# Patient Record
Sex: FEMALE | Race: WHITE | NOT HISPANIC OR LATINO | Employment: OTHER | URBAN - METROPOLITAN AREA
[De-identification: names, ages, dates, MRNs, and addresses within clinical notes are randomized per-mention and may not be internally consistent; named-entity substitution may affect disease eponyms.]

---

## 2017-09-13 ENCOUNTER — OFFICE VISIT (OUTPATIENT)
Dept: GASTROENTEROLOGY | Facility: CLINIC | Age: 62
End: 2017-09-13

## 2017-09-13 VITALS
SYSTOLIC BLOOD PRESSURE: 150 MMHG | TEMPERATURE: 97.1 F | OXYGEN SATURATION: 95 % | WEIGHT: 197.8 LBS | HEART RATE: 80 BPM | DIASTOLIC BLOOD PRESSURE: 84 MMHG | HEIGHT: 61 IN | BODY MASS INDEX: 37.34 KG/M2

## 2017-09-13 DIAGNOSIS — K85.90 ACUTE PANCREATITIS, UNSPECIFIED COMPLICATION STATUS, UNSPECIFIED PANCREATITIS TYPE: Primary | ICD-10-CM

## 2017-09-13 PROCEDURE — 99204 OFFICE O/P NEW MOD 45 MIN: CPT | Performed by: INTERNAL MEDICINE

## 2017-09-13 RX ORDER — CARVEDILOL 6.25 MG
6.25 TABLET ORAL 2 TIMES DAILY WITH MEALS
COMMUNITY

## 2017-09-13 RX ORDER — HYDROCODONE BITARTRATE AND ACETAMINOPHEN 5; 325 MG/1; MG/1
TABLET ORAL
COMMUNITY
Start: 2017-06-28 | End: 2017-09-13 | Stop reason: HOSPADM

## 2017-09-13 RX ORDER — ONDANSETRON 4 MG/1
4 TABLET, ORALLY DISINTEGRATING ORAL AS NEEDED
COMMUNITY
Start: 2017-06-28 | End: 2018-10-31

## 2017-09-13 RX ORDER — TICAGRELOR 90 MG/1
TABLET ORAL
COMMUNITY
Start: 2017-07-19 | End: 2017-09-13 | Stop reason: HOSPADM

## 2017-09-13 RX ORDER — LINAGLIPTIN 5 MG/1
5 TABLET, FILM COATED ORAL DAILY
COMMUNITY
Start: 2017-07-24 | End: 2018-10-31

## 2017-09-13 RX ORDER — FAMOTIDINE 20 MG/1
20 TABLET, FILM COATED ORAL 2 TIMES DAILY
COMMUNITY
Start: 2017-07-24

## 2017-09-13 RX ORDER — LORATADINE 10 MG/1
CAPSULE, LIQUID FILLED ORAL DAILY
COMMUNITY
End: 2018-12-03

## 2017-09-13 RX ORDER — LEVOTHYROXINE SODIUM 0.07 MG/1
75 TABLET ORAL DAILY
COMMUNITY
Start: 2017-08-01

## 2017-09-13 RX ORDER — ASPIRIN 81 MG/1
81 TABLET ORAL DAILY
COMMUNITY
End: 2019-01-01

## 2017-09-13 RX ORDER — EVOLOCUMAB 140 MG/ML
140 INJECTION, SOLUTION SUBCUTANEOUS
COMMUNITY
Start: 2017-07-28 | End: 2019-01-01

## 2017-09-13 RX ORDER — PANTOPRAZOLE SODIUM 40 MG/1
40 TABLET, DELAYED RELEASE ORAL DAILY
COMMUNITY
Start: 2017-07-03

## 2017-09-13 NOTE — PROGRESS NOTES
PCP: Rox Singleton MD    Chief Complaint   Patient presents with   • Abdominal Pain       History of Present Illness:   HPI  Ms. Valencia is a 63 yo with a history of diabetes, hyperlipidemia and chronic tobacco use.  She had an episode in June of upper abdominal pain and presented to the local hospital in [place], [place].  She was diagnosed with pancreatitis. Ms. Valencia was in the hospital for about 4 days.  The patient states she's been doing well over the last 2 months without any abdominal pain.  She denies any nausea or vomiting.  The patient has no difficult or painful swallowing.  The patient denies any breakthrough heartburn.  Ms. Valencia does not drink alcohol.  She denies any family history of pancreas disease.  There is no history of change in bowel habits or signs of gastrointestinal bleeding.  The patient denies any recent travel outside the [place]. There is no history of new medication.  Past Medical History:   Diagnosis Date   • Diabetes mellitus    • Hyperlipidemia    • Pancreatitis      Peripheral vascular disease  Past Surgical History:   Procedure Laterality Date   • CAROTID STENT     • COLONOSCOPY     • UPPER GASTROINTESTINAL ENDOSCOPY           Current Outpatient Prescriptions:   •  aspirin 81 MG EC tablet, Take 81 mg by mouth Daily., Disp: , Rfl:   •  carvedilol (COREG) 6.25 MG tablet, Take 6.25 mg by mouth 2 (Two) Times a Day With Meals., Disp: , Rfl:   •  famotidine (PEPCID) 20 MG tablet, Take 20 mg by mouth 2 (Two) Times a Day., Disp: , Rfl:   •  levothyroxine (SYNTHROID, LEVOTHROID) 75 MCG tablet, Take 75 mcg by mouth Daily., Disp: , Rfl:   •  Loratadine (CLARITIN) 10 MG capsule, Take  by mouth Daily., Disp: , Rfl:   •  ondansetron ODT (ZOFRAN-ODT) 4 MG disintegrating tablet, Take 4 mg by mouth As Needed., Disp: , Rfl:   •  pantoprazole (PROTONIX) 40 MG EC tablet, Take 40 mg by mouth Daily., Disp: , Rfl:   •  REPATHA SURECLICK 140 MG/ML solution auto-injector, Every 14  (Fourteen) Days., Disp: , Rfl:   •  ticagrelor (BRILINTA) 90 MG tablet tablet, Take 90 mg by mouth 2 (Two) Times a Day., Disp: , Rfl:   •  TRADJENTA 5 MG tablet tablet, Take 5 mg by mouth Daily., Disp: , Rfl:     Allergies   Allergen Reactions   • Plavix [Clopidogrel Bisulfate] Anaphylaxis   • Codeine    • Penicillins        Family History   Problem Relation Age of Onset   • Colon polyps Mother    • Ulcerative colitis Sister        Social History     Social History   • Marital status:      Spouse name: N/A   • Number of children: N/A   • Years of education: N/A     Occupational History   • Not on file.     Social History Main Topics   • Smoking status: Current Every Day Smoker     Types: Cigarettes   • Smokeless tobacco: Not on file   • Alcohol use No   • Drug use: Not on file   • Sexual activity: Not on file     Other Topics Concern   • Not on file     Social History Narrative   • No narrative on file       Review of Systems   Constitutional: Negative for activity change, appetite change, fatigue, fever and unexpected weight change.   HENT: Negative for dental problem, hearing loss, mouth sores, postnasal drip, sneezing, trouble swallowing and voice change.    Eyes: Negative for pain, redness, itching and visual disturbance.   Respiratory: Negative for cough, choking, chest tightness, shortness of breath and wheezing.    Cardiovascular: Negative for chest pain, palpitations and leg swelling.   Gastrointestinal: Positive for abdominal distention (bloating) and abdominal pain. Negative for anal bleeding, blood in stool, constipation, diarrhea, nausea, rectal pain and vomiting.   Endocrine: Negative for cold intolerance, heat intolerance, polydipsia, polyphagia and polyuria.   Genitourinary: Negative.  Negative for dysuria, enuresis, flank pain, hematuria and urgency.   Musculoskeletal: Negative for arthralgias, back pain, gait problem, joint swelling and myalgias.   Skin: Negative for color change, pallor and  rash.   Allergic/Immunologic: Negative for environmental allergies, food allergies and immunocompromised state.   Neurological: Negative for dizziness, tremors, seizures, facial asymmetry, speech difficulty, numbness and headaches.   Hematological: Negative for adenopathy.   Psychiatric/Behavioral: Negative for behavioral problems, confusion, dysphoric mood, hallucinations and self-injury.       Vitals:    09/13/17 1323   BP: 150/84   Pulse: 80   Temp: 97.1 °F (36.2 °C)   SpO2: 95%       Physical Exam   Constitutional: She is oriented to person, place, and time. She appears well-nourished. No distress.   HENT:   Head: Normocephalic and atraumatic.   Mouth/Throat: Oropharynx is clear and moist. No oropharyngeal exudate.   Eyes: EOM are normal. Pupils are equal, round, and reactive to light. No scleral icterus.   Neck: Normal range of motion. Neck supple. No thyromegaly present.   Cardiovascular: Normal rate, regular rhythm and normal heart sounds.  Exam reveals no gallop.    No murmur heard.  Pulmonary/Chest: Effort normal and breath sounds normal. She has no wheezes. She has no rales.   Abdominal: Soft. Bowel sounds are normal. There is no tenderness. There is no rebound and no guarding.   Well healed surgical incision   Musculoskeletal: Normal range of motion. She exhibits no edema or tenderness.   Lymphadenopathy:     She has no cervical adenopathy.   Neurological: She is alert and oriented to person, place, and time. She exhibits normal muscle tone.   Skin: Skin is dry. No rash noted. No erythema.   Psychiatric: She has a normal mood and affect. Her behavior is normal. Thought content normal.   Vitals reviewed.      Shauna was seen today for abdominal pain.    Diagnoses and all orders for this visit:    Acute pancreatitis, unspecified complication status, unspecified pancreatitis type  The patient has no significant history of alcohol use.  She does take the medicine Tradjenta and this  has been associated with  pancreatitis.  She also does smoke and this also associated with pancreas disease.  The clinical history is not suggestive of a passed common duct stone.      Plan: Encouraged tobacco cessation.           Will obtain a copy of CT scan from [facility] in [place], [place].           Will discuss case further with Dr. Oquendo regarding the medication for diabetes.      I spent over 50% of the office visit counseling and answering questions from the patient.

## 2017-10-13 ENCOUNTER — TELEPHONE (OUTPATIENT)
Dept: GASTROENTEROLOGY | Facility: CLINIC | Age: 62
End: 2017-10-13

## 2017-10-13 NOTE — TELEPHONE ENCOUNTER
Carolina,  Please call [facility] in [place] for CT Scan records. We will request it.

## 2017-11-29 ENCOUNTER — LAB (OUTPATIENT)
Dept: LAB | Facility: HOSPITAL | Age: 62
End: 2017-11-29

## 2017-11-29 ENCOUNTER — TRANSCRIBE ORDERS (OUTPATIENT)
Dept: LAB | Facility: HOSPITAL | Age: 62
End: 2017-11-29

## 2017-11-29 DIAGNOSIS — E78.00 PURE HYPERCHOLESTEROLEMIA: Primary | ICD-10-CM

## 2017-11-29 DIAGNOSIS — E78.00 PURE HYPERCHOLESTEROLEMIA: ICD-10-CM

## 2017-11-29 PROCEDURE — 36415 COLL VENOUS BLD VENIPUNCTURE: CPT

## 2017-11-29 PROCEDURE — 80076 HEPATIC FUNCTION PANEL: CPT | Performed by: INTERNAL MEDICINE

## 2017-11-29 PROCEDURE — 80061 LIPID PANEL: CPT | Performed by: INTERNAL MEDICINE

## 2017-11-30 LAB
ALBUMIN SERPL-MCNC: 4.5 G/DL (ref 3.4–4.8)
ALP SERPL-CCNC: 67 U/L (ref 35–104)
ALT SERPL W P-5'-P-CCNC: 23 U/L (ref 10–36)
AST SERPL-CCNC: 19 U/L (ref 10–30)
BILIRUB CONJ SERPL-MCNC: 0.2 MG/DL (ref 0–0.2)
BILIRUB INDIRECT SERPL-MCNC: 0.1 MG/DL
BILIRUB SERPL-MCNC: 0.3 MG/DL (ref 0.2–1.8)
CHOLEST SERPL-MCNC: 211 MG/DL (ref 0–200)
HDLC SERPL-MCNC: 35 MG/DL (ref 60–100)
LDLC SERPL CALC-MCNC: ABNORMAL MG/DL (ref 0–100)
LDLC/HDLC SERPL: ABNORMAL {RATIO}
PROT SERPL-MCNC: 6.9 G/DL (ref 6–8)
TRIGL SERPL-MCNC: 440 MG/DL (ref 0–150)
VLDLC SERPL-MCNC: ABNORMAL MG/DL

## 2018-01-23 ENCOUNTER — LAB (OUTPATIENT)
Dept: LAB | Facility: HOSPITAL | Age: 63
End: 2018-01-23

## 2018-01-23 ENCOUNTER — TRANSCRIBE ORDERS (OUTPATIENT)
Dept: LAB | Facility: HOSPITAL | Age: 63
End: 2018-01-23

## 2018-01-23 DIAGNOSIS — E78.00 HYPERCHOLESTEREMIA: ICD-10-CM

## 2018-01-23 DIAGNOSIS — E78.00 PURE HYPERCHOLESTEROLEMIA: ICD-10-CM

## 2018-01-23 DIAGNOSIS — E78.00 PURE HYPERCHOLESTEROLEMIA: Primary | ICD-10-CM

## 2018-01-23 PROCEDURE — 80061 LIPID PANEL: CPT | Performed by: INTERNAL MEDICINE

## 2018-01-23 PROCEDURE — 85027 COMPLETE CBC AUTOMATED: CPT | Performed by: INTERNAL MEDICINE

## 2018-01-23 PROCEDURE — 36415 COLL VENOUS BLD VENIPUNCTURE: CPT

## 2018-01-23 PROCEDURE — 80048 BASIC METABOLIC PNL TOTAL CA: CPT | Performed by: INTERNAL MEDICINE

## 2018-01-23 PROCEDURE — 80076 HEPATIC FUNCTION PANEL: CPT | Performed by: INTERNAL MEDICINE

## 2018-01-24 LAB
ALBUMIN SERPL-MCNC: 4.7 G/DL (ref 3.4–4.8)
ALP SERPL-CCNC: 68 U/L (ref 35–104)
ALT SERPL W P-5'-P-CCNC: 23 U/L (ref 10–36)
ANION GAP SERPL CALCULATED.3IONS-SCNC: 9.5 MMOL/L (ref 3.6–11.2)
AST SERPL-CCNC: 15 U/L (ref 10–30)
BILIRUB CONJ SERPL-MCNC: 0.1 MG/DL (ref 0–0.2)
BILIRUB INDIRECT SERPL-MCNC: 0.4 MG/DL
BILIRUB SERPL-MCNC: 0.5 MG/DL (ref 0.2–1.8)
BUN BLD-MCNC: 15 MG/DL (ref 7–21)
BUN/CREAT SERPL: 17 (ref 7–25)
CALCIUM SPEC-SCNC: 9.8 MG/DL (ref 7.7–10)
CHLORIDE SERPL-SCNC: 105 MMOL/L (ref 99–112)
CHOLEST SERPL-MCNC: 240 MG/DL (ref 0–200)
CO2 SERPL-SCNC: 26.5 MMOL/L (ref 24.3–31.9)
CREAT BLD-MCNC: 0.88 MG/DL (ref 0.43–1.29)
GFR SERPL CREATININE-BSD FRML MDRD: 66 ML/MIN/1.73
GLUCOSE BLD-MCNC: 124 MG/DL (ref 70–110)
HDLC SERPL-MCNC: 43 MG/DL (ref 60–100)
LDLC SERPL CALC-MCNC: 144 MG/DL (ref 0–100)
LDLC/HDLC SERPL: 3.34 {RATIO}
OSMOLALITY SERPL CALC.SUM OF ELEC: 283.5 MOSM/KG (ref 273–305)
POTASSIUM BLD-SCNC: 4 MMOL/L (ref 3.5–5.3)
PROT SERPL-MCNC: 7.3 G/DL (ref 6–8)
SODIUM BLD-SCNC: 141 MMOL/L (ref 135–153)
TRIGL SERPL-MCNC: 266 MG/DL (ref 0–150)
VLDLC SERPL-MCNC: 53.2 MG/DL

## 2018-01-25 LAB
DEPRECATED RDW RBC AUTO: 43.7 FL (ref 37–54)
ERYTHROCYTE [DISTWIDTH] IN BLOOD BY AUTOMATED COUNT: 13.7 % (ref 11.5–14.5)
HCT VFR BLD AUTO: 43.3 % (ref 37–47)
HGB BLD-MCNC: 14 G/DL (ref 12–16)
MCH RBC QN AUTO: 28.7 PG (ref 27–33)
MCHC RBC AUTO-ENTMCNC: 32.3 G/DL (ref 33–37)
MCV RBC AUTO: 88.9 FL (ref 80–94)
PLATELET # BLD AUTO: 305 10*3/MM3 (ref 130–400)
PMV BLD AUTO: 10.3 FL (ref 6–10)
RBC # BLD AUTO: 4.87 10*6/MM3 (ref 4.2–5.4)
WBC NRBC COR # BLD: 7.91 10*3/MM3 (ref 4.5–12.5)

## 2018-05-24 ENCOUNTER — LAB (OUTPATIENT)
Dept: LAB | Facility: HOSPITAL | Age: 63
End: 2018-05-24

## 2018-05-24 ENCOUNTER — TRANSCRIBE ORDERS (OUTPATIENT)
Dept: LAB | Facility: HOSPITAL | Age: 63
End: 2018-05-24

## 2018-05-24 DIAGNOSIS — E78.00 PURE HYPERCHOLESTEROLEMIA: Primary | ICD-10-CM

## 2018-05-24 DIAGNOSIS — E78.00 PURE HYPERCHOLESTEROLEMIA: ICD-10-CM

## 2018-05-24 PROCEDURE — 36415 COLL VENOUS BLD VENIPUNCTURE: CPT

## 2018-05-24 PROCEDURE — 80076 HEPATIC FUNCTION PANEL: CPT | Performed by: INTERNAL MEDICINE

## 2018-05-24 PROCEDURE — 80061 LIPID PANEL: CPT | Performed by: INTERNAL MEDICINE

## 2018-05-25 LAB
ALBUMIN SERPL-MCNC: 4.6 G/DL (ref 3.4–4.8)
ALP SERPL-CCNC: 65 U/L (ref 35–104)
ALT SERPL W P-5'-P-CCNC: 18 U/L (ref 10–36)
AST SERPL-CCNC: 21 U/L (ref 10–30)
BILIRUB CONJ SERPL-MCNC: 0.2 MG/DL (ref 0–0.2)
BILIRUB INDIRECT SERPL-MCNC: 0.3 MG/DL
BILIRUB SERPL-MCNC: 0.5 MG/DL (ref 0.2–1.8)
CHOLEST SERPL-MCNC: 144 MG/DL (ref 0–200)
HDLC SERPL-MCNC: 34 MG/DL (ref 60–100)
LDLC SERPL CALC-MCNC: 62 MG/DL (ref 0–100)
LDLC/HDLC SERPL: 1.84 {RATIO}
PROT SERPL-MCNC: 7 G/DL (ref 6–8)
TRIGL SERPL-MCNC: 238 MG/DL (ref 0–150)
VLDLC SERPL-MCNC: 47.6 MG/DL

## 2018-10-31 ENCOUNTER — HOSPITAL ENCOUNTER (INPATIENT)
Facility: HOSPITAL | Age: 63
LOS: 17 days | Discharge: HOME HEALTH | End: 2018-11-17
Attending: EMERGENCY MEDICINE | Admitting: INTERNAL MEDICINE

## 2018-10-31 ENCOUNTER — APPOINTMENT (OUTPATIENT)
Dept: CT IMAGING | Facility: HOSPITAL | Age: 63
End: 2018-10-31

## 2018-10-31 DIAGNOSIS — C34.12 SMALL CELL LUNG CANCER, LEFT UPPER LOBE (HCC): ICD-10-CM

## 2018-10-31 DIAGNOSIS — R91.8 MASS OF UPPER LOBE OF LEFT LUNG: ICD-10-CM

## 2018-10-31 DIAGNOSIS — E87.1 ACUTE HYPONATREMIA: Primary | ICD-10-CM

## 2018-10-31 DIAGNOSIS — R06.00 DYSPNEA, UNSPECIFIED TYPE: ICD-10-CM

## 2018-10-31 DIAGNOSIS — R13.13 PHARYNGEAL DYSPHAGIA: ICD-10-CM

## 2018-10-31 DIAGNOSIS — R91.8 HILAR MASS: ICD-10-CM

## 2018-10-31 PROBLEM — E66.9 OBESITY (BMI 30-39.9): Status: ACTIVE | Noted: 2018-10-31

## 2018-10-31 PROBLEM — E11.9 TYPE 2 DIABETES MELLITUS (HCC): Status: ACTIVE | Noted: 2018-10-31

## 2018-10-31 PROBLEM — E78.5 HYPERLIPIDEMIA: Status: ACTIVE | Noted: 2018-10-31

## 2018-10-31 PROBLEM — Z72.0 TOBACCO ABUSE: Status: ACTIVE | Noted: 2018-10-31

## 2018-10-31 LAB
ANION GAP SERPL CALCULATED.3IONS-SCNC: 5 MMOL/L (ref 3–11)
BASOPHILS # BLD AUTO: 0.04 10*3/MM3 (ref 0–0.2)
BASOPHILS NFR BLD AUTO: 0.5 % (ref 0–1)
BILIRUB UR QL STRIP: NEGATIVE
BUN BLD-MCNC: 12 MG/DL (ref 9–23)
BUN/CREAT SERPL: 17.9 (ref 7–25)
CALCIUM SPEC-SCNC: 8.8 MG/DL (ref 8.7–10.4)
CHLORIDE SERPL-SCNC: 87 MMOL/L (ref 99–109)
CLARITY UR: CLEAR
CO2 SERPL-SCNC: 25 MMOL/L (ref 20–31)
COLOR UR: ABNORMAL
CREAT BLD-MCNC: 0.67 MG/DL (ref 0.6–1.3)
DEPRECATED RDW RBC AUTO: 37.9 FL (ref 37–54)
EOSINOPHIL # BLD AUTO: 0.15 10*3/MM3 (ref 0–0.3)
EOSINOPHIL NFR BLD AUTO: 2 % (ref 0–3)
ERYTHROCYTE [DISTWIDTH] IN BLOOD BY AUTOMATED COUNT: 12.7 % (ref 11.3–14.5)
GFR SERPL CREATININE-BSD FRML MDRD: 89 ML/MIN/1.73
GLUCOSE BLD-MCNC: 146 MG/DL (ref 70–100)
GLUCOSE UR STRIP-MCNC: NEGATIVE MG/DL
HCT VFR BLD AUTO: 33.8 % (ref 34.5–44)
HGB BLD-MCNC: 11.7 G/DL (ref 11.5–15.5)
HGB UR QL STRIP.AUTO: NEGATIVE
IMM GRANULOCYTES # BLD: 0.02 10*3/MM3 (ref 0–0.03)
IMM GRANULOCYTES NFR BLD: 0.3 % (ref 0–0.6)
KETONES UR QL STRIP: ABNORMAL
LEUKOCYTE ESTERASE UR QL STRIP.AUTO: NEGATIVE
LYMPHOCYTES # BLD AUTO: 1.59 10*3/MM3 (ref 0.6–4.8)
LYMPHOCYTES NFR BLD AUTO: 21.1 % (ref 24–44)
MCH RBC QN AUTO: 28.4 PG (ref 27–31)
MCHC RBC AUTO-ENTMCNC: 34.6 G/DL (ref 32–36)
MCV RBC AUTO: 82 FL (ref 80–99)
MONOCYTES # BLD AUTO: 0.66 10*3/MM3 (ref 0–1)
MONOCYTES NFR BLD AUTO: 8.8 % (ref 0–12)
NEUTROPHILS # BLD AUTO: 5.1 10*3/MM3 (ref 1.5–8.3)
NEUTROPHILS NFR BLD AUTO: 67.6 % (ref 41–71)
NITRITE UR QL STRIP: NEGATIVE
OSMOLALITY SERPL: 256 MOSM/KG (ref 275–295)
OSMOLALITY UR: 879 MOSM/KG (ref 300–1100)
PH UR STRIP.AUTO: 6 [PH] (ref 5–8)
PLATELET # BLD AUTO: 422 10*3/MM3 (ref 150–450)
PMV BLD AUTO: 9 FL (ref 6–12)
POTASSIUM BLD-SCNC: 4.4 MMOL/L (ref 3.5–5.5)
PROT UR QL STRIP: NEGATIVE
RBC # BLD AUTO: 4.12 10*6/MM3 (ref 3.89–5.14)
SODIUM BLD-SCNC: 117 MMOL/L (ref 132–146)
SODIUM UR-SCNC: 55 MMOL/L (ref 30–90)
SP GR UR STRIP: >=1.03 (ref 1–1.03)
UROBILINOGEN UR QL STRIP: ABNORMAL
WBC NRBC COR # BLD: 7.54 10*3/MM3 (ref 3.5–10.8)

## 2018-10-31 PROCEDURE — 84300 ASSAY OF URINE SODIUM: CPT | Performed by: EMERGENCY MEDICINE

## 2018-10-31 PROCEDURE — 71250 CT THORAX DX C-: CPT

## 2018-10-31 PROCEDURE — 99223 1ST HOSP IP/OBS HIGH 75: CPT | Performed by: HOSPITALIST

## 2018-10-31 PROCEDURE — G0378 HOSPITAL OBSERVATION PER HR: HCPCS

## 2018-10-31 PROCEDURE — 85025 COMPLETE CBC W/AUTO DIFF WBC: CPT | Performed by: EMERGENCY MEDICINE

## 2018-10-31 PROCEDURE — 81003 URINALYSIS AUTO W/O SCOPE: CPT | Performed by: EMERGENCY MEDICINE

## 2018-10-31 PROCEDURE — 83930 ASSAY OF BLOOD OSMOLALITY: CPT | Performed by: EMERGENCY MEDICINE

## 2018-10-31 PROCEDURE — 80048 BASIC METABOLIC PNL TOTAL CA: CPT | Performed by: HOSPITALIST

## 2018-10-31 PROCEDURE — 83036 HEMOGLOBIN GLYCOSYLATED A1C: CPT | Performed by: HOSPITALIST

## 2018-10-31 PROCEDURE — 99284 EMERGENCY DEPT VISIT MOD MDM: CPT

## 2018-10-31 PROCEDURE — 63710000001 INSULIN LISPRO (HUMAN) PER 5 UNITS: Performed by: HOSPITALIST

## 2018-10-31 PROCEDURE — 83935 ASSAY OF URINE OSMOLALITY: CPT | Performed by: EMERGENCY MEDICINE

## 2018-10-31 PROCEDURE — 80048 BASIC METABOLIC PNL TOTAL CA: CPT | Performed by: EMERGENCY MEDICINE

## 2018-10-31 RX ORDER — ASPIRIN 81 MG/1
81 TABLET ORAL DAILY
Status: DISCONTINUED | OUTPATIENT
Start: 2018-11-01 | End: 2018-11-17 | Stop reason: HOSPADM

## 2018-10-31 RX ORDER — LEVOTHYROXINE SODIUM 0.07 MG/1
75 TABLET ORAL DAILY
Status: DISCONTINUED | OUTPATIENT
Start: 2018-11-01 | End: 2018-11-17 | Stop reason: HOSPADM

## 2018-10-31 RX ORDER — ALBUTEROL SULFATE 90 UG/1
2 AEROSOL, METERED RESPIRATORY (INHALATION) EVERY 4 HOURS PRN
COMMUNITY
End: 2019-01-01 | Stop reason: HOSPADM

## 2018-10-31 RX ORDER — FAMOTIDINE 20 MG/1
20 TABLET, FILM COATED ORAL DAILY
Status: DISCONTINUED | OUTPATIENT
Start: 2018-11-01 | End: 2018-11-09

## 2018-10-31 RX ORDER — DOXYCYCLINE 100 MG/1
100 CAPSULE ORAL EVERY 12 HOURS SCHEDULED
Status: DISCONTINUED | OUTPATIENT
Start: 2018-10-31 | End: 2018-11-01

## 2018-10-31 RX ORDER — CARVEDILOL 6.25 MG/1
6.25 TABLET ORAL 2 TIMES DAILY WITH MEALS
Status: DISCONTINUED | OUTPATIENT
Start: 2018-10-31 | End: 2018-11-17 | Stop reason: HOSPADM

## 2018-10-31 RX ORDER — NICOTINE POLACRILEX 4 MG
15 LOZENGE BUCCAL
Status: DISCONTINUED | OUTPATIENT
Start: 2018-10-31 | End: 2018-11-17 | Stop reason: HOSPADM

## 2018-10-31 RX ORDER — CETIRIZINE HYDROCHLORIDE 10 MG/1
10 TABLET ORAL DAILY
Status: DISCONTINUED | OUTPATIENT
Start: 2018-11-01 | End: 2018-11-17 | Stop reason: HOSPADM

## 2018-10-31 RX ORDER — CEFTRIAXONE SODIUM 1 G/50ML
1 INJECTION, SOLUTION INTRAVENOUS
Status: DISCONTINUED | OUTPATIENT
Start: 2018-10-31 | End: 2018-11-01

## 2018-10-31 RX ORDER — SODIUM CHLORIDE 9 MG/ML
75 INJECTION, SOLUTION INTRAVENOUS CONTINUOUS
Status: DISCONTINUED | OUTPATIENT
Start: 2018-10-31 | End: 2018-11-01

## 2018-10-31 RX ORDER — DEXTROSE MONOHYDRATE 25 G/50ML
25 INJECTION, SOLUTION INTRAVENOUS
Status: DISCONTINUED | OUTPATIENT
Start: 2018-10-31 | End: 2018-11-17 | Stop reason: HOSPADM

## 2018-10-31 RX ADMIN — SODIUM CHLORIDE 75 ML/HR: 9 INJECTION, SOLUTION INTRAVENOUS at 21:38

## 2018-10-31 RX ADMIN — CARVEDILOL 6.25 MG: 6.25 TABLET, FILM COATED ORAL at 21:40

## 2018-11-01 ENCOUNTER — ANCILLARY PROCEDURE (OUTPATIENT)
Dept: SPEECH THERAPY | Facility: HOSPITAL | Age: 63
End: 2018-11-01
Attending: HOSPITALIST

## 2018-11-01 PROBLEM — R91.8 MASS OF UPPER LOBE OF LEFT LUNG: Status: ACTIVE | Noted: 2018-11-01

## 2018-11-01 PROBLEM — R59.0 MEDIASTINAL LYMPHADENOPATHY: Status: ACTIVE | Noted: 2018-11-01

## 2018-11-01 LAB
ANION GAP SERPL CALCULATED.3IONS-SCNC: 3 MMOL/L (ref 3–11)
ANION GAP SERPL CALCULATED.3IONS-SCNC: 4 MMOL/L (ref 3–11)
ANION GAP SERPL CALCULATED.3IONS-SCNC: 7 MMOL/L (ref 3–11)
ANION GAP SERPL CALCULATED.3IONS-SCNC: 8 MMOL/L (ref 3–11)
BUN BLD-MCNC: 10 MG/DL (ref 9–23)
BUN BLD-MCNC: 10 MG/DL (ref 9–23)
BUN BLD-MCNC: 11 MG/DL (ref 9–23)
BUN BLD-MCNC: 12 MG/DL (ref 9–23)
BUN BLD-MCNC: 13 MG/DL (ref 9–23)
BUN BLD-MCNC: 13 MG/DL (ref 9–23)
BUN/CREAT SERPL: 15.4 (ref 7–25)
BUN/CREAT SERPL: 15.9 (ref 7–25)
BUN/CREAT SERPL: 16.9 (ref 7–25)
BUN/CREAT SERPL: 18.8 (ref 7–25)
BUN/CREAT SERPL: 21.1 (ref 7–25)
BUN/CREAT SERPL: 22.8 (ref 7–25)
CALCIUM SPEC-SCNC: 8.1 MG/DL (ref 8.7–10.4)
CALCIUM SPEC-SCNC: 8.3 MG/DL (ref 8.7–10.4)
CALCIUM SPEC-SCNC: 8.4 MG/DL (ref 8.7–10.4)
CALCIUM SPEC-SCNC: 8.4 MG/DL (ref 8.7–10.4)
CALCIUM SPEC-SCNC: 8.5 MG/DL (ref 8.7–10.4)
CALCIUM SPEC-SCNC: 8.6 MG/DL (ref 8.7–10.4)
CHLORIDE SERPL-SCNC: 88 MMOL/L (ref 99–109)
CHLORIDE SERPL-SCNC: 89 MMOL/L (ref 99–109)
CHLORIDE SERPL-SCNC: 89 MMOL/L (ref 99–109)
CHLORIDE SERPL-SCNC: 90 MMOL/L (ref 99–109)
CHLORIDE SERPL-SCNC: 90 MMOL/L (ref 99–109)
CHLORIDE SERPL-SCNC: 91 MMOL/L (ref 99–109)
CO2 SERPL-SCNC: 21 MMOL/L (ref 20–31)
CO2 SERPL-SCNC: 23 MMOL/L (ref 20–31)
CO2 SERPL-SCNC: 24 MMOL/L (ref 20–31)
CO2 SERPL-SCNC: 26 MMOL/L (ref 20–31)
CO2 SERPL-SCNC: 26 MMOL/L (ref 20–31)
CO2 SERPL-SCNC: 28 MMOL/L (ref 20–31)
CREAT BLD-MCNC: 0.57 MG/DL (ref 0.6–1.3)
CREAT BLD-MCNC: 0.57 MG/DL (ref 0.6–1.3)
CREAT BLD-MCNC: 0.63 MG/DL (ref 0.6–1.3)
CREAT BLD-MCNC: 0.65 MG/DL (ref 0.6–1.3)
CREAT BLD-MCNC: 0.65 MG/DL (ref 0.6–1.3)
CREAT BLD-MCNC: 0.69 MG/DL (ref 0.6–1.3)
GFR SERPL CREATININE-BSD FRML MDRD: 108 ML/MIN/1.73
GFR SERPL CREATININE-BSD FRML MDRD: 108 ML/MIN/1.73
GFR SERPL CREATININE-BSD FRML MDRD: 86 ML/MIN/1.73
GFR SERPL CREATININE-BSD FRML MDRD: 93 ML/MIN/1.73
GFR SERPL CREATININE-BSD FRML MDRD: 93 ML/MIN/1.73
GFR SERPL CREATININE-BSD FRML MDRD: 96 ML/MIN/1.73
GLUCOSE BLD-MCNC: 107 MG/DL (ref 70–100)
GLUCOSE BLD-MCNC: 113 MG/DL (ref 70–100)
GLUCOSE BLD-MCNC: 124 MG/DL (ref 70–100)
GLUCOSE BLD-MCNC: 138 MG/DL (ref 70–100)
GLUCOSE BLD-MCNC: 162 MG/DL (ref 70–100)
GLUCOSE BLD-MCNC: 180 MG/DL (ref 70–100)
GLUCOSE BLDC GLUCOMTR-MCNC: 136 MG/DL (ref 70–130)
GLUCOSE BLDC GLUCOMTR-MCNC: 157 MG/DL (ref 70–130)
GLUCOSE BLDC GLUCOMTR-MCNC: 170 MG/DL (ref 70–130)
HBA1C MFR BLD: 7.3 % (ref 4.8–5.6)
POTASSIUM BLD-SCNC: 3.8 MMOL/L (ref 3.5–5.5)
POTASSIUM BLD-SCNC: 3.9 MMOL/L (ref 3.5–5.5)
POTASSIUM BLD-SCNC: 4 MMOL/L (ref 3.5–5.5)
POTASSIUM BLD-SCNC: 4.1 MMOL/L (ref 3.5–5.5)
POTASSIUM BLD-SCNC: 4.2 MMOL/L (ref 3.5–5.5)
POTASSIUM BLD-SCNC: 4.5 MMOL/L (ref 3.5–5.5)
SODIUM BLD-SCNC: 118 MMOL/L (ref 132–146)
SODIUM BLD-SCNC: 118 MMOL/L (ref 132–146)
SODIUM BLD-SCNC: 119 MMOL/L (ref 132–146)
SODIUM BLD-SCNC: 119 MMOL/L (ref 132–146)
SODIUM BLD-SCNC: 120 MMOL/L (ref 132–146)
SODIUM BLD-SCNC: 121 MMOL/L (ref 132–146)
TSH SERPL DL<=0.05 MIU/L-ACNC: 0.73 MIU/ML (ref 0.35–5.35)

## 2018-11-01 PROCEDURE — 84443 ASSAY THYROID STIM HORMONE: CPT | Performed by: HOSPITALIST

## 2018-11-01 PROCEDURE — 25010000002 ENOXAPARIN PER 10 MG: Performed by: HOSPITALIST

## 2018-11-01 PROCEDURE — 82962 GLUCOSE BLOOD TEST: CPT

## 2018-11-01 PROCEDURE — 80048 BASIC METABOLIC PNL TOTAL CA: CPT | Performed by: HOSPITALIST

## 2018-11-01 PROCEDURE — 25010000002 FUROSEMIDE PER 20 MG: Performed by: NURSE PRACTITIONER

## 2018-11-01 PROCEDURE — 92610 EVALUATE SWALLOWING FUNCTION: CPT

## 2018-11-01 PROCEDURE — 99233 SBSQ HOSP IP/OBS HIGH 50: CPT | Performed by: HOSPITALIST

## 2018-11-01 PROCEDURE — 25010000002 CEFTRIAXONE PER 250 MG: Performed by: HOSPITALIST

## 2018-11-01 PROCEDURE — 25010000002 ONDANSETRON PER 1 MG: Performed by: HOSPITALIST

## 2018-11-01 PROCEDURE — 99221 1ST HOSP IP/OBS SF/LOW 40: CPT | Performed by: INTERNAL MEDICINE

## 2018-11-01 PROCEDURE — 92612 ENDOSCOPY SWALLOW (FEES) VID: CPT

## 2018-11-01 RX ORDER — ONDANSETRON 2 MG/ML
4 INJECTION INTRAMUSCULAR; INTRAVENOUS EVERY 6 HOURS PRN
Status: DISCONTINUED | OUTPATIENT
Start: 2018-11-01 | End: 2018-11-17 | Stop reason: HOSPADM

## 2018-11-01 RX ORDER — ACETAMINOPHEN 325 MG/1
650 TABLET ORAL EVERY 6 HOURS PRN
Status: DISCONTINUED | OUTPATIENT
Start: 2018-11-01 | End: 2018-11-17 | Stop reason: HOSPADM

## 2018-11-01 RX ORDER — BENZONATATE 100 MG/1
200 CAPSULE ORAL 3 TIMES DAILY PRN
Status: DISCONTINUED | OUTPATIENT
Start: 2018-11-01 | End: 2018-11-17 | Stop reason: HOSPADM

## 2018-11-01 RX ORDER — NICOTINE 21 MG/24HR
1 PATCH, TRANSDERMAL 24 HOURS TRANSDERMAL
Status: DISCONTINUED | OUTPATIENT
Start: 2018-11-01 | End: 2018-11-17 | Stop reason: HOSPADM

## 2018-11-01 RX ORDER — PANTOPRAZOLE SODIUM 40 MG/1
40 TABLET, DELAYED RELEASE ORAL
Status: DISCONTINUED | OUTPATIENT
Start: 2018-11-01 | End: 2018-11-17 | Stop reason: HOSPADM

## 2018-11-01 RX ORDER — FUROSEMIDE 10 MG/ML
40 INJECTION INTRAMUSCULAR; INTRAVENOUS ONCE
Status: COMPLETED | OUTPATIENT
Start: 2018-11-01 | End: 2018-11-01

## 2018-11-01 RX ORDER — SODIUM CHLORIDE 1000 MG
1 TABLET, SOLUBLE MISCELLANEOUS
Status: DISCONTINUED | OUTPATIENT
Start: 2018-11-01 | End: 2018-11-05

## 2018-11-01 RX ADMIN — INSULIN LISPRO 2 UNITS: 100 INJECTION, SOLUTION INTRAVENOUS; SUBCUTANEOUS at 21:05

## 2018-11-01 RX ADMIN — Medication 1 G: at 17:15

## 2018-11-01 RX ADMIN — ENOXAPARIN SODIUM 40 MG: 100 INJECTION SUBCUTANEOUS at 06:23

## 2018-11-01 RX ADMIN — Medication 1 G: at 12:05

## 2018-11-01 RX ADMIN — NICOTINE 1 PATCH: 21 PATCH, EXTENDED RELEASE TRANSDERMAL at 12:05

## 2018-11-01 RX ADMIN — LEVOTHYROXINE SODIUM 75 MCG: 75 TABLET ORAL at 06:23

## 2018-11-01 RX ADMIN — BENZONATATE 200 MG: 100 CAPSULE ORAL at 21:04

## 2018-11-01 RX ADMIN — TICAGRELOR 90 MG: 90 TABLET ORAL at 08:51

## 2018-11-01 RX ADMIN — SODIUM CHLORIDE 75 ML/HR: 9 INJECTION, SOLUTION INTRAVENOUS at 09:51

## 2018-11-01 RX ADMIN — DOXYCYCLINE 100 MG: 100 CAPSULE ORAL at 00:39

## 2018-11-01 RX ADMIN — ASPIRIN 81 MG: 81 TABLET, COATED ORAL at 08:51

## 2018-11-01 RX ADMIN — ONDANSETRON HYDROCHLORIDE 4 MG: 2 INJECTION, SOLUTION INTRAMUSCULAR; INTRAVENOUS at 09:50

## 2018-11-01 RX ADMIN — CETIRIZINE HYDROCHLORIDE 10 MG: 10 TABLET, FILM COATED ORAL at 08:51

## 2018-11-01 RX ADMIN — PANTOPRAZOLE SODIUM 40 MG: 40 TABLET, DELAYED RELEASE ORAL at 09:50

## 2018-11-01 RX ADMIN — CARVEDILOL 6.25 MG: 6.25 TABLET, FILM COATED ORAL at 08:51

## 2018-11-01 RX ADMIN — CEFTRIAXONE SODIUM 1 G: 1 INJECTION, SOLUTION INTRAVENOUS at 00:39

## 2018-11-01 RX ADMIN — Medication 5 MG: at 21:04

## 2018-11-01 RX ADMIN — CARVEDILOL 6.25 MG: 6.25 TABLET, FILM COATED ORAL at 17:15

## 2018-11-01 RX ADMIN — INSULIN LISPRO 2 UNITS: 100 INJECTION, SOLUTION INTRAVENOUS; SUBCUTANEOUS at 00:40

## 2018-11-01 RX ADMIN — FAMOTIDINE 20 MG: 20 TABLET ORAL at 08:51

## 2018-11-01 RX ADMIN — ACETAMINOPHEN 650 MG: 325 TABLET ORAL at 09:41

## 2018-11-01 RX ADMIN — FUROSEMIDE 40 MG: 10 INJECTION, SOLUTION INTRAMUSCULAR; INTRAVENOUS at 22:12

## 2018-11-01 RX ADMIN — DOXYCYCLINE 100 MG: 100 CAPSULE ORAL at 08:51

## 2018-11-01 NOTE — PLAN OF CARE
Problem: Pain, Acute (Adult)  Goal: Identify Related Risk Factors and Signs and Symptoms  Pt request tylenol for pain of head and right leg, chronic type. Reports relief.

## 2018-11-01 NOTE — DISCHARGE INSTR - DIET
MBS/VFSS/FEES 11/01/18    Reason for Referral  Patient was referred for a FEES to assess the efficiency of his/her swallow function, rule out aspiration and make recommendations regarding safe dietary consistencies, effective compensatory strategies, and safe eating environment.                   Recommendations/Treatment            Risks/Benefits Reviewed: (P) risks/benefits explained, patient, family, agreed to eval  Nasal Entry: (P) right:  Special Considerations: (P) Scope #338    SLP Swallowing Diagnosis: (P) functional oral phase, moderate, pharyngeal dysfunction  Functional Impact: (P) risk of aspiration/pneumonia  Rehab Potential/Prognosis, Swallowing: (P) good, to achieve stated therapy goals  Swallow Criteria for Skilled Therapeutic Interventions Met: (P) demonstrates skilled criteria    Therapy Frequency (Swallow): (P) 5 days per week  Predicted Duration Therapy Intervention (Days): (P) until discharge  SLP Diet Recommendation: (P) mechanical soft with no mixed consistencies, honey thick liquids  Recommended Precautions and Strategies: (P) upright posture during/after eating, small bites of food and sips of liquid, no straw, alternate between small bites of food and sips of liquid, other (see comments) (oral care BID/PRN)  SLP Rec. for Method of Medication Administration: (P) meds whole, with pudding or applesauce, as tolerated  Monitor for Signs of Aspiration: (P) yes, notify SLP if any concerns, cough, gurgly voice, throat clearing  Anticipated Dischage Disposition: (P) unknown, anticipate therapy at next level of care      Oral Preparation/ Oral Phase       Oral Phase: (P) WFL    Pharyngeal Phase       Initiation of Pharyngeal Swallow: (P) bolus in pyriform sinuses  Pharyngeal Phase: (P) impaired pharyngeal phase of swallowing  Penetration During the Swallow: (P) honey-thick liquids, secondary to delayed swallow initiation or mistiming, secondary to reduced laryngeal elevation, secondary to reduced  vestibular closure  Aspiration During the Swallow: (P) thin liquids, nectar-thick liquids, secondary to delayed swallow initiation or mistiming, secondary to reduced laryngeal elevation, secondary to reduced vestibular closure  Response to Aspiration: (P) response with cue only, could not clear subglottic material  Rosenbek's Scale: (P) thin:, nectar:, 8-->Level 8, honey:, 3-->Level 3, pudding/puree:, regular textures:, 1-->Level 1  Residue: (P) all consistencies tested, base of tongue, valleculae, posterior pharyngeal wall, secondary to reduced base of tongue retraction, secondary to reduced posterior pharyngeal wall stripping, secondary to reduced laryngeal elevation (> w/ pudding and solid )  Response to Residue: (P) cleared residue, with liquid wash  Attempted Compensatory Maneuvers: (P) bolus size, bolus presentation style, chin tuck, additional subsequent swallow, alternate liquids/solids, breath hold, throat clear after swallow  Response to Attempted Compensatory Maneuvers: (P) did not prevent aspiration  FEES Summary: (P) FEES completed. Trials given of thin via tsp, cup, and straw, nectar-thick via tsp, cup, honey-thick via tsp and cup, pudding, and solid. Moderate pharyngeal dysphagia. Swallow initiation delayed to the pyriform sinuses w/ all consistencies. Trace aspiration during the swallow w/ thin and nectar-thick liquids 2' delay and decr'd elevation and vestibular closure. Aspiration was silent, pt was u/a to fully clear subglottic material w/ cued cough. Intermittent trace penetration above the level of the vocal folds w/ honey-thick liquids 2' to delay and decr'd elevation and vestibular closure. No penetration or aspiration observed w/ pudding or solid. Moderate diffuse pharyngeal residue w/ pudding and solid 2' decr'd base of tongue retraction, posterior pharyngeal wall contraction, and decr'd elevation. Pharyngeal residue decr'd w/ liquid wash. Compensatory strategies were attempted but  unsuccessful in preventing aspiration. Recommend level 4 diet w/ honey-thick liquids, no straws, small bites and sips, and alternating bites and sips. Meds whole w/ puree as tolerated. Will plan to begin dysphagia tx.         Cervical Esophageal Phase              Prognosis

## 2018-11-01 NOTE — PLAN OF CARE
Problem: Patient Care Overview  Goal: Plan of Care Review  Outcome: Ongoing (interventions implemented as appropriate)   11/01/18 4433   Coping/Psychosocial   Plan of Care Reviewed With patient;daughter   SLP evaluation completed. Clinical swallow eval revealed s/sxs aspiration, so completed FEES to further assess. FEES revealed moderate pharyngeal dysphagia w/ silent trace aspiration of thin and nectar-thick liquid. Also, noted laryngeal edema (greatest in arytenoids bilaterally), as well as edematous/vascular lesion located between right arytenoid and right pyriform sinus (see images in chart). May consider ENT consult, per MD discretion. Rec: dysphagia level IV diet, honey-thick liquid; small bites/sips; no straws; alternate bites/sips. Will address dysphagia in tx. Please see note for further details and recommendations.

## 2018-11-01 NOTE — CONSULTS
Pulmonary Hospital Consult Note     LOS: 1 day   Patient Care Team:  Rox Singleton MD as PCP - General (Internal Medicine)    Chief Complaint:    Chief Complaint   Patient presents with   • Abnormal Lab       Subjective     HPI:   63-year-old female lifetime smoker with a history of coronary artery disease status post 5 stents in the past, the last of which was greater than 2 years ago, diabetes was admitted for hyponatremia.  Further evaluation with CT scan of the chest showed a left upper lobe lung mass worrisome for malignancy.  We are consulted for further evaluation.    The patient reports fatigue for the past 3 weeks or so.  She was evaluated by her primary care provider and found to have hyponatremia.  She was put on an increased salt diet and recheck of her sodium showed some improvement.  On 10/31/2018 the patient followed up with her endocrinologist and had a repeat sodium which was 117.  She was subsequently sent straight to the emergency department and admitted to the hospitalist service.    The patient does report slightly worsening dyspnea from baseline.  She has a history of COPD and takes a Ventolin rescue inhaler as well as a Flovent inhaler regularly.  She denies any hemoptysis.  She denies lymphadenopathy, night sweats or unexplained weight loss.  She takes Brilinta.  She reports a remote history of a blood clot in her right leg but was never treated with anticoagulation.    History taken from: patient    Past Medical History:   Diagnosis Date   • Diabetes mellitus (CMS/HCC)    • Hyperlipidemia    • Pancreatitis        Past Surgical History:   Procedure Laterality Date   • CAROTID STENT     • COLONOSCOPY     • UPPER GASTROINTESTINAL ENDOSCOPY         Family History   Problem Relation Age of Onset   • Colon polyps Mother    • Ulcerative colitis Sister        Social History     Social History   • Marital status:      Spouse name: N/A   • Number of children: N/A   • Years of education:  N/A     Occupational History   • Not on file.     Social History Main Topics   • Smoking status: Current Every Day Smoker     Packs/day: 0.50     Types: Cigarettes   • Smokeless tobacco: Not on file   • Alcohol use No   • Drug use: Unknown   • Sexual activity: Not on file     Other Topics Concern   • Not on file     Social History Narrative   • No narrative on file       Allergies   Allergen Reactions   • Plavix [Clopidogrel Bisulfate] Anaphylaxis   • Codeine    • Penicillins        PMH/FH/SocH were reviewed by me and updates were made.     Review of Systems:    All other systems were reviewed and are negative.  Exceptions are noted in subjective or below.      Objective     Vital Signs  Temp:  [96.4 °F (35.8 °C)-98.2 °F (36.8 °C)] 97.6 °F (36.4 °C)  Heart Rate:  [] 78  Resp:  [16-18] 18  BP: (109-164)/(60-96) 140/71  Body mass index is 37.53 kg/m².    Physical Exam:     Constitutional:    Alert, cooperative, in no acute distress   Head:    Normocephalic, without obvious abnormality, atraumatic   Eyes:            Lids and lashes normal, conjunctivae and sclerae normal, no   icterus, no pallor, corneas clear, PER   ENMT:   Ears appear intact with no abnormalities noted      No oral lesions, no thrush, oral mucosa moist   Neck:   No adenopathy, supple, trachea midline, no thyromegaly, no   carotid bruit, no JVD       Lungs/Resp:     Normal effort, symmetric chest rise, no crepitus, clear to      auscultation bilaterally, no chest wall tenderness                  Heart/CV:    Regular rhythm and normal rate, normal S1 and S2, no            murmur   Abdomen/GI:     Normal bowel sounds, no masses, no organomegaly, soft        non-tender, non-distended   :     Deferred   Extremities/MSK:   No clubbing, cyanosis or edema.  No deformities.    Pulses:   Pulses palpable and equal bilaterally   Skin:   No bleeding, bruising or rash   Hem/Lymph:   No cervical or supraclavicular adenopathy.    Neurologic:   Moves all  extremities with no obvious focal motor deficit.  Cranial nerves 2 - 12 grossly intact             Psychiatric: Normal mood and affect, oriented x 3.      Results Review:     I reviewed the patient's new clinical results.     Results from last 7 days  Lab Units 11/01/18  0749 11/01/18  0646 10/31/18  2344   SODIUM mmol/L 118* 119* 120*   POTASSIUM mmol/L 3.9 4.2 4.0   CHLORIDE mmol/L 90* 89* 88*   CO2 mmol/L 24.0 26.0 28.0   BUN mg/dL 12 13 13   CREATININE mg/dL 0.57* 0.57* 0.69   CALCIUM mg/dL 8.1* 8.3* 8.5*   GLUCOSE mg/dL 107* 113* 138*       Results from last 7 days  Lab Units 10/31/18  1710   WBC 10*3/mm3 7.54   HEMOGLOBIN g/dL 11.7   HEMATOCRIT % 33.8*   PLATELETS 10*3/mm3 422           I reviewed the patient's new imaging including images and reports.  CT scan of the chest from 10/31/2018 was reviewed.  It shows a 4.4 cm left hilar mass with left upper lobe lingular nodule measuring approximately 1.9 cm.  The left apical bronchus appears to be completely obstructed.    Medication Review:     aspirin 81 mg Oral Daily   carvedilol 6.25 mg Oral BID With Meals   cetirizine 10 mg Oral Daily   enoxaparin 40 mg Subcutaneous Q24H   famotidine 20 mg Oral Daily   insulin lispro 0-7 Units Subcutaneous 4x Daily With Meals & Nightly   levothyroxine 75 mcg Oral Daily   nicotine 1 patch Transdermal Q24H   pantoprazole 40 mg Oral Q AM   sodium chloride 1 g Oral TID With Meals          Assessment/Plan       Acute hyponatremia    Hyperlipidemia    Type 2 diabetes mellitus (CMS/HCC)    Tobacco abuse    Obesity (BMI 30-39.9)    Mass of upper lobe of left lung    Mediastinal lymphadenopathy    63-year-old female lifetime smoker admitted for hyponatremia and found to have a left hilar lung mass.  She has a history of coronary artery disease with remote stents but has been maintained on Brilinta.    This far as I can tell, the patient has no evidence of infection.  I'm going to stop the patient's Rocephin and doxycycline.  The  patient will need a tissue diagnosis however I cannot do a bronchoscopy for at least 5 days secondary to the patient's use of Brilinta.  She has not had a recent stent so I do not see a hard indication to continue the Brilinta.  I will continue aspirin 81 mg daily.    Plan:  1.  Plan on bronchoscopy with left upper lobe biopsy next week.  Possibly as an outpatient if the patient is discharged.  2.  Smoking cessation counseling.  3.  Hold Brilinta.   4.  Continue aspirin 81 mg daily.  5.  Bronchodilators.  6.  Management and workup of hyponatremia per primary team.      Clay Scott MD  11/01/18  1:26 PM

## 2018-11-01 NOTE — PROGRESS NOTES
Discharge Planning Assessment  [facility]     Patient Name: Shauna Valencia  MRN: [id number]  Today's Date: 11/1/2018    Admit Date: 10/31/2018          Discharge Needs Assessment     Row Name 11/01/18 1132       Living Environment    Lives With alone    Current Living Arrangements home/apartment/condo    Living Arrangement Comments Has steps to enter but one level home.       Discharge Needs Assessment    Equipment Currently Used at Home none    Equipment Needed After Discharge none    Discharge Coordination/Progress Had HH in the past but not current after a hip surgery. Supportive family.            Discharge Plan     Row Name 11/01/18 1135       Plan    Plan Home at DC    Patient/Family in Agreement with Plan yes    Plan Comments I spoke with the pt. She is interested in a nicotine patch at DC if insurance covers it. SW is checking.    Row Name 11/01/18 0844       Plan    Final Discharge Disposition Code 01 - home or self-care        Destination     No service coordination in this encounter.      Durable Medical Equipment     No service coordination in this encounter.      Dialysis/Infusion     No service coordination in this encounter.      Home Medical Care     No service coordination in this encounter.      Social Care     No service coordination in this encounter.        Expected Discharge Date and Time     Expected Discharge Date Expected Discharge Time    Nov 3, 2018               Demographic Summary    No documentation.           Functional Status     Row Name 11/01/18 1132       Functional Status    Usual Activity Tolerance good       Functional Status, IADL    Medications independent    Meal Preparation independent    Housekeeping independent    Laundry independent    Shopping independent            Psychosocial    No documentation.           Abuse/Neglect    No documentation.           Legal    No documentation.           Substance Abuse    No documentation.           Patient Forms    No documentation.          Rocio Hutchins, RN

## 2018-11-01 NOTE — THERAPY EVALUATION
Acute Care - Speech Language Pathology   Swallow Initial Evaluation [facility]   Fiberoptic Endoscopic Evaluation of Swallowing (FEES)     Patient Name: Shauna Valencia  : 1955  MRN: [id number]  Today's Date: 2018               Admit Date: 10/31/2018    Visit Dx:     ICD-10-CM ICD-9-CM   1. Acute hyponatremia E87.1 276.1   2. Dyspnea, unspecified type R06.00 786.09   3. Pharyngeal dysphagia R13.13 787.23     Patient Active Problem List   Diagnosis   • Acute hyponatremia   • Hyperlipidemia   • Type 2 diabetes mellitus (CMS/HCC)   • Tobacco abuse   • Obesity (BMI 30-39.9)   • Mass of upper lobe of left lung   • Mediastinal lymphadenopathy     Past Medical History:   Diagnosis Date   • Diabetes mellitus (CMS/HCC)    • Hyperlipidemia    • Pancreatitis      Past Surgical History:   Procedure Laterality Date   • CAROTID STENT     • COLONOSCOPY     • UPPER GASTROINTESTINAL ENDOSCOPY            SWALLOW EVALUATION (last 72 hours)      SLP Adult Swallow Evaluation     Row Name 18 1500                   Rehab Evaluation    Document Type (P)  evaluation  -TC        Subjective Information (P)  no complaints  -TC        Patient Observations (P)  alert;cooperative;agree to therapy  -TC        Patient/Family Observations (P)  Dtr present   -TC        Patient Effort (P)  good  -TC        Symptoms Noted During/After Treatment (P)  none  -TC           General Information    Patient Profile Reviewed (P)  yes  -TC        Pertinent History Of Current Problem (P)  Adm w/ acute hyponatremia. H/o COPD, tobacco abuse, CAD s/p 5 stents, mediastinal disease. CXR indicating concern for malignant L upper lobe mass and post-obstructive PNA.    -TC        Current Method of Nutrition (P)  regular textures;thin liquids  -TC        Precautions/Limitations, Vision (P)  WFL with corrective lenses  -TC        Precautions/Limitations, Hearing (P)  WFL;for purposes of eval  -TC        Prior Level of Function-Communication (P)  WFL  -TC         Prior Level of Function-Swallowing (P)  no diet consistency restrictions  -TC        Plans/Goals Discussed with (P)  patient and family;agreed upon  -TC        Barriers to Rehab (P)  medically complex  -TC        Patient's Goals for Discharge (P)  patient did not state  -TC        Family Goals for Discharge (P)  family did not state  -TC           Pain Assessment    Additional Documentation (P)  Pain Scale: Numbers Pre/Post-Treatment (Group)  -TC           Pain Scale: Numbers Pre/Post-Treatment    Pain Scale: Numbers, Pretreatment (P)  0/10 - no pain  -TC        Pain Scale: Numbers, Post-Treatment (P)  0/10 - no pain  -TC           Oral Motor and Function    Dentition Assessment (P)  poor oral hygiene  -TC        Secretion Management (P)  WNL/WFL;other (see comments)   pt reports anterior loss of saliva when laying down  -TC        Mucosal Quality (P)  dry  -TC        Volitional Swallow (P)  WFL  -TC        Volitional Cough (P)  WFL  -TC           Oral Musculature and Cranial Nerve Assessment    Oral Motor General Assessment (P)  generalized oral motor weakness  -TC           General Eating/Swallowing Observations    Respiratory Support Currently in Use (P)  room air  -TC        Eating/Swallowing Skills (P)  self-fed;fed by SLP  -TC        Positioning During Eating (P)  upright in bed  -TC        Utensils Used (P)  spoon;cup;straw  -TC        Consistencies Trialed (P)  thin liquids;nectar/syrup-thick liquids;pureed  -TC           Respiratory    Respiratory Status (P)  WFL  -TC           Clinical Swallow Eval    Oral Prep Phase (P)  WFL   DNT solid   -TC        Oral Transit (P)  WFL   DNT solid   -TC        Oral Residue (P)  WFL   DNT solid   -TC        Pharyngeal Phase (P)  suspected pharyngeal impairment  -TC        Clinical Swallow Evaluation Summary (P)  Clinical swallow eval completed. Trials given of thin via tsp, cup, and straw, nectar-thick liquid via tsp and cup, and puree. Pt demonstrating wet vocal  quality w/ thin via straw and puree. Wet vocal quality and cough observed w/ nectar-thick via cup. Recommend NPO w/ meds alt route. Will plan for FEES today to determine pt's least restrictive diet.   -TC           Pharyngeal Phase Concerns    Pharyngeal Phase Concerns (P)  wet vocal quality;cough  -TC        Wet Vocal Quality (P)  thin;pudding  -TC        Cough (P)  nectar  -TC           Fiberoptic Endoscopic Evaluation of Swallowing (FEES)    Risks/Benefits Reviewed (P)  risks/benefits explained;patient;family;agreed to eval  -TC        Nasal Entry (P)  right:  -TC        Special Considerations (P)  Scope #338  -TC           Anatomy and Physiology    Anatomic Considerations (P)  edema;arytenoids  -TC        Comment (P)  Also noted edematous and vascular lesion located in the space between the right arytenoid and pyriform sinus. MD notified, pt may benefit from an ENT consult.  -TC        Velopharyngeal (P)  WFL  -TC        Base of Tongue (P)  symmetrical  -TC        Epiglottis (P)  WFL  -TC        Laryngeal Function Breathing (P)  symmetrical  -TC        Laryngeal Function Phonation (P)  symmetrical  -TC        Laryngeal Function to Breath Hold (P)  TVT/FVF/Arytenoid  -TC        Secretion Rating Scale (Jesus et al. 1996) (P)  0- normal, no visible secretions  -TC        Ice Chips (P)  elicited swallow;aspirated;no response to aspiration  -TC        Spontaneous Swallow (P)  frequency adequate  -TC        Sensory (P)  sensed scope  -TC        Utensils Used (P)  Spoon;Cup;Straw  -TC        Consistencies Trialed (P)  thin liquids;nectar-thick liquids;honey-thick liquids;pudding/puree;Regular textures  -TC           FEES Interpretation    Oral Phase (P)  WFL  -TC           Initiation of Pharyngeal Swallow    Initiation of Pharyngeal Swallow (P)  bolus in pyriform sinuses  -TC        Pharyngeal Phase (P)  impaired pharyngeal phase of swallowing  -TC        Penetration During the Swallow (P)  honey-thick  liquids;secondary to delayed swallow initiation or mistiming;secondary to reduced laryngeal elevation;secondary to reduced vestibular closure  -TC        Aspiration During the Swallow (P)  thin liquids;nectar-thick liquids;secondary to delayed swallow initiation or mistiming;secondary to reduced laryngeal elevation;secondary to reduced vestibular closure  -TC        Response to Aspiration (P)  response with cue only;could not clear subglottic material  -TC        Rosenbek's Scale (P)  thin:;nectar:;8-->Level 8;honey:;3-->Level 3;pudding/puree:;regular textures:;1-->Level 1  -TC        Residue (P)  all consistencies tested;base of tongue;valleculae;posterior pharyngeal wall;secondary to reduced base of tongue retraction;secondary to reduced posterior pharyngeal wall stripping;secondary to reduced laryngeal elevation   > w/ pudding and solid   -TC        Response to Residue (P)  cleared residue;with liquid wash  -TC        Attempted Compensatory Maneuvers (P)  bolus size;bolus presentation style;chin tuck;additional subsequent swallow;alternate liquids/solids;breath hold;throat clear after swallow  -TC        Response to Attempted Compensatory Maneuvers (P)  did not prevent aspiration  -TC        FEES Summary (P)  FEES completed. Trials given of thin via tsp, cup, and straw, nectar-thick via tsp, cup, honey-thick via tsp and cup, pudding, and solid. Moderate pharyngeal dysphagia. Swallow initiation delayed to the pyriform sinuses w/ all consistencies. Trace aspiration during the swallow w/ thin and nectar-thick liquids 2' delay and decr'd elevation and vestibular closure. Aspiration was silent, pt was u/a to fully clear subglottic material w/ cued cough. Intermittent trace penetration above the level of the vocal folds w/ honey-thick liquids 2' to delay and decr'd elevation and vestibular closure. No penetration or aspiration observed w/ pudding or solid. Moderate diffuse pharyngeal residue w/ pudding and solid 2'  decr'd base of tongue retraction, posterior pharyngeal wall contraction, and decr'd elevation. Pharyngeal residue decr'd w/ liquid wash. Compensatory strategies were attempted but unsuccessful in preventing aspiration. Recommend level 4 diet w/ honey-thick liquids, no straws, small bites and sips, and alternating bites and sips. Meds whole w/ puree as tolerated. Will plan to begin dysphagia tx.   -TC           Clinical Impression    SLP Swallowing Diagnosis (P)  functional oral phase;moderate;pharyngeal dysfunction  -TC        Functional Impact (P)  risk of aspiration/pneumonia  -TC        Rehab Potential/Prognosis, Swallowing (P)  good, to achieve stated therapy goals  -TC        Swallow Criteria for Skilled Therapeutic Interventions Met (P)  demonstrates skilled criteria  -TC           Recommendations    Therapy Frequency (Swallow) (P)  5 days per week  -TC        Predicted Duration Therapy Intervention (Days) (P)  until discharge  -TC        SLP Diet Recommendation (P)  mechanical soft with no mixed consistencies;honey thick liquids  -TC        Recommended Precautions and Strategies (P)  upright posture during/after eating;small bites of food and sips of liquid;no straw;alternate between small bites of food and sips of liquid;other (see comments)   oral care BID/PRN  -TC        SLP Rec. for Method of Medication Administration (P)  meds whole;with pudding or applesauce;as tolerated  -TC        Monitor for Signs of Aspiration (P)  yes;notify SLP if any concerns;cough;gurgly voice;throat clearing  -TC        Anticipated Dischage Disposition (P)  unknown;anticipate therapy at next level of care  -TC          User Key  (r) = Recorded By, (t) = Taken By, (c) = Cosigned By    Initials Name Effective Dates    TC Rachael Quiroga, Speech Therapy Student 08/06/18 -         EDUCATION  The patient has been educated in the following areas:   Dysphagia (Swallowing Impairment) Oral Care/Hydration Modified Diet  Instruction.    SLP Recommendation and Plan  SLP Swallowing Diagnosis: (P) functional oral phase, moderate, pharyngeal dysfunction  SLP Diet Recommendation: (P) mechanical soft with no mixed consistencies, honey thick liquids  Recommended Precautions and Strategies: (P) upright posture during/after eating, small bites of food and sips of liquid, no straw, alternate between small bites of food and sips of liquid, other (see comments) (oral care BID/PRN)     Monitor for Signs of Aspiration: (P) yes, notify SLP if any concerns, cough, gurgly voice, throat clearing     Swallow Criteria for Skilled Therapeutic Interventions Met: (P) demonstrates skilled criteria  Anticipated Dischage Disposition: (P) unknown, anticipate therapy at next level of care  Rehab Potential/Prognosis, Swallowing: (P) good, to achieve stated therapy goals  Therapy Frequency (Swallow): (P) 5 days per week  Predicted Duration Therapy Intervention (Days): (P) until discharge                     SLP GOALS     Row Name 11/01/18 1500             Oral Nutrition/Hydration Goal 1 (SLP)    Oral Nutrition/Hydration Goal 1, SLP (P)  LTG: Pt will return to regular diet and thin liquids w/o s/s of aspiration w/ 100% accuracy w/o cues.   -TC      Time Frame (Oral Nutrition/Hydration Goal 1, SLP) (P)  by discharge  -TC         Oral Nutrition/Hydration Goal 2 (SLP)    Oral Nutrition/Hydration Goal 2, SLP (P)  Pt will tolerate trials of soft-solid and honey-thick liquids w/o s/s of aspiration w/ 100% accuracy w/o cues.   -TC      Time Frame (Oral Nutrition/Hydration Goal 2, SLP) (P)  short term goal (STG);by discharge  -TC         Pharyngeal Strengthening Exercise Goal 1 (SLP)    Activity (Pharyngeal Strengthening Goal 1, SLP) (P)  increase timing;increase superior movement of the hyolaryngeal complex;increase squeeze/positive pressure generation;increase tongue base retraction;increase closure at entrance to airway/closure of airway at glottis  -TC      Increase  Timing (P)  prepping - 3 second prep or suck swallow or 3-step swallow  -TC      Increase Superior Movement of the Hyolaryngeal Complex (P)  Mendelsohn;falsetto  -TC      Increase Closure at Entrance to Airway/Closure of Airway at Glottis (P)  super-supraglottic swallow  -TC      Increase Squeeze/Positive Pressure Generation (P)  hard effortful swallow  -TC      Increase Tongue Base Retraction (P)  sunny  -TC      [place]/Accuracy (Pharyngeal Strengthening Goal 1, SLP) (P)  with minimal cues (75-90% accuracy)  -TC      Time Frame (Pharyngeal Strengthening Goal 1, SLP) (P)  short term goal (STG);by discharge  -TC         Swallow Compensatory Strategies Goal 1 (SLP)    Activity (Swallow Compensatory Strategies/Techniques Goal 1, SLP) (P)  compensatory strategies;during meal intake;during p.o. trials;small cup sips;alternate food/liquid intake;other (see comments)   no straws  -TC      [place]/Accuracy (Swallow Compensatory Strategies/Techniques Goal 1, SLP) (P)  with minimal cues (75-90% accuracy)  -TC      Time Frame (Swallow Compensatory Strategies/Techniques Goal 1, SLP) (P)  short term goal (STG);by discharge  -TC        User Key  (r) = Recorded By, (t) = Taken By, (c) = Cosigned By    Initials Name Provider Type    Rachael Peters, Speech Therapy Student Speech Therapy Student               Time Calculation:         Time Calculation- SLP     Row Name 11/01/18 1706             Time Calculation- SLP    SLP Start Time (P)  1500  -TC      SLP Received On (P)  11/01/18  -TC        User Key  (r) = Recorded By, (t) = Taken By, (c) = Cosigned By    Initials Name Provider Type    Rachael Peters, Speech Therapy Student Speech Therapy Student          Therapy Charges for Today     Code Description Service Date Service Provider Modifiers Qty    [id number]  ST EVAL ORAL PHARYNG SWALLOW 4 11/1/2018 Rachael Quiroga, Speech Therapy Student GN 1    [id number]  ST FIBEROPTIC ENDO EVAL SWALL 6 11/1/2018  Rachael Quiroga, Speech Therapy Student GN 1               Rachael Quiroga, Speech Therapy Student  11/1/2018

## 2018-11-01 NOTE — PLAN OF CARE
Problem: Patient Care Overview  Goal: Plan of Care Review  Outcome: Ongoing (interventions implemented as appropriate)   11/01/18 0614   Coping/Psychosocial   Plan of Care Reviewed With patient   Plan of Care Review   Progress improving   OTHER   Outcome Summary Pt denies of pain or discomfort. Pt appears sad and worry about Ct scan lungs result. Sodium level is improving. Will continue to monitor.

## 2018-11-01 NOTE — PROGRESS NOTES
[facility] Medicine Services  PROGRESS NOTE    Patient Name: Shauna Valencia  : 1955  MRN: [id number]    Date of Admission: 10/31/2018  Length of Stay: 1  Primary Care Physician: Rox Singleton MD    Subjective   Subjective     CC:  Weakness    HPI:  Dry cough. Weak. No other issues.    Review of Systems    Otherwise ROS is negative except as mentioned in the HPI.    Objective   Objective     Vital Signs:   Temp:  [96.4 °F (35.8 °C)-98.2 °F (36.8 °C)] 97.6 °F (36.4 °C)  Heart Rate:  [] 78  Resp:  [16-18] 18  BP: (109-164)/(60-96) 140/71        Physical Exam:  NAD, alert and oriented  OP clear, MMM  PERRL  Neck supple  RRR  CTAB  +BS, ND, NT  POLLACK  No c/c/e  No rashes  Normal affect    Results Reviewed:  I have personally reviewed current lab, radiology, and data and agree.      Results from last 7 days  Lab Units 10/31/18  1710   WBC 10*3/mm3 7.54   HEMOGLOBIN g/dL 11.7   HEMATOCRIT % 33.8*   PLATELETS 10*3/mm3 422       Results from last 7 days  Lab Units 18  0749 18  0646 10/31/18  2344   SODIUM mmol/L 118* 119* 120*   POTASSIUM mmol/L 3.9 4.2 4.0   CHLORIDE mmol/L 90* 89* 88*   CO2 mmol/L 24.0 26.0 28.0   BUN mg/dL 12 13 13   CREATININE mg/dL 0.57* 0.57* 0.69   GLUCOSE mg/dL 107* 113* 138*   CALCIUM mg/dL 8.1* 8.3* 8.5*     Estimated Creatinine Clearance: 105.9 mL/min (A) (by C-G formula based on SCr of 0.57 mg/dL (L)).  No results found for: BNP    Microbiology Results Abnormal     None          Imaging Results (last 24 hours)     Procedure Component Value Units Date/Time    CT Chest Without Contrast [[id number]] Collected:  10/31/18 1701     Updated:  10/31/18 1930    Narrative:       EXAM:    CT Chest Without Intravenous Contrast     EXAM DATE/TIME:    10/31/2018 5:01 PM     CLINICAL HISTORY:    63 years old, female; Hypo-osmolality and hyponatremia; Dyspnea, unspecified;   Signs and symptoms; Cough and shortness of breath; Prior surgery; Surgery date:     months; Surgery type: 5 heart stents; Additional info: SOA, hyponatremia     TECHNIQUE:    Axial computed tomography images of the chest without intravenous contrast.    All CT scans at this facility use at least one of these dose optimization   techniques: automated exposure control; mA and/or kV adjustment per patient   size (includes targeted exams where dose is matched to clinical indication); or   iterative reconstruction.    Coronal reformatted images were created and reviewed.     COMPARISON:    No relevant prior studies available.     FINDINGS:     LUNGS/PLEURA: Suboptimally evaluated located and mildly spiculated RIGHT   hilar mass measuring at least 4.4 cm in the long axis. Nodular peribronchial   LEFT upper lobe and subpleural inferior lingula airspace opacities measuring up   to 1.9 cm and centrilobular nodules in the LEFT upper lobe. Calcific granuloma   in the RIGHT normal. No pleural effusion or pneumothorax. Complete obstruction   of the LEFT apical bronchus, no other central obstructive endobronchial mass or   abnormality.     MEDIASTINUM: Heart size is normal, with trace pericardial   thickening/effusion. Severe coronary arterial calcification/coronary stents.   Atherosclerotic disease of the aorta without aortic aneurysm. Bulky prevascular   lymph node adjacent to the aortic arch measuring 3.0 cm and LEFT hilar mass   which may represent lymph nodes or primary lung malignancy.     OTHER STRUCTURES: Chronic degenerative changes in the visualized spine.   Nonspecific bilateral perinephric fat stranding and renal cortical scarring.   Small LEFT adrenal nodule measuring 1.3 cm.       Impression:       1. Findings concerning for LEFT HILAR MALIGNANT SOFT TISSUE MASS with   postobstructive focal pneumonia/bronchiolitis in the LEFT upper lobe.   2. Likely METASTATIC mediastinal and possibly LEFT hilar lymph nodes.   3. Coronary arterial calcification suggestive of CAD.   4. Other nonemergent/incidental  findings as described.     THIS DOCUMENT HAS BEEN ELECTRONICALLY SIGNED BY BATOOL SAMANO MD             I have reviewed the medications.      aspirin 81 mg Oral Daily   carvedilol 6.25 mg Oral BID With Meals   ceftriaxone 1 g Intravenous Q24H   cetirizine 10 mg Oral Daily   doxycycline 100 mg Oral Q12H   enoxaparin 40 mg Subcutaneous Q24H   famotidine 20 mg Oral Daily   insulin lispro 0-7 Units Subcutaneous 4x Daily With Meals & Nightly   levothyroxine 75 mcg Oral Daily   pantoprazole 40 mg Oral Q AM   sodium chloride 1 g Oral TID With Meals         Assessment/Plan   Assessment / Plan     Active Hospital Problems    Diagnosis   • Acute hyponatremia   • Hyperlipidemia   • Type 2 diabetes mellitus (CMS/HCC)   • Tobacco abuse   • Obesity (BMI 30-39.9)          Brief Hospital Course to date:  Shauna Valencia is a 63 y.o. female here with hyponatremia, likely SIADH, and found to have a lung mass      Assessment & Plan:  L hilar mass  --bronchoscopy next week  Hx of carotid stent per chart  --hold Brilinta  Probable post-obstructive pneumonia/bronchiolitis  --abx  Hyponatremia  --probable SIADH  --fluid restrict  --check AM cortisol  --TSH normal  Tobacco abuse  Obesity  HL    DVT Prophylaxis:  Lovenox    CODE STATUS:   Code Status and Medical Interventions:   Ordered at: 10/31/18 1909     Level Of Support Discussed With:    Patient     Code Status:    CPR     Medical Interventions (Level of Support Prior to Arrest):    Full       Disposition: I expect the patient to be discharged TBD.      Electronically signed by Maynor Ansari MD, 11/01/18, 10:20 AM.

## 2018-11-02 LAB
ANION GAP SERPL CALCULATED.3IONS-SCNC: 2 MMOL/L (ref 3–11)
ANION GAP SERPL CALCULATED.3IONS-SCNC: 5 MMOL/L (ref 3–11)
ANION GAP SERPL CALCULATED.3IONS-SCNC: 7 MMOL/L (ref 3–11)
ANION GAP SERPL CALCULATED.3IONS-SCNC: 8 MMOL/L (ref 3–11)
BUN BLD-MCNC: 11 MG/DL (ref 9–23)
BUN BLD-MCNC: 11 MG/DL (ref 9–23)
BUN BLD-MCNC: 12 MG/DL (ref 9–23)
BUN BLD-MCNC: 12 MG/DL (ref 9–23)
BUN BLD-MCNC: 13 MG/DL (ref 9–23)
BUN BLD-MCNC: 16 MG/DL (ref 9–23)
BUN/CREAT SERPL: 15.1 (ref 7–25)
BUN/CREAT SERPL: 15.2 (ref 7–25)
BUN/CREAT SERPL: 15.5 (ref 7–25)
BUN/CREAT SERPL: 16.2 (ref 7–25)
BUN/CREAT SERPL: 17.6 (ref 7–25)
BUN/CREAT SERPL: 20.5 (ref 7–25)
CALCIUM SPEC-SCNC: 8.5 MG/DL (ref 8.7–10.4)
CALCIUM SPEC-SCNC: 8.8 MG/DL (ref 8.7–10.4)
CALCIUM SPEC-SCNC: 8.8 MG/DL (ref 8.7–10.4)
CALCIUM SPEC-SCNC: 8.9 MG/DL (ref 8.7–10.4)
CALCIUM SPEC-SCNC: 8.9 MG/DL (ref 8.7–10.4)
CALCIUM SPEC-SCNC: 9.1 MG/DL (ref 8.7–10.4)
CHLORIDE SERPL-SCNC: 87 MMOL/L (ref 99–109)
CHLORIDE SERPL-SCNC: 88 MMOL/L (ref 99–109)
CHLORIDE SERPL-SCNC: 89 MMOL/L (ref 99–109)
CHLORIDE SERPL-SCNC: 89 MMOL/L (ref 99–109)
CHLORIDE SERPL-SCNC: 90 MMOL/L (ref 99–109)
CHLORIDE SERPL-SCNC: 93 MMOL/L (ref 99–109)
CO2 SERPL-SCNC: 26 MMOL/L (ref 20–31)
CO2 SERPL-SCNC: 28 MMOL/L (ref 20–31)
CO2 SERPL-SCNC: 29 MMOL/L (ref 20–31)
CO2 SERPL-SCNC: 31 MMOL/L (ref 20–31)
CORTIS SERPL-MCNC: 7.8 MCG/DL
CREAT BLD-MCNC: 0.71 MG/DL (ref 0.6–1.3)
CREAT BLD-MCNC: 0.73 MG/DL (ref 0.6–1.3)
CREAT BLD-MCNC: 0.74 MG/DL (ref 0.6–1.3)
CREAT BLD-MCNC: 0.74 MG/DL (ref 0.6–1.3)
CREAT BLD-MCNC: 0.78 MG/DL (ref 0.6–1.3)
CREAT BLD-MCNC: 0.79 MG/DL (ref 0.6–1.3)
GFR SERPL CREATININE-BSD FRML MDRD: 74 ML/MIN/1.73
GFR SERPL CREATININE-BSD FRML MDRD: 75 ML/MIN/1.73
GFR SERPL CREATININE-BSD FRML MDRD: 80 ML/MIN/1.73
GFR SERPL CREATININE-BSD FRML MDRD: 80 ML/MIN/1.73
GFR SERPL CREATININE-BSD FRML MDRD: 81 ML/MIN/1.73
GFR SERPL CREATININE-BSD FRML MDRD: 84 ML/MIN/1.73
GLUCOSE BLD-MCNC: 108 MG/DL (ref 70–100)
GLUCOSE BLD-MCNC: 129 MG/DL (ref 70–100)
GLUCOSE BLD-MCNC: 135 MG/DL (ref 70–100)
GLUCOSE BLD-MCNC: 145 MG/DL (ref 70–100)
GLUCOSE BLD-MCNC: 154 MG/DL (ref 70–100)
GLUCOSE BLD-MCNC: 178 MG/DL (ref 70–100)
GLUCOSE BLDC GLUCOMTR-MCNC: 127 MG/DL (ref 70–130)
GLUCOSE BLDC GLUCOMTR-MCNC: 152 MG/DL (ref 70–130)
GLUCOSE BLDC GLUCOMTR-MCNC: 182 MG/DL (ref 70–130)
GLUCOSE BLDC GLUCOMTR-MCNC: 195 MG/DL (ref 70–130)
POTASSIUM BLD-SCNC: 3.6 MMOL/L (ref 3.5–5.5)
POTASSIUM BLD-SCNC: 3.7 MMOL/L (ref 3.5–5.5)
POTASSIUM BLD-SCNC: 3.8 MMOL/L (ref 3.5–5.5)
POTASSIUM BLD-SCNC: 3.8 MMOL/L (ref 3.5–5.5)
POTASSIUM BLD-SCNC: 3.9 MMOL/L (ref 3.5–5.5)
POTASSIUM BLD-SCNC: 4.1 MMOL/L (ref 3.5–5.5)
SODIUM BLD-SCNC: 120 MMOL/L (ref 132–146)
SODIUM BLD-SCNC: 121 MMOL/L (ref 132–146)
SODIUM BLD-SCNC: 121 MMOL/L (ref 132–146)
SODIUM BLD-SCNC: 122 MMOL/L (ref 132–146)
SODIUM BLD-SCNC: 122 MMOL/L (ref 132–146)
SODIUM BLD-SCNC: 123 MMOL/L (ref 132–146)
SODIUM BLD-SCNC: 126 MMOL/L (ref 132–146)
SODIUM BLD-SCNC: 127 MMOL/L (ref 132–146)
SODIUM BLD-SCNC: 127 MMOL/L (ref 132–146)
URATE SERPL-MCNC: 2.5 MG/DL (ref 3.1–7.8)

## 2018-11-02 PROCEDURE — 80048 BASIC METABOLIC PNL TOTAL CA: CPT | Performed by: HOSPITALIST

## 2018-11-02 PROCEDURE — 84550 ASSAY OF BLOOD/URIC ACID: CPT | Performed by: INTERNAL MEDICINE

## 2018-11-02 PROCEDURE — 82533 TOTAL CORTISOL: CPT | Performed by: HOSPITALIST

## 2018-11-02 PROCEDURE — 99232 SBSQ HOSP IP/OBS MODERATE 35: CPT | Performed by: INTERNAL MEDICINE

## 2018-11-02 PROCEDURE — 25010000002 ONDANSETRON PER 1 MG: Performed by: HOSPITALIST

## 2018-11-02 PROCEDURE — 99233 SBSQ HOSP IP/OBS HIGH 50: CPT | Performed by: HOSPITALIST

## 2018-11-02 PROCEDURE — 84295 ASSAY OF SERUM SODIUM: CPT | Performed by: INTERNAL MEDICINE

## 2018-11-02 PROCEDURE — 92526 ORAL FUNCTION THERAPY: CPT

## 2018-11-02 PROCEDURE — 82962 GLUCOSE BLOOD TEST: CPT

## 2018-11-02 RX ORDER — PANTOPRAZOLE SODIUM 40 MG/1
40 TABLET, DELAYED RELEASE ORAL ONCE
Status: COMPLETED | OUTPATIENT
Start: 2018-11-02 | End: 2018-11-02

## 2018-11-02 RX ORDER — FENTANYL CITRATE 50 UG/ML
50 INJECTION, SOLUTION INTRAMUSCULAR; INTRAVENOUS
Status: CANCELLED | OUTPATIENT
Start: 2018-11-02

## 2018-11-02 RX ORDER — IBUPROFEN 400 MG/1
400 TABLET ORAL EVERY 4 HOURS PRN
Status: DISCONTINUED | OUTPATIENT
Start: 2018-11-02 | End: 2018-11-08

## 2018-11-02 RX ORDER — SODIUM CHLORIDE 9 MG/ML
75 INJECTION, SOLUTION INTRAVENOUS CONTINUOUS
Status: DISCONTINUED | OUTPATIENT
Start: 2018-11-02 | End: 2018-11-03

## 2018-11-02 RX ORDER — SODIUM CHLORIDE 0.9 % (FLUSH) 0.9 %
3-10 SYRINGE (ML) INJECTION AS NEEDED
Status: CANCELLED | OUTPATIENT
Start: 2018-11-02

## 2018-11-02 RX ORDER — SODIUM CHLORIDE, SODIUM LACTATE, POTASSIUM CHLORIDE, CALCIUM CHLORIDE 600; 310; 30; 20 MG/100ML; MG/100ML; MG/100ML; MG/100ML
9 INJECTION, SOLUTION INTRAVENOUS CONTINUOUS
Status: CANCELLED | OUTPATIENT
Start: 2018-11-02

## 2018-11-02 RX ORDER — LIDOCAINE HYDROCHLORIDE 10 MG/ML
0.5 INJECTION, SOLUTION EPIDURAL; INFILTRATION; INTRACAUDAL; PERINEURAL ONCE AS NEEDED
Status: CANCELLED | OUTPATIENT
Start: 2018-11-02

## 2018-11-02 RX ORDER — SODIUM CHLORIDE 0.9 % (FLUSH) 0.9 %
3 SYRINGE (ML) INJECTION EVERY 12 HOURS SCHEDULED
Status: CANCELLED | OUTPATIENT
Start: 2018-11-02

## 2018-11-02 RX ADMIN — FAMOTIDINE 20 MG: 20 TABLET ORAL at 09:57

## 2018-11-02 RX ADMIN — ONDANSETRON HYDROCHLORIDE 4 MG: 2 INJECTION, SOLUTION INTRAMUSCULAR; INTRAVENOUS at 02:32

## 2018-11-02 RX ADMIN — CARVEDILOL 6.25 MG: 6.25 TABLET, FILM COATED ORAL at 18:26

## 2018-11-02 RX ADMIN — ASPIRIN 81 MG: 81 TABLET, COATED ORAL at 09:56

## 2018-11-02 RX ADMIN — SODIUM CHLORIDE 75 ML/HR: 9 INJECTION, SOLUTION INTRAVENOUS at 13:24

## 2018-11-02 RX ADMIN — HYDROCODONE POLISTIREX AND CHLORPHENIRAMINE POLISTIREX 2.5 ML: 10; 8 SUSPENSION, EXTENDED RELEASE ORAL at 13:24

## 2018-11-02 RX ADMIN — ACETAMINOPHEN 650 MG: 325 TABLET ORAL at 01:16

## 2018-11-02 RX ADMIN — BENZONATATE 200 MG: 100 CAPSULE ORAL at 11:38

## 2018-11-02 RX ADMIN — Medication 1 G: at 09:57

## 2018-11-02 RX ADMIN — CARVEDILOL 6.25 MG: 6.25 TABLET, FILM COATED ORAL at 09:56

## 2018-11-02 RX ADMIN — NICOTINE 1 PATCH: 21 PATCH, EXTENDED RELEASE TRANSDERMAL at 09:57

## 2018-11-02 RX ADMIN — PANTOPRAZOLE SODIUM 40 MG: 40 TABLET, DELAYED RELEASE ORAL at 20:35

## 2018-11-02 RX ADMIN — Medication 1 G: at 11:38

## 2018-11-02 RX ADMIN — INSULIN LISPRO 2 UNITS: 100 INJECTION, SOLUTION INTRAVENOUS; SUBCUTANEOUS at 21:30

## 2018-11-02 RX ADMIN — Medication 1 G: at 18:26

## 2018-11-02 RX ADMIN — INSULIN LISPRO 2 UNITS: 100 INJECTION, SOLUTION INTRAVENOUS; SUBCUTANEOUS at 11:39

## 2018-11-02 RX ADMIN — CETIRIZINE HYDROCHLORIDE 10 MG: 10 TABLET, FILM COATED ORAL at 09:56

## 2018-11-02 NOTE — PROGRESS NOTES
[facility] Consult Note     LOS: 2 days   Patient Care Team:  Rox Singleton MD as PCP - General (Internal Medicine)    Chief Complaint:    Chief Complaint   Patient presents with   • Abnormal Lab       Subjective     HPI:   63-year-old female lifetime smoker with a history of coronary artery disease status post 5 stents in the past, the last of which was greater than 2 years ago, diabetes was admitted for hyponatremia.  Further evaluation with CT scan of the chest showed a left upper lobe lung mass worrisome for malignancy.  We are consulted for further evaluation.    The patient reports fatigue for the past 3 weeks or so.  She was evaluated by her primary care provider and found to have hyponatremia.  She was put on an increased salt diet and recheck of her sodium showed some improvement.  On 10/31/2018 the patient followed up with her endocrinologist and had a repeat sodium which was 117.  She was subsequently sent straight to the emergency department and admitted to the hospitalist service.    The patient does report slightly worsening dyspnea from baseline.  She has a history of COPD and takes a Ventolin rescue inhaler as well as a Flovent inhaler regularly.  She denies any hemoptysis.  She denies lymphadenopathy, night sweats or unexplained weight loss.  She takes Brilinta.  She reports a remote history of a blood clot in her right leg but was never treated with anticoagulation.    Interval history: Patient's sodium is still low.  She failed a dysphagia evaluation for thin liquids and is on thickened liquid diet.  Speech is following her.  She complains about not being able to drink thickened coffee.  Otherwise, she reports that she is feeling like she has more energy.  She denies dyspnea.  No hemoptysis.  No new complaints.    History taken from: patient    PMH/FH/SocH were reviewed by me and updates were made.     Review of Systems:    All other systems were reviewed and are negative.   Exceptions are noted in subjective or below.      Objective     Vital Signs  Temp:  [97.5 °F (36.4 °C)-97.7 °F (36.5 °C)] 97.5 °F (36.4 °C)  Heart Rate:  [66-81] 74  Resp:  [18] 18  BP: (118-140)/(62-81) 140/77  Body mass index is 37.53 kg/m².    Physical Exam:     Constitutional:    Alert, cooperative, in no acute distress   Head:    Normocephalic, without obvious abnormality, atraumatic   Eyes:            Lids and lashes normal, conjunctivae and sclerae normal, no   icterus, no pallor, corneas clear, PER   ENMT:   Ears appear intact with no abnormalities noted      No oral lesions, no thrush, oral mucosa moist   Neck:   No adenopathy, supple, trachea midline, no thyromegaly, no   carotid bruit, no JVD       Lungs/Resp:     Normal effort, symmetric chest rise, no crepitus, clear to      auscultation bilaterally, no chest wall tenderness                  Heart/CV:    Regular rhythm and normal rate, normal S1 and S2, no            murmur   Abdomen/GI:     Normal bowel sounds, no masses, no organomegaly, soft        non-tender, non-distended   :     Deferred   Extremities/MSK:   No clubbing or cyanosis.  Trace LE edema.  No deformities.    Pulses:   Pulses palpable and equal bilaterally   Skin:   No bleeding, bruising or rash   Hem/Lymph:   No cervical or supraclavicular adenopathy.    Neurologic:   Moves all extremities with no obvious focal motor deficit.  Cranial nerves 2 - 12 grossly intact             Psychiatric: Normal mood and affect, oriented x 3.      Results Review:     I reviewed the patient's new clinical results.     Results from last 7 days  Lab Units 11/02/18  1519 11/02/18  1243 11/02/18  1130 11/02/18  0758   SODIUM mmol/L 123* 122* 120* 121*   POTASSIUM mmol/L 4.1  --  3.9 3.8   CHLORIDE mmol/L 90*  --  89* 88*   CO2 mmol/L 28.0  --  26.0 31.0   BUN mg/dL 13  --  11 11   CREATININE mg/dL 0.74  --  0.73 0.71   CALCIUM mg/dL 9.1  --  8.9 8.8   GLUCOSE mg/dL 129*  --  178* 108*       Results from  last 7 days  Lab Units 10/31/18  1710   WBC 10*3/mm3 7.54   HEMOGLOBIN g/dL 11.7   HEMATOCRIT % 33.8*   PLATELETS 10*3/mm3 422           I reviewed the patient's new imaging including images and reports.  CT scan of the chest from 10/31/2018 was reviewed.  It shows a 4.4 cm left hilar mass with left upper lobe lingular nodule measuring approximately 1.9 cm.  The left apical bronchus appears to be completely obstructed.    Medication Review:     aspirin 81 mg Oral Daily   carvedilol 6.25 mg Oral BID With Meals   cetirizine 10 mg Oral Daily   enoxaparin 40 mg Subcutaneous Q24H   famotidine 20 mg Oral Daily   insulin lispro 0-7 Units Subcutaneous 4x Daily With Meals & Nightly   levothyroxine 75 mcg Oral Daily   nicotine 1 patch Transdermal Q24H   pantoprazole 40 mg Oral Q AM   sodium chloride 1 g Oral TID With Meals       sodium chloride 75 mL/hr Last Rate: 75 mL/hr (11/02/18 1324)       Assessment/Plan       Acute hyponatremia    Hyperlipidemia    Type 2 diabetes mellitus (CMS/HCC)    Tobacco abuse    Obesity (BMI 30-39.9)    Mass of upper lobe of left lung    Mediastinal lymphadenopathy    63-year-old female lifetime smoker admitted for hyponatremia and found to have a left hilar lung mass.  She has a history of coronary artery disease with remote stents but has been maintained on Brilinta.    This far as I can tell, the patient has no evidence of infection.  I'm going to stop the patient's Rocephin and doxycycline.  The patient will need a tissue diagnosis however I cannot do a bronchoscopy for at least 5 days secondary to the patient's use of Brilinta.  She has not had a recent stent so I do not see a hard indication to continue the Brilinta.  I will continue aspirin 81 mg daily.    Plan:  1.  Plan on bronchoscopy with left upper lobe biopsy next week.  Likely with endobronchial ultrasound guidance for staging.  Possibly as an outpatient if the patient is discharged.  2.  Smoking cessation counseling.  3.  Hold  Brilinta.  Try to get records from [facility], Dr. Aleman, including recent stress test.   4.  Continue aspirin 81 mg daily.  5.  Bronchodilators.  6.  Management and workup of hyponatremia per primary team / nephrology.      Clay Scott MD  11/02/18  5:10 PM

## 2018-11-02 NOTE — PROGRESS NOTES
Continued Stay Note  [facility]     Patient Name: Shauna Valencia  MRN: [id number]  Today's Date: 11/2/2018    Admit Date: 10/31/2018          Discharge Plan     Row Name 11/02/18 1448       Plan    Plan Update    Patient/Family in Agreement with Plan yes    Plan Comments For Bronch Monday. CM will follow for needs.              Discharge Codes    No documentation.       Expected Discharge Date and Time     Expected Discharge Date Expected Discharge Time    Nov 3, 2018             Rocio Hutchins RN

## 2018-11-02 NOTE — THERAPY TREATMENT NOTE
Acute Care - Speech Language Pathology   Swallow Treatment Note [facility]     Patient Name: Shauna Valencia  : 1955  MRN: [id number]  Today's Date: 2018               Admit Date: 10/31/2018    Visit Dx:      ICD-10-CM ICD-9-CM   1. Acute hyponatremia E87.1 276.1   2. Dyspnea, unspecified type R06.00 786.09   3. Pharyngeal dysphagia R13.13 787.23   4. Hilar mass R91.8 786.6     Patient Active Problem List   Diagnosis   • Acute hyponatremia   • Hyperlipidemia   • Type 2 diabetes mellitus (CMS/HCC)   • Tobacco abuse   • Obesity (BMI 30-39.9)   • Mass of upper lobe of left lung   • Mediastinal lymphadenopathy       Therapy Treatment        Rehabilitation Treatment Summary     Row Name 18 0945             Treatment Time/Intention    Discipline speech language pathologist  -      Document Type therapy note (daily note)  -SM      Subjective Information no complaints  -SM      Care Plan Review evaluation/treatment results reviewed;care plan/treatment goals reviewed;patient/other agree to care plan  -      Care Plan Review, Other Participant(s) daughter  -      Patient Effort excellent  -SM      Recorded by [SM] Gabriela Lofton MS CCC-SLP 18 1043      Row Name 18 0945             Pain Scale: Numbers Pre/Post-Treatment    Pain Scale: Numbers, Pretreatment 0/10 - no pain  -      Pain Scale: Numbers, Post-Treatment 0/10 - no pain  -SM      Recorded by [SM] Gabriela Lofton MS CCC-SLP 18 1043      Row Name 18 0945             Outcome Summary/Treatment Plan (SLP)    Daily Summary of Progress (SLP) progress toward functional goals as expected  -      Plan for Continued Treatment (SLP) Continue modified diet and dysphagia tx. Pt to complete exercises 3x/day, preferrably after meal to assist with increasing resistance/load outcome.   -SM      Anticipated Dischage Disposition anticipate therapy at next level of care  -SM      Recorded by [SM] Gabriela Lofton, MS  [facility]-SLP 11/02/18 1043        User Key  (r) = Recorded By, (t) = Taken By, (c) = Cosigned By    Initials Name Effective Dates Discipline     Gabriela Lofton, MS CCC-SLP 06/22/15 -  SLP          Outcome Summary  Outcome Summary/Treatment Plan (SLP)  Daily Summary of Progress (SLP): progress toward functional goals as expected (11/02/18 0945 : Gabriela Lofton, MS CCC-SLP)  Plan for Continued Treatment (SLP): Continue modified diet and dysphagia tx. Pt to complete exercises 3x/day, preferrably after meal to assist with increasing resistance/load outcome.  (11/02/18 0945 : Gabriela Lofton, MS CCC-SLP)  Anticipated Dischage Disposition: anticipate therapy at next level of care (11/02/18 0945 : Gabriela Lofton, MS CCC-SLP)            SLP GOALS     Row Name 11/02/18 0945 11/01/18 1500          Oral Nutrition/Hydration Goal 1 (SLP)    Oral Nutrition/Hydration Goal 1, SLP LTG: Pt will return to regular diet and thin liquids w/o s/s of aspiration w/ 100% accuracy w/o cues.   -SM LTG: Pt will return to regular diet and thin liquids w/o s/s of aspiration w/ 100% accuracy w/o cues.   -AC (r) TC (t) AC (c)     Time Frame (Oral Nutrition/Hydration Goal 1, SLP) by discharge  - by discharge  -AC (r) TC (t) AC (c)     Progress/Outcomes (Oral Nutrition/Hydration Goal 1, SLP) continuing progress toward goal  -  --        Oral Nutrition/Hydration Goal 2 (SLP)    Oral Nutrition/Hydration Goal 2, SLP Pt will tolerate trials of soft-solid and honey-thick liquids w/o s/s of aspiration w/ 100% accuracy w/o cues.   -SM Pt will tolerate trials of soft-solid and honey-thick liquids w/o s/s of aspiration w/ 100% accuracy w/o cues.   -AC (r) TC (t) AC (c)     Time Frame (Oral Nutrition/Hydration Goal 2, SLP) short term goal (STG);by discharge  - short term goal (STG);by discharge  -AC (r) TC (t) AC (c)     Barriers (Oral Nutrition/Hydration Goal 2, SLP) no s/s aspiration  -SM  --     Progress/Outcomes (Oral  Nutrition/Hydration Goal 2, SLP) continuing progress toward goal  -SM  --        Pharyngeal Strengthening Exercise Goal 1 (SLP)    Activity (Pharyngeal Strengthening Goal 1, SLP) increase timing;increase superior movement of the hyolaryngeal complex;increase squeeze/positive pressure generation;increase tongue base retraction;increase closure at entrance to airway/closure of airway at glottis  -SM increase timing;increase superior movement of the hyolaryngeal complex;increase squeeze/positive pressure generation;increase tongue base retraction;increase closure at entrance to airway/closure of airway at glottis  -AC (r) TC (t) AC (c)     Increase Timing prepping - 3 second prep or suck swallow or 3-step swallow  -SM prepping - 3 second prep or suck swallow or 3-step swallow  -AC (r) TC (t) AC (c)     Increase Superior Movement of the Hyolaryngeal Complex Mendelsohn;falsetto  -SM Mendelsohn;falsetto  -AC (r) TC (t) AC (c)     Increase Closure at Entrance to Airway/Closure of Airway at Glottis super-supraglottic swallow  -SM super-supraglottic swallow  -AC (r) TC (t) AC (c)     Increase Squeeze/Positive Pressure Generation hard effortful swallow  -SM hard effortful swallow  -AC (r) TC (t) AC (c)     Increase Tongue Base Retraction sunny  -SM sunny  -AC (r) TC (t) AC (c)     [place]/Accuracy (Pharyngeal Strengthening Goal 1, SLP) independently (over 90% accuracy)  -SM with minimal cues (75-90% accuracy)  -AC (r) TC (t) AC (c)     Time Frame (Pharyngeal Strengthening Goal 1, SLP) short term goal (STG);by discharge  -SM short term goal (STG);by discharge  -AC (r) TC (t) AC (c)     Barriers (Pharyngeal Strengthening Goal 1, SLP) Provided copy of exercises. Completed following explanation/demonstration without issue. Adjusted goal to independently given today's session. Pt to complete 3x/day, preferrably after meal.   -SM  --     Progress/Outcomes (Pharyngeal Strengthening Goal 1, SLP) good progress toward goal  -SM   --        Swallow Compensatory Strategies Goal 1 (SLP)    Activity (Swallow Compensatory Strategies/Techniques Goal 1, SLP) compensatory strategies;during meal intake;during p.o. trials;small cup sips;alternate food/liquid intake;other (see comments)  -SM compensatory strategies;during meal intake;during p.o. trials;small cup sips;alternate food/liquid intake;other (see comments)   no straws  -AC (r) TC (t) AC (c)     [place]/Accuracy (Swallow Compensatory Strategies/Techniques Goal 1, SLP) independently (over 90% accuracy)  -SM with minimal cues (75-90% accuracy)  -AC (r) TC (t) AC (c)     Time Frame (Swallow Compensatory Strategies/Techniques Goal 1, SLP) short term goal (STG)  -SM short term goal (STG);by discharge  -AC (r) TC (t) AC (c)     Barriers (Swallow Compensatory Strategies/Techniques Goal 1, SLP) Cues needed  -SM  --     Progress/Outcomes (Swallow Compensatory Strategies/Techniques Goal 1, SLP) continuing progress toward goal  -SM  --       User Key  (r) = Recorded By, (t) = Taken By, (c) = Cosigned By    Initials Name Provider Type    Gabriela Lentz, MS CCC-SLP Speech and Language Pathologist    AC Radha Reece, MS CCC-SLP Speech and Language Pathologist    TC Rachael Quiroga, Speech Therapy Student Speech Therapy Student          EDUCATION  The patient has been educated in the following areas:   Dysphagia (Swallowing Impairment) Oral Care/Hydration Modified Diet Instruction.    SLP Recommendation and Plan                       Anticipated Dischage Disposition: anticipate therapy at next level of care                Plan of Care Reviewed With: patient, daughter  Plan of Care Review  Plan of Care Reviewed With: patient, daughter  Daily Summary of Progress (SLP): progress toward functional goals as expected  Plan for Continued Treatment (SLP): Continue modified diet and dysphagia tx. Pt to complete exercises 3x/day, preferrably after meal to assist with increasing resistance/load outcome.             Time Calculation:         Time Calculation- SLP     Row Name 11/02/18 1046             Time Calculation- SLP    SLP Start Time 0945  -      SLP Received On 11/02/18  -        User Key  (r) = Recorded By, (t) = Taken By, (c) = Cosigned By    Initials Name Provider Type    Gabriela Lentz MS CCC-SLP Speech and Language Pathologist          Therapy Charges for Today     Code Description Service Date Service Provider Modifiers Qty    [id number]  ST TREATMENT SWALLOW 4 11/2/2018 Gabriela Lofton MS CCC-SLP GN 1                 Gabriela Lofton MS CCC-SLP  11/2/2018

## 2018-11-02 NOTE — PLAN OF CARE
Problem: Patient Care Overview  Goal: Plan of Care Review  Outcome: Ongoing (interventions implemented as appropriate)   11/01/18 0614 11/02/18 0318   Coping/Psychosocial   Plan of Care Reviewed With --  patient;daughter   Plan of Care Review   Progress improving --    OTHER   Outcome Summary --  VSS; no complaints of pain; Na+ improving; Bronchoscopy scheduled in the AM 11/2     Goal: Discharge Needs Assessment  Outcome: Ongoing (interventions implemented as appropriate)   10/31/18 2020 11/01/18 1132 11/02/18 0318   Discharge Needs Assessment   Readmission Within the Last 30 Days --  --  no previous admission in last 30 days   Concerns to be Addressed --  --  denies needs/concerns at this time   Patient/Family Anticipates Transition to home with family --  --    Patient/Family Anticipated Services at Transition  --  --    Transportation Anticipated family or friend will provide --  --    Equipment Needed After Discharge --  none --    Discharge Coordination/Progress --  Had HH in the past but not current after a hip surgery. Supportive family. --    Disability   Equipment Currently Used at Home --  none --        Problem: Pain, Acute (Adult)  Goal: Identify Related Risk Factors and Signs and Symptoms  Outcome: Ongoing (interventions implemented as appropriate)   11/01/18 1447   Pain, Acute (Adult)   Related Risk Factors (Acute Pain) knowledge deficit;patient perception;persistent pain;positioning   Signs and Symptoms (Acute Pain) sleep pattern alteration;verbalization of pain descriptors     Goal: Acceptable Pain Control/Comfort Level  Outcome: Ongoing (interventions implemented as appropriate)   11/02/18 0318   Pain, Acute (Adult)   Acceptable Pain Control/Comfort Level making progress toward outcome       Problem: Diabetes, Type 2 (Adult)  Goal: Signs and Symptoms of Listed Potential Problems Will be Absent, Minimized or Managed (Diabetes, Type 2)  Outcome: Ongoing (interventions implemented as  appropriate)   11/02/18 7865   Goal/Outcome Evaluation   Problems Assessed (Type 2 Diabetes) all   Problems Present (Type 2 Diabetes) none

## 2018-11-02 NOTE — PLAN OF CARE
Problem: Patient Care Overview  Goal: Plan of Care Review  Outcome: Ongoing (interventions implemented as appropriate)   11/02/18 1046   Coping/Psychosocial   Plan of Care Reviewed With patient;daughter   SLP treatment completed. Participated well in dysphagia tx. Provided copy of exercises for pt to complete between sessions. Will continue to address. Please see note for further details and recommendations.

## 2018-11-02 NOTE — CONSULTS
NAL Consult Note    Shauna Valencia  1955  [id number]    Date of Admit:  10/31/2018    Date of Consult: 11/2/2018        Requesting Provider: No ref. provider found  Evaluating Physician: Phillip Valdez MD        Reason for Consultation: HYPONATREMIA    History of present illness:    Patient is a 63 y.o.  Yr old female with history of obesity, HTN, HLD, DM2, and tobacco abuse, who was directed for admission by Dr Banks (her endocrinologist) after routine lab revealed significant hyponatremia. At the beginning of 2018, it was normal, but per pt, she recently had routine labs in 9/2018 which also revealed hyponatremia (Na 120) and her PCP. Her PCP ordered CT chest, which showed lung mass which is a new finding. Her serum sodium was normal in jan 2018 and next lab in sept shows it be 120.  Pt denies any new medications or SSRI.  Pt is relatively asymptomatic.     Past Medical History:   Diagnosis Date   • Diabetes mellitus (CMS/HCC)    • Hyperlipidemia    • Pancreatitis        Past Surgical History:   Procedure Laterality Date   • CAROTID STENT     • COLONOSCOPY     • UPPER GASTROINTESTINAL ENDOSCOPY         Social History     Social History   • Marital status:      Social History Main Topics   • Smoking status: Current Every Day Smoker     Packs/day: 0.50     Types: Cigarettes   • Alcohol use No   • Drug use: Unknown     Other Topics Concern   • Not on file       family history includes Colon polyps in her mother; Ulcerative colitis in her sister.    Allergies   Allergen Reactions   • Plavix [Clopidogrel Bisulfate] Anaphylaxis   • Codeine    • Penicillins        Medication:    Current Facility-Administered Medications:   •  acetaminophen (TYLENOL) tablet 650 mg, 650 mg, Oral, Q6H PRN, Maynor Ansari MD, 650 mg at 11/02/18 0116  •  aspirin EC tablet 81 mg, 81 mg, Oral, Daily, Mickey Warner MD, 81 mg at 11/02/18 0956  •  benzonatate (TESSALON) capsule 200 mg, 200 mg, Oral, TID PRN, Will  Malia Domínguez MD, 200 mg at 11/02/18 1138  •  carvedilol (COREG) tablet 6.25 mg, 6.25 mg, Oral, BID With Meals, Mickey Warner MD, 6.25 mg at 11/02/18 0956  •  cetirizine (zyrTEC) tablet 10 mg, 10 mg, Oral, Daily, Mickey Warner MD, 10 mg at 11/02/18 0956  •  dextrose (D50W) 25 g/ 50mL Intravenous Solution 25 g, 25 g, Intravenous, Q15 Min PRN, Mickey Warner MD  •  dextrose (GLUTOSE) oral gel 15 g, 15 g, Oral, Q15 Min PRN, Mickey Warner MD  •  enoxaparin (LOVENOX) syringe 40 mg, 40 mg, Subcutaneous, Q24H, Mickey Warner MD, Stopped at 11/02/18 0600  •  famotidine (PEPCID) tablet 20 mg, 20 mg, Oral, Daily, Mickey Warner MD, 20 mg at 11/02/18 0957  •  glucagon (human recombinant) (GLUCAGEN DIAGNOSTIC) injection 1 mg, 1 mg, Subcutaneous, PRN, Mickey Warner MD  •  ibuprofen (ADVIL,MOTRIN) tablet 400 mg, 400 mg, Oral, Q4H PRN, Maynor Ansari MD  •  insulin lispro (humaLOG) injection 0-7 Units, 0-7 Units, Subcutaneous, 4x Daily With Meals & Nightly, Mickey Warner MD, 2 Units at 11/02/18 1139  •  levothyroxine (SYNTHROID, LEVOTHROID) tablet 75 mcg, 75 mcg, Oral, Daily, Mickey Warner MD, Stopped at 11/02/18 0600  •  melatonin sublingual tablet 5 mg, 5 mg, Sublingual, Nightly PRN, Malia Solis MD, 5 mg at 11/01/18 2104  •  nicotine (NICODERM CQ) 21 MG/24HR patch 1 patch, 1 patch, Transdermal, Q24H, Maynor Ansari MD, 1 patch at 11/02/18 0957  •  ondansetron (ZOFRAN) injection 4 mg, 4 mg, Intravenous, Q6H PRN, Maynor Ansari MD, 4 mg at 11/02/18 0232  •  pantoprazole (PROTONIX) EC tablet 40 mg, 40 mg, Oral, Q AM, Maynor Ansari MD, Stopped at 11/02/18 0600  •  sodium chloride tablet 1 g, 1 g, Oral, TID With Meals, Maynor Ansari MD, 1 g at 11/02/18 1138    Prescriptions Prior to Admission   Medication Sig Dispense Refill Last Dose   • albuterol (PROVENTIL HFA;VENTOLIN HFA) 108 (90 Base) MCG/ACT inhaler Inhale 2 puffs  Every 4 (Four) Hours As Needed for Wheezing.   10/31/2018 at Unknown time   • aspirin 81 MG EC tablet Take 81 mg by mouth Daily.   10/31/2018 at Unknown time   • carvedilol (COREG) 6.25 MG tablet Take 6.25 mg by mouth 2 (Two) Times a Day With Meals.   10/31/2018 at Unknown time   • famotidine (PEPCID) 20 MG tablet Take 20 mg by mouth 2 (Two) Times a Day.   10/31/2018 at Unknown time   • levothyroxine (SYNTHROID, LEVOTHROID) 75 MCG tablet Take 75 mcg by mouth Daily.   10/31/2018 at Unknown time   • Loratadine (CLARITIN) 10 MG capsule Take  by mouth Daily.   10/31/2018 at Unknown time   • pantoprazole (PROTONIX) 40 MG EC tablet Take 40 mg by mouth Daily.   10/31/2018 at Unknown time   • REPATHA SURECLICK 140 MG/ML solution auto-injector Inject 140 mg under the skin into the appropriate area as directed Every 14 (Fourteen) Days.   10/22/2018   • SITagliptin (JANUVIA) 100 MG tablet Take 100 mg by mouth Daily.   10/30/2018 at Unknown time   • ticagrelor (BRILINTA) 60 MG tablet tablet Take 60 mg by mouth Daily.   10/30/2018 at Unknown time       Review of Systems:    Constitutional-- No Fever, chills or sweats. No significant change in weight  Eye-- no diplopia, no conjunctivitis  ENT-- No new hearing or throat complaints.  No epistaxis or oral sores. No odynophagia or dysphagia. No headache, photophobia or neck stiffness.  CV-- No chest pain, palpitations, soa, or edema  Resp-- No SOB/cough/Hemoptysis  GI- No nausea, vomiting, or diarrhea.  Does have some difficulty or aspiration on drinking clear fluids thin in nature  -- No dysuria, hematuria, or flank pain. No lower tract obstructive symptoms  Skin-- No rash, warm and dry  Lymph- no painful or swollen lymph nodes in neck/axilla or groin.   Heme- No active bruising or bleeding; no Hx of DVT or PE.  MS-- no swelling or pain in the joints  Neuro-- No acute focal weakness or numbness in the arms or legs.  No seizures.  Psych--No anxiety or depression  Endo--No cold or  "heat intolerance.  No polyuria, polydipsia, or polyphagia    Full review of systems reviewed and negative otherwise for acute complaints    Physical Exam:   Vital Signs   Blood pressure 140/77, pulse 74, temperature 97.5 °F (36.4 °C), temperature source Oral, resp. rate 18, height 154.9 cm (61\"), weight 90.1 kg (198 lb 9.6 oz), SpO2 96 %.     GENERAL: Awake and alert, in no acute distress.   HEENT: Normocephalic, atraumatic.  PER.  No conjunctivitis. No icterus. Oropharynx clear without evidence of thrush or exudate. No evidence of peridontal disease.    NECK: Supple without nuchal rigidity. No mass.  LYMPH: No painful cervical, axillary or inguinal lymphadenopathy.  HEART: RRR; No murmur, rubs, gallops. No bruits in neck, abdomen, or groins, no edema  LUNGS: Normal respiratory effort. Nonlabored. No dullness.  No crepitus.  Clear to auscultation bilaterally without wheezing, rales, rhonchi.  ABDOMEN: Soft, nontender, nondistended. Positive bowel sounds. No rebound or guarding. NO mass or HSM.  JOINTS:  Full range of motion, no redness or tenderness.  EXT:  No cyanosis, clubbing or edema.  :  External genitalia without swelling or lesion  SKIN: Warm and dry without rash  NEURO: Oriented to PPT. No focal neurological deficits. Strength equal bilateral  PSYCHIATRIC: Normal insight and judgement. Cooperative with PE    Laboratory Data    Results from last 7 days  Lab Units 10/31/18  1710   HEMOGLOBIN g/dL 11.7   HEMATOCRIT % 33.8*       Results from last 7 days  Lab Units 11/02/18  0758 11/02/18  0343 11/02/18  0022 11/01/18  1949   SODIUM mmol/L 121* 121* 122* 118*   POTASSIUM mmol/L 3.8 3.8 3.6 3.8   CHLORIDE mmol/L 88* 87* 89* 89*   CO2 mmol/L 31.0 26.0 26.0 21.0   BUN mg/dL 11 12 12 10   CREATININE mg/dL 0.71 0.74 0.79 0.65   CALCIUM mg/dL 8.8 8.5* 8.8 8.4*       Results from last 7 days  Lab Units 11/02/18  0758   GLUCOSE mg/dL 108*       Results from last 7 days  Lab Units 10/31/18  1729   COLOR UA  Dark " Yellow*   CLARITY UA  Clear   PH, URINE  6.0   SPECIFIC GRAVITY, URINE  >=1.030   GLUCOSE UA  Negative   KETONES UA  Trace*   BILIRUBIN UA  Negative   PROTEIN UA  Negative   BLOOD UA  Negative   LEUKOCYTES UA  Negative   NITRITE UA  Negative         Estimated Creatinine Clearance: 85 mL/min (by C-G formula based on SCr of 0.71 mg/dL).    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 but very low chloride level- recent use of diuretic  She is assymptomatic at this itme    PLAN: Thank you for asking us to see Shauna Valencia, I recommend the following:   I initially thought of giving tolvaptan but due to her difficulty in drinking fluid I am going to try normal saline first and see if sodium gets corrected  Will continue to check serial sodium levels and if in next 12 hours serum soidum is not getting better or is getting lower will go ahead and give tolvaptan- I am concerned that her serum sodium will get corrected much faster than desired with tolvaptan- I have discussed this in detail with patient  plz call with sodium levels      Phillip Valdez MD  11/2/2018  11:56 AM

## 2018-11-03 LAB
ANION GAP SERPL CALCULATED.3IONS-SCNC: 5 MMOL/L (ref 3–11)
ANION GAP SERPL CALCULATED.3IONS-SCNC: 6 MMOL/L (ref 3–11)
BUN BLD-MCNC: 14 MG/DL (ref 9–23)
BUN BLD-MCNC: 14 MG/DL (ref 9–23)
BUN BLD-MCNC: 15 MG/DL (ref 9–23)
BUN BLD-MCNC: 18 MG/DL (ref 9–23)
BUN/CREAT SERPL: 22.6 (ref 7–25)
BUN/CREAT SERPL: 23 (ref 7–25)
BUN/CREAT SERPL: 23.7 (ref 7–25)
BUN/CREAT SERPL: 24.6 (ref 7–25)
CALCIUM SPEC-SCNC: 8.6 MG/DL (ref 8.7–10.4)
CALCIUM SPEC-SCNC: 9.1 MG/DL (ref 8.7–10.4)
CHLORIDE SERPL-SCNC: 97 MMOL/L (ref 99–109)
CHLORIDE SERPL-SCNC: 98 MMOL/L (ref 99–109)
CO2 SERPL-SCNC: 25 MMOL/L (ref 20–31)
CO2 SERPL-SCNC: 26 MMOL/L (ref 20–31)
CO2 SERPL-SCNC: 28 MMOL/L (ref 20–31)
CO2 SERPL-SCNC: 28 MMOL/L (ref 20–31)
CREAT BLD-MCNC: 0.61 MG/DL (ref 0.6–1.3)
CREAT BLD-MCNC: 0.61 MG/DL (ref 0.6–1.3)
CREAT BLD-MCNC: 0.62 MG/DL (ref 0.6–1.3)
CREAT BLD-MCNC: 0.76 MG/DL (ref 0.6–1.3)
GFR SERPL CREATININE-BSD FRML MDRD: 100 ML/MIN/1.73
GFR SERPL CREATININE-BSD FRML MDRD: 100 ML/MIN/1.73
GFR SERPL CREATININE-BSD FRML MDRD: 77 ML/MIN/1.73
GFR SERPL CREATININE-BSD FRML MDRD: 98 ML/MIN/1.73
GLUCOSE BLD-MCNC: 113 MG/DL (ref 70–100)
GLUCOSE BLD-MCNC: 134 MG/DL (ref 70–100)
GLUCOSE BLD-MCNC: 151 MG/DL (ref 70–100)
GLUCOSE BLD-MCNC: 169 MG/DL (ref 70–100)
GLUCOSE BLDC GLUCOMTR-MCNC: 128 MG/DL (ref 70–130)
GLUCOSE BLDC GLUCOMTR-MCNC: 162 MG/DL (ref 70–130)
GLUCOSE BLDC GLUCOMTR-MCNC: 167 MG/DL (ref 70–130)
GLUCOSE BLDC GLUCOMTR-MCNC: 178 MG/DL (ref 70–130)
POTASSIUM BLD-SCNC: 4 MMOL/L (ref 3.5–5.5)
POTASSIUM BLD-SCNC: 4.2 MMOL/L (ref 3.5–5.5)
SODIUM BLD-SCNC: 128 MMOL/L (ref 132–146)
SODIUM BLD-SCNC: 128 MMOL/L (ref 132–146)
SODIUM BLD-SCNC: 129 MMOL/L (ref 132–146)
SODIUM BLD-SCNC: 129 MMOL/L (ref 132–146)
SODIUM BLD-SCNC: 130 MMOL/L (ref 132–146)
SODIUM BLD-SCNC: 130 MMOL/L (ref 132–146)
SODIUM BLD-SCNC: 131 MMOL/L (ref 132–146)
SODIUM BLD-SCNC: 131 MMOL/L (ref 132–146)
SODIUM BLD-SCNC: 132 MMOL/L (ref 132–146)

## 2018-11-03 PROCEDURE — 80048 BASIC METABOLIC PNL TOTAL CA: CPT | Performed by: HOSPITALIST

## 2018-11-03 PROCEDURE — 99233 SBSQ HOSP IP/OBS HIGH 50: CPT | Performed by: HOSPITALIST

## 2018-11-03 PROCEDURE — 84295 ASSAY OF SERUM SODIUM: CPT | Performed by: INTERNAL MEDICINE

## 2018-11-03 PROCEDURE — 82962 GLUCOSE BLOOD TEST: CPT

## 2018-11-03 PROCEDURE — 25010000002 DESMOPRESSIN PER 1 MCG: Performed by: INTERNAL MEDICINE

## 2018-11-03 PROCEDURE — 25010000002 ENOXAPARIN PER 10 MG: Performed by: HOSPITALIST

## 2018-11-03 RX ORDER — DEXTROSE MONOHYDRATE 50 MG/ML
100 INJECTION, SOLUTION INTRAVENOUS CONTINUOUS
Status: DISCONTINUED | OUTPATIENT
Start: 2018-11-03 | End: 2018-11-03

## 2018-11-03 RX ORDER — SODIUM CHLORIDE 9 MG/ML
75 INJECTION, SOLUTION INTRAVENOUS CONTINUOUS
Status: DISCONTINUED | OUTPATIENT
Start: 2018-11-03 | End: 2018-11-04

## 2018-11-03 RX ADMIN — INSULIN LISPRO 2 UNITS: 100 INJECTION, SOLUTION INTRAVENOUS; SUBCUTANEOUS at 20:11

## 2018-11-03 RX ADMIN — FAMOTIDINE 20 MG: 20 TABLET ORAL at 09:30

## 2018-11-03 RX ADMIN — ASPIRIN 81 MG: 81 TABLET, COATED ORAL at 09:30

## 2018-11-03 RX ADMIN — CARVEDILOL 6.25 MG: 6.25 TABLET, FILM COATED ORAL at 17:50

## 2018-11-03 RX ADMIN — PANTOPRAZOLE SODIUM 40 MG: 40 TABLET, DELAYED RELEASE ORAL at 05:20

## 2018-11-03 RX ADMIN — LEVOTHYROXINE SODIUM 75 MCG: 75 TABLET ORAL at 05:20

## 2018-11-03 RX ADMIN — DEXTROSE MONOHYDRATE 100 ML/HR: 50 INJECTION, SOLUTION INTRAVENOUS at 01:50

## 2018-11-03 RX ADMIN — NICOTINE 1 PATCH: 21 PATCH, EXTENDED RELEASE TRANSDERMAL at 09:31

## 2018-11-03 RX ADMIN — Medication 1 G: at 17:50

## 2018-11-03 RX ADMIN — CARVEDILOL 6.25 MG: 6.25 TABLET, FILM COATED ORAL at 09:30

## 2018-11-03 RX ADMIN — Medication 1 G: at 09:30

## 2018-11-03 RX ADMIN — HYDROCODONE POLISTIREX AND CHLORPHENIRAMINE POLISTIREX 2.5 ML: 10; 8 SUSPENSION, EXTENDED RELEASE ORAL at 00:43

## 2018-11-03 RX ADMIN — INSULIN LISPRO 2 UNITS: 100 INJECTION, SOLUTION INTRAVENOUS; SUBCUTANEOUS at 17:50

## 2018-11-03 RX ADMIN — CETIRIZINE HYDROCHLORIDE 10 MG: 10 TABLET, FILM COATED ORAL at 09:30

## 2018-11-03 RX ADMIN — INSULIN LISPRO 2 UNITS: 100 INJECTION, SOLUTION INTRAVENOUS; SUBCUTANEOUS at 09:31

## 2018-11-03 RX ADMIN — Medication 1 G: at 12:03

## 2018-11-03 RX ADMIN — SODIUM CHLORIDE 75 ML/HR: 9 INJECTION, SOLUTION INTRAVENOUS at 12:03

## 2018-11-03 RX ADMIN — DESMOPRESSIN ACETATE 2 MCG: 4 SOLUTION INTRAVENOUS at 01:53

## 2018-11-03 RX ADMIN — ENOXAPARIN SODIUM 40 MG: 100 INJECTION SUBCUTANEOUS at 05:20

## 2018-11-03 NOTE — PROGRESS NOTES
[facility] Medicine Services  PROGRESS NOTE    Patient Name: Shauna Valencia  : 1955  MRN: [id number]    Date of Admission: 10/31/2018  Length of Stay: 3  Primary Care Physician: Rox Singleton MD    Subjective   Subjective     CC:  Weakness    HPI:    Dry cough only. No shortness of breath. Weakness better. Slept better. Cough better.    Review of Systems    Otherwise ROS is negative except as mentioned in the HPI.    Objective   Objective     Vital Signs:   Temp:  [97.3 °F (36.3 °C)-97.5 °F (36.4 °C)] 97.5 °F (36.4 °C)  Heart Rate:  [74-86] 76  Resp:  [18] 18  BP: (110-140)/(67-77) 113/67        Physical Exam:  NAD, alert and oriented  OP clear, MMM  PERRL  Neck supple  RRR  CTAB  +BS, ND, NT  POLLACK  No c/c/e  No rashes  Normal affect  No change     Results Reviewed:  I have personally reviewed current lab, radiology, and data and agree.      Results from last 7 days  Lab Units 10/31/18  1710   WBC 10*3/mm3 7.54   HEMOGLOBIN g/dL 11.7   HEMATOCRIT % 33.8*   PLATELETS 10*3/mm3 422       Results from last 7 days  Lab Units 18  0836 18  0500 18  0352   SODIUM mmol/L 129*  130* 128* 128*   POTASSIUM mmol/L 4.0 4.0 4.0   CHLORIDE mmol/L 97* 97* 98*   CO2 mmol/L 28.0 26.0 25.0   BUN mg/dL 15 14 14   CREATININE mg/dL 0.61 0.62 0.61   GLUCOSE mg/dL 113* 151* 169*   CALCIUM mg/dL 8.6* 8.6* 8.6*     Estimated Creatinine Clearance: 98.9 mL/min (by C-G formula based on SCr of 0.61 mg/dL).  No results found for: BNP    Microbiology Results Abnormal     None          Imaging Results (last 24 hours)     ** No results found for the last 24 hours. **             I have reviewed the medications.      aspirin 81 mg Oral Daily   carvedilol 6.25 mg Oral BID With Meals   cetirizine 10 mg Oral Daily   enoxaparin 40 mg Subcutaneous Q24H   famotidine 20 mg Oral Daily   insulin lispro 0-7 Units Subcutaneous 4x Daily With Meals & Nightly   levothyroxine 75 mcg Oral Daily   nicotine 1  patch Transdermal Q24H   pantoprazole 40 mg Oral Q AM   sodium chloride 1 g Oral TID With Meals         Assessment/Plan   Assessment / Plan     Active Hospital Problems    Diagnosis   • Mass of upper lobe of left lung   • Mediastinal lymphadenopathy   • Acute hyponatremia   • Hyperlipidemia   • Type 2 diabetes mellitus (CMS/HCC)   • Tobacco abuse   • Obesity (BMI 30-39.9)          Brief Hospital Course to date:  Shauna Valencia is a 63 y.o. female here with hyponatremia, likely SIADH, and found to have a lung mass      Assessment & Plan:  L hilar mass  --bronchoscopy next week  Hx of RCA stent, chart reviewed, 7/2017  --hold Brilinta, aspirin only  Hyponatremia  --probable SIADH  --fluid restrict  --checked AM cortisol  --TSH normal  --s/p IVF, with improvement  --further recommendations per NAL  Dysphagia  --on modified diet  Tobacco abuse  Obesity  HL    DVT Prophylaxis:  Lovenox    CODE STATUS:   Code Status and Medical Interventions:   Ordered at: 10/31/18 1909     Level Of Support Discussed With:    Patient     Code Status:    CPR     Medical Interventions (Level of Support Prior to Arrest):    Full       Disposition: I expect the patient to be discharged TBD.      Electronically signed by Maynor Ansari MD, 11/03/18, 9:50 AM.

## 2018-11-03 NOTE — PROGRESS NOTES
"NAL Progress Note  Shauna Valencia  [id number]  1955    10/31/2018    Reason for visit:  Hyponatremia    ROS:    Pulm:  No SOA  CV:  No CP    I&O:    Intake/Output Summary (Last 24 hours) at 11/03/18 1130  Last data filed at 11/03/18 0900   Gross per 24 hour   Intake            830.3 ml   Output                0 ml   Net            830.3 ml       PE:   Blood pressure 113/67, pulse 76, temperature 97.5 °F (36.4 °C), temperature source Oral, resp. rate 18, height 154.9 cm (61\"), weight 90.1 kg (198 lb 9.6 oz), SpO2 96 %.    GENERAL: Awake and alert, in no acute distress.   HEENT: PER, No conjunctivitis  NECK: Supple, no JVD     HEART: RRR; No murmur, rubs, gallops.   LUNGS: Clear to auscultation bilaterally without wheezing, rales, rhonchi. Normal respiratory effort. Nonlabored. No dullness.  ABDOMEN: Soft, nontender, nondistended. Positive bowel sounds. No rebound or guarding. NO mass or HSM.  EXT:  No cyanosis, clubbing or edema. No cord.  : Genitalia generally unremarkable.  Without [place] catheter.  SKIN: Warm and dry without cutaneous eruptions on Inspection/palpation.    NEURO: Alert and oriented x 3, Motor 5/5 bilaterally    Medications:    Current Facility-Administered Medications:   •  acetaminophen (TYLENOL) tablet 650 mg, 650 mg, Oral, Q6H PRN, Maynor Ansari MD, 650 mg at 11/02/18 0116  •  aspirin EC tablet 81 mg, 81 mg, Oral, Daily, Mickey Warner MD, 81 mg at 11/03/18 0930  •  benzonatate (TESSALON) capsule 200 mg, 200 mg, Oral, TID PRN, Malia Solis MD, 200 mg at 11/02/18 1138  •  carvedilol (COREG) tablet 6.25 mg, 6.25 mg, Oral, BID With Meals, Mickey Warner MD, 6.25 mg at 11/03/18 0930  •  cetirizine (zyrTEC) tablet 10 mg, 10 mg, Oral, Daily, Mickey Warner MD, 10 mg at 11/03/18 0930  •  dextrose (D50W) 25 g/ 50mL Intravenous Solution 25 g, 25 g, Intravenous, Q15 Min PRN, Mickey Warner MD  •  dextrose (GLUTOSE) oral gel 15 g, 15 g, Oral, Q15 Min PRN, " Mickey Warner MD  •  enoxaparin (LOVENOX) syringe 40 mg, 40 mg, Subcutaneous, Q24H, Mickey Warner MD, 40 mg at 11/03/18 0520  •  famotidine (PEPCID) tablet 20 mg, 20 mg, Oral, Daily, Mickey Warner MD, 20 mg at 11/03/18 0930  •  glucagon (human recombinant) (GLUCAGEN DIAGNOSTIC) injection 1 mg, 1 mg, Subcutaneous, PRN, Mickey Warner MD  •  HYDROcod Polst-CPM Polst ER (TUSSIONEX PENNKINETIC) 10-8 MG/5ML ER suspension 2.5 mL, 2.5 mL, Oral, Q12H PRN, Maynor Ansari MD, 2.5 mL at 11/03/18 0043  •  ibuprofen (ADVIL,MOTRIN) tablet 400 mg, 400 mg, Oral, Q4H PRN, Maynor Ansari MD  •  insulin lispro (humaLOG) injection 0-7 Units, 0-7 Units, Subcutaneous, 4x Daily With Meals & Nightly, Mickey Warner MD, 2 Units at 11/03/18 0931  •  levothyroxine (SYNTHROID, LEVOTHROID) tablet 75 mcg, 75 mcg, Oral, Daily, Mickey Warner MD, 75 mcg at 11/03/18 0520  •  melatonin sublingual tablet 5 mg, 5 mg, Sublingual, Nightly PRN, Malia Solis MD, 5 mg at 11/01/18 2104  •  nicotine (NICODERM CQ) 21 MG/24HR patch 1 patch, 1 patch, Transdermal, Q24H, Maynor Ansari MD, 1 patch at 11/03/18 0931  •  ondansetron (ZOFRAN) injection 4 mg, 4 mg, Intravenous, Q6H PRN, Maynor Ansari MD, 4 mg at 11/02/18 0232  •  pantoprazole (PROTONIX) EC tablet 40 mg, 40 mg, Oral, Q AM, Maynor Ansari MD, 40 mg at 11/03/18 0520  •  sodium chloride 0.9 % infusion, 75 mL/hr, Intravenous, Continuous, Phillip Valdez MD  •  sodium chloride tablet 1 g, 1 g, Oral, TID With Meals, Maynor Ansari MD, 1 g at 11/03/18 0930    Meds reviewed and doses adjusted for renal function    Labs:    Results from last 7 days  Lab Units 11/03/18  0836 11/03/18  0500 11/03/18  0352 11/02/18  2358 11/02/18  2027 11/02/18  1808 11/02/18  1519  11/02/18  1130  10/31/18  1710   WBC 10*3/mm3  --   --   --   --   --   --   --   --   --   --  7.54   HEMOGLOBIN g/dL  --   --   --   --   --   --   --   --   --   --   11.7   HEMATOCRIT %  --   --   --   --   --   --   --   --   --   --  33.8*   PLATELETS 10*3/mm3  --   --   --   --   --   --   --   --   --   --  422   SODIUM mmol/L 129*  130* 128* 128* 131* 127*  127* 126* 123*  < > 120*  < >  --    POTASSIUM mmol/L 4.0 4.0 4.0 4.2 3.7  --  4.1  --  3.9  < >  --    CHLORIDE mmol/L 97* 97* 98* 97* 93*  --  90*  --  89*  < >  --    CO2 mmol/L 28.0 26.0 25.0 28.0 29.0  --  28.0  --  26.0  < >  --    BUN mg/dL 15 14 14 18 16  --  13  --  11  < >  --    CREATININE mg/dL 0.61 0.62 0.61 0.76 0.78  --  0.74  --  0.73  < >  --    GLUCOSE mg/dL 113* 151* 169* 134* 145*  --  129*  --  178*  < >  --    CALCIUM mg/dL 8.6* 8.6* 8.6* 9.1 8.9  --  9.1  --  8.9  < >  --    URIC ACID mg/dL  --   --   --   --   --   --   --   --  2.5*  --   --    < > = values in this interval not displayed.          Radiology:  Imaging Results (last 72 hours)     Procedure Component Value Units Date/Time    Fiberoptic Endo (fees) [[id number]] Resulted:  11/01/18 1555     Updated:  11/01/18 1555    Narrative:       This procedure was auto-finalized with no dictation required.    CT Chest Without Contrast [[id number]] Collected:  10/31/18 1701     Updated:  10/31/18 1930    Narrative:       EXAM:    CT Chest Without Intravenous Contrast     EXAM DATE/TIME:    10/31/2018 5:01 PM     CLINICAL HISTORY:    63 years old, female; Hypo-osmolality and hyponatremia; Dyspnea, unspecified;   Signs and symptoms; Cough and shortness of breath; Prior surgery; Surgery date:   6+ months; Surgery type: 5 heart stents; Additional info: SOA, hyponatremia     TECHNIQUE:    Axial computed tomography images of the chest without intravenous contrast.    All CT scans at this facility use at least one of these dose optimization   techniques: automated exposure control; mA and/or kV adjustment per patient   size (includes targeted exams where dose is matched to clinical indication); or   iterative reconstruction.    Coronal reformatted images  were created and reviewed.     COMPARISON:    No relevant prior studies available.     FINDINGS:     LUNGS/PLEURA: Suboptimally evaluated located and mildly spiculated RIGHT   hilar mass measuring at least 4.4 cm in the long axis. Nodular peribronchial   LEFT upper lobe and subpleural inferior lingula airspace opacities measuring up   to 1.9 cm and centrilobular nodules in the LEFT upper lobe. Calcific granuloma   in the RIGHT normal. No pleural effusion or pneumothorax. Complete obstruction   of the LEFT apical bronchus, no other central obstructive endobronchial mass or   abnormality.     MEDIASTINUM: Heart size is normal, with trace pericardial   thickening/effusion. Severe coronary arterial calcification/coronary stents.   Atherosclerotic disease of the aorta without aortic aneurysm. Bulky prevascular   lymph node adjacent to the aortic arch measuring 3.0 cm and LEFT hilar mass   which may represent lymph nodes or primary lung malignancy.     OTHER STRUCTURES: Chronic degenerative changes in the visualized spine.   Nonspecific bilateral perinephric fat stranding and renal cortical scarring.   Small LEFT adrenal nodule measuring 1.3 cm.       Impression:       1. Findings concerning for LEFT HILAR MALIGNANT SOFT TISSUE MASS with   postobstructive focal pneumonia/bronchiolitis in the LEFT upper lobe.   2. Likely METASTATIC mediastinal and possibly LEFT hilar lymph nodes.   3. Coronary arterial calcification suggestive of CAD.   4. Other nonemergent/incidental findings as described.     THIS DOCUMENT HAS BEEN ELECTRONICALLY SIGNED BY BATOOL SAMANO MD          Renal Imaging:        IMPRESSION:  Hyponatremia; Likely SIADH sec to lung mass but very low chloride level- recent use of diuretic  She is assymptomatic at this time  We gave normal saline and her sodium got corrected faster than I desired  Given one dose of ddavp to lower it for safety reasons      RECOMMENDATIONS:     continue normal saline 75 cc/hr x 24  hrs  Continue serial sodium checks  Aim is to raise sodium not more than 134 in next 24 hours  Continue regular diet at this time  DW Dr Aguila Valdez MD  11/3/2018  11:30 AM

## 2018-11-04 LAB
ANION GAP SERPL CALCULATED.3IONS-SCNC: 5.2 MMOL/L (ref 3–11)
BUN BLD-MCNC: 14 MG/DL (ref 9–23)
BUN/CREAT SERPL: 23.3 (ref 7–25)
CALCIUM SPEC-SCNC: 8.7 MG/DL (ref 8.7–10.4)
CHLORIDE SERPL-SCNC: 98 MMOL/L (ref 99–109)
CO2 SERPL-SCNC: 26.8 MMOL/L (ref 20–31)
CREAT BLD-MCNC: 0.6 MG/DL (ref 0.6–1.3)
GFR SERPL CREATININE-BSD FRML MDRD: 102 ML/MIN/1.73
GLUCOSE BLD-MCNC: 93 MG/DL (ref 70–100)
GLUCOSE BLDC GLUCOMTR-MCNC: 131 MG/DL (ref 70–130)
GLUCOSE BLDC GLUCOMTR-MCNC: 170 MG/DL (ref 70–130)
GLUCOSE BLDC GLUCOMTR-MCNC: 254 MG/DL (ref 70–130)
GLUCOSE BLDC GLUCOMTR-MCNC: 95 MG/DL (ref 70–130)
POTASSIUM BLD-SCNC: 4.2 MMOL/L (ref 3.5–5.5)
SODIUM BLD-SCNC: 129 MMOL/L (ref 132–146)
SODIUM BLD-SCNC: 130 MMOL/L (ref 132–146)
SODIUM BLD-SCNC: 131 MMOL/L (ref 132–146)

## 2018-11-04 PROCEDURE — 84295 ASSAY OF SERUM SODIUM: CPT | Performed by: INTERNAL MEDICINE

## 2018-11-04 PROCEDURE — 82962 GLUCOSE BLOOD TEST: CPT

## 2018-11-04 PROCEDURE — 25010000002 ENOXAPARIN PER 10 MG: Performed by: HOSPITALIST

## 2018-11-04 PROCEDURE — 80048 BASIC METABOLIC PNL TOTAL CA: CPT | Performed by: INTERNAL MEDICINE

## 2018-11-04 PROCEDURE — 99233 SBSQ HOSP IP/OBS HIGH 50: CPT | Performed by: HOSPITALIST

## 2018-11-04 RX ADMIN — Medication 1 G: at 11:55

## 2018-11-04 RX ADMIN — SODIUM CHLORIDE 75 ML/HR: 9 INJECTION, SOLUTION INTRAVENOUS at 01:49

## 2018-11-04 RX ADMIN — FAMOTIDINE 20 MG: 20 TABLET ORAL at 08:36

## 2018-11-04 RX ADMIN — HYDROCODONE POLISTIREX AND CHLORPHENIRAMINE POLISTIREX 2.5 ML: 10; 8 SUSPENSION, EXTENDED RELEASE ORAL at 01:50

## 2018-11-04 RX ADMIN — IBUPROFEN 400 MG: 400 TABLET ORAL at 08:47

## 2018-11-04 RX ADMIN — INSULIN LISPRO 2 UNITS: 100 INJECTION, SOLUTION INTRAVENOUS; SUBCUTANEOUS at 21:16

## 2018-11-04 RX ADMIN — Medication 1 G: at 17:09

## 2018-11-04 RX ADMIN — ASPIRIN 81 MG: 81 TABLET, COATED ORAL at 08:36

## 2018-11-04 RX ADMIN — INSULIN LISPRO 4 UNITS: 100 INJECTION, SOLUTION INTRAVENOUS; SUBCUTANEOUS at 11:55

## 2018-11-04 RX ADMIN — PANTOPRAZOLE SODIUM 40 MG: 40 TABLET, DELAYED RELEASE ORAL at 05:37

## 2018-11-04 RX ADMIN — ENOXAPARIN SODIUM 40 MG: 100 INJECTION SUBCUTANEOUS at 05:37

## 2018-11-04 RX ADMIN — LEVOTHYROXINE SODIUM 75 MCG: 75 TABLET ORAL at 05:37

## 2018-11-04 RX ADMIN — CARVEDILOL 6.25 MG: 6.25 TABLET, FILM COATED ORAL at 17:09

## 2018-11-04 RX ADMIN — NICOTINE 1 PATCH: 21 PATCH, EXTENDED RELEASE TRANSDERMAL at 08:37

## 2018-11-04 RX ADMIN — Medication 1 G: at 08:37

## 2018-11-04 RX ADMIN — ACETAMINOPHEN 650 MG: 325 TABLET ORAL at 06:18

## 2018-11-04 RX ADMIN — CETIRIZINE HYDROCHLORIDE 10 MG: 10 TABLET, FILM COATED ORAL at 08:37

## 2018-11-04 RX ADMIN — CARVEDILOL 6.25 MG: 6.25 TABLET, FILM COATED ORAL at 08:36

## 2018-11-04 NOTE — PROGRESS NOTES
[facility] Medicine Services  PROGRESS NOTE    Patient Name: Shauna Valencia  : 1955  MRN: [id number]    Date of Admission: 10/31/2018  Length of Stay: 4  Primary Care Physician: Rox Singleton MD    Subjective   Subjective     CC:  Weakness    HPI:    Dry cough only. No shortness of breath. Weakness better. Slept better. Cough better. Hates thickened liquids. Attributes HA to lack of coffee from thickener. No f/c. Some R LE cramping, but better.    Review of Systems    Otherwise ROS is negative except as mentioned in the HPI.    Objective   Objective     Vital Signs:   Temp:  [97.5 °F (36.4 °C)-98.6 °F (37 °C)] 98.6 °F (37 °C)  Heart Rate:  [86-88] 88  Resp:  [18] 18  BP: (126-160)/(63-81) 140/63        Physical Exam:  NAD, alert and oriented  OP clear, MMM  PERRL  Neck supple  RRR  CTAB  +BS, ND, NT  POLLACK  No c/c  Tr LE edema only  No rashes  Normal affect  No change 11/3 otherwise    Results Reviewed:  I have personally reviewed current lab, radiology, and data and agree.      Results from last 7 days  Lab Units 10/31/18  1710   WBC 10*3/mm3 7.54   HEMOGLOBIN g/dL 11.7   HEMATOCRIT % 33.8*   PLATELETS 10*3/mm3 422       Results from last 7 days  Lab Units 18  0556 18  0243 18  2350  18  0836 18  0500   SODIUM mmol/L 130* 130* 131*  < > 129*  130* 128*   POTASSIUM mmol/L  --  4.2  --   --  4.0 4.0   CHLORIDE mmol/L  --  98*  --   --  97* 97*   CO2 mmol/L  --  26.8  --   --  28.0 26.0   BUN mg/dL  --  14  --   --  15 14   CREATININE mg/dL  --  0.60  --   --  0.61 0.62   GLUCOSE mg/dL  --  93  --   --  113* 151*   CALCIUM mg/dL  --  8.7  --   --  8.6* 8.6*   < > = values in this interval not displayed.  Estimated Creatinine Clearance: 100.6 mL/min (by C-G formula based on SCr of 0.6 mg/dL).  No results found for: BNP    Microbiology Results Abnormal     None          Imaging Results (last 24 hours)     ** No results found for the last 24 hours. **              I have reviewed the medications.      aspirin 81 mg Oral Daily   carvedilol 6.25 mg Oral BID With Meals   cetirizine 10 mg Oral Daily   enoxaparin 40 mg Subcutaneous Q24H   famotidine 20 mg Oral Daily   insulin lispro 0-7 Units Subcutaneous 4x Daily With Meals & Nightly   levothyroxine 75 mcg Oral Daily   nicotine 1 patch Transdermal Q24H   pantoprazole 40 mg Oral Q AM   sodium chloride 1 g Oral TID With Meals         Assessment/Plan   Assessment / Plan     Active Hospital Problems    Diagnosis   • Mass of upper lobe of left lung   • Mediastinal lymphadenopathy   • Acute hyponatremia   • Hyperlipidemia   • Type 2 diabetes mellitus (CMS/HCC)   • Tobacco abuse   • Obesity (BMI 30-39.9)          Brief Hospital Course to date:  Shauna Valencia is a 63 y.o. female here with hyponatremia, likely SIADH, and found to have a lung mass      Assessment & Plan:  L hilar mass  --bronchoscopy next week  Hx of RCA stent, chart reviewed, 7/2017  --hold Brilinta, aspirin only  Hyponatremia  --probable SIADH  --fluid restrict  --checked AM cortisol  --TSH normal  --s/p IVF, with improvement  --further recommendations per NAL  Dysphagia  --on modified diet, but will try thin liquids today and monitor  Tobacco abuse  Obesity  HL    DVT Prophylaxis:  Lovenox    CODE STATUS:   Code Status and Medical Interventions:   Ordered at: 10/31/18 1909     Level Of Support Discussed With:    Patient     Code Status:    CPR     Medical Interventions (Level of Support Prior to Arrest):    Full       Disposition: I expect the patient to be discharged TBD.      Electronically signed by Maynor Ansari MD, 11/04/18, 9:39 AM.

## 2018-11-05 ENCOUNTER — APPOINTMENT (OUTPATIENT)
Dept: GENERAL RADIOLOGY | Facility: HOSPITAL | Age: 63
End: 2018-11-05

## 2018-11-05 ENCOUNTER — APPOINTMENT (OUTPATIENT)
Dept: CARDIOLOGY | Facility: HOSPITAL | Age: 63
End: 2018-11-05
Attending: HOSPITALIST

## 2018-11-05 ENCOUNTER — HOSPITAL ENCOUNTER (OUTPATIENT)
Facility: HOSPITAL | Age: 63
Setting detail: HOSPITAL OUTPATIENT SURGERY
End: 2018-11-05
Attending: INTERNAL MEDICINE | Admitting: INTERNAL MEDICINE

## 2018-11-05 LAB
ANION GAP SERPL CALCULATED.3IONS-SCNC: 6 MMOL/L (ref 3–11)
BH CV ECHO MEAS - BSA(HAYCOCK): 2 M^2
BH CV ECHO MEAS - BSA: 1.9 M^2
BH CV ECHO MEAS - BZI_BMI: 37.4 KILOGRAMS/M^2
BH CV ECHO MEAS - BZI_METRIC_HEIGHT: 154.9 CM
BH CV ECHO MEAS - BZI_METRIC_WEIGHT: 89.8 KG
BH CV LOW VAS LEFT LESSER SAPH VESSEL: 1
BH CV LOWER VASCULAR LEFT COMMON FEMORAL AUGMENT: NORMAL
BH CV LOWER VASCULAR LEFT COMMON FEMORAL COMPRESS: NORMAL
BH CV LOWER VASCULAR LEFT COMMON FEMORAL PHASIC: NORMAL
BH CV LOWER VASCULAR LEFT COMMON FEMORAL SPONT: NORMAL
BH CV LOWER VASCULAR LEFT DISTAL FEMORAL AUGMENT: NORMAL
BH CV LOWER VASCULAR LEFT DISTAL FEMORAL COMPRESS: NORMAL
BH CV LOWER VASCULAR LEFT DISTAL FEMORAL PHASIC: NORMAL
BH CV LOWER VASCULAR LEFT DISTAL FEMORAL SPONT: NORMAL
BH CV LOWER VASCULAR LEFT GASTRONEMIUS COMPRESS: NORMAL
BH CV LOWER VASCULAR LEFT GREATER SAPH AK COMPRESS: NORMAL
BH CV LOWER VASCULAR LEFT GREATER SAPH BK COMPRESS: NORMAL
BH CV LOWER VASCULAR LEFT LESSER SAPH COMPRESS: NORMAL
BH CV LOWER VASCULAR LEFT MID FEMORAL AUGMENT: NORMAL
BH CV LOWER VASCULAR LEFT MID FEMORAL COMPRESS: NORMAL
BH CV LOWER VASCULAR LEFT MID FEMORAL PHASIC: NORMAL
BH CV LOWER VASCULAR LEFT MID FEMORAL SPONT: NORMAL
BH CV LOWER VASCULAR LEFT PERONEAL COMPRESS: NORMAL
BH CV LOWER VASCULAR LEFT POPLITEAL AUGMENT: NORMAL
BH CV LOWER VASCULAR LEFT POPLITEAL COMPRESS: NORMAL
BH CV LOWER VASCULAR LEFT POPLITEAL PHASIC: NORMAL
BH CV LOWER VASCULAR LEFT POPLITEAL SPONT: NORMAL
BH CV LOWER VASCULAR LEFT POSTERIOR TIBIAL COMPRESS: NORMAL
BH CV LOWER VASCULAR LEFT PROFUNDA FEMORAL AUGMENT: NORMAL
BH CV LOWER VASCULAR LEFT PROFUNDA FEMORAL COMPRESS: NORMAL
BH CV LOWER VASCULAR LEFT PROFUNDA FEMORAL PHASIC: NORMAL
BH CV LOWER VASCULAR LEFT PROFUNDA FEMORAL SPONT: NORMAL
BH CV LOWER VASCULAR LEFT PROXIMAL FEMORAL AUGMENT: NORMAL
BH CV LOWER VASCULAR LEFT PROXIMAL FEMORAL COMPRESS: NORMAL
BH CV LOWER VASCULAR LEFT PROXIMAL FEMORAL PHASIC: NORMAL
BH CV LOWER VASCULAR LEFT PROXIMAL FEMORAL SPONT: NORMAL
BH CV LOWER VASCULAR LEFT SAPHENOFEMORAL JUNCTION COMPETENT: NORMAL
BH CV LOWER VASCULAR LEFT SAPHENOFEMORAL JUNCTION COMPRESS: NORMAL
BH CV LOWER VASCULAR LEFT SAPHENOFEMORAL JUNCTION SPONT: NORMAL
BH CV LOWER VASCULAR RIGHT COMMON FEMORAL AUGMENT: NORMAL
BH CV LOWER VASCULAR RIGHT COMMON FEMORAL COMPRESS: NORMAL
BH CV LOWER VASCULAR RIGHT COMMON FEMORAL PHASIC: NORMAL
BH CV LOWER VASCULAR RIGHT COMMON FEMORAL SPONT: NORMAL
BH CV LOWER VASCULAR RIGHT DISTAL FEMORAL AUGMENT: NORMAL
BH CV LOWER VASCULAR RIGHT DISTAL FEMORAL COMPRESS: NORMAL
BH CV LOWER VASCULAR RIGHT DISTAL FEMORAL PHASIC: NORMAL
BH CV LOWER VASCULAR RIGHT DISTAL FEMORAL SPONT: NORMAL
BH CV LOWER VASCULAR RIGHT GASTRONEMIUS COMPRESS: NORMAL
BH CV LOWER VASCULAR RIGHT GREATER SAPH AK COMPRESS: NORMAL
BH CV LOWER VASCULAR RIGHT GREATER SAPH BK COMPRESS: NORMAL
BH CV LOWER VASCULAR RIGHT LESSER SAPH COMPRESS: NORMAL
BH CV LOWER VASCULAR RIGHT MID FEMORAL AUGMENT: NORMAL
BH CV LOWER VASCULAR RIGHT MID FEMORAL COMPRESS: NORMAL
BH CV LOWER VASCULAR RIGHT MID FEMORAL PHASIC: NORMAL
BH CV LOWER VASCULAR RIGHT MID FEMORAL SPONT: NORMAL
BH CV LOWER VASCULAR RIGHT PERONEAL COMPRESS: NORMAL
BH CV LOWER VASCULAR RIGHT PERONEAL SPONT: NORMAL
BH CV LOWER VASCULAR RIGHT POPLITEAL AUGMENT: NORMAL
BH CV LOWER VASCULAR RIGHT POPLITEAL COMPRESS: NORMAL
BH CV LOWER VASCULAR RIGHT POPLITEAL PHASIC: NORMAL
BH CV LOWER VASCULAR RIGHT POPLITEAL SPONT: NORMAL
BH CV LOWER VASCULAR RIGHT POSTERIOR TIBIAL COMPRESS: NORMAL
BH CV LOWER VASCULAR RIGHT PROFUNDA FEMORAL AUGMENT: NORMAL
BH CV LOWER VASCULAR RIGHT PROFUNDA FEMORAL COMPRESS: NORMAL
BH CV LOWER VASCULAR RIGHT PROFUNDA FEMORAL PHASIC: NORMAL
BH CV LOWER VASCULAR RIGHT PROFUNDA FEMORAL SPONT: NORMAL
BH CV LOWER VASCULAR RIGHT PROXIMAL FEMORAL AUGMENT: NORMAL
BH CV LOWER VASCULAR RIGHT PROXIMAL FEMORAL COMPRESS: NORMAL
BH CV LOWER VASCULAR RIGHT PROXIMAL FEMORAL PHASIC: NORMAL
BH CV LOWER VASCULAR RIGHT SAPHENOFEMORAL JUNCTION COMPRESS: NORMAL
BH CV LOWER VASCULAR RIGHT SAPHENOFEMORAL JUNCTION SPONT: NORMAL
BUN BLD-MCNC: 11 MG/DL (ref 9–23)
BUN/CREAT SERPL: 18 (ref 7–25)
CALCIUM SPEC-SCNC: 8.9 MG/DL (ref 8.7–10.4)
CHLORIDE SERPL-SCNC: 92 MMOL/L (ref 99–109)
CO2 SERPL-SCNC: 29 MMOL/L (ref 20–31)
CREAT BLD-MCNC: 0.61 MG/DL (ref 0.6–1.3)
GFR SERPL CREATININE-BSD FRML MDRD: 100 ML/MIN/1.73
GLUCOSE BLD-MCNC: 98 MG/DL (ref 70–100)
GLUCOSE BLDC GLUCOMTR-MCNC: 109 MG/DL (ref 70–130)
GLUCOSE BLDC GLUCOMTR-MCNC: 122 MG/DL (ref 70–130)
GLUCOSE BLDC GLUCOMTR-MCNC: 174 MG/DL (ref 70–130)
GLUCOSE BLDC GLUCOMTR-MCNC: 189 MG/DL (ref 70–130)
POTASSIUM BLD-SCNC: 4.3 MMOL/L (ref 3.5–5.5)
SODIUM BLD-SCNC: 121 MMOL/L (ref 132–146)
SODIUM BLD-SCNC: 121 MMOL/L (ref 132–146)
SODIUM BLD-SCNC: 127 MMOL/L (ref 132–146)
SODIUM BLD-SCNC: 128 MMOL/L (ref 132–146)

## 2018-11-05 PROCEDURE — 99233 SBSQ HOSP IP/OBS HIGH 50: CPT | Performed by: HOSPITALIST

## 2018-11-05 PROCEDURE — 84295 ASSAY OF SERUM SODIUM: CPT | Performed by: INTERNAL MEDICINE

## 2018-11-05 PROCEDURE — 80048 BASIC METABOLIC PNL TOTAL CA: CPT | Performed by: HOSPITALIST

## 2018-11-05 PROCEDURE — 99233 SBSQ HOSP IP/OBS HIGH 50: CPT | Performed by: INTERNAL MEDICINE

## 2018-11-05 PROCEDURE — 93970 EXTREMITY STUDY: CPT

## 2018-11-05 PROCEDURE — 93970 EXTREMITY STUDY: CPT | Performed by: INTERNAL MEDICINE

## 2018-11-05 PROCEDURE — 25010000002 ENOXAPARIN PER 10 MG: Performed by: HOSPITALIST

## 2018-11-05 PROCEDURE — 71046 X-RAY EXAM CHEST 2 VIEWS: CPT

## 2018-11-05 PROCEDURE — 82962 GLUCOSE BLOOD TEST: CPT

## 2018-11-05 RX ORDER — SODIUM CHLORIDE 1000 MG
2 TABLET, SOLUBLE MISCELLANEOUS 2 TIMES DAILY WITH MEALS
Status: DISCONTINUED | OUTPATIENT
Start: 2018-11-05 | End: 2018-11-06

## 2018-11-05 RX ADMIN — INSULIN LISPRO 2 UNITS: 100 INJECTION, SOLUTION INTRAVENOUS; SUBCUTANEOUS at 21:30

## 2018-11-05 RX ADMIN — PANTOPRAZOLE SODIUM 40 MG: 40 TABLET, DELAYED RELEASE ORAL at 06:32

## 2018-11-05 RX ADMIN — Medication 1 G: at 10:13

## 2018-11-05 RX ADMIN — INSULIN LISPRO 2 UNITS: 100 INJECTION, SOLUTION INTRAVENOUS; SUBCUTANEOUS at 17:50

## 2018-11-05 RX ADMIN — LEVOTHYROXINE SODIUM 75 MCG: 75 TABLET ORAL at 06:32

## 2018-11-05 RX ADMIN — Medication 2 G: at 17:50

## 2018-11-05 RX ADMIN — CARVEDILOL 6.25 MG: 6.25 TABLET, FILM COATED ORAL at 10:12

## 2018-11-05 RX ADMIN — FAMOTIDINE 20 MG: 20 TABLET ORAL at 10:13

## 2018-11-05 RX ADMIN — HYDROCODONE POLISTIREX AND CHLORPHENIRAMINE POLISTIREX 2.5 ML: 10; 8 SUSPENSION, EXTENDED RELEASE ORAL at 23:51

## 2018-11-05 RX ADMIN — CARVEDILOL 6.25 MG: 6.25 TABLET, FILM COATED ORAL at 17:50

## 2018-11-05 RX ADMIN — ENOXAPARIN SODIUM 40 MG: 100 INJECTION SUBCUTANEOUS at 06:32

## 2018-11-05 RX ADMIN — NICOTINE 1 PATCH: 21 PATCH, EXTENDED RELEASE TRANSDERMAL at 10:12

## 2018-11-05 RX ADMIN — CETIRIZINE HYDROCHLORIDE 10 MG: 10 TABLET, FILM COATED ORAL at 10:12

## 2018-11-05 RX ADMIN — ASPIRIN 81 MG: 81 TABLET, COATED ORAL at 10:13

## 2018-11-05 NOTE — PROGRESS NOTES
Continued Stay Note  [facility]     Patient Name: Shauna Valencia  MRN: [id number]  Today's Date: 11/5/2018    Admit Date: 10/31/2018          Discharge Plan     Row Name 11/05/18 1111       Plan    Plan update    Patient/Family in Agreement with Plan other (see comments)    Plan Comments Pt is not medically ready for d/c. No d/c needs as of today. CM will continue to follow.              Discharge Codes    No documentation.       Expected Discharge Date and Time     Expected Discharge Date Expected Discharge Time    Nov 3, 2018             Pam Garcia, RN

## 2018-11-05 NOTE — PROGRESS NOTES
INPATIENT PULMONARY SERVICE  PROGRESS NOTE     Hospital:  LOS: 5 days      S   Ms. Shauna Valencia, 63 y.o. female is followed for:    JESSICA mass    Interval History:  Doing well.  Uneventful night.  Some edema in her legs.     The patient's relevant past medical, surgical and social history were reviewed and updated in Epic as appropriate.      ROS:   Constitutional: Negative for fever.   Respiratory: Negative for dyspnea.   Cardiovascular: Negative for chest pain.   Gastrointestinal: Negative for  nausea, vomiting and diarrhea.        O     Vitals:  Temp: 97.8 °F (36.6 °C) (11/05/18 0700) Temp  Min: 97.8 °F (36.6 °C)  Max: 98.3 °F (36.8 °C)   BP: 151/70 (11/05/18 0700) BP  Min: 142/75  Max: 151/70   Pulse: 78 (11/04/18 1938) Pulse  Min: 78  Max: 78   Resp: 18 (11/05/18 0700) Resp  Min: 18  Max: 18   SpO2: 96 % (11/01/18 0819) No Data Recorded   Device: room air (11/05/18 0800)    Flow Rate:   No Data Recorded     Physical Examination  Telemetry:  Rhythm: normal sinus rhythm (11/05/18 1000)         Constitutional:  No acute distress.   Cardiovascular: Normal rate, regular and rhythm. Normal heart sounds.  No murmurs, gallop or rub.   Respiratory: No respiratory distress. Normal respiratory effort.  Normal breath sounds  Clear to auscultation and percussion.    Abdominal:  Soft. No masses. Non-tender. No distension. No HSM.   Extremities: No digital cyanosis. No clubbing.  Edema.   Neurological:   Alert and Oriented to person, place, and time.  Best Eye Response: 4-->(E4) spontaneous (11/05/18 0800)  Best Motor Response: 6-->(M6) obeys commands (11/05/18 0800)  Best Verbal Response: 5-->(V5) oriented (11/05/18 0800)  Geoffrey Coma Scale Score: 15 (11/05/18 0800)   Lines/Drains/Airways: Peripheral IV(s)       Hematology:    Results from last 7 days  Lab Units 10/31/18  1710   WBC 10*3/mm3 7.54   HEMOGLOBIN g/dL 11.7   MCV fL 82.0   PLATELETS 10*3/mm3 422     Chemistry:  Estimated Creatinine Clearance: 98.8 mL/min (by C-G  formula based on SCr of 0.61 mg/dL).      Results from last 7 days  Lab Units 11/05/18  0545 11/04/18  2341 11/04/18  1806  11/04/18  0243  11/03/18  0836   SODIUM mmol/L 127* 128* 129*  < > 130*  < > 129*  130*   POTASSIUM mmol/L 4.3  --   --   --  4.2  --  4.0   CHLORIDE mmol/L 92*  --   --   --  98*  --  97*   CO2 mmol/L 29.0  --   --   --  26.8  --  28.0   ANION GAP mmol/L 6.0  --   --   --  5.2  --  5.0   GLUCOSE mg/dL 98  --   --   --  93  --  113*   CALCIUM mg/dL 8.9  --   --   --  8.7  --  8.6*   < > = values in this interval not displayed.        Results from last 7 days  Lab Units 11/05/18 0545 11/04/18 0243 11/03/18  0836   BUN mg/dL 11 14 15   CREATININE mg/dL 0.61 0.60 0.61     Images:  No radiology results for the last day    I reviewed the patient's new laboratory and imaging results.  I independently reviewed the patient's new images.    Medications: Reviewed.    Assessment/Plan   A / P     63 y.o.female, admitted on 10/31/2018 with:     10/31/2018      Impressions:  1. JESSICA mass  2. Hyponatremia } Renal  3. Dyslipidemia  4. CAD s/p stents, on Brillinta at home.  5. Tobacco use  6. Obesity II. Body mass index is 37.41 kg/m².     Nutrition Support: Patient isn't on Tube Feeding   Modulars: Patient doesn't have any tube feeding modular orders   Diet: Diet Regular; Consistent Carbohydrate, Daily Fluid Restriction; Other; 1,200  NPO Diet     No active supplement orders     Advance Directives: Code Status and Medical Interventions:   Ordered at: 10/31/18 7589     Level Of Support Discussed With:    Patient     Code Status:    CPR     Medical Interventions (Level of Support Prior to Arrest):    Full        Assessment / Plan:  1. Eventually Bronch with EBUS, we will schedule for tomorrow 11 am, but final decision will depend on anesthesia evaluation and Na level.  2. Labs in AM, including PT/INR and PTT.  3. Follow up Na  4. Venous duplex LE, pending    I discussed the patient's findings and my  recommendations with patient and primary care team    Isaac Epstein MD, FACP, FCCP, CNSC  Intensive Care Medicine, Nutrition Support and Pulmonary Medicine

## 2018-11-05 NOTE — PROGRESS NOTES
[facility] Medicine Services  PROGRESS NOTE    Patient Name: Shauna Valencia  : 1955  MRN: [id number]    Date of Admission: 10/31/2018  Length of Stay: 5  Primary Care Physician: Rox Singleton MD    Subjective   Subjective     CC:  Weakness    HPI:    Dry cough only. No shortness of breath. Weakness better. Slept better. Cough better. HA better after resuming caffeine. Notes R LE pain again, aching, but edema better. No other issues.    Review of Systems    Otherwise ROS is negative except as mentioned in the HPI.    Objective   Objective     Vital Signs:   Temp:  [97.8 °F (36.6 °C)-98.3 °F (36.8 °C)] 97.8 °F (36.6 °C)  Heart Rate:  [78] 78  Resp:  [18] 18  BP: (142-151)/(70-75) 151/70        Physical Exam:  NAD, alert and oriented  OP clear, MMM  PERRL  Neck supple  RRR  CTAB  +BS, ND, NT  POLLACK  No c/c  Tr LE edema only  No rashes  Normal affect  No change  otherwise    Results Reviewed:  I have personally reviewed current lab, radiology, and data and agree.      Results from last 7 days  Lab Units 10/31/18  1710   WBC 10*3/mm3 7.54   HEMOGLOBIN g/dL 11.7   HEMATOCRIT % 33.8*   PLATELETS 10*3/mm3 422       Results from last 7 days  Lab Units 18  0545 18  2341 18  1806  18  0243  18  0836   SODIUM mmol/L 127* 128* 129*  < > 130*  < > 129*  130*   POTASSIUM mmol/L 4.3  --   --   --  4.2  --  4.0   CHLORIDE mmol/L 92*  --   --   --  98*  --  97*   CO2 mmol/L 29.0  --   --   --  26.8  --  28.0   BUN mg/dL 11  --   --   --  14  --  15   CREATININE mg/dL 0.61  --   --   --  0.60  --  0.61   GLUCOSE mg/dL 98  --   --   --  93  --  113*   CALCIUM mg/dL 8.9  --   --   --  8.7  --  8.6*   < > = values in this interval not displayed.  Estimated Creatinine Clearance: 98.9 mL/min (by C-G formula based on SCr of 0.61 mg/dL).  No results found for: BNP    Microbiology Results Abnormal     None          Imaging Results (last 24 hours)     ** No results found for  the last 24 hours. **             I have reviewed the medications.      aspirin 81 mg Oral Daily   carvedilol 6.25 mg Oral BID With Meals   cetirizine 10 mg Oral Daily   enoxaparin 40 mg Subcutaneous Q24H   famotidine 20 mg Oral Daily   insulin lispro 0-7 Units Subcutaneous 4x Daily With Meals & Nightly   levothyroxine 75 mcg Oral Daily   nicotine 1 patch Transdermal Q24H   pantoprazole 40 mg Oral Q AM   sodium chloride 2 g Oral BID With Meals         Assessment/Plan   Assessment / Plan     Active Hospital Problems    Diagnosis   • Mass of upper lobe of left lung   • Mediastinal lymphadenopathy   • Acute hyponatremia   • Hyperlipidemia   • Type 2 diabetes mellitus (CMS/HCC)   • Tobacco abuse   • Obesity (BMI 30-39.9)          Brief Hospital Course to date:  Shauna Valencia is a 63 y.o. female here with hyponatremia, likely SIADH, and found to have a lung mass      Assessment & Plan:  L hilar mass  --bronchoscopy this week  Hx of RCA stent, chart reviewed, 7/2017  --hold Brilinta, aspirin only  Hyponatremia  --probable SIADH  --fluid restrict  --checked AM cortisol  --TSH normal  --s/p IVF, with improvement  --further recommendations per NAL, low but stable  Dysphagia  --not tolerating modified diet, back on thin liquids without incident  HA  --resolved after resuming caffeine  R LE pain  --check duplex  --has hx of sciatica with R leg pain however  Tobacco abuse  Obesity  HL    DVT Prophylaxis:  Lovenox    CODE STATUS:   Code Status and Medical Interventions:   Ordered at: 10/31/18 1984     Level Of Support Discussed With:    Patient     Code Status:    CPR     Medical Interventions (Level of Support Prior to Arrest):    Full       Disposition: I expect the patient to be discharged TBD.      Electronically signed by Maynor Ansari MD, 11/05/18, 9:28 AM.

## 2018-11-05 NOTE — PLAN OF CARE
Problem: Patient Care Overview  Goal: Plan of Care Review  Outcome: Ongoing (interventions implemented as appropriate)   11/05/18 0610   Coping/Psychosocial   Plan of Care Reviewed With patient   Plan of Care Review   Progress improving

## 2018-11-05 NOTE — PROGRESS NOTES
"NAL Progress Note  Shauna Valencia  [id number]  1955    10/31/2018    Reason for visit:  Hyponatremia    Eating better  No over night events    ROS:    Pulm:  No SOA  CV:  No CP    I&O:    Intake/Output Summary (Last 24 hours) at 11/05/18 1236  Last data filed at 11/04/18 1300   Gross per 24 hour   Intake              240 ml   Output                0 ml   Net              240 ml       PE:   Blood pressure 151/70, pulse 78, temperature 97.8 °F (36.6 °C), temperature source Axillary, resp. rate 18, height 154.9 cm (61\"), weight 89.8 kg (198 lb), SpO2 96 %.    GENERAL: Awake and alert, in no acute distress.   HEENT: PER, No conjunctivitis  NECK: Supple, no JVD     HEART: RRR; No murmur, rubs, gallops.   LUNGS: Clear to auscultation bilaterally without wheezing, rales, rhonchi. Normal respiratory effort. Nonlabored. No dullness.  ABDOMEN: Soft, nontender, nondistended. Positive bowel sounds. No rebound or guarding. NO mass or HSM.  EXT:  No cyanosis, clubbing or edema. No cord.  : Genitalia generally unremarkable.  Without [place] catheter.  SKIN: Warm and dry without cutaneous eruptions on Inspection/palpation.    NEURO: Alert and oriented x 3, Motor 5/5 bilaterally    Medications:    Current Facility-Administered Medications:   •  acetaminophen (TYLENOL) tablet 650 mg, 650 mg, Oral, Q6H PRN, Maynor Ansari MD, 650 mg at 11/04/18 0618  •  aspirin EC tablet 81 mg, 81 mg, Oral, Daily, Mickey Warner MD, 81 mg at 11/05/18 1013  •  benzonatate (TESSALON) capsule 200 mg, 200 mg, Oral, TID PRN, Malia Solis MD, 200 mg at 11/02/18 1138  •  carvedilol (COREG) tablet 6.25 mg, 6.25 mg, Oral, BID With Meals, Mickey Warner MD, 6.25 mg at 11/05/18 1012  •  cetirizine (zyrTEC) tablet 10 mg, 10 mg, Oral, Daily, Mickey Warner MD, 10 mg at 11/05/18 1012  •  dextrose (D50W) 25 g/ 50mL Intravenous Solution 25 g, 25 g, Intravenous, Q15 Min PRN, Mickey Warner MD  •  dextrose (GLUTOSE) oral " gel 15 g, 15 g, Oral, Q15 Min PRN, Mickey Warner MD  •  enoxaparin (LOVENOX) syringe 40 mg, 40 mg, Subcutaneous, Q24H, Mickey Warner MD, 40 mg at 11/05/18 0632  •  famotidine (PEPCID) tablet 20 mg, 20 mg, Oral, Daily, Mickey Warner MD, 20 mg at 11/05/18 1013  •  glucagon (human recombinant) (GLUCAGEN DIAGNOSTIC) injection 1 mg, 1 mg, Subcutaneous, PRN, Mickey Warner MD  •  HYDROcod Polst-CPM Polst ER (TUSSIONEX PENNKINETIC) 10-8 MG/5ML ER suspension 2.5 mL, 2.5 mL, Oral, Q12H PRN, Maynor Ansari MD, 2.5 mL at 11/04/18 0150  •  ibuprofen (ADVIL,MOTRIN) tablet 400 mg, 400 mg, Oral, Q4H PRN, Maynor Ansari MD, 400 mg at 11/04/18 0847  •  insulin lispro (humaLOG) injection 0-7 Units, 0-7 Units, Subcutaneous, 4x Daily With Meals & Nightly, Mickey Warner MD, 2 Units at 11/04/18 2116  •  levothyroxine (SYNTHROID, LEVOTHROID) tablet 75 mcg, 75 mcg, Oral, Daily, Mickey Warner MD, 75 mcg at 11/05/18 0632  •  melatonin sublingual tablet 5 mg, 5 mg, Sublingual, Nightly PRN, Malia Solis MD, 5 mg at 11/01/18 2104  •  nicotine (NICODERM CQ) 21 MG/24HR patch 1 patch, 1 patch, Transdermal, Q24H, Maynor Ansari MD, 1 patch at 11/05/18 1012  •  ondansetron (ZOFRAN) injection 4 mg, 4 mg, Intravenous, Q6H PRN, Maynor Ansari MD, 4 mg at 11/02/18 0232  •  pantoprazole (PROTONIX) EC tablet 40 mg, 40 mg, Oral, Q AM, Maynor Ansari MD, 40 mg at 11/05/18 0632  •  sodium chloride tablet 2 g, 2 g, Oral, BID With Meals, Phillip Valdez MD    Meds reviewed and doses adjusted for renal function    Labs:    Results from last 7 days  Lab Units 11/05/18  0545 11/04/18  2341 11/04/18  1806 11/04/18  1217 11/04/18  0556 11/04/18  0243 11/03/18  2350  11/03/18  0836 11/03/18  0500 11/03/18  0352 11/02/18  2358 11/02/18  2027  11/02/18  1130  10/31/18  1710   WBC 10*3/mm3  --   --   --   --   --   --   --   --   --   --   --   --   --   --   --   --  7.54   HEMOGLOBIN g/dL   --   --   --   --   --   --   --   --   --   --   --   --   --   --   --   --  11.7   HEMATOCRIT %  --   --   --   --   --   --   --   --   --   --   --   --   --   --   --   --  33.8*   PLATELETS 10*3/mm3  --   --   --   --   --   --   --   --   --   --   --   --   --   --   --   --  422   SODIUM mmol/L 127* 128* 129* 130* 130* 130* 131*  < > 129*  130* 128* 128* 131* 127*  127*  < > 120*  < >  --    POTASSIUM mmol/L 4.3  --   --   --   --  4.2  --   --  4.0 4.0 4.0 4.2 3.7  < > 3.9  < >  --    CHLORIDE mmol/L 92*  --   --   --   --  98*  --   --  97* 97* 98* 97* 93*  < > 89*  < >  --    CO2 mmol/L 29.0  --   --   --   --  26.8  --   --  28.0 26.0 25.0 28.0 29.0  < > 26.0  < >  --    BUN mg/dL 11  --   --   --   --  14  --   --  15 14 14 18 16  < > 11  < >  --    CREATININE mg/dL 0.61  --   --   --   --  0.60  --   --  0.61 0.62 0.61 0.76 0.78  < > 0.73  < >  --    GLUCOSE mg/dL 98  --   --   --   --  93  --   --  113* 151* 169* 134* 145*  < > 178*  < >  --    CALCIUM mg/dL 8.9  --   --   --   --  8.7  --   --  8.6* 8.6* 8.6* 9.1 8.9  < > 8.9  < >  --    URIC ACID mg/dL  --   --   --   --   --   --   --   --   --   --   --   --   --   --  2.5*  --   --    < > = values in this interval not displayed.          Radiology:  Imaging Results (last 72 hours)     Procedure Component Value Units Date/Time    Fiberoptic Endo (fees) [[id number]] Resulted:  11/01/18 1555     Updated:  11/01/18 1555    Narrative:       This procedure was auto-finalized with no dictation required.    CT Chest Without Contrast [[id number]] Collected:  10/31/18 1701     Updated:  10/31/18 1930    Narrative:       EXAM:    CT Chest Without Intravenous Contrast     EXAM DATE/TIME:    10/31/2018 5:01 PM     CLINICAL HISTORY:    63 years old, female; Hypo-osmolality and hyponatremia; Dyspnea, unspecified;   Signs and symptoms; Cough and shortness of breath; Prior surgery; Surgery date:   6+ months; Surgery type: 5 heart stents; Additional info: POP  hyponatremia     TECHNIQUE:    Axial computed tomography images of the chest without intravenous contrast.    All CT scans at this facility use at least one of these dose optimization   techniques: automated exposure control; mA and/or kV adjustment per patient   size (includes targeted exams where dose is matched to clinical indication); or   iterative reconstruction.    Coronal reformatted images were created and reviewed.     COMPARISON:    No relevant prior studies available.     FINDINGS:     LUNGS/PLEURA: Suboptimally evaluated located and mildly spiculated RIGHT   hilar mass measuring at least 4.4 cm in the long axis. Nodular peribronchial   LEFT upper lobe and subpleural inferior lingula airspace opacities measuring up   to 1.9 cm and centrilobular nodules in the LEFT upper lobe. Calcific granuloma   in the RIGHT normal. No pleural effusion or pneumothorax. Complete obstruction   of the LEFT apical bronchus, no other central obstructive endobronchial mass or   abnormality.     MEDIASTINUM: Heart size is normal, with trace pericardial   thickening/effusion. Severe coronary arterial calcification/coronary stents.   Atherosclerotic disease of the aorta without aortic aneurysm. Bulky prevascular   lymph node adjacent to the aortic arch measuring 3.0 cm and LEFT hilar mass   which may represent lymph nodes or primary lung malignancy.     OTHER STRUCTURES: Chronic degenerative changes in the visualized spine.   Nonspecific bilateral perinephric fat stranding and renal cortical scarring.   Small LEFT adrenal nodule measuring 1.3 cm.       Impression:       1. Findings concerning for LEFT HILAR MALIGNANT SOFT TISSUE MASS with   postobstructive focal pneumonia/bronchiolitis in the LEFT upper lobe.   2. Likely METASTATIC mediastinal and possibly LEFT hilar lymph nodes.   3. Coronary arterial calcification suggestive of CAD.   4. Other nonemergent/incidental findings as described.     THIS DOCUMENT HAS BEEN  ELECTRONICALLY SIGNED BY BATOOL SAMANO MD          Renal Imaging:        IMPRESSION:  Hyponatremia; Likely SIADH sec to lung mass but very low chloride level- recent use of diuretic  She is assymptomatic at this time  We gave normal saline and her sodium got corrected faster than I desired  Given one dose of ddavp to lower it for safety reasons      RECOMMENDATIONS:    Dc ivf  Serum sodium stable but monitor closely  Raise sodium tabs to 4 gms a day  Continue serial sodium checks but change frequency to 6 hr  Encourage po regular food  Continue regular diet at this time  dw Patient and family  Phillip Valdez MD  11/5/2018  12:36 PM

## 2018-11-05 NOTE — PLAN OF CARE
Problem: Patient Care Overview  Goal: Plan of Care Review  Outcome: Ongoing (interventions implemented as appropriate)   11/05/18 0800 11/05/18 1632   Coping/Psychosocial   Plan of Care Reviewed With patient;family --    Plan of Care Review   Progress --  no change     Goal: Individualization and Mutuality  Outcome: Outcome(s) achieved Date Met: 11/05/18    Goal: Discharge Needs Assessment  Outcome: Ongoing (interventions implemented as appropriate)   10/31/18 2020 11/01/18 1132 11/02/18 0318   Discharge Needs Assessment   Readmission Within the Last 30 Days --  --  no previous admission in last 30 days   Concerns to be Addressed --  --  denies needs/concerns at this time   Patient/Family Anticipates Transition to home with family --  --    Patient/Family Anticipated Services at Transition  --  --    Transportation Anticipated family or friend will provide --  --    Equipment Needed After Discharge --  none --    Disability   Equipment Currently Used at Home --  none --      Goal: Interprofessional Rounds/Family Conf  Outcome: Ongoing (interventions implemented as appropriate)   11/05/18 1632   Interdisciplinary Rounds/Family Conf   Participants nursing;;family;patient;physician       Problem: Pain, Acute (Adult)  Goal: Identify Related Risk Factors and Signs and Symptoms  Outcome: Outcome(s) achieved Date Met: 11/05/18 11/04/18 1631   Pain, Acute (Adult)   Related Risk Factors (Acute Pain) disease process;knowledge deficit   Signs and Symptoms (Acute Pain) fatigue/weakness     Goal: Acceptable Pain Control/Comfort Level  Outcome: Ongoing (interventions implemented as appropriate)   11/05/18 1632   Pain, Acute (Adult)   Acceptable Pain Control/Comfort Level making progress toward outcome       Problem: Diabetes, Type 2 (Adult)  Goal: Signs and Symptoms of Listed Potential Problems Will be Absent, Minimized or Managed (Diabetes, Type 2)  Outcome: Ongoing (interventions implemented as  appropriate)   11/02/18 0318 11/05/18 1632   Goal/Outcome Evaluation   Problems Assessed (Type 2 Diabetes) all --    Problems Present (Type 2 Diabetes) --  hyperglycemia

## 2018-11-06 ENCOUNTER — ANESTHESIA EVENT (OUTPATIENT)
Dept: GASTROENTEROLOGY | Facility: HOSPITAL | Age: 63
End: 2018-11-06

## 2018-11-06 ENCOUNTER — ANESTHESIA (OUTPATIENT)
Dept: GASTROENTEROLOGY | Facility: HOSPITAL | Age: 63
End: 2018-11-06

## 2018-11-06 ENCOUNTER — APPOINTMENT (OUTPATIENT)
Dept: GENERAL RADIOLOGY | Facility: HOSPITAL | Age: 63
End: 2018-11-06

## 2018-11-06 LAB
ANION GAP SERPL CALCULATED.3IONS-SCNC: 10 MMOL/L (ref 3–11)
ANION GAP SERPL CALCULATED.3IONS-SCNC: 7 MMOL/L (ref 3–11)
ANION GAP SERPL CALCULATED.3IONS-SCNC: 7 MMOL/L (ref 3–11)
APTT PPP: 29.6 SECONDS (ref 24–31)
BNP SERPL-MCNC: 77 PG/ML (ref 0–100)
BUN BLD-MCNC: 10 MG/DL (ref 9–23)
BUN BLD-MCNC: 12 MG/DL (ref 9–23)
BUN BLD-MCNC: 9 MG/DL (ref 9–23)
BUN/CREAT SERPL: 12 (ref 7–25)
BUN/CREAT SERPL: 14.3 (ref 7–25)
BUN/CREAT SERPL: 21.1 (ref 7–25)
CALCIUM SPEC-SCNC: 8.7 MG/DL (ref 8.7–10.4)
CALCIUM SPEC-SCNC: 8.8 MG/DL (ref 8.7–10.4)
CALCIUM SPEC-SCNC: 8.9 MG/DL (ref 8.7–10.4)
CHLORIDE SERPL-SCNC: 88 MMOL/L (ref 99–109)
CHLORIDE SERPL-SCNC: 89 MMOL/L (ref 99–109)
CHLORIDE SERPL-SCNC: 89 MMOL/L (ref 99–109)
CO2 SERPL-SCNC: 21 MMOL/L (ref 20–31)
CO2 SERPL-SCNC: 22 MMOL/L (ref 20–31)
CO2 SERPL-SCNC: 28 MMOL/L (ref 20–31)
CREAT BLD-MCNC: 0.57 MG/DL (ref 0.6–1.3)
CREAT BLD-MCNC: 0.7 MG/DL (ref 0.6–1.3)
CREAT BLD-MCNC: 0.75 MG/DL (ref 0.6–1.3)
GFR SERPL CREATININE-BSD FRML MDRD: 108 ML/MIN/1.73
GFR SERPL CREATININE-BSD FRML MDRD: 79 ML/MIN/1.73
GFR SERPL CREATININE-BSD FRML MDRD: 85 ML/MIN/1.73
GLUCOSE BLD-MCNC: 210 MG/DL (ref 70–100)
GLUCOSE BLD-MCNC: 248 MG/DL (ref 70–100)
GLUCOSE BLD-MCNC: 92 MG/DL (ref 70–100)
GLUCOSE BLDC GLUCOMTR-MCNC: 110 MG/DL (ref 70–130)
GLUCOSE BLDC GLUCOMTR-MCNC: 194 MG/DL (ref 70–130)
GLUCOSE BLDC GLUCOMTR-MCNC: 297 MG/DL (ref 70–130)
INR PPP: 0.96 (ref 0.91–1.09)
MAGNESIUM SERPL-MCNC: 1.5 MG/DL (ref 1.3–2.7)
OSMOLALITY SERPL: 256 MOSM/KG (ref 275–295)
OSMOLALITY UR: 785 MOSM/KG (ref 300–1100)
PHOSPHATE SERPL-MCNC: 4.1 MG/DL (ref 2.4–5.1)
POTASSIUM BLD-SCNC: 4.3 MMOL/L (ref 3.5–5.5)
POTASSIUM BLD-SCNC: 4.3 MMOL/L (ref 3.5–5.5)
POTASSIUM BLD-SCNC: 4.5 MMOL/L (ref 3.5–5.5)
PROTHROMBIN TIME: 10.1 SECONDS (ref 9.6–11.5)
SODIUM BLD-SCNC: 118 MMOL/L (ref 132–146)
SODIUM BLD-SCNC: 119 MMOL/L (ref 132–146)
SODIUM BLD-SCNC: 119 MMOL/L (ref 132–146)
SODIUM BLD-SCNC: 120 MMOL/L (ref 132–146)
SODIUM BLD-SCNC: 123 MMOL/L (ref 132–146)
SODIUM BLD-SCNC: 124 MMOL/L (ref 132–146)
SODIUM UR-SCNC: 260 MMOL/L (ref 30–90)

## 2018-11-06 PROCEDURE — 31625 BRONCHOSCOPY W/BIOPSY(S): CPT | Performed by: INTERNAL MEDICINE

## 2018-11-06 PROCEDURE — 87116 MYCOBACTERIA CULTURE: CPT | Performed by: INTERNAL MEDICINE

## 2018-11-06 PROCEDURE — 82962 GLUCOSE BLOOD TEST: CPT

## 2018-11-06 PROCEDURE — 83930 ASSAY OF BLOOD OSMOLALITY: CPT | Performed by: INTERNAL MEDICINE

## 2018-11-06 PROCEDURE — 25010000002 PROPOFOL 10 MG/ML EMULSION: Performed by: NURSE ANESTHETIST, CERTIFIED REGISTERED

## 2018-11-06 PROCEDURE — 88112 CYTOPATH CELL ENHANCE TECH: CPT | Performed by: INTERNAL MEDICINE

## 2018-11-06 PROCEDURE — 87205 SMEAR GRAM STAIN: CPT | Performed by: INTERNAL MEDICINE

## 2018-11-06 PROCEDURE — 83735 ASSAY OF MAGNESIUM: CPT | Performed by: INTERNAL MEDICINE

## 2018-11-06 PROCEDURE — 87070 CULTURE OTHR SPECIMN AEROBIC: CPT | Performed by: INTERNAL MEDICINE

## 2018-11-06 PROCEDURE — 25010000002 ONDANSETRON PER 1 MG: Performed by: HOSPITALIST

## 2018-11-06 PROCEDURE — 84295 ASSAY OF SERUM SODIUM: CPT | Performed by: INTERNAL MEDICINE

## 2018-11-06 PROCEDURE — 87206 SMEAR FLUORESCENT/ACID STAI: CPT | Performed by: INTERNAL MEDICINE

## 2018-11-06 PROCEDURE — 71045 X-RAY EXAM CHEST 1 VIEW: CPT

## 2018-11-06 PROCEDURE — 87209 SMEAR COMPLEX STAIN: CPT | Performed by: INTERNAL MEDICINE

## 2018-11-06 PROCEDURE — 80048 BASIC METABOLIC PNL TOTAL CA: CPT | Performed by: INTERNAL MEDICINE

## 2018-11-06 PROCEDURE — 84100 ASSAY OF PHOSPHORUS: CPT | Performed by: INTERNAL MEDICINE

## 2018-11-06 PROCEDURE — 31652 BRONCH EBUS SAMPLNG 1/2 NODE: CPT | Performed by: INTERNAL MEDICINE

## 2018-11-06 PROCEDURE — 83880 ASSAY OF NATRIURETIC PEPTIDE: CPT | Performed by: INTERNAL MEDICINE

## 2018-11-06 PROCEDURE — 25010000002 SUCCINYLCHOLINE PER 20 MG: Performed by: NURSE ANESTHETIST, CERTIFIED REGISTERED

## 2018-11-06 PROCEDURE — 99233 SBSQ HOSP IP/OBS HIGH 50: CPT | Performed by: HOSPITALIST

## 2018-11-06 PROCEDURE — 84300 ASSAY OF URINE SODIUM: CPT | Performed by: INTERNAL MEDICINE

## 2018-11-06 PROCEDURE — 0BJ08ZZ INSPECTION OF TRACHEOBRONCHIAL TREE, VIA NATURAL OR ARTIFICIAL OPENING ENDOSCOPIC: ICD-10-PCS | Performed by: INTERNAL MEDICINE

## 2018-11-06 PROCEDURE — 83935 ASSAY OF URINE OSMOLALITY: CPT | Performed by: INTERNAL MEDICINE

## 2018-11-06 PROCEDURE — 25010000002 ENOXAPARIN PER 10 MG: Performed by: HOSPITALIST

## 2018-11-06 PROCEDURE — 88305 TISSUE EXAM BY PATHOLOGIST: CPT | Performed by: INTERNAL MEDICINE

## 2018-11-06 PROCEDURE — 85730 THROMBOPLASTIN TIME PARTIAL: CPT | Performed by: INTERNAL MEDICINE

## 2018-11-06 PROCEDURE — 87177 OVA AND PARASITES SMEARS: CPT | Performed by: INTERNAL MEDICINE

## 2018-11-06 PROCEDURE — 25010000002 DEXAMETHASONE PER 1 MG: Performed by: NURSE ANESTHETIST, CERTIFIED REGISTERED

## 2018-11-06 PROCEDURE — 25010000002 MAGNESIUM SULFATE 2 GM/50ML SOLUTION: Performed by: HOSPITALIST

## 2018-11-06 PROCEDURE — 31623 DX BRONCHOSCOPE/BRUSH: CPT | Performed by: INTERNAL MEDICINE

## 2018-11-06 PROCEDURE — C1726 CATH, BAL DIL, NON-VASCULAR: HCPCS | Performed by: INTERNAL MEDICINE

## 2018-11-06 PROCEDURE — 85610 PROTHROMBIN TIME: CPT | Performed by: INTERNAL MEDICINE

## 2018-11-06 PROCEDURE — 87102 FUNGUS ISOLATION CULTURE: CPT | Performed by: INTERNAL MEDICINE

## 2018-11-06 RX ORDER — MAGNESIUM SULFATE HEPTAHYDRATE 40 MG/ML
4 INJECTION, SOLUTION INTRAVENOUS AS NEEDED
Status: DISCONTINUED | OUTPATIENT
Start: 2018-11-06 | End: 2018-11-17 | Stop reason: HOSPADM

## 2018-11-06 RX ORDER — SODIUM CHLORIDE 0.9 % (FLUSH) 0.9 %
3-10 SYRINGE (ML) INJECTION AS NEEDED
Status: DISCONTINUED | OUTPATIENT
Start: 2018-11-06 | End: 2018-11-06 | Stop reason: HOSPADM

## 2018-11-06 RX ORDER — MAGNESIUM SULFATE HEPTAHYDRATE 40 MG/ML
2 INJECTION, SOLUTION INTRAVENOUS AS NEEDED
Status: DISCONTINUED | OUTPATIENT
Start: 2018-11-06 | End: 2018-11-17 | Stop reason: HOSPADM

## 2018-11-06 RX ORDER — LIDOCAINE HYDROCHLORIDE 10 MG/ML
INJECTION, SOLUTION INFILTRATION; PERINEURAL AS NEEDED
Status: DISCONTINUED | OUTPATIENT
Start: 2018-11-06 | End: 2018-11-06 | Stop reason: SURG

## 2018-11-06 RX ORDER — TOLVAPTAN 15 MG/1
7.5 TABLET ORAL ONCE
Status: COMPLETED | OUTPATIENT
Start: 2018-11-06 | End: 2018-11-06

## 2018-11-06 RX ORDER — LIDOCAINE HYDROCHLORIDE 10 MG/ML
0.5 INJECTION, SOLUTION EPIDURAL; INFILTRATION; INTRACAUDAL; PERINEURAL ONCE AS NEEDED
Status: DISCONTINUED | OUTPATIENT
Start: 2018-11-06 | End: 2018-11-06 | Stop reason: HOSPADM

## 2018-11-06 RX ORDER — ROCURONIUM BROMIDE 10 MG/ML
INJECTION, SOLUTION INTRAVENOUS AS NEEDED
Status: DISCONTINUED | OUTPATIENT
Start: 2018-11-06 | End: 2018-11-06 | Stop reason: SURG

## 2018-11-06 RX ORDER — SODIUM CHLORIDE 9 MG/ML
75 INJECTION, SOLUTION INTRAVENOUS CONTINUOUS
Status: DISCONTINUED | OUTPATIENT
Start: 2018-11-06 | End: 2018-11-08

## 2018-11-06 RX ORDER — SUCCINYLCHOLINE CHLORIDE 20 MG/ML
INJECTION INTRAMUSCULAR; INTRAVENOUS AS NEEDED
Status: DISCONTINUED | OUTPATIENT
Start: 2018-11-06 | End: 2018-11-06 | Stop reason: SURG

## 2018-11-06 RX ORDER — SODIUM CHLORIDE, SODIUM LACTATE, POTASSIUM CHLORIDE, CALCIUM CHLORIDE 600; 310; 30; 20 MG/100ML; MG/100ML; MG/100ML; MG/100ML
9 INJECTION, SOLUTION INTRAVENOUS CONTINUOUS PRN
Status: DISCONTINUED | OUTPATIENT
Start: 2018-11-06 | End: 2018-11-06 | Stop reason: HOSPADM

## 2018-11-06 RX ORDER — SODIUM CHLORIDE 0.9 % (FLUSH) 0.9 %
3 SYRINGE (ML) INJECTION EVERY 12 HOURS SCHEDULED
Status: DISCONTINUED | OUTPATIENT
Start: 2018-11-06 | End: 2018-11-06 | Stop reason: HOSPADM

## 2018-11-06 RX ORDER — FENTANYL CITRATE 50 UG/ML
50 INJECTION, SOLUTION INTRAMUSCULAR; INTRAVENOUS
Status: DISCONTINUED | OUTPATIENT
Start: 2018-11-06 | End: 2018-11-06

## 2018-11-06 RX ORDER — SODIUM CHLORIDE, SODIUM LACTATE, POTASSIUM CHLORIDE, CALCIUM CHLORIDE 600; 310; 30; 20 MG/100ML; MG/100ML; MG/100ML; MG/100ML
20 INJECTION, SOLUTION INTRAVENOUS CONTINUOUS
Status: DISCONTINUED | OUTPATIENT
Start: 2018-11-06 | End: 2018-11-08

## 2018-11-06 RX ORDER — PROPOFOL 10 MG/ML
VIAL (ML) INTRAVENOUS AS NEEDED
Status: DISCONTINUED | OUTPATIENT
Start: 2018-11-06 | End: 2018-11-06 | Stop reason: SURG

## 2018-11-06 RX ORDER — LABETALOL HYDROCHLORIDE 5 MG/ML
INJECTION, SOLUTION INTRAVENOUS AS NEEDED
Status: DISCONTINUED | OUTPATIENT
Start: 2018-11-06 | End: 2018-11-06 | Stop reason: SURG

## 2018-11-06 RX ORDER — DEXAMETHASONE SODIUM PHOSPHATE 4 MG/ML
INJECTION, SOLUTION INTRA-ARTICULAR; INTRALESIONAL; INTRAMUSCULAR; INTRAVENOUS; SOFT TISSUE AS NEEDED
Status: DISCONTINUED | OUTPATIENT
Start: 2018-11-06 | End: 2018-11-06 | Stop reason: SURG

## 2018-11-06 RX ADMIN — DEXAMETHASONE SODIUM PHOSPHATE 8 MG: 4 INJECTION, SOLUTION INTRAMUSCULAR; INTRAVENOUS at 11:50

## 2018-11-06 RX ADMIN — SUCCINYLCHOLINE CHLORIDE 100 MG: 20 INJECTION, SOLUTION INTRAMUSCULAR; INTRAVENOUS at 11:40

## 2018-11-06 RX ADMIN — MAGNESIUM SULFATE HEPTAHYDRATE 2 G: 40 INJECTION, SOLUTION INTRAVENOUS at 18:41

## 2018-11-06 RX ADMIN — ROCURONIUM BROMIDE 10 MG: 10 SOLUTION INTRAVENOUS at 11:40

## 2018-11-06 RX ADMIN — CARVEDILOL 6.25 MG: 6.25 TABLET, FILM COATED ORAL at 08:43

## 2018-11-06 RX ADMIN — NICOTINE 1 PATCH: 21 PATCH, EXTENDED RELEASE TRANSDERMAL at 08:44

## 2018-11-06 RX ADMIN — MAGNESIUM SULFATE HEPTAHYDRATE 2 G: 40 INJECTION, SOLUTION INTRAVENOUS at 14:42

## 2018-11-06 RX ADMIN — PROPOFOL 150 MG: 10 INJECTION, EMULSION INTRAVENOUS at 11:40

## 2018-11-06 RX ADMIN — TOLVAPTAN 7.5 MG: 15 TABLET ORAL at 15:56

## 2018-11-06 RX ADMIN — SODIUM CHLORIDE 75 ML/HR: 9 INJECTION, SOLUTION INTRAVENOUS at 13:45

## 2018-11-06 RX ADMIN — ONDANSETRON HYDROCHLORIDE 4 MG: 2 INJECTION, SOLUTION INTRAMUSCULAR; INTRAVENOUS at 08:56

## 2018-11-06 RX ADMIN — MAGNESIUM SULFATE HEPTAHYDRATE 2 G: 40 INJECTION, SOLUTION INTRAVENOUS at 16:36

## 2018-11-06 RX ADMIN — LIDOCAINE HYDROCHLORIDE 50 MG: 10 INJECTION, SOLUTION INFILTRATION; PERINEURAL at 11:40

## 2018-11-06 RX ADMIN — LABETALOL HYDROCHLORIDE 10 MG: 5 INJECTION, SOLUTION INTRAVENOUS at 12:25

## 2018-11-06 RX ADMIN — ONDANSETRON HYDROCHLORIDE 4 MG: 2 INJECTION, SOLUTION INTRAMUSCULAR; INTRAVENOUS at 12:14

## 2018-11-06 RX ADMIN — INSULIN LISPRO 2 UNITS: 100 INJECTION, SOLUTION INTRAVENOUS; SUBCUTANEOUS at 17:08

## 2018-11-06 RX ADMIN — SODIUM CHLORIDE, POTASSIUM CHLORIDE, SODIUM LACTATE AND CALCIUM CHLORIDE: 600; 310; 30; 20 INJECTION, SOLUTION INTRAVENOUS at 11:40

## 2018-11-06 RX ADMIN — LABETALOL HYDROCHLORIDE 5 MG: 5 INJECTION, SOLUTION INTRAVENOUS at 12:30

## 2018-11-06 RX ADMIN — CARVEDILOL 6.25 MG: 6.25 TABLET, FILM COATED ORAL at 17:08

## 2018-11-06 RX ADMIN — PANTOPRAZOLE SODIUM 40 MG: 40 TABLET, DELAYED RELEASE ORAL at 05:12

## 2018-11-06 RX ADMIN — CETIRIZINE HYDROCHLORIDE 10 MG: 10 TABLET, FILM COATED ORAL at 08:43

## 2018-11-06 RX ADMIN — SODIUM CHLORIDE, POTASSIUM CHLORIDE, SODIUM LACTATE AND CALCIUM CHLORIDE 20 ML/HR: 600; 310; 30; 20 INJECTION, SOLUTION INTRAVENOUS at 11:02

## 2018-11-06 RX ADMIN — BENZONATATE 200 MG: 100 CAPSULE ORAL at 22:46

## 2018-11-06 RX ADMIN — LEVOTHYROXINE SODIUM 75 MCG: 75 TABLET ORAL at 05:12

## 2018-11-06 RX ADMIN — ENOXAPARIN SODIUM 40 MG: 100 INJECTION SUBCUTANEOUS at 05:11

## 2018-11-06 RX ADMIN — Medication 2 G: at 08:43

## 2018-11-06 RX ADMIN — INSULIN LISPRO 4 UNITS: 100 INJECTION, SOLUTION INTRAVENOUS; SUBCUTANEOUS at 20:35

## 2018-11-06 RX ADMIN — ROCURONIUM BROMIDE 10 MG: 10 SOLUTION INTRAVENOUS at 11:50

## 2018-11-06 RX ADMIN — FAMOTIDINE 20 MG: 20 TABLET ORAL at 08:43

## 2018-11-06 NOTE — ANESTHESIA PREPROCEDURE EVALUATION
Anesthesia Evaluation     Patient summary reviewed and Nursing notes reviewed   no history of anesthetic complications:  NPO Solid Status: > 8 hours  NPO Liquid Status: > 2 hours           Airway   Mallampati: I  TM distance: >3 FB  Neck ROM: full  No difficulty expected  Dental - normal exam     Pulmonary    (+) a smoker Current Abstained day of surgery, COPD moderate, decreased breath sounds,   Cardiovascular   Exercise tolerance: good (4-7 METS)    Rhythm: regular  Rate: normal    (+) hypertension well controlled less than 2 medications, cardiac stents Drug eluting stent within the past 12 months hyperlipidemia,       Neuro/Psych  GI/Hepatic/Renal/Endo    (+) morbid obesity,  diabetes mellitus type 2 well controlled, hypothyroidism,   (-)  obesity    Musculoskeletal     Abdominal   (+) obese,     Abdomen: soft.   Substance History      OB/GYN          Other   (+) arthritis                     Anesthesia Plan    ASA 3     general     intravenous induction   Anesthetic plan, all risks, benefits, and alternatives have been provided, discussed and informed consent has been obtained with: patient.    Plan discussed with CRNA.

## 2018-11-06 NOTE — ANESTHESIA POSTPROCEDURE EVALUATION
Patient: Shauna Valencia    Procedure Summary     Date:  11/06/18 Room / Location:   [facility] /  [facility]    Anesthesia Start:  1135 Anesthesia Stop:  1243    Procedure:  BRONCHOSCOPY WITH ENDOBRONCHIAL ULTRASOUND, biopsies, brushing and washing. (N/A Bronchus) Diagnosis:      Surgeon:  Isaac Epstein MD Provider:  Asia Michelle MD    Anesthesia Type:  general ASA Status:  3          Anesthesia Type: general  Last vitals  BP   137/99   Temp   98.5   Pulse   73   Resp   18   SpO2   98.5     Post Anesthesia Care and Evaluation    Patient location during evaluation: PACU  Patient participation: complete - patient participated  Level of consciousness: awake and alert  Pain score: 0  Pain management: adequate  Airway patency: patent  Anesthetic complications: No anesthetic complications  PONV Status: none  Cardiovascular status: hemodynamically stable and acceptable  Respiratory status: acceptable and nasal cannula  Hydration status: acceptable

## 2018-11-06 NOTE — PLAN OF CARE
Problem: Patient Care Overview  Goal: Plan of Care Review  Outcome: Ongoing (interventions implemented as appropriate)   11/06/18 0422   Coping/Psychosocial   Plan of Care Reviewed With patient   Plan of Care Review   Progress no change   OTHER   Outcome Summary VSS. No complaints. Na+ improving at last check. NPO after MN for possible bronch today.        Problem: Pain, Acute (Adult)  Goal: Acceptable Pain Control/Comfort Level  Outcome: Ongoing (interventions implemented as appropriate)

## 2018-11-06 NOTE — ANESTHESIA PROCEDURE NOTES
Airway  Urgency: elective    Airway not difficult    General Information and Staff    Patient location during procedure: OR  CRNA: WALDEMAR GONZALES    Indications and Patient Condition  Indications for airway management: airway protection    Preoxygenated: yes  MILS not maintained throughout  Mask difficulty assessment: 1 - vent by mask    Final Airway Details  Final airway type: endotracheal airway      Successful airway: ETT  Cuffed: yes   Successful intubation technique: direct laryngoscopy  Endotracheal tube insertion site: oral  Blade: Eloy  Blade size: 3  ETT size: 8.5 mm  Cormack-Lehane Classification: grade I - full view of glottis  Placement verified by: chest auscultation and capnometry   Measured from: lips  ETT to lips (cm): 20  Number of attempts at approach: 1    Additional Comments  Negative epigastric sounds, Breath sound equal bilaterally with symmetric chest rise and fall

## 2018-11-06 NOTE — PROGRESS NOTES
"NAL Progress Note  Shauna Valencia  [id number]  1955    10/31/2018    Reason for visit:  Hyponatremia    Just returned from bronchoscopy  Sodium dropped significantly  ROS:    Pulm:  No SOA  CV:  No CP    I&O:    Intake/Output Summary (Last 24 hours) at 11/06/18 1512  Last data filed at 11/06/18 0900   Gross per 24 hour   Intake              680 ml   Output              300 ml   Net              380 ml       PE:   Blood pressure 123/53, pulse 70, temperature 98.3 °F (36.8 °C), temperature source Temporal Artery , resp. rate 18, height 154.9 cm (61\"), weight 89.8 kg (198 lb), SpO2 96 %.    GENERAL: Awake and alert, in no acute distress.   HEENT: PER, No conjunctivitis  NECK: Supple, no JVD     HEART: RRR; No murmur, rubs, gallops.   LUNGS: Clear to auscultation bilaterally without wheezing, rales, rhonchi. Normal respiratory effort. Nonlabored. No dullness.  ABDOMEN: Soft, nontender, nondistended. Positive bowel sounds. No rebound or guarding. NO mass or HSM.  EXT:  No cyanosis, clubbing or edema. No cord.  : Genitalia generally unremarkable.  Without [place] catheter.  SKIN: Warm and dry without cutaneous eruptions on Inspection/palpation.    NEURO: Alert and oriented x 3, Motor 5/5 bilaterally    Medications:    Current Facility-Administered Medications:   •  acetaminophen (TYLENOL) tablet 650 mg, 650 mg, Oral, Q6H PRN, Maynor Ansari MD, 650 mg at 11/04/18 0618  •  aspirin EC tablet 81 mg, 81 mg, Oral, Daily, Mickey Warner MD, 81 mg at 11/05/18 1013  •  benzonatate (TESSALON) capsule 200 mg, 200 mg, Oral, TID PRN, Malia Solis MD, 200 mg at 11/02/18 1138  •  carvedilol (COREG) tablet 6.25 mg, 6.25 mg, Oral, BID With Meals, Mickey Warner MD, 6.25 mg at 11/06/18 0843  •  cetirizine (zyrTEC) tablet 10 mg, 10 mg, Oral, Daily, Mickey Warner MD, 10 mg at 11/06/18 0843  •  dextrose (D50W) 25 g/ 50mL Intravenous Solution 25 g, 25 g, Intravenous, Q15 Min PRN, Timmy, " MD Mickey  •  dextrose (GLUTOSE) oral gel 15 g, 15 g, Oral, Q15 Min PRN, Mickey Warner MD  •  enoxaparin (LOVENOX) syringe 40 mg, 40 mg, Subcutaneous, Q24H, Mickey Warner MD, 40 mg at 11/06/18 0511  •  famotidine (PEPCID) tablet 20 mg, 20 mg, Oral, Daily, Mickey Warner MD, 20 mg at 11/06/18 0843  •  glucagon (human recombinant) (GLUCAGEN DIAGNOSTIC) injection 1 mg, 1 mg, Subcutaneous, PRN, Mickey Warner MD  •  HYDROcod Polst-CPM Polst ER (TUSSIONEX PENNKINETIC) 10-8 MG/5ML ER suspension 2.5 mL, 2.5 mL, Oral, Q12H PRN, Maynor Ansari MD, 2.5 mL at 11/05/18 2351  •  ibuprofen (ADVIL,MOTRIN) tablet 400 mg, 400 mg, Oral, Q4H PRN, Maynor Ansari MD, 400 mg at 11/04/18 0847  •  insulin lispro (humaLOG) injection 0-7 Units, 0-7 Units, Subcutaneous, 4x Daily With Meals & Nightly, Mickey Warner MD, 2 Units at 11/05/18 2130  •  lactated ringers infusion, 20 mL/hr, Intravenous, Continuous, Mathieu Eason MD, Last Rate: 20 mL/hr at 11/06/18 1102  •  levothyroxine (SYNTHROID, LEVOTHROID) tablet 75 mcg, 75 mcg, Oral, Daily, Mickey Warner MD, 75 mcg at 11/06/18 0512  •  Magnesium Sulfate 2 gram Bolus, followed by 8 gram infusion (total Mg dose 10 grams)- Mg less than or equal to 1mg/dL, 2 g, Intravenous, PRN **OR** Magnesium Sulfate 2 gram / 50mL Infusion (GIVE X 3 BAGS TO EQUAL 6GM TOTAL DOSE) - Mg 1.1 - 1.5 mg/dl, 2 g, Intravenous, PRN, Last Rate: 25 mL/hr at 11/06/18 1442, 2 g at 11/06/18 1442 **OR** Magnesium Sulfate 4 gram infusion- Mg 1.6-1.9 mg/dL, 4 g, Intravenous, PRN, Maynor Ansari MD  •  melatonin sublingual tablet 5 mg, 5 mg, Sublingual, Nightly PRN, Malia Solis MD, 5 mg at 11/01/18 2104  •  nicotine (NICODERM CQ) 21 MG/24HR patch 1 patch, 1 patch, Transdermal, Q24H, Maynor Ansari MD, 1 patch at 11/06/18 0844  •  ondansetron (ZOFRAN) injection 4 mg, 4 mg, Intravenous, Q6H PRN, Maynor Ansari MD, 4 mg at 11/06/18 1214  •   pantoprazole (PROTONIX) EC tablet 40 mg, 40 mg, Oral, Q AM, Maynor Ansari MD, 40 mg at 11/06/18 0512  •  sodium chloride 0.9 % infusion, 75 mL/hr, Intravenous, Continuous, Phillip Valdez MD, Last Rate: 75 mL/hr at 11/06/18 1345, 75 mL/hr at 11/06/18 1345  •  sodium chloride tablet 2 g, 2 g, Oral, BID With Meals, Phillip Valdez MD, 2 g at 11/06/18 0843  •  tolvaptan (SAMSCA) tablet 7.5 mg, 7.5 mg, Oral, Once, Phillip Valdez MD    Meds reviewed and doses adjusted for renal function    Labs:    Results from last 7 days  Lab Units 11/06/18  1415 11/06/18  0514 11/06/18  0025 11/05/18  1842 11/05/18  1250 11/05/18  0545 11/04/18  2341  11/04/18  0243  11/03/18  0836 11/03/18  0500 11/03/18  0352 11/02/18  2358  11/02/18  1130  10/31/18  1710   WBC 10*3/mm3  --   --   --   --   --   --   --   --   --   --   --   --   --   --   --   --   --  7.54   HEMOGLOBIN g/dL  --   --   --   --   --   --   --   --   --   --   --   --   --   --   --   --   --  11.7   HEMATOCRIT %  --   --   --   --   --   --   --   --   --   --   --   --   --   --   --   --   --  33.8*   PLATELETS 10*3/mm3  --   --   --   --   --   --   --   --   --   --   --   --   --   --   --   --   --  422   SODIUM mmol/L 119* 123* 124* 121* 121* 127* 128*  < > 130*  < > 129*  130* 128* 128* 131*  < > 120*  < >  --    POTASSIUM mmol/L  --  4.3  --   --   --  4.3  --   --  4.2  --  4.0 4.0 4.0 4.2  < > 3.9  < >  --    CHLORIDE mmol/L  --  88*  --   --   --  92*  --   --  98*  --  97* 97* 98* 97*  < > 89*  < >  --    CO2 mmol/L  --  28.0  --   --   --  29.0  --   --  26.8  --  28.0 26.0 25.0 28.0  < > 26.0  < >  --    BUN mg/dL  --  12  --   --   --  11  --   --  14  --  15 14 14 18  < > 11  < >  --    CREATININE mg/dL  --  0.57*  --   --   --  0.61  --   --  0.60  --  0.61 0.62 0.61 0.76  < > 0.73  < >  --    GLUCOSE mg/dL  --  92  --   --   --  98  --   --  93  --  113* 151* 169* 134*  < > 178*  < >  --    CALCIUM mg/dL  --  8.8  --   --   --  8.9  --    --  8.7  --  8.6* 8.6* 8.6* 9.1  < > 8.9  < >  --    PHOSPHORUS mg/dL  --  4.1  --   --   --   --   --   --   --   --   --   --   --   --   --   --   --   --    URIC ACID mg/dL  --   --   --   --   --   --   --   --   --   --   --   --   --   --   --  2.5*  --   --    < > = values in this interval not displayed.          Radiology:  Imaging Results (last 72 hours)     Procedure Component Value Units Date/Time    Fiberoptic Endo (fees) [[id number]] Resulted:  11/01/18 1555     Updated:  11/01/18 1555    Narrative:       This procedure was auto-finalized with no dictation required.    CT Chest Without Contrast [[id number]] Collected:  10/31/18 1701     Updated:  10/31/18 1930    Narrative:       EXAM:    CT Chest Without Intravenous Contrast     EXAM DATE/TIME:    10/31/2018 5:01 PM     CLINICAL HISTORY:    63 years old, female; Hypo-osmolality and hyponatremia; Dyspnea, unspecified;   Signs and symptoms; Cough and shortness of breath; Prior surgery; Surgery date:   6+ months; Surgery type: 5 heart stents; Additional info: SOA, hyponatremia     TECHNIQUE:    Axial computed tomography images of the chest without intravenous contrast.    All CT scans at this facility use at least one of these dose optimization   techniques: automated exposure control; mA and/or kV adjustment per patient   size (includes targeted exams where dose is matched to clinical indication); or   iterative reconstruction.    Coronal reformatted images were created and reviewed.     COMPARISON:    No relevant prior studies available.     FINDINGS:     LUNGS/PLEURA: Suboptimally evaluated located and mildly spiculated RIGHT   hilar mass measuring at least 4.4 cm in the long axis. Nodular peribronchial   LEFT upper lobe and subpleural inferior lingula airspace opacities measuring up   to 1.9 cm and centrilobular nodules in the LEFT upper lobe. Calcific granuloma   in the RIGHT normal. No pleural effusion or pneumothorax. Complete obstruction   of the  LEFT apical bronchus, no other central obstructive endobronchial mass or   abnormality.     MEDIASTINUM: Heart size is normal, with trace pericardial   thickening/effusion. Severe coronary arterial calcification/coronary stents.   Atherosclerotic disease of the aorta without aortic aneurysm. Bulky prevascular   lymph node adjacent to the aortic arch measuring 3.0 cm and LEFT hilar mass   which may represent lymph nodes or primary lung malignancy.     OTHER STRUCTURES: Chronic degenerative changes in the visualized spine.   Nonspecific bilateral perinephric fat stranding and renal cortical scarring.   Small LEFT adrenal nodule measuring 1.3 cm.       Impression:       1. Findings concerning for LEFT HILAR MALIGNANT SOFT TISSUE MASS with   postobstructive focal pneumonia/bronchiolitis in the LEFT upper lobe.   2. Likely METASTATIC mediastinal and possibly LEFT hilar lymph nodes.   3. Coronary arterial calcification suggestive of CAD.   4. Other nonemergent/incidental findings as described.     THIS DOCUMENT HAS BEEN ELECTRONICALLY SIGNED BY BATOOL SAMANO MD          Renal Imaging:        IMPRESSION:  Hyponatremia; Likely SIADH sec to lung mass but very low chloride level- recent use of diuretic  She is assymptomatic at this time  We gave normal saline and her sodium got corrected faster than I desired  Given one dose of ddavp to lower it for safety reasons      RECOMMENDATIONS:  I am giving her tolvaptan  Asked to drink more fluid  No fluid restriction  Will dc sodium tabs to avoid rapid rise  I am checking serum sodium frequently- dont want her sodium to beyong 130 in next 24 hours to be careful  She may need ivf as d5w with ddavp iv 2 mcg if that happens  dw Dr Elan Valdez MD  11/6/2018  3:12 PM

## 2018-11-06 NOTE — SIGNIFICANT NOTE
11/06/18 1100   SLP Deferred Reason   SLP Deferred Reason Routine  (Pt is NPO for procedure today. Need FEES re-eval. Rec: Resume dysphagia diet per rec's after procedure and FEES schedule for tomorrow. Thank you, SLP)

## 2018-11-06 NOTE — OP NOTE
"BRONCHOSCOPY REPORT     Date: 11/6/2018       Procedure(s): Procedure(s):  BRONCHOSCOPY WITH ENDOBRONCHIAL ULTRASOUND, biopsies, brushing and washing.   Surgeon: Surgeon(s):  Isaac Epstein MD    Scope In: 11:49   Scope Out: 12:24   Pre-Operative Diagnosis: Left hilar mass   Post-Operative Diagnosis: Same   ASA and Mallampati: ASA Class II. Mallampati } Per anesthesia.   Time out: Immediately prior to procedure a \"time out\" was called to verify the correct patient, procedure, equipment, support staff and site/side as required.   Anesthesia: General       Findings:    A fiberoptic bronchoscope was inserted into the main airway via the ETT.      An anatomical survey was done of the main airways and the subsegmental bronchus: including the Right Mainstem bronchus, the Right Upper Lobe bronchus, Bronchus Intermedius, Right Middle Lobe, Superior Segment of the Right Lower Lobe and the Basilar segments of the Right Lower Lobe.     Then, the scope was withdrawn back to the karen and advanced into the Left Mainstem Bronchus, Left Upper Lobe bronchus, Lingula Bronchus, and the basilar segments of the Left Lower Lobe.    Vocal Cords: Not examined   Main Tachea and Karen: Normal.   Bronchial Tree: Extrinsic compression in the JESSICA, see picture, with neovascularization.     RB1: Apical  RB2 Posterior  RB3 Anterior    RB4: Lateral  RB5: Medial    RB6:  Superior Basal  RB7:  Medial Basal  RB8: Anterio Basal  RB9: Lateral Basal  RB10: Posterior  LB1+2: Apical Posterior    LB3: Anterior    LB4: Superior  LB5: Inferior    LB6: Superior Basal    LB8: Anterior Medial  LB9: Lateral Basal  LB10: Posterior Basal     Estimated Blood Loss: 10 mL.   Medications: Cold Saline   Complications: None     Specimens:   BW BALF PSB [place] EBBx TBBx EBUS   Location L Lung   JESSICA JESSICA  Site L11   Respiratory Cult. & GS ([id number]). X         Fungus smear (GMS) ([id number]) X         Fungus Culture ([id number]) X         AFB Smear and Culture ([id number]) X       "   Ova and Parasite Examination ([id number]) X                   Non-Gyn Cytology (LAB13) X   X                Pathology ([id number])     X  X     Pictures:     Karen:   RUL:   R B Int:     Left mainstem:   JESSICA:   JESSICA and brush:       JESSICA apical posterior segment        Isaac Epstein MD, FACP, FCCP, [facility]  Intensive Care Medicine and Pulmonary Medicine

## 2018-11-06 NOTE — PROGRESS NOTES
[facility] Medicine Services  PROGRESS NOTE    Patient Name: Shauna Valencia  : 1955  MRN: [id number]    Date of Admission: 10/31/2018  Length of Stay: 6  Primary Care Physician: Rox Singleton MD    Subjective   Subjective     CC:  Weakness    HPI:    A little short of breath. Cough/congestion after bronch. Nausea with meds this AM. HA after missing her morning coffee. No other issues.    Review of Systems    Otherwise ROS is negative except as mentioned in the HPI.    Objective   Objective     Vital Signs:   Temp:  [97.2 °F (36.2 °C)-98.7 °F (37.1 °C)] 98.3 °F (36.8 °C)  Heart Rate:  [67-78] 69  Resp:  [18-20] 18  BP: (126-163)/(66-99) 134/77        Physical Exam:  NAD, alert and oriented, tired appearing  OP clear, MMM  PERRL  Neck supple  RRR  JESSICA/LLL rhonchi, clear on R, ?exp wheeze  +BS, ND, NT  POLLACK  No c/c  Tr LE edema only  No rashes  Normal affect  No change  otherwise    Results Reviewed:  I have personally reviewed current lab, radiology, and data and agree.      Results from last 7 days  Lab Units 18  0514 10/31/18  1710   WBC 10*3/mm3  --  7.54   HEMOGLOBIN g/dL  --  11.7   HEMATOCRIT %  --  33.8*   PLATELETS 10*3/mm3  --  422   INR  0.96  --        Results from last 7 days  Lab Units 18  0514 18  0025 18  1842  18  0545  18  0243   SODIUM mmol/L 123* 124* 121*  < > 127*  < > 130*   POTASSIUM mmol/L 4.3  --   --   --  4.3  --  4.2   CHLORIDE mmol/L 88*  --   --   --  92*  --  98*   CO2 mmol/L 28.0  --   --   --  29.0  --  26.8   BUN mg/dL 12  --   --   --  11  --  14   CREATININE mg/dL 0.57*  --   --   --  0.61  --  0.60   GLUCOSE mg/dL 92  --   --   --  98  --  93   CALCIUM mg/dL 8.8  --   --   --  8.9  --  8.7   < > = values in this interval not displayed.  Estimated Creatinine Clearance: 105.7 mL/min (A) (by C-G formula based on SCr of 0.57 mg/dL (L)).  BNP   Date Value Ref Range Status   2018 77.0 0.0 - 100.0 pg/mL  Final     Comment:     Results may be falsely decreased if patient taking Biotin.       Microbiology Results Abnormal     None          Imaging Results (last 24 hours)     Procedure Component Value Units Date/Time    XR Chest 1 View [[id number]] Updated:  11/06/18 1304    XR Chest PA & Lateral [[id number]] Collected:  11/05/18 1626     Updated:  11/05/18 1636    Narrative:       EXAMINATION: XR CHEST PA AND LATERAL- 11/05/2018     INDICATION: E87.7-Koay-doqmzhznfy and hyponatremia; R06.00-Dyspnea,  unspecified; R13.13-Dysphagia, pharyngeal phase; R91.8-Other nonspecific  abnormal finding of lung field; R91.8-Other nonspecific abnormal finding  of lung field      COMPARISON: CT chest dated 10/31/2018     FINDINGS: Rounded masslike opacity within the left suprahilar region  consistent with known mass lesion. No focal consolidation otherwise  noted. No pneumothorax or pleural effusion. Calcified granuloma left  lung base. Degenerative changes of the spine.           Impression:       Left suprahilar mass lesion seen better on recent CT chest  10/31/2018 without acute parenchymal process otherwise noted.     D:  11/05/2018  E:  11/05/2018     This report was finalized on 11/5/2018 4:34 PM by Dr. Johnathon Cameron.                I have reviewed the medications.      aspirin 81 mg Oral Daily   carvedilol 6.25 mg Oral BID With Meals   cetirizine 10 mg Oral Daily   enoxaparin 40 mg Subcutaneous Q24H   famotidine 20 mg Oral Daily   insulin lispro 0-7 Units Subcutaneous 4x Daily With Meals & Nightly   levothyroxine 75 mcg Oral Daily   nicotine 1 patch Transdermal Q24H   pantoprazole 40 mg Oral Q AM   sodium chloride 2 g Oral BID With Meals         Assessment/Plan   Assessment / Plan     Active Hospital Problems    Diagnosis   • Mass of upper lobe of left lung   • Mediastinal lymphadenopathy   • Acute hyponatremia   • Hyperlipidemia   • Type 2 diabetes mellitus (CMS/HCC)   • Tobacco abuse   • Obesity (BMI 30-39.9)          Brief  Hospital Course to date:  Shauna Valencia is a 63 y.o. female here with hyponatremia, likely SIADH, and found to have a lung mass      Assessment & Plan:  L hilar mass  --s/p bronchoscopy, path/studies pending  Hx of RCA stent, chart reviewed, 7/2017  --held Brilinta, aspirin only  Hyponatremia  --probable SIADH  --checked AM cortisol  --TSH normal  --back on IVF per NAL, likely to need tolvaptan  Dysphagia  --not tolerating modified diet, back on thin liquids without incident  HA  --resolved after resuming caffeine  R LE pain  --checked duplex, superficial clot only  --has hx of sciatica with R leg pain however  Tobacco abuse  Obesity  HL    DVT Prophylaxis:  Lovenox    CODE STATUS:   Code Status and Medical Interventions:   Ordered at: 10/31/18 1906     Level Of Support Discussed With:    Patient     Code Status:    CPR     Medical Interventions (Level of Support Prior to Arrest):    Full       Disposition: I expect the patient to be discharged TBD.      Electronically signed by Maynor Anasri MD, 11/06/18, 1:15 PM.

## 2018-11-07 LAB
ANION GAP SERPL CALCULATED.3IONS-SCNC: 10 MMOL/L (ref 3–11)
BUN BLD-MCNC: 10 MG/DL (ref 9–23)
BUN/CREAT SERPL: 15.4 (ref 7–25)
CALCIUM SPEC-SCNC: 8.8 MG/DL (ref 8.7–10.4)
CHLORIDE SERPL-SCNC: 93 MMOL/L (ref 99–109)
CO2 SERPL-SCNC: 22 MMOL/L (ref 20–31)
CREAT BLD-MCNC: 0.65 MG/DL (ref 0.6–1.3)
CYTO UR: NORMAL
DEPRECATED RDW RBC AUTO: 37.7 FL (ref 37–54)
ERYTHROCYTE [DISTWIDTH] IN BLOOD BY AUTOMATED COUNT: 12.5 % (ref 11.3–14.5)
GFR SERPL CREATININE-BSD FRML MDRD: 93 ML/MIN/1.73
GLUCOSE BLD-MCNC: 191 MG/DL (ref 70–100)
GLUCOSE BLDC GLUCOMTR-MCNC: 186 MG/DL (ref 70–130)
GLUCOSE BLDC GLUCOMTR-MCNC: 197 MG/DL (ref 70–130)
GLUCOSE BLDC GLUCOMTR-MCNC: 200 MG/DL (ref 70–130)
GLUCOSE BLDC GLUCOMTR-MCNC: 210 MG/DL (ref 70–130)
HCT VFR BLD AUTO: 31 % (ref 34.5–44)
HGB BLD-MCNC: 10.8 G/DL (ref 11.5–15.5)
LAB AP CASE REPORT: NORMAL
LAB AP CASE REPORT: NORMAL
LAB AP CLINICAL INFORMATION: NORMAL
MAGNESIUM SERPL-MCNC: 2.6 MG/DL (ref 1.3–2.7)
MCH RBC QN AUTO: 28.6 PG (ref 27–31)
MCHC RBC AUTO-ENTMCNC: 34.8 G/DL (ref 32–36)
MCV RBC AUTO: 82.2 FL (ref 80–99)
PATH REPORT.FINAL DX SPEC: NORMAL
PATH REPORT.FINAL DX SPEC: NORMAL
PATH REPORT.GROSS SPEC: NORMAL
PLATELET # BLD AUTO: 379 10*3/MM3 (ref 150–450)
PMV BLD AUTO: 8.3 FL (ref 6–12)
POTASSIUM BLD-SCNC: 4.5 MMOL/L (ref 3.5–5.5)
RBC # BLD AUTO: 3.77 10*6/MM3 (ref 3.89–5.14)
SODIUM BLD-SCNC: 122 MMOL/L (ref 132–146)
SODIUM BLD-SCNC: 122 MMOL/L (ref 132–146)
SODIUM BLD-SCNC: 125 MMOL/L (ref 132–146)
SODIUM BLD-SCNC: 126 MMOL/L (ref 132–146)
SODIUM BLD-SCNC: 127 MMOL/L (ref 132–146)
SODIUM BLD-SCNC: 130 MMOL/L (ref 132–146)
WBC NRBC COR # BLD: 11.92 10*3/MM3 (ref 3.5–10.8)

## 2018-11-07 PROCEDURE — 82962 GLUCOSE BLOOD TEST: CPT

## 2018-11-07 PROCEDURE — 92612 ENDOSCOPY SWALLOW (FEES) VID: CPT

## 2018-11-07 PROCEDURE — 85027 COMPLETE CBC AUTOMATED: CPT | Performed by: HOSPITALIST

## 2018-11-07 PROCEDURE — 99233 SBSQ HOSP IP/OBS HIGH 50: CPT | Performed by: INTERNAL MEDICINE

## 2018-11-07 PROCEDURE — 83735 ASSAY OF MAGNESIUM: CPT | Performed by: HOSPITALIST

## 2018-11-07 PROCEDURE — 99232 SBSQ HOSP IP/OBS MODERATE 35: CPT | Performed by: INTERNAL MEDICINE

## 2018-11-07 PROCEDURE — 25010000002 ENOXAPARIN PER 10 MG: Performed by: HOSPITALIST

## 2018-11-07 PROCEDURE — 80048 BASIC METABOLIC PNL TOTAL CA: CPT | Performed by: INTERNAL MEDICINE

## 2018-11-07 PROCEDURE — 84295 ASSAY OF SERUM SODIUM: CPT | Performed by: INTERNAL MEDICINE

## 2018-11-07 RX ORDER — BISACODYL 10 MG
10 SUPPOSITORY, RECTAL RECTAL DAILY PRN
Status: DISCONTINUED | OUTPATIENT
Start: 2018-11-07 | End: 2018-11-17 | Stop reason: HOSPADM

## 2018-11-07 RX ORDER — TOLVAPTAN 15 MG/1
7.5 TABLET ORAL ONCE
Status: DISCONTINUED | OUTPATIENT
Start: 2018-11-07 | End: 2018-11-07

## 2018-11-07 RX ORDER — BISACODYL 10 MG
10 SUPPOSITORY, RECTAL RECTAL ONCE
Status: DISCONTINUED | OUTPATIENT
Start: 2018-11-07 | End: 2018-11-07

## 2018-11-07 RX ORDER — TOLVAPTAN 15 MG/1
7.5 TABLET ORAL ONCE
Status: COMPLETED | OUTPATIENT
Start: 2018-11-07 | End: 2018-11-07

## 2018-11-07 RX ADMIN — INSULIN LISPRO 3 UNITS: 100 INJECTION, SOLUTION INTRAVENOUS; SUBCUTANEOUS at 12:00

## 2018-11-07 RX ADMIN — CARVEDILOL 6.25 MG: 6.25 TABLET, FILM COATED ORAL at 17:28

## 2018-11-07 RX ADMIN — CARVEDILOL 6.25 MG: 6.25 TABLET, FILM COATED ORAL at 09:13

## 2018-11-07 RX ADMIN — LEVOTHYROXINE SODIUM 75 MCG: 75 TABLET ORAL at 06:10

## 2018-11-07 RX ADMIN — ASPIRIN 81 MG: 81 TABLET, COATED ORAL at 09:13

## 2018-11-07 RX ADMIN — NICOTINE 1 PATCH: 21 PATCH, EXTENDED RELEASE TRANSDERMAL at 09:13

## 2018-11-07 RX ADMIN — CETIRIZINE HYDROCHLORIDE 10 MG: 10 TABLET, FILM COATED ORAL at 09:13

## 2018-11-07 RX ADMIN — PANTOPRAZOLE SODIUM 40 MG: 40 TABLET, DELAYED RELEASE ORAL at 06:10

## 2018-11-07 RX ADMIN — HYDROCODONE POLISTIREX AND CHLORPHENIRAMINE POLISTIREX 2.5 ML: 10; 8 SUSPENSION, EXTENDED RELEASE ORAL at 11:59

## 2018-11-07 RX ADMIN — TOLVAPTAN 7.5 MG: 15 TABLET ORAL at 14:44

## 2018-11-07 RX ADMIN — INSULIN LISPRO 3 UNITS: 100 INJECTION, SOLUTION INTRAVENOUS; SUBCUTANEOUS at 09:14

## 2018-11-07 RX ADMIN — INSULIN LISPRO 2 UNITS: 100 INJECTION, SOLUTION INTRAVENOUS; SUBCUTANEOUS at 17:28

## 2018-11-07 RX ADMIN — INSULIN LISPRO 2 UNITS: 100 INJECTION, SOLUTION INTRAVENOUS; SUBCUTANEOUS at 21:31

## 2018-11-07 RX ADMIN — Medication 5 MG: at 23:08

## 2018-11-07 RX ADMIN — FAMOTIDINE 20 MG: 20 TABLET ORAL at 09:13

## 2018-11-07 RX ADMIN — ENOXAPARIN SODIUM 40 MG: 100 INJECTION SUBCUTANEOUS at 06:10

## 2018-11-07 NOTE — PROGRESS NOTES
Continued Stay Note  [facility]     Patient Name: Shauna Valencia  MRN: [id number]  Today's Date: 11/7/2018    Admit Date: 10/31/2018          Discharge Plan     Row Name 11/07/18 1110       Plan    Plan Comments Cm continues to follow for discharge needs. Pt is not medically ready for discharge at this time.              Discharge Codes    No documentation.       Expected Discharge Date and Time     Expected Discharge Date Expected Discharge Time    Nov 7, 2018             Mariia Frank RN

## 2018-11-07 NOTE — PROGRESS NOTES
"Clinical Nutrition   Reason For Visit: DINORA    Patient Name: Shauna Valencia  YOB: 1955  MRN: [id number]  Date of Encounter: 11/07/18 1:05 PM  Admission date: 10/31/2018    Ordered Boost Pudding for patient to try  Removed fluid restriction from PO diet order (no longer needs per nephrologist note 11/6 and verified by RN at visit)    Nutrition Assessment     Admission Problem List:  Hyponatremia - likely SIADH  Left hilar lung mass  Possible post-obstructive PNA  Dysphagia      Applicable medical tests/procedures since admission:  (11/1) SLP FEES - dysphagia level 4 / HTL / no straws  (11/6) s/p bronchoscopy - pathology pending  (11/6) SLP FEES - dysphagia level 4 / HTL / no straws      PMH: She  has a past medical history of Diabetes mellitus (CMS/HCC); Hyperlipidemia; and Pancreatitis.   PSxH: She  has a past surgical history that includes Carotid stent; Colonoscopy; Upper gastrointestinal endoscopy; and Bronchoscopy (N/A, 11/6/2018).        Reported/Observed/Food/Nutrition Related History   Patient, family and RN present. Patient has been eating okay, not quite what is considered her \"normal\". Dislikes honey thickened liquids. RN reports patient no longer has to be on fluid restriction per nephrologist. Patient does not want Boost/Ensure supplement drinks since they will have to be thickened, but she is willing to try Boost pudding.      Anthropometrics   Height: 61 in  Weight: 198 lbs (standing wt 10/31 per ns doc)  BMI: 37.4  BMI classification: Obese Class II: 35-39.9kg/m2       Labs reviewed   Labs reviewed: Yes    Medications reviewed   Medications reviewed: Yes    Current Nutrition Prescription   PO: Diet Regular; Honey Thick; Consistent Carbohydrate, Daily Fluid Restriction; Other; 1,200    Evaluation of Received Nutrient/Fluid Intake: 60% / 7 meals      Nutrition Diagnosis     Problem Inadequate oral intake   Etiology Decreased appetite, dislike of dysphagia diet textures/liquid consistencies "   Signs/Symptoms PO intake: 60% / 7 meals       Intervention   Intervention: Follow treatment progress, Care plan reviewed, Interview for preferences, Encourage intake     Ordered Boost pudding 2x daily (L/D)      Goal:   General: Nutrition support treatment  PO: Increase intake      Monitoring/Evaluation:       Monitoring/Evaluation: Per protocol, PO intake, Supplement intake  Will Continue to follow per protocol  Rachael Pulido RD  Time Spent: 25 min

## 2018-11-07 NOTE — PROGRESS NOTES
INPATIENT PULMONARY SERVICE  PROGRESS NOTE     Hospital:  LOS: 7 days      S   Ms. Shauna Valencia, 63 y.o. female is followed for:    JESSICA mass    Interval History:  Mininmal hemoptysis after bronchoscope. Better today.  Diuresis with tolvaptan. She could not sleep last night.     The patient's relevant past medical, surgical and social history were reviewed and updated in Epic as appropriate.      ROS:   Constitutional: Negative for fever.   Respiratory: Negative for dyspnea.   Cardiovascular: Negative for chest pain.   Gastrointestinal: Negative for  nausea, vomiting and diarrhea.        O     Vitals:  Temp: 98.1 °F (36.7 °C) (11/07/18 0700) Temp  Min: 98 °F (36.7 °C)  Max: 98.1 °F (36.7 °C)   BP: 139/76 (11/07/18 0700) BP  Min: 137/80  Max: 146/79   Pulse: 80 (11/07/18 1116) Pulse  Min: 80  Max: 90   Resp: 18 (11/07/18 0700) Resp  Min: 18  Max: 18   SpO2: 96 % (11/06/18 1315) No Data Recorded   Device: room air (11/07/18 1402)    Flow Rate: 2 (11/06/18 1315) No Data Recorded     Physical Examination  Telemetry:  Rhythm: normal sinus rhythm (11/07/18 0803)         Constitutional:  No acute distress.   Cardiovascular: Normal rate, regular and rhythm. Normal heart sounds.  No murmurs, gallop or rub.   Respiratory: No respiratory distress. Normal respiratory effort.  Normal breath sounds  Clear to auscultation and percussion.    Abdominal:  Soft. No masses. Non-tender. No distension. No HSM.   Extremities: No digital cyanosis. No clubbing.  Edema.   Neurological:   Alert and Oriented to person, place, and time.  Best Eye Response: 4-->(E4) spontaneous (11/07/18 1402)  Best Motor Response: 6-->(M6) obeys commands (11/07/18 1402)  Best Verbal Response: 5-->(V5) oriented (11/07/18 1402)  Geoffrey Coma Scale Score: 15 (11/07/18 1402)   Lines/Drains/Airways: Peripheral IV(s)       Hematology:    Results from last 7 days  Lab Units 11/07/18  0615   WBC 10*3/mm3 11.92*   HEMOGLOBIN g/dL 10.8*   MCV fL 82.2   PLATELETS 10*3/mm3  379     Chemistry:  Estimated Creatinine Clearance: 92.7 mL/min (by C-G formula based on SCr of 0.65 mg/dL).      Results from last 7 days  Lab Units 11/07/18  1453 11/07/18  1200 11/07/18  0615  11/06/18 2211 11/06/18  1922   SODIUM mmol/L 122* 126* 125*  < > 120* 118*   POTASSIUM mmol/L  --   --  4.5  --  4.3 4.5   CHLORIDE mmol/L  --   --  93*  --  89* 89*   CO2 mmol/L  --   --  22.0  --  21.0 22.0   ANION GAP mmol/L  --   --  10.0  --  10.0 7.0   GLUCOSE mg/dL  --   --  191*  --  210* 248*   CALCIUM mg/dL  --   --  8.8  --  8.7 8.9   < > = values in this interval not displayed.    Results from last 7 days  Lab Units 11/07/18  0615 11/06/18  0514   MAGNESIUM mg/dL 2.6 1.5   PHOSPHORUS mg/dL  --  4.1       Results from last 7 days  Lab Units 11/07/18  0615 11/06/18 2211 11/06/18  1922   BUN mg/dL 10 10 9   CREATININE mg/dL 0.65 0.70 0.75     Images:  Xr Chest 1 View    Result Date: 11/6/2018  No postprocedural pneumothorax. Mildly increased opacifications adjacent to the left suprahilar mass lesion may represent post bronchoscopy debris material or postobstructive changes.  D:  11/06/2018 E:  11/06/2018  This report was finalized on 11/6/2018 1:41 PM by Dr. Johnathon Cameron.      Xr Chest Pa & Lateral    Result Date: 11/5/2018  Left suprahilar mass lesion seen better on recent CT chest 10/31/2018 without acute parenchymal process otherwise noted.  D:  11/05/2018 E:  11/05/2018  This report was finalized on 11/5/2018 4:34 PM by Dr. Johnathon Cameron.        I reviewed the patient's new laboratory and imaging results.  I independently reviewed the patient's new images.    Medications: Reviewed.    Assessment/Plan   A / P     63 y.o.female, admitted on 10/31/2018 with:     10/31/2018      Impressions:  1. JESSICA mass  1. Bronch with EBUS 11/06/18   Lab Results   Component Value Date    FINALDX  11/06/2018      1. BRONCH BRUSH, LEFT UPPER LOBE:  Negative for malignant cells.   2. BRONCH WASH, LEFT UPPER LOBE:  Negative for  malignant cells.    This diagnosis was rendered by Justo Correa M.D. at [facility] . See scanned report for full consultative opinion.          TBNA and EBBx: also non-diagnostic.  2. Hyponatremia, now on tolvaptan } Renal  3. Dyslipidemia  4. CAD s/p stents, on Brillinta at home.  5. Tobacco use  6. Obesity II. Body mass index is 37.41 kg/m².     Nutrition Support: Patient isn't on Tube Feeding   Modulars: Patient doesn't have any tube feeding modular orders   Diet: Diet Regular; Honey Thick; Consistent Carbohydrate       Active Supplement Orders      DIET MESSAGE Please being wheatley, eggs now thanks      Dietary Nutrition Supplements Boost Pudding   Advance Directives: Code Status and Medical Interventions:   Ordered at: 10/31/18 1909     Level Of Support Discussed With:    Patient     Code Status:    CPR     Medical Interventions (Level of Support Prior to Arrest):    Full        Assessment / Plan:  1. Continue therapy for hyponatremia.  2. We will consider a second look with EBUS.  3. She will also need a PET scan.    I discussed the patient's findings and my recommendations with patient and primary care team    Isaac Epstein MD, FACP, FCCP, [facility]  Intensive Care Medicine, Nutrition Support and Pulmonary Medicine

## 2018-11-07 NOTE — PROGRESS NOTES
[facility] Medicine Services  PROGRESS NOTE    Patient Name: Shauna Valencia  : 1955  MRN: [id number]    Date of Admission: 10/31/2018  Length of Stay: 7  Primary Care Physician: Rox Singleton MD    Subjective   Subjective     CC:  Weakness    HPI:    Pt getting FEES at bedside this am when I went to see her. No issues overnight.  Tussionex order  apparently,but pt was able to get it reordered and cough is better now.     Review of Systems   Gen- No fevers, chills  CV- No chest pain, palpitations  Resp- No cough, dyspnea  GI- No N/V/D, abd pain    Otherwise ROS is negative except as mentioned in the HPI.    Objective   Objective     Vital Signs:   Temp:  [98 °F (36.7 °C)-98.1 °F (36.7 °C)] 98.1 °F (36.7 °C)  Heart Rate:  [80-90] 80  Resp:  [18] 18  BP: (137-146)/(76-80) 139/76        Physical Exam:  Constitutional: No acute distress, awake, alert  HENT: NCAT, mucous membranes moist  Respiratory: rhonchi right side otherwise clear with normal respiratory effort  Cardiovascular: RRR, no murmurs, rubs, or gallops, palpable pedal pulses bilaterally  Gastrointestinal: Positive bowel sounds, soft, nontender, nondistended  Musculoskeletal: No bilateral ankle edema  Psychiatric: Appropriate affect, cooperative  Neurologic: Oriented x 3, strength symmetric in all extremities, Cranial Nerves grossly intact to confrontation, speech clear  Skin: No rashes    Results Reviewed:  I have personally reviewed current lab, radiology, and data and agree.      Results from last 7 days  Lab Units 18  0615 18  0514 10/31/18  1710   WBC 10*3/mm3 11.92*  --  7.54   HEMOGLOBIN g/dL 10.8*  --  11.7   HEMATOCRIT % 31.0*  --  33.8*   PLATELETS 10*3/mm3 379  --  422   INR   --  0.96  --        Results from last 7 days  Lab Units 18  1200 18  0615 18  0124 18  2211 18  1922   SODIUM mmol/L 126* 125* 122* 120* 118*   POTASSIUM mmol/L  --  4.5  --  4.3 4.5    CHLORIDE mmol/L  --  93*  --  89* 89*   CO2 mmol/L  --  22.0  --  21.0 22.0   BUN mg/dL  --  10  --  10 9   CREATININE mg/dL  --  0.65  --  0.70 0.75   GLUCOSE mg/dL  --  191*  --  210* 248*   CALCIUM mg/dL  --  8.8  --  8.7 8.9     Estimated Creatinine Clearance: 92.7 mL/min (by C-G formula based on SCr of 0.65 mg/dL).  BNP   Date Value Ref Range Status   11/06/2018 77.0 0.0 - 100.0 pg/mL Final     Comment:     Results may be falsely decreased if patient taking Biotin.       Microbiology Results Abnormal     Procedure Component Value - Date/Time    AFB Culture - Wash, Lung, Left Upper Lobe [[id number]] Collected:  11/06/18 1225    Lab Status:  Preliminary result Specimen:  Wash from Lung, Left Upper Lobe Updated:  11/07/18 1216     AFB Stain No acid fast bacilli seen on concentrated smear    Respiratory Culture - Wash, Lung, Left Upper Lobe [[id number]] Collected:  11/06/18 1225    Lab Status:  Preliminary result Specimen:  Wash from Lung, Left Upper Lobe Updated:  11/07/18 0747     Respiratory Culture No growth          Imaging Results (last 24 hours)     ** No results found for the last 24 hours. **             I have reviewed the medications.      aspirin 81 mg Oral Daily   carvedilol 6.25 mg Oral BID With Meals   cetirizine 10 mg Oral Daily   enoxaparin 40 mg Subcutaneous Q24H   famotidine 20 mg Oral Daily   insulin lispro 0-7 Units Subcutaneous 4x Daily With Meals & Nightly   levothyroxine 75 mcg Oral Daily   nicotine 1 patch Transdermal Q24H   pantoprazole 40 mg Oral Q AM   tolvaptan 7.5 mg Oral Once         Assessment/Plan   Assessment / Plan     Active Hospital Problems    Diagnosis   • Mass of upper lobe of left lung   • Mediastinal lymphadenopathy   • Acute hyponatremia   • Hyperlipidemia   • Type 2 diabetes mellitus (CMS/HCC)   • Tobacco abuse   • Obesity (BMI 30-39.9)          Brief Hospital Course to date:  Shauna Valencia is a 63 y.o. female here with hyponatremia, likely SIADH, and found to have a lung  mass      Assessment & Plan:  L hilar mass  --s/p bronchoscopy, path/studies pending  Hx of RCA stent, chart reviewed, 7/2017  --held Brilinta, aspirin only  Hyponatremia  --probable SIADH  --checked AM cortisol  --TSH normal  --better with Tolvaptan, NAL following  Dysphagia  --SLP following, recommended honey thick diet- pt pretty resistant to modified diets in the past.  May need to consider comfort diet pending results of biopsy.   R LE pain  --checked duplex, superficial clot only  --has hx of sciatica with R leg pain however  Tobacco abuse  Obesity  HL    DVT Prophylaxis:  Lovenox    CODE STATUS:   Code Status and Medical Interventions:   Ordered at: 10/31/18 1909     Level Of Support Discussed With:    Patient     Code Status:    CPR     Medical Interventions (Level of Support Prior to Arrest):    Full       Disposition: I expect the patient to be discharged TBD.      Electronically signed by Malia Solis MD, 11/07/18, 1:45 PM.

## 2018-11-07 NOTE — PLAN OF CARE
Problem: Patient Care Overview  Goal: Plan of Care Review  Outcome: Ongoing (interventions implemented as appropriate)   11/07/18 0506   Coping/Psychosocial   Plan of Care Reviewed With patient   Plan of Care Review   Progress no change   OTHER   Outcome Summary Patient rested quietly most of the shift after taking a walk. No complaints. Will continue to monitor.       Problem: Pain, Acute (Adult)  Goal: Acceptable Pain Control/Comfort Level  Outcome: Ongoing (interventions implemented as appropriate)   11/07/18 0506   Pain, Acute (Adult)   Acceptable Pain Control/Comfort Level making progress toward outcome       Problem: Diabetes, Type 2 (Adult)  Goal: Signs and Symptoms of Listed Potential Problems Will be Absent, Minimized or Managed (Diabetes, Type 2)  Outcome: Ongoing (interventions implemented as appropriate)   11/07/18 0506   Goal/Outcome Evaluation   Problems Assessed (Type 2 Diabetes) all   Problems Present (Type 2 Diabetes) hyperglycemia

## 2018-11-07 NOTE — MBS/VFSS/FEES
Acute Care - Speech Language Pathology   Swallow Initial Evaluation [facility]   Fiberoptic Endoscopic Evaluation of Swallowing (FEES)       Patient Name: Shauna Valencia  : 1955  MRN: [id number]  Today's Date: 2018               Admit Date: 10/31/2018    Visit Dx:     ICD-10-CM ICD-9-CM   1. Acute hyponatremia E87.1 276.1   2. Dyspnea, unspecified type R06.00 786.09   3. Pharyngeal dysphagia R13.13 787.23   4. Hilar mass R91.8 786.6   5. Mass of upper lobe of left lung R91.8 786.6     Patient Active Problem List   Diagnosis   • Acute hyponatremia   • Hyperlipidemia   • Type 2 diabetes mellitus (CMS/HCC)   • Tobacco abuse   • Obesity (BMI 30-39.9)   • Mass of upper lobe of left lung   • Mediastinal lymphadenopathy     Past Medical History:   Diagnosis Date   • Diabetes mellitus (CMS/HCC)    • Hyperlipidemia    • Pancreatitis      Past Surgical History:   Procedure Laterality Date   • CAROTID STENT     • COLONOSCOPY     • UPPER GASTROINTESTINAL ENDOSCOPY            SWALLOW EVALUATION (last 72 hours)      SLP Adult Swallow Evaluation     Row Name 18 0930                   Rehab Evaluation    Document Type evaluation   FEES  -SG        Subjective Information no complaints  -SG        Patient Observations alert;cooperative  -SG        Patient/Family Observations Sisters present  -SG        Patient Effort good  -SG        Symptoms Noted During/After Treatment none  -SG           General Information    Patient Profile Reviewed yes  -SG        Pertinent History Of Current Problem see inital eval, bronch performed yesterday, pathology pending  -SG        Current Method of Nutrition honey-thick liquids;mechanical soft, no mixed consistencies  -SG        Precautions/Limitations, Vision WFL with corrective lenses  -SG        Precautions/Limitations, Hearing WFL;for purposes of eval  -SG        Prior Level of Function-Communication WFL  -SG        Prior Level of Function-Swallowing no diet consistency  restrictions  -SG        Plans/Goals Discussed with patient and family;agreed upon  -SG        Barriers to Rehab medically complex  -SG        Patient's Goals for Discharge return to regular diet  -SG        Family Goals for Discharge patient able to return to regular diet  -SG           Pain Assessment    Additional Documentation Pain Scale: Numbers Pre/Post-Treatment (Group)  -SG           Pain Scale: Numbers Pre/Post-Treatment    Pain Scale: Numbers, Pretreatment 0/10 - no pain  -SG        Pain Scale: Numbers, Post-Treatment 0/10 - no pain  -SG           Fiberoptic Endoscopic Evaluation of Swallowing (FEES)    Risks/Benefits Reviewed risks/benefits explained;patient;family;agreed to eval  -SG        Nasal Entry right:  -SG        Special Considerations Scope #[id number]  -SG           Anatomy and Physiology    Anatomic Considerations erythema;edema;vocal folds;arytenoids   edema arytenoids, erytema TVC, worse on R.   -SG        Comment Consistent w/ prior FEES. ENT consult still recommended. See prior FEES for details  -SG        Velopharyngeal WFL  -SG        Base of Tongue symmetrical  -SG        Epiglottis WFL  -SG        Laryngeal Function Breathing symmetrical  -SG        Laryngeal Function Phonation symmetrical  -SG        Laryngeal Function to Breath Hold TVT/FVF/Arytenoid  -SG        Secretion Rating Scale (Jesus et al. 1996) 0- normal, no visible secretions  -SG        Secretion Description thin;clear  -SG        Ice Chips elicited swallow;aspirated;no response to aspiration  -SG        Spontaneous Swallow frequency adequate  -SG        Sensory sensed scope  -SG        Utensils Used Spoon;Cup;Straw  -SG        Consistencies Trialed thin liquids;nectar-thick liquids;honey-thick liquids;pudding/puree;Regular textures  -SG           FEES Interpretation    Oral Phase WFL  -SG           Initiation of Pharyngeal Swallow    Initiation of Pharyngeal Swallow bolus in pyriform sinuses  -SG        Pharyngeal Phase  impaired pharyngeal phase of swallowing  -SG        Penetration During the Swallow honey-thick liquids;secondary to delayed swallow initiation or mistiming;secondary to reduced laryngeal elevation;secondary to reduced vestibular closure  -SG        Aspiration During the Swallow thin liquids;nectar-thick liquids;secondary to delayed swallow initiation or mistiming;secondary to reduced laryngeal elevation;secondary to reduced vestibular closure  -SG        Response to Aspiration response with cue only;could not clear subglottic material  -SG        Rosenbek's Scale thin:;nectar:;8-->Level 8  -SG        Residue all consistencies tested;base of tongue;valleculae;posterior pharyngeal wall;secondary to reduced base of tongue retraction;secondary to reduced posterior pharyngeal wall stripping;secondary to reduced laryngeal elevation  -SG        Attempted Compensatory Maneuvers chin tuck;bolus presentation style;bolus size  -SG        Response to Attempted Compensatory Maneuvers did not prevent aspiration  -SG        FEES Summary FEES re-eval completed this date. Pt's results consistent w/ FEES performed on 11/1/2018. No improvement noted. Small amounts of aspiration during the swallow w/ thins and NT 2' delay, dec'd elevation and dec'd vestibular closure. All aspiration was silent, but pt exhibited habital throat clear throughout FEES, unrelated to swallowing. Swallow initiation still delayed to pys-- supect this is pt's baseline. Suspect aspiration related to baseline delay + laryngeal edema/erythema impacting closure + inc'd weakness w/ this admission (? 2' hyponatremia). Given summation of deficits, dramatic improvement in the next few days is unlikely. However, SLP will continue to provide dys tx (continue prior goals), education, and re-assessments if indicated. Con't w/ previous recommendations: L4, HT, no straws, small bites and sips, aleternate bites and sips, meds whole w/ puree.   -SG           Clinical  Impression    SLP Swallowing Diagnosis moderate;pharyngeal dysfunction  -SG        Functional Impact risk of aspiration/pneumonia  -SG        Rehab Potential/Prognosis, Swallowing adequate, monitor progress closely  -SG        Swallow Criteria for Skilled Therapeutic Interventions Met demonstrates skilled criteria  -SG           Recommendations    Therapy Frequency (Swallow) 5 days per week  -SG        Predicted Duration Therapy Intervention (Days) until discharge  -SG        SLP Diet Recommendation mechanical soft with no mixed consistencies;honey thick liquids  -SG        Recommended Diagnostics reassess via FEES  -SG        Recommended Precautions and Strategies upright posture during/after eating;small bites of food and sips of liquid;no straw;alternate between small bites of food and sips of liquid  -SG        SLP Rec. for Method of Medication Administration meds whole;with pudding or applesauce;as tolerated  -SG        Monitor for Signs of Aspiration yes;notify SLP if any concerns;cough;gurgly voice;throat clearing  -SG        Anticipated Dischage Disposition anticipate therapy at next level of care  -SG          User Key  (r) = Recorded By, (t) = Taken By, (c) = Cosigned By    Initials Name Effective Dates    FEMI Devi-Svitlana Noyola MS CCC-SLP 06/22/15 -         EDUCATION  The patient has been educated in the following areas:   Dysphagia (Swallowing Impairment) Oral Care/Hydration Modified Diet Instruction.    SLP Recommendation and Plan  SLP Swallowing Diagnosis: moderate, pharyngeal dysfunction  SLP Diet Recommendation: mechanical soft with no mixed consistencies, honey thick liquids  Recommended Precautions and Strategies: upright posture during/after eating, small bites of food and sips of liquid, no straw, alternate between small bites of food and sips of liquid     Monitor for Signs of Aspiration: yes, notify SLP if any concerns, cough, gurgly voice, throat clearing  Recommended Diagnostics:  reassess via FEES  Swallow Criteria for Skilled Therapeutic Interventions Met: demonstrates skilled criteria  Anticipated Dischage Disposition: anticipate therapy at next level of care  Rehab Potential/Prognosis, Swallowing: adequate, monitor progress closely  Therapy Frequency (Swallow): 5 days per week  Predicted Duration Therapy Intervention (Days): until discharge       Plan of Care Reviewed With: patient  Plan of Care Review  Plan of Care Reviewed With: patient  Progress: no change             Time Calculation:         Time Calculation- SLP     Row Name 11/07/18 1059             Time Calculation- SLP    SLP Start Time 0930  -      SLP Received On 11/07/18  -        User Key  (r) = Recorded By, (t) = Taken By, (c) = Cosigned By    Initials Name Provider Type     Svitlana Cordero MS CCC-SLP Speech and Language Pathologist          Therapy Charges for Today     Code Description Service Date Service Provider Modifiers Qty    [id number] [facility] 11[facility]2018 Svitlana Cordero MS CCC-SLP GN 1               Svitlana Cordero MS CCC-SLP  11/7/2018

## 2018-11-07 NOTE — PROGRESS NOTES
"[facility] Progress Note  Shauna Valencia  [id number]  1955    10/31/2018    Reason for visit:  Hyponatremia    Gave tolvaptan yesterday  Sodium a bit better  Tolerating well  ROS:    Pulm:  No SOA  CV:  No CP    I&O:    Intake/Output Summary (Last 24 hours) at 11/07/18 1359  Last data filed at 11/07/18 0600   Gross per 24 hour   Intake              972 ml   Output              900 ml   Net               72 ml       PE:   Blood pressure 139/76, pulse 80, temperature 98.1 °F (36.7 °C), temperature source Oral, resp. rate 18, height 154.9 cm (61\"), weight 89.8 kg (198 lb), SpO2 96 %.    GENERAL: Awake and alert, in no acute distress.   HEENT: PER, No conjunctivitis  NECK: Supple, no JVD     HEART: RRR; No murmur, rubs, gallops.   LUNGS: Clear to auscultation bilaterally without wheezing, rales, rhonchi. Normal respiratory effort. Nonlabored. No dullness.  ABDOMEN: Soft, nontender, nondistended. Positive bowel sounds. No rebound or guarding. NO mass or HSM.  EXT:  No cyanosis, clubbing or edema. No cord.  : Genitalia generally unremarkable.  Without [place] catheter.  SKIN: Warm and dry without cutaneous eruptions on Inspection/palpation.    NEURO: Alert and oriented x 3, Motor 5/5 bilaterally    Medications:    Current Facility-Administered Medications:   •  acetaminophen (TYLENOL) tablet 650 mg, 650 mg, Oral, Q6H PRN, Maynor Ansari MD, 650 mg at 11/04/18 0618  •  aspirin EC tablet 81 mg, 81 mg, Oral, Daily, Mickey Warner MD, 81 mg at 11/07/18 0913  •  benzonatate (TESSALON) capsule 200 mg, 200 mg, Oral, TID PRN, Malia Solis MD, 200 mg at 11/06/18 2246  •  bisacodyl (DULCOLAX) suppository 10 mg, 10 mg, Rectal, Daily PRN, Malia Solis MD  •  carvedilol (COREG) tablet 6.25 mg, 6.25 mg, Oral, BID With Meals, Mickey Warner MD, 6.25 mg at 11/07/18 0913  •  cetirizine (zyrTEC) tablet 10 mg, 10 mg, Oral, Daily, Mickey Warner MD, 10 mg at 11/07/18 0913  •  dextrose (D50W) 25 g/ " 50mL Intravenous Solution 25 g, 25 g, Intravenous, Q15 Min PRN, Mickey Warner MD  •  dextrose (GLUTOSE) oral gel 15 g, 15 g, Oral, Q15 Min PRN, Mickey Warner MD  •  enoxaparin (LOVENOX) syringe 40 mg, 40 mg, Subcutaneous, Q24H, Mickey Warner MD, 40 mg at 11/07/18 0610  •  famotidine (PEPCID) tablet 20 mg, 20 mg, Oral, Daily, Mickey Warner MD, 20 mg at 11/07/18 0913  •  glucagon (human recombinant) (GLUCAGEN DIAGNOSTIC) injection 1 mg, 1 mg, Subcutaneous, PRN, Mickey Warner MD  •  HYDROcod Polst-CPM Polst ER (TUSSIONEX PENNKINETIC) 10-8 MG/5ML ER suspension 2.5 mL, 2.5 mL, Oral, Q12H PRN, Maynor Ansari MD, 2.5 mL at 11/07/18 1159  •  ibuprofen (ADVIL,MOTRIN) tablet 400 mg, 400 mg, Oral, Q4H PRN, Maynor Ansari MD, 400 mg at 11/04/18 0847  •  insulin lispro (humaLOG) injection 0-7 Units, 0-7 Units, Subcutaneous, 4x Daily With Meals & Nightly, Mickey Warner MD, 3 Units at 11/07/18 1200  •  lactated ringers infusion, 20 mL/hr, Intravenous, Continuous, Mathieu Eason MD, Last Rate: 20 mL/hr at 11/06/18 1102  •  levothyroxine (SYNTHROID, LEVOTHROID) tablet 75 mcg, 75 mcg, Oral, Daily, Mickey Warner MD, 75 mcg at 11/07/18 0610  •  Magnesium Sulfate 2 gram Bolus, followed by 8 gram infusion (total Mg dose 10 grams)- Mg less than or equal to 1mg/dL, 2 g, Intravenous, PRN **OR** Magnesium Sulfate 2 gram / 50mL Infusion (GIVE X 3 BAGS TO EQUAL 6GM TOTAL DOSE) - Mg 1.1 - 1.5 mg/dl, 2 g, Intravenous, PRN, Last Rate: 25 mL/hr at 11/06/18 1841, 2 g at 11/06/18 1841 **OR** Magnesium Sulfate 4 gram infusion- Mg 1.6-1.9 mg/dL, 4 g, Intravenous, PRN, Maynor Ansari MD  •  melatonin sublingual tablet 5 mg, 5 mg, Sublingual, Nightly PRN, Malia Solis MD, 5 mg at 11/01/18 2104  •  nicotine (NICODERM CQ) 21 MG/24HR patch 1 patch, 1 patch, Transdermal, Q24H, Maynor Ansari MD, 1 patch at 11/07/18 0913  •  ondansetron (ZOFRAN) injection 4 mg, 4  mg, Intravenous, Q6H PRN, Maynor Ansari MD, 4 mg at 11/06/18 1214  •  pantoprazole (PROTONIX) EC tablet 40 mg, 40 mg, Oral, Q AM, Maynor Ansari MD, 40 mg at 11/07/18 0610  •  sodium chloride 0.9 % infusion, 75 mL/hr, Intravenous, Continuous, Phillip Valdez MD, Last Rate: 75 mL/hr at 11/06/18 1345, 75 mL/hr at 11/06/18 1345  •  tolvaptan (SAMSCA) tablet 7.5 mg, 7.5 mg, Oral, Once, Phillip Valdez MD    Meds reviewed and doses adjusted for renal function    Labs:    Results from last 7 days  Lab Units 11/07/18  1200 11/07/18  0615 11/07/18  0124 11/06/18  2211 11/06/18  1922 11/06/18  1635 11/06/18  1415 11/06/18  0514  11/05/18  0545  11/04/18  0243  11/03/18  0836  11/02/18  1130  10/31/18  1710   WBC 10*3/mm3  --  11.92*  --   --   --   --   --   --   --   --   --   --   --   --   --   --   --  7.54   HEMOGLOBIN g/dL  --  10.8*  --   --   --   --   --   --   --   --   --   --   --   --   --   --   --  11.7   HEMATOCRIT %  --  31.0*  --   --   --   --   --   --   --   --   --   --   --   --   --   --   --  33.8*   PLATELETS 10*3/mm3  --  379  --   --   --   --   --   --   --   --   --   --   --   --   --   --   --  422   SODIUM mmol/L 126* 125* 122* 120* 118* 119* 119* 123*  < > 127*  < > 130*  < > 129*  130*  < > 120*  < >  --    POTASSIUM mmol/L  --  4.5  --  4.3 4.5  --   --  4.3  --  4.3  --  4.2  --  4.0  < > 3.9  < >  --    CHLORIDE mmol/L  --  93*  --  89* 89*  --   --  88*  --  92*  --  98*  --  97*  < > 89*  < >  --    CO2 mmol/L  --  22.0  --  21.0 22.0  --   --  28.0  --  29.0  --  26.8  --  28.0  < > 26.0  < >  --    BUN mg/dL  --  10  --  10 9  --   --  12  --  11  --  14  --  15  < > 11  < >  --    CREATININE mg/dL  --  0.65  --  0.70 0.75  --   --  0.57*  --  0.61  --  0.60  --  0.61  < > 0.73  < >  --    GLUCOSE mg/dL  --  191*  --  210* 248*  --   --  92  --  98  --  93  --  113*  < > 178*  < >  --    CALCIUM mg/dL  --  8.8  --  8.7 8.9  --   --  8.8  --  8.9  --  8.7  --  8.6*  < > 8.9  <  >  --    PHOSPHORUS mg/dL  --   --   --   --   --   --   --  4.1  --   --   --   --   --   --   --   --   --   --    URIC ACID mg/dL  --   --   --   --   --   --   --   --   --   --   --   --   --   --   --  2.5*  --   --    < > = values in this interval not displayed.          Radiology:  Imaging Results (last 72 hours)     Procedure Component Value Units Date/Time    Fiberoptic Endo (fees) [[id number]] Resulted:  11/01/18 1555     Updated:  11/01/18 1555    Narrative:       This procedure was auto-finalized with no dictation required.    CT Chest Without Contrast [[id number]] Collected:  10/31/18 1701     Updated:  10/31/18 1930    Narrative:       EXAM:    CT Chest Without Intravenous Contrast     EXAM DATE/TIME:    10/31/2018 5:01 PM     CLINICAL HISTORY:    63 years old, female; Hypo-osmolality and hyponatremia; Dyspnea, unspecified;   Signs and symptoms; Cough and shortness of breath; Prior surgery; Surgery date:   6+ months; Surgery type: 5 heart stents; Additional info: SOA, hyponatremia     TECHNIQUE:    Axial computed tomography images of the chest without intravenous contrast.    All CT scans at this facility use at least one of these dose optimization   techniques: automated exposure control; mA and/or kV adjustment per patient   size (includes targeted exams where dose is matched to clinical indication); or   iterative reconstruction.    Coronal reformatted images were created and reviewed.     COMPARISON:    No relevant prior studies available.     FINDINGS:     LUNGS/PLEURA: Suboptimally evaluated located and mildly spiculated RIGHT   hilar mass measuring at least 4.4 cm in the long axis. Nodular peribronchial   LEFT upper lobe and subpleural inferior lingula airspace opacities measuring up   to 1.9 cm and centrilobular nodules in the LEFT upper lobe. Calcific granuloma   in the RIGHT normal. No pleural effusion or pneumothorax. Complete obstruction   of the LEFT apical bronchus, no other central  obstructive endobronchial mass or   abnormality.     MEDIASTINUM: Heart size is normal, with trace pericardial   thickening/effusion. Severe coronary arterial calcification/coronary stents.   Atherosclerotic disease of the aorta without aortic aneurysm. Bulky prevascular   lymph node adjacent to the aortic arch measuring 3.0 cm and LEFT hilar mass   which may represent lymph nodes or primary lung malignancy.     OTHER STRUCTURES: Chronic degenerative changes in the visualized spine.   Nonspecific bilateral perinephric fat stranding and renal cortical scarring.   Small LEFT adrenal nodule measuring 1.3 cm.       Impression:       1. Findings concerning for LEFT HILAR MALIGNANT SOFT TISSUE MASS with   postobstructive focal pneumonia/bronchiolitis in the LEFT upper lobe.   2. Likely METASTATIC mediastinal and possibly LEFT hilar lymph nodes.   3. Coronary arterial calcification suggestive of CAD.   4. Other nonemergent/incidental findings as described.     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

## 2018-11-07 NOTE — PLAN OF CARE
Problem: Patient Care Overview  Goal: Plan of Care Review   11/07/18 1057   Coping/Psychosocial   Plan of Care Reviewed With patient   Plan of Care Review   Progress no change     SLP re-evaluation completed. Will continue to address dysphagia. Please see note for further details and recommendations.    Summary:  FEES re-eval completed this date. Pt's results consistent w/ FEES performed on 11/1/2018. No improvement noted. Small amounts of aspiration during the swallow w/ thins and NT 2' delay, dec'd elevation and dec'd vestibular closure. All aspiration was silent, but pt exhibited habital throat clear throughout FEES, unrelated to swallowing. Swallow initiation still delayed to pys-- supect this is pt's baseline. Suspect aspiration related to baseline swallow initiation delay + laryngeal edema/erythema impacting closure enough to permit small amounts of aspiration + inc'd weakness w/ this admission (? 2' hyponatremia) worsening pharyngeal strength/function. Given summation of deficits, significant improvement in the next few days is unlikely. However, SLP will continue to provide dys tx (continue prior goals), education, and re-assessments if indicated. Con't w/ previous recommendations: L4, HT, no straws, small bites and sips, aleternate bites and sips, meds whole w/ puree.

## 2018-11-08 ENCOUNTER — APPOINTMENT (OUTPATIENT)
Dept: SPEECH THERAPY | Facility: HOSPITAL | Age: 63
End: 2018-11-08
Attending: HOSPITALIST

## 2018-11-08 ENCOUNTER — APPOINTMENT (OUTPATIENT)
Dept: CT IMAGING | Facility: HOSPITAL | Age: 63
End: 2018-11-08

## 2018-11-08 LAB
ANION GAP SERPL CALCULATED.3IONS-SCNC: 6 MMOL/L (ref 3–11)
BACTERIA SPEC RESP CULT: NORMAL
BUN BLD-MCNC: 15 MG/DL (ref 9–23)
BUN/CREAT SERPL: 23.1 (ref 7–25)
CALCIUM SPEC-SCNC: 8.8 MG/DL (ref 8.7–10.4)
CHLORIDE SERPL-SCNC: 104 MMOL/L (ref 99–109)
CO2 SERPL-SCNC: 28 MMOL/L (ref 20–31)
CREAT BLD-MCNC: 0.65 MG/DL (ref 0.6–1.3)
DEPRECATED RDW RBC AUTO: 41.2 FL (ref 37–54)
ERYTHROCYTE [DISTWIDTH] IN BLOOD BY AUTOMATED COUNT: 13.4 % (ref 11.3–14.5)
GFR SERPL CREATININE-BSD FRML MDRD: 93 ML/MIN/1.73
GLUCOSE BLD-MCNC: 117 MG/DL (ref 70–100)
GLUCOSE BLDC GLUCOMTR-MCNC: 113 MG/DL (ref 70–130)
GLUCOSE BLDC GLUCOMTR-MCNC: 134 MG/DL (ref 70–130)
GLUCOSE BLDC GLUCOMTR-MCNC: 169 MG/DL (ref 70–130)
GLUCOSE BLDC GLUCOMTR-MCNC: 184 MG/DL (ref 70–130)
GRAM STN SPEC: NORMAL
HCT VFR BLD AUTO: 29.4 % (ref 34.5–44)
HGB BLD-MCNC: 9.8 G/DL (ref 11.5–15.5)
MCH RBC QN AUTO: 28.5 PG (ref 27–31)
MCHC RBC AUTO-ENTMCNC: 33.3 G/DL (ref 32–36)
MCV RBC AUTO: 85.5 FL (ref 80–99)
PLATELET # BLD AUTO: 374 10*3/MM3 (ref 150–450)
PMV BLD AUTO: 8.4 FL (ref 6–12)
POTASSIUM BLD-SCNC: 4.8 MMOL/L (ref 3.5–5.5)
RBC # BLD AUTO: 3.44 10*6/MM3 (ref 3.89–5.14)
SODIUM BLD-SCNC: 133 MMOL/L (ref 132–146)
SODIUM BLD-SCNC: 135 MMOL/L (ref 132–146)
SODIUM BLD-SCNC: 136 MMOL/L (ref 132–146)
SODIUM BLD-SCNC: 137 MMOL/L (ref 132–146)
SODIUM BLD-SCNC: 137 MMOL/L (ref 132–146)
SODIUM BLD-SCNC: 138 MMOL/L (ref 132–146)
WBC NRBC COR # BLD: 10.17 10*3/MM3 (ref 3.5–10.8)

## 2018-11-08 PROCEDURE — 0 IOPAMIDOL PER 1 ML: Performed by: INTERNAL MEDICINE

## 2018-11-08 PROCEDURE — 99233 SBSQ HOSP IP/OBS HIGH 50: CPT | Performed by: INTERNAL MEDICINE

## 2018-11-08 PROCEDURE — 02HV33Z INSERTION OF INFUSION DEVICE INTO SUPERIOR VENA CAVA, PERCUTANEOUS APPROACH: ICD-10-PCS | Performed by: INTERNAL MEDICINE

## 2018-11-08 PROCEDURE — 71260 CT THORAX DX C+: CPT

## 2018-11-08 PROCEDURE — C1751 CATH, INF, PER/CENT/MIDLINE: HCPCS

## 2018-11-08 PROCEDURE — 85027 COMPLETE CBC AUTOMATED: CPT | Performed by: INTERNAL MEDICINE

## 2018-11-08 PROCEDURE — 25010000002 DESMOPRESSIN PER 1 MCG: Performed by: INTERNAL MEDICINE

## 2018-11-08 PROCEDURE — 80048 BASIC METABOLIC PNL TOTAL CA: CPT | Performed by: INTERNAL MEDICINE

## 2018-11-08 PROCEDURE — 84295 ASSAY OF SERUM SODIUM: CPT | Performed by: INTERNAL MEDICINE

## 2018-11-08 PROCEDURE — 92526 ORAL FUNCTION THERAPY: CPT

## 2018-11-08 PROCEDURE — 25010000002 ENOXAPARIN PER 10 MG: Performed by: HOSPITALIST

## 2018-11-08 PROCEDURE — C1894 INTRO/SHEATH, NON-LASER: HCPCS

## 2018-11-08 PROCEDURE — 82962 GLUCOSE BLOOD TEST: CPT

## 2018-11-08 RX ORDER — DESMOPRESSIN ACETATE 4 UG/ML
1 INJECTION, SOLUTION INTRAVENOUS; SUBCUTANEOUS ONCE
Status: COMPLETED | OUTPATIENT
Start: 2018-11-08 | End: 2018-11-08

## 2018-11-08 RX ORDER — SODIUM CHLORIDE 0.9 % (FLUSH) 0.9 %
10 SYRINGE (ML) INJECTION AS NEEDED
Status: DISCONTINUED | OUTPATIENT
Start: 2018-11-08 | End: 2018-11-17 | Stop reason: HOSPADM

## 2018-11-08 RX ORDER — BUSPIRONE HYDROCHLORIDE 5 MG/1
5 TABLET ORAL 3 TIMES DAILY
Status: DISCONTINUED | OUTPATIENT
Start: 2018-11-08 | End: 2018-11-09

## 2018-11-08 RX ADMIN — CARVEDILOL 6.25 MG: 6.25 TABLET, FILM COATED ORAL at 08:45

## 2018-11-08 RX ADMIN — NICOTINE 1 PATCH: 21 PATCH, EXTENDED RELEASE TRANSDERMAL at 08:47

## 2018-11-08 RX ADMIN — INSULIN LISPRO 2 UNITS: 100 INJECTION, SOLUTION INTRAVENOUS; SUBCUTANEOUS at 11:57

## 2018-11-08 RX ADMIN — ASPIRIN 81 MG: 81 TABLET, COATED ORAL at 08:45

## 2018-11-08 RX ADMIN — CETIRIZINE HYDROCHLORIDE 10 MG: 10 TABLET, FILM COATED ORAL at 08:45

## 2018-11-08 RX ADMIN — IOPAMIDOL 100 ML: 755 INJECTION, SOLUTION INTRAVENOUS at 15:00

## 2018-11-08 RX ADMIN — LEVOTHYROXINE SODIUM 75 MCG: 75 TABLET ORAL at 05:24

## 2018-11-08 RX ADMIN — SODIUM CHLORIDE 75 ML/HR: 9 INJECTION, SOLUTION INTRAVENOUS at 05:27

## 2018-11-08 RX ADMIN — BISACODYL 10 MG: 10 SUPPOSITORY RECTAL at 17:59

## 2018-11-08 RX ADMIN — BUSPIRONE HYDROCHLORIDE 5 MG: 5 TABLET ORAL at 15:30

## 2018-11-08 RX ADMIN — CARVEDILOL 6.25 MG: 6.25 TABLET, FILM COATED ORAL at 17:58

## 2018-11-08 RX ADMIN — BUSPIRONE HYDROCHLORIDE 5 MG: 5 TABLET ORAL at 11:57

## 2018-11-08 RX ADMIN — BUSPIRONE HYDROCHLORIDE 5 MG: 5 TABLET ORAL at 21:24

## 2018-11-08 RX ADMIN — Medication 5 MG: at 23:40

## 2018-11-08 RX ADMIN — INSULIN LISPRO 2 UNITS: 100 INJECTION, SOLUTION INTRAVENOUS; SUBCUTANEOUS at 21:24

## 2018-11-08 RX ADMIN — FAMOTIDINE 20 MG: 20 TABLET ORAL at 08:45

## 2018-11-08 RX ADMIN — DESMOPRESSIN ACETATE 1 MCG: 4 SOLUTION INTRAVENOUS at 12:52

## 2018-11-08 RX ADMIN — PANTOPRAZOLE SODIUM 40 MG: 40 TABLET, DELAYED RELEASE ORAL at 05:24

## 2018-11-08 RX ADMIN — ENOXAPARIN SODIUM 40 MG: 100 INJECTION SUBCUTANEOUS at 05:24

## 2018-11-08 RX ADMIN — HYDROCODONE POLISTIREX AND CHLORPHENIRAMINE POLISTIREX 2.5 ML: 10; 8 SUSPENSION, EXTENDED RELEASE ORAL at 21:34

## 2018-11-08 NOTE — PROGRESS NOTES
[facility] Medicine Services  PROGRESS NOTE    Patient Name: Shauna Valencia  : 1955  MRN: [id number]    Date of Admission: 10/31/2018  Length of Stay: 8  Primary Care Physician: Rox Singleton MD    Subjective   Subjective     CC:  Weakness    HPI:    Pt doing well this am, denies any complaints, but very anxious.  Family and patient have a lot of questions re: pathology and Pulm plans. Pt also c/o pain at IV site.     Review of Systems   Gen- No fevers, chills  CV- No chest pain, palpitations  Resp- No cough, dyspnea  GI- No N/V/D, abd pain    Otherwise ROS is negative except as mentioned in the HPI.    Objective   Objective     Vital Signs:   Temp:  [97.3 °F (36.3 °C)-98.1 °F (36.7 °C)] 97.3 °F (36.3 °C)  Heart Rate:  [74-82] 82  Resp:  [18] 18  BP: (126-142)/(59-80) 142/80        Physical Exam:  Constitutional: No acute distress, awake, alert  HENT: NCAT, mucous membranes moist  Respiratory: rhonchi right side otherwise clear with normal respiratory effort  Cardiovascular: RRR, no murmurs, rubs, or gallops, palpable pedal pulses bilaterally  Gastrointestinal: Positive bowel sounds, soft, nontender, nondistended  Musculoskeletal: No bilateral ankle edema  Psychiatric: Appropriate affect, cooperative  Neurologic: Oriented x 3, strength symmetric in all extremities, Cranial Nerves grossly intact to confrontation, speech clear  Skin: No rashes    Results Reviewed:  I have personally reviewed current lab, radiology, and data and agree.      Results from last 7 days  Lab Units 18  0720 18  0615 18  0514   WBC 10*3/mm3 10.17 11.92*  --    HEMOGLOBIN g/dL 9.8* 10.8*  --    HEMATOCRIT % 29.4* 31.0*  --    PLATELETS 10*3/mm3 374 379  --    INR   --   --  0.96       Results from last 7 days  Lab Units 18  0852 18  0720 18  0323  18  0615  18  2211   SODIUM mmol/L 138 138 135  < > 125*  < > 120*   POTASSIUM mmol/L  --  4.8  --   --  4.5  --   4.3   CHLORIDE mmol/L  --  104  --   --  93*  --  89*   CO2 mmol/L  --  28.0  --   --  22.0  --  21.0   BUN mg/dL  --  15  --   --  10  --  10   CREATININE mg/dL  --  0.65  --   --  0.65  --  0.70   GLUCOSE mg/dL  --  117*  --   --  191*  --  210*   CALCIUM mg/dL  --  8.8  --   --  8.8  --  8.7   < > = values in this interval not displayed.  Estimated Creatinine Clearance: 92.7 mL/min (by C-G formula based on SCr of 0.65 mg/dL).  BNP   Date Value Ref Range Status   11/06/2018 77.0 0.0 - 100.0 pg/mL Final     Comment:     Results may be falsely decreased if patient taking Biotin.       Microbiology Results Abnormal     Procedure Component Value - Date/Time    Respiratory Culture - Wash, Lung, Left Upper Lobe [[id number]] Collected:  11/06/18 1225    Lab Status:  Final result Specimen:  Wash from Lung, Left Upper Lobe Updated:  11/08/18 0732     Respiratory Culture Scant growth (1+) Normal Respiratory Shavon     Gram Stain Result No WBCs or organisms seen    AFB Culture - Wash, Lung, Left Upper Lobe [[id number]] Collected:  11/06/18 1225    Lab Status:  Preliminary result Specimen:  Wash from Lung, Left Upper Lobe Updated:  11/07/18 1216     AFB Stain No acid fast bacilli seen on concentrated smear          Imaging Results (last 24 hours)     ** No results found for the last 24 hours. **             I have reviewed the medications.      aspirin 81 mg Oral Daily   carvedilol 6.25 mg Oral BID With Meals   cetirizine 10 mg Oral Daily   desmopressin 1 mcg Subcutaneous Once   enoxaparin 40 mg Subcutaneous Q24H   famotidine 20 mg Oral Daily   insulin lispro 0-7 Units Subcutaneous 4x Daily With Meals & Nightly   levothyroxine 75 mcg Oral Daily   nicotine 1 patch Transdermal Q24H   pantoprazole 40 mg Oral Q AM         Assessment/Plan   Assessment / Plan     Active Hospital Problems    Diagnosis   • Mass of upper lobe of left lung   • Mediastinal lymphadenopathy   • Acute hyponatremia   • Hyperlipidemia   • Type 2 diabetes  mellitus (CMS/HCC)   • Tobacco abuse   • Obesity (BMI 30-39.9)          Brief Hospital Course to date:  Shauna Valencia is a 63 y.o. female here with hyponatremia, likely SIADH, and found to have a lung mass      Assessment & Plan:  L hilar mass  --s/p bronchoscopy, path did not show any malignant cells.  ? Repeat EBUS per Pulm.    Hx of RCA stent, chart reviewed, 7/2017  --held Brilinta, aspirin only    Hyponatremia  --probable SIADH  --checked AM cortisol  --TSH normal  --better with Tolvaptan, NAL following. Continue to monitor closely     Dysphagia  --SLP following, recommended honey thick diet- pt pretty resistant to modified diets in the past.  May need to consider comfort diet.    R LE pain  --checked duplex, superficial clot only  --has hx of sciatica with R leg pain    Tobacco abuse    Obesity    HL  --on Repatha once every two weeks at home, pt concerned that she missed a dose while here.  Have tried to reassure her that this is okay. We do not have this on formulary, so will hold for now.     Anxiety  --will start Buspar    DVT Prophylaxis:  Lovenox    CODE STATUS:   Code Status and Medical Interventions:   Ordered at: 10/31/18 1909     Level Of Support Discussed With:    Patient     Code Status:    CPR     Medical Interventions (Level of Support Prior to Arrest):    Full       Disposition: I expect the patient to be discharged TBD.      Electronically signed by Malia Solis MD, 11/08/18, 11:29 AM.

## 2018-11-08 NOTE — PROGRESS NOTES
"[facility] Progress Note  Shauna Valencia  [id number]  1955    10/31/2018    Reason for visit:  Hyponatremia    Gave tolvaptan yesterday  Sodium risen higher than desired  ROS:    Pulm:  No SOA  CV:  No CP    I&O:    Intake/Output Summary (Last 24 hours) at 11/08/18 1124  Last data filed at 11/08/18 0900   Gross per 24 hour   Intake                0 ml   Output             4700 ml   Net            -4700 ml       PE:   Blood pressure 142/80, pulse 82, temperature 97.3 °F (36.3 °C), temperature source Oral, resp. rate 18, height 154.9 cm (61\"), weight 89.8 kg (198 lb), SpO2 96 %.    GENERAL: Awake and alert, in no acute distress.   HEENT: PER, No conjunctivitis  NECK: Supple, no JVD     HEART: RRR; No murmur, rubs, gallops.   LUNGS: Clear to auscultation bilaterally without wheezing, rales, rhonchi. Normal respiratory effort. Nonlabored. No dullness.  ABDOMEN: Soft, nontender, nondistended. Positive bowel sounds. No rebound or guarding. NO mass or HSM.  EXT:  No cyanosis, clubbing or edema. No cord.  : Genitalia generally unremarkable.  Without [place] catheter.  SKIN: Warm and dry without cutaneous eruptions on Inspection/palpation.    NEURO: Alert and oriented x 3, Motor 5/5 bilaterally    Medications:    Current Facility-Administered Medications:   •  acetaminophen (TYLENOL) tablet 650 mg, 650 mg, Oral, Q6H PRN, Maynor Ansari MD, 650 mg at 11/04/18 0618  •  aspirin EC tablet 81 mg, 81 mg, Oral, Daily, Mickey Warner MD, 81 mg at 11/08/18 0845  •  benzonatate (TESSALON) capsule 200 mg, 200 mg, Oral, TID PRN, Malia Solis MD, 200 mg at 11/06/18 2246  •  bisacodyl (DULCOLAX) suppository 10 mg, 10 mg, Rectal, Daily PRN, Malia Solis MD  •  carvedilol (COREG) tablet 6.25 mg, 6.25 mg, Oral, BID With Meals, Mickey Warner MD, 6.25 mg at 11/08/18 0845  •  cetirizine (zyrTEC) tablet 10 mg, 10 mg, Oral, Daily, Mickey Warner MD, 10 mg at 11/08/18 0845  •  desmopressin (DDAVP) " injection 1 mcg, 1 mcg, Subcutaneous, Once, Phillip Valdez MD  •  dextrose (D50W) 25 g/ 50mL Intravenous Solution 25 g, 25 g, Intravenous, Q15 Min PRN, Mickey Warner MD  •  dextrose (GLUTOSE) oral gel 15 g, 15 g, Oral, Q15 Min PRN, Mickey Warner MD  •  enoxaparin (LOVENOX) syringe 40 mg, 40 mg, Subcutaneous, Q24H, Mickey Warner MD, 40 mg at 11/08/18 0524  •  famotidine (PEPCID) tablet 20 mg, 20 mg, Oral, Daily, Mickey Warner MD, 20 mg at 11/08/18 0845  •  glucagon (human recombinant) (GLUCAGEN DIAGNOSTIC) injection 1 mg, 1 mg, Subcutaneous, PRN, Mickey Warner MD  •  HYDROcod Polst-CPM Polst ER (TUSSIONEX PENNKINETIC) 10-8 MG/5ML ER suspension 2.5 mL, 2.5 mL, Oral, Q12H PRN, Maynor Ansari MD, 2.5 mL at 11/07/18 1159  •  ibuprofen (ADVIL,MOTRIN) tablet 400 mg, 400 mg, Oral, Q4H PRN, Maynor Ansari MD, 400 mg at 11/04/18 0847  •  insulin lispro (humaLOG) injection 0-7 Units, 0-7 Units, Subcutaneous, 4x Daily With Meals & Nightly, Mickey Warner MD, 2 Units at 11/07/18 2131  •  levothyroxine (SYNTHROID, LEVOTHROID) tablet 75 mcg, 75 mcg, Oral, Daily, Mickey Warner MD, 75 mcg at 11/08/18 0524  •  Magnesium Sulfate 2 gram Bolus, followed by 8 gram infusion (total Mg dose 10 grams)- Mg less than or equal to 1mg/dL, 2 g, Intravenous, PRN **OR** Magnesium Sulfate 2 gram / 50mL Infusion (GIVE X 3 BAGS TO EQUAL 6GM TOTAL DOSE) - Mg 1.1 - 1.5 mg/dl, 2 g, Intravenous, PRN, Last Rate: 25 mL/hr at 11/06/18 1841, 2 g at 11/06/18 1841 **OR** Magnesium Sulfate 4 gram infusion- Mg 1.6-1.9 mg/dL, 4 g, Intravenous, PRN, Maynor Ansari MD  •  melatonin sublingual tablet 5 mg, 5 mg, Sublingual, Nightly PRN, Malia Solis MD, 5 mg at 11/07/18 2308  •  nicotine (NICODERM CQ) 21 MG/24HR patch 1 patch, 1 patch, Transdermal, Q24H, Maynor Ansari MD, 1 patch at 11/08/18 0847  •  ondansetron (ZOFRAN) injection 4 mg, 4 mg, Intravenous, Q6H PRN, Maynor Ansari  MD TIMMY, 4 mg at 11/06/18 1214  •  pantoprazole (PROTONIX) EC tablet 40 mg, 40 mg, Oral, Q AM, Maynor Ansari MD, 40 mg at 11/08/18 0524    Meds reviewed and doses adjusted for renal function    Labs:    Results from last 7 days  Lab Units 11/08/18  0852 11/08/18  0720 11/08/18  0323 11/08/18  0000 11/07/18  2103 11/07/18  1805 11/07/18  1453  11/07/18  0615  11/06/18  2211 11/06/18  1922  11/06/18  0514  11/05/18  0545  11/04/18  0243  11/02/18  1130   WBC 10*3/mm3  --  10.17  --   --   --   --   --   --  11.92*  --   --   --   --   --   --   --   --   --   --   --    HEMOGLOBIN g/dL  --  9.8*  --   --   --   --   --   --  10.8*  --   --   --   --   --   --   --   --   --   --   --    HEMATOCRIT %  --  29.4*  --   --   --   --   --   --  31.0*  --   --   --   --   --   --   --   --   --   --   --    PLATELETS 10*3/mm3  --  374  --   --   --   --   --   --  379  --   --   --   --   --   --   --   --   --   --   --    SODIUM mmol/L 138 138 135 133 130* 127* 122*  < > 125*  < > 120* 118*  < > 123*  < > 127*  < > 130*  < > 120*   POTASSIUM mmol/L  --  4.8  --   --   --   --   --   --  4.5  --  4.3 4.5  --  4.3  --  4.3  --  4.2  < > 3.9   CHLORIDE mmol/L  --  104  --   --   --   --   --   --  93*  --  89* 89*  --  88*  --  92*  --  98*  < > 89*   CO2 mmol/L  --  28.0  --   --   --   --   --   --  22.0  --  21.0 22.0  --  28.0  --  29.0  --  26.8  < > 26.0   BUN mg/dL  --  15  --   --   --   --   --   --  10  --  10 9  --  12  --  11  --  14  < > 11   CREATININE mg/dL  --  0.65  --   --   --   --   --   --  0.65  --  0.70 0.75  --  0.57*  --  0.61  --  0.60  < > 0.73   GLUCOSE mg/dL  --  117*  --   --   --   --   --   --  191*  --  210* 248*  --  92  --  98  --  93  < > 178*   CALCIUM mg/dL  --  8.8  --   --   --   --   --   --  8.8  --  8.7 8.9  --  8.8  --  8.9  --  8.7  < > 8.9   PHOSPHORUS mg/dL  --   --   --   --   --   --   --   --   --   --   --   --   --  4.1  --   --   --   --   --   --    URIC ACID mg/dL  --    --   --   --   --   --   --   --   --   --   --   --   --   --   --   --   --   --   --  2.5*   < > = values in this interval not displayed.          Radiology:  Imaging Results (last 72 hours)     Procedure Component Value Units Date/Time    Fiberoptic Endo (fees) [[id number]] Resulted:  11/01/18 1555     Updated:  11/01/18 1555    Narrative:       This procedure was auto-finalized with no dictation required.    CT Chest Without Contrast [[id number]] Collected:  10/31/18 1701     Updated:  10/31/18 1930    Narrative:       EXAM:    CT Chest Without Intravenous Contrast     EXAM DATE/TIME:    10/31/2018 5:01 PM     CLINICAL HISTORY:    63 years old, female; Hypo-osmolality and hyponatremia; Dyspnea, unspecified;   Signs and symptoms; Cough and shortness of breath; Prior surgery; Surgery date:   6+ months; Surgery type: 5 heart stents; Additional info: SOA, hyponatremia     TECHNIQUE:    Axial computed tomography images of the chest without intravenous contrast.    All CT scans at this facility use at least one of these dose optimization   techniques: automated exposure control; mA and/or kV adjustment per patient   size (includes targeted exams where dose is matched to clinical indication); or   iterative reconstruction.    Coronal reformatted images were created and reviewed.     COMPARISON:    No relevant prior studies available.     FINDINGS:     LUNGS/PLEURA: Suboptimally evaluated located and mildly spiculated RIGHT   hilar mass measuring at least 4.4 cm in the long axis. Nodular peribronchial   LEFT upper lobe and subpleural inferior lingula airspace opacities measuring up   to 1.9 cm and centrilobular nodules in the LEFT upper lobe. Calcific granuloma   in the RIGHT normal. No pleural effusion or pneumothorax. Complete obstruction   of the LEFT apical bronchus, no other central obstructive endobronchial mass or   abnormality.     MEDIASTINUM: Heart size is normal, with trace pericardial   thickening/effusion.  Severe coronary arterial calcification/coronary stents.   Atherosclerotic disease of the aorta without aortic aneurysm. Bulky prevascular   lymph node adjacent to the aortic arch measuring 3.0 cm and LEFT hilar mass   which may represent lymph nodes or primary lung malignancy.     OTHER STRUCTURES: Chronic degenerative changes in the visualized spine.   Nonspecific bilateral perinephric fat stranding and renal cortical scarring.   Small LEFT adrenal nodule measuring 1.3 cm.       Impression:       1. Findings concerning for LEFT HILAR MALIGNANT SOFT TISSUE MASS with   postobstructive focal pneumonia/bronchiolitis in the LEFT upper lobe.   2. Likely METASTATIC mediastinal and possibly LEFT hilar lymph nodes.   3. Coronary arterial calcification suggestive of CAD.   4. Other nonemergent/incidental findings as described.     THIS DOCUMENT HAS BEEN ELECTRONICALLY SIGNED BY BATOOL SAMANO MD          Renal Imaging:        IMPRESSION:  Hyponatremia; Likely SIADH sec to lung mass but very low chloride level- recent use of diuretic  She is assymptomatic at this time  We gave normal saline and her sodium got corrected faster than I desired  Given one dose of ddavp to lower it for safety reasons      RECOMMENDATIONS:  Giving ddavp, encouraged to drinjk more water to counter roise in serun sodium- received ivf last night  Continue to monitor sodium closely and liberalize po fluids  dw staff RN    Phillip Valdez MD  11/8/2018  11:24 AM

## 2018-11-08 NOTE — PROGRESS NOTES
"INPATIENT PULMONARY SERVICE  PROGRESS NOTE     Hospital:  LOS: 8 days      S   Ms. Shauna Valencia, 63 y.o. female is followed for:    JESSICA mass    Interval History:  Still some minimal hemoptysis this morning.  Remains anxious, she just had a \"difference\" with the Speech Therapist, this morning.     The patient's relevant past medical, surgical and social history were reviewed and updated in Epic as appropriate.      ROS:   Constitutional: Negative for fever.   Respiratory: Negative for dyspnea.   Cardiovascular: Negative for chest pain.   Gastrointestinal: Negative for  nausea, vomiting and diarrhea.        O     Vitals:  Temp: 97.3 °F (36.3 °C) (11/08/18 0714) Temp  Min: 97.3 °F (36.3 °C)  Max: 98.1 °F (36.7 °C)   BP: 142/80 (11/08/18 0714) BP  Min: 126/79  Max: 142/80   Pulse: 82 (11/08/18 0714) Pulse  Min: 74  Max: 82   Resp: 18 (11/08/18 0714) Resp  Min: 18  Max: 18   SpO2: 96 % (11/06/18 1315) No Data Recorded   Device: room air (11/08/18 0810)    Flow Rate: 2 (11/06/18 1315) No Data Recorded     Physical Examination  Telemetry:  Rhythm: normal sinus rhythm (11/08/18 0810)         Constitutional:  No acute distress.   Cardiovascular: Normal rate, regular and rhythm. Normal heart sounds.  No murmurs, gallop or rub.   Respiratory: No respiratory distress. Normal respiratory effort.  Normal breath sounds  Clear to auscultation and percussion.    Abdominal:  Soft. No masses. Non-tender. No distension. No HSM.   Extremities: No digital cyanosis. No clubbing.  Edema.   Neurological:   Alert and Oriented to person, place, and time.  Best Eye Response: 4-->(E4) spontaneous (11/08/18 1000)  Best Motor Response: 6-->(M6) obeys commands (11/08/18 1000)  Best Verbal Response: 5-->(V5) oriented (11/08/18 1000)  Mar[place]ro Coma Scale Score: 15 (11/08/18 1000)   Lines/Drains/Airways: Peripheral IV(s)       Hematology:    Results from last 7 days  Lab Units 11/08/18 0720 11/07/18  0615   WBC 10*3/mm3 10.17 11.92*   HEMOGLOBIN " g/dL 9.8* 10.8*   MCV fL 85.5 82.2   PLATELETS 10*3/mm3 374 379     Chemistry:  Estimated Creatinine Clearance: 92.7 mL/min (by C-G formula based on SCr of 0.65 mg/dL).      Results from last 7 days  Lab Units 11/08/18  0852 11/08/18  0720 11/08/18  0323  11/07/18  0615  11/06/18  2211   SODIUM mmol/L 138 138 135  < > 125*  < > 120*   POTASSIUM mmol/L  --  4.8  --   --  4.5  --  4.3   CHLORIDE mmol/L  --  104  --   --  93*  --  89*   CO2 mmol/L  --  28.0  --   --  22.0  --  21.0   ANION GAP mmol/L  --  6.0  --   --  10.0  --  10.0   GLUCOSE mg/dL  --  117*  --   --  191*  --  210*   CALCIUM mg/dL  --  8.8  --   --  8.8  --  8.7   < > = values in this interval not displayed.    Results from last 7 days  Lab Units 11/08/18  0720 11/07/18  0615 11/06/18  2211   BUN mg/dL 15 10 10   CREATININE mg/dL 0.65 0.65 0.70     Images:  Xr Chest 1 View    Result Date: 11/6/2018  No postprocedural pneumothorax. Mildly increased opacifications adjacent to the left suprahilar mass lesion may represent post bronchoscopy debris material or postobstructive changes.  D:  11/06/2018 E:  11/06/2018  This report was finalized on 11/6/2018 1:41 PM by Dr. Johnathon Cameron.        I reviewed the patient's new laboratory and imaging results.  I independently reviewed the patient's new images.    Medications: Reviewed.    Assessment/Plan   A / P     63 y.o.female, admitted on 10/31/2018 with:     10/31/2018      Impressions:  1. JESSICA mass  1. Bronch with EBUS 11/06/18   Lab Results   Component Value Date    FINALDX  11/06/2018      1. BRONCH BRUSH, LEFT UPPER LOBE:  Negative for malignant cells.   2. BRONCH WASH, LEFT UPPER LOBE:  Negative for malignant cells.    This diagnosis was rendered by Justo Correa M.D. at [facility] . See scanned report for full consultative opinion.          TBNA and EBBx: also non-diagnostic.  2. Hyponatremia, now on tolvaptan } Renal  3. Dyslipidemia  4. CAD s/p stents, on Brillinta at  home.  5. Tobacco use  6. Obesity II. Body mass index is 37.41 kg/m².     Diet: Diet Dysphagia; IV - Mechanical Soft No Mixed Consistencies; Honey Thick  NPO Diet       Active Supplement Orders      DIET MESSAGE Please being wheatley, eggs now thanks      Dietary Nutrition Supplements Boost Pudding   Advance Directives: Code Status and Medical Interventions:   Ordered at: 10/31/18 5953     Level Of Support Discussed With:    Patient     Code Status:    CPR     Medical Interventions (Level of Support Prior to Arrest):    Full        Assessment / Plan:  1. Na much better, per Renal.  2. Repeat EBUS in AM. Dr. Scott.  3. Hold antithrombotics: No LMWH tomorrow.  4. CT with IV contrast today.  5. Discussed with patient and family  6. She will also need a PET scan.    I discussed the patient's findings and my recommendations with patient and primary care team    Isaac Epstein MD, FACP, FCCP, CNSC  Intensive Care Medicine, Nutrition Support and Pulmonary Medicine

## 2018-11-08 NOTE — PLAN OF CARE
Problem: Patient Care Overview  Goal: Plan of Care Review   11/08/18 8682   Coping/Psychosocial   Plan of Care Reviewed With patient;family   Plan of Care Review   Progress no change     SLP treatment completed. Will continue to address dysphagia. Pt not showing signs of clinical improvement, not ready for repeat FEES this date. Please see note for further details and recommendations.

## 2018-11-08 NOTE — THERAPY TREATMENT NOTE
Acute Care - Speech Language Pathology   Swallow Treatment Note [facility]     Patient Name: Shauna Valencia  : 1955  MRN: [id number]  Today's Date: 2018               Admit Date: 10/31/2018    Visit Dx:      ICD-10-CM ICD-9-CM   1. Acute hyponatremia E87.1 276.1   2. Dyspnea, unspecified type R06.00 786.09   3. Pharyngeal dysphagia R13.13 787.23   4. Hilar mass R91.8 786.6   5. Mass of upper lobe of left lung R91.8 786.6     Patient Active Problem List   Diagnosis   • Acute hyponatremia   • Hyperlipidemia   • Type 2 diabetes mellitus (CMS/HCC)   • Tobacco abuse   • Obesity (BMI 30-39.9)   • Mass of upper lobe of left lung   • Mediastinal lymphadenopathy       Therapy Treatment        Rehabilitation Treatment Summary     Row Name 18 1030             Treatment Time/Intention    Discipline speech language pathologist  -SG      Document Type therapy note (daily note)  -SG      Subjective Information no complaints  -SG      Patient/Family Observations 4 female family members present for tx  -SG      Care Plan Review evaluation/treatment results reviewed;care plan/treatment goals reviewed;risks/benefits reviewed;current/potential barriers reviewed;patient/other agree to care plan  -SG      Therapy Frequency (Swallow) 5 days per week  -SG      Patient Effort adequate  -SG      Comment Pt upset regarding thickened liquids, irritable toward SLP.   -SG      Recorded by [SG] Svitlana Cordero MS CCC-SLP 18 1236      Row Name 18 1030             Pain Scale: Numbers Pre/Post-Treatment    Pain Scale: Numbers, Pretreatment 0/10 - no pain  -SG      Pain Scale: Numbers, Post-Treatment 0/10 - no pain  -SG      Recorded by [SG] Svitlana Cordero MS CCC-SLP 18 1236      Row Name 18 1030             Outcome Summary/Treatment Plan (SLP)    Daily Summary of Progress (SLP) progress toward functional goals as expected  -SG      Barriers to Overall Progress (SLP) Previous  functional deficit  -SG      Plan for Continued Treatment (SLP) Con't modified diet and dys tx.   -SG      Anticipated Dischage Disposition unknown;anticipate therapy at next level of care  -SG      Recorded by [SG] Svitlana Cordero, MS LOJA-SLP 11/08/18 1236        User Key  (r) = Recorded By, (t) = Taken By, (c) = Cosigned By    Initials Name Effective Dates Discipline    SG Svitlana Cordero MS CCC-SLP 06/22/15 -  SLP          Outcome Summary  Outcome Summary/Treatment Plan (SLP)  Daily Summary of Progress (SLP): progress toward functional goals as expected (11/08/18 1030 : Svitlana Cordero, MS CCC-SLP)  Barriers to Overall Progress (SLP): Previous functional deficit (11/08/18 1030 : Svitlana Cordero, MS CCC-SLP)  Plan for Continued Treatment (SLP): Con't modified diet and dys tx.  (11/08/18 1030 : Svitlana Cordero, [facility]-SLP)  Anticipated Dischage Disposition: unknown, anticipate therapy at next level of care (11/08/18 1030 : Svitlana Cordero, MS CCC-[facility])            SLP GOALS     Row Name 11/08/18 1030             Oral Nutrition/Hydration Goal 1 (SLP)    Oral Nutrition/Hydration Goal 1, SLP LTG: Pt will return to regular diet and thin liquids w/o s/s of aspiration w/ 100% accuracy w/o cues.   -SG      Time Frame (Oral Nutrition/Hydration Goal 1, SLP) by discharge  -SG      Progress/Outcomes (Oral Nutrition/Hydration Goal 1, SLP) continuing progress toward goal  -SG         Oral Nutrition/Hydration Goal 2 (SLP)    Oral Nutrition/Hydration Goal 2, SLP Pt will tolerate trials of soft-solid and honey-thick liquids w/o s/s of aspiration w/ 100% accuracy w/o cues.   -SG      Time Frame (Oral Nutrition/Hydration Goal 2, SLP) short term goal (STG);by discharge  -SG      Barriers (Oral Nutrition/Hydration Goal 2, SLP) no s/s aspiration  -SG      Progress/Outcomes (Oral Nutrition/Hydration Goal 2, SLP) continuing progress toward goal  -SG          Pharyngeal Strengthening Exercise Goal 1 (SLP)    Activity (Pharyngeal Strengthening Goal 1, SLP) increase timing;increase superior movement of the hyolaryngeal complex;increase squeeze/positive pressure generation;increase tongue base retraction;increase closure at entrance to airway/closure of airway at glottis  -SG      Increase Timing prepping - 3 second prep or suck swallow or 3-step swallow  -SG      Increase Superior Movement of the Hyolaryngeal Complex Mendelsohn;falsetto  -SG      Increase Closure at Entrance to Airway/Closure of Airway at Glottis super-supraglottic swallow  -SG      Increase Squeeze/Positive Pressure Generation hard effortful swallow  -SG      Increase Tongue Base Retraction sunny  -SG      [place]/Accuracy (Pharyngeal Strengthening Goal 1, SLP) independently (over 90% accuracy)  -SG      Time Frame (Pharyngeal Strengthening Goal 1, SLP) short term goal (STG);by discharge  -SG      Progress/Outcomes (Pharyngeal Strengthening Goal 1, SLP) good progress toward goal  -SG         Swallow Compensatory Strategies Goal 1 (SLP)    Activity (Swallow Compensatory Strategies/Techniques Goal 1, SLP) compensatory strategies;during meal intake;during p.o. trials;small cup sips;alternate food/liquid intake;other (see comments)  -SG      [place]/Accuracy (Swallow Compensatory Strategies/Techniques Goal 1, SLP) independently (over 90% accuracy)  -SG      Time Frame (Swallow Compensatory Strategies/Techniques Goal 1, SLP) short term goal (STG)  -SG      Progress/Outcomes (Swallow Compensatory Strategies/Techniques Goal 1, SLP) continuing progress toward goal  -SG        User Key  (r) = Recorded By, (t) = Taken By, (c) = Cosigned By    Initials Name Provider Type    SG Svitlana Cordero MS CCC-SLP Speech and Language Pathologist          EDUCATION  The patient has been educated in the following areas:   Dysphagia (Swallowing Impairment) Oral Care/Hydration Modified Diet  Instruction.    SLP Recommendation and Plan                       Anticipated Dischage Disposition: unknown, anticipate therapy at next level of care     Therapy Frequency (Swallow): 5 days per week          Plan of Care Reviewed With: patient, family  Plan of Care Review  Plan of Care Reviewed With: patient, family  Daily Summary of Progress (SLP): progress toward functional goals as expected  Plan for Continued Treatment (SLP): Con't modified diet and dys tx.   Progress: no change           Time Calculation:         Time Calculation- SLP     Row Name 11/08/18 1238             Time Calculation- SLP    SLP Start Time 1030  -      SLP Received On 11/08/18  -        User Key  (r) = Recorded By, (t) = Taken By, (c) = Cosigned By    Initials Name Provider Type     Svitlana Cordero MS CCC-SLP Speech and Language Pathologist          Therapy Charges for Today     Code Description Service Date Service Provider Modifiers Qty    38267474235 HC ST FIBEROPTIC ENDO EVAL SWALL 6 11/7/2018 Svitlana Cordero MS CCC-SLP GN 1    [id number] HC ST TREATMENT SWALLOW 3 11/8/2018 Svitlana Cordero MS CCC-SLP GN 1                 Svitlana Cordero MS CCC-SLP  11/8/2018

## 2018-11-08 NOTE — PLAN OF CARE
Problem: Patient Care Overview  Goal: Plan of Care Review  Outcome: Ongoing (interventions implemented as appropriate)    Goal: Discharge Needs Assessment  Outcome: Ongoing (interventions implemented as appropriate)    Goal: Interprofessional Rounds/Family Conf  Outcome: Ongoing (interventions implemented as appropriate)      Problem: Pain, Acute (Adult)  Goal: Acceptable Pain Control/Comfort Level  Outcome: Ongoing (interventions implemented as appropriate)      Problem: Diabetes, Type 2 (Adult)  Goal: Signs and Symptoms of Listed Potential Problems Will be Absent, Minimized or Managed (Diabetes, Type 2)  Outcome: Ongoing (interventions implemented as appropriate)

## 2018-11-09 ENCOUNTER — APPOINTMENT (OUTPATIENT)
Dept: GENERAL RADIOLOGY | Facility: HOSPITAL | Age: 63
End: 2018-11-09

## 2018-11-09 ENCOUNTER — ANESTHESIA (OUTPATIENT)
Dept: GASTROENTEROLOGY | Facility: HOSPITAL | Age: 63
End: 2018-11-09

## 2018-11-09 ENCOUNTER — ANESTHESIA EVENT (OUTPATIENT)
Dept: GASTROENTEROLOGY | Facility: HOSPITAL | Age: 63
End: 2018-11-09

## 2018-11-09 LAB
ANION GAP SERPL CALCULATED.3IONS-SCNC: 5 MMOL/L (ref 3–11)
BASOPHILS # BLD AUTO: 0.03 10*3/MM3 (ref 0–0.2)
BASOPHILS NFR BLD AUTO: 0.4 % (ref 0–1)
BUN BLD-MCNC: 16 MG/DL (ref 9–23)
BUN/CREAT SERPL: 21.1 (ref 7–25)
CALCIUM SPEC-SCNC: 8.8 MG/DL (ref 8.7–10.4)
CHLORIDE SERPL-SCNC: 104 MMOL/L (ref 99–109)
CO2 SERPL-SCNC: 28 MMOL/L (ref 20–31)
CREAT BLD-MCNC: 0.76 MG/DL (ref 0.6–1.3)
DEPRECATED RDW RBC AUTO: 43.4 FL (ref 37–54)
EOSINOPHIL # BLD AUTO: 0.16 10*3/MM3 (ref 0–0.3)
EOSINOPHIL NFR BLD AUTO: 1.9 % (ref 0–3)
ERYTHROCYTE [DISTWIDTH] IN BLOOD BY AUTOMATED COUNT: 13.7 % (ref 11.3–14.5)
GFR SERPL CREATININE-BSD FRML MDRD: 77 ML/MIN/1.73
GLUCOSE BLD-MCNC: 103 MG/DL (ref 70–100)
GLUCOSE BLDC GLUCOMTR-MCNC: 200 MG/DL (ref 70–130)
GLUCOSE BLDC GLUCOMTR-MCNC: 214 MG/DL (ref 70–130)
GLUCOSE BLDC GLUCOMTR-MCNC: 99 MG/DL (ref 70–130)
HCT VFR BLD AUTO: 29.6 % (ref 34.5–44)
HGB BLD-MCNC: 9.8 G/DL (ref 11.5–15.5)
IMM GRANULOCYTES # BLD: 0.03 10*3/MM3 (ref 0–0.03)
IMM GRANULOCYTES NFR BLD: 0.4 % (ref 0–0.6)
LYMPHOCYTES # BLD AUTO: 2.78 10*3/MM3 (ref 0.6–4.8)
LYMPHOCYTES NFR BLD AUTO: 33.7 % (ref 24–44)
MCH RBC QN AUTO: 29 PG (ref 27–31)
MCHC RBC AUTO-ENTMCNC: 33.1 G/DL (ref 32–36)
MCV RBC AUTO: 87.6 FL (ref 80–99)
MONOCYTES # BLD AUTO: 0.81 10*3/MM3 (ref 0–1)
MONOCYTES NFR BLD AUTO: 9.8 % (ref 0–12)
NEUTROPHILS # BLD AUTO: 4.47 10*3/MM3 (ref 1.5–8.3)
NEUTROPHILS NFR BLD AUTO: 54.2 % (ref 41–71)
PLATELET # BLD AUTO: 352 10*3/MM3 (ref 150–450)
PMV BLD AUTO: 8.6 FL (ref 6–12)
POTASSIUM BLD-SCNC: 4.3 MMOL/L (ref 3.5–5.5)
RBC # BLD AUTO: 3.38 10*6/MM3 (ref 3.89–5.14)
SODIUM BLD-SCNC: 130 MMOL/L (ref 132–146)
SODIUM BLD-SCNC: 131 MMOL/L (ref 132–146)
SODIUM BLD-SCNC: 134 MMOL/L (ref 132–146)
SODIUM BLD-SCNC: 134 MMOL/L (ref 132–146)
SODIUM BLD-SCNC: 135 MMOL/L (ref 132–146)
SODIUM BLD-SCNC: 135 MMOL/L (ref 132–146)
SODIUM BLD-SCNC: 137 MMOL/L (ref 132–146)
SODIUM BLD-SCNC: 139 MMOL/L (ref 132–146)
WBC NRBC COR # BLD: 8.25 10*3/MM3 (ref 3.5–10.8)

## 2018-11-09 PROCEDURE — 88305 TISSUE EXAM BY PATHOLOGIST: CPT | Performed by: INTERNAL MEDICINE

## 2018-11-09 PROCEDURE — 63710000001 INSULIN LISPRO (HUMAN) PER 5 UNITS: Performed by: NURSE ANESTHETIST, CERTIFIED REGISTERED

## 2018-11-09 PROCEDURE — 25010000002 PROPOFOL 10 MG/ML EMULSION: Performed by: NURSE ANESTHETIST, CERTIFIED REGISTERED

## 2018-11-09 PROCEDURE — 84295 ASSAY OF SERUM SODIUM: CPT | Performed by: INTERNAL MEDICINE

## 2018-11-09 PROCEDURE — 87116 MYCOBACTERIA CULTURE: CPT | Performed by: INTERNAL MEDICINE

## 2018-11-09 PROCEDURE — 87070 CULTURE OTHR SPECIMN AEROBIC: CPT | Performed by: INTERNAL MEDICINE

## 2018-11-09 PROCEDURE — 88172 CYTP DX EVAL FNA 1ST EA SITE: CPT | Performed by: INTERNAL MEDICINE

## 2018-11-09 PROCEDURE — 87206 SMEAR FLUORESCENT/ACID STAI: CPT | Performed by: INTERNAL MEDICINE

## 2018-11-09 PROCEDURE — 87205 SMEAR GRAM STAIN: CPT | Performed by: INTERNAL MEDICINE

## 2018-11-09 PROCEDURE — 25010000002 NEOSTIGMINE 10 MG/10ML SOLUTION: Performed by: NURSE ANESTHETIST, CERTIFIED REGISTERED

## 2018-11-09 PROCEDURE — 82962 GLUCOSE BLOOD TEST: CPT

## 2018-11-09 PROCEDURE — 99233 SBSQ HOSP IP/OBS HIGH 50: CPT | Performed by: INTERNAL MEDICINE

## 2018-11-09 PROCEDURE — 07D78ZX EXTRACTION OF THORAX LYMPHATIC, VIA NATURAL OR ARTIFICIAL OPENING ENDOSCOPIC, DIAGNOSTIC: ICD-10-PCS | Performed by: INTERNAL MEDICINE

## 2018-11-09 PROCEDURE — 80048 BASIC METABOLIC PNL TOTAL CA: CPT | Performed by: INTERNAL MEDICINE

## 2018-11-09 PROCEDURE — 88112 CYTOPATH CELL ENHANCE TECH: CPT | Performed by: INTERNAL MEDICINE

## 2018-11-09 PROCEDURE — 25010000002 ONDANSETRON PER 1 MG: Performed by: NURSE ANESTHETIST, CERTIFIED REGISTERED

## 2018-11-09 PROCEDURE — 92526 ORAL FUNCTION THERAPY: CPT

## 2018-11-09 PROCEDURE — 0BD88ZX EXTRACTION OF LEFT UPPER LOBE BRONCHUS, VIA NATURAL OR ARTIFICIAL OPENING ENDOSCOPIC, DIAGNOSTIC: ICD-10-PCS | Performed by: INTERNAL MEDICINE

## 2018-11-09 PROCEDURE — 71045 X-RAY EXAM CHEST 1 VIEW: CPT

## 2018-11-09 PROCEDURE — 31653 BRONCH EBUS SAMPLNG 3/> NODE: CPT | Performed by: INTERNAL MEDICINE

## 2018-11-09 PROCEDURE — 87102 FUNGUS ISOLATION CULTURE: CPT | Performed by: INTERNAL MEDICINE

## 2018-11-09 PROCEDURE — 88341 IMHCHEM/IMCYTCHM EA ADD ANTB: CPT | Performed by: INTERNAL MEDICINE

## 2018-11-09 PROCEDURE — 31625 BRONCHOSCOPY W/BIOPSY(S): CPT | Performed by: INTERNAL MEDICINE

## 2018-11-09 PROCEDURE — C1726 CATH, BAL DIL, NON-VASCULAR: HCPCS | Performed by: INTERNAL MEDICINE

## 2018-11-09 PROCEDURE — 0BDG8ZX EXTRACTION OF LEFT UPPER LUNG LOBE, VIA NATURAL OR ARTIFICIAL OPENING ENDOSCOPIC, DIAGNOSTIC: ICD-10-PCS | Performed by: INTERNAL MEDICINE

## 2018-11-09 PROCEDURE — 88173 CYTOPATH EVAL FNA REPORT: CPT | Performed by: INTERNAL MEDICINE

## 2018-11-09 PROCEDURE — 85025 COMPLETE CBC W/AUTO DIFF WBC: CPT | Performed by: INTERNAL MEDICINE

## 2018-11-09 PROCEDURE — 25010000002 DEXAMETHASONE PER 1 MG: Performed by: NURSE ANESTHETIST, CERTIFIED REGISTERED

## 2018-11-09 PROCEDURE — 88342 IMHCHEM/IMCYTCHM 1ST ANTB: CPT | Performed by: INTERNAL MEDICINE

## 2018-11-09 PROCEDURE — 0BD78ZX EXTRACTION OF LEFT MAIN BRONCHUS, VIA NATURAL OR ARTIFICIAL OPENING ENDOSCOPIC, DIAGNOSTIC: ICD-10-PCS | Performed by: INTERNAL MEDICINE

## 2018-11-09 RX ORDER — ALBUTEROL SULFATE 2.5 MG/3ML
2.5 SOLUTION RESPIRATORY (INHALATION) ONCE AS NEEDED
Status: DISCONTINUED | OUTPATIENT
Start: 2018-11-09 | End: 2018-11-09 | Stop reason: HOSPADM

## 2018-11-09 RX ORDER — LIDOCAINE HYDROCHLORIDE 10 MG/ML
INJECTION, SOLUTION EPIDURAL; INFILTRATION; INTRACAUDAL; PERINEURAL AS NEEDED
Status: DISCONTINUED | OUTPATIENT
Start: 2018-11-09 | End: 2018-11-09 | Stop reason: SURG

## 2018-11-09 RX ORDER — ONDANSETRON 2 MG/ML
4 INJECTION INTRAMUSCULAR; INTRAVENOUS ONCE AS NEEDED
Status: DISCONTINUED | OUTPATIENT
Start: 2018-11-09 | End: 2018-11-09 | Stop reason: HOSPADM

## 2018-11-09 RX ORDER — NEOSTIGMINE METHYLSULFATE 1 MG/ML
INJECTION, SOLUTION INTRAVENOUS AS NEEDED
Status: DISCONTINUED | OUTPATIENT
Start: 2018-11-09 | End: 2018-11-09 | Stop reason: SURG

## 2018-11-09 RX ORDER — LIDOCAINE HYDROCHLORIDE 10 MG/ML
0.5 INJECTION, SOLUTION EPIDURAL; INFILTRATION; INTRACAUDAL; PERINEURAL ONCE AS NEEDED
Status: DISCONTINUED | OUTPATIENT
Start: 2018-11-09 | End: 2018-11-09 | Stop reason: HOSPADM

## 2018-11-09 RX ORDER — DEXAMETHASONE SODIUM PHOSPHATE 4 MG/ML
INJECTION, SOLUTION INTRA-ARTICULAR; INTRALESIONAL; INTRAMUSCULAR; INTRAVENOUS; SOFT TISSUE AS NEEDED
Status: DISCONTINUED | OUTPATIENT
Start: 2018-11-09 | End: 2018-11-09 | Stop reason: SURG

## 2018-11-09 RX ORDER — BUSPIRONE HYDROCHLORIDE 10 MG/1
10 TABLET ORAL 3 TIMES DAILY
Status: DISCONTINUED | OUTPATIENT
Start: 2018-11-09 | End: 2018-11-11

## 2018-11-09 RX ORDER — FAMOTIDINE 10 MG/ML
20 INJECTION, SOLUTION INTRAVENOUS ONCE
Status: DISCONTINUED | OUTPATIENT
Start: 2018-11-09 | End: 2018-11-09 | Stop reason: HOSPADM

## 2018-11-09 RX ORDER — LIDOCAINE HYDROCHLORIDE 40 MG/ML
SOLUTION TOPICAL AS NEEDED
Status: DISCONTINUED | OUTPATIENT
Start: 2018-11-09 | End: 2018-11-09 | Stop reason: SURG

## 2018-11-09 RX ORDER — HYDROCODONE BITARTRATE AND ACETAMINOPHEN 5; 325 MG/1; MG/1
1 TABLET ORAL EVERY 4 HOURS PRN
Status: DISCONTINUED | OUTPATIENT
Start: 2018-11-09 | End: 2018-11-17 | Stop reason: HOSPADM

## 2018-11-09 RX ORDER — FAMOTIDINE 20 MG/1
20 TABLET, FILM COATED ORAL 2 TIMES DAILY
Status: DISCONTINUED | OUTPATIENT
Start: 2018-11-09 | End: 2018-11-17 | Stop reason: HOSPADM

## 2018-11-09 RX ORDER — GLYCOPYRROLATE 0.2 MG/ML
INJECTION INTRAMUSCULAR; INTRAVENOUS AS NEEDED
Status: DISCONTINUED | OUTPATIENT
Start: 2018-11-09 | End: 2018-11-09 | Stop reason: SURG

## 2018-11-09 RX ORDER — SODIUM CHLORIDE 0.9 % (FLUSH) 0.9 %
3-10 SYRINGE (ML) INJECTION AS NEEDED
Status: DISCONTINUED | OUTPATIENT
Start: 2018-11-09 | End: 2018-11-09 | Stop reason: HOSPADM

## 2018-11-09 RX ORDER — ESMOLOL HYDROCHLORIDE 10 MG/ML
INJECTION INTRAVENOUS AS NEEDED
Status: DISCONTINUED | OUTPATIENT
Start: 2018-11-09 | End: 2018-11-09 | Stop reason: SURG

## 2018-11-09 RX ORDER — SODIUM CHLORIDE, SODIUM LACTATE, POTASSIUM CHLORIDE, CALCIUM CHLORIDE 600; 310; 30; 20 MG/100ML; MG/100ML; MG/100ML; MG/100ML
9 INJECTION, SOLUTION INTRAVENOUS CONTINUOUS
Status: DISCONTINUED | OUTPATIENT
Start: 2018-11-09 | End: 2018-11-17 | Stop reason: HOSPADM

## 2018-11-09 RX ORDER — ROCURONIUM BROMIDE 10 MG/ML
INJECTION, SOLUTION INTRAVENOUS AS NEEDED
Status: DISCONTINUED | OUTPATIENT
Start: 2018-11-09 | End: 2018-11-09 | Stop reason: SURG

## 2018-11-09 RX ORDER — SODIUM CHLORIDE 0.9 % (FLUSH) 0.9 %
3 SYRINGE (ML) INJECTION EVERY 12 HOURS SCHEDULED
Status: DISCONTINUED | OUTPATIENT
Start: 2018-11-09 | End: 2018-11-09 | Stop reason: HOSPADM

## 2018-11-09 RX ORDER — FAMOTIDINE 20 MG/1
20 TABLET, FILM COATED ORAL ONCE
Status: DISCONTINUED | OUTPATIENT
Start: 2018-11-09 | End: 2018-11-09 | Stop reason: HOSPADM

## 2018-11-09 RX ORDER — ONDANSETRON 2 MG/ML
INJECTION INTRAMUSCULAR; INTRAVENOUS AS NEEDED
Status: DISCONTINUED | OUTPATIENT
Start: 2018-11-09 | End: 2018-11-09 | Stop reason: SURG

## 2018-11-09 RX ORDER — PROPOFOL 10 MG/ML
VIAL (ML) INTRAVENOUS AS NEEDED
Status: DISCONTINUED | OUTPATIENT
Start: 2018-11-09 | End: 2018-11-09 | Stop reason: SURG

## 2018-11-09 RX ORDER — LORAZEPAM 1 MG/1
1 TABLET ORAL DAILY PRN
Status: DISCONTINUED | OUTPATIENT
Start: 2018-11-09 | End: 2018-11-17 | Stop reason: HOSPADM

## 2018-11-09 RX ADMIN — SODIUM CHLORIDE, POTASSIUM CHLORIDE, SODIUM LACTATE AND CALCIUM CHLORIDE: 600; 310; 30; 20 INJECTION, SOLUTION INTRAVENOUS at 11:45

## 2018-11-09 RX ADMIN — EPHEDRINE SULFATE 10 MG: 50 INJECTION INTRAMUSCULAR; INTRAVENOUS; SUBCUTANEOUS at 10:45

## 2018-11-09 RX ADMIN — LEVOTHYROXINE SODIUM 75 MCG: 75 TABLET ORAL at 06:29

## 2018-11-09 RX ADMIN — DEXAMETHASONE SODIUM PHOSPHATE 8 MG: 4 INJECTION, SOLUTION INTRAMUSCULAR; INTRAVENOUS at 10:34

## 2018-11-09 RX ADMIN — BUSPIRONE HYDROCHLORIDE 5 MG: 5 TABLET ORAL at 09:18

## 2018-11-09 RX ADMIN — PANTOPRAZOLE SODIUM 40 MG: 40 TABLET, DELAYED RELEASE ORAL at 06:29

## 2018-11-09 RX ADMIN — LIDOCAINE HYDROCHLORIDE 50 MG: 10 INJECTION, SOLUTION EPIDURAL; INFILTRATION; INTRACAUDAL; PERINEURAL at 10:26

## 2018-11-09 RX ADMIN — CARVEDILOL 6.25 MG: 6.25 TABLET, FILM COATED ORAL at 09:18

## 2018-11-09 RX ADMIN — NEOSTIGMINE METHYLSULFATE 4 MG: 1 INJECTION, SOLUTION INTRAVENOUS at 11:43

## 2018-11-09 RX ADMIN — ROCURONIUM BROMIDE 40 MG: 10 SOLUTION INTRAVENOUS at 10:26

## 2018-11-09 RX ADMIN — FAMOTIDINE 20 MG: 20 TABLET ORAL at 09:23

## 2018-11-09 RX ADMIN — GLYCOPYRROLATE 0.4 MG: 0.2 INJECTION, SOLUTION INTRAMUSCULAR; INTRAVENOUS at 11:43

## 2018-11-09 RX ADMIN — HYDROCODONE BITARTRATE AND ACETAMINOPHEN 1 TABLET: 5; 325 TABLET ORAL at 21:10

## 2018-11-09 RX ADMIN — ONDANSETRON 4 MG: 2 INJECTION INTRAMUSCULAR; INTRAVENOUS at 11:00

## 2018-11-09 RX ADMIN — SODIUM CHLORIDE, POTASSIUM CHLORIDE, SODIUM LACTATE AND CALCIUM CHLORIDE 9 ML/HR: 600; 310; 30; 20 INJECTION, SOLUTION INTRAVENOUS at 10:16

## 2018-11-09 RX ADMIN — EPHEDRINE SULFATE 10 MG: 50 INJECTION INTRAMUSCULAR; INTRAVENOUS; SUBCUTANEOUS at 10:42

## 2018-11-09 RX ADMIN — BUSPIRONE HYDROCHLORIDE 10 MG: 10 TABLET ORAL at 20:28

## 2018-11-09 RX ADMIN — ESMOLOL HYDROCHLORIDE 30 MG: 10 INJECTION INTRAVENOUS at 10:26

## 2018-11-09 RX ADMIN — BUSPIRONE HYDROCHLORIDE 10 MG: 10 TABLET ORAL at 16:25

## 2018-11-09 RX ADMIN — PROPOFOL 150 MG: 10 INJECTION, EMULSION INTRAVENOUS at 10:26

## 2018-11-09 RX ADMIN — EPHEDRINE SULFATE 10 MG: 50 INJECTION INTRAMUSCULAR; INTRAVENOUS; SUBCUTANEOUS at 10:55

## 2018-11-09 RX ADMIN — FAMOTIDINE 20 MG: 20 TABLET ORAL at 20:28

## 2018-11-09 RX ADMIN — ESMOLOL HYDROCHLORIDE 20 MG: 10 INJECTION INTRAVENOUS at 11:18

## 2018-11-09 RX ADMIN — INSULIN LISPRO 3 UNITS: 100 INJECTION, SOLUTION INTRAVENOUS; SUBCUTANEOUS at 16:25

## 2018-11-09 RX ADMIN — NICOTINE 1 PATCH: 21 PATCH, EXTENDED RELEASE TRANSDERMAL at 09:18

## 2018-11-09 RX ADMIN — CETIRIZINE HYDROCHLORIDE 10 MG: 10 TABLET, FILM COATED ORAL at 16:25

## 2018-11-09 RX ADMIN — CARVEDILOL 6.25 MG: 6.25 TABLET, FILM COATED ORAL at 16:25

## 2018-11-09 RX ADMIN — INSULIN LISPRO 3 UNITS: 100 INJECTION, SOLUTION INTRAVENOUS; SUBCUTANEOUS at 20:28

## 2018-11-09 RX ADMIN — LORAZEPAM 1 MG: 1 TABLET ORAL at 16:36

## 2018-11-09 RX ADMIN — LIDOCAINE HYDROCHLORIDE 1 EACH: 40 SOLUTION TOPICAL at 10:28

## 2018-11-09 RX ADMIN — ROCURONIUM BROMIDE 10 MG: 10 SOLUTION INTRAVENOUS at 11:18

## 2018-11-09 RX ADMIN — ACETAMINOPHEN 650 MG: 325 TABLET ORAL at 16:25

## 2018-11-09 NOTE — THERAPY TREATMENT NOTE
Acute Care - Speech Language Pathology   Swallow Treatment Note [facility]     Patient Name: Shauna Valencia  : 1955  MRN: [id number]  Today's Date: 2018               Admit Date: 10/31/2018    Visit Dx:      ICD-10-CM ICD-9-CM   1. Acute hyponatremia E87.1 276.1   2. Dyspnea, unspecified type R06.00 786.09   3. Pharyngeal dysphagia R13.13 787.23   4. Hilar mass R91.8 786.6   5. Mass of upper lobe of left lung R91.8 786.6     Patient Active Problem List   Diagnosis   • Acute hyponatremia   • Hyperlipidemia   • Type 2 diabetes mellitus (CMS/HCC)   • Tobacco abuse   • Obesity (BMI 30-39.9)   • Mass of upper lobe of left lung   • Mediastinal lymphadenopathy       Therapy Treatment        Rehabilitation Treatment Summary     Row Name 18 1600             Treatment Time/Intention    Discipline speech language pathologist  -SM      Document Type therapy note (daily note)  -SM      Subjective Information complains of;fatigue  -SM      Patient Effort good  -SM      Comment Did not wish to do exercises during session 2' fatigue - she has been doing with family throughout the day though  -SM      Recorded by [SM] Gabriela Lofton, MS CCC-SLP 18 1631      Row Name 18 1600             Pain Assessment    Additional Documentation Pain Scale: FACES Pre/Post-Treatment (Group)  -SM      Recorded by [SM] Gabriela Lofton, MS CCC-SLP 18 1631      Row Name 18 1600             Pain Scale: FACES Pre/Post-Treatment    Pain: FACES Scale, Pretreatment 4-->hurts little more  -SM      Pain: FACES Scale, Post-Treatment 4-->hurts little more  -SM      Pre/Post Treatment Pain Comment headache, RN bringing Tylenol  -SM      Recorded by [SM] Gabriela Lofton, MS CCC-SLP 18 1631      Row Name 18 1600             Outcome Summary/Treatment Plan (SLP)    Daily Summary of Progress (SLP) progress toward functional goals is gradual  -      Plan for Continued Treatment (SLP) Continue tx.  Repeat instrumental swallow study as shows clinical improvement (be it prior to d/c or as OP). Pt in agreement with tx plan.  -SM      Anticipated Dischage Disposition anticipate therapy at next level of care  -SM      Recorded by [] Gabriela Lofton MS CCC-SLP 11/09/18 1631        User Key  (r) = Recorded By, (t) = Taken By, (c) = Cosigned By    Initials Name Effective Dates Discipline     Gabriela Lofton MS CCC-SLP 06/22/15 -  SLP          Outcome Summary  Outcome Summary/Treatment Plan (SLP)  Daily Summary of Progress (SLP): progress toward functional goals is gradual (11/09/18 1600 : Gabriela Lofton, MS CCC-SLP)  Plan for Continued Treatment (SLP): Continue tx. Repeat instrumental swallow study as shows clinical improvement (be it prior to d/c or as OP). Pt in agreement with tx plan. (11/09/18 1600 : Gabriela Lofton, MS CCC-SLP)  Anticipated Dischage Disposition: anticipate therapy at next level of care (11/09/18 1600 : Gabriela Lofton, MS CCC-[facility])            SLP GOALS     Row Name 11/09/18 1600 11/08/18 1030          Oral Nutrition/Hydration Goal 1 (SLP)    Oral Nutrition/Hydration Goal 1, SLP LTG: Pt will return to regular diet and thin liquids w/o s/s of aspiration w/ 100% accuracy w/o cues.   - LTG: Pt will return to regular diet and thin liquids w/o s/s of aspiration w/ 100% accuracy w/o cues.   -SG     Time Frame (Oral Nutrition/Hydration Goal 1, SLP) by discharge  -SM by discharge  -SG     Progress/Outcomes (Oral Nutrition/Hydration Goal 1, SLP) continuing progress toward goal  -SM continuing progress toward goal  -SG        Oral Nutrition/Hydration Goal 2 (SLP)    Oral Nutrition/Hydration Goal 2, SLP Pt will tolerate trials of soft-solid and honey-thick liquids w/o s/s of aspiration w/ 100% accuracy w/o cues.   -SM Pt will tolerate trials of soft-solid and honey-thick liquids w/o s/s of aspiration w/ 100% accuracy w/o cues.   -SG     Time Frame (Oral Nutrition/Hydration  Goal 2, SLP) short term goal (STG);by discharge  -SM short term goal (STG);by discharge  -SG     Barriers (Oral Nutrition/Hydration Goal 2, SLP) Lengthy education and discussion of POC. Pt on reg/thin for comfort diet, MD in agreement.   -SM no s/s aspiration  -SG     Progress/Outcomes (Oral Nutrition/Hydration Goal 2, SLP) goal no longer appropriate  -SM continuing progress toward goal  -SG        Pharyngeal Strengthening Exercise Goal 1 (SLP)    Activity (Pharyngeal Strengthening Goal 1, SLP) increase timing;increase superior movement of the hyolaryngeal complex;increase squeeze/positive pressure generation;increase tongue base retraction;increase closure at entrance to airway/closure of airway at glottis  -SM increase timing;increase superior movement of the hyolaryngeal complex;increase squeeze/positive pressure generation;increase tongue base retraction;increase closure at entrance to airway/closure of airway at glottis  -SG     Increase Timing prepping - 3 second prep or suck swallow or 3-step swallow  -SM prepping - 3 second prep or suck swallow or 3-step swallow  -SG     Increase Superior Movement of the Hyolaryngeal Complex Mendelsohn;falsetto  -SM Mendelsohn;falsetto  -SG     Increase Closure at Entrance to Airway/Closure of Airway at Glottis super-supraglottic swallow  -SM super-supraglottic swallow  -SG     Increase Squeeze/Positive Pressure Generation hard effortful swallow  -SM hard effortful swallow  -SG     Increase Tongue Base Retraction sunny  -SM sunny  -SG     [place]/Accuracy (Pharyngeal Strengthening Goal 1, SLP) independently (over 90% accuracy)  -SM independently (over 90% accuracy)  -SG     Time Frame (Pharyngeal Strengthening Goal 1, SLP) short term goal (STG);by discharge  -SM short term goal (STG);by discharge  -SG     Barriers (Pharyngeal Strengthening Goal 1, SLP) Pt fatigued, did not wish to complete though has been doing with family. Does wish to continue despite comfort diet   -SM  --     Progress/Outcomes (Pharyngeal Strengthening Goal 1, SLP) goal ongoing  -SM good progress toward goal  -SG        Swallow Compensatory Strategies Goal 1 (SLP)    Activity (Swallow Compensatory Strategies/Techniques Goal 1, SLP) compensatory strategies;during meal intake;during p.o. trials;small cup sips;alternate food/liquid intake;other (see comments)  -SM compensatory strategies;during meal intake;during p.o. trials;small cup sips;alternate food/liquid intake;other (see comments)  -SG     [place]/Accuracy (Swallow Compensatory Strategies/Techniques Goal 1, SLP) independently (over 90% accuracy)  -SM independently (over 90% accuracy)  -SG     Time Frame (Swallow Compensatory Strategies/Techniques Goal 1, SLP) short term goal (STG);by discharge  -SM short term goal (STG)  -SG     Progress/Outcomes (Swallow Compensatory Strategies/Techniques Goal 1, SLP) continuing progress toward goal  -SM continuing progress toward goal  -SG       User Key  (r) = Recorded By, (t) = Taken By, (c) = Cosigned By    Initials Name Provider Type    SG Svitlana Cordero, MS CCC-SLP Speech and Language Pathologist    Gabriela Lentz, MS CCC-SLP Speech and Language Pathologist          EDUCATION  The patient has been educated in the following areas:   Dysphagia (Swallowing Impairment).    SLP Recommendation and Plan                       Anticipated Dischage Disposition: anticipate therapy at next level of care                Plan of Care Reviewed With: patient, family  Plan of Care Review  Plan of Care Reviewed With: patient, family  Daily Summary of Progress (SLP): progress toward functional goals is gradual  Plan for Continued Treatment (SLP): Continue tx. Repeat instrumental swallow study as shows clinical improvement (be it prior to d/c or as OP). Pt in agreement with tx plan.           Time Calculation:         Time Calculation- SLP     Row Name 11/09/18 6367             Time Calculation- SLP    SLP  Start Time 1600  -      SLP Received On 11/09/18  -        User Key  (r) = Recorded By, (t) = Taken By, (c) = Cosigned By    Initials Name Provider Type    Gabriela Lentz MS CCC-SLP Speech and Language Pathologist          Therapy Charges for Today     Code Description Service Date Service Provider Modifiers Qty    [id number]  ST TREATMENT SWALLOW 2 11/9/2018 Gabriela Lofton MS CCC-SLP GN 1                 Gabriela Lofton MS CCC-SLP  11/9/2018

## 2018-11-09 NOTE — PLAN OF CARE
Problem: Patient Care Overview  Goal: Plan of Care Review  Outcome: Ongoing (interventions implemented as appropriate)   11/09/18 2038   Coping/Psychosocial   Plan of Care Reviewed With patient;family   SLP treatment completed. Pt on reg/thin as was not eating/drinking modified diet. Discussed POC options with pt. She does wish to continue exercises and repeat swallow study as clinically improves. Please see note for further details and recommendations.

## 2018-11-09 NOTE — PROGRESS NOTES
Continued Stay Note  [facility]     Patient Name: Shauna Valencia  MRN: [id number]  Today's Date: 11/9/2018    Admit Date: 10/31/2018          Discharge Plan     Row Name 11/09/18 1137       Plan    Plan Update    Patient/Family in Agreement with Plan yes    Plan Comments The pt is off the floor for a bronch. No family in room. CM will continue to follow for DC planning needs.              Discharge Codes    No documentation.       Expected Discharge Date and Time     Expected Discharge Date Expected Discharge Time    Nov 7, 2018             Rocio Hutchins RN

## 2018-11-09 NOTE — ANESTHESIA PROCEDURE NOTES
Airway  Urgency: elective    Airway not difficult    General Information and Staff    Patient location during procedure: OR  CRNA: SHAHRIAR JUDD    Indications and Patient Condition  Indications for airway management: airway protection    Preoxygenated: yes  MILS not maintained throughout  Mask difficulty assessment: 1 - vent by mask    Final Airway Details  Final airway type: endotracheal airway      Successful airway: ETT  Cuffed: yes   Successful intubation technique: direct laryngoscopy  Endotracheal tube insertion site: oral  Blade: [place]  Blade size: 2  ETT size: 8.5 mm  Cormack-Lehane Classification: grade I - full view of glottis  Placement verified by: chest auscultation and capnometry   Cuff volume (mL): 8  Measured from: lips  ETT to lips (cm): 20  Number of attempts at approach: 1    Additional Comments  Negative epigastric sounds, Breath sound equal bilaterally with symmetric chest rise and fall

## 2018-11-09 NOTE — PLAN OF CARE
Problem: Patient Care Overview  Goal: Plan of Care Review  Outcome: Ongoing (interventions implemented as appropriate)   11/09/18 0315 11/09/18 1135   Coping/Psychosocial   Plan of Care Reviewed With --  patient   Plan of Care Review   Progress improving --      Goal: Discharge Needs Assessment  Outcome: Ongoing (interventions implemented as appropriate)   10/31/18 2020 11/01/18 1132 11/02/18 0318   Discharge Needs Assessment   Readmission Within the Last 30 Days --  --  no previous admission in last 30 days   Concerns to be Addressed --  --  denies needs/concerns at this time   Patient/Family Anticipates Transition to home with family --  --    Patient/Family Anticipated Services at Transition  --  --    Transportation Anticipated family or friend will provide --  --    Equipment Needed After Discharge --  none --    Disability   Equipment Currently Used at Home --  none --      Goal: Interprofessional Rounds/Family Conf   11/09/18 1604   Interdisciplinary Rounds/Family Conf   Participants nursing;family;physician       Problem: Pain, Acute (Adult)  Goal: Acceptable Pain Control/Comfort Level  Outcome: Ongoing (interventions implemented as appropriate)   11/09/18 1604   Pain, Acute (Adult)   Acceptable Pain Control/Comfort Level making progress toward outcome       Problem: Diabetes, Type 2 (Adult)  Goal: Signs and Symptoms of Listed Potential Problems Will be Absent, Minimized or Managed (Diabetes, Type 2)  Outcome: Ongoing (interventions implemented as appropriate)   11/07/18 0506 11/09/18 1604   Goal/Outcome Evaluation   Problems Assessed (Type 2 Diabetes) --  all   Problems Present (Type 2 Diabetes) hyperglycemia --

## 2018-11-09 NOTE — PROGRESS NOTES
"   LOS: 9 days    Patient Care Team:  Rox Singleton MD as PCP - General (Internal Medicine)    Subjective     No new events    Objective     Vital Signs:  Blood pressure 156/90, pulse 86, temperature 98.2 °F (36.8 °C), temperature source Temporal Artery , resp. rate 16, height 154.9 cm (61\"), weight 89.8 kg (198 lb), SpO2 98 %.    Flowsheet Rows      First Filed Value   Admission Height  154.9 cm (61\") Documented at 10/31/2018 1424   Admission Weight  89.8 kg (198 lb) Documented at 10/31/2018 1424          11/08 0701 - 11/09 0700  In: 120 [P.O.:120]  Out: 1600 [Urine:1600]    Physical Exam:    General Appearance: NAD  Psych:   awake alert   CV:   No edema  Lungs: respirations regular and unlabored  Abdomen: not distended  :  No schwartz  Skin: No rash, Warm and dry      Labs:    Results from last 7 days  Lab Units 11/09/18  0349 11/08/18  0720 11/07/18  0615   WBC 10*3/mm3 8.25 10.17 11.92*   HEMOGLOBIN g/dL 9.8* 9.8* 10.8*   PLATELETS 10*3/mm3 352 374 379       Results from last 7 days  Lab Units 11/09/18  1335 11/09/18  0901 11/09/18  0528 11/09/18  0349  11/08/18  0720  11/07/18  0615  11/06/18  2211  11/06/18  0514   SODIUM mmol/L 135 135 134 137  < > 138  < > 125*  < > 120*  < > 123*   POTASSIUM mmol/L  --   --   --  4.3  --  4.8  --  4.5  --  4.3  < > 4.3   CHLORIDE mmol/L  --   --   --  104  --  104  --  93*  --  89*  < > 88*   CO2 mmol/L  --   --   --  28.0  --  28.0  --  22.0  --  21.0  < > 28.0   BUN mg/dL  --   --   --  16  --  15  --  10  --  10  < > 12   CREATININE mg/dL  --   --   --  0.76  --  0.65  --  0.65  --  0.70  < > 0.57*   CALCIUM mg/dL  --   --   --  8.8  --  8.8  --  8.8  --  8.7  < > 8.8   PHOSPHORUS mg/dL  --   --   --   --   --   --   --   --   --   --   --  4.1   MAGNESIUM mg/dL  --   --   --   --   --   --   --  2.6  --   --   --  1.5   < > = values in this interval not displayed.                Estimated Creatinine Clearance: 79.3 mL/min (by C-G formula based on SCr of 0.76 " mg/dL).         A/P:    Hyponatremia:  Na levels stable after initial rapid rise.  Thought due to SIADH weith lung cancer.  Restrict FW to 1000 ml a day.  Recheck U/P osm and Urine Na    HTN:  BP stable     Lung Cancer      Horacio Caruso MD  11/09/18  2:33 PM

## 2018-11-09 NOTE — OP NOTE
Bronchoscopy Procedural Note       Date of Operation: 11/9/2018    Indication: This is a 63 y.o. admitted for hyponatremia and found to have a left upper lobe lung mass with left hilar and mediastinal adenopathy.    Pre-op Diagnosis: Left upper lobe lung mass and mediastinal adenopathy.    Post-op Diagnosis: Left upper lobe lung mass and mediastinal adenopathy    Surgeon: Clay Scott MD    Referring Physician: Malia Solis M.D.    Procedures Performed:  Flexible videobronchoscopy  Bronchial washings  Endobronchial ultrasound-guided TBNA station 4L  Endobronchial ultrasound-guided TBNA left mainstem mass.  Endobronchial ultrasound-guided TBNA left upper lobe mass.    Anesthesia: Gen. endotracheal    Estimated Blood Loss: <5 ml    Description of Procedure:    Informed consent was obtained after the risks and benefits of the procedure, including the risk of bleeding, pneumothorax, medication reaction, and death were described.  A signed informed consent form was placed on the chart prior to the procedure.      The patient was taken to the endoscopy room where she was intubated by anesthesia who monitored the patient throughout the case.    A flexible video bronchoscope was inserted through the existing endotracheal tube and advanced to the mid trachea.  The trachea showed moderate chronic inflammatory changes but had no endotracheal lesions.  The main ariel was sharp.    I examined the right bronchial tree to at least the segmental level.  There was normal bronchial anatomy with no discrete endobronchial lesions.  There were scant, clear secretions throughout which were suctioned/lavaged clear.  There are no discrete endobronchial lesions.    I then examined the left bronchial tree to at least the segmental level throughout.  Again, there were chronic inflammatory changes which were moderate.  There were moderate, slightly bloody secretions throughout.  There was obstruction in the apical branch of the  apical posterior segment with extrinsic compression and some inflamed mucosa at the takeoff.  Otherwise, no discrete endobronchial lesions.  I then removed the diagnostic bronchoscope.    I inserted the endobronchial ultrasound scope and did a brief survey of the mediastinal and hilar lymph node stations.  There is no enlarged lymph node at station 4L.  Additionally, there was apparent submucosal tumor along the left mainstem bronchus approaching the left upper lobe.  Within the left upper lobe, there was apparent submucosal mass which compressed the left upper lobe vessels.    At that point I position the scope station 4L and did several passes of a 19-gauge FNA needle with adequate specimen obtained.  Specimen was sent for immediate evaluation on a slide.  Next, I position the scope at the left mainstem submucosal mass and did multiple passes of a 19-gauge FNA needle.  Finally, I position the scope within the left upper lobe and did several passes of a 19-gauge FNA needle with adequate specimen obtained.  There was no significant bleeding with any of the procedures.  Pathology evaluated several slides and determined that there was abnormal tissue obtained.  At that point I removed the endobronchial ultrasound scope.    I reinserted the flexible video bronchoscope and advanced it to the left upper lobe.  I then did multiple passes of biopsy forceps within the left upper lobe apical posterior segment with adequate specimen obtained.  There was no significant bleeding, just some mild oozing which was self-limited.  I then did a brief airway examination and cleaned up any residual secretions.  I removed the bronchoscope and turned the patient over to anesthesia/endoscopy staff for extubation and postprocedure monitoring.    There were no immediate complications.    Findings and Recommendations:  Specimens obtained:  1.  Station 4L TBNA.  2.  Left mainstem TBNA.  3.  Left upper lobe TBNA.  4.  Left upper lobe  endobronchial biopsies.  5.  Bronchial washings.    At this point, our service will continue to follow the patient while in the hospital.  Follow-up biopsy specimens to determine definitive plan.  I discussed the case with Dr. Epstein.      Clay Scott MD  Pulmonary and Critical Care Medicine   11/09/18 12:05 PM

## 2018-11-09 NOTE — PROGRESS NOTES
[facility] Medicine Services  PROGRESS NOTE    Patient Name: Shauna Valencia  : 1955  MRN: [id number]    Date of Admission: 10/31/2018  Length of Stay: 9  Primary Care Physician: Rox Singleton MD    Subjective   Subjective     CC:  Weakness    HPI:    Pt just woke up when I went to see her earlier this am. No issues overnight.  Per RN, pt very anxious post-procedure this afternoon and family is concerned as pt's beloved dog  today and pt has not yet been told.     Review of Systems   Gen- No fevers, chills  CV- No chest pain, palpitations  Resp- No cough, dyspnea  GI- No N/V/D, abd pain    Otherwise ROS is negative except as mentioned in the HPI.    Objective   Objective     Vital Signs:   Temp:  [97 °F (36.1 °C)-98.2 °F (36.8 °C)] 98.2 °F (36.8 °C)  Heart Rate:  [71-86] 86  Resp:  [16-18] 16  BP: (110-156)/(64-90) 156/90        Physical Exam:  Constitutional: No acute distress, waking up when I went to see her  HENT: NCAT, mucous membranes moist  Respiratory: rhonchi right side otherwise clear with normal respiratory effort  Cardiovascular: RRR, no murmurs, rubs, or gallops, palpable pedal pulses bilaterally  Gastrointestinal: Positive bowel sounds, soft, nontender, nondistended  Musculoskeletal: No bilateral ankle edema  Psychiatric: Appropriate affect, cooperative  Neurologic: Oriented x 3, strength symmetric in all extremities, Cranial Nerves grossly intact to confrontation, speech clear  Skin: No rashes    Results Reviewed:  I have personally reviewed current lab, radiology, and data and agree.      Results from last 7 days  Lab Units 18  0349 18  0720 18  0615 18  0514   WBC 10*3/mm3 8.25 10.17 11.92*  --    HEMOGLOBIN g/dL 9.8* 9.8* 10.8*  --    HEMATOCRIT % 29.6* 29.4* 31.0*  --    PLATELETS 10*3/mm3 352 374 379  --    INR   --   --   --  0.96       Results from last 7 days  Lab Units 18  0901 18  0528 18  0349   11/08/18  0720  11/07/18 0615   SODIUM mmol/L 135 134 137  < > 138  < > 125*   POTASSIUM mmol/L  --   --  4.3  --  4.8  --  4.5   CHLORIDE mmol/L  --   --  104  --  104  --  93*   CO2 mmol/L  --   --  28.0  --  28.0  --  22.0   BUN mg/dL  --   --  16  --  15  --  10   CREATININE mg/dL  --   --  0.76  --  0.65  --  0.65   GLUCOSE mg/dL  --   --  103*  --  117*  --  191*   CALCIUM mg/dL  --   --  8.8  --  8.8  --  8.8   < > = values in this interval not displayed.  Estimated Creatinine Clearance: 79.3 mL/min (by C-G formula based on SCr of 0.76 mg/dL).  No results found for: BNP    Microbiology Results Abnormal     Procedure Component Value - Date/Time    Respiratory Culture - Wash, Lung, Left Upper Lobe [[id number]] Collected:  11/06/18 1225    Lab Status:  Final result Specimen:  Wash from Lung, Left Upper Lobe Updated:  11/08/18 0732     Respiratory Culture Scant growth (1+) Normal Respiratory Shavon     Gram Stain Result No WBCs or organisms seen    AFB Culture - Wash, Lung, Left Upper Lobe [[id number]] Collected:  11/06/18 1225    Lab Status:  Preliminary result Specimen:  Wash from Lung, Left Upper Lobe Updated:  11/07/18 1216     AFB Stain No acid fast bacilli seen on concentrated smear          Imaging Results (last 24 hours)     Procedure Component Value Units Date/Time    XR Chest 1 View [[id number]] Collected:  11/09/18 1042     Updated:  11/09/18 1042    Narrative:       EXAMINATION: XR CHEST 1 VW- 11/09/2018     INDICATION: E87.4-Suzl-ixgxofftom and hyponatremia; R06.00-Dyspnea,  unspecified; R13.13-Dysphagia, pharyngeal phase; R91.8-Other nonspecific  abnormal finding of lung field; R91.8-Other nonspecific abnormal finding  of lung field      COMPARISON: 11/06/2018     FINDINGS: Portable chest reveals improvement seen in aeration of the  left perihilar region. Underlying soft tissue mass suggesting adenopathy  remains. There is a PICC line catheter identified on the right tip in  the SVC. Cardiac silhouette  is borderline enlarged. Lung volumes are  low.           Impression:       Soft tissue mass again seen in the left perihilar region.  PICC line catheter on the right tip in the SVC. No new focal parenchymal  opacification present.     D:  11/09/2018  E:  11/09/2018          CT Chest With Contrast [[id number]] Collected:  11/08/18 1536     Updated:  11/08/18 1539    Narrative:       EXAMINATION: CT CHEST W CONTRAST- 11/08/2018     INDICATION: Mass or lump, thorax axilla     TECHNIQUE:  Axial CT data of the chest were acquired helically with  contrast.  Multiplanar reformatted images were generated and reviewed.   The radiation dose reduction device was turned on for each scan per the  ALARA (As Low as Reasonably Achievable) protocol      COMPARISONS:  10/31/2018     FINDINGS: Unchanged small nodule in the left upper lobe, 1.5 cm. Small  amount of right infrahilar airspace disease is seen, likely infectious  or inflammatory. Central airways are patent. Heart size is within normal  limits. Coronary artery disease noted. There is metastatic adenopathy in  the left hilum (largest collection of nodes 3.2 cm), prevascular station  (1.4 cm) and AP window (3.4 cm). The lymph nodes severely narrow the  pulmonary arterial branches to the left upper and lower lobes  bilaterally as well as narrowing of some of the left-sided pulmonary  veins. Right lung is clear. Unremarkable chest wall soft tissues.  Unchanged long-standing left adrenal thickening. Partially visualized  left renal cysts. No aggressive bone lesion.       Impression:          1. Small nodule in the left upper lobe could represent primary neoplasm.  2. Metastatic left hilar and mediastinal lymph nodes.  3. Narrowing of the left hilar vessels.     D:  11/08/2018  E:  11/08/2018     This report was finalized on 11/8/2018 3:37 PM by Ap Wright.                I have reviewed the medications.      [MAR Hold] aspirin 81 mg Oral Daily   [MAR Hold] busPIRone 5 mg Oral  TID   carvedilol 6.25 mg Oral BID With Meals   [MAR Hold] cetirizine 10 mg Oral Daily   [MAR Hold] enoxaparin 40 mg Subcutaneous Q24H   famotidine 20 mg Oral Daily   [MAR Hold] insulin lispro 0-7 Units Subcutaneous 4x Daily With Meals & Nightly   [MAR Hold] levothyroxine 75 mcg Oral Daily   [MAR Hold] nicotine 1 patch Transdermal Q24H   [MAR Hold] pantoprazole 40 mg Oral Q AM         Assessment/Plan   Assessment / Plan     Active Hospital Problems    Diagnosis   • Mass of upper lobe of left lung   • Mediastinal lymphadenopathy   • Acute hyponatremia   • Hyperlipidemia   • Type 2 diabetes mellitus (CMS/HCC)   • Tobacco abuse   • Obesity (BMI 30-39.9)          Brief Hospital Course to date:  Shauna Valencia is a 63 y.o. female here with hyponatremia, likely SIADH, and found to have a lung mass      Assessment & Plan:  L hilar mass  --s/p bronchoscopy, path did not show any malignant cells.  Repeat EBUS today with Dr. Scott.     Hx of RCA stent, chart reviewed, 7/2017  --held Brilinta, aspirin only    Hyponatremia  --probable SIADH  --checked AM cortisol  --TSH normal  --better with Tolvaptan, NAL following. Continue to monitor closely, better last 24 hours.     Dysphagia  --SLP following, recommended honey thick diet- pt pretty resistant to modified diets in the past.  Will do comfort diet for now as pt unable to tolerate honey thick and needs to get as much PO hydration as possible, per NAL.     R LE pain  --checked duplex, superficial clot only  --has hx of sciatica with R leg pain    Tobacco abuse    Obesity    HL  --on Repatha once every two weeks at home, pt concerned that she missed a dose while here.  Have tried to reassure her that this is okay. We do not have this on formulary, so will hold for now.     Anxiety  --started Buspar, but will add PRN Ativan given pt's increased anxiety today     DVT Prophylaxis:  Lovenox    CODE STATUS:   Code Status and Medical Interventions:   Ordered at: 10/31/18 0178      Level Of Support Discussed With:    Patient     Code Status:    CPR     Medical Interventions (Level of Support Prior to Arrest):    Full       Disposition: I expect the patient to be discharged TBD.      Electronically signed by Malia Solis MD, 11/09/18, 1:30 PM.

## 2018-11-09 NOTE — ANESTHESIA PREPROCEDURE EVALUATION
Anesthesia Evaluation     Patient summary reviewed and Nursing notes reviewed   no history of anesthetic complications:  NPO Solid Status: > 8 hours  NPO Liquid Status: > 8 hours           Airway   Mallampati: III  TM distance: >3 FB  Neck ROM: full  No difficulty expected  Dental      Pulmonary - normal exam   (+) a smoker,   Cardiovascular - normal exam    (+) hyperlipidemia,       Neuro/Psych- negative ROS  GI/Hepatic/Renal/Endo    (+) obesity, morbid obesity,  diabetes mellitus,     Musculoskeletal     Abdominal    Substance History      OB/GYN          Other                        Anesthesia Plan    ASA 3     general     intravenous induction   Anesthetic plan, all risks, benefits, and alternatives have been provided, discussed and informed consent has been obtained with: patient.    Plan discussed with CRNA.

## 2018-11-10 LAB
ANION GAP SERPL CALCULATED.3IONS-SCNC: 5 MMOL/L (ref 3–11)
ANION GAP SERPL CALCULATED.3IONS-SCNC: 9 MMOL/L (ref 3–11)
BUN BLD-MCNC: 14 MG/DL (ref 9–23)
BUN BLD-MCNC: 22 MG/DL (ref 9–23)
BUN/CREAT SERPL: 21.9 (ref 7–25)
BUN/CREAT SERPL: 24.2 (ref 7–25)
CALCIUM SPEC-SCNC: 9 MG/DL (ref 8.7–10.4)
CALCIUM SPEC-SCNC: 9.4 MG/DL (ref 8.7–10.4)
CHLORIDE SERPL-SCNC: 94 MMOL/L (ref 99–109)
CHLORIDE SERPL-SCNC: 96 MMOL/L (ref 99–109)
CO2 SERPL-SCNC: 26 MMOL/L (ref 20–31)
CO2 SERPL-SCNC: 27 MMOL/L (ref 20–31)
CREAT BLD-MCNC: 0.64 MG/DL (ref 0.6–1.3)
CREAT BLD-MCNC: 0.91 MG/DL (ref 0.6–1.3)
DEPRECATED RDW RBC AUTO: 41.7 FL (ref 37–54)
ERYTHROCYTE [DISTWIDTH] IN BLOOD BY AUTOMATED COUNT: 13.1 % (ref 11.3–14.5)
GFR SERPL CREATININE-BSD FRML MDRD: 63 ML/MIN/1.73
GFR SERPL CREATININE-BSD FRML MDRD: 94 ML/MIN/1.73
GLUCOSE BLD-MCNC: 152 MG/DL (ref 70–100)
GLUCOSE BLD-MCNC: 200 MG/DL (ref 70–100)
GLUCOSE BLDC GLUCOMTR-MCNC: 161 MG/DL (ref 70–130)
GLUCOSE BLDC GLUCOMTR-MCNC: 199 MG/DL (ref 70–130)
GLUCOSE BLDC GLUCOMTR-MCNC: 220 MG/DL (ref 70–130)
GLUCOSE BLDC GLUCOMTR-MCNC: 237 MG/DL (ref 70–130)
HCT VFR BLD AUTO: 30.6 % (ref 34.5–44)
HGB BLD-MCNC: 10.1 G/DL (ref 11.5–15.5)
MCH RBC QN AUTO: 28.5 PG (ref 27–31)
MCHC RBC AUTO-ENTMCNC: 33 G/DL (ref 32–36)
MCV RBC AUTO: 86.4 FL (ref 80–99)
PLATELET # BLD AUTO: 338 10*3/MM3 (ref 150–450)
PMV BLD AUTO: 8.4 FL (ref 6–12)
POTASSIUM BLD-SCNC: 4.2 MMOL/L (ref 3.5–5.5)
POTASSIUM BLD-SCNC: 4.3 MMOL/L (ref 3.5–5.5)
RBC # BLD AUTO: 3.54 10*6/MM3 (ref 3.89–5.14)
SODIUM BLD-SCNC: 128 MMOL/L (ref 132–146)
SODIUM BLD-SCNC: 129 MMOL/L (ref 132–146)
SODIUM BLD-SCNC: 130 MMOL/L (ref 132–146)
SODIUM BLD-SCNC: 130 MMOL/L (ref 132–146)
WBC NRBC COR # BLD: 9.97 10*3/MM3 (ref 3.5–10.8)

## 2018-11-10 PROCEDURE — 84295 ASSAY OF SERUM SODIUM: CPT | Performed by: INTERNAL MEDICINE

## 2018-11-10 PROCEDURE — 82962 GLUCOSE BLOOD TEST: CPT

## 2018-11-10 PROCEDURE — 99232 SBSQ HOSP IP/OBS MODERATE 35: CPT | Performed by: NURSE PRACTITIONER

## 2018-11-10 PROCEDURE — 25010000002 ENOXAPARIN PER 10 MG: Performed by: INTERNAL MEDICINE

## 2018-11-10 PROCEDURE — 80048 BASIC METABOLIC PNL TOTAL CA: CPT | Performed by: INTERNAL MEDICINE

## 2018-11-10 PROCEDURE — 85027 COMPLETE CBC AUTOMATED: CPT | Performed by: INTERNAL MEDICINE

## 2018-11-10 PROCEDURE — 99233 SBSQ HOSP IP/OBS HIGH 50: CPT | Performed by: INTERNAL MEDICINE

## 2018-11-10 RX ORDER — TOLVAPTAN 15 MG/1
7.5 TABLET ORAL ONCE
Status: COMPLETED | OUTPATIENT
Start: 2018-11-10 | End: 2018-11-10

## 2018-11-10 RX ADMIN — ENOXAPARIN SODIUM 40 MG: 40 INJECTION SUBCUTANEOUS at 05:45

## 2018-11-10 RX ADMIN — PANTOPRAZOLE SODIUM 40 MG: 40 TABLET, DELAYED RELEASE ORAL at 05:45

## 2018-11-10 RX ADMIN — INSULIN LISPRO 3 UNITS: 100 INJECTION, SOLUTION INTRAVENOUS; SUBCUTANEOUS at 20:47

## 2018-11-10 RX ADMIN — BUSPIRONE HYDROCHLORIDE 10 MG: 10 TABLET ORAL at 20:45

## 2018-11-10 RX ADMIN — CETIRIZINE HYDROCHLORIDE 10 MG: 10 TABLET, FILM COATED ORAL at 08:21

## 2018-11-10 RX ADMIN — TOLVAPTAN 7.5 MG: 15 TABLET ORAL at 16:28

## 2018-11-10 RX ADMIN — BUSPIRONE HYDROCHLORIDE 10 MG: 10 TABLET ORAL at 08:21

## 2018-11-10 RX ADMIN — NICOTINE 1 PATCH: 21 PATCH, EXTENDED RELEASE TRANSDERMAL at 08:21

## 2018-11-10 RX ADMIN — HYDROCODONE BITARTRATE AND ACETAMINOPHEN 1 TABLET: 5; 325 TABLET ORAL at 05:45

## 2018-11-10 RX ADMIN — LEVOTHYROXINE SODIUM 75 MCG: 75 TABLET ORAL at 05:44

## 2018-11-10 RX ADMIN — INSULIN LISPRO 2 UNITS: 100 INJECTION, SOLUTION INTRAVENOUS; SUBCUTANEOUS at 07:18

## 2018-11-10 RX ADMIN — FAMOTIDINE 20 MG: 20 TABLET ORAL at 08:21

## 2018-11-10 RX ADMIN — CARVEDILOL 6.25 MG: 6.25 TABLET, FILM COATED ORAL at 08:21

## 2018-11-10 RX ADMIN — HYDROCODONE POLISTIREX AND CHLORPHENIRAMINE POLISTIREX 2.5 ML: 10; 8 SUSPENSION, EXTENDED RELEASE ORAL at 07:18

## 2018-11-10 RX ADMIN — INSULIN LISPRO 2 UNITS: 100 INJECTION, SOLUTION INTRAVENOUS; SUBCUTANEOUS at 16:33

## 2018-11-10 RX ADMIN — BUSPIRONE HYDROCHLORIDE 10 MG: 10 TABLET ORAL at 16:28

## 2018-11-10 RX ADMIN — ASPIRIN 81 MG: 81 TABLET, COATED ORAL at 08:21

## 2018-11-10 RX ADMIN — CARVEDILOL 6.25 MG: 6.25 TABLET, FILM COATED ORAL at 16:29

## 2018-11-10 RX ADMIN — INSULIN LISPRO 3 UNITS: 100 INJECTION, SOLUTION INTRAVENOUS; SUBCUTANEOUS at 12:23

## 2018-11-10 RX ADMIN — FAMOTIDINE 20 MG: 20 TABLET ORAL at 20:45

## 2018-11-10 NOTE — PLAN OF CARE
Problem: Patient Care Overview  Goal: Plan of Care Review   11/10/18 1609   Coping/Psychosocial   Plan of Care Reviewed With patient;family   Plan of Care Review   Progress no change     Goal: Discharge Needs Assessment  Outcome: Ongoing (interventions implemented as appropriate)   10/31/18 2020 11/01/18 1132 11/02/18 0318   Discharge Needs Assessment   Readmission Within the Last 30 Days --  --  no previous admission in last 30 days   Concerns to be Addressed --  --  denies needs/concerns at this time   Patient/Family Anticipates Transition to home with family --  --    Patient/Family Anticipated Services at Transition  --  --    Transportation Anticipated family or friend will provide --  --    Equipment Needed After Discharge --  none --    Disability   Equipment Currently Used at Home --  none --      Goal: Interprofessional Rounds/Family Conf  Outcome: Ongoing (interventions implemented as appropriate)   11/09/18 1604   Interdisciplinary Rounds/Family Conf   Participants nursing;family;physician       Problem: Pain, Acute (Adult)  Goal: Acceptable Pain Control/Comfort Level  Outcome: Ongoing (interventions implemented as appropriate)   11/10/18 1609   Pain, Acute (Adult)   Acceptable Pain Control/Comfort Level making progress toward outcome       Problem: Diabetes, Type 2 (Adult)  Goal: Signs and Symptoms of Listed Potential Problems Will be Absent, Minimized or Managed (Diabetes, Type 2)  Outcome: Ongoing (interventions implemented as appropriate)   11/07/18 0506 11/09/18 1604   Goal/Outcome Evaluation   Problems Assessed (Type 2 Diabetes) --  all   Problems Present (Type 2 Diabetes) hyperglycemia --

## 2018-11-10 NOTE — PROGRESS NOTES
"INPATIENT PULMONARY SERVICE  PROGRESS NOTE     Hospital:  LOS: 10 days      S   Ms. Shauna Valencia, 63 y.o. female is followed for:    JESSICA mass    Interval History:  Small hemoptysis in the morning that appears to be dark \"old\" blood.  She doesn't understand why her sodium is persistently low.    Has a poor appetite.  Wants to go home.     The patient's relevant past medical, surgical and social history were reviewed and updated in Epic as appropriate.      ROS:   Constitutional: Negative for fever.   Respiratory: Negative for dyspnea.   Cardiovascular: Negative for chest pain.   Gastrointestinal: Negative for  nausea, vomiting and diarrhea.        O     Vitals:  Temp: 97.7 °F (36.5 °C) (11/10/18 0712) Temp  Min: 97.6 °F (36.4 °C)  Max: 97.7 °F (36.5 °C)   BP: 151/81 (11/10/18 1629) BP  Min: 139/79  Max: 151/81   Pulse: 75 (11/10/18 1629) Pulse  Min: 75  Max: 84   Resp: 18 (11/10/18 0712) Resp  Min: 18  Max: 18   SpO2: 98 % (11/09/18 1220) No Data Recorded   Device: room air (11/10/18 0800)    Flow Rate: 2 (11/09/18 1220) No Data Recorded     Physical Examination  Telemetry:  Rhythm: (no tele ordered at this time) (11/09/18 2000)         Constitutional:  No acute distress.   Cardiovascular: Normal rate, regular and rhythm. Normal heart sounds.  No murmurs, gallop or rub.   Respiratory: No respiratory distress. Normal respiratory effort.  Normal breath sounds  Clear to auscultation and percussion.    Abdominal:  Soft. No masses. Non-tender. No distension. No HSM.   Extremities: No digital cyanosis. No clubbing.  Mild Edema.   Neurological:   Alert and Oriented to person, place, and time.  Best Eye Response: 4-->(E4) spontaneous (11/10/18 0800)  Best Motor Response: 6-->(M6) obeys commands (11/10/18 0800)  Best Verbal Response: 5-->(V5) oriented (11/10/18 0800)  [place] Coma Scale Score: 15 (11/10/18 0800)   Lines/Drains/Airways: Peripheral IV(s)     Hematology:  Results from last 7 days   Lab Units  11/10/18   0322  " 11/09/18   0349  11/08/18   0720   WBC 10*3/mm3  9.97  8.25  10.17   HEMOGLOBIN g/dL  10.1*  9.8*  9.8*   MCV fL  86.4  87.6  85.5   PLATELETS 10*3/mm3  338  352  374     Chemistry:  Estimated Creatinine Clearance: 94.1 mL/min (by C-G formula based on SCr of 0.64 mg/dL).    Results from last 7 days   Lab Units  11/10/18   1504  11/10/18   1221  11/10/18   1006  11/10/18   0327   11/09/18   0349   11/08/18   0720   SODIUM mmol/L  130*  128*  128*  128*   < >  137   < >  138   POTASSIUM mmol/L   --    --    --   4.3   --   4.3   --   4.8   CHLORIDE mmol/L   --    --    --   96*   --   104   --   104   CO2 mmol/L   --    --    --   27.0   --   28.0   --   28.0   ANION GAP mmol/L   --    --    --   5.0   --   5.0   --   6.0   GLUCOSE mg/dL   --    --    --   152*   --   103*   --   117*   CALCIUM mg/dL   --    --    --   9.0   --   8.8   --   8.8    < > = values in this interval not displayed.     Results from last 7 days   Lab Units  11/10/18   0327  11/09/18   0349  11/08/18   0720   BUN mg/dL  14  16  15   CREATININE mg/dL  0.64  0.76  0.65     Images:  Xr Chest 1 View    Result Date: 11/9/2018  Soft tissue mass again seen in the left perihilar region. PICC line catheter on the right tip in the SVC. No new focal parenchymal opacification present.  D:  11/09/2018 E:  11/09/2018  This report was finalized on 11/9/2018 4:33 PM by Dr. Amelie Teran MD.      I reviewed the patient's new laboratory and imaging results.  I independently reviewed the patient's new images.    Medications: Reviewed.    Assessment/Plan   A / P     63 y.o.female, admitted on 10/31/2018 with:     10/31/2018      Impressions:  1. JESSICA mass  1. Bronch with EBUS 11/06/18   Lab Results   Component Value Date    FINALDX  11/06/2018      1. BRONCH BRUSH, LEFT UPPER LOBE:  Negative for malignant cells.   2. BRONCH WASH, LEFT UPPER LOBE:  Negative for malignant cells.    This diagnosis was rendered by Justo Correa M.D. at [facility] . See scanned report for full consultative opinion.          TBNA and EBBx: also non-diagnostic.      2. Bronch with EBUS 11/9/18 by Dr. Scott    2. Hyponatremia, now on tolvaptan } Renal  3. Dyslipidemia  4. CAD s/p stents, on Brillinta at home.  5. Tobacco use  6. Obesity II. Body mass index is 37.41 kg/m².     Diet: Diet Regular; Thin; Consistent Carbohydrate     Orders Placed This Encounter      Dietary Nutrition Supplements Boost Pudding     Advance Directives: Code Status and Medical Interventions:   Ordered at: 10/31/18 1909     Level Of Support Discussed With:    Patient     Code Status:    CPR     Medical Interventions (Level of Support Prior to Arrest):    Full        Assessment / Plan:  1. Na slowly improving, now on tolvaptan per Nephrology.  2. Follow up pathology/cytology from EBUS on 11/9/18  3. Discussed with patient and family that results should be back by Tuesday  4. She will also need a PET scan.    I discussed the patient's findings and my recommendations with patient, family and nursing staff    MATTHEW Bustos, AGACNP-BC, FNP-BC  Pulmonary and Critical Care Service

## 2018-11-10 NOTE — PROGRESS NOTES
"   LOS: 10 days    Patient Care Team:  Rox Singleton MD as PCP - General (Internal Medicine)    Subjective     Stable overnight    Objective     Vital Signs:  Blood pressure 146/73, pulse 84, temperature 97.7 °F (36.5 °C), temperature source Oral, resp. rate 18, height 154.9 cm (61\"), weight 89.8 kg (198 lb), SpO2 98 %.    Flowsheet Rows      First Filed Value   Admission Height  154.9 cm (61\") Documented at 10/31/2018 1424   Admission Weight  89.8 kg (198 lb) Documented at 10/31/2018 1424          11/09 0701 - 11/10 0700  In: 1000 [I.V.:1000]  Out: 2400 [Urine:2400]    Physical Exam:    General Appearance:  WDWF NAD  Psych:   awake alert   CV:  RRR No edema  Lungs: respirations regular and unlabored,  CTA  Abdomen: not distended, Soft NTTP  :  No schwartz  Skin: No rash, Warm and dry      Labs:  Results from last 7 days   Lab Units  11/10/18   0322  11/09/18   0349  11/08/18   0720   WBC 10*3/mm3  9.97  8.25  10.17   HEMOGLOBIN g/dL  10.1*  9.8*  9.8*   PLATELETS 10*3/mm3  338  352  374     Results from last 7 days   Lab Units  11/10/18   1006  11/10/18   0327  11/09/18   2213  11/09/18   1904   11/09/18   0349   11/08/18   0720   11/07/18   0615   11/06/18   0514   SODIUM mmol/L  128*  128*  130*  131*   < >  137   < >  138   < >  125*   < >  123*   POTASSIUM mmol/L   --   4.3   --    --    --   4.3   --   4.8   --   4.5   < >  4.3   CHLORIDE mmol/L   --   96*   --    --    --   104   --   104   --   93*   < >  88*   CO2 mmol/L   --   27.0   --    --    --   28.0   --   28.0   --   22.0   < >  28.0   BUN mg/dL   --   14   --    --    --   16   --   15   --   10   < >  12   CREATININE mg/dL   --   0.64   --    --    --   0.76   --   0.65   --   0.65   < >  0.57*   CALCIUM mg/dL   --   9.0   --    --    --   8.8   --   8.8   --   8.8   < >  8.8   PHOSPHORUS mg/dL   --    --    --    --    --    --    --    --    --    --    --   4.1   MAGNESIUM mg/dL   --    --    --    --    --    --    --    --    --   2.6   " --   1.5    < > = values in this interval not displayed.                   Estimated Creatinine Clearance: 94.1 mL/min (by C-G formula based on SCr of 0.64 mg/dL).         A/P:    Hyponatremia:  Na back down overnight. Restart tolvaptan at 7.5mg.  Watch closely at pt corrected rapidly last time. Avoid NS.  Go back off fluid restriction as per protocol.  Cont to monitor.      HTN:  BP stable     Lung Cancer    High risk medication    Horacio Caruso MD  11/10/18  12:43 PM

## 2018-11-10 NOTE — PROGRESS NOTES
[facility] Medicine Services  PROGRESS NOTE    Patient Name: Shauna Valencia  : 1955  MRN: [id number]    Date of Admission: 10/31/2018  Length of Stay: 10  Primary Care Physician: Rox Singleton MD    Subjective   Subjective     CC:  Weakness    HPI:  Pt feeling well today, denies any issues overnight.     Review of Systems   Gen- No fevers, chills  CV- No chest pain, palpitations  Resp- No cough, dyspnea  GI- No N/V/D, abd pain    Otherwise ROS is negative except as mentioned in the HPI.    Objective   Objective     Vital Signs:   Temp:  [97.6 °F (36.4 °C)-98.2 °F (36.8 °C)] 97.7 °F (36.5 °C)  Heart Rate:  [71-86] 84  Resp:  [16-18] 18  BP: (110-156)/(64-90) 146/73        Physical Exam:  Constitutional: No acute distress, awake, alert  HENT: NCAT, mucous membranes moist  Respiratory: rhonchi right side otherwise clear with normal respiratory effort  Cardiovascular: RRR, no murmurs, rubs, or gallops, palpable pedal pulses bilaterally  Gastrointestinal: Positive bowel sounds, soft, nontender, nondistended  Musculoskeletal: No bilateral ankle edema  Psychiatric: Appropriate affect, cooperative  Neurologic: Oriented x 3, strength symmetric in all extremities, Cranial Nerves grossly intact to confrontation, speech clear  Skin: No rashes    Results Reviewed:  I have personally reviewed current lab, radiology, and data and agree.    Results from last 7 days   Lab Units  11/10/18   0322  18   0349  18   0720   18   0514   WBC 10*3/mm3  9.97  8.25  10.17   < >   --    HEMOGLOBIN g/dL  10.1*  9.8*  9.8*   < >   --    HEMATOCRIT %  30.6*  29.6*  29.4*   < >   --    PLATELETS 10*3/mm3  338  352  374   < >   --    INR    --    --    --    --   0.96    < > = values in this interval not displayed.     Results from last 7 days   Lab Units  11/10/18   0327  18   2213  18   1904   18   0349   18   0720   SODIUM mmol/L  128*  130*  131*   < >  137   < >   138   POTASSIUM mmol/L  4.3   --    --    --   4.3   --   4.8   CHLORIDE mmol/L  96*   --    --    --   104   --   104   CO2 mmol/L  27.0   --    --    --   28.0   --   28.0   BUN mg/dL  14   --    --    --   16   --   15   CREATININE mg/dL  0.64   --    --    --   0.76   --   0.65   GLUCOSE mg/dL  152*   --    --    --   103*   --   117*   CALCIUM mg/dL  9.0   --    --    --   8.8   --   8.8    < > = values in this interval not displayed.     Estimated Creatinine Clearance: 94.1 mL/min (by C-G formula based on SCr of 0.64 mg/dL).  No results found for: BNP    Microbiology Results Abnormal     Procedure Component Value - Date/Time    Respiratory Culture - Wash, Bronchus [[id number]] Collected:  11/09/18 1147    Lab Status:  Preliminary result Specimen:  Wash from Bronchus Updated:  11/10/18 0643     Respiratory Culture Scant growth (1+) Normal Respiratory Shavon    AFB Culture - Wash, Bronchus [[id number]] Collected:  11/09/18 1147    Lab Status:  Preliminary result Specimen:  Wash from Bronchus Updated:  11/09/18 1435     AFB Stain Rare (1+) Epithelial cells per low power field      Few (2+) WBCs per low power field      No organisms seen    Respiratory Culture - Wash, Lung, Left Upper Lobe [[id number]] Collected:  11/06/18 1225    Lab Status:  Final result Specimen:  Wash from Lung, Left Upper Lobe Updated:  11/08/18 0732     Respiratory Culture Scant growth (1+) Normal Respiratory Shavon     Gram Stain No WBCs or organisms seen    AFB Culture - Wash, Lung, Left Upper Lobe [[id number]] Collected:  11/06/18 1225    Lab Status:  Preliminary result Specimen:  Wash from Lung, Left Upper Lobe Updated:  11/07/18 1216     AFB Stain No acid fast bacilli seen on concentrated smear          Imaging Results (last 24 hours)     Procedure Component Value Units Date/Time    XR Chest 1 View [[id number]] Collected:  11/09/18 1042     Updated:  11/09/18 1635    Narrative:       EXAMINATION: XR CHEST 1 VW- 11/09/2018     INDICATION:  E87.4-Vlcn-yezhewzewi and hyponatremia; R06.00-Dyspnea,  unspecified; R13.13-Dysphagia, pharyngeal phase; R91.8-Other nonspecific  abnormal finding of lung field; R91.8-Other nonspecific abnormal finding  of lung field      COMPARISON: 11/06/2018     FINDINGS: Portable chest reveals improvement seen in aeration of the  left perihilar region. Underlying soft tissue mass suggesting adenopathy  remains. There is a PICC line catheter identified on the right tip in  the SVC. Cardiac silhouette is borderline enlarged. Lung volumes are  low.           Impression:       Soft tissue mass again seen in the left perihilar region.  PICC line catheter on the right tip in the SVC. No new focal parenchymal  opacification present.     D:  11/09/2018  E:  11/09/2018     This report was finalized on 11/9/2018 4:33 PM by Dr. Amelie Teran MD.                I have reviewed the medications.      aspirin 81 mg Oral Daily   busPIRone 10 mg Oral TID   carvedilol 6.25 mg Oral BID With Meals   cetirizine 10 mg Oral Daily   enoxaparin 40 mg Subcutaneous Q24H   famotidine 20 mg Oral BID   insulin lispro 0-7 Units Subcutaneous 4x Daily With Meals & Nightly   levothyroxine 75 mcg Oral Daily   nicotine 1 patch Transdermal Q24H   pantoprazole 40 mg Oral Q AM         Assessment/Plan   Assessment / Plan     Active Hospital Problems    Diagnosis   • Mass of upper lobe of left lung   • Mediastinal lymphadenopathy   • Acute hyponatremia   • Hyperlipidemia   • Type 2 diabetes mellitus (CMS/HCC)   • Tobacco abuse   • Obesity (BMI 30-39.9)          Brief Hospital Course to date:  Shauna Valencia is a 63 y.o. female here with hyponatremia, likely SIADH, and found to have a lung mass      Assessment & Plan:  L hilar mass  --s/p bronchoscopy, path did not show any malignant cells.  Repeat EBUS 11/9 with Dr. Scott.     Hx of RCA stent, chart reviewed, 7/2017  --held Brilinta, aspirin only    Hyponatremia  --probable SIADH  --checked AM cortisol  --TSH  normal  -- NAL following, had Tolvaptan this admission. Continue to monitor closely, down slightly today.     Dysphagia  --SLP following, recommended honey thick diet- pt pretty resistant to modified diets in the past.  Will do comfort diet for now as pt unable to tolerate honey thick and needs to get as much PO hydration as possible, per NAL.     R LE pain  --checked duplex, superficial clot only  --has hx of sciatica with R leg pain    Tobacco abuse    Obesity    HL  --on Repatha once every two weeks at home, pt concerned that she missed a dose while here.  Have tried to reassure her that this is okay. We do not have this on formulary, so will hold for now.     Anxiety  --started Buspar and added PRN Ativan for now    DVT Prophylaxis:  Lovenox    CODE STATUS:   Code Status and Medical Interventions:   Ordered at: 10/31/18 1909     Level Of Support Discussed With:    Patient     Code Status:    CPR     Medical Interventions (Level of Support Prior to Arrest):    Full       Disposition: I expect the patient to be discharged TBD.      Electronically signed by Malia Solis MD, 11/10/18, 10:34 AM.

## 2018-11-11 LAB
ANION GAP SERPL CALCULATED.3IONS-SCNC: 7 MMOL/L (ref 3–11)
ANION GAP SERPL CALCULATED.3IONS-SCNC: 8 MMOL/L (ref 3–11)
ANION GAP SERPL CALCULATED.3IONS-SCNC: 9 MMOL/L (ref 3–11)
BACTERIA SPEC RESP CULT: NORMAL
BUN BLD-MCNC: 16 MG/DL (ref 9–23)
BUN BLD-MCNC: 17 MG/DL (ref 9–23)
BUN BLD-MCNC: 22 MG/DL (ref 9–23)
BUN/CREAT SERPL: 18.6 (ref 7–25)
BUN/CREAT SERPL: 25 (ref 7–25)
BUN/CREAT SERPL: 25.9 (ref 7–25)
CALCIUM SPEC-SCNC: 8.8 MG/DL (ref 8.7–10.4)
CALCIUM SPEC-SCNC: 8.9 MG/DL (ref 8.7–10.4)
CALCIUM SPEC-SCNC: 9.5 MG/DL (ref 8.7–10.4)
CHLORIDE SERPL-SCNC: 97 MMOL/L (ref 99–109)
CHLORIDE SERPL-SCNC: 97 MMOL/L (ref 99–109)
CHLORIDE SERPL-SCNC: 98 MMOL/L (ref 99–109)
CO2 SERPL-SCNC: 27 MMOL/L (ref 20–31)
CO2 SERPL-SCNC: 28 MMOL/L (ref 20–31)
CO2 SERPL-SCNC: 28 MMOL/L (ref 20–31)
CREAT BLD-MCNC: 0.68 MG/DL (ref 0.6–1.3)
CREAT BLD-MCNC: 0.85 MG/DL (ref 0.6–1.3)
CREAT BLD-MCNC: 0.86 MG/DL (ref 0.6–1.3)
DEPRECATED RDW RBC AUTO: 40.2 FL (ref 37–54)
ERYTHROCYTE [DISTWIDTH] IN BLOOD BY AUTOMATED COUNT: 13.1 % (ref 11.3–14.5)
GFR SERPL CREATININE-BSD FRML MDRD: 67 ML/MIN/1.73
GFR SERPL CREATININE-BSD FRML MDRD: 68 ML/MIN/1.73
GFR SERPL CREATININE-BSD FRML MDRD: 88 ML/MIN/1.73
GLUCOSE BLD-MCNC: 190 MG/DL (ref 70–100)
GLUCOSE BLD-MCNC: 89 MG/DL (ref 70–100)
GLUCOSE BLD-MCNC: 98 MG/DL (ref 70–100)
GLUCOSE BLDC GLUCOMTR-MCNC: 102 MG/DL (ref 70–130)
GLUCOSE BLDC GLUCOMTR-MCNC: 135 MG/DL (ref 70–130)
GLUCOSE BLDC GLUCOMTR-MCNC: 151 MG/DL (ref 70–130)
GLUCOSE BLDC GLUCOMTR-MCNC: 174 MG/DL (ref 70–130)
GRAM STN SPEC: NORMAL
HCT VFR BLD AUTO: 30 % (ref 34.5–44)
HGB BLD-MCNC: 10.1 G/DL (ref 11.5–15.5)
MCH RBC QN AUTO: 28.8 PG (ref 27–31)
MCHC RBC AUTO-ENTMCNC: 33.7 G/DL (ref 32–36)
MCV RBC AUTO: 85.5 FL (ref 80–99)
PLATELET # BLD AUTO: 358 10*3/MM3 (ref 150–450)
PMV BLD AUTO: 8.5 FL (ref 6–12)
POTASSIUM BLD-SCNC: 3.7 MMOL/L (ref 3.5–5.5)
POTASSIUM BLD-SCNC: 3.9 MMOL/L (ref 3.5–5.5)
POTASSIUM BLD-SCNC: 4 MMOL/L (ref 3.5–5.5)
RBC # BLD AUTO: 3.51 10*6/MM3 (ref 3.89–5.14)
SODIUM BLD-SCNC: 130 MMOL/L (ref 132–146)
SODIUM BLD-SCNC: 131 MMOL/L (ref 132–146)
SODIUM BLD-SCNC: 132 MMOL/L (ref 132–146)
SODIUM BLD-SCNC: 133 MMOL/L (ref 132–146)
SODIUM BLD-SCNC: 134 MMOL/L (ref 132–146)
SODIUM BLD-SCNC: 134 MMOL/L (ref 132–146)
SODIUM BLD-SCNC: 137 MMOL/L (ref 132–146)
WBC NRBC COR # BLD: 9.16 10*3/MM3 (ref 3.5–10.8)

## 2018-11-11 PROCEDURE — 84295 ASSAY OF SERUM SODIUM: CPT | Performed by: INTERNAL MEDICINE

## 2018-11-11 PROCEDURE — 82962 GLUCOSE BLOOD TEST: CPT

## 2018-11-11 PROCEDURE — 99233 SBSQ HOSP IP/OBS HIGH 50: CPT | Performed by: INTERNAL MEDICINE

## 2018-11-11 PROCEDURE — 85027 COMPLETE CBC AUTOMATED: CPT | Performed by: INTERNAL MEDICINE

## 2018-11-11 PROCEDURE — 80048 BASIC METABOLIC PNL TOTAL CA: CPT | Performed by: INTERNAL MEDICINE

## 2018-11-11 PROCEDURE — 25010000002 ENOXAPARIN PER 10 MG: Performed by: INTERNAL MEDICINE

## 2018-11-11 RX ORDER — TOLVAPTAN 15 MG/1
7.5 TABLET ORAL ONCE
Status: COMPLETED | OUTPATIENT
Start: 2018-11-11 | End: 2018-11-11

## 2018-11-11 RX ORDER — TOLVAPTAN 15 MG/1
7.5 TABLET ORAL DAILY
Status: DISCONTINUED | OUTPATIENT
Start: 2018-11-12 | End: 2018-11-12

## 2018-11-11 RX ORDER — BUSPIRONE HYDROCHLORIDE 5 MG/1
5 TABLET ORAL 3 TIMES DAILY
Status: DISCONTINUED | OUTPATIENT
Start: 2018-11-11 | End: 2018-11-12

## 2018-11-11 RX ORDER — LACTULOSE 10 G/15ML
20 SOLUTION ORAL ONCE
Status: COMPLETED | OUTPATIENT
Start: 2018-11-11 | End: 2018-11-11

## 2018-11-11 RX ADMIN — PANTOPRAZOLE SODIUM 40 MG: 40 TABLET, DELAYED RELEASE ORAL at 05:32

## 2018-11-11 RX ADMIN — LACTULOSE 20 G: 10 SOLUTION ORAL at 17:03

## 2018-11-11 RX ADMIN — CARVEDILOL 6.25 MG: 6.25 TABLET, FILM COATED ORAL at 10:15

## 2018-11-11 RX ADMIN — FAMOTIDINE 20 MG: 20 TABLET ORAL at 10:15

## 2018-11-11 RX ADMIN — INSULIN LISPRO 2 UNITS: 100 INJECTION, SOLUTION INTRAVENOUS; SUBCUTANEOUS at 17:05

## 2018-11-11 RX ADMIN — BUSPIRONE HYDROCHLORIDE 5 MG: 5 TABLET ORAL at 10:15

## 2018-11-11 RX ADMIN — NICOTINE 1 PATCH: 21 PATCH, EXTENDED RELEASE TRANSDERMAL at 10:21

## 2018-11-11 RX ADMIN — POLYETHYLENE GLYCOL (3350) 17 G: 17 POWDER, FOR SOLUTION ORAL at 10:15

## 2018-11-11 RX ADMIN — FAMOTIDINE 20 MG: 20 TABLET ORAL at 21:28

## 2018-11-11 RX ADMIN — INSULIN LISPRO 2 UNITS: 100 INJECTION, SOLUTION INTRAVENOUS; SUBCUTANEOUS at 21:28

## 2018-11-11 RX ADMIN — LEVOTHYROXINE SODIUM 75 MCG: 75 TABLET ORAL at 05:32

## 2018-11-11 RX ADMIN — ASPIRIN 81 MG: 81 TABLET, COATED ORAL at 10:15

## 2018-11-11 RX ADMIN — BUSPIRONE HYDROCHLORIDE 5 MG: 5 TABLET ORAL at 21:28

## 2018-11-11 RX ADMIN — CARVEDILOL 6.25 MG: 6.25 TABLET, FILM COATED ORAL at 17:04

## 2018-11-11 RX ADMIN — CETIRIZINE HYDROCHLORIDE 10 MG: 10 TABLET, FILM COATED ORAL at 10:15

## 2018-11-11 RX ADMIN — ENOXAPARIN SODIUM 40 MG: 40 INJECTION SUBCUTANEOUS at 05:32

## 2018-11-11 RX ADMIN — TOLVAPTAN 7.5 MG: 15 TABLET ORAL at 10:24

## 2018-11-11 RX ADMIN — BUSPIRONE HYDROCHLORIDE 5 MG: 5 TABLET ORAL at 17:04

## 2018-11-11 NOTE — PLAN OF CARE
Problem: Patient Care Overview  Goal: Plan of Care Review  Outcome: Ongoing (interventions implemented as appropriate)      Problem: Pain, Acute (Adult)  Goal: Acceptable Pain Control/Comfort Level  Outcome: Ongoing (interventions implemented as appropriate)      Problem: Diabetes, Type 2 (Adult)  Goal: Signs and Symptoms of Listed Potential Problems Will be Absent, Minimized or Managed (Diabetes, Type 2)  Outcome: Ongoing (interventions implemented as appropriate)

## 2018-11-11 NOTE — PROGRESS NOTES
[facility] Medicine Services  PROGRESS NOTE    Patient Name: Shauna Valencia  : 1955  MRN: [id number]    Date of Admission: 10/31/2018  Length of Stay: 11  Primary Care Physician: Rox Singleton MD    Subjective   Subjective     CC:  Weakness    HPI:  Pt states that she feels restless, thinks it may be due to increased Buspar dose as she has experienced the same symptom for last two evenings after getting her meds at night.  Also c/o constipation for two days.     Review of Systems   Gen- No fevers, chills  CV- No chest pain, palpitations  Resp- No cough, dyspnea  GI- No N/V/D, abd pain    Otherwise ROS is negative except as mentioned in the HPI.    Objective   Objective     Vital Signs:   Temp:  [97.6 °F (36.4 °C)-98.4 °F (36.9 °C)] 98.4 °F (36.9 °C)  Heart Rate:  [71-75] 75  Resp:  [18] 18  BP: (119-151)/(61-81) 119/66        Physical Exam:  Constitutional: No acute distress, awake, alert  HENT: NCAT, mucous membranes moist  Respiratory: CTAB  Cardiovascular: RRR, no murmurs, rubs, or gallops, palpable pedal pulses bilaterally  Gastrointestinal: Positive bowel sounds, soft, nontender, nondistended  Musculoskeletal: No bilateral ankle edema  Psychiatric: Appropriate affect, cooperative  Neurologic: Oriented x 3, strength symmetric in all extremities, Cranial Nerves grossly intact to confrontation, speech clear  Skin: No rashes    Results Reviewed:  I have personally reviewed current lab, radiology, and data and agree.    Results from last 7 days   Lab Units  11/11/18   0622  11/10/18   0322  18   0349   18   0514   WBC 10*3/mm3  9.16  9.97  8.25   < >   --    HEMOGLOBIN g/dL  10.1*  10.1*  9.8*   < >   --    HEMATOCRIT %  30.0*  30.6*  29.6*   < >   --    PLATELETS 10*3/mm3  358  338  352   < >   --    INR    --    --    --    --   0.96    < > = values in this interval not displayed.     Results from last 7 days   Lab Units  18   0256   11/10/18   2344   11/10/18   1817   SODIUM mmol/L  134  134  133  133   < >  129*   POTASSIUM mmol/L  4.0   --   3.7   --   4.2   CHLORIDE mmol/L  97*   --   98*   --   94*   CO2 mmol/L  28.0   --   28.0   --   26.0   BUN mg/dL  17   --   22   --   22   CREATININE mg/dL  0.68   --   0.85   --   0.91   GLUCOSE mg/dL  89   --   190*   --   200*   CALCIUM mg/dL  8.8   --   8.9   --   9.4    < > = values in this interval not displayed.     Estimated Creatinine Clearance: 88.6 mL/min (by C-G formula based on SCr of 0.68 mg/dL).  No results found for: BNP    Microbiology Results Abnormal     Procedure Component Value - Date/Time    Respiratory Culture - Wash, Bronchus [[id number]] Collected:  11/09/18 1147    Lab Status:  Final result Specimen:  Wash from Bronchus Updated:  11/11/18 0702     Respiratory Culture Light growth (2+) Normal Respiratory Shavon     Gram Stain Few (2+) WBCs per low power field      Occasional Epithelial cells per low power field      Few (2+) Gram positive cocci in pairs and clusters    AFB Culture - Wash, Bronchus [[id number]] Collected:  11/09/18 1147    Lab Status:  Preliminary result Specimen:  Wash from Bronchus Updated:  11/10/18 1319     AFB Stain Rare (1+) Epithelial cells per low power field      Few (2+) WBCs per low power field      No organisms seen      No acid fast bacilli seen on concentrated smear    Respiratory Culture - Wash, Lung, Left Upper Lobe [[id number]] Collected:  11/06/18 1225    Lab Status:  Final result Specimen:  Wash from Lung, Left Upper Lobe Updated:  11/08/18 0732     Respiratory Culture Scant growth (1+) Normal Respiratory Shavon     Gram Stain No WBCs or organisms seen    AFB Culture - Wash, Lung, Left Upper Lobe [[id number]] Collected:  11/06/18 1225    Lab Status:  Preliminary result Specimen:  Wash from Lung, Left Upper Lobe Updated:  11/07/18 1216     AFB Stain No acid fast bacilli seen on concentrated smear          Imaging Results (last 24 hours)     ** No  results found for the last 24 hours. **             I have reviewed the medications.      aspirin 81 mg Oral Daily   busPIRone 5 mg Oral TID   carvedilol 6.25 mg Oral BID With Meals   cetirizine 10 mg Oral Daily   enoxaparin 40 mg Subcutaneous Q24H   famotidine 20 mg Oral BID   insulin lispro 0-7 Units Subcutaneous 4x Daily With Meals & Nightly   levothyroxine 75 mcg Oral Daily   nicotine 1 patch Transdermal Q24H   pantoprazole 40 mg Oral Q AM   polyethylene glycol 17 g Oral Daily   tolvaptan 7.5 mg Oral Once         Assessment/Plan   Assessment / Plan     Active Hospital Problems    Diagnosis   • Mass of upper lobe of left lung   • Mediastinal lymphadenopathy   • Acute hyponatremia   • Hyperlipidemia   • Type 2 diabetes mellitus (CMS/HCC)   • Tobacco abuse   • Obesity (BMI 30-39.9)          Brief Hospital Course to date:  Shauna Valencia is a 63 y.o. female here with hyponatremia, likely SIADH, and found to have a lung mass      Assessment & Plan:  L hilar mass  --s/p bronchoscopy, path did not show any malignant cells.  Repeat EBUS 11/9 with Dr. Scott, pathology PENDING    Hx of RCA stent, chart reviewed, 7/2017  --held Brilinta, aspirin only    Hyponatremia  --probable SIADH  --checked AM cortisol  --TSH normal  -- NAL following, continuing Tolvaptan for now. Na+ normal this am, continue to monitor as per NAL    Dysphagia  --SLP following, recommended honey thick diet- pt pretty resistant to modified diets in the past.  Will do comfort diet for now as pt unable to tolerate honey thick    R LE pain  --checked duplex, superficial clot only  --has hx of sciatica with R leg pain    Tobacco abuse    Obesity    HL  --on Repatha once every two weeks at home, pt concerned that she missed a dose while here.  Have tried to reassure her that this is okay. We do not have this on formulary, so will hold for now.     Anxiety  --started Buspar and added PRN Ativan for now. Will decrease Buspar dose back to 5mg TID as pt  intolerant to 10mg dose.     DVT Prophylaxis:  Lovenox    CODE STATUS:   Code Status and Medical Interventions:   Ordered at: 10/31/18 1909     Level Of Support Discussed With:    Patient     Code Status:    CPR     Medical Interventions (Level of Support Prior to Arrest):    Full       Disposition: I expect the patient to be discharged TBD.      Electronically signed by Malia Solis MD, 11/11/18, 9:26 AM.

## 2018-11-12 LAB
ANION GAP SERPL CALCULATED.3IONS-SCNC: 8 MMOL/L (ref 3–11)
BUN BLD-MCNC: 15 MG/DL (ref 9–23)
BUN/CREAT SERPL: 20.3 (ref 7–25)
CALCIUM SPEC-SCNC: 9.3 MG/DL (ref 8.7–10.4)
CHLORIDE SERPL-SCNC: 99 MMOL/L (ref 99–109)
CO2 SERPL-SCNC: 29 MMOL/L (ref 20–31)
CREAT BLD-MCNC: 0.74 MG/DL (ref 0.6–1.3)
DEPRECATED RDW RBC AUTO: 43 FL (ref 37–54)
ERYTHROCYTE [DISTWIDTH] IN BLOOD BY AUTOMATED COUNT: 13.5 % (ref 11.3–14.5)
GFR SERPL CREATININE-BSD FRML MDRD: 80 ML/MIN/1.73
GIE STN SPEC: NORMAL
GLUCOSE BLD-MCNC: 102 MG/DL (ref 70–100)
GLUCOSE BLDC GLUCOMTR-MCNC: 109 MG/DL (ref 70–130)
GLUCOSE BLDC GLUCOMTR-MCNC: 125 MG/DL (ref 70–130)
GLUCOSE BLDC GLUCOMTR-MCNC: 186 MG/DL (ref 70–130)
GLUCOSE BLDC GLUCOMTR-MCNC: 195 MG/DL (ref 70–130)
HCT VFR BLD AUTO: 33 % (ref 34.5–44)
HGB BLD-MCNC: 10.7 G/DL (ref 11.5–15.5)
LAB AP CASE REPORT: NORMAL
MCH RBC QN AUTO: 28.2 PG (ref 27–31)
MCHC RBC AUTO-ENTMCNC: 32.4 G/DL (ref 32–36)
MCV RBC AUTO: 87.1 FL (ref 80–99)
PATH REPORT.FINAL DX SPEC: NORMAL
PLATELET # BLD AUTO: 370 10*3/MM3 (ref 150–450)
PMV BLD AUTO: 8.8 FL (ref 6–12)
POTASSIUM BLD-SCNC: 4.2 MMOL/L (ref 3.5–5.5)
RBC # BLD AUTO: 3.79 10*6/MM3 (ref 3.89–5.14)
SODIUM BLD-SCNC: 136 MMOL/L (ref 132–146)
SODIUM BLD-SCNC: 137 MMOL/L (ref 132–146)
SODIUM BLD-SCNC: 138 MMOL/L (ref 132–146)
SODIUM BLD-SCNC: 139 MMOL/L (ref 132–146)
WBC NRBC COR # BLD: 10.57 10*3/MM3 (ref 3.5–10.8)

## 2018-11-12 PROCEDURE — 25010000002 ENOXAPARIN PER 10 MG: Performed by: INTERNAL MEDICINE

## 2018-11-12 PROCEDURE — 82962 GLUCOSE BLOOD TEST: CPT

## 2018-11-12 PROCEDURE — 84295 ASSAY OF SERUM SODIUM: CPT | Performed by: INTERNAL MEDICINE

## 2018-11-12 PROCEDURE — 80048 BASIC METABOLIC PNL TOTAL CA: CPT | Performed by: INTERNAL MEDICINE

## 2018-11-12 PROCEDURE — 85027 COMPLETE CBC AUTOMATED: CPT | Performed by: INTERNAL MEDICINE

## 2018-11-12 PROCEDURE — 99233 SBSQ HOSP IP/OBS HIGH 50: CPT | Performed by: FAMILY MEDICINE

## 2018-11-12 RX ORDER — TEMAZEPAM 15 MG/1
15 CAPSULE ORAL NIGHTLY
Status: DISCONTINUED | OUTPATIENT
Start: 2018-11-12 | End: 2018-11-12

## 2018-11-12 RX ORDER — TOLVAPTAN 15 MG/1
7.5 TABLET ORAL ONCE
Status: COMPLETED | OUTPATIENT
Start: 2018-11-12 | End: 2018-11-12

## 2018-11-12 RX ORDER — TEMAZEPAM 15 MG/1
15 CAPSULE ORAL NIGHTLY PRN
Status: DISCONTINUED | OUTPATIENT
Start: 2018-11-12 | End: 2018-11-13

## 2018-11-12 RX ORDER — TOLVAPTAN 15 MG/1
7.5 TABLET ORAL DAILY
Status: DISCONTINUED | OUTPATIENT
Start: 2018-11-13 | End: 2018-11-17 | Stop reason: HOSPADM

## 2018-11-12 RX ADMIN — LEVOTHYROXINE SODIUM 75 MCG: 75 TABLET ORAL at 06:11

## 2018-11-12 RX ADMIN — FAMOTIDINE 20 MG: 20 TABLET ORAL at 22:11

## 2018-11-12 RX ADMIN — CARVEDILOL 6.25 MG: 6.25 TABLET, FILM COATED ORAL at 08:52

## 2018-11-12 RX ADMIN — TOLVAPTAN 7.5 MG: 15 TABLET ORAL at 10:40

## 2018-11-12 RX ADMIN — INSULIN LISPRO 2 UNITS: 100 INJECTION, SOLUTION INTRAVENOUS; SUBCUTANEOUS at 12:16

## 2018-11-12 RX ADMIN — Medication 5 MG: at 02:14

## 2018-11-12 RX ADMIN — CARVEDILOL 6.25 MG: 6.25 TABLET, FILM COATED ORAL at 17:18

## 2018-11-12 RX ADMIN — NICOTINE 1 PATCH: 21 PATCH, EXTENDED RELEASE TRANSDERMAL at 08:52

## 2018-11-12 RX ADMIN — ENOXAPARIN SODIUM 40 MG: 40 INJECTION SUBCUTANEOUS at 06:10

## 2018-11-12 RX ADMIN — CETIRIZINE HYDROCHLORIDE 10 MG: 10 TABLET, FILM COATED ORAL at 08:52

## 2018-11-12 RX ADMIN — FAMOTIDINE 20 MG: 20 TABLET ORAL at 08:52

## 2018-11-12 RX ADMIN — ASPIRIN 81 MG: 81 TABLET, COATED ORAL at 08:52

## 2018-11-12 RX ADMIN — PANTOPRAZOLE SODIUM 40 MG: 40 TABLET, DELAYED RELEASE ORAL at 06:11

## 2018-11-12 RX ADMIN — INSULIN LISPRO 2 UNITS: 100 INJECTION, SOLUTION INTRAVENOUS; SUBCUTANEOUS at 22:11

## 2018-11-12 RX ADMIN — TEMAZEPAM 15 MG: 15 CAPSULE ORAL at 22:11

## 2018-11-12 NOTE — PROGRESS NOTES
[facility] Medicine Services  PROGRESS NOTE    Patient Name: Shauna Valencia  : 1955  MRN: [id number]    Date of Admission: 10/31/2018  Length of Stay: 12  Primary Care Physician: Rox Singleton MD    Subjective   Subjective     CC:  Weakness    HPI:  Pt sitting up on bed, family at bedside. Has been refusing buspar because it makes her feel jittery inside.  Just wants something to help her sleep. Not used to antidepressants, her family just thinks she needs them because she is going through so much.  Never sleeps well.     Review of Systems   Gen- No fevers, chills  CV- No chest pain, palpitations  Resp- No cough, dyspnea  GI- No N/V/D, abd pain    Otherwise ROS is negative except as mentioned in the HPI.    Objective   Objective     Vital Signs:   Temp:  [98 °F (36.7 °C)-98.4 °F (36.9 °C)] 98 °F (36.7 °C)  Heart Rate:  [72-84] 77  Resp:  [18-20] 18  BP: (106-135)/(59-74) 116/72        Physical Exam:  Constitutional: No acute distress, awake, alert  HENT: NCAT, mucous membranes moist  Respiratory: CTAB  Cardiovascular: RRR, no murmurs, rubs, or gallops, palpable pedal pulses bilaterally  Gastrointestinal: Positive bowel sounds, soft, nontender, nondistended  Musculoskeletal: No bilateral ankle edema  Psychiatric: Appropriate affect, cooperative  Neurologic: Oriented x 3, strength symmetric in all extremities, Cranial Nerves grossly intact to confrontation, speech clear  Skin: No rashes    Results Reviewed:  I have personally reviewed current lab, radiology, and data and agree.    Results from last 7 days   Lab Units  18   0622  11/10/18   0322  18   0349   18   0514   WBC 10*3/mm3  9.16  9.97  8.25   < >   --    HEMOGLOBIN g/dL  10.1*  10.1*  9.8*   < >   --    HEMATOCRIT %  30.0*  30.6*  29.6*   < >   --    PLATELETS 10*3/mm3  358  338  352   < >   --    INR    --    --    --    --   0.96    < > = values in this interval not displayed.     Results from last 7  days   Lab Units  11/12/18   0301  11/11/18   2348  11/11/18 2000 11/11/18   1339   11/11/18   0622   11/10/18   2344   SODIUM mmol/L  138  137  137   < >  132   < >  134  134   < >  133   POTASSIUM mmol/L   --    --    --    --   3.9   --   4.0   --   3.7   CHLORIDE mmol/L   --    --    --    --   97*   --   97*   --   98*   CO2 mmol/L   --    --    --    --   27.0   --   28.0   --   28.0   BUN mg/dL   --    --    --    --   16   --   17   --   22   CREATININE mg/dL   --    --    --    --   0.86   --   0.68   --   0.85   GLUCOSE mg/dL   --    --    --    --   98   --   89   --   190*   CALCIUM mg/dL   --    --    --    --   9.5   --   8.8   --   8.9    < > = values in this interval not displayed.     Estimated Creatinine Clearance: 70.1 mL/min (by C-G formula based on SCr of 0.86 mg/dL).  No results found for: BNP    Microbiology Results Abnormal     Procedure Component Value - Date/Time    Fungus Culture - Wash, Lung, Left Upper Lobe [[id number]] Collected:  11/06/18 1225    Lab Status:  Preliminary result Specimen:  Wash from Lung, Left Upper Lobe Updated:  11/11/18 1300     Fungus Culture No fungus isolated at less than 1 week    AFB Culture - Wash, Lung, Left Upper Lobe [[id number]] Collected:  11/06/18 1225    Lab Status:  Preliminary result Specimen:  Wash from Lung, Left Upper Lobe Updated:  11/11/18 1300     AFB Culture No AFB isolated at less than 1 week     AFB Stain No acid fast bacilli seen on concentrated smear    Respiratory Culture - Wash, Bronchus [[id number]] Collected:  11/09/18 1147    Lab Status:  Final result Specimen:  Wash from Bronchus Updated:  11/11/18 0702     Respiratory Culture Light growth (2+) Normal Respiratory Shavon     Gram Stain Few (2+) WBCs per low power field      Occasional Epithelial cells per low power field      Few (2+) Gram positive cocci in pairs and clusters    AFB Culture - Wash, Bronchus [[id number]] Collected:  11/09/18 1147    Lab Status:  Preliminary result  Specimen:  Wash from Bronchus Updated:  11/10/18 1319     AFB Stain Rare (1+) Epithelial cells per low power field      Few (2+) WBCs per low power field      No organisms seen      No acid fast bacilli seen on concentrated smear    Respiratory Culture - Wash, Lung, Left Upper Lobe [[id number]] Collected:  11/06/18 1225    Lab Status:  Final result Specimen:  Wash from Lung, Left Upper Lobe Updated:  11/08/18 0732     Respiratory Culture Scant growth (1+) Normal Respiratory Shavon     Gram Stain No WBCs or organisms seen          Imaging Results (last 24 hours)     ** No results found for the last 24 hours. **             I have reviewed the medications.      aspirin 81 mg Oral Daily   busPIRone 5 mg Oral TID   carvedilol 6.25 mg Oral BID With Meals   cetirizine 10 mg Oral Daily   enoxaparin 40 mg Subcutaneous Q24H   famotidine 20 mg Oral BID   insulin lispro 0-7 Units Subcutaneous 4x Daily With Meals & Nightly   levothyroxine 75 mcg Oral Daily   nicotine 1 patch Transdermal Q24H   pantoprazole 40 mg Oral Q AM   polyethylene glycol 17 g Oral Daily   tolvaptan 7.5 mg Oral Once   [START ON 11/13/2018] tolvaptan 7.5 mg Oral Daily         Assessment/Plan   Assessment / Plan     Active Hospital Problems    Diagnosis   • Mass of upper lobe of left lung   • Mediastinal lymphadenopathy   • Acute hyponatremia   • Hyperlipidemia   • Type 2 diabetes mellitus (CMS/HCC)   • Tobacco abuse   • Obesity (BMI 30-39.9)          Brief Hospital Course to date:  Shauna Valencia is a 63 y.o. female here with hyponatremia, likely SIADH, and found to have a lung mass      Assessment & Plan:  L hilar mass  --s/p bronchoscopy 11/6, path did not show any malignant cells.  Repeat EBUS 11/9 with Dr. Scott, pathology still PENDING 11/12    Hx of RCA stent, chart reviewed, 7/2017  --held Brilinta, aspirin only    Hyponatremia  --probable SIADH  --check  AM cortisol pending   --TSH normal  -- NAL following, continuing Tolvaptan for now. Na+ normal  this am, continue to monitor as per NAL, per Nephro will need to continue as outpt.     Dysphagia  --SLP following, recommended honey thick diet- pt pretty resistant to modified diets in the past.  Pt wishes for comfort diet for now as pt unable to tolerate honey thick    R LE pain  --checked duplex, superficial clot only  --has hx of sciatica with R leg pain    Tobacco abuse    Obesity    HL  --on Repatha once every two weeks at home, we do not have here, so will hold for now. Pt reassured     Anxiety  --dc buspar,    Insomnia  --restoril prn .     DVT Prophylaxis:  Lovenox    CODE STATUS:   Code Status and Medical Interventions:   Ordered at: 10/31/18 1902     Level Of Support Discussed With:    Patient     Code Status:    CPR     Medical Interventions (Level of Support Prior to Arrest):    Full       Disposition: I expect the patient to be discharged TBD.      Electronically signed by Pam Abrams DO, 11/12/18, 7:43 AM.

## 2018-11-12 NOTE — DISCHARGE PLACEMENT REQUEST
"Shauna Vera (63 y.o. Female)   To Neda  From Rocio Hutchins [phone]    For Tolvapton (samsca) 7.5mg daily x 30 days (will need Na lab work prior)    Date of Birth Social Security Number Address Home Phone MRN    1955  [address]  [facility] [address] [phone] [id number]    [employer] Marital Status          Unknown        Admission Date Admission Type Admitting Provider Attending Provider Department, Room/Bed    10/31/18 Emergency Pam Abrams, Pam Swann,  [facility] 5G, S549/1    Discharge Date Discharge Disposition Discharge Destination                       Attending Provider:  Pam Abrams DO    Allergies:  Plavix [Clopidogrel Bisulfate], Codeine, Penicillins    Isolation:  None   Infection:  None   Code Status:  CPR    Ht:  154.9 cm (61\")   Wt:  89.8 kg (198 lb)    Admission Cmt:  None   Principal Problem:  None                Active Insurance as of 10/31/2018     Primary Coverage     Payor Plan Insurance Group Employer/Plan Group    MEDICARE MEDICARE A & B      Payor Plan Address Payor Plan Phone Number Payor Plan Fax Number Effective Dates    [address] [phone]  11/1/2008 - None Entered    [facility] [id number]       Subscriber Name Subscriber Birth Date Member ID       SHAUNA VERA 1955 [id number]           Secondary Coverage     Payor Plan Insurance Group Employer/Plan Group    [place]      Payor Plan Address Payor Plan Phone Number Payor Plan Fax Number Effective Dates    [address] [address]   2/1/2014 - None Entered    [place] [address]       Subscriber Name Subscriber Birth Date Member ID       SHAUNA VERA ESTHER 1955 [id number]                 Emergency Contacts      (Rel.) Home Phone Work Phone Mobile Phone    Jade Infante (Daughter) [phone] -- --    DesireReyna (Sister) [phone] -- --            Insurance Information                " MEDICARE/MEDICARE A & B Phone: [phone]    Subscriber: Shauna Valencia Subscriber#: [id number]    Group#:  Precert#:         [employer] [place] [place] Phone:     Subscriber: Shauna Valencia Subscriber#: [id number]    Group#:  Precert#:              History & Physical      Mickey Warner MD at 10/31/2018  7:09 PM              [facility] Medicine Services  HISTORY AND PHYSICAL    Patient Name: Shauna Valencia  : 1955  MRN: [id number]  Primary Care Physician: Rox Singleton MD  Date of admission: 10/31/2018      Subjective   Subjective     Chief Complaint:  Hyponatremia    HPI:  Shauna Valencia is a 63 y.o. female with history of obesity, HTN, HLD, DM2, and tobacco abuse, who was directed for admission by Dr Banks (her endocrinologist) after routine lab revealed significant hyponatremia. At the beginning of , it was normal, but per pt, she recently had routine labs in 2018 which also revealed hyponatremia (Na 120) and her PCP. Her PCP ordered CT chest, which had been scheduled, but have not been done yet. Pt denies any new medications or SSRI.  Pt is relatively asymptomatic. She only reports persistent nagging dry cough for several months now. No unintentional weight loss, but she does admit to being on a diet and not eating very much.    Review of Systems     Otherwise 10-system ROS reviewed and is negative except as mentioned in the HPI.    Personal History     Past Medical History:   Diagnosis Date   • Diabetes mellitus (CMS/HCC)    • Hyperlipidemia    • Pancreatitis        Past Surgical History:   Procedure Laterality Date   • CAROTID STENT     • COLONOSCOPY     • UPPER GASTROINTESTINAL ENDOSCOPY         Family History: family history includes Colon polyps in her mother; Ulcerative colitis in her sister.     Social History:  reports that she has been smoking Cigarettes.  She has been smoking about 0.50 packs per day. She does not have any smokeless  tobacco history on file. She reports that she does not drink alcohol.  Social History     Social History Narrative   • No narrative on file       Medications:  Reviewed and reconciled    Allergies   Allergen Reactions   • Plavix [Clopidogrel Bisulfate] Anaphylaxis   • Codeine    • Penicillins        Objective   Objective     Vital Signs:   Temp:  [97.4 °F (36.3 °C)-98.2 °F (36.8 °C)] 97.7 °F (36.5 °C)  Heart Rate:  [] 101  Resp:  [16-17] 16  BP: (109-164)/(64-96) 109/96        Physical Exam   General Assessment: No acute cardiopulmonary distress. Well developed and well nourished.    HEENT: NCAT, PERRL, MM moist    Neck: Supple    CVS: RRR, S1S2 normal, no murmurs    Resp: CTAB, no adventitious sound    Abd: soft, NT, ND, normal BS, no guarding or peritoneal signs    Ext: No edema, both calves are symmetric and NTTP    Neuro: Nonfocal    Skin: W/D/I. No rash.    Psych: Affect is appropriate    Results Reviewed:  I have personally reviewed current lab, radiology, and data and agree.      Results from last 7 days  Lab Units 10/31/18  1710   WBC 10*3/mm3 7.54   HEMOGLOBIN g/dL 11.7   HEMATOCRIT % 33.8*   PLATELETS 10*3/mm3 422       Results from last 7 days  Lab Units 10/31/18  1507   SODIUM mmol/L 117*   POTASSIUM mmol/L 4.4   CHLORIDE mmol/L 87*   CO2 mmol/L 25.0   BUN mg/dL 12   CREATININE mg/dL 0.67   GLUCOSE mg/dL 146*   CALCIUM mg/dL 8.8     Estimated Creatinine Clearance: 90.1 mL/min (by C-G formula based on SCr of 0.67 mg/dL).  Brief Urine Lab Results  (Last result in the past 365 days)      Color   Clarity   Blood   Leuk Est   Nitrite   Protein   CREAT   Urine HCG        10/31/18 1729 Dark Yellow(A) Clear Negative Negative Negative Negative             No results found for: BNP  Imaging Results (last 24 hours)     Procedure Component Value Units Date/Time    CT Chest Without Contrast [[id number]] Collected:  10/31/18 1701     Updated:  10/31/18 1930    Narrative:       EXAM:    CT Chest Without  Intravenous Contrast     EXAM DATE/TIME:    10/31/2018 5:01 PM     CLINICAL HISTORY:    63 years old, female; Hypo-osmolality and hyponatremia; Dyspnea, unspecified;   Signs and symptoms; Cough and shortness of breath; Prior surgery; Surgery date:   6+ months; Surgery type: 5 heart stents; Additional info: SOA, hyponatremia     TECHNIQUE:    Axial computed tomography images of the chest without intravenous contrast.    All CT scans at this facility use at least one of these dose optimization   techniques: automated exposure control; mA and/or kV adjustment per patient   size (includes targeted exams where dose is matched to clinical indication); or   iterative reconstruction.    Coronal reformatted images were created and reviewed.     COMPARISON:    No relevant prior studies available.     FINDINGS:     LUNGS/PLEURA: Suboptimally evaluated located and mildly spiculated RIGHT   hilar mass measuring at least 4.4 cm in the long axis. Nodular peribronchial   LEFT upper lobe and subpleural inferior lingula airspace opacities measuring up   to 1.9 cm and centrilobular nodules in the LEFT upper lobe. Calcific granuloma   in the RIGHT normal. No pleural effusion or pneumothorax. Complete obstruction   of the LEFT apical bronchus, no other central obstructive endobronchial mass or   abnormality.     MEDIASTINUM: Heart size is normal, with trace pericardial   thickening/effusion. Severe coronary arterial calcification/coronary stents.   Atherosclerotic disease of the aorta without aortic aneurysm. Bulky prevascular   lymph node adjacent to the aortic arch measuring 3.0 cm and LEFT hilar mass   which may represent lymph nodes or primary lung malignancy.     OTHER STRUCTURES: Chronic degenerative changes in the visualized spine.   Nonspecific bilateral perinephric fat stranding and renal cortical scarring.   Small LEFT adrenal nodule measuring 1.3 cm.       Impression:       1. Findings concerning for LEFT HILAR MALIGNANT SOFT  TISSUE MASS with   postobstructive focal pneumonia/bronchiolitis in the LEFT upper lobe.   2. Likely METASTATIC mediastinal and possibly LEFT hilar lymph nodes.   3. Coronary arterial calcification suggestive of CAD.   4. Other nonemergent/incidental findings as described.     THIS DOCUMENT HAS BEEN ELECTRONICALLY SIGNED BY BATOOL SAMANO MD             Assessment/Plan   Assessment / Plan     Active Hospital Problems    Diagnosis   • Acute hyponatremia   • Hyperlipidemia   • Type 2 diabetes mellitus (CMS/HCC)   • Tobacco abuse   • Obesity (BMI 30-39.9)         Assessment & Plan:  Shauna Valencia is a 63 y.o. female with history of obesity, HTN, HLD, DM2, and tobacco abuse, who was directed for admission by Dr Banks (her endocrinologist) after routine lab revealed significant hyponatremia. Pt is asymptomatic. CT chest noted as above (left hilar malignant soft tissue mass with post obstructive focal pneumonia/bronchiolitis in the JESSICA with likely metastatic mediastinal and left hilar lymph node. Hyponatremia is likely from paraneosplastic syndrome/SIAD.    - urine studies obtained in the ED pending  - correct Na with NS slowly for now and fluid restrition to 1L per day, monitor with q4h BMP.   - Consult pulm in am  - Consider nephrology consultation PRN  - Start Rocephin and Doxy  - Pharm consulted by ED to look at potential meds as the underlying culprit. I personally reviewed med recs, no obvious medication as culprit  - check TSH/A1C in am  - low dose SSI and adjust PRN    DVT prophylaxis: Lovenox    CODE STATUS:    Code Status and Medical Interventions:   Ordered at: 10/31/18 9148     Level Of Support Discussed With:    Patient     Code Status:    CPR     Medical Interventions (Level of Support Prior to Arrest):    Full       Admission Status:  I believe this patient meets INPATIENT status due to the need for care which can only be reasonably provided in an hospital setting such as aggressive/expedited ancillary  "services and/or consultation services, the necessity for IV medications, close physician monitoring and/or the possible need for procedures.  In such, I feel patient’s risk for adverse outcomes and need for care warrant INPATIENT evaluation and predict the patient’s care encounter to likely last beyond 2 midnights.        Electronically signed by Mickey Warner MD, 10/31/18, 7:09 PM.          Electronically signed by Mickey Warner MD at 10/31/2018 10:37 PM          Physician Progress Notes (last 72 hours) (Notes from 11/9/2018  2:19 PM through 11/12/2018  2:19 PM)      Benito Donovan MD at 11/12/2018 11:31 AM             LOS: 12 days    Patient Care Team:  Rox Singleton MD as PCP - General (Internal Medicine)    Reason For Visit:  F/U HYPONATREMIA  Subjective           Review of Systems:    Pulm: No soa   CV:  No CP      Objective       aspirin 81 mg Oral Daily   busPIRone 5 mg Oral TID   carvedilol 6.25 mg Oral BID With Meals   cetirizine 10 mg Oral Daily   enoxaparin 40 mg Subcutaneous Q24H   famotidine 20 mg Oral BID   insulin lispro 0-7 Units Subcutaneous 4x Daily With Meals & Nightly   levothyroxine 75 mcg Oral Daily   nicotine 1 patch Transdermal Q24H   pantoprazole 40 mg Oral Q AM   polyethylene glycol 17 g Oral Daily   [START ON 11/13/2018] tolvaptan 7.5 mg Oral Daily       lactated ringers 9 mL/hr Last Rate: 9 mL/hr (11/09/18 1016)         Vital Signs:  Blood pressure 128/76, pulse 86, temperature 98 °F (36.7 °C), temperature source Oral, resp. rate 18, height 154.9 cm (61\"), weight 89.8 kg (198 lb), SpO2 98 %.    Flowsheet Rows      First Filed Value   Admission Height  154.9 cm (61\") Documented at 10/31/2018 1424   Admission Weight  89.8 kg (198 lb) Documented at 10/31/2018 1424          11/11 0701 - 11/12 0700  In: 720 [P.O.:720]  Out: 3450 [Urine:3450]    Physical Exam:    General Appearance: NAD, alert and cooperative, Ox3  Eyes: PER, conjunctivae and sclerae normal, " no icterus  Lungs: respirations regular and unlabored, no crepitus, clear to auscultation  Heart/CV: regular rhythm & normal rate, no murmur, no gallop, no rub and no edema  Abdomen: not distended, soft, non-tender, no masses,  bowel sounds present  Skin: No rash, Warm and dry    Radiology:            Labs:  Results from last 7 days   Lab Units  11/12/18   0708  11/11/18   0622  11/10/18   0322   WBC 10*3/mm3  10.57  9.16  9.97   HEMOGLOBIN g/dL  10.7*  10.1*  10.1*   HEMATOCRIT %  33.0*  30.0*  30.6*   PLATELETS 10*3/mm3  370  358  338     Results from last 7 days   Lab Units  11/12/18   0932  11/12/18   0708  11/12/18   0301  11/11/18   2348   11/11/18   1339   11/11/18   0622   11/10/18   2344   11/07/18   0615   11/06/18   0514   SODIUM mmol/L  138  136  138  137   < >  132   < >  134  134   < >  133   < >  125*   < >  123*   POTASSIUM mmol/L   --   4.2   --    --    --   3.9   --   4.0   --   3.7   < >  4.5   < >  4.3   CHLORIDE mmol/L   --   99   --    --    --   97*   --   97*   --   98*   < >  93*   < >  88*   CO2 mmol/L   --   29.0   --    --    --   27.0   --   28.0   --   28.0   < >  22.0   < >  28.0   BUN mg/dL   --   15   --    --    --   16   --   17   --   22   < >  10   < >  12   CREATININE mg/dL   --   0.74   --    --    --   0.86   --   0.68   --   0.85   < >  0.65   < >  0.57*   CALCIUM mg/dL   --   9.3   --    --    --   9.5   --   8.8   --   8.9   < >  8.8   < >  8.8   PHOSPHORUS mg/dL   --    --    --    --    --    --    --    --    --    --    --    --    --   4.1   MAGNESIUM mg/dL   --    --    --    --    --    --    --    --    --    --    --   2.6   --   1.5    < > = values in this interval not displayed.     Results from last 7 days   Lab Units  11/12/18   0708   GLUCOSE mg/dL  102*                       Estimated Creatinine Clearance: 81.4 mL/min (by C-G formula based on SCr of 0.74 mg/dL).      Assessment       Acute hyponatremia    Hyperlipidemia    Type 2 diabetes mellitus  "(CMS/[facility])    Tobacco abuse    Obesity (BMI 30-39.9)    Mass of upper lobe of left lung    Mediastinal lymphadenopathy            Impression: HYPONATREMIA LIKELY FROM SIADH FROM LUNG CA. LEVEL GOOD.            Recommendations: HAVE CASE MANAGEMENT ARRANGE FOR OUTPATIENT TOLVAPTAN.      Benito Donovan MD  11/12/18  11:31 AM          Electronically signed by Benito Donovan MD at 11/12/2018 11:34 AM     Horacio Caruso MD at 11/11/2018  1:44 PM             LOS: 11 days    Patient Care Team:  Rox Singleton MD as PCP - General (Internal Medicine)    Subjective     Stable overnight    Objective     Vital Signs:  Blood pressure 135/74, pulse 72, temperature 98.4 °F (36.9 °C), temperature source Oral, resp. rate 18, height 154.9 cm (61\"), weight 89.8 kg (198 lb), SpO2 98 %.    Flowsheet Rows      First Filed Value   Admission Height  154.9 cm (61\") Documented at 10/31/2018 1424   Admission Weight  89.8 kg (198 lb) Documented at 10/31/2018 1424          11/10 0701 - 11/11 0700  In: -   Out: 1400 [Urine:1400]    Physical Exam:    General Appearance:  WDWF NAD  Psych:   awake alert   CV:  RRR No edema  Lungs: respirations regular and unlabored,  CTA  Abdomen: not distended, Soft NTTP  :  No schwartz  Skin: No rash, Warm and dry      Labs:  Results from last 7 days   Lab Units  11/11/18   0622  11/10/18   0322  11/09/18   0349   WBC 10*3/mm3  9.16  9.97  8.25   HEMOGLOBIN g/dL  10.1*  10.1*  9.8*   PLATELETS 10*3/mm3  358  338  352     Results from last 7 days   Lab Units  11/11/18   1212  11/11/18   0917  11/11/18   0622  11/11/18   0256  11/10/18   2344   11/10/18   1817   11/10/18   0327   11/07/18   0615   11/06/18   0514   SODIUM mmol/L  130*  131*  134  134  133  133   < >  129*   < >  128*   < >  125*   < >  123*   POTASSIUM mmol/L   --    --   4.0   --   3.7   --   4.2   --   4.3   < >  4.5   < >  4.3   CHLORIDE mmol/L   --    --   97*   --   98*   --   94*   --   96*   < >  93*   < >  " 88*   CO2 mmol/L   --    --   28.0   --   28.0   --   26.0   --   27.0   < >  22.0   < >  28.0   BUN mg/dL   --    --   17   --   22   --   22   --   14   < >  10   < >  12   CREATININE mg/dL   --    --   0.68   --   0.85   --   0.91   --   0.64   < >  0.65   < >  0.57*   CALCIUM mg/dL   --    --   8.8   --   8.9   --   9.4   --   9.0   < >  8.8   < >  8.8   PHOSPHORUS mg/dL   --    --    --    --    --    --    --    --    --    --    --    --   4.1   MAGNESIUM mg/dL   --    --    --    --    --    --    --    --    --    --   2.6   --   1.5    < > = values in this interval not displayed.                   Estimated Creatinine Clearance: 88.6 mL/min (by C-G formula based on SCr of 0.68 mg/dL).        A/P:    Hyponatremia:  Na stable overnight.  Good UOP.  Cont tolvaptan for now.  Will need to get as outpt.    HTN:  BP stable     Lung Cancer    High risk medication    Horacio Caruso MD  18  1:44 PM            Electronically signed by Horacio Caruso MD at 2018 10:35 PM     Malia Solis MD at 2018  9:25 AM              [facility] Medicine Services  PROGRESS NOTE    Patient Name: Shauna Valencia  : 1955  MRN: [id number]    Date of Admission: 10/31/2018  Length of Stay: 11  Primary Care Physician: Rox Singleton MD    Subjective   Subjective     CC:  Weakness    HPI:  Pt states that she feels restless, thinks it may be due to increased Buspar dose as she has experienced the same symptom for last two evenings after getting her meds at night.  Also c/o constipation for two days.     Review of Systems   Gen- No fevers, chills  CV- No chest pain, palpitations  Resp- No cough, dyspnea  GI- No N/V/D, abd pain    Otherwise ROS is negative except as mentioned in the HPI.    Objective   Objective     Vital Signs:   Temp:  [97.6 °F (36.4 °C)-98.4 °F (36.9 °C)] 98.4 °F (36.9 °C)  Heart Rate:  [71-75] 75  Resp:  [18] 18  BP: (119-151)/(61-81) 119/66         Physical Exam:  Constitutional: No acute distress, awake, alert  HENT: NCAT, mucous membranes moist  Respiratory: CTAB  Cardiovascular: RRR, no murmurs, rubs, or gallops, palpable pedal pulses bilaterally  Gastrointestinal: Positive bowel sounds, soft, nontender, nondistended  Musculoskeletal: No bilateral ankle edema  Psychiatric: Appropriate affect, cooperative  Neurologic: Oriented x 3, strength symmetric in all extremities, Cranial Nerves grossly intact to confrontation, speech clear  Skin: No rashes    Results Reviewed:  I have personally reviewed current lab, radiology, and data and agree.    Results from last 7 days   Lab Units  11/11/18   0622  11/10/18   0322  11/09/18   0349   11/06/18   0514   WBC 10*3/mm3  9.16  9.97  8.25   < >   --    HEMOGLOBIN g/dL  10.1*  10.1*  9.8*   < >   --    HEMATOCRIT %  30.0*  30.6*  29.6*   < >   --    PLATELETS 10*3/mm3  358  338  352   < >   --    INR    --    --    --    --   0.96    < > = values in this interval not displayed.     Results from last 7 days   Lab Units  11/11/18   0622  11/11/18   0256  11/10/18   2344   11/10/18   1817   SODIUM mmol/L  134  134  133  133   < >  129*   POTASSIUM mmol/L  4.0   --   3.7   --   4.2   CHLORIDE mmol/L  97*   --   98*   --   94*   CO2 mmol/L  28.0   --   28.0   --   26.0   BUN mg/dL  17   --   22   --   22   CREATININE mg/dL  0.68   --   0.85   --   0.91   GLUCOSE mg/dL  89   --   190*   --   200*   CALCIUM mg/dL  8.8   --   8.9   --   9.4    < > = values in this interval not displayed.     Estimated Creatinine Clearance: 88.6 mL/min (by C-G formula based on SCr of 0.68 mg/dL).  No results found for: BNP    Microbiology Results Abnormal     Procedure Component Value - Date/Time    Respiratory Culture - Wash, Bronchus [[id number]] Collected:  11/09/18 1147    Lab Status:  Final result Specimen:  Wash from Bronchus Updated:  11/11/18 0702     Respiratory Culture Light growth (2+) Normal Respiratory Shavon     Gram Stain Few (2+)  WBCs per low power field      Occasional Epithelial cells per low power field      Few (2+) Gram positive cocci in pairs and clusters    AFB Culture - Wash, Bronchus [[id number]] Collected:  11/09/18 1147    Lab Status:  Preliminary result Specimen:  Wash from Bronchus Updated:  11/10/18 1319     AFB Stain Rare (1+) Epithelial cells per low power field      Few (2+) WBCs per low power field      No organisms seen      No acid fast bacilli seen on concentrated smear    Respiratory Culture - Wash, Lung, Left Upper Lobe [[id number]] Collected:  11/06/18 1225    Lab Status:  Final result Specimen:  Wash from Lung, Left Upper Lobe Updated:  11/08/18 0732     Respiratory Culture Scant growth (1+) Normal Respiratory Shavon     Gram Stain No WBCs or organisms seen    AFB Culture - Wash, Lung, Left Upper Lobe [[id number]] Collected:  11/06/18 1225    Lab Status:  Preliminary result Specimen:  Wash from Lung, Left Upper Lobe Updated:  11/07/18 1216     AFB Stain No acid fast bacilli seen on concentrated smear          Imaging Results (last 24 hours)     ** No results found for the last 24 hours. **             I have reviewed the medications.      aspirin 81 mg Oral Daily   busPIRone 5 mg Oral TID   carvedilol 6.25 mg Oral BID With Meals   cetirizine 10 mg Oral Daily   enoxaparin 40 mg Subcutaneous Q24H   famotidine 20 mg Oral BID   insulin lispro 0-7 Units Subcutaneous 4x Daily With Meals & Nightly   levothyroxine 75 mcg Oral Daily   nicotine 1 patch Transdermal Q24H   pantoprazole 40 mg Oral Q AM   polyethylene glycol 17 g Oral Daily   tolvaptan 7.5 mg Oral Once         Assessment/Plan   Assessment / Plan     Active Hospital Problems    Diagnosis   • Mass of upper lobe of left lung   • Mediastinal lymphadenopathy   • Acute hyponatremia   • Hyperlipidemia   • Type 2 diabetes mellitus (CMS/HCC)   • Tobacco abuse   • Obesity (BMI 30-39.9)          Brief Hospital Course to date:  Shauna Valencia is a 63 y.o. female here with  "hyponatremia, likely SIADH, and found to have a lung mass      Assessment & Plan:  L hilar mass  --s/p bronchoscopy, path did not show any malignant cells.  Repeat EBUS 11/9 with Dr. Scott, pathology PENDING    Hx of RCA stent, chart reviewed, 7/2017  --held Brilinta, aspirin only    Hyponatremia  --probable SIADH  --checked AM cortisol  --TSH normal  -- NAL following, continuing Tolvaptan for now. Na+ normal this am, continue to monitor as per NAL    Dysphagia  --SLP following, recommended honey thick diet- pt pretty resistant to modified diets in the past.  Will do comfort diet for now as pt unable to tolerate honey thick    R LE pain  --checked duplex, superficial clot only  --has hx of sciatica with R leg pain    Tobacco abuse    Obesity    HL  --on Repatha once every two weeks at home, pt concerned that she missed a dose while here.  Have tried to reassure her that this is okay. We do not have this on formulary, so will hold for now.     Anxiety  --started Buspar and added PRN Ativan for now. Will decrease Buspar dose back to 5mg TID as pt intolerant to 10mg dose.     DVT Prophylaxis:  Lovenox    CODE STATUS:   Code Status and Medical Interventions:   Ordered at: 10/31/18 1909     Level Of Support Discussed With:    Patient     Code Status:    CPR     Medical Interventions (Level of Support Prior to Arrest):    Full       Disposition: I expect the patient to be discharged TBD.      Electronically signed by Malia Solis MD, 11/11/18, 9:26 AM.        Electronically signed by Malia Solis MD at 11/11/2018  9:28 AM     Yu Lloyd APRN at 11/10/2018  6:56 PM          INPATIENT PULMONARY SERVICE  PROGRESS NOTE     Hospital:  LOS: 10 days   Subjective   S   Ms. Shauna Valencia, 63 y.o. female is followed for:    JESSICA mass    Interval History:  Small hemoptysis in the morning that appears to be dark \"old\" blood.  She doesn't understand why her sodium is persistently low.    Has a " poor appetite.  Wants to go home.     The patient's relevant past medical, surgical and social history were reviewed and updated in Epic as appropriate.      ROS:   Constitutional: Negative for fever.   Respiratory: Negative for dyspnea.   Cardiovascular: Negative for chest pain.   Gastrointestinal: Negative for  nausea, vomiting and diarrhea.     Objective   O     Vitals:  Temp: 97.7 °F (36.5 °C) (11/10/18 0712) Temp  Min: 97.6 °F (36.4 °C)  Max: 97.7 °F (36.5 °C)   BP: 151/81 (11/10/18 1629) BP  Min: 139/79  Max: 151/81   Pulse: 75 (11/10/18 1629) Pulse  Min: 75  Max: 84   Resp: 18 (11/10/18 0712) Resp  Min: 18  Max: 18   SpO2: 98 % (11/09/18 1220) No Data Recorded   Device: room air (11/10/18 0800)    Flow Rate: 2 (11/09/18 1220) No Data Recorded     Physical Examination  Telemetry:  Rhythm: (no tele ordered at this time) (11/09/18 2000)         Constitutional:  No acute distress.   Cardiovascular: Normal rate, regular and rhythm. Normal heart sounds.  No murmurs, gallop or rub.   Respiratory: No respiratory distress. Normal respiratory effort.  Normal breath sounds  Clear to auscultation and percussion.    Abdominal:  Soft. No masses. Non-tender. No distension. No HSM.   Extremities: No digital cyanosis. No clubbing.  Mild Edema.   Neurological:   Alert and Oriented to person, place, and time.  Best Eye Response: 4-->(E4) spontaneous (11/10/18 0800)  Best Motor Response: 6-->(M6) obeys commands (11/10/18 0800)  Best Verbal Response: 5-->(V5) oriented (11/10/18 0800)  [place] Coma Scale Score: 15 (11/10/18 0800)   Lines/Drains/Airways: Peripheral IV(s)     Hematology:  Results from last 7 days   Lab Units  11/10/18   0322  11/09/18   0349  11/08/18   0720   WBC 10*3/mm3  9.97  8.25  10.17   HEMOGLOBIN g/dL  10.1*  9.8*  9.8*   MCV fL  86.4  87.6  85.5   PLATELETS 10*3/mm3  338  352  374     Chemistry:  Estimated Creatinine Clearance: 94.1 mL/min (by C-G formula based on SCr of 0.64 mg/dL).    Results from last 7  days   Lab Units  11/10/18   1504  11/10/18   1221  11/10/18   1006  11/10/18   0327   11/09/18   0349   11/08/18   0720   SODIUM mmol/L  130*  128*  128*  128*   < >  137   < >  138   POTASSIUM mmol/L   --    --    --   4.3   --   4.3   --   4.8   CHLORIDE mmol/L   --    --    --   96*   --   104   --   104   CO2 mmol/L   --    --    --   27.0   --   28.0   --   28.0   ANION GAP mmol/L   --    --    --   5.0   --   5.0   --   6.0   GLUCOSE mg/dL   --    --    --   152*   --   103*   --   117*   CALCIUM mg/dL   --    --    --   9.0   --   8.8   --   8.8    < > = values in this interval not displayed.     Results from last 7 days   Lab Units  11/10/18   0327  11/09/18   0349  11/08/18   0720   BUN mg/dL  14  16  15   CREATININE mg/dL  0.64  0.76  0.65     Images:  Xr Chest 1 View    Result Date: 11/9/2018  Soft tissue mass again seen in the left perihilar region. PICC line catheter on the right tip in the SVC. No new focal parenchymal opacification present.  D:  11/09/2018 E:  11/09/2018  This report was finalized on 11/9/2018 4:33 PM by Dr. Amelie Teran MD.      I reviewed the patient's new laboratory and imaging results.  I independently reviewed the patient's new images.    Medications: Reviewed.    Assessment/Plan   A / P     63 y.o.female, admitted on 10/31/2018 with:     10/31/2018      Impressions:  1. JESSICA mass  1. Bronch with EBUS 11/06/18   Lab Results   Component Value Date    FINALDX  11/06/2018      1. BRONCH BRUSH, LEFT UPPER LOBE:  Negative for malignant cells.   2. BRONCH WASH, LEFT UPPER LOBE:  Negative for malignant cells.    This diagnosis was rendered by Justo Correa M.D. at [facility] . See scanned report for full consultative opinion.          TBNA and EBBx: also non-diagnostic.      2. Bronch with EBUS 11/9/18 by Dr. Scott    2. Hyponatremia, now on tolvaptan } Renal  3. Dyslipidemia  4. CAD s/p stents, on Brillinta at home.  5. Tobacco use  6. Obesity II.  "Body mass index is 37.41 kg/m².     Diet: Diet Regular; Thin; Consistent Carbohydrate     Orders Placed This Encounter      Dietary Nutrition Supplements Boost Pudding     Advance Directives: Code Status and Medical Interventions:   Ordered at: 10/31/18 1909     Level Of Support Discussed With:    Patient     Code Status:    CPR     Medical Interventions (Level of Support Prior to Arrest):    Full        Assessment / Plan:  1. Na slowly improving, now on tolvaptan per Nephrology.  2. Follow up pathology/cytology from EBUS on 11/9/18  3. Discussed with patient and family that results should be back by Tuesday  4. She will also need a PET scan.    I discussed the patient's findings and my recommendations with patient, family and nursing staff    MATTHEW Bustos, AGACN-BC, FNP-BC  Pulmonary and Critical Care Service          Electronically signed by Yu Lloyd APRN at 11/10/2018  7:06 PM     Horacio Caruso MD at 11/10/2018 12:43 PM             LOS: 10 days    Patient Care Team:  Rox Singleton MD as PCP - General (Internal Medicine)    Subjective     Stable overnight    Objective     Vital Signs:  Blood pressure 146/73, pulse 84, temperature 97.7 °F (36.5 °C), temperature source Oral, resp. rate 18, height 154.9 cm (61\"), weight 89.8 kg (198 lb), SpO2 98 %.    Flowsheet Rows      First Filed Value   Admission Height  154.9 cm (61\") Documented at 10/31/2018 1424   Admission Weight  89.8 kg (198 lb) Documented at 10/31/2018 1424          11/09 0701 - 11/10 0700  In: 1000 [I.V.:1000]  Out: 2400 [Urine:2400]    Physical Exam:    General Appearance:  WDWF NAD  Psych:   awake alert   CV:  RRR No edema  Lungs: respirations regular and unlabored,  CTA  Abdomen: not distended, Soft NTTP  :  No schwartz  Skin: No rash, Warm and dry      Labs:  Results from last 7 days   Lab Units  11/10/18   0322  11/09/18   0349  11/08/18   0720   WBC 10*3/mm3  9.97  8.25  10.17   HEMOGLOBIN " g/dL  10.1*  9.8*  9.8*   PLATELETS 10*3/mm3  338  352  374     Results from last 7 days   Lab Units  11/10/18   1006  11/10/18   0327  18   2213  18   1904   18   0349   18   0720   18   0615   18   0514   SODIUM mmol/L  128*  128*  130*  131*   < >  137   < >  138   < >  125*   < >  123*   POTASSIUM mmol/L   --   4.3   --    --    --   4.3   --   4.8   --   4.5   < >  4.3   CHLORIDE mmol/L   --   96*   --    --    --   104   --   104   --   93*   < >  88*   CO2 mmol/L   --   27.0   --    --    --   28.0   --   28.0   --   22.0   < >  28.0   BUN mg/dL   --   14   --    --    --   16   --   15   --   10   < >  12   CREATININE mg/dL   --   0.64   --    --    --   0.76   --   0.65   --   0.65   < >  0.57*   CALCIUM mg/dL   --   9.0   --    --    --   8.8   --   8.8   --   8.8   < >  8.8   PHOSPHORUS mg/dL   --    --    --    --    --    --    --    --    --    --    --   4.1   MAGNESIUM mg/dL   --    --    --    --    --    --    --    --    --   2.6   --   1.5    < > = values in this interval not displayed.                   Estimated Creatinine Clearance: 94.1 mL/min (by C-G formula based on SCr of 0.64 mg/dL).        A/P:    Hyponatremia:  Na back down overnight. Restart tolvaptan at 7.5mg.  Watch closely at pt corrected rapidly last time. Avoid NS.  Go back off fluid restriction as per protocol.  Cont to monitor.      HTN:  BP stable     Lung Cancer    High risk medication    Horacio Caruso MD  11/10/18  12:43 PM            Electronically signed by Horacio Caruso MD at 11/10/2018  5:05 PM     Malia Solis MD at 11/10/2018  8:34 AM              [facility]  PROGRESS NOTE    Patient Name: Shauna Valencia  : 1955  MRN: [id number]    Date of Admission: 10/31/2018  Length of Stay: 10  Primary Care Physician: Rox Singleton MD    Subjective   Subjective     CC:  Weakness    HPI:  Pt feeling well today, denies  any issues overnight.     Review of Systems   Gen- No fevers, chills  CV- No chest pain, palpitations  Resp- No cough, dyspnea  GI- No N/V/D, abd pain    Otherwise ROS is negative except as mentioned in the HPI.    Objective   Objective     Vital Signs:   Temp:  [97.6 °F (36.4 °C)-98.2 °F (36.8 °C)] 97.7 °F (36.5 °C)  Heart Rate:  [71-86] 84  Resp:  [16-18] 18  BP: (110-156)/(64-90) 146/73        Physical Exam:  Constitutional: No acute distress, awake, alert  HENT: NCAT, mucous membranes moist  Respiratory: rhonchi right side otherwise clear with normal respiratory effort  Cardiovascular: RRR, no murmurs, rubs, or gallops, palpable pedal pulses bilaterally  Gastrointestinal: Positive bowel sounds, soft, nontender, nondistended  Musculoskeletal: No bilateral ankle edema  Psychiatric: Appropriate affect, cooperative  Neurologic: Oriented x 3, strength symmetric in all extremities, Cranial Nerves grossly intact to confrontation, speech clear  Skin: No rashes    Results Reviewed:  I have personally reviewed current lab, radiology, and data and agree.    Results from last 7 days   Lab Units  11/10/18   0322  11/09/18   0349  11/08/18   0720   11/06/18   0514   WBC 10*3/mm3  9.97  8.25  10.17   < >   --    HEMOGLOBIN g/dL  10.1*  9.8*  9.8*   < >   --    HEMATOCRIT %  30.6*  29.6*  29.4*   < >   --    PLATELETS 10*3/mm3  338  352  374   < >   --    INR    --    --    --    --   0.96    < > = values in this interval not displayed.     Results from last 7 days   Lab Units  11/10/18   0327  11/09/18   2213  11/09/18   1904   11/09/18   0349   11/08/18   0720   SODIUM mmol/L  128*  130*  131*   < >  137   < >  138   POTASSIUM mmol/L  4.3   --    --    --   4.3   --   4.8   CHLORIDE mmol/L  96*   --    --    --   104   --   104   CO2 mmol/L  27.0   --    --    --   28.0   --   28.0   BUN mg/dL  14   --    --    --   16   --   15   CREATININE mg/dL  0.64   --    --    --   0.76   --   0.65   GLUCOSE mg/dL  152*   --    --     --   103*   --   117*   CALCIUM mg/dL  9.0   --    --    --   8.8   --   8.8    < > = values in this interval not displayed.     Estimated Creatinine Clearance: 94.1 mL/min (by C-G formula based on SCr of 0.64 mg/dL).  No results found for: BNP    Microbiology Results Abnormal     Procedure Component Value - Date/Time    Respiratory Culture - Wash, Bronchus [[id number]] Collected:  11/09/18 1147    Lab Status:  Preliminary result Specimen:  Wash from Bronchus Updated:  11/10/18 0643     Respiratory Culture Scant growth (1+) Normal Respiratory Shavon    AFB Culture - Wash, Bronchus [[id number]] Collected:  11/09/18 1147    Lab Status:  Preliminary result Specimen:  Wash from Bronchus Updated:  11/09/18 1435     AFB Stain Rare (1+) Epithelial cells per low power field      Few (2+) WBCs per low power field      No organisms seen    Respiratory Culture - Wash, Lung, Left Upper Lobe [[id number]] Collected:  11/06/18 1225    Lab Status:  Final result Specimen:  Wash from Lung, Left Upper Lobe Updated:  11/08/18 0732     Respiratory Culture Scant growth (1+) Normal Respiratory Shavon     Gram Stain No WBCs or organisms seen    AFB Culture - Wash, Lung, Left Upper Lobe [[id number]] Collected:  11/06/18 1225    Lab Status:  Preliminary result Specimen:  Wash from Lung, Left Upper Lobe Updated:  11/07/18 1216     AFB Stain No acid fast bacilli seen on concentrated smear          Imaging Results (last 24 hours)     Procedure Component Value Units Date/Time    XR Chest 1 View [[id number]] Collected:  11/09/18 1042     Updated:  11/09/18 1635    Narrative:       EXAMINATION: XR CHEST 1 VW- 11/09/2018     INDICATION: E87.9-Hcci-oprplthped and hyponatremia; R06.00-Dyspnea,  unspecified; R13.13-Dysphagia, pharyngeal phase; R91.8-Other nonspecific  abnormal finding of lung field; R91.8-Other nonspecific abnormal finding  of lung field      COMPARISON: 11/06/2018     FINDINGS: Portable chest reveals improvement seen in aeration of  the  left perihilar region. Underlying soft tissue mass suggesting adenopathy  remains. There is a PICC line catheter identified on the right tip in  the SVC. Cardiac silhouette is borderline enlarged. Lung volumes are  low.           Impression:       Soft tissue mass again seen in the left perihilar region.  PICC line catheter on the right tip in the SVC. No new focal parenchymal  opacification present.     D:  11/09/2018  E:  11/09/2018     This report was finalized on 11/9/2018 4:33 PM by Dr. Amelie Teran MD.                I have reviewed the medications.      aspirin 81 mg Oral Daily   busPIRone 10 mg Oral TID   carvedilol 6.25 mg Oral BID With Meals   cetirizine 10 mg Oral Daily   enoxaparin 40 mg Subcutaneous Q24H   famotidine 20 mg Oral BID   insulin lispro 0-7 Units Subcutaneous 4x Daily With Meals & Nightly   levothyroxine 75 mcg Oral Daily   nicotine 1 patch Transdermal Q24H   pantoprazole 40 mg Oral Q AM         Assessment/Plan   Assessment / Plan     Active Hospital Problems    Diagnosis   • Mass of upper lobe of left lung   • Mediastinal lymphadenopathy   • Acute hyponatremia   • Hyperlipidemia   • Type 2 diabetes mellitus (CMS/HCC)   • Tobacco abuse   • Obesity (BMI 30-39.9)          Brief Hospital Course to date:  Shauna Valencia is a 63 y.o. female here with hyponatremia, likely SIADH, and found to have a lung mass      Assessment & Plan:  L hilar mass  --s/p bronchoscopy, path did not show any malignant cells.  Repeat EBUS 11/9 with Dr. Scott.     Hx of RCA stent, chart reviewed, 7/2017  --held Brilinta, aspirin only    Hyponatremia  --probable SIADH  --checked AM cortisol  --TSH normal  -- NAL following, had Tolvaptan this admission. Continue to monitor closely, down slightly today.     Dysphagia  --SLP following, recommended honey thick diet- pt pretty resistant to modified diets in the past.  Will do comfort diet for now as pt unable to tolerate honey thick and needs to get as much PO  "hydration as possible, per NAL.     R LE pain  --checked duplex, superficial clot only  --has hx of sciatica with R leg pain    Tobacco abuse    Obesity    HL  --on Repatha once every two weeks at home, pt concerned that she missed a dose while here.  Have tried to reassure her that this is okay. We do not have this on formulary, so will hold for now.     Anxiety  --started Buspar and added PRN Ativan for now    DVT Prophylaxis:  Lovenox    CODE STATUS:   Code Status and Medical Interventions:   Ordered at: 10/31/18 1909     Level Of Support Discussed With:    Patient     Code Status:    CPR     Medical Interventions (Level of Support Prior to Arrest):    Full       Disposition: I expect the patient to be discharged TBD.      Electronically signed by Malia Solis MD, 11/10/18, 10:34 AM.        Electronically signed by Malia Solis MD at 11/10/2018 10:36 AM     Horacio Caruso MD at 11/9/2018  2:32 PM             LOS: 9 days    Patient Care Team:  Rox Singleton MD as PCP - General (Internal Medicine)    Subjective     No new events    Objective     Vital Signs:  Blood pressure 156/90, pulse 86, temperature 98.2 °F (36.8 °C), temperature source Temporal Artery , resp. rate 16, height 154.9 cm (61\"), weight 89.8 kg (198 lb), SpO2 98 %.    Flowsheet Rows      First Filed Value   Admission Height  154.9 cm (61\") Documented at 10/31/2018 1424   Admission Weight  89.8 kg (198 lb) Documented at 10/31/2018 1424          11/08 0701 - 11/09 0700  In: 120 [P.O.:120]  Out: 1600 [Urine:1600]    Physical Exam:    General Appearance: NAD  Psych:   awake alert   CV:   No edema  Lungs: respirations regular and unlabored  Abdomen: not distended  :  No schwartz  Skin: No rash, Warm and dry      Labs:    Results from last 7 days  Lab Units 11/09/18  0349 11/08/18  0720 11/07/18  0615   WBC 10*3/mm3 8.25 10.17 11.92*   HEMOGLOBIN g/dL 9.8* 9.8* 10.8*   PLATELETS 10*3/mm3 352 374 379       Results from last " 7 days  Lab Units 11/09/18  1335 11/09/18  0901 11/09/18  0528 11/09/18  0349  11/08/18  0720  11/07/18  0615  11/06/18  2211  11/06/18  0514   SODIUM mmol/L 135 135 134 137  < > 138  < > 125*  < > 120*  < > 123*   POTASSIUM mmol/L  --   --   --  4.3  --  4.8  --  4.5  --  4.3  < > 4.3   CHLORIDE mmol/L  --   --   --  104  --  104  --  93*  --  89*  < > 88*   CO2 mmol/L  --   --   --  28.0  --  28.0  --  22.0  --  21.0  < > 28.0   BUN mg/dL  --   --   --  16  --  15  --  10  --  10  < > 12   CREATININE mg/dL  --   --   --  0.76  --  0.65  --  0.65  --  0.70  < > 0.57*   CALCIUM mg/dL  --   --   --  8.8  --  8.8  --  8.8  --  8.7  < > 8.8   PHOSPHORUS mg/dL  --   --   --   --   --   --   --   --   --   --   --  4.1   MAGNESIUM mg/dL  --   --   --   --   --   --   --  2.6  --   --   --  1.5   < > = values in this interval not displayed.                Estimated Creatinine Clearance: 79.3 mL/min (by C-G formula based on SCr of 0.76 mg/dL).        A/P:    Hyponatremia:  Na levels stable after initial rapid rise.  Thought due to SIADH weith lung cancer.  Restrict FW to 1000 ml a day.  Recheck U/P osm and Urine Na    HTN:  BP stable     Lung Cancer      Horacio Caruso MD  11/09/18  2:33 PM            Electronically signed by Horacio Caruso MD at 11/9/2018  9:35 PM

## 2018-11-12 NOTE — PROGRESS NOTES
"   LOS: 12 days    Patient Care Team:  Rox Singleton MD as PCP - General (Internal Medicine)    Reason For Visit:  F/U HYPONATREMIA  Subjective           Review of Systems:    Pulm: No soa   CV:  No CP      Objective       aspirin 81 mg Oral Daily   busPIRone 5 mg Oral TID   carvedilol 6.25 mg Oral BID With Meals   cetirizine 10 mg Oral Daily   enoxaparin 40 mg Subcutaneous Q24H   famotidine 20 mg Oral BID   insulin lispro 0-7 Units Subcutaneous 4x Daily With Meals & Nightly   levothyroxine 75 mcg Oral Daily   nicotine 1 patch Transdermal Q24H   pantoprazole 40 mg Oral Q AM   polyethylene glycol 17 g Oral Daily   [START ON 11/13/2018] tolvaptan 7.5 mg Oral Daily       lactated ringers 9 mL/hr Last Rate: 9 mL/hr (11/09/18 1016)         Vital Signs:  Blood pressure 128/76, pulse 86, temperature 98 °F (36.7 °C), temperature source Oral, resp. rate 18, height 154.9 cm (61\"), weight 89.8 kg (198 lb), SpO2 98 %.    Flowsheet Rows      First Filed Value   Admission Height  154.9 cm (61\") Documented at 10/31/2018 1424   Admission Weight  89.8 kg (198 lb) Documented at 10/31/2018 1424          11/11 0701 - 11/12 0700  In: 720 [P.O.:720]  Out: 3450 [Urine:3450]    Physical Exam:    General Appearance: NAD, alert and cooperative, Ox3  Eyes: PER, conjunctivae and sclerae normal, no icterus  Lungs: respirations regular and unlabored, no crepitus, clear to auscultation  Heart/CV: regular rhythm & normal rate, no murmur, no gallop, no rub and no edema  Abdomen: not distended, soft, non-tender, no masses,  bowel sounds present  Skin: No rash, Warm and dry    Radiology:            Labs:  Results from last 7 days   Lab Units  11/12/18   0708  11/11/18   0622  11/10/18   0322   WBC 10*3/mm3  10.57  9.16  9.97   HEMOGLOBIN g/dL  10.7*  10.1*  10.1*   HEMATOCRIT %  33.0*  30.0*  30.6*   PLATELETS 10*3/mm3  370  358  338     Results from last 7 days   Lab Units  11/12/18   0932  11/12/18   0708  11/12/18   0301  11/11/18   2348   " 11/11/18   1339   11/11/18   0622   11/10/18   2344   11/07/18   0615   11/06/18   0514   SODIUM mmol/L  138  136  138  137   < >  132   < >  134  134   < >  133   < >  125*   < >  123*   POTASSIUM mmol/L   --   4.2   --    --    --   3.9   --   4.0   --   3.7   < >  4.5   < >  4.3   CHLORIDE mmol/L   --   99   --    --    --   97*   --   97*   --   98*   < >  93*   < >  88*   CO2 mmol/L   --   29.0   --    --    --   27.0   --   28.0   --   28.0   < >  22.0   < >  28.0   BUN mg/dL   --   15   --    --    --   16   --   17   --   22   < >  10   < >  12   CREATININE mg/dL   --   0.74   --    --    --   0.86   --   0.68   --   0.85   < >  0.65   < >  0.57*   CALCIUM mg/dL   --   9.3   --    --    --   9.5   --   8.8   --   8.9   < >  8.8   < >  8.8   PHOSPHORUS mg/dL   --    --    --    --    --    --    --    --    --    --    --    --    --   4.1   MAGNESIUM mg/dL   --    --    --    --    --    --    --    --    --    --    --   2.6   --   1.5    < > = values in this interval not displayed.     Results from last 7 days   Lab Units  11/12/18   0708   GLUCOSE mg/dL  102*                       Estimated Creatinine Clearance: 81.4 mL/min (by C-G formula based on SCr of 0.74 mg/dL).      Assessment       Acute hyponatremia    Hyperlipidemia    Type 2 diabetes mellitus (CMS/HCC)    Tobacco abuse    Obesity (BMI 30-39.9)    Mass of upper lobe of left lung    Mediastinal lymphadenopathy            Impression: HYPONATREMIA LIKELY FROM SIADH FROM LUNG CA. LEVEL GOOD.            Recommendations: HAVE CASE MANAGEMENT ARRANGE FOR OUTPATIENT TOLVAPTAN.      Benito Donovan MD  11/12/18  11:31 AM

## 2018-11-12 NOTE — PLAN OF CARE
Problem: Patient Care Overview  Goal: Plan of Care Review  Outcome: Ongoing (interventions implemented as appropriate)   11/12/18 0540   Coping/Psychosocial   Plan of Care Reviewed With patient   Plan of Care Review   Progress improving   OTHER   Outcome Summary Pt. slept some last night, VSS, no c/o pain or nausea, q4 neuro, BM, & will continue to monitor.        Problem: Pain, Acute (Adult)  Goal: Acceptable Pain Control/Comfort Level  Outcome: Ongoing (interventions implemented as appropriate)   11/12/18 0540   Pain, Acute (Adult)   Acceptable Pain Control/Comfort Level making progress toward outcome       Problem: Diabetes, Type 2 (Adult)  Goal: Signs and Symptoms of Listed Potential Problems Will be Absent, Minimized or Managed (Diabetes, Type 2)  Outcome: Ongoing (interventions implemented as appropriate)   11/12/18 0540   Goal/Outcome Evaluation   Problems Assessed (Type 2 Diabetes) all   Problems Present (Type 2 Diabetes) none

## 2018-11-12 NOTE — PROGRESS NOTES
Continued Stay Note  CORNELIUS Lorenzo     Patient Name: Shauna Valencia  MRN: [id number]  Today's Date: 11/12/2018    Admit Date: 10/31/2018    Discharge Plan     Row Name 11/12/18 1548       Plan    Plan Comments  Neda with [facility] has received the info and has iniated pre cert. Will not know anything until am. Pt and family updated.    Row Name 11/12/18 1423       Plan    Plan  [place]    Patient/Family in Agreement with Plan  yes    Plan Comments  I spoke with Neda at [facility] [phone] and faxed her info to [phone]. She will need to check benefits/coverage.        Discharge Codes    No documentation.       Expected Discharge Date and Time     Expected Discharge Date Expected Discharge Time    Nov 14, 2018             Rocio Hutchins RN

## 2018-11-12 NOTE — PLAN OF CARE
Problem: Patient Care Overview  Goal: Plan of Care Review  Outcome: Ongoing (interventions implemented as appropriate)    Goal: Discharge Needs Assessment  Outcome: Ongoing (interventions implemented as appropriate)      Problem: Pain, Acute (Adult)  Goal: Acceptable Pain Control/Comfort Level  Outcome: Ongoing (interventions implemented as appropriate)      Problem: Diabetes, Type 2 (Adult)  Goal: Signs and Symptoms of Listed Potential Problems Will be Absent, Minimized or Managed (Diabetes, Type 2)  Outcome: Ongoing (interventions implemented as appropriate)

## 2018-11-12 NOTE — PROGRESS NOTES
Continued Stay Note  CORNELIUS Lornezo     Patient Name: Shauna Valencia  MRN: [id number]  Today's Date: 11/12/2018    Admit Date: 10/31/2018    Discharge Plan     Row Name 11/12/18 1423       Plan    Plan  [place]sca    Patient/Family in Agreement with Plan  yes    Plan Comments  I spoke with Neda at [facility] [phone] and faxed her info to [phone]. She will need to check benefits/coverage.        Discharge Codes    No documentation.       Expected Discharge Date and Time     Expected Discharge Date Expected Discharge Time    Nov 14, 2018             Rocio Hutchins RN

## 2018-11-13 ENCOUNTER — APPOINTMENT (OUTPATIENT)
Dept: MRI IMAGING | Facility: HOSPITAL | Age: 63
End: 2018-11-13

## 2018-11-13 ENCOUNTER — APPOINTMENT (OUTPATIENT)
Dept: CT IMAGING | Facility: HOSPITAL | Age: 63
End: 2018-11-13

## 2018-11-13 PROBLEM — C34.12 SMALL CELL LUNG CANCER, LEFT UPPER LOBE (HCC): Status: ACTIVE | Noted: 2018-11-13

## 2018-11-13 LAB
CYTO UR: NORMAL
GLUCOSE BLDC GLUCOMTR-MCNC: 105 MG/DL (ref 70–130)
GLUCOSE BLDC GLUCOMTR-MCNC: 132 MG/DL (ref 70–130)
GLUCOSE BLDC GLUCOMTR-MCNC: 149 MG/DL (ref 70–130)
GLUCOSE BLDC GLUCOMTR-MCNC: 249 MG/DL (ref 70–130)
LAB AP CASE REPORT: NORMAL
LAB AP CLINICAL INFORMATION: NORMAL
LAB AP DIAGNOSIS COMMENT: NORMAL
Lab: NORMAL
PATH REPORT.FINAL DX SPEC: NORMAL
PATH REPORT.GROSS SPEC: NORMAL
SODIUM BLD-SCNC: 136 MMOL/L (ref 132–146)
SODIUM BLD-SCNC: 137 MMOL/L (ref 132–146)
SODIUM BLD-SCNC: 138 MMOL/L (ref 132–146)

## 2018-11-13 PROCEDURE — 82962 GLUCOSE BLOOD TEST: CPT

## 2018-11-13 PROCEDURE — 99233 SBSQ HOSP IP/OBS HIGH 50: CPT | Performed by: FAMILY MEDICINE

## 2018-11-13 PROCEDURE — 74177 CT ABD & PELVIS W/CONTRAST: CPT

## 2018-11-13 PROCEDURE — 99223 1ST HOSP IP/OBS HIGH 75: CPT | Performed by: INTERNAL MEDICINE

## 2018-11-13 PROCEDURE — 84295 ASSAY OF SERUM SODIUM: CPT | Performed by: INTERNAL MEDICINE

## 2018-11-13 PROCEDURE — 25010000002 ENOXAPARIN PER 10 MG: Performed by: INTERNAL MEDICINE

## 2018-11-13 PROCEDURE — 70553 MRI BRAIN STEM W/O & W/DYE: CPT

## 2018-11-13 RX ORDER — TEMAZEPAM 7.5 MG/1
7.5 CAPSULE ORAL NIGHTLY PRN
Status: DISCONTINUED | OUTPATIENT
Start: 2018-11-13 | End: 2018-11-17 | Stop reason: HOSPADM

## 2018-11-13 RX ORDER — LORAZEPAM 0.5 MG/1
0.5 TABLET ORAL ONCE
Status: COMPLETED | OUTPATIENT
Start: 2018-11-13 | End: 2018-11-13

## 2018-11-13 RX ADMIN — CARVEDILOL 6.25 MG: 6.25 TABLET, FILM COATED ORAL at 11:36

## 2018-11-13 RX ADMIN — LORAZEPAM 0.5 MG: 0.5 TABLET ORAL at 12:00

## 2018-11-13 RX ADMIN — HYDROCODONE POLISTIREX AND CHLORPHENIRAMINE POLISTIREX 2.5 ML: 10; 8 SUSPENSION, EXTENDED RELEASE ORAL at 00:08

## 2018-11-13 RX ADMIN — CETIRIZINE HYDROCHLORIDE 10 MG: 10 TABLET, FILM COATED ORAL at 11:35

## 2018-11-13 RX ADMIN — FAMOTIDINE 20 MG: 20 TABLET ORAL at 11:36

## 2018-11-13 RX ADMIN — INSULIN LISPRO 3 UNITS: 100 INJECTION, SOLUTION INTRAVENOUS; SUBCUTANEOUS at 21:16

## 2018-11-13 RX ADMIN — PANTOPRAZOLE SODIUM 40 MG: 40 TABLET, DELAYED RELEASE ORAL at 05:29

## 2018-11-13 RX ADMIN — ASPIRIN 81 MG: 81 TABLET, COATED ORAL at 11:35

## 2018-11-13 RX ADMIN — TOLVAPTAN 7.5 MG: 15 TABLET ORAL at 11:36

## 2018-11-13 RX ADMIN — LEVOTHYROXINE SODIUM 75 MCG: 75 TABLET ORAL at 05:29

## 2018-11-13 RX ADMIN — HYDROCODONE POLISTIREX AND CHLORPHENIRAMINE POLISTIREX 2.5 ML: 10; 8 SUSPENSION, EXTENDED RELEASE ORAL at 17:05

## 2018-11-13 RX ADMIN — CARVEDILOL 6.25 MG: 6.25 TABLET, FILM COATED ORAL at 17:02

## 2018-11-13 RX ADMIN — POLYETHYLENE GLYCOL (3350) 17 G: 17 POWDER, FOR SOLUTION ORAL at 12:04

## 2018-11-13 RX ADMIN — NICOTINE 1 PATCH: 21 PATCH, EXTENDED RELEASE TRANSDERMAL at 12:03

## 2018-11-13 RX ADMIN — FAMOTIDINE 20 MG: 20 TABLET ORAL at 20:34

## 2018-11-13 RX ADMIN — IOPAMIDOL 95 ML: 612 INJECTION, SOLUTION INTRAVENOUS at 23:06

## 2018-11-13 RX ADMIN — ENOXAPARIN SODIUM 40 MG: 40 INJECTION SUBCUTANEOUS at 05:29

## 2018-11-13 NOTE — CONSULTS
Subjective     CHIEF COMPLAINT: Shorten of breath    HISTORY OF PRESENT ILLNESS:  The patient is a 63 y.o. female, referred by Pam Abrams DO for new diagnosis of small cell lung cancer.  The patient was admitted to the hospitalist service on October 31, 2018.  She presented with shortness of breath her she had a CAT scan that revealed left hilar and metastatic adenopathy with left upper lobe lung nodule.  She had bronchoscopy with a biopsy done on November 6, 2018 that was negative.  She had repeat bronchoscopy done on November 9, 2018 pathology came back consistent with small cell lung cancer for which I was consulted.  When I saw the patient today she is anxious about new cancer diagnosis. She is interested in active cancer treatment. She has never been diagnosed with cancer before.      REVIEW OF SYSTEMS:  A 14 point review of systems was performed and is negative except as noted above.    Past Medical History:   Diagnosis Date   • Diabetes mellitus (CMS/HCC)    • Hyperlipidemia    • Pancreatitis        No current facility-administered medications on file prior to encounter.      Current Outpatient Medications on File Prior to Encounter   Medication Sig Dispense Refill   • albuterol (PROVENTIL HFA;VENTOLIN HFA) 108 (90 Base) MCG/ACT inhaler Inhale 2 puffs Every 4 (Four) Hours As Needed for Wheezing.     • aspirin 81 MG EC tablet Take 81 mg by mouth Daily.     • carvedilol (COREG) 6.25 MG tablet Take 6.25 mg by mouth 2 (Two) Times a Day With Meals.     • famotidine (PEPCID) 20 MG tablet Take 20 mg by mouth 2 (Two) Times a Day.     • levothyroxine (SYNTHROID, LEVOTHROID) 75 MCG tablet Take 75 mcg by mouth Daily.     • Loratadine (CLARITIN) 10 MG capsule Take  by mouth Daily.     • pantoprazole (PROTONIX) 40 MG EC tablet Take 40 mg by mouth Daily.     • REPATHA SURECLICK 140 MG/ML solution auto-injector Inject 140 mg under the skin into the appropriate area as directed Every 14 (Fourteen) Days.     •  SITagliptin (JANUVIA) 100 MG tablet Take 100 mg by mouth Daily.     • ticagrelor (BRILINTA) 60 MG tablet tablet Take 60 mg by mouth Daily.         Allergies   Allergen Reactions   • Plavix [Clopidogrel Bisulfate] Anaphylaxis   • Codeine    • Penicillins        Past Surgical History:   Procedure Laterality Date   • CAROTID STENT     • COLONOSCOPY     • UPPER GASTROINTESTINAL ENDOSCOPY         OB History   No data available       Social History     Socioeconomic History   • Marital status:      Spouse name: Not on file   • Number of children: Not on file   • Years of education: Not on file   • Highest education level: Not on file   Tobacco Use   • Smoking status: Current Every Day Smoker     Packs/day: 0.50     Types: Cigarettes   Substance and Sexual Activity   • Alcohol use: No       Family History   Problem Relation Age of Onset   • Colon polyps Mother    • Ulcerative colitis Sister        Objective     Vitals:    11/12/18 0852 11/12/18 1718 11/12/18 1919 11/13/18 0701   BP: 128/76 139/77 111/61 100/56   BP Location:   Left arm Left arm   Patient Position:   Lying Lying   Pulse: 86 73 72    Resp:   18 16   Temp:   98.6 °F (37 °C) 98.3 °F (36.8 °C)   TempSrc:   Oral Oral   SpO2:       Weight:       Height:                ECOG Performance Status: 1 - Symptomatic but completely ambulatory  General: well appearing female in no acute distress  Neuro/Psych: A&O x 3, gait steady, appropriate affect, strength 5/5 in all muscle groups  HEENT: sclera anicteric, oropharynx clear  Lymphatics: no cervical, supraclavicular, or axillary adenopathy  Cardiovascular: regular rate and rhythm, no murmurs  Lungs: clear to auscultation bilaterally  Abdomen: soft, nontender, nondistended.  No palpable organomegaly  Extremeties: no lower extremity edema  Skin: no rashes, lesions, bruising, or petechiae      Admission on 10/31/2018   No results displayed because visit has over 200 results.           No results found.    ASSESSMENT  63 years old female with small cell lung cancer    PROBLEM LIST   1.  Left upper lobe small cell lung cancer [id number] stage IIIa disease:  8.  Presented with shortness of breath.  B.  Status post bronchoscopy with a biopsy done on November 9, 2018 revealed small cell lung cancer   2.  Hyponatremia  3.  Normocytic anemia    PLAN  1.  I reviewed the patient's chart including admission note blood results and imaging report.  I reviewed the patient's CT scan films myself.  2.  I discussed the case with Dr. Mayberry from pathology to coordinate patient's care.  3.  I will order MRI brain CAT scan abdomen and pelvis and bone scan to complete her staging workup.  4.  I explained to patient her disease type as well as stage.  She is interested in active cancer treatment.  I'll get her started on cisplatin and etoposide.  We'll include radiation as an outpatient.  5. We reviewed the potential side effects of this regimen including fatigue, vomiting and nausea, hair loss, nephropathy, neuropathy, hearing loss, myelosuppression, and risk of infusion reaction.  6.  I will monitor patient blood work including blood counts kidney function liver enzymes and electrolytes were  7.  I will add Zofran as needed for nausea.  Discussed with patient and her daughter at the bedside.  Discussed with the patient nurse as well as our pharmacist to coordinate patient's care.    Desmond Cardoso MD    11/13/2018

## 2018-11-13 NOTE — PROGRESS NOTES
Continued Stay Note   [place]     Patient Name: Shauna Valencia  MRN: [id number]  Today's Date: 11/13/2018    Admit Date: 10/31/2018    Discharge Plan     Row Name 11/13/18 1153       Plan    Plan  Samsca    Patient/Family in Agreement with Plan  yes    Plan Comments  Received a call back from Neda at [facility] [phone] ex [phone]. She has obtained approval for Samsca and the pt has zero co-pay. It will need to be prescribed as 15mg and will need the actual script before they will ship. I will need to notify [facility] the day the pts is Dced and they will overnight ship. I update the pt and family.        Discharge Codes    No documentation.       Expected Discharge Date and Time     Expected Discharge Date Expected Discharge Time    Nov 14, 2018             Rocio Hutchins RN

## 2018-11-13 NOTE — PLAN OF CARE
Problem: Patient Care Overview  Goal: Plan of Care Review  Outcome: Ongoing (interventions implemented as appropriate)   11/12/18 1843 11/12/18 2030 11/13/18 0427   Coping/Psychosocial   Plan of Care Reviewed With --  patient --    Plan of Care Review   Progress improving --  --    OTHER   Outcome Summary --  --  VSS, pt c/o cough prn given, Sodium maintaining, awaiting biopsy results, rested on and off this shift, no other issues/concerns     Goal: Discharge Needs Assessment  Outcome: Ongoing (interventions implemented as appropriate)      Problem: Pain, Acute (Adult)  Goal: Acceptable Pain Control/Comfort Level  Outcome: Ongoing (interventions implemented as appropriate)      Problem: Diabetes, Type 2 (Adult)  Goal: Signs and Symptoms of Listed Potential Problems Will be Absent, Minimized or Managed (Diabetes, Type 2)  Outcome: Ongoing (interventions implemented as appropriate)

## 2018-11-13 NOTE — PROGRESS NOTES
[facility]  PROGRESS NOTE    Patient Name: Shauna Valencia  : 1955  MRN: [id number]    Date of Admission: 10/31/2018  Length of Stay: 13  Primary Care Physician: Rox Singleton MD    Subjective   Subjective     CC:  Weakness    HPI:  Pt sitting up on bed, family at bedside. Slept well tonight, ate well today. I informed pt that her bx results were back and she was appropriately tearful.     Review of Systems   Gen- No fevers, chills  CV- No chest pain, palpitations  Resp- No cough, dyspnea  GI- No N/V/D, abd pain    Otherwise ROS is negative except as mentioned in the HPI.    Objective   Objective     Vital Signs:   Temp:  [98.3 °F (36.8 °C)-98.6 °F (37 °C)] 98.3 °F (36.8 °C)  Heart Rate:  [72-73] 72  Resp:  [16-18] 16  BP: (100-139)/(56-77) 100/56        Physical Exam:  Constitutional: tearful, awake, alert  HENT: NCAT, mucous membranes moist  Respiratory: CTAB  Cardiovascular: RRR, no murmurs, rubs, or gallops, palpable pedal pulses bilaterally  Gastrointestinal: Positive bowel sounds, soft, nontender, nondistended  Musculoskeletal: No bilateral ankle edema  Psychiatric: Appropriate affect, cooperative  Neurologic: Oriented x 3, strength symmetric in all extremities, Cranial Nerves grossly intact to confrontation, speech clear  Skin: No rashes    Results Reviewed:  I have personally reviewed current lab, radiology, and data and agree.    Results from last 7 days   Lab Units  18   0708  18   0622  11/10/18   0322   WBC 10*3/mm3  10.57  9.16  9.97   HEMOGLOBIN g/dL  10.7*  10.1*  10.1*   HEMATOCRIT %  33.0*  30.0*  30.6*   PLATELETS 10*3/mm3  370  358  338     Results from last 7 days   Lab Units  18   0626  18   0318  18   2340   18   0708   18   1339   18   0622   SODIUM mmol/L  138  137  139   < >  136   < >  132   < >  134  134   POTASSIUM mmol/L   --    --    --    --   4.2   --   3.9   --   4.0   CHLORIDE  mmol/L   --    --    --    --   99   --   97*   --   97*   CO2 mmol/L   --    --    --    --   29.0   --   27.0   --   28.0   BUN mg/dL   --    --    --    --   15   --   16   --   17   CREATININE mg/dL   --    --    --    --   0.74   --   0.86   --   0.68   GLUCOSE mg/dL   --    --    --    --   102*   --   98   --   89   CALCIUM mg/dL   --    --    --    --   9.3   --   9.5   --   8.8    < > = values in this interval not displayed.     Estimated Creatinine Clearance: 81.4 mL/min (by C-G formula based on SCr of 0.74 mg/dL).  No results found for: BNP    Microbiology Results Abnormal     Procedure Component Value - Date/Time    Fungus Smear - Wash, Bronchus [[id number]] Collected:  11/09/18 1147    Lab Status:  Final result Specimen:  Wash from Bronchus Updated:  11/12/18 1335     Fungal Stain No fungal elements seen      No Pneumocystis jirovecci (formerly Pneumocystis carinii) noted on smear.    Fungus Smear - Wash, Lung, Left Upper Lobe [[id number]] Collected:  11/06/18 1225    Lab Status:  Final result Specimen:  Wash from Lung, Left Upper Lobe Updated:  11/12/18 1301     Fungal Stain No fungal elements seen      No Pneumocystis jirovecci (formerly Pneumocystis carinii) noted on smear.    Fungus Culture - Wash, Lung, Left Upper Lobe [[id number]] Collected:  11/06/18 1225    Lab Status:  Preliminary result Specimen:  Wash from Lung, Left Upper Lobe Updated:  11/11/18 1300     Fungus Culture No fungus isolated at less than 1 week    AFB Culture - Wash, Lung, Left Upper Lobe [[id number]] Collected:  11/06/18 1225    Lab Status:  Preliminary result Specimen:  Wash from Lung, Left Upper Lobe Updated:  11/11/18 1300     AFB Culture No AFB isolated at less than 1 week     AFB Stain No acid fast bacilli seen on concentrated smear    Respiratory Culture - Wash, Bronchus [[id number]] Collected:  11/09/18 1147    Lab Status:  Final result Specimen:  Wash from Bronchus Updated:  11/11/18 0702     Respiratory Culture Light growth  (2+) Normal Respiratory Shavon     Gram Stain Few (2+) WBCs per low power field      Occasional Epithelial cells per low power field      Few (2+) Gram positive cocci in pairs and clusters    AFB Culture - Wash, Bronchus [[id number]] Collected:  11/09/18 1147    Lab Status:  Preliminary result Specimen:  Wash from Bronchus Updated:  11/10/18 1319     AFB Stain Rare (1+) Epithelial cells per low power field      Few (2+) WBCs per low power field      No organisms seen      No acid fast bacilli seen on concentrated smear    Respiratory Culture - Wash, Lung, Left Upper Lobe [[id number]] Collected:  11/06/18 1225    Lab Status:  Final result Specimen:  Wash from Lung, Left Upper Lobe Updated:  11/08/18 0732     Respiratory Culture Scant growth (1+) Normal Respiratory Shavon     Gram Stain No WBCs or organisms seen          Imaging Results (last 24 hours)     ** No results found for the last 24 hours. **             I have reviewed the medications.      aspirin 81 mg Oral Daily   carvedilol 6.25 mg Oral BID With Meals   cetirizine 10 mg Oral Daily   enoxaparin 40 mg Subcutaneous Q24H   famotidine 20 mg Oral BID   insulin lispro 0-7 Units Subcutaneous 4x Daily With Meals & Nightly   levothyroxine 75 mcg Oral Daily   nicotine 1 patch Transdermal Q24H   pantoprazole 40 mg Oral Q AM   polyethylene glycol 17 g Oral Daily   tolvaptan 7.5 mg Oral Daily         Assessment/Plan   Assessment / Plan     Active Hospital Problems    Diagnosis   • Mass of upper lobe of left lung   • Mediastinal lymphadenopathy   • Acute hyponatremia   • Hyperlipidemia   • Type 2 diabetes mellitus (CMS/HCC)   • Tobacco abuse   • Obesity (BMI 30-39.9)          Brief Hospital Course to date:  Shauna Valencia is a 63 y.o. female here with hyponatremia, likely SIADH, and found to have a lung mass      Assessment & Plan:  Small Cell Lung Carcinoma   --s/p bronchoscopy 11/6, path did not show any malignant cells.  Repeat EBUS 11/9 with Dr. Scott, pathology pos  for Small Cell Lung Carcinoma, cytology pos for Small Cell Carcinoma, I have DW Dr. Lyons, Oncology  consulted today for further workup for staging and treatment plan.     Hx of RCA stent, chart reviewed, 7/2017  --held Brilinta, aspirin only    Hyponatremia  --probable SIADH  --AM cortisol pending   --TSH normal  -- NAL following, continuing Tolvaptan for now. Na+ normal this am, continue to monitor as per NAL, per Nephro will need to continue as outpt.     Dysphagia  --SLP following, recommended honey thick diet- pt pretty resistant to modified diets in the past.  Pt wishes for comfort diet for now as pt unable to tolerate honey thick    R LE pain  --checked duplex, superficial clot only  --has hx of sciatica with R leg pain    Tobacco abuse    Obesity    HL  --on Repatha once every two weeks at home, we do not have here, so will hold for now. Pt reassured     Anxiety  --dc buspar,  --Ativan 0.5mg qd prn anxiety for now     Insomnia  --decrease restoril to 7.5mg prn  .     DVT Prophylaxis:  Lovenox    CODE STATUS:   Code Status and Medical Interventions:   Ordered at: 10/31/18 190     Level Of Support Discussed With:    Patient     Code Status:    CPR     Medical Interventions (Level of Support Prior to Arrest):    Full       Disposition: I expect the patient to be discharged TBD.      Electronically signed by Pam Abrams DO, 11/13/18, 8:56 AM.

## 2018-11-13 NOTE — PROGRESS NOTES
"   LOS: 13 days    Patient Care Team:  Rox Singleton MD as PCP - General (Internal Medicine)    Reason For Visit:  F/U HYPONATREMIA.  Subjective           Review of Systems:    Pulm: No soa   CV:  No CP      Objective       aspirin 81 mg Oral Daily   carvedilol 6.25 mg Oral BID With Meals   cetirizine 10 mg Oral Daily   enoxaparin 40 mg Subcutaneous Q24H   famotidine 20 mg Oral BID   insulin lispro 0-7 Units Subcutaneous 4x Daily With Meals & Nightly   levothyroxine 75 mcg Oral Daily   nicotine 1 patch Transdermal Q24H   pantoprazole 40 mg Oral Q AM   polyethylene glycol 17 g Oral Daily   tolvaptan 7.5 mg Oral Daily       lactated ringers 9 mL/hr Last Rate: 9 mL/hr (11/09/18 1016)         Vital Signs:  Blood pressure 100/56, pulse 72, temperature 98.3 °F (36.8 °C), temperature source Oral, resp. rate 16, height 154.9 cm (61\"), weight 89.8 kg (198 lb), SpO2 98 %.    Flowsheet Rows      First Filed Value   Admission Height  154.9 cm (61\") Documented at 10/31/2018 1424   Admission Weight  89.8 kg (198 lb) Documented at 10/31/2018 1424          11/12 0701 - 11/13 0700  In: -   Out: 1900 [Urine:1900]    Physical Exam:    General Appearance: NAD, alert and cooperative, Ox3  Eyes: PER, conjunctivae and sclerae normal, no icterus  Lungs: respirations regular and unlabored, no crepitus, clear to auscultation  Heart/CV: regular rhythm & normal rate, no murmur, no gallop, no rub and no edema  Abdomen: not distended, soft, non-tender, no masses,  bowel sounds present  Skin: No rash, Warm and dry    Radiology:            Labs:  Results from last 7 days   Lab Units  11/12/18   0708  11/11/18   0622  11/10/18   0322   WBC 10*3/mm3  10.57  9.16  9.97   HEMOGLOBIN g/dL  10.7*  10.1*  10.1*   HEMATOCRIT %  33.0*  30.0*  30.6*   PLATELETS 10*3/mm3  370  358  338     Results from last 7 days   Lab Units  11/13/18   0849  11/13/18   0626  11/13/18   0318  11/12/18   2340   11/12/18   0708   11/11/18   1339   11/11/18   0622   " 11/10/18   2344   11/07/18   0615   SODIUM mmol/L  136  138  137  139   < >  136   < >  132   < >  134  134   < >  133   < >  125*   POTASSIUM mmol/L   --    --    --    --    --   4.2   --   3.9   --   4.0   --   3.7   < >  4.5   CHLORIDE mmol/L   --    --    --    --    --   99   --   97*   --   97*   --   98*   < >  93*   CO2 mmol/L   --    --    --    --    --   29.0   --   27.0   --   28.0   --   28.0   < >  22.0   BUN mg/dL   --    --    --    --    --   15   --   16   --   17   --   22   < >  10   CREATININE mg/dL   --    --    --    --    --   0.74   --   0.86   --   0.68   --   0.85   < >  0.65   CALCIUM mg/dL   --    --    --    --    --   9.3   --   9.5   --   8.8   --   8.9   < >  8.8   MAGNESIUM mg/dL   --    --    --    --    --    --    --    --    --    --    --    --    --   2.6    < > = values in this interval not displayed.     Results from last 7 days   Lab Units  11/12/18   0708   GLUCOSE mg/dL  102*                       Estimated Creatinine Clearance: 81.4 mL/min (by C-G formula based on SCr of 0.74 mg/dL).      Assessment       Acute hyponatremia    Hyperlipidemia    Type 2 diabetes mellitus (CMS/HCC)    Tobacco abuse    Obesity (BMI 30-39.9)    Mass of upper lobe of left lung    Mediastinal lymphadenopathy            Impression: HYPONATREMIA STABLE ON TOLVAPTAN. LIKELY SIADH.            Recommendations: PRESENT TOLVAPTAN AND CHANGE LAB TO QD. HOME SOON OK WITH RENAL.      Benito Donovan MD  11/13/18  11:28 AM

## 2018-11-13 NOTE — PROGRESS NOTES
Adult Nutrition  Assessment/PES    Patient Name:  Shauna Valencia  YOB: 1955  MRN: [id number]  Admit Date:  10/31/2018    Assessment Date:  11/13/2018      Reason for Assessment     Row Name 11/13/18 1443          Reason for Assessment    Reason For Assessment  follow-up protocol 25 mins     Diagnosis  -- per notes this adm                   Nutrition Prescription Ordered     Row Name 11/13/18 1445 11/13/18 1443       Nutrition Prescription PO    Current PO Diet  --  Regular 11/9 SLP notes indicate SLP  staff aware pt on regular texture foods with thin liquids.    Supplement  Boost Pudding (Ensure Pudding)  --    Supplement Frequency  2 times a day  --        Evaluation of Received Nutrient/Fluid Intake     Row Name 11/13/18 1444          PO Evaluation    Number of Meals  4     % PO Intake  69               Problem/Interventions:  Problem 1     Row Name 11/13/18 1444          Nutrition Diagnoses Problem 1    Problem 1  Nutrition Appropriate for Condition at this Time     Etiology (related to)  MNT for Treatment/Condition     Signs/Symptoms (evidenced by)  PO Intake     Percent (%) intake recorded  69 %     Over number of meals  4                 Intervention Goal     Row Name 11/13/18 1445          Intervention Goal    PO  Maintain intake         Nutrition Intervention     Row Name 11/13/18 1445          Nutrition Intervention    RD/Tech Action  Follow Tx progress; supplement provided           Education/Evaluation     Row Name 11/13/18 1445          Monitor/Evaluation    Monitor  Per protocol           Electronically signed by:  Beryl Nixon MS,RD,LD  11/13/18 2:45 PM

## 2018-11-13 NOTE — PROGRESS NOTES
I was able to stop by and visit with Mrs. Valencia and her daughter today. 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 I was able to discuss the ramifications of this and discuss the fact that staging will need to be completed and that she will need consultation with an oncologist which I have ordered. I'd be happy to help in any way I can in the meantime.    Ricardo Laguerre MD

## 2018-11-14 ENCOUNTER — APPOINTMENT (OUTPATIENT)
Dept: NUCLEAR MEDICINE | Facility: HOSPITAL | Age: 63
End: 2018-11-14
Attending: INTERNAL MEDICINE

## 2018-11-14 LAB
ALBUMIN SERPL-MCNC: 4.1 G/DL (ref 3.2–4.8)
ALBUMIN/GLOB SERPL: 2.7 G/DL (ref 1.5–2.5)
ALP SERPL-CCNC: 59 U/L (ref 25–100)
ALT SERPL W P-5'-P-CCNC: 23 U/L (ref 7–40)
ANION GAP SERPL CALCULATED.3IONS-SCNC: 10 MMOL/L (ref 3–11)
ANION GAP SERPL CALCULATED.3IONS-SCNC: 10 MMOL/L (ref 3–11)
AST SERPL-CCNC: 12 U/L (ref 0–33)
BILIRUB SERPL-MCNC: 0.3 MG/DL (ref 0.3–1.2)
BUN BLD-MCNC: 17 MG/DL (ref 9–23)
BUN BLD-MCNC: 17 MG/DL (ref 9–23)
BUN/CREAT SERPL: 21.8 (ref 7–25)
BUN/CREAT SERPL: 21.8 (ref 7–25)
CALCIUM SPEC-SCNC: 9.2 MG/DL (ref 8.7–10.4)
CALCIUM SPEC-SCNC: 9.2 MG/DL (ref 8.7–10.4)
CHLORIDE SERPL-SCNC: 94 MMOL/L (ref 99–109)
CHLORIDE SERPL-SCNC: 94 MMOL/L (ref 99–109)
CO2 SERPL-SCNC: 30 MMOL/L (ref 20–31)
CO2 SERPL-SCNC: 30 MMOL/L (ref 20–31)
CORTIS SERPL-MCNC: 8.7 MCG/DL
CREAT BLD-MCNC: 0.78 MG/DL (ref 0.6–1.3)
CREAT BLD-MCNC: 0.78 MG/DL (ref 0.6–1.3)
GFR SERPL CREATININE-BSD FRML MDRD: 75 ML/MIN/1.73
GFR SERPL CREATININE-BSD FRML MDRD: 75 ML/MIN/1.73
GLOBULIN UR ELPH-MCNC: 1.5 GM/DL
GLUCOSE BLD-MCNC: 145 MG/DL (ref 70–100)
GLUCOSE BLD-MCNC: 145 MG/DL (ref 70–100)
GLUCOSE BLDC GLUCOMTR-MCNC: 134 MG/DL (ref 70–130)
GLUCOSE BLDC GLUCOMTR-MCNC: 140 MG/DL (ref 70–130)
GLUCOSE BLDC GLUCOMTR-MCNC: 210 MG/DL (ref 70–130)
GLUCOSE BLDC GLUCOMTR-MCNC: 268 MG/DL (ref 70–130)
MAGNESIUM SERPL-MCNC: 1.9 MG/DL (ref 1.3–2.7)
POTASSIUM BLD-SCNC: 4 MMOL/L (ref 3.5–5.5)
POTASSIUM BLD-SCNC: 4 MMOL/L (ref 3.5–5.5)
PROT SERPL-MCNC: 5.6 G/DL (ref 5.7–8.2)
SODIUM BLD-SCNC: 134 MMOL/L (ref 132–146)
SODIUM BLD-SCNC: 134 MMOL/L (ref 132–146)

## 2018-11-14 PROCEDURE — 0 TECHNETIUM MEDRONATE KIT: Performed by: FAMILY MEDICINE

## 2018-11-14 PROCEDURE — A9577 INJ MULTIHANCE: HCPCS | Performed by: FAMILY MEDICINE

## 2018-11-14 PROCEDURE — 25010000002 FOSAPREPITANT PER 1 MG: Performed by: INTERNAL MEDICINE

## 2018-11-14 PROCEDURE — 0 GADOBENATE DIMEGLUMINE 529 MG/ML SOLUTION: Performed by: FAMILY MEDICINE

## 2018-11-14 PROCEDURE — A9503 TC99M MEDRONATE: HCPCS | Performed by: FAMILY MEDICINE

## 2018-11-14 PROCEDURE — 82533 TOTAL CORTISOL: CPT | Performed by: FAMILY MEDICINE

## 2018-11-14 PROCEDURE — 25010000002 CISPLATIN PER 50 MG: Performed by: INTERNAL MEDICINE

## 2018-11-14 PROCEDURE — 99233 SBSQ HOSP IP/OBS HIGH 50: CPT | Performed by: FAMILY MEDICINE

## 2018-11-14 PROCEDURE — 25010000002 ENOXAPARIN PER 10 MG: Performed by: INTERNAL MEDICINE

## 2018-11-14 PROCEDURE — 63710000001 INSULIN LISPRO (HUMAN) PER 5 UNITS: Performed by: HOSPITALIST

## 2018-11-14 PROCEDURE — 80053 COMPREHEN METABOLIC PANEL: CPT | Performed by: INTERNAL MEDICINE

## 2018-11-14 PROCEDURE — 25010000002 ONDANSETRON PER 1 MG: Performed by: INTERNAL MEDICINE

## 2018-11-14 PROCEDURE — 83735 ASSAY OF MAGNESIUM: CPT

## 2018-11-14 PROCEDURE — 25010000002 POTASSIUM CHLORIDE PER 2 MEQ OF POTASSIUM: Performed by: INTERNAL MEDICINE

## 2018-11-14 PROCEDURE — 82962 GLUCOSE BLOOD TEST: CPT

## 2018-11-14 PROCEDURE — 25010000002 ETOPOSIDE 100 MG/5ML SOLUTION 25 ML VIAL: Performed by: INTERNAL MEDICINE

## 2018-11-14 PROCEDURE — 78306 BONE IMAGING WHOLE BODY: CPT

## 2018-11-14 PROCEDURE — 25010000002 MAGNESIUM SULFATE PER 500 MG OF MAGNESIUM: Performed by: INTERNAL MEDICINE

## 2018-11-14 PROCEDURE — 25010000002 DEXAMETHASONE PER 1 MG: Performed by: INTERNAL MEDICINE

## 2018-11-14 PROCEDURE — 99232 SBSQ HOSP IP/OBS MODERATE 35: CPT | Performed by: INTERNAL MEDICINE

## 2018-11-14 PROCEDURE — 25010000002 IOPAMIDOL 61 % SOLUTION: Performed by: FAMILY MEDICINE

## 2018-11-14 RX ORDER — TC 99M MEDRONATE 20 MG/10ML
27.5 INJECTION, POWDER, LYOPHILIZED, FOR SOLUTION INTRAVENOUS
Status: COMPLETED | OUTPATIENT
Start: 2018-11-14 | End: 2018-11-14

## 2018-11-14 RX ORDER — DEXAMETHASONE 4 MG/1
TABLET ORAL
Qty: 6 TABLET | Refills: 5 | Status: SHIPPED | OUTPATIENT
Start: 2018-11-14 | End: 2018-12-05

## 2018-11-14 RX ORDER — DEXAMETHASONE 4 MG/1
TABLET ORAL
Qty: 6 TABLET | Refills: 5 | Status: CANCELLED | OUTPATIENT
Start: 2018-11-14

## 2018-11-14 RX ORDER — PALONOSETRON 0.05 MG/ML
0.25 INJECTION, SOLUTION INTRAVENOUS ONCE
Status: DISCONTINUED | OUTPATIENT
Start: 2018-11-14 | End: 2018-11-14

## 2018-11-14 RX ORDER — ONDANSETRON HYDROCHLORIDE 8 MG/1
8 TABLET, FILM COATED ORAL 3 TIMES DAILY PRN
Qty: 30 TABLET | Refills: 5 | Status: SHIPPED | OUTPATIENT
Start: 2018-11-14 | End: 2018-12-11 | Stop reason: SDUPTHER

## 2018-11-14 RX ORDER — ONDANSETRON HYDROCHLORIDE 8 MG/1
8 TABLET, FILM COATED ORAL 3 TIMES DAILY PRN
Qty: 30 TABLET | Refills: 5 | Status: CANCELLED | OUTPATIENT
Start: 2018-11-14

## 2018-11-14 RX ORDER — SODIUM CHLORIDE 9 MG/ML
250 INJECTION, SOLUTION INTRAVENOUS ONCE
Status: COMPLETED | OUTPATIENT
Start: 2018-11-14 | End: 2018-11-14

## 2018-11-14 RX ADMIN — ENOXAPARIN SODIUM 40 MG: 40 INJECTION SUBCUTANEOUS at 05:57

## 2018-11-14 RX ADMIN — CETIRIZINE HYDROCHLORIDE 10 MG: 10 TABLET, FILM COATED ORAL at 08:59

## 2018-11-14 RX ADMIN — TOLVAPTAN 7.5 MG: 15 TABLET ORAL at 08:57

## 2018-11-14 RX ADMIN — NICOTINE 1 PATCH: 21 PATCH, EXTENDED RELEASE TRANSDERMAL at 00:20

## 2018-11-14 RX ADMIN — SODIUM CHLORIDE 150 MG: 9 INJECTION, SOLUTION INTRAVENOUS at 21:18

## 2018-11-14 RX ADMIN — DEXAMETHASONE SODIUM PHOSPHATE 12 MG: 4 INJECTION, SOLUTION INTRA-ARTICULAR; INTRALESIONAL; INTRAMUSCULAR; INTRAVENOUS; SOFT TISSUE at 20:07

## 2018-11-14 RX ADMIN — TEMAZEPAM 7.5 MG: 7.5 CAPSULE ORAL at 01:39

## 2018-11-14 RX ADMIN — LEVOTHYROXINE SODIUM 75 MCG: 75 TABLET ORAL at 05:57

## 2018-11-14 RX ADMIN — GADOBENATE DIMEGLUMINE 19 ML: 529 INJECTION, SOLUTION INTRAVENOUS at 00:45

## 2018-11-14 RX ADMIN — ETOPOSIDE 190 MG: 20 INJECTION, SOLUTION, CONCENTRATE INTRAVENOUS at 22:01

## 2018-11-14 RX ADMIN — ONDANSETRON HYDROCHLORIDE 16 MG: 2 INJECTION, SOLUTION INTRAMUSCULAR; INTRAVENOUS at 20:35

## 2018-11-14 RX ADMIN — FAMOTIDINE 20 MG: 20 TABLET ORAL at 21:18

## 2018-11-14 RX ADMIN — TEMAZEPAM 7.5 MG: 7.5 CAPSULE ORAL at 23:24

## 2018-11-14 RX ADMIN — POLYETHYLENE GLYCOL (3350) 17 G: 17 POWDER, FOR SOLUTION ORAL at 09:03

## 2018-11-14 RX ADMIN — SODIUM CHLORIDE 250 ML: 9 INJECTION, SOLUTION INTRAVENOUS at 20:07

## 2018-11-14 RX ADMIN — CISPLATIN 188 MG: 1 INJECTION INTRAVENOUS at 23:23

## 2018-11-14 RX ADMIN — NICOTINE 1 PATCH: 21 PATCH, EXTENDED RELEASE TRANSDERMAL at 12:04

## 2018-11-14 RX ADMIN — INSULIN LISPRO 4 UNITS: 100 INJECTION, SOLUTION INTRAVENOUS; SUBCUTANEOUS at 21:34

## 2018-11-14 RX ADMIN — INSULIN LISPRO 3 UNITS: 100 INJECTION, SOLUTION INTRAVENOUS; SUBCUTANEOUS at 12:04

## 2018-11-14 RX ADMIN — FAMOTIDINE 20 MG: 20 TABLET ORAL at 08:59

## 2018-11-14 RX ADMIN — ASPIRIN 81 MG: 81 TABLET, COATED ORAL at 08:59

## 2018-11-14 RX ADMIN — MAGNESIUM SULFATE HEPTAHYDRATE 1000 ML: 500 INJECTION, SOLUTION INTRAMUSCULAR; INTRAVENOUS at 18:28

## 2018-11-14 RX ADMIN — PANTOPRAZOLE SODIUM 40 MG: 40 TABLET, DELAYED RELEASE ORAL at 05:57

## 2018-11-14 RX ADMIN — Medication 27.5 MILLICURIE: at 10:05

## 2018-11-14 RX ADMIN — CARVEDILOL 6.25 MG: 6.25 TABLET, FILM COATED ORAL at 08:56

## 2018-11-14 NOTE — PROGRESS NOTES
"S: Doing reasonably well.  Denies any significant pain issues ongoing.  She still has a persistent cough.    Past medical history, social history and family history was reviewed and unchanged from prior visit.    Review of Systems:    Review of Systems   A comprehensive 14 point review of systems was performed and was negative except as mentioned.      Medications:  The current medication list was reviewed in the EMR    ALLERGIES:    Allergies   Allergen Reactions   • Plavix [Clopidogrel Bisulfate] Anaphylaxis   • Codeine    • Penicillins          Physical Exam    VITAL SIGNS:  /71   Pulse 87   Temp 98.7 °F (37.1 °C)   Resp 18   Ht 154.9 cm (61\")   Wt 89.8 kg (198 lb)   SpO2 99%   BMI 37.41 kg/m²   Temp:  [97.9 °F (36.6 °C)-98.7 °F (37.1 °C)] 98.7 °F (37.1 °C)      Performance Status: 1    Physical Exam    General: well appearing, in no acute distress  HEENT: sclera anicteric, oropharynx clear, neck is supple  Lymphatics: no cervical, supraclavicular, or axillary adenopathy  Cardiovascular: regular rate and rhythm, no murmurs, rubs or gallops  Lungs: clear to auscultation bilaterally  Abdomen: soft, nontender, nondistended.  No palpable organomegaly  Extremities: no lower extremity edema  Skin: no rashes, lesions, bruising, or petechiae  Msk:  Shows no weakness of the large muscle groups  Psych: Mood is stable        RECENT LABS:    Lab Results   Component Value Date    HGB 10.7 (L) 11/12/2018    HCT 33.0 (L) 11/12/2018    MCV 87.1 11/12/2018     11/12/2018    WBC 10.57 11/12/2018    NEUTROABS 4.47 11/09/2018    LYMPHSABS 2.78 11/09/2018    MONOSABS 0.81 11/09/2018    EOSABS 0.16 11/09/2018    BASOSABS 0.03 11/09/2018       Lab Results   Component Value Date    GLUCOSE 145 (H) 11/14/2018    GLUCOSE 145 (H) 11/14/2018    BUN 17 11/14/2018    BUN 17 11/14/2018    CREATININE 0.78 11/14/2018    CREATININE 0.78 11/14/2018     11/14/2018     11/14/2018    K 4.0 11/14/2018    K 4.0 " 11/14/2018    CL 94 (L) 11/14/2018    CL 94 (L) 11/14/2018    CO2 30.0 11/14/2018    CO2 30.0 11/14/2018    CALCIUM 9.2 11/14/2018    CALCIUM 9.2 11/14/2018    PROTEINTOT 5.6 (L) 11/14/2018    ALBUMIN 4.10 11/14/2018    BILITOT 0.3 11/14/2018    ALKPHOS 59 11/14/2018    AST 12 11/14/2018    ALT 23 11/14/2018     Lab Results   Component Value Date    FINALDX  11/09/2018      1. LEFT LUNG, UPPER LOBE, MASS, TBNA:  Malignant.  Small Cell Carcinoma.   2. BRONCH WASH:  Negative for malignancy.  Benign bronchial cells, pulmonary macrophages and acute inflammation.    This diagnosis was rendered by KHANH Reyes Jr., M.D. at [facility] . See scanned report for full consultative opinion.         Ct Chest Without Contrast    Result Date: 10/31/2018  EXAM:  CT Chest Without Intravenous Contrast EXAM DATE/TIME:  10/31/2018 5:01 PM CLINICAL HISTORY:  63 years old, female; Hypo-osmolality and hyponatremia; Dyspnea, unspecified; Signs and symptoms; Cough and shortness of breath; Prior surgery; Surgery date: 6+ months; Surgery type: 5 heart stents; Additional info: SOA, hyponatremia TECHNIQUE:  Axial computed tomography images of the chest without intravenous contrast.  All CT scans at this facility use at least one of these dose optimization techniques: automated exposure control; mA and/or kV adjustment per patient size (includes targeted exams where dose is matched to clinical indication); or iterative reconstruction.  Coronal reformatted images were created and reviewed. COMPARISON:  No relevant prior studies available. FINDINGS:   LUNGS/PLEURA: Suboptimally evaluated located and mildly spiculated RIGHT hilar mass measuring at least 4.4 cm in the long axis. Nodular peribronchial LEFT upper lobe and subpleural inferior lingula airspace opacities measuring up to 1.9 cm and centrilobular nodules in the LEFT upper lobe. Calcific granuloma in the RIGHT normal. No pleural effusion or pneumothorax.  Complete obstruction of the LEFT apical bronchus, no other central obstructive endobronchial mass or abnormality.   MEDIASTINUM: Heart size is normal, with trace pericardial thickening/effusion. Severe coronary arterial calcification/coronary stents. Atherosclerotic disease of the aorta without aortic aneurysm. Bulky prevascular lymph node adjacent to the aortic arch measuring 3.0 cm and LEFT hilar mass which may represent lymph nodes or primary lung malignancy.   OTHER STRUCTURES: Chronic degenerative changes in the visualized spine. Nonspecific bilateral perinephric fat stranding and renal cortical scarring. Small LEFT adrenal nodule measuring 1.3 cm.     1. Findings concerning for LEFT HILAR MALIGNANT SOFT TISSUE MASS with postobstructive focal pneumonia/bronchiolitis in the LEFT upper lobe. 2. Likely METASTATIC mediastinal and possibly LEFT hilar lymph nodes. 3. Coronary arterial calcification suggestive of CAD. 4. Other nonemergent/incidental findings as described. THIS DOCUMENT HAS BEEN ELECTRONICALLY SIGNED BY BATOOL SAMANO MD    Ct Chest With Contrast    Result Date: 11/8/2018  EXAMINATION: CT CHEST W CONTRAST- 11/08/2018  INDICATION: Mass or lump, thorax axilla  TECHNIQUE:  Axial CT data of the chest were acquired helically with contrast.  Multiplanar reformatted images were generated and reviewed. The radiation dose reduction device was turned on for each scan per the ALARA (As Low as Reasonably Achievable) protocol  COMPARISONS:  10/31/2018  FINDINGS: Unchanged small nodule in the left upper lobe, 1.5 cm. Small amount of right infrahilar airspace disease is seen, likely infectious or inflammatory. Central airways are patent. Heart size is within normal limits. Coronary artery disease noted. There is metastatic adenopathy in the left hilum (largest collection of nodes 3.2 cm), prevascular station (1.4 cm) and AP window (3.4 cm). The lymph nodes severely narrow the pulmonary arterial branches to the left  upper and lower lobes bilaterally as well as narrowing of some of the left-sided pulmonary veins. Right lung is clear. Unremarkable chest wall soft tissues. Unchanged long-standing left adrenal thickening. Partially visualized left renal cysts. No aggressive bone lesion.       1. Small nodule in the left upper lobe could represent primary neoplasm. 2. Metastatic left hilar and mediastinal lymph nodes. 3. Narrowing of the left hilar vessels.  D:  11/08/2018 E:  11/08/2018  This report was finalized on 11/8/2018 3:37 PM by Ap Wright.      Mri Brain With & Without Contrast    Result Date: 11/14/2018  EXAMINATION: MRI BRAIN W WO CONTRAST-11/13/2018:  INDICATION: Small cell lung cancer; E87.8-Jgzr-nabokmrxso and hyponatremia; R06.00-Dyspnea, unspecified; R13.13-Dysphagia, pharyngeal phase; R91.8-Other nonspecific abnormal finding of lung field; R91.8-Other nonspecific abnormal finding of lung field; C34.12-Malignant neoplasm of upper lobe, left bronchus or lung.  TECHNIQUE:  Multiplanar multisequence MRI of the brain was performed without and with contrast.  COMPARISONS:  None.  FINDINGS:  Brain volume is within normal limits. Ventricles are normal in size and configuration.  There is no abnormal signal on diffusion weighted images. Scattered FLAIR signal abnormalities are nonspecific but most likely attributable to chronic microangiopathy. There is no abnormal intracranial enhancement. There is no mass effect and the basal cisterns are patent. [place] flow voids are maintained.  No significant abnormal sinonasal or temporal bone fluid is identified.      No evidence of brain metastases.  D:  11/14/2018 E:  11/14/2018   This report was finalized on 11/14/2018 10:09 AM by Ap Wright.      Ct Abdomen Pelvis With Contrast    Result Date: 11/14/2018  EXAMINATION: CT ABDOMEN PELVIS W CONTRAST- 11/13/2018  INDICATION: small cell lung cancer; E87.6-Xaze-vniakljuod and hyponatremia; R06.00-Dyspnea, unspecified;  R13.13-Dysphagia, pharyngeal phase; R91.8-Other nonspecific abnormal finding of lung field; R91.8-Other nonspecific abnormal finding of lung field; C34.12-Malignant neoplasm of upper lobe, left bronchus or lung  TECHNIQUE:  Axial CT data of the abdomen and pelvis were acquired helically following IV contrast administration. Multiplanar reformatted images were generated and reviewed. The radiation dose reduction device was turned on for each scan per the ALARA (As Low as Reasonably Achievable) protocol  COMPARISONS:  03/28/2015  FINDINGS:   Calcified granuloma noted incidentally in the left lung base. There is a cyst in the left kidney. The liver is without focal lesion. A calcified granuloma is seen in the spleen, further evidence of healed granulomatous disease. Gallbladder is likely surgically absent. The adrenal glands and pancreas are normal. Right kidney is normal. No dilated small or large bowel. No enlarged lymph nodes or free fluid. Right hip arthroplasty hardware in place. No focal bone lesion identified.      No evidence of metastatic disease in the abdomen or pelvis.  D:  11/14/2018 E:  11/14/2018  This report was finalized on 11/14/2018 10:09 AM by Ap Wright.              Assessment/Plan    1.  Limited stage small cell lung cancer.  I spoke with Dr. Cardoso who has recommended cisplatin and etoposide.  She will initiate radiotherapy with cycle 2.  She would prefer to get her treatment here in [place].  She has a daughter that lives in [facility].  She will be transferred to the chemotherapy floor and initiated on therapy today.  We discussed expectations of the chemotherapy itself.  We will monitor her counts closely and will likely need weekly labs upon discharge.  She should get growth factor support 48 hours after discharge.            Tracie Denson MD  [facility]    11/14/2018     Please note that portions of this note may have been completed with a voice  recognition program. Efforts were made to edit the dictations, but occasionally words are mistranscribed.

## 2018-11-14 NOTE — PROGRESS NOTES
"   LOS: 14 days    Patient Care Team:  Rox Singleton MD as PCP - General (Internal Medicine)    Reason For Visit:  F/U HYPONATREMIA.  Subjective           Review of Systems:    Pulm: No soa   CV:  No CP      Objective       aspirin 81 mg Oral Daily   carvedilol 6.25 mg Oral BID With Meals   cetirizine 10 mg Oral Daily   enoxaparin 40 mg Subcutaneous Q24H   famotidine 20 mg Oral BID   insulin lispro 0-7 Units Subcutaneous 4x Daily With Meals & Nightly   levothyroxine 75 mcg Oral Daily   nicotine 1 patch Transdermal Q24H   pantoprazole 40 mg Oral Q AM   polyethylene glycol 17 g Oral Daily   tolvaptan 7.5 mg Oral Daily       lactated ringers 9 mL/hr Last Rate: 9 mL/hr (11/09/18 1016)         Vital Signs:  Blood pressure 118/70, pulse 88, temperature 97.9 °F (36.6 °C), temperature source Oral, resp. rate 16, height 154.9 cm (61\"), weight 89.8 kg (198 lb), SpO2 95 %.    Flowsheet Rows      First Filed Value   Admission Height  154.9 cm (61\") Documented at 10/31/2018 1424   Admission Weight  89.8 kg (198 lb) Documented at 10/31/2018 1424          11/13 0701 - 11/14 0700  In: -   Out: 1700 [Urine:1700]    Physical Exam:    General Appearance: NAD, alert and cooperative, Ox3  Eyes: PER, conjunctivae and sclerae normal, no icterus  Lungs: respirations regular and unlabored, no crepitus, clear to auscultation  Heart/CV: regular rhythm & normal rate, no murmur, no gallop, no rub and no edema  Abdomen: not distended, soft, non-tender, no masses,  bowel sounds present  Skin: No rash, Warm and dry    Radiology:            Labs:  Results from last 7 days   Lab Units  11/12/18   0708  11/11/18   0622  11/10/18   0322   WBC 10*3/mm3  10.57  9.16  9.97   HEMOGLOBIN g/dL  10.7*  10.1*  10.1*   HEMATOCRIT %  33.0*  30.0*  30.6*   PLATELETS 10*3/mm3  370  358  338     Results from last 7 days   Lab Units  11/14/18   0644  11/13/18   0849  11/13/18   0626  11/13/18   0318   11/12/18   0708   11/11/18   1339   11/11/18   0622 "   SODIUM mmol/L  134  134  136  138  137   < >  136   < >  132   < >  134  134   POTASSIUM mmol/L  4.0  4.0   --    --    --    --   4.2   --   3.9   --   4.0   CHLORIDE mmol/L  94*  94*   --    --    --    --   99   --   97*   --   97*   CO2 mmol/L  30.0  30.0   --    --    --    --   29.0   --   27.0   --   28.0   BUN mg/dL  17  17   --    --    --    --   15   --   16   --   17   CREATININE mg/dL  0.78  0.78   --    --    --    --   0.74   --   0.86   --   0.68   CALCIUM mg/dL  9.2  9.2   --    --    --    --   9.3   --   9.5   --   8.8   MAGNESIUM mg/dL  1.9   --    --    --    --    --    --    --    --    --    ALBUMIN g/dL  4.10   --    --    --    --    --    --    --    --    --     < > = values in this interval not displayed.     Results from last 7 days   Lab Units  11/14/18   0644   GLUCOSE mg/dL  145*  145*       Results from last 7 days   Lab Units  11/14/18   0644   ALK PHOS U/L  59   BILIRUBIN mg/dL  0.3   ALT (SGPT) U/L  23   AST (SGOT) U/L  12                 Estimated Creatinine Clearance: 77.2 mL/min (by C-G formula based on SCr of 0.78 mg/dL).      Assessment       Acute hyponatremia    Hyperlipidemia    Type 2 diabetes mellitus (CMS/HCC)    Tobacco abuse    Obesity (BMI 30-39.9)    Mass of upper lobe of left lung    Mediastinal lymphadenopathy    Small cell lung cancer, left upper lobe (CMS/HCC)            Impression: F/U HYPONATREMIA FROM SIADH. STABLE NA.            Recommendations: CONTINUE TOLVAPTAN.      Benito Donovan MD  11/14/18  12:01 PM

## 2018-11-14 NOTE — PROGRESS NOTES
[facility]  PROGRESS NOTE    Patient Name: Shauna Valencia  : 1955  MRN: [id number]    Date of Admission: 10/31/2018  Length of Stay: 14  Primary Care Physician: Rox Singleton MD    Subjective   Subjective     CC:  Weakness    HPI:  Pt sitting up on bed, family at bedside. She is in better spirits today. Had CT and MRI done and planning for bone scan today.     Review of Systems   Gen- No fevers, chills  CV- No chest pain, palpitations  Resp- No cough, dyspnea  GI- No N/V/D, abd pain    Otherwise ROS is negative except as mentioned in the HPI.    Objective   Objective     Vital Signs:   Temp:  [97.9 °F (36.6 °C)-98.4 °F (36.9 °C)] 97.9 °F (36.6 °C)  Heart Rate:  [80-90] 90  Resp:  [16-18] 16  BP: (112-138)/(64-84) 118/70        Physical Exam:  Constitutional: awake, alert, oriented  HENT: NCAT, mucous membranes moist  Respiratory: CTAB  Cardiovascular: RRR, no murmurs, rubs, or gallops, palpable pedal pulses bilaterally  Gastrointestinal: Positive bowel sounds, soft, nontender, nondistended  Musculoskeletal: No bilateral ankle edema  Psychiatric: Appropriate affect, cooperative  Neurologic: Oriented x 3, strength symmetric in all extremities, Cranial Nerves grossly intact to confrontation, speech clear  Skin: No rashes    Results Reviewed:  I have personally reviewed current lab, radiology, and data and agree.    Results from last 7 days   Lab Units  18   0708  18   0622  11/10/18   0322   WBC 10*3/mm3  10.57  9.16  9.97   HEMOGLOBIN g/dL  10.7*  10.1*  10.1*   HEMATOCRIT %  33.0*  30.0*  30.6*   PLATELETS 10*3/mm3  370  358  338     Results from last 7 days   Lab Units  18   0644  18   0849  18   0626   18   0708   18   1339   SODIUM mmol/L  134  134  136  138   < >  136   < >  132   POTASSIUM mmol/L  4.0  4.0   --    --    --   4.2   --   3.9   CHLORIDE mmol/L  94*  94*   --    --    --   99   --   97*   CO2 mmol/L   30.0  30.0   --    --    --   29.0   --   27.0   BUN mg/dL  17  17   --    --    --   15   --   16   CREATININE mg/dL  0.78  0.78   --    --    --   0.74   --   0.86   GLUCOSE mg/dL  145*  145*   --    --    --   102*   --   98   CALCIUM mg/dL  9.2  9.2   --    --    --   9.3   --   9.5   ALT (SGPT) U/L  23   --    --    --    --    --    --    AST (SGOT) U/L  12   --    --    --    --    --    --     < > = values in this interval not displayed.     Estimated Creatinine Clearance: 77.2 mL/min (by C-G formula based on SCr of 0.78 mg/dL).  No results found for: BNP    Microbiology Results Abnormal     Procedure Component Value - Date/Time    Fungus Culture - Wash, Lung, Left Upper Lobe [[id number]] Collected:  11/06/18 1225    Lab Status:  Preliminary result Specimen:  Wash from Lung, Left Upper Lobe Updated:  11/13/18 1302     Fungus Culture No fungus isolated at 1 week    AFB Culture - Wash, Lung, Left Upper Lobe [[id number]] Collected:  11/06/18 1225    Lab Status:  Preliminary result Specimen:  Wash from Lung, Left Upper Lobe Updated:  11/13/18 1302     AFB Culture No AFB isolated at 1 week     AFB Stain No acid fast bacilli seen on concentrated smear    Fungus Smear - Wash, Bronchus [[id number]] Collected:  11/09/18 1147    Lab Status:  Final result Specimen:  Wash from Bronchus Updated:  11/12/18 1335     Fungal Stain No fungal elements seen      No Pneumocystis jirovecci (formerly Pneumocystis carinii) noted on smear.    Fungus Smear - Wash, Lung, Left Upper Lobe [[id number]] Collected:  11/06/18 1225    Lab Status:  Final result Specimen:  Wash from Lung, Left Upper Lobe Updated:  11/12/18 1301     Fungal Stain No fungal elements seen      No Pneumocystis jirovecci (formerly Pneumocystis carinii) noted on smear.    Respiratory Culture - Wash, Bronchus [[id number]] Collected:  11/09/18 1147    Lab Status:  Final result Specimen:  Wash from Bronchus Updated:  11/11/18 0702     Respiratory Culture Light growth  (2+) Normal Respiratory Shavon     Gram Stain Few (2+) WBCs per low power field      Occasional Epithelial cells per low power field      Few (2+) Gram positive cocci in pairs and clusters    AFB Culture - Wash, Bronchus [[id number]] Collected:  11/09/18 1147    Lab Status:  Preliminary result Specimen:  Wash from Bronchus Updated:  11/10/18 1319     AFB Stain Rare (1+) Epithelial cells per low power field      Few (2+) WBCs per low power field      No organisms seen      No acid fast bacilli seen on concentrated smear    Respiratory Culture - Wash, Lung, Left Upper Lobe [[id number]] Collected:  11/06/18 1225    Lab Status:  Final result Specimen:  Wash from Lung, Left Upper Lobe Updated:  11/08/18 0732     Respiratory Culture Scant growth (1+) Normal Respiratory Shavon     Gram Stain No WBCs or organisms seen          Imaging Results (last 24 hours)     Procedure Component Value Units Date/Time    MRI Brain With & Without Contrast [[id number]] Updated:  11/14/18 0021    CT Abdomen Pelvis With Contrast [[id number]] Updated:  11/13/18 2313             I have reviewed the medications.      aspirin 81 mg Oral Daily   carvedilol 6.25 mg Oral BID With Meals   cetirizine 10 mg Oral Daily   enoxaparin 40 mg Subcutaneous Q24H   famotidine 20 mg Oral BID   insulin lispro 0-7 Units Subcutaneous 4x Daily With Meals & Nightly   levothyroxine 75 mcg Oral Daily   nicotine 1 patch Transdermal Q24H   pantoprazole 40 mg Oral Q AM   polyethylene glycol 17 g Oral Daily   tolvaptan 7.5 mg Oral Daily         Assessment/Plan   Assessment / Plan     Active Hospital Problems    Diagnosis   • Small cell lung cancer, left upper lobe (CMS/HCC)   • Mass of upper lobe of left lung   • Mediastinal lymphadenopathy   • Acute hyponatremia   • Hyperlipidemia   • Type 2 diabetes mellitus (CMS/HCC)   • Tobacco abuse   • Obesity (BMI 30-39.9)          Brief Hospital Course to date:  Shauna Valencia is a 63 y.o. female here with hyponatremia, likely SIADH, and  found to have a lung mass with bx showing Small Cell Lung Ca      Assessment & Plan:  Small Cell Lung Carcinoma   --s/p bronchoscopy 11/6, path negative malignant cells.  Repeat EBUS 11/9 with Dr. Scott, pathology pos for Small Cell Lung Carcinoma, cytology pos for Small Cell Carcinoma, I have DW Dr. Lyons, Oncology following and planning for further workup for staging and treatment plan. MRI Brain, CT abd pelvis and bone scan pending. Plan to start cisplatin and etoposide, as well as radiation.    Hx of RCA stent, chart reviewed, 7/2017  --held Brilinta, aspirin only    Hyponatremia  --probable SIADH  --AM cortisol 8.7  --TSH normal  -- NAL following, continuing Tolvaptan for now. Na+ normal this am, continue to monitor as per NAL, per Nephro will need to continue as outpt.     Dysphagia  --SLP following, recommended honey thick diet- pt pretty resistant to modified diets in the past.  Pt wishes for comfort diet for now as pt unable to tolerate honey thick    R LE pain  --checked duplex, superficial clot only  --has hx of sciatica with R leg pain    Tobacco abuse    Obesity    HL  --on Repatha once every two weeks at home, we do not have here, so will hold for now. Pt reassured     Anxiety  --dcd buspar,  --Ativan 0.5mg qd prn anxiety for now     Insomnia  --decreased restoril to 7.5mg prn with good sleep     DVT Prophylaxis:  Lovenox    CODE STATUS:   Code Status and Medical Interventions:   Ordered at: 10/31/18 9451     Level Of Support Discussed With:    Patient     Code Status:    CPR     Medical Interventions (Level of Support Prior to Arrest):    Full       Disposition: I expect the patient to be discharged TBD.      Electronically signed by Pam Abrams DO, 11/14/18, 8:53 AM.

## 2018-11-14 NOTE — NURSING NOTE
Patient and daughter educated re: cisplatin and eotposide. Background information, side effects and care considerations reviewed.  Questions answered. Denies further questions.  Written packets given for further reinforcement. Encouraged to call out for any additional questions or concerns.

## 2018-11-14 NOTE — PLAN OF CARE
Problem: Patient Care Overview  Goal: Plan of Care Review  Outcome: Ongoing (interventions implemented as appropriate)   11/14/18 0312   Coping/Psychosocial   Plan of Care Reviewed With patient   Plan of Care Review   Progress improving   OTHER   Outcome Summary VSS. No complaints sore throat voiced. Na remains stable at this time. MRI of rhoda and CT of abd and pelvis done pending results. Pt to begin chemo on 11/14/2018. Will continue to monitor.     Goal: Discharge Needs Assessment  Outcome: Ongoing (interventions implemented as appropriate)    Goal: Interprofessional Rounds/Family Conf  Outcome: Ongoing (interventions implemented as appropriate)   11/14/18 0312   Interdisciplinary Rounds/Family Conf   Participants family;patient;physician;nursing       Problem: Pain, Acute (Adult)  Goal: Acceptable Pain Control/Comfort Level  Outcome: Ongoing (interventions implemented as appropriate)   11/14/18 0312   Pain, Acute (Adult)   Acceptable Pain Control/Comfort Level making progress toward outcome       Problem: Diabetes, Type 2 (Adult)  Goal: Signs and Symptoms of Listed Potential Problems Will be Absent, Minimized or Managed (Diabetes, Type 2)  Outcome: Ongoing (interventions implemented as appropriate)   11/13/18 1630   Goal/Outcome Evaluation   Problems Assessed (Type 2 Diabetes) all   Problems Present (Type 2 Diabetes) none

## 2018-11-14 NOTE — PROGRESS NOTES
Continued Stay Note  [facility]     Patient Name: Shauna Valencia  MRN: [id number]  Today's Date: 11/14/2018    Admit Date: 10/31/2018    Discharge Plan     Row Name 11/14/18 7409       Plan    Plan  Update    Patient/Family in Agreement with Plan  yes    Plan Comments  Will need a script for Samsca 15mg faxed to [facility] [phone] and they will need to be notified of [phone] (Neda) so they can overnight ship. They will not ship until DC to make sure no changes will be made to the med.        Discharge Codes    No documentation.       Expected Discharge Date and Time     Expected Discharge Date Expected Discharge Time    Nov 14, 2018             Rocio Hutchins RN

## 2018-11-15 LAB
ANION GAP SERPL CALCULATED.3IONS-SCNC: 10 MMOL/L (ref 3–11)
BUN BLD-MCNC: 13 MG/DL (ref 9–23)
BUN/CREAT SERPL: 15.7 (ref 7–25)
CALCIUM SPEC-SCNC: 8.8 MG/DL (ref 8.7–10.4)
CHLORIDE SERPL-SCNC: 102 MMOL/L (ref 99–109)
CO2 SERPL-SCNC: 21 MMOL/L (ref 20–31)
CREAT BLD-MCNC: 0.83 MG/DL (ref 0.6–1.3)
DEPRECATED RDW RBC AUTO: 43.5 FL (ref 37–54)
ERYTHROCYTE [DISTWIDTH] IN BLOOD BY AUTOMATED COUNT: 13.4 % (ref 11.3–14.5)
GFR SERPL CREATININE-BSD FRML MDRD: 70 ML/MIN/1.73
GLUCOSE BLD-MCNC: 273 MG/DL (ref 70–100)
GLUCOSE BLDC GLUCOMTR-MCNC: 243 MG/DL (ref 70–130)
GLUCOSE BLDC GLUCOMTR-MCNC: 259 MG/DL (ref 70–130)
GLUCOSE BLDC GLUCOMTR-MCNC: 307 MG/DL (ref 70–130)
HCT VFR BLD AUTO: 34.4 % (ref 34.5–44)
HGB BLD-MCNC: 10.9 G/DL (ref 11.5–15.5)
MCH RBC QN AUTO: 28.1 PG (ref 27–31)
MCHC RBC AUTO-ENTMCNC: 31.7 G/DL (ref 32–36)
MCV RBC AUTO: 88.7 FL (ref 80–99)
PLATELET # BLD AUTO: 360 10*3/MM3 (ref 150–450)
PMV BLD AUTO: 8.5 FL (ref 6–12)
POTASSIUM BLD-SCNC: 4.6 MMOL/L (ref 3.5–5.5)
RBC # BLD AUTO: 3.88 10*6/MM3 (ref 3.89–5.14)
SODIUM BLD-SCNC: 133 MMOL/L (ref 132–146)
WBC NRBC COR # BLD: 6.41 10*3/MM3 (ref 3.5–10.8)

## 2018-11-15 PROCEDURE — 25010000002 ETOPOSIDE 100 MG/5ML SOLUTION 25 ML VIAL: Performed by: INTERNAL MEDICINE

## 2018-11-15 PROCEDURE — 25010000002 DEXAMETHASONE PER 1 MG: Performed by: INTERNAL MEDICINE

## 2018-11-15 PROCEDURE — 99233 SBSQ HOSP IP/OBS HIGH 50: CPT | Performed by: INTERNAL MEDICINE

## 2018-11-15 PROCEDURE — 82962 GLUCOSE BLOOD TEST: CPT

## 2018-11-15 PROCEDURE — 25010000002 ONDANSETRON PER 1 MG: Performed by: INTERNAL MEDICINE

## 2018-11-15 PROCEDURE — 25010000002 POTASSIUM CHLORIDE PER 2 MEQ OF POTASSIUM: Performed by: INTERNAL MEDICINE

## 2018-11-15 PROCEDURE — 25010000002 ENOXAPARIN PER 10 MG: Performed by: INTERNAL MEDICINE

## 2018-11-15 PROCEDURE — 25010000002 MAGNESIUM SULFATE PER 500 MG OF MAGNESIUM: Performed by: INTERNAL MEDICINE

## 2018-11-15 PROCEDURE — 85027 COMPLETE CBC AUTOMATED: CPT | Performed by: FAMILY MEDICINE

## 2018-11-15 PROCEDURE — 80048 BASIC METABOLIC PNL TOTAL CA: CPT | Performed by: INTERNAL MEDICINE

## 2018-11-15 RX ORDER — SODIUM CHLORIDE 9 MG/ML
250 INJECTION, SOLUTION INTRAVENOUS ONCE
Status: COMPLETED | OUTPATIENT
Start: 2018-11-15 | End: 2018-11-15

## 2018-11-15 RX ORDER — TOLVAPTAN 15 MG/1
7.5 TABLET ORAL DAILY
Qty: 30 TABLET | Refills: 0 | Status: ON HOLD | OUTPATIENT
Start: 2018-11-16 | End: 2019-01-01

## 2018-11-15 RX ADMIN — CETIRIZINE HYDROCHLORIDE 10 MG: 10 TABLET, FILM COATED ORAL at 08:04

## 2018-11-15 RX ADMIN — ONDANSETRON 16 MG: 2 INJECTION INTRAMUSCULAR; INTRAVENOUS at 17:44

## 2018-11-15 RX ADMIN — TOLVAPTAN 7.5 MG: 15 TABLET ORAL at 08:04

## 2018-11-15 RX ADMIN — FAMOTIDINE 20 MG: 20 TABLET ORAL at 20:33

## 2018-11-15 RX ADMIN — TEMAZEPAM 7.5 MG: 7.5 CAPSULE ORAL at 23:33

## 2018-11-15 RX ADMIN — POLYETHYLENE GLYCOL (3350) 17 G: 17 POWDER, FOR SOLUTION ORAL at 08:04

## 2018-11-15 RX ADMIN — LEVOTHYROXINE SODIUM 75 MCG: 75 TABLET ORAL at 05:20

## 2018-11-15 RX ADMIN — INSULIN LISPRO 4 UNITS: 100 INJECTION, SOLUTION INTRAVENOUS; SUBCUTANEOUS at 20:33

## 2018-11-15 RX ADMIN — SODIUM CHLORIDE 250 ML: 9 INJECTION, SOLUTION INTRAVENOUS at 18:39

## 2018-11-15 RX ADMIN — PANTOPRAZOLE SODIUM 40 MG: 40 TABLET, DELAYED RELEASE ORAL at 05:21

## 2018-11-15 RX ADMIN — ENOXAPARIN SODIUM 40 MG: 40 INJECTION SUBCUTANEOUS at 05:21

## 2018-11-15 RX ADMIN — ETOPOSIDE 190 MG: 20 INJECTION, SOLUTION, CONCENTRATE INTRAVENOUS at 18:33

## 2018-11-15 RX ADMIN — INSULIN LISPRO 4 UNITS: 100 INJECTION, SOLUTION INTRAVENOUS; SUBCUTANEOUS at 08:08

## 2018-11-15 RX ADMIN — INSULIN LISPRO 5 UNITS: 100 INJECTION, SOLUTION INTRAVENOUS; SUBCUTANEOUS at 18:27

## 2018-11-15 RX ADMIN — FAMOTIDINE 20 MG: 20 TABLET ORAL at 08:04

## 2018-11-15 RX ADMIN — INSULIN LISPRO 3 UNITS: 100 INJECTION, SOLUTION INTRAVENOUS; SUBCUTANEOUS at 11:57

## 2018-11-15 RX ADMIN — CARVEDILOL 6.25 MG: 6.25 TABLET, FILM COATED ORAL at 08:04

## 2018-11-15 RX ADMIN — ASPIRIN 81 MG: 81 TABLET, COATED ORAL at 08:04

## 2018-11-15 RX ADMIN — NICOTINE 1 PATCH: 21 PATCH, EXTENDED RELEASE TRANSDERMAL at 08:04

## 2018-11-15 RX ADMIN — MAGNESIUM SULFATE HEPTAHYDRATE 1000 ML: 500 INJECTION, SOLUTION INTRAMUSCULAR; INTRAVENOUS at 00:30

## 2018-11-15 RX ADMIN — DEXAMETHASONE SODIUM PHOSPHATE 12 MG: 4 INJECTION, SOLUTION INTRA-ARTICULAR; INTRALESIONAL; INTRAMUSCULAR; INTRAVENOUS; SOFT TISSUE at 17:45

## 2018-11-15 RX ADMIN — CARVEDILOL 6.25 MG: 6.25 TABLET, FILM COATED ORAL at 16:55

## 2018-11-15 NOTE — PROGRESS NOTES
Continued Stay Note  [facility]     Patient Name: Shauna Valencia  MRN: [id number]  Today's Date: 11/15/2018    Admit Date: 10/31/2018    Discharge Plan     Row Name 11/15/18 2647       Plan    Plan  Home with family    Patient/Family in Agreement with Plan  yes    Plan Comments  I spoke with patient this afternoon, along with her daughter. I have faxed [facility] to number given and have given notice of pending discharge of tomorrow. I will also let Neda know tomorrow when patient is discharge.  I spoke with [facility] and they will ship tonight. I have asked patient to have someone retrieve her package off porch tomorrow. It did not require a signature.      I will arrange PICC line dressing changes weekly with [facility])  and obtain flushes/kits  through [facility]. Flushes/kits  to be delivered to patient;'s room prior to discharge.     Final Discharge Disposition Code  06 - home with home health care        Discharge Codes    No documentation.       Expected Discharge Date and Time     Expected Discharge Date Expected Discharge Time    Nov 16, 2018             Svitlana Shah RN

## 2018-11-15 NOTE — PROGRESS NOTES
[facility] Medicine Services  PROGRESS NOTE    Patient Name: Shauna Valencia  : 1955  MRN: [id number]    Date of Admission: 10/31/2018  Length of Stay: 15  Primary Care Physician: Rox Singleton MD    Subjective   Subjective     CC: f/u hyponatremia    HPI: Up on edge of bed with her daughter at bedside. Exceedingly upset regarding her breakfast and interaction with dietary this am. Essentially refuses to discuss anything else. Does say she tolerated chemotherapy well last night.    Review of Systems  Gen- No fevers, chills  CV- No chest pain, palpitations  Resp- No cough, dyspnea  GI- No N/V/D, abd pain    Otherwise ROS is negative except as mentioned in the HPI.    Objective   Objective     Vital Signs:   Temp:  [97.7 °F (36.5 °C)-98.9 °F (37.2 °C)] 97.8 °F (36.6 °C)  Heart Rate:  [80-95] 95  Resp:  [18] 18  BP: (113-143)/(57-78) 143/65        Physical Exam:  Constitutional: No acute distress, awake, alert, well appearing  HENT: NCAT, mucous membranes moist  Respiratory: Clear to auscultation bilaterally, respiratory effort normal   Cardiovascular: RRR, no murmurs, rubs, or gallops, palpable pedal pulses bilaterally  Gastrointestinal: Positive bowel sounds, soft, nontender, nondistended  Musculoskeletal: No bilateral ankle edema  Psychiatric: Slightly agitated  Neurologic: Oriented x 3, strength symmetric in all extremities, Cranial Nerves grossly intact to confrontation, speech clear  Skin: No rashes    Results Reviewed:  I have personally reviewed current lab, radiology, and data and agree.    Results from last 7 days   Lab Units  11/15/18   0605  18   0708  18   0622   WBC 10*3/mm3  6.41  10.57  9.16   HEMOGLOBIN g/dL  10.9*  10.7*  10.1*   HEMATOCRIT %  34.4*  33.0*  30.0*   PLATELETS 10*3/mm3  360  370  358     Results from last 7 days   Lab Units  11/15/18   0605  18   0644  18   0849   18   0708   SODIUM mmol/L  133  134  134  136   < >   136   POTASSIUM mmol/L  4.6  4.0  4.0   --    --   4.2   CHLORIDE mmol/L  102  94*  94*   --    --   99   CO2 mmol/L  21.0  30.0  30.0   --    --   29.0   BUN mg/dL  13  17  17   --    --   15   CREATININE mg/dL  0.83  0.78  0.78   --    --   0.74   GLUCOSE mg/dL  273*  145*  145*   --    --   102*   CALCIUM mg/dL  8.8  9.2  9.2   --    --   9.3   ALT (SGPT) U/L   --   23   --    --    --    AST (SGOT) U/L   --   12   --    --    --     < > = values in this interval not displayed.     Estimated Creatinine Clearance: 72.6 mL/min (by C-G formula based on SCr of 0.83 mg/dL).  No results found for: BNP    Microbiology Results Abnormal     Procedure Component Value - Date/Time    AFB Culture - Wash, Bronchus [[id number]] Collected:  11/09/18 1147    Lab Status:  Preliminary result Specimen:  Wash from Bronchus Updated:  11/14/18 1300     AFB Culture No AFB isolated at less than 1 week     AFB Stain Rare (1+) Epithelial cells per low power field      Few (2+) WBCs per low power field      No organisms seen      No acid fast bacilli seen on concentrated smear    Fungus Culture - Wash, Lung, Left Upper Lobe [[id number]] Collected:  11/06/18 1225    Lab Status:  Preliminary result Specimen:  Wash from Lung, Left Upper Lobe Updated:  11/13/18 1302     Fungus Culture No fungus isolated at 1 week    AFB Culture - Wash, Lung, Left Upper Lobe [[id number]] Collected:  11/06/18 1225    Lab Status:  Preliminary result Specimen:  Wash from Lung, Left Upper Lobe Updated:  11/13/18 1302     AFB Culture No AFB isolated at 1 week     AFB Stain No acid fast bacilli seen on concentrated smear    Fungus Smear - Wash, Bronchus [[id number]] Collected:  11/09/18 1147    Lab Status:  Final result Specimen:  Wash from Bronchus Updated:  11/12/18 1335     Fungal Stain No fungal elements seen      No Pneumocystis jirovecci (formerly Pneumocystis carinii) noted on smear.    Fungus Smear - Wash, Lung, Left Upper Lobe [[id number]] Collected:   11/06/18 1225    Lab Status:  Final result Specimen:  Wash from Lung, Left Upper Lobe Updated:  11/12/18 1301     Fungal Stain No fungal elements seen      No Pneumocystis jirovecci (formerly Pneumocystis carinii) noted on smear.    Respiratory Culture - Wash, Bronchus [[id number]] Collected:  11/09/18 1147    Lab Status:  Final result Specimen:  Wash from Bronchus Updated:  11/11/18 0702     Respiratory Culture Light growth (2+) Normal Respiratory Shavon     Gram Stain Few (2+) WBCs per low power field      Occasional Epithelial cells per low power field      Few (2+) Gram positive cocci in pairs and clusters    Respiratory Culture - Wash, Lung, Left Upper Lobe [[id number]] Collected:  11/06/18 1225    Lab Status:  Final result Specimen:  Wash from Lung, Left Upper Lobe Updated:  11/08/18 0732     Respiratory Culture Scant growth (1+) Normal Respiratory Shavon     Gram Stain No WBCs or organisms seen        Reviewed imaging series' as below. Agree with interpretations.  Imaging Results (last 24 hours)     Procedure Component Value Units Date/Time    NM Bone Scan Whole Body [[id number]] Collected:  11/14/18 1803     Updated:  11/15/18 0826    Narrative:       EXAMINATION: NM BONE SCAN WHOLE-BODY - 11/14/2018     INDICATION: E87.7-Svqk-qfmvisdcvs and hyponatremia; R06.00-Dyspnea,  unspecified; R13.13-Dysphagia, pharyngeal phase; R91.8-Other nonspecific  abnormal finding of lung field; C34.12-Malignant neoplasm of upper lobe,  left bronchus or lung.     TECHNIQUE: Patient was injected with 27.5 mCi Technetium MDP and scanned  after a 5-hour delay.     COMPARISON: None.     FINDINGS: There are arthritic changes in the shoulders, cervical spine,  knees and ankles. There is absent tracer in the right femoral head  related to the right hip prosthesis. There is no focal or multifocal  pattern to suggest metastatic disease.        Impression:       Arthritic and postoperative changes described above. There  is no evidence of  metastatic disease.     DICTATED:   11/14/2018  EDITED/ls :   11/14/2018          This report was finalized on 11/15/2018 8:24 AM by Dr. Julián Barrios MD.       CT Abdomen Pelvis With Contrast [[id number]] Collected:  11/14/18 0925     Updated:  11/14/18 1011    Narrative:       EXAMINATION: CT ABDOMEN PELVIS W CONTRAST- 11/13/2018     INDICATION: small cell lung cancer; E87.0-Enkb-tzquxjynkr and  hyponatremia; R06.00-Dyspnea, unspecified; R13.13-Dysphagia, pharyngeal  phase; R91.8-Other nonspecific abnormal finding of lung field;  R91.8-Other nonspecific abnormal finding of lung field; C34.12-Malignant  neoplasm of upper lobe, left bronchus or lung     TECHNIQUE:  Axial CT data of the abdomen and pelvis were acquired  helically following IV contrast administration. Multiplanar reformatted  images were generated and reviewed. The radiation dose reduction device  was turned on for each scan per the ALARA (As Low as Reasonably  Achievable) protocol     COMPARISONS:  03/28/2015     FINDINGS:       Calcified granuloma noted incidentally in the left lung base. There is a  cyst in the left kidney. The liver is without focal lesion. A calcified  granuloma is seen in the spleen, further evidence of healed  granulomatous disease. Gallbladder is likely surgically absent. The  adrenal glands and pancreas are normal. Right kidney is normal. No  dilated small or large bowel. No enlarged lymph nodes or free fluid.  Right hip arthroplasty hardware in place. No focal bone lesion  identified.       Impression:       No evidence of metastatic disease in the abdomen or pelvis.     D:  11/14/2018  E:  11/14/2018     This report was finalized on 11/14/2018 10:09 AM by Ap Wright.       MRI Brain With & Without Contrast [[id number]] Collected:  11/14/18 0925     Updated:  11/14/18 1011    Narrative:       EXAMINATION: MRI BRAIN W WO CONTRAST-11/13/2018:      INDICATION: Small cell lung cancer; E87.9-Krwr-omrshmvcwf and  hyponatremia;  R06.00-Dyspnea, unspecified; R13.13-Dysphagia, pharyngeal  phase; R91.8-Other nonspecific abnormal finding of lung field;  R91.8-Other nonspecific abnormal finding of lung field; C34.12-Malignant  neoplasm of upper lobe, left bronchus or lung.     TECHNIQUE:  Multiplanar multisequence MRI of the brain was performed  without and with contrast.     COMPARISONS:  None.     FINDINGS:  Brain volume is within normal limits. Ventricles are normal  in size and configuration.  There is no abnormal signal on diffusion  weighted images. Scattered FLAIR signal abnormalities are nonspecific  but most likely attributable to chronic microangiopathy. There is no  abnormal intracranial enhancement. There is no mass effect and the basal  cisterns are patent. [place] flow voids are maintained.  No  significant abnormal sinonasal or temporal bone fluid is identified.       Impression:       No evidence of brain metastases.     D:  11/14/2018  E:  11/14/2018        This report was finalized on 11/14/2018 10:09 AM by Ap Wright.                I have reviewed the medications.      aspirin 81 mg Oral Daily   carvedilol 6.25 mg Oral BID With Meals   cetirizine 10 mg Oral Daily   enoxaparin 40 mg Subcutaneous Q24H   famotidine 20 mg Oral BID   insulin lispro 0-7 Units Subcutaneous 4x Daily With Meals & Nightly   levothyroxine 75 mcg Oral Daily   nicotine 1 patch Transdermal Q24H   ondansetron 16 mg Intravenous Once   pantoprazole 40 mg Oral Q AM   polyethylene glycol 17 g Oral Daily   tolvaptan 7.5 mg Oral Daily         Assessment/Plan   Assessment / Plan     Active Hospital Problems    Diagnosis   • Small cell lung cancer, left upper lobe (CMS/HCC)   • Mass of upper lobe of left lung   • Mediastinal lymphadenopathy   • Acute hyponatremia   • Hyperlipidemia   • Type 2 diabetes mellitus (CMS/HCC)   • Tobacco abuse   • Obesity (BMI 30-39.9)       Brief Hospital Course to date:  Shauna Valencia is a 63 y.o. female here with  hyponatremia, likely SIADH, and found to have a lung mass with bx and imaging showing limited stage Small Cell Lung Ca. Started on chemotherapy per Dr. Briones 11/14.       Assessment & Plan:  Limited Stage Small Cell Lung Carcinoma   --s/p bronchoscopy 11/6, path negative malignant cells.  Repeat EBUS 11/9 with Dr. Scott, pathology pos for Small Cell Lung Carcinoma, cytology pos for Small Cell Carcinoma.  -- Has started cisplatin and etoposide and is tolerating well. To start radiation with cycle 2.     Hx of RCA stent, chart reviewed, 7/2017  -- Held Brilinta due to ongoing eval as above. Continue aspirin only for now.     Hyponatremia  --probable SIADH due to lung mass.  -- NAL following, continuing Tolvaptan for now. Na+ normal this am, continue to monitor as per NAL, per Nephro will need to continue as outpt.     DM2, new diagnosis  --Hb A1C 7.3%. Ideally would start metformin, however given ongoing issues and initiation of chemo as above will hold on this so as to not add anything which might increase her GI symptoms.     Dysphagia  --SLP following, recommended honey thick diet- pt pretty resistant to modified diets in the past.  Pt wishes for comfort diet for now as pt unable to tolerate honey thick     R LE pain  --checked duplex, superficial clot only  --has hx of sciatica with R leg pain     Tobacco abuse     Obesity     HL  --on Repatha once every two weeks at home, we do not have here, so will hold for now. Pt reassured      Anxiety  --dcd buspar,  --Ativan 0.5mg qd prn anxiety for now      Insomnia  --Restoril to 7.5mg prn with good sleep      DVT Prophylaxis:  Lovenox    CODE STATUS:   Code Status and Medical Interventions:   Ordered at: 10/31/18 3028     Level Of Support Discussed With:    Patient     Code Status:    CPR     Medical Interventions (Level of Support Prior to Arrest):    Full       Disposition: I expect the patient to be discharged home in 24-48 hours when okay with Dr. Briones and  NAL.      Electronically signed by Tricia Obrien II, DO, 11/15/18, 9:59 AM.

## 2018-11-15 NOTE — PROGRESS NOTES
"   LOS: 15 days    Patient Care Team:  Rox Singleton MD as PCP - General (Internal Medicine)    Reason For Visit:  F/U HYPONATREMIA.  Subjective           Review of Systems:    Pulm: No soa   CV:  No CP      Objective       aspirin 81 mg Oral Daily   carvedilol 6.25 mg Oral BID With Meals   cetirizine 10 mg Oral Daily   enoxaparin 40 mg Subcutaneous Q24H   etoposide (VEPESID) chemo IVPB 100 mg/m2 (Treatment Plan Recorded) Intravenous Once   famotidine 20 mg Oral BID   insulin lispro 0-7 Units Subcutaneous 4x Daily With Meals & Nightly   levothyroxine 75 mcg Oral Daily   nicotine 1 patch Transdermal Q24H   ondansetron 16 mg Intravenous Once   pantoprazole 40 mg Oral Q AM   polyethylene glycol 17 g Oral Daily   sodium chloride 250 mL Intravenous Once   tolvaptan 7.5 mg Oral Daily       lactated ringers 9 mL/hr Last Rate: 9 mL/hr (11/09/18 1016)         Vital Signs:  Blood pressure 137/78, pulse 92, temperature 97.8 °F (36.6 °C), temperature source Oral, resp. rate 18, height 154.9 cm (61\"), weight 89.8 kg (198 lb), SpO2 93 %.    Flowsheet Rows      First Filed Value   Admission Height  154.9 cm (61\") Documented at 10/31/2018 1424   Admission Weight  89.8 kg (198 lb) Documented at 10/31/2018 1424          11/14 0701 - 11/15 0700  In: 3490 [P.O.:600; I.V.:2002]  Out: 1800 [Urine:1800]    Physical Exam:    General Appearance: NAD, alert and cooperative, Ox3  Eyes: PER, conjunctivae and sclerae normal, no icterus  Lungs: respirations regular and unlabored, no crepitus, clear to auscultation  Heart/CV: regular rhythm & normal rate, no murmur, no gallop, no rub and no edema  Abdomen: not distended, soft, non-tender, no masses,  bowel sounds present  Skin: No rash, Warm and dry      Labs:  Results from last 7 days   Lab Units  11/15/18   0605  11/12/18   0708  11/11/18   0622   WBC 10*3/mm3  6.41  10.57  9.16   HEMOGLOBIN g/dL  10.9*  10.7*  10.1*   HEMATOCRIT %  34.4*  33.0*  30.0*   PLATELETS 10*3/mm3  360  370  358 "     Results from last 7 days   Lab Units  11/15/18   0605  11/14/18   0644  11/13/18   0849  11/13/18   0626   11/12/18   0708   11/11/18   1339   SODIUM mmol/L  133  134  134  136  138   < >  136   < >  132   POTASSIUM mmol/L  4.6  4.0  4.0   --    --    --   4.2   --   3.9   CHLORIDE mmol/L  102  94*  94*   --    --    --   99   --   97*   CO2 mmol/L  21.0  30.0  30.0   --    --    --   29.0   --   27.0   BUN mg/dL  13  17  17   --    --    --   15   --   16   CREATININE mg/dL  0.83  0.78  0.78   --    --    --   0.74   --   0.86   CALCIUM mg/dL  8.8  9.2  9.2   --    --    --   9.3   --   9.5   MAGNESIUM mg/dL   --   1.9   --    --    --    --    --    --    ALBUMIN g/dL   --   4.10   --    --    --    --    --    --     < > = values in this interval not displayed.     Results from last 7 days   Lab Units  11/15/18   0605   GLUCOSE mg/dL  273*       Results from last 7 days   Lab Units  11/14/18   0644   ALK PHOS U/L  59   BILIRUBIN mg/dL  0.3   ALT (SGPT) U/L  23   AST (SGOT) U/L  12                 Estimated Creatinine Clearance: 72.6 mL/min (by C-G formula based on SCr of 0.83 mg/dL).      Assessment       Acute hyponatremia    Hyperlipidemia    Type 2 diabetes mellitus (CMS/HCC)    Tobacco abuse    Obesity (BMI 30-39.9)    Mass of upper lobe of left lung    Mediastinal lymphadenopathy    Small cell lung cancer, left upper lobe (CMS/HCC)            Impression: F/U HYPONATREMIA FROM SIADH. STABLE NA.            Recommendations: CONTINUE TOLVAPTAN.    PATIENT SEEN AND EXAMINED. HOME OK WITH RENAL. F/U WITH [facility] 1/21/19 AT 1:15 PM.  MATTHEW Azul  11/15/18  1:26 PM

## 2018-11-15 NOTE — DISCHARGE PLACEMENT REQUEST
"VeraShauna crain (63 y.o. Female)     From Svitlana GALLO, RN, [facility]   [phone], anticipate discharge tomorrow. Will notify you tomorrow.     Thank you    Date of Birth Social Security Number Address Home Phone MRN    1955  [address]  [facility] [address] [phone] [id number]    Episcopalian Marital Status          Unknown        Admission Date Admission Type Admitting Provider Attending Provider Department, Room/Bed    10/31/18 Emergency Tricia Obrien II, DO Tricia Obrien II, DO [facility] 5B, N530/1    Discharge Date Discharge Disposition Discharge Destination                       Attending Provider:  Tricia Obrien II, DO    Allergies:  Plavix [Clopidogrel Bisulfate], Codeine, Penicillins    Isolation:  None   Infection:  None   Code Status:  CPR    Ht:  154.9 cm (61\")   Wt:  89.8 kg (198 lb)    Admission Cmt:  None   Principal Problem:  None                Active Insurance as of 10/31/2018     Primary Coverage     Payor Plan Insurance Group Employer/Plan Group    MEDICARE MEDICARE A & B      Payor Plan Address Payor Plan Phone Number Payor Plan Fax Number Effective Dates    [address] [phone]  11/1/2008 - None Entered    [facility] [address]       Subscriber Name Subscriber Birth Date Member ID       SHAUNA VERA 1955 [id number]           Secondary Coverage     Payor Plan Insurance Group Employer/Plan Group    [place]      Payor [place] Address Payor Plan Phone Number Payor Plan Fax Number Effective Dates    [address] [address]   2/1/2014 - None Entered    [place] [address]       Subscriber Name Subscriber Birth Date Member ID       SHAUNA VERA 1955 [id number]                 Emergency Contacts      (Rel.) Home Phone Work Phone Mobile Phone    Quarter,Jade (Daughter) [phone] -- --    DesireReyna (Sister) [phone] -- --            Insurance Information                " MEDICARE/MEDICARE A & B Phone: [phone]    Subscriber: Shauna Valnecia Subscriber#: [id number]    Group#:  Precert#:         [id number] [place] [place] Phone:     Subscriber: Shauna Valencia Subscriber#: [id number]    Group#:  Precert#:           [address]  [address] [facility] [address]  Phone:  [phone]  Fax:          Patient:     Shauna Valencia MRN:  [id number]   [address]  [facility] [address] :  1955  SSN:    Phone: [phone] Sex:  F      INSURANCE PAYOR PLAN GROUP # SUBSCRIBER ID   Primary:  Secondary:    MEDICARE  [facility] [place] [id number]  [id number]      [id number]  [id number]   Admitting Diagnosis: Acute hyponatremia [E87.1]  Order Date:  Nov 15, 2018         Notify [facility]       (Order ID: [id number])     Diagnosis:         Priority:  Routine Expected Date:   Expiration Date:        Interval:  Until Discontinued Count:    Comments: SKilled nursing for PICC line dressing change weekly. Kits and flushes through [facility].        Specimen Type:   Specimen Source:   Specimen Taken Date:   Specimen Taken Time:                   Verbal Order Mode: Verbal with readback   Authorizing Provider: Tricia Obrien II, DO  Authorizing Provider's NPI: [id number]     Order Entered By: Svitlana Shah RN 11/15/2018  3:22 PM     Electronically signed by:                   History & Physical      Mickey Warner MD at 10/31/2018  7:09 PM              [facility] Medicine Services  HISTORY AND PHYSICAL    Patient Name: Shauna Valencia  : 1955  MRN: [id number]  Primary Care Physician: Rox Singleton MD  Date of admission: 10/31/2018      Subjective   Subjective     Chief Complaint:  Hyponatremia    HPI:  Shauna Valencia is a 63 y.o. female with history of obesity, HTN, HLD, DM2, and tobacco abuse, who was directed for admission by Dr Banks (her endocrinologist) after  routine lab revealed significant hyponatremia. At the beginning of 2018, it was normal, but per pt, she recently had routine labs in 9/2018 which also revealed hyponatremia (Na 120) and her PCP. Her PCP ordered CT chest, which had been scheduled, but have not been done yet. Pt denies any new medications or SSRI.  Pt is relatively asymptomatic. She only reports persistent nagging dry cough for several months now. No unintentional weight loss, but she does admit to being on a diet and not eating very much.    Review of Systems     Otherwise 10-system ROS reviewed and is negative except as mentioned in the HPI.    Personal History     Past Medical History:   Diagnosis Date   • Diabetes mellitus (CMS/HCC)    • Hyperlipidemia    • Pancreatitis        Past Surgical History:   Procedure Laterality Date   • CAROTID STENT     • COLONOSCOPY     • UPPER GASTROINTESTINAL ENDOSCOPY         Family History: family history includes Colon polyps in her mother; Ulcerative colitis in her sister.     Social History:  reports that she has been smoking Cigarettes.  She has been smoking about 0.50 packs per day. She does not have any smokeless tobacco history on file. She reports that she does not drink alcohol.  Social History     Social History Narrative   • No narrative on file       Medications:  Reviewed and reconciled    Allergies   Allergen Reactions   • Plavix [Clopidogrel Bisulfate] Anaphylaxis   • Codeine    • Penicillins        Objective   Objective     Vital Signs:   Temp:  [97.4 °F (36.3 °C)-98.2 °F (36.8 °C)] 97.7 °F (36.5 °C)  Heart Rate:  [] 101  Resp:  [16-17] 16  BP: (109-164)/(64-96) 109/96        Physical Exam   General Assessment: No acute cardiopulmonary distress. Well developed and well nourished.    HEENT: NCAT, PERRL, MM moist    Neck: Supple    CVS: RRR, S1S2 normal, no murmurs    Resp: CTAB, no adventitious sound    Abd: soft, NT, ND, normal BS, no guarding or peritoneal signs    Ext: No edema, both calves  are symmetric and NTTP    Neuro: Nonfocal    Skin: W/D/I. No rash.    Psych: Affect is appropriate    Results Reviewed:  I have personally reviewed current lab, radiology, and data and agree.      Results from last 7 days  Lab Units 10/31/18  1710   WBC 10*3/mm3 7.54   HEMOGLOBIN g/dL 11.7   HEMATOCRIT % 33.8*   PLATELETS 10*3/mm3 422       Results from last 7 days  Lab Units 10/31/18  1507   SODIUM mmol/L 117*   POTASSIUM mmol/L 4.4   CHLORIDE mmol/L 87*   CO2 mmol/L 25.0   BUN mg/dL 12   CREATININE mg/dL 0.67   GLUCOSE mg/dL 146*   CALCIUM mg/dL 8.8     Estimated Creatinine Clearance: 90.1 mL/min (by C-G formula based on SCr of 0.67 mg/dL).  Brief Urine Lab Results  (Last result in the past 365 days)      Color   Clarity   Blood   Leuk Est   Nitrite   Protein   CREAT   Urine HCG        10/31/18 1729 Dark Yellow(A) Clear Negative Negative Negative Negative             No results found for: BNP  Imaging Results (last 24 hours)     Procedure Component Value Units Date/Time    CT Chest Without Contrast [[id number]] Collected:  10/31/18 1701     Updated:  10/31/18 1930    Narrative:       EXAM:    CT Chest Without Intravenous Contrast     EXAM DATE/TIME:    10/31/2018 5:01 PM     CLINICAL HISTORY:    63 years old, female; Hypo-osmolality and hyponatremia; Dyspnea, unspecified;   Signs and symptoms; Cough and shortness of breath; Prior surgery; Surgery date:   6+ months; Surgery type: 5 heart stents; Additional info: SOA, hyponatremia     TECHNIQUE:    Axial computed tomography images of the chest without intravenous contrast.    All CT scans at this facility use at least one of these dose optimization   techniques: automated exposure control; mA and/or kV adjustment per patient   size (includes targeted exams where dose is matched to clinical indication); or   iterative reconstruction.    Coronal reformatted images were created and reviewed.     COMPARISON:    No relevant prior studies available.     FINDINGS:      LUNGS/PLEURA: Suboptimally evaluated located and mildly spiculated RIGHT   hilar mass measuring at least 4.4 cm in the long axis. Nodular peribronchial   LEFT upper lobe and subpleural inferior lingula airspace opacities measuring up   to 1.9 cm and centrilobular nodules in the LEFT upper lobe. Calcific granuloma   in the RIGHT normal. No pleural effusion or pneumothorax. Complete obstruction   of the LEFT apical bronchus, no other central obstructive endobronchial mass or   abnormality.     MEDIASTINUM: Heart size is normal, with trace pericardial   thickening/effusion. Severe coronary arterial calcification/coronary stents.   Atherosclerotic disease of the aorta without aortic aneurysm. Bulky prevascular   lymph node adjacent to the aortic arch measuring 3.0 cm and LEFT hilar mass   which may represent lymph nodes or primary lung malignancy.     OTHER STRUCTURES: Chronic degenerative changes in the visualized spine.   Nonspecific bilateral perinephric fat stranding and renal cortical scarring.   Small LEFT adrenal nodule measuring 1.3 cm.       Impression:       1. Findings concerning for LEFT HILAR MALIGNANT SOFT TISSUE MASS with   postobstructive focal pneumonia/bronchiolitis in the LEFT upper lobe.   2. Likely METASTATIC mediastinal and possibly LEFT hilar lymph nodes.   3. Coronary arterial calcification suggestive of CAD.   4. Other nonemergent/incidental findings as described.     THIS DOCUMENT HAS BEEN ELECTRONICALLY SIGNED BY BATOOL SAMANO MD             Assessment/Plan   Assessment / Plan     Active Hospital Problems    Diagnosis   • Acute hyponatremia   • Hyperlipidemia   • Type 2 diabetes mellitus (CMS/HCC)   • Tobacco abuse   • Obesity (BMI 30-39.9)         Assessment & Plan:  Shauna Valencia is a 63 y.o. female with history of obesity, HTN, HLD, DM2, and tobacco abuse, who was directed for admission by Dr Banks (her endocrinologist) after routine lab revealed significant hyponatremia. Pt is asymptomatic.  CT chest noted as above (left hilar malignant soft tissue mass with post obstructive focal pneumonia/bronchiolitis in the JESSICA with likely metastatic mediastinal and left hilar lymph node. Hyponatremia is likely from paraneosplastic syndrome/SIAD.    - urine studies obtained in the ED pending  - correct Na with NS slowly for now and fluid restrition to 1L per day, monitor with q4h BMP.   - Consult pulm in am  - Consider nephrology consultation PRN  - Start Rocephin and Doxy  - Pharm consulted by ED to look at potential meds as the underlying culprit. I personally reviewed med recs, no obvious medication as culprit  - check TSH/A1C in am  - low dose SSI and adjust PRN    DVT prophylaxis: Lovenox    CODE STATUS:    Code Status and Medical Interventions:   Ordered at: 10/31/18 1909     Level Of Support Discussed With:    Patient     Code Status:    CPR     Medical Interventions (Level of Support Prior to Arrest):    Full       Admission Status:  I believe this patient meets INPATIENT status due to the need for care which can only be reasonably provided in an hospital setting such as aggressive/expedited ancillary services and/or consultation services, the necessity for IV medications, close physician monitoring and/or the possible need for procedures.  In such, I feel patient’s risk for adverse outcomes and need for care warrant INPATIENT evaluation and predict the patient’s care encounter to likely last beyond 2 midnights.        Electronically signed by Mickey Warner MD, 10/31/18, 7:09 PM.          Electronically signed by Mickey Warner MD at 10/31/2018 10:37 PM          Physician Progress Notes (most recent note)      Benito Donovan MD at 11/15/2018  1:26 PM             LOS: 15 days    Patient Care Team:  Rox Singleton MD as PCP - General (Internal Medicine)    Reason For Visit:  F/U HYPONATREMIA.  Subjective           Review of Systems:    Pulm: No soa   CV:  No CP      Objective  "      aspirin 81 mg Oral Daily   carvedilol 6.25 mg Oral BID With Meals   cetirizine 10 mg Oral Daily   enoxaparin 40 mg Subcutaneous Q24H   etoposide (VEPESID) chemo IVPB 100 mg/m2 (Treatment Plan Recorded) Intravenous Once   famotidine 20 mg Oral BID   insulin lispro 0-7 Units Subcutaneous 4x Daily With Meals & Nightly   levothyroxine 75 mcg Oral Daily   nicotine 1 patch Transdermal Q24H   ondansetron 16 mg Intravenous Once   pantoprazole 40 mg Oral Q AM   polyethylene glycol 17 g Oral Daily   sodium chloride 250 mL Intravenous Once   tolvaptan 7.5 mg Oral Daily       lactated ringers 9 mL/hr Last Rate: 9 mL/hr (11/09/18 1016)         Vital Signs:  Blood pressure 137/78, pulse 92, temperature 97.8 °F (36.6 °C), temperature source Oral, resp. rate 18, height 154.9 cm (61\"), weight 89.8 kg (198 lb), SpO2 93 %.    Flowsheet Rows      First Filed Value   Admission Height  154.9 cm (61\") Documented at 10/31/2018 1424   Admission Weight  89.8 kg (198 lb) Documented at 10/31/2018 1424          11/14 0701 - 11/15 0700  In: 3490 [P.O.:600; I.V.:2002]  Out: 1800 [Urine:1800]    Physical Exam:    General Appearance: NAD, alert and cooperative, Ox3  Eyes: PER, conjunctivae and sclerae normal, no icterus  Lungs: respirations regular and unlabored, no crepitus, clear to auscultation  Heart/CV: regular rhythm & normal rate, no murmur, no gallop, no rub and no edema  Abdomen: not distended, soft, non-tender, no masses,  bowel sounds present  Skin: No rash, Warm and dry      Labs:  Results from last 7 days   Lab Units  11/15/18   0605  11/12/18   0708  11/11/18   0622   WBC 10*3/mm3  6.41  10.57  9.16   HEMOGLOBIN g/dL  10.9*  10.7*  10.1*   HEMATOCRIT %  34.4*  33.0*  30.0*   PLATELETS 10*3/mm3  360  370  358     Results from last 7 days   Lab Units  11/15/18   0605  11/14/18   0644  11/13/18   0849  11/13/18   0626   11/12/18   0708   11/11/18   1339   SODIUM mmol/L  133  134  134  136  138   < >  136   < >  132   POTASSIUM " mmol/L  4.6  4.0  4.0   --    --    --   4.2   --   3.9   CHLORIDE mmol/L  102  94*  94*   --    --    --   99   --   97*   CO2 mmol/L  21.0  30.0  30.0   --    --    --   29.0   --   27.0   BUN mg/dL  13  17  17   --    --    --   15   --   16   CREATININE mg/dL  0.83  0.78  0.78   --    --    --   0.74   --   0.86   CALCIUM mg/dL  8.8  9.2  9.2   --    --    --   9.3   --   9.5   MAGNESIUM mg/dL   --   1.9   --    --    --    --    --    --    ALBUMIN g/dL   --   4.10   --    --    --    --    --    --     < > = values in this interval not displayed.     Results from last 7 days   Lab Units  11/15/18   0605   GLUCOSE mg/dL  273*       Results from last 7 days   Lab Units  11/14/18   0644   ALK PHOS U/L  59   BILIRUBIN mg/dL  0.3   ALT (SGPT) U/L  23   AST (SGOT) U/L  12                 Estimated Creatinine Clearance: 72.6 mL/min (by C-G formula based on SCr of 0.83 mg/dL).      Assessment       Acute hyponatremia    Hyperlipidemia    Type 2 diabetes mellitus (CMS/HCC)    Tobacco abuse    Obesity (BMI 30-39.9)    Mass of upper lobe of left lung    Mediastinal lymphadenopathy    Small cell lung cancer, left upper lobe (CMS/HCC)            Impression: F/U HYPONATREMIA FROM SIADH. STABLE NA.            Recommendations: CONTINUE TOLVAPTAN.    PATIENT SEEN AND EXAMINED. HOME OK WITH RENAL. F/U WITH [facility] 1/21/19 AT 1:15 PM.  MATTHEW Azul  11/15/18  1:26 PM          Electronically signed by Benito Donovan MD at 11/15/2018  2:42 PM          Consult Notes (most recent note)      Desmond Cardoso MD at 11/13/2018  8:41 AM      Consult Orders    1. Inpatient Hematology & Oncology Consult [[id number]] ordered by Ricardo Laguerre MD at 11/13/18 0806                Subjective     CHIEF COMPLAINT: Shorten of breath    HISTORY OF PRESENT ILLNESS:  The patient is a 63 y.o. female, referred by Pam Abrams DO for new diagnosis of small cell lung cancer.  The patient was admitted to the  hospitalist service on October 31, 2018.  She presented with shortness of breath her she had a CAT scan that revealed left hilar and metastatic adenopathy with left upper lobe lung nodule.  She had bronchoscopy with a biopsy done on November 6, 2018 that was negative.  She had repeat bronchoscopy done on November 9, 2018 pathology came back consistent with small cell lung cancer for which I was consulted.  When I saw the patient today she is anxious about new cancer diagnosis. She is interested in active cancer treatment. She has never been diagnosed with cancer before.      REVIEW OF SYSTEMS:  A 14 point review of systems was performed and is negative except as noted above.    Past Medical History:   Diagnosis Date   • Diabetes mellitus (CMS/HCC)    • Hyperlipidemia    • Pancreatitis        No current facility-administered medications on file prior to encounter.      Current Outpatient Medications on File Prior to Encounter   Medication Sig Dispense Refill   • albuterol (PROVENTIL HFA;VENTOLIN HFA) 108 (90 Base) MCG/ACT inhaler Inhale 2 puffs Every 4 (Four) Hours As Needed for Wheezing.     • aspirin 81 MG EC tablet Take 81 mg by mouth Daily.     • carvedilol (COREG) 6.25 MG tablet Take 6.25 mg by mouth 2 (Two) Times a Day With Meals.     • famotidine (PEPCID) 20 MG tablet Take 20 mg by mouth 2 (Two) Times a Day.     • levothyroxine (SYNTHROID, LEVOTHROID) 75 MCG tablet Take 75 mcg by mouth Daily.     • Loratadine (CLARITIN) 10 MG capsule Take  by mouth Daily.     • pantoprazole (PROTONIX) 40 MG EC tablet Take 40 mg by mouth Daily.     • REPATHA SURECLICK 140 MG/ML solution auto-injector Inject 140 mg under the skin into the appropriate area as directed Every 14 (Fourteen) Days.     • SITagliptin (JANUVIA) 100 MG tablet Take 100 mg by mouth Daily.     • ticagrelor (BRILINTA) 60 MG tablet tablet Take 60 mg by mouth Daily.         Allergies   Allergen Reactions   • Plavix [Clopidogrel Bisulfate] Anaphylaxis   •  Codeine    • Penicillins        Past Surgical History:   Procedure Laterality Date   • CAROTID STENT     • COLONOSCOPY     • UPPER GASTROINTESTINAL ENDOSCOPY         OB History   No data available       Social History     Socioeconomic History   • Marital status:      Spouse name: Not on file   • Number of children: Not on file   • Years of education: Not on file   • Highest education level: Not on file   Tobacco Use   • Smoking status: Current Every Day Smoker     Packs/day: 0.50     Types: Cigarettes   Substance and Sexual Activity   • Alcohol use: No       Family History   Problem Relation Age of Onset   • Colon polyps Mother    • Ulcerative colitis Sister        Objective     Vitals:    11/12/18 0852 11/12/18 1718 11/12/18 1919 11/13/18 0701   BP: 128/76 139/77 111/61 100/56   BP Location:   Left arm Left arm   Patient Position:   Lying Lying   Pulse: 86 73 72    Resp:   18 16   Temp:   98.6 °F (37 °C) 98.3 °F (36.8 °C)   TempSrc:   Oral Oral   SpO2:       Weight:       Height:                ECOG Performance Status: 1 - Symptomatic but completely ambulatory  General: well appearing female in no acute distress  Neuro/Psych: A&O x 3, gait steady, appropriate affect, strength 5/5 in all muscle groups  HEENT: sclera anicteric, oropharynx clear  Lymphatics: no cervical, supraclavicular, or axillary adenopathy  Cardiovascular: regular rate and rhythm, no murmurs  Lungs: clear to auscultation bilaterally  Abdomen: soft, nontender, nondistended.  No palpable organomegaly  Extremeties: no lower extremity edema  Skin: no rashes, lesions, bruising, or petechiae      Admission on 10/31/2018   No results displayed because visit has over 200 results.           No results found.    ASSESSMENT 63 years old female with small cell lung cancer    PROBLEM LIST   1.  Left upper lobe small cell lung cancer [id number] stage IIIa disease:  8.  Presented with shortness of breath.  B.  Status post bronchoscopy with a biopsy done on  November 9, 2018 revealed small cell lung cancer   2.  Hyponatremia  3.  Normocytic anemia    PLAN  1.  I reviewed the patient's chart including admission note blood results and imaging report.  I reviewed the patient's CT scan films myself.  2.  I discussed the case with Dr. Mayberry from pathology to coordinate patient's care.  3.  I will order MRI brain CAT scan abdomen and pelvis and bone scan to complete her staging workup.  4.  I explained to patient her disease type as well as stage.  She is interested in active cancer treatment.  I'll get her started on cisplatin and etoposide.  We'll include radiation as an outpatient.  5. We reviewed the potential side effects of this regimen including fatigue, vomiting and nausea, hair loss, nephropathy, neuropathy, hearing loss, myelosuppression, and risk of infusion reaction.  6.  I will monitor patient blood work including blood counts kidney function liver enzymes and electrolytes were  7.  I will add Zofran as needed for nausea.  Discussed with patient and her daughter at the bedside.  Discussed with the patient nurse as well as our pharmacist to coordinate patient's care.    Desmond Cardoso MD    11/13/2018      Electronically signed by Desmond Cardoso MD at 11/13/2018  4:25 PM

## 2018-11-15 NOTE — PLAN OF CARE
Problem: Patient Care Overview  Goal: Plan of Care Review  Outcome: Ongoing (interventions implemented as appropriate)   11/15/18 0340   Coping/Psychosocial   Plan of Care Reviewed With patient   Plan of Care Review   Progress no change   OTHER   Outcome Summary VSS no overnight events. Received Day 1 Cycle 1 etoposide/cisplatin without adverse events or issues. am labs ordered. plan for Day 2 etoposide. amb safely. voiding. full code. family present and supportive.      Goal: Discharge Needs Assessment  Outcome: Ongoing (interventions implemented as appropriate)    Goal: Interprofessional Rounds/Family Conf  Outcome: Ongoing (interventions implemented as appropriate)      Problem: Pain, Acute (Adult)  Goal: Acceptable Pain Control/Comfort Level  Outcome: Ongoing (interventions implemented as appropriate)      Problem: Chemotherapy Effects (Adult)  Goal: Signs and Symptoms of Listed Potential Problems Will be Absent, Minimized or Managed (Chemotherapy Effects)  Outcome: Ongoing (interventions implemented as appropriate)

## 2018-11-16 LAB
ANION GAP SERPL CALCULATED.3IONS-SCNC: 8 MMOL/L (ref 3–11)
BUN BLD-MCNC: 20 MG/DL (ref 9–23)
BUN/CREAT SERPL: 22.7 (ref 7–25)
CALCIUM SPEC-SCNC: 8.8 MG/DL (ref 8.7–10.4)
CHLORIDE SERPL-SCNC: 102 MMOL/L (ref 99–109)
CO2 SERPL-SCNC: 23 MMOL/L (ref 20–31)
CREAT BLD-MCNC: 0.88 MG/DL (ref 0.6–1.3)
GFR SERPL CREATININE-BSD FRML MDRD: 65 ML/MIN/1.73
GLUCOSE BLD-MCNC: 270 MG/DL (ref 70–100)
GLUCOSE BLDC GLUCOMTR-MCNC: 279 MG/DL (ref 70–130)
GLUCOSE BLDC GLUCOMTR-MCNC: 283 MG/DL (ref 70–130)
GLUCOSE BLDC GLUCOMTR-MCNC: 355 MG/DL (ref 70–130)
GLUCOSE BLDC GLUCOMTR-MCNC: 438 MG/DL (ref 70–130)
GLUCOSE BLDC GLUCOMTR-MCNC: 440 MG/DL (ref 70–130)
POTASSIUM BLD-SCNC: 4.2 MMOL/L (ref 3.5–5.5)
SODIUM BLD-SCNC: 133 MMOL/L (ref 132–146)

## 2018-11-16 PROCEDURE — 25010000002 ENOXAPARIN PER 10 MG: Performed by: INTERNAL MEDICINE

## 2018-11-16 PROCEDURE — 99231 SBSQ HOSP IP/OBS SF/LOW 25: CPT | Performed by: INTERNAL MEDICINE

## 2018-11-16 PROCEDURE — 25010000002 ETOPOSIDE 100 MG/5ML SOLUTION 25 ML VIAL: Performed by: INTERNAL MEDICINE

## 2018-11-16 PROCEDURE — 25010000002 DEXAMETHASONE PER 1 MG: Performed by: INTERNAL MEDICINE

## 2018-11-16 PROCEDURE — 63710000001 INSULIN LISPRO (HUMAN) PER 5 UNITS: Performed by: HOSPITALIST

## 2018-11-16 PROCEDURE — 82962 GLUCOSE BLOOD TEST: CPT

## 2018-11-16 PROCEDURE — 80048 BASIC METABOLIC PNL TOTAL CA: CPT | Performed by: INTERNAL MEDICINE

## 2018-11-16 PROCEDURE — 99233 SBSQ HOSP IP/OBS HIGH 50: CPT | Performed by: INTERNAL MEDICINE

## 2018-11-16 PROCEDURE — 25010000002 ONDANSETRON PER 1 MG: Performed by: HOSPITALIST

## 2018-11-16 PROCEDURE — 92526 ORAL FUNCTION THERAPY: CPT

## 2018-11-16 RX ORDER — NICOTINE 21 MG/24HR
1 PATCH, TRANSDERMAL 24 HOURS TRANSDERMAL
Qty: 28 EACH | Refills: 0 | Status: SHIPPED | OUTPATIENT
Start: 2018-11-17 | End: 2018-12-05 | Stop reason: DRUGHIGH

## 2018-11-16 RX ORDER — SODIUM CHLORIDE 9 MG/ML
250 INJECTION, SOLUTION INTRAVENOUS ONCE
Status: COMPLETED | OUTPATIENT
Start: 2018-11-16 | End: 2018-11-16

## 2018-11-16 RX ORDER — HYDROCODONE BITARTRATE AND ACETAMINOPHEN 5; 325 MG/1; MG/1
1 TABLET ORAL EVERY 4 HOURS PRN
Qty: 18 TABLET | Refills: 0 | Status: SHIPPED | OUTPATIENT
Start: 2018-11-16 | End: 2018-11-19

## 2018-11-16 RX ADMIN — ETOPOSIDE 190 MG: 20 INJECTION, SOLUTION, CONCENTRATE INTRAVENOUS at 15:23

## 2018-11-16 RX ADMIN — SODIUM CHLORIDE 250 ML: 9 INJECTION, SOLUTION INTRAVENOUS at 14:54

## 2018-11-16 RX ADMIN — ASPIRIN 81 MG: 81 TABLET, COATED ORAL at 09:14

## 2018-11-16 RX ADMIN — INSULIN LISPRO 6 UNITS: 100 INJECTION, SOLUTION INTRAVENOUS; SUBCUTANEOUS at 18:57

## 2018-11-16 RX ADMIN — ONDANSETRON HYDROCHLORIDE 4 MG: 2 INJECTION, SOLUTION INTRAMUSCULAR; INTRAVENOUS at 14:34

## 2018-11-16 RX ADMIN — CARVEDILOL 6.25 MG: 6.25 TABLET, FILM COATED ORAL at 18:57

## 2018-11-16 RX ADMIN — INSULIN LISPRO 4 UNITS: 100 INJECTION, SOLUTION INTRAVENOUS; SUBCUTANEOUS at 08:45

## 2018-11-16 RX ADMIN — HYDROCODONE BITARTRATE AND ACETAMINOPHEN 1 TABLET: 5; 325 TABLET ORAL at 09:14

## 2018-11-16 RX ADMIN — DEXAMETHASONE SODIUM PHOSPHATE 12 MG: 4 INJECTION, SOLUTION INTRA-ARTICULAR; INTRALESIONAL; INTRAMUSCULAR; INTRAVENOUS; SOFT TISSUE at 14:34

## 2018-11-16 RX ADMIN — ENOXAPARIN SODIUM 40 MG: 40 INJECTION SUBCUTANEOUS at 05:20

## 2018-11-16 RX ADMIN — TEMAZEPAM 7.5 MG: 7.5 CAPSULE ORAL at 22:42

## 2018-11-16 RX ADMIN — LORAZEPAM 1 MG: 1 TABLET ORAL at 09:18

## 2018-11-16 RX ADMIN — ONDANSETRON HYDROCHLORIDE 4 MG: 2 INJECTION, SOLUTION INTRAMUSCULAR; INTRAVENOUS at 21:22

## 2018-11-16 RX ADMIN — INSULIN LISPRO 7 UNITS: 100 INJECTION, SOLUTION INTRAVENOUS; SUBCUTANEOUS at 22:00

## 2018-11-16 RX ADMIN — TOLVAPTAN 7.5 MG: 15 TABLET ORAL at 09:15

## 2018-11-16 RX ADMIN — CARVEDILOL 6.25 MG: 6.25 TABLET, FILM COATED ORAL at 09:14

## 2018-11-16 RX ADMIN — POLYETHYLENE GLYCOL (3350) 17 G: 17 POWDER, FOR SOLUTION ORAL at 09:14

## 2018-11-16 RX ADMIN — ONDANSETRON HYDROCHLORIDE 4 MG: 2 INJECTION, SOLUTION INTRAMUSCULAR; INTRAVENOUS at 09:15

## 2018-11-16 RX ADMIN — PANTOPRAZOLE SODIUM 40 MG: 40 TABLET, DELAYED RELEASE ORAL at 05:20

## 2018-11-16 RX ADMIN — LEVOTHYROXINE SODIUM 75 MCG: 75 TABLET ORAL at 05:20

## 2018-11-16 RX ADMIN — NICOTINE 1 PATCH: 21 PATCH, EXTENDED RELEASE TRANSDERMAL at 09:17

## 2018-11-16 RX ADMIN — FAMOTIDINE 20 MG: 20 TABLET ORAL at 09:00

## 2018-11-16 RX ADMIN — CETIRIZINE HYDROCHLORIDE 10 MG: 10 TABLET, FILM COATED ORAL at 09:14

## 2018-11-16 RX ADMIN — INSULIN LISPRO 4 UNITS: 100 INJECTION, SOLUTION INTRAVENOUS; SUBCUTANEOUS at 11:58

## 2018-11-16 RX ADMIN — FAMOTIDINE 20 MG: 20 TABLET ORAL at 21:22

## 2018-11-16 NOTE — PLAN OF CARE
Problem: Patient Care Overview  Goal: Plan of Care Review   11/16/18 1804   OTHER   Outcome Summary completed day 3 chemo. reports not feeling well today. vss. nad noted. is requesting to stay tonight.

## 2018-11-16 NOTE — PROGRESS NOTES
"S: Tolerating chemotherapy reasonably well.    Past medical history, social history and family history was reviewed and unchanged from prior visit.    Review of Systems:    Review of Systems   Constitutional: Positive for fatigue.   HENT: Negative.    Eyes: Negative.    Respiratory: Negative.    Cardiovascular: Negative.    Gastrointestinal: Negative.    Endocrine: Negative.    Genitourinary: Negative.    Musculoskeletal: Negative.    Skin: Negative.    Allergic/Immunologic: Negative.    Neurological: Negative.    Hematological: Negative.    Psychiatric/Behavioral: Negative.       A comprehensive 14 point review of systems was performed and was negative except as mentioned.      Medications:  The current medication list was reviewed in the EMR    ALLERGIES:    Allergies   Allergen Reactions   • Plavix [Clopidogrel Bisulfate] Anaphylaxis   • Codeine    • Penicillins          Physical Exam    VITAL SIGNS:  /76 Comment: nurse present  Pulse 92   Temp 97.6 °F (36.4 °C) (Oral)   Resp 20   Ht 154.9 cm (61\")   Wt 89.8 kg (198 lb)   SpO2 98%   BMI 37.41 kg/m²   Temp:  [97.4 °F (36.3 °C)-98.4 °F (36.9 °C)] 97.6 °F (36.4 °C)      Performance Status: 1    Physical Exam    General: well appearing, in no acute distress  HEENT: sclera anicteric, oropharynx clear, neck is supple  Lymphatics: no cervical, supraclavicular, or axillary adenopathy  Cardiovascular: regular rate and rhythm, no murmurs, rubs or gallops  Lungs: clear to auscultation bilaterally  Abdomen: soft, nontender, nondistended.  No palpable organomegaly  Extremities: no lower extremity edema  Skin: no rashes, lesions, bruising, or petechiae  Msk:  Shows no weakness of the large muscle groups  Psych: Mood is stable        RECENT LABS:    Lab Results   Component Value Date    HGB 10.9 (L) 11/15/2018    HCT 34.4 (L) 11/15/2018    MCV 88.7 11/15/2018     11/15/2018    WBC 6.41 11/15/2018    NEUTROABS 4.47 11/09/2018    LYMPHSABS 2.78 11/09/2018    " MONOSABS 0.81 11/09/2018    EOSABS 0.16 11/09/2018    BASOSABS 0.03 11/09/2018       Lab Results   Component Value Date    GLUCOSE 270 (H) 11/16/2018    BUN 20 11/16/2018    CREATININE 0.88 11/16/2018     11/16/2018    K 4.2 11/16/2018     11/16/2018    CO2 23.0 11/16/2018    CALCIUM 8.8 11/16/2018    PROTEINTOT 5.6 (L) 11/14/2018    ALBUMIN 4.10 11/14/2018    BILITOT 0.3 11/14/2018    ALKPHOS 59 11/14/2018    AST 12 11/14/2018    ALT 23 11/14/2018     Lab Results   Component Value Date    FINALDX  11/09/2018      1. LEFT LUNG, UPPER LOBE, MASS, TBNA:  Malignant.  Small Cell Carcinoma.   2. BRONCH WASH:  Negative for malignancy.  Benign bronchial cells, pulmonary macrophages and acute inflammation.    This diagnosis was rendered by KHANH Reyes Jr., M.D. at [facility] . See scanned report for full consultative opinion.         Ct Chest Without Contrast    Result Date: 10/31/2018  EXAM:  CT Chest Without Intravenous Contrast EXAM DATE/TIME:  10/31/2018 5:01 PM CLINICAL HISTORY:  63 years old, female; Hypo-osmolality and hyponatremia; Dyspnea, unspecified; Signs and symptoms; Cough and shortness of breath; Prior surgery; Surgery date: 6+ months; Surgery type: 5 heart stents; Additional info: SOA, hyponatremia TECHNIQUE:  Axial computed tomography images of the chest without intravenous contrast.  All CT scans at this facility use at least one of these dose optimization techniques: automated exposure control; mA and/or kV adjustment per patient size (includes targeted exams where dose is matched to clinical indication); or iterative reconstruction.  Coronal reformatted images were created and reviewed. COMPARISON:  No relevant prior studies available. FINDINGS:   LUNGS/PLEURA: Suboptimally evaluated located and mildly spiculated RIGHT hilar mass measuring at least 4.4 cm in the long axis. Nodular peribronchial LEFT upper lobe and subpleural inferior lingula airspace opacities  measuring up to 1.9 cm and centrilobular nodules in the LEFT upper lobe. Calcific granuloma in the RIGHT normal. No pleural effusion or pneumothorax. Complete obstruction of the LEFT apical bronchus, no other central obstructive endobronchial mass or abnormality.   MEDIASTINUM: Heart size is normal, with trace pericardial thickening/effusion. Severe coronary arterial calcification/coronary stents. Atherosclerotic disease of the aorta without aortic aneurysm. Bulky prevascular lymph node adjacent to the aortic arch measuring 3.0 cm and LEFT hilar mass which may represent lymph nodes or primary lung malignancy.   OTHER STRUCTURES: Chronic degenerative changes in the visualized spine. Nonspecific bilateral perinephric fat stranding and renal cortical scarring. Small LEFT adrenal nodule measuring 1.3 cm.     1. Findings concerning for LEFT HILAR MALIGNANT SOFT TISSUE MASS with postobstructive focal pneumonia/bronchiolitis in the LEFT upper lobe. 2. Likely METASTATIC mediastinal and possibly LEFT hilar lymph nodes. 3. Coronary arterial calcification suggestive of CAD. 4. Other nonemergent/incidental findings as described. THIS DOCUMENT HAS BEEN ELECTRONICALLY SIGNED BY BATOOL SAMANO MD    Ct Chest With Contrast    Result Date: 11/8/2018  EXAMINATION: CT CHEST W CONTRAST- 11/08/2018  INDICATION: Mass or lump, thorax axilla  TECHNIQUE:  Axial CT data of the chest were acquired helically with contrast.  Multiplanar reformatted images were generated and reviewed. The radiation dose reduction device was turned on for each scan per the ALARA (As Low as Reasonably Achievable) protocol  COMPARISONS:  10/31/2018  FINDINGS: Unchanged small nodule in the left upper lobe, 1.5 cm. Small amount of right infrahilar airspace disease is seen, likely infectious or inflammatory. Central airways are patent. Heart size is within normal limits. Coronary artery disease noted. There is metastatic adenopathy in the left hilum (largest collection  of nodes 3.2 cm), prevascular station (1.4 cm) and AP window (3.4 cm). The lymph nodes severely narrow the pulmonary arterial branches to the left upper and lower lobes bilaterally as well as narrowing of some of the left-sided pulmonary veins. Right lung is clear. Unremarkable chest wall soft tissues. Unchanged long-standing left adrenal thickening. Partially visualized left renal cysts. No aggressive bone lesion.       1. Small nodule in the left upper lobe could represent primary neoplasm. 2. Metastatic left hilar and mediastinal lymph nodes. 3. Narrowing of the left hilar vessels.  D:  11/08/2018 E:  11/08/2018  This report was finalized on 11/8/2018 3:37 PM by Ap Wright.      Mri Brain With & Without Contrast    Result Date: 11/14/2018  EXAMINATION: MRI BRAIN W WO CONTRAST-11/13/2018:  INDICATION: Small cell lung cancer; E87.2-Frhv-[id number] and hyponatremia; R06.00-Dyspnea, unspecified; R13.13-Dysphagia, pharyngeal phase; R91.8-Other nonspecific abnormal finding of lung field; R91.8-Other nonspecific abnormal finding of lung field; C34.12-Malignant neoplasm of upper lobe, left bronchus or lung.  TECHNIQUE:  Multiplanar multisequence MRI of the brain was performed without and with contrast.  COMPARISONS:  None.  FINDINGS:  Brain volume is within normal limits. Ventricles are normal in size and configuration.  There is no abnormal signal on diffusion weighted images. Scattered FLAIR signal abnormalities are nonspecific but most likely attributable to chronic microangiopathy. There is no abnormal intracranial enhancement. There is no mass effect and the basal cisterns are patent. Coffeen of Mem[place]o flow voids are maintained.  No significant abnormal sinonasal or temporal bone fluid is identified.      No evidence of brain metastases.  D:  11/14/2018 E:  11/14/2018   This report was finalized on 11/14/2018 10:09 AM by Ap Wright.      Ct Abdomen Pelvis With Contrast    Result Date: 11/14/2018  EXAMINATION: CT  ABDOMEN PELVIS W CONTRAST- 11/13/2018  INDICATION: small cell lung cancer; E87.1-Mwqq-pbhhikfaak and hyponatremia; R06.00-Dyspnea, unspecified; R13.13-Dysphagia, pharyngeal phase; R91.8-Other nonspecific abnormal finding of lung field; R91.8-Other nonspecific abnormal finding of lung field; C34.12-Malignant neoplasm of upper lobe, left bronchus or lung  TECHNIQUE:  Axial CT data of the abdomen and pelvis were acquired helically following IV contrast administration. Multiplanar reformatted images were generated and reviewed. The radiation dose reduction device was turned on for each scan per the ALARA (As Low as Reasonably Achievable) protocol  COMPARISONS:  03/28/2015  FINDINGS:   Calcified granuloma noted incidentally in the left lung base. There is a cyst in the left kidney. The liver is without focal lesion. A calcified granuloma is seen in the spleen, further evidence of healed granulomatous disease. Gallbladder is likely surgically absent. The adrenal glands and pancreas are normal. Right kidney is normal. No dilated small or large bowel. No enlarged lymph nodes or free fluid. Right hip arthroplasty hardware in place. No focal bone lesion identified.      No evidence of metastatic disease in the abdomen or pelvis.  D:  11/14/2018 E:  11/14/2018  This report was finalized on 11/14/2018 10:09 AM by Ap Wright.              Assessment/Plan    1.  Limited stage small cell lung cancer.  Day 3 of cisplatin and etoposide.  He tolerated it well.  Plan to discharge tomorrow morning.  Patient to follow up with Dr. Cardoso in 3 weeks for cycle #2.  She will also need to follow-up with radiation oncology prior to that to plan for concurrent radiotherapy.        Tracie Denson MD  [facility]    11/16/2018     Please note that portions of this note may have been completed with a voice recognition program. Efforts were made to edit the dictations, but occasionally words are mistranscribed.

## 2018-11-16 NOTE — PROGRESS NOTES
"                  Clinical Nutrition     Nutrition Assessment  Reason for Visit:   Physician consult      Patient Name: Shauna Valencia  YOB: 1955  MRN: [id number]  Date of Encounter: 11/16/18 2:00 PM  Admission date: 10/31/2018      Comments:   Pt seen for food preferences- took late lunch tray order during time of visit, provided menu, will adjust supplement, and spoke with  after visit about pt food preferences.    Nutrition Assessment   Assessment       Hospital Problem List    Acute hyponatremia    Hyperlipidemia    Type 2 diabetes mellitus (CMS/HCC)    Tobacco abuse    Obesity (BMI 30-39.9)    Mass of upper lobe of left lung    Mediastinal lymphadenopathy    Small cell lung cancer, left upper lobe (CMS/HCC)      PMH: She  has a past medical history of Diabetes mellitus (CMS/HCC), Hyperlipidemia, and Pancreatitis.   PSxH: She  has a past surgical history that includes Carotid stent; Colonoscopy; Upper gastrointestinal endoscopy; BRONCHOSCOPY WITH ENDOBRONCHIAL ULTRASOUND (N/A, 11/9/2018); and BRONCHOSCOPY WITH ENDOBRONCHIAL ULTRASOUND, biopsies, brushing and washing. (N/A, 11/6/2018).       Reported/Observed/Food/Nutrition Related History:     Pt upset with some food orders that she has received, reported really liking supplements when they are made into a milkshake vs pudding texture.       Anthropometrics     Height: 154.9 cm (61\")  Last filed wt: Weight: 89.8 kg (198 lb) (11/06/18 1052)  Weight Method: Stated    BMI: BMI (Calculated): 37.4  Obese Class II: 35-39.9kg/m2    Ideal Body Weight (IBW) (kg): 48.15    Medications reviewed     Applicable medical tests/Procedures since admission:   11/9 SLP notes indicate SLP  staff aware pt on regular texture foods with thin liquids.    SLP (11/16): \"Patient now on regular diet, thin liquids per patient/MD agreement (comfort diet, though still full code).\"    Current Nutrition Prescription     PO: Diet Regular; Thin; Consistent " Carbohydrate  Orders Placed This Encounter      Dietary Nutrition Supplements Boost Pudding       Intake:  Per charting pt consuming 65% of 8 meals (11/14-11/16)         Nutrition Diagnosis     11/16  Problem Inadequate oral intake   Etiology Clinical condition   Signs/Symptoms PO intake 65% of 8 meals          Nutrition Intervention   1.  Follow treatment progress, Care plan reviewed, Advise alternate selection, Advised available snacks, Interview for preferences, Menu provided, Adjusted supplement, Encourage intake    Will adjust supplement to premier protein with sf ice cream- chocolate flavor    Goal:   General: Nutrition support treatment  PO: Increase intake  Additional goals:      Monitoring/Evaluation:   Per protocol, PO intake, Supplement intake      Will Continue to follow per protocol      Rena MCWILLIAMS Waits  Time Spent: 45 minutes

## 2018-11-16 NOTE — PLAN OF CARE
Problem: Patient Care Overview  Goal: Plan of Care Review  Outcome: Ongoing (interventions implemented as appropriate)   11/15/18 1400 11/16/18 0000 11/16/18 0305   Coping/Psychosocial   Plan of Care Reviewed With --  patient;family  (administered PRN dose but pt still not satisfied.) --    Plan of Care Review   Progress improving --  --    OTHER   Outcome Summary --  --  Completed day 2 of 3 of chemo....received  16 last pm. To receive final dose of VP16 today and then be discharged. Has PICC in place. ACHS FSBS with SSI. Nephrology following for hyponatremia which has resolved but still being monitored.     Goal: Discharge Needs Assessment  Outcome: Ongoing (interventions implemented as appropriate)      Problem: Chemotherapy Effects (Adult)  Goal: Signs and Symptoms of Listed Potential Problems Will be Absent, Minimized or Managed (Chemotherapy Effects)  Outcome: Ongoing (interventions implemented as appropriate)

## 2018-11-16 NOTE — DISCHARGE SUMMARY
[facility] Medicine Services  DISCHARGE SUMMARY    Patient Name: Shauna Valencia  : 1955  MRN: [id number]    Date of Admission: 10/31/2018  Date of Discharge:  2018  Primary Care Physician: Rox Singleton MD    Consults     Date and Time Order Name Status Description    2018 0827 Inpatient Hematology & Oncology Consult Completed     2018 0937 Inpatient Nephrology Consult Completed     10/31/2018 2233 Inpatient Pulmonology Consult Completed         Hospital Course     Presenting Problem:   Acute hyponatremia [E87.1]    Active Hospital Problems    Diagnosis Date Noted   • Small cell lung cancer, left upper lobe (CMS/HCC) [C34.12] 2018   • Mass of upper lobe of left lung [R91.8] 2018   • Mediastinal lymphadenopathy [R59.0] 2018   • Acute hyponatremia [E87.1] 10/31/2018   • Hyperlipidemia [E78.5] 10/31/2018   • Type 2 diabetes mellitus (CMS/HCC) [E11.9] 10/31/2018   • Tobacco abuse [Z72.0] 10/31/2018   • Obesity (BMI 30-39.9) [E66.9] 10/31/2018      Resolved Hospital Problems   No resolved problems to display.          Hospital Course:  Shauna Valencia is a 63 y.o. 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    Limited stage small cell lung cancer: 62 y/o female admitted with above. During evaluation for her hyponatremia she was found to have left lung mass which after diagnostic eval returned positive for small cell lung cancer. MRI brain, CT a/p, and bone scan showed no presence of further disease. She was seen by oncology and started on cisplatin and etoposide as inpatient which she tolerated well. She will follow with Dr. Cardoso upon d/c and will continue with cycle 2 of chemotherapy and radiation to start at that time as well to be arranged by Dr. Cardoso. I discussed the case with Dr. Briones prior to patient's discharge.    Hyponatremia due to SIADH from  above: Up arrival, her sodium was noted to be 117. She was seen by nephrology who determined etiology was likely SIADH due to above. She was ultimately treated with Samsca to which she responded to appropriately. She will continue Samsca at discharge as directed by nephrology and will f/u with [facility] in [place] on 1/21/19.       Discharge Follow Up Recommendations for labs/diagnostics:   PCP in 1 week, Recommend bmp, Re: hyponatremia   Dr. Cardoso in 1-2 weeks   [facility] in [place] clinic on 1/21/19    Day of Discharge     HPI: Lying in bed with daughter sleeping at bedside. Patient upset that she didn't receive her Restoril quickly enough last night. Still wants to go home this pm after chemo.    Review of Systems  Gen- No fevers, chills  CV- No chest pain, palpitations  Resp- No cough, dyspnea  GI- No N/V/D, abd pain    Otherwise ROS is negative except as mentioned in the HPI.    Vital Signs:   Temp:  [97.5 °F (36.4 °C)-98.5 °F (36.9 °C)] 97.8 °F (36.6 °C)  Heart Rate:  [76-92] 78  Resp:  [18-20] 20  BP: (131-170)/(69-76) 156/70     Physical Exam:  Constitutional: No acute distress, awake, alert, appears comfortable  HENT: NCAT, mucous membranes moist  Respiratory: Clear to auscultation bilaterally, respiratory effort normal   Cardiovascular: RRR, no murmurs, rubs, or gallops, palpable pedal pulses bilaterally  Gastrointestinal: Positive bowel sounds, soft, nontender, nondistended  Musculoskeletal: No bilateral ankle edema  Psychiatric: Appropriate affect, cooperative  Neurologic: Oriented x 3, strength symmetric in all extremities, Cranial Nerves grossly intact to confrontation, speech clear  Skin: No rashes    Pertinent  and/or Most Recent Results     Results from last 7 days   Lab Units  11/16/18   0750  11/15/18   0605  11/14/18   0644  11/13/18   0849  11/13/18   0626  11/13/18   0318  11/12/18   2340   11/12/18   0708   11/11/18   1339   11/11/18   0622   11/10/18   2344   WBC 10*3/mm3   --   6.41   --    --    --     --    --    --   10.57   --    --    --   9.16   --    --    HEMOGLOBIN g/dL   --   10.9*   --    --    --    --    --    --   10.7*   --    --    --   10.1*   --    --    HEMATOCRIT %   --   34.4*   --    --    --    --    --    --   33.0*   --    --    --   30.0*   --    --    PLATELETS 10*3/mm3   --   360   --    --    --    --    --    --   370   --    --    --   358   --    --    SODIUM mmol/L  133  133  134  134  136  138  137  139   < >  136   < >  132   < >  134  134   < >  133   POTASSIUM mmol/L  4.2  4.6  4.0  4.0   --    --    --    --    --   4.2   --   3.9   --   4.0   --   3.7   CHLORIDE mmol/L  102  102  94*  94*   --    --    --    --    --   99   --   97*   --   97*   --   98*   CO2 mmol/L  23.0  21.0  30.0  30.0   --    --    --    --    --   29.0   --   27.0   --   28.0   --   28.0   BUN mg/dL  20  13  17  17   --    --    --    --    --   15   --   16   --   17   --   22   CREATININE mg/dL  0.88  0.83  0.78  0.78   --    --    --    --    --   0.74   --   0.86   --   0.68   --   0.85   GLUCOSE mg/dL  270*  273*  145*  145*   --    --    --    --    --   102*   --   98   --   89   --   190*   CALCIUM mg/dL  8.8  8.8  9.2  9.2   --    --    --    --    --   9.3   --   9.5   --   8.8   --   8.9    < > = values in this interval not displayed.     Results from last 7 days   Lab Units  11/14/18   0644   BILIRUBIN mg/dL  0.3   ALK PHOS U/L  59   ALT (SGPT) U/L  23   AST (SGOT) U/L  12           Invalid input(s): TG, LDLCALC, LDLREALC  Results from last 7 days   Lab Units  11/14/18   0644   CORTISOL mcg/dL  8.70     Brief Urine Lab Results  (Last result in the past 365 days)      Color   Clarity   Blood   Leuk Est   Nitrite   Protein   CREAT   Urine HCG        10/31/18 1729 Dark Yellow Clear Negative Negative Negative Negative               Microbiology Results Abnormal     Procedure Component Value - Date/Time    AFB Culture - Wash, Bronchus [[id number]] Collected:  11/09/18 1147    Lab Status:   Preliminary result Specimen:  Wash from Bronchus Updated:  11/16/18 1300     AFB Culture No AFB isolated at 1 week     AFB Stain Rare (1+) Epithelial cells per low power field      Few (2+) WBCs per low power field      No organisms seen      No acid fast bacilli seen on concentrated smear    Fungus Culture - Wash, Lung, Left Upper Lobe [[id number]] Collected:  11/06/18 1225    Lab Status:  Preliminary result Specimen:  Wash from Lung, Left Upper Lobe Updated:  11/13/18 1302     Fungus Culture No fungus isolated at 1 week    AFB Culture - Wash, Lung, Left Upper Lobe [[id number]] Collected:  11/06/18 1225    Lab Status:  Preliminary result Specimen:  Wash from Lung, Left Upper Lobe Updated:  11/13/18 1302     AFB Culture No AFB isolated at 1 week     AFB Stain No acid fast bacilli seen on concentrated smear    Fungus Smear - Wash, Bronchus [[id number]] Collected:  11/09/18 1147    Lab Status:  Final result Specimen:  Wash from Bronchus Updated:  11/12/18 1335     Fungal Stain No fungal elements seen      No Pneumocystis jirovecci (formerly Pneumocystis carinii) noted on smear.    Fungus Smear - Wash, Lung, Left Upper Lobe [[id number]] Collected:  11/06/18 1225    Lab Status:  Final result Specimen:  Wash from Lung, Left Upper Lobe Updated:  11/12/18 1301     Fungal Stain No fungal elements seen      No Pneumocystis jirovecci (formerly Pneumocystis carinii) noted on smear.    Respiratory Culture - Wash, Bronchus [[id number]] Collected:  11/09/18 1147    Lab Status:  Final result Specimen:  Wash from Bronchus Updated:  11/11/18 0702     Respiratory Culture Light growth (2+) Normal Respiratory Shavon     Gram Stain Few (2+) WBCs per low power field      Occasional Epithelial cells per low power field      Few (2+) Gram positive cocci in pairs and clusters    Respiratory Culture - Wash, Lung, Left Upper Lobe [[id number]] Collected:  11/06/18 1225    Lab Status:  Final result Specimen:  Wash from Lung, Left Upper Lobe Updated:   11/08/18 0732     Respiratory Culture Scant growth (1+) Normal Respiratory Shavon     Gram Stain No WBCs or organisms seen          Imaging Results (all)     Procedure Component Value Units Date/Time    NM Bone Scan Whole Body [[id number]] Collected:  11/14/18 1803     Updated:  11/15/18 0826    Narrative:       EXAMINATION: NM BONE SCAN WHOLE-BODY - 11/14/2018     INDICATION: E87.5-Jdrc-ovzbeztlmv and hyponatremia; R06.00-Dyspnea,  unspecified; R13.13-Dysphagia, pharyngeal phase; R91.8-Other nonspecific  abnormal finding of lung field; C34.12-Malignant neoplasm of upper lobe,  left bronchus or lung.     TECHNIQUE: Patient was injected with 27.5 mCi Technetium MDP and scanned  after a 5-hour delay.     COMPARISON: None.     FINDINGS: There are arthritic changes in the shoulders, cervical spine,  knees and ankles. There is absent tracer in the right femoral head  related to the right hip prosthesis. There is no focal or multifocal  pattern to suggest metastatic disease.        Impression:       Arthritic and postoperative changes described above. There  is no evidence of metastatic disease.     DICTATED:   11/14/2018  EDITED/ls :   11/14/2018          This report was finalized on 11/15/2018 8:24 AM by Dr. Julián Barrios MD.       CT Abdomen Pelvis With Contrast [[id number]] Collected:  11/14/18 0925     Updated:  11/14/18 1011    Narrative:       EXAMINATION: CT ABDOMEN PELVIS W CONTRAST- 11/13/2018     INDICATION: small cell lung cancer; E87.4-Yrvt-lygnapnrbm and  hyponatremia; R06.00-Dyspnea, unspecified; R13.13-Dysphagia, pharyngeal  phase; R91.8-Other nonspecific abnormal finding of lung field;  R91.8-Other nonspecific abnormal finding of lung field; C34.12-Malignant  neoplasm of upper lobe, left bronchus or lung     TECHNIQUE:  Axial CT data of the abdomen and pelvis were acquired  helically following IV contrast administration. Multiplanar reformatted  images were generated and reviewed. The radiation dose reduction  device  was turned on for each scan per the ALARA (As Low as Reasonably  Achievable) protocol     COMPARISONS:  03/28/2015     FINDINGS:       Calcified granuloma noted incidentally in the left lung base. There is a  cyst in the left kidney. The liver is without focal lesion. A calcified  granuloma is seen in the spleen, further evidence of healed  granulomatous disease. Gallbladder is likely surgically absent. The  adrenal glands and pancreas are normal. Right kidney is normal. No  dilated small or large bowel. No enlarged lymph nodes or free fluid.  Right hip arthroplasty hardware in place. No focal bone lesion  identified.       Impression:       No evidence of metastatic disease in the abdomen or pelvis.     D:  11/14/2018  E:  11/14/2018     This report was finalized on 11/14/2018 10:09 AM by Ap Wright.       MRI Brain With & Without Contrast [[id number]] Collected:  11/14/18 0925     Updated:  11/14/18 1011    Narrative:       EXAMINATION: MRI BRAIN W WO CONTRAST-11/13/2018:      INDICATION: Small cell lung cancer; E87.7-Wqnr-ietelzpozo and  hyponatremia; R06.00-Dyspnea, unspecified; R13.13-Dysphagia, pharyngeal  phase; R91.8-Other nonspecific abnormal finding of lung field;  R91.8-Other nonspecific abnormal finding of lung field; C34.12-Malignant  neoplasm of upper lobe, left bronchus or lung.     TECHNIQUE:  Multiplanar multisequence MRI of the brain was performed  without and with contrast.     COMPARISONS:  None.     FINDINGS:  Brain volume is within normal limits. Ventricles are normal  in size and configuration.  There is no abnormal signal on diffusion  weighted images. Scattered FLAIR signal abnormalities are nonspecific  but most likely attributable to chronic microangiopathy. There is no  abnormal intracranial enhancement. There is no mass effect and the basal  cisterns are patent. [place] flow voids are maintained.  No  significant abnormal sinonasal or temporal bone fluid is identified.        Impression:       No evidence of brain metastases.     D:  11/14/2018  E:  11/14/2018        This report was finalized on 11/14/2018 10:09 AM by Ap Wright.       XR Chest 1 View [[id number]] Collected:  11/09/18 1042     Updated:  11/09/18 1635    Narrative:       EXAMINATION: XR CHEST 1 VW- 11/09/2018     INDICATION: E87.0-Gyki-izqsosmiov and hyponatremia; R06.00-Dyspnea,  unspecified; R13.13-Dysphagia, pharyngeal phase; R91.8-Other nonspecific  abnormal finding of lung field; R91.8-Other nonspecific abnormal finding  of lung field      COMPARISON: 11/06/2018     FINDINGS: Portable chest reveals improvement seen in aeration of the  left perihilar region. Underlying soft tissue mass suggesting adenopathy  remains. There is a PICC line catheter identified on the right tip in  the SVC. Cardiac silhouette is borderline enlarged. Lung volumes are  low.           Impression:       Soft tissue mass again seen in the left perihilar region.  PICC line catheter on the right tip in the SVC. No new focal parenchymal  opacification present.     D:  11/09/2018  E:  11/09/2018     This report was finalized on 11/9/2018 4:33 PM by Dr. Amelie Teran MD.       CT Chest With Contrast [[id number]] Collected:  11/08/18 1536     Updated:  11/08/18 1539    Narrative:       EXAMINATION: CT CHEST W CONTRAST- 11/08/2018     INDICATION: Mass or lump, thorax axilla     TECHNIQUE:  Axial CT data of the chest were acquired helically with  contrast.  Multiplanar reformatted images were generated and reviewed.   The radiation dose reduction device was turned on for each scan per the  ALARA (As Low as Reasonably Achievable) protocol      COMPARISONS:  10/31/2018     FINDINGS: Unchanged small nodule in the left upper lobe, 1.5 cm. Small  amount of right infrahilar airspace disease is seen, likely infectious  or inflammatory. Central airways are patent. Heart size is within normal  limits. Coronary artery disease noted. There is metastatic  adenopathy in  the left hilum (largest collection of nodes 3.2 cm), prevascular station  (1.4 cm) and AP window (3.4 cm). The lymph nodes severely narrow the  pulmonary arterial branches to the left upper and lower lobes  bilaterally as well as narrowing of some of the left-sided pulmonary  veins. Right lung is clear. Unremarkable chest wall soft tissues.  Unchanged long-standing left adrenal thickening. Partially visualized  left renal cysts. No aggressive bone lesion.       Impression:          1. Small nodule in the left upper lobe could represent primary neoplasm.  2. Metastatic left hilar and mediastinal lymph nodes.  3. Narrowing of the left hilar vessels.     D:  11/08/2018  E:  11/08/2018     This report was finalized on 11/8/2018 3:37 PM by Ap Wright.       XR Chest 1 View [[id number]] Collected:  11/06/18 1325     Updated:  11/06/18 1343    Narrative:       EXAMINATION: XR CHEST 1 VW- 11/06/2018     INDICATION: E87.7-Fgax-phzohwqjpy and hyponatremia; R06.00-Dyspnea,  unspecified; R13.13-Dysphagia, pharyngeal phase; R91.8-Other nonspecific  abnormal finding of lung field     COMPARISON: 11/05/2018     FINDINGS: Mildly increased opacifications left upper lobe likely post  bronchoscopy debris or postobstructive changes. Cardiac silhouette  unchanged. No pneumothorax or pleural effusion.           Impression:       No postprocedural pneumothorax. Mildly increased  opacifications adjacent to the left suprahilar mass lesion may represent  post bronchoscopy debris material or postobstructive changes.     D:  11/06/2018  E:  11/06/2018     This report was finalized on 11/6/2018 1:41 PM by Dr. Johnathon Cameron.       XR Chest PA & Lateral [[id number]] Collected:  11/05/18 1626     Updated:  11/05/18 1636    Narrative:       EXAMINATION: XR CHEST PA AND LATERAL- 11/05/2018     INDICATION: E87.1-Xjlh-yqerrjakss and hyponatremia; R06.00-Dyspnea,  unspecified; R13.13-Dysphagia, pharyngeal phase; R91.8-Other  nonspecific  abnormal finding of lung field; R91.8-Other nonspecific abnormal finding  of lung field      COMPARISON: CT chest dated 10/31/2018     FINDINGS: Rounded masslike opacity within the left suprahilar region  consistent with known mass lesion. No focal consolidation otherwise  noted. No pneumothorax or pleural effusion. Calcified granuloma left  lung base. Degenerative changes of the spine.           Impression:       Left suprahilar mass lesion seen better on recent CT chest  10/31/2018 without acute parenchymal process otherwise noted.     D:  11/05/2018  E:  11/05/2018     This report was finalized on 11/5/2018 4:34 PM by Dr. Johnathon Cameron.       Fiberoptic Endo (fees) [[id number]] Resulted:  11/01/18 1555     Updated:  11/01/18 1555    Narrative:       This procedure was auto-finalized with no dictation required.    CT Chest Without Contrast [[id number]] Collected:  10/31/18 1701     Updated:  10/31/18 1930    Narrative:       EXAM:    CT Chest Without Intravenous Contrast     EXAM DATE/TIME:    10/31/2018 5:01 PM     CLINICAL HISTORY:    63 years old, female; Hypo-osmolality and hyponatremia; Dyspnea, unspecified;   Signs and symptoms; Cough and shortness of breath; Prior surgery; Surgery date:   6+ months; Surgery type: 5 heart stents; Additional info: SOA, hyponatremia     TECHNIQUE:    Axial computed tomography images of the chest without intravenous contrast.    All CT scans at this facility use at least one of these dose optimization   techniques: automated exposure control; mA and/or kV adjustment per patient   size (includes targeted exams where dose is matched to clinical indication); or   iterative reconstruction.    Coronal reformatted images were created and reviewed.     COMPARISON:    No relevant prior studies available.     FINDINGS:     LUNGS/PLEURA: Suboptimally evaluated located and mildly spiculated RIGHT   hilar mass measuring at least 4.4 cm in the long axis. Nodular peribronchial    LEFT upper lobe and subpleural inferior lingula airspace opacities measuring up   to 1.9 cm and centrilobular nodules in the LEFT upper lobe. Calcific granuloma   in the RIGHT normal. No pleural effusion or pneumothorax. Complete obstruction   of the LEFT apical bronchus, no other central obstructive endobronchial mass or   abnormality.     MEDIASTINUM: Heart size is normal, with trace pericardial   thickening/effusion. Severe coronary arterial calcification/coronary stents.   Atherosclerotic disease of the aorta without aortic aneurysm. Bulky prevascular   lymph node adjacent to the aortic arch measuring 3.0 cm and LEFT hilar mass   which may represent lymph nodes or primary lung malignancy.     OTHER STRUCTURES: Chronic degenerative changes in the visualized spine.   Nonspecific bilateral perinephric fat stranding and renal cortical scarring.   Small LEFT adrenal nodule measuring 1.3 cm.       Impression:       1. Findings concerning for LEFT HILAR MALIGNANT SOFT TISSUE MASS with   postobstructive focal pneumonia/bronchiolitis in the LEFT upper lobe.   2. Likely METASTATIC mediastinal and possibly LEFT hilar lymph nodes.   3. Coronary arterial calcification suggestive of CAD.   4. Other nonemergent/incidental findings as described.     THIS DOCUMENT HAS BEEN ELECTRONICALLY SIGNED BY BATOOL SAMANO MD          Results for orders placed during the hospital encounter of 10/31/18   Duplex Venous Lower Extremity - Bilateral CAR    Narrative · There is evidence of superficial venous thrombosis within the left   saphenous vein  · No evidence of DVT          Results for orders placed during the hospital encounter of 10/31/18   Duplex Venous Lower Extremity - Bilateral CAR    Narrative · There is evidence of superficial venous thrombosis within the left   saphenous vein  · No evidence of DVT                Order Current Status    Fungus Culture - Wash, Bronchus In process    Ova & Parasite Examination - Wash, Lung, Left  Upper Lobe In process    AFB Culture - Wash, Bronchus Preliminary result    AFB Culture - Wash, Lung, Left Upper Lobe Preliminary result    Fungus Culture - Wash, Lung, Left Upper Lobe Preliminary result        Discharge Details        Discharge Medications      New Medications      Instructions Start Date   dexamethasone 4 MG tablet  Commonly known as:  DECADRON   Take 2 tablets in the morning daily on days 2, 3 & 4.  Take with food.      LORCET 5-325 MG per tablet  Generic drug:  HYDROcodone-acetaminophen   1 tablet, Oral, Every 4 Hours PRN      nicotine 21 MG/24HR patch  Commonly known as:  NICODERM CQ   1 patch, Transdermal, Every 24 Hours Scheduled      ondansetron 8 MG tablet  Commonly known as:  ZOFRAN   8 mg, Oral, 3 Times Daily PRN      ondansetron 4 MG tablet  Commonly known as:  ZOFRAN   4 mg, Oral, Every 8 Hours PRN      temazepam 7.5 MG capsule  Commonly known as:  RESTORIL   7.5 mg, Oral, Nightly PRN      tolvaptan 15 MG tablet tablet  Commonly known as:  SAMSCA   7.5 mg, Oral, Daily         Continue These Medications      Instructions Start Date   albuterol 108 (90 Base) MCG/ACT inhaler  Commonly known as:  PROVENTIL HFA;VENTOLIN HFA   2 puffs, Inhalation, Every 4 Hours PRN      aspirin 81 MG EC tablet   81 mg, Oral, Daily      BRILINTA 60 MG tablet tablet  Generic drug:  ticagrelor   60 mg, Oral, Daily      CLARITIN 10 MG capsule  Generic drug:  Loratadine   Oral, Daily      COREG 6.25 MG tablet  Generic drug:  carvedilol   6.25 mg, Oral, 2 Times Daily With Meals      famotidine 20 MG tablet  Commonly known as:  PEPCID   20 mg, Oral, 2 Times Daily      levothyroxine 75 MCG tablet  Commonly known as:  SYNTHROID, LEVOTHROID   75 mcg, Oral, Daily      pantoprazole 40 MG EC tablet  Commonly known as:  PROTONIX   40 mg, Oral, Daily      REPATHA SURECLICK 140 MG/ML solution auto-injector  Generic drug:  Evolocumab   140 mg, Subcutaneous, Every 14 Days      SITagliptin 100 MG tablet  Commonly known as:   JANUVIA   100 mg, Oral, Daily               Discharge Disposition:  Home or Self Care    Discharge Diet:  Diet Instructions     MBS/VFSS/FEES 11/01/18    Reason for Referral  Patient was referred for a FEES to assess the efficiency of his/her swallow function, rule out aspiration and make recommendations regarding safe dietary consistencies, effective compensatory strategies, and safe eating environment.                   Recommendations/Treatment            Risks/Benefits Reviewed: (P) risks/benefits explained, patient, family, agreed to eval  Nasal Entry: (P) right:  Special Considerations: (P) Scope #338    SLP Swallowing Diagnosis: (P) functional oral phase, moderate, pharyngeal dysfunction  Functional Impact: (P) risk of aspiration/pneumonia  Rehab Potential/Prognosis, Swallowing: (P) good, to achieve stated therapy goals  Swallow Criteria for Skilled Therapeutic Interventions Met: (P) demonstrates skilled criteria    Therapy Frequency (Swallow): (P) 5 days per week  Predicted Duration Therapy Intervention (Days): (P) until discharge  SLP Diet Recommendation: (P) mechanical soft with no mixed consistencies, honey thick liquids  Recommended Precautions and Strategies: (P) upright posture during/after eating, small bites of food and sips of liquid, no straw, alternate between small bites of food and sips of liquid, other (see comments) (oral care BID/PRN)  SLP Rec. for Method of Medication Administration: (P) meds whole, with pudding or applesauce, as tolerated  Monitor for Signs of Aspiration: (P) yes, notify SLP if any concerns, cough, gurgly voice, throat clearing  Anticipated Dischage Disposition: (P) unknown, anticipate therapy at next level of care      Oral Preparation/ Oral Phase       Oral Phase: (P) WFL    Pharyngeal Phase       Initiation of Pharyngeal Swallow: (P) bolus in pyriform sinuses  Pharyngeal Phase: (P) impaired pharyngeal phase of swallowing  Penetration During the Swallow: (P) honey-thick  liquids, secondary to delayed swallow initiation or mistiming, secondary to reduced laryngeal elevation, secondary to reduced vestibular closure  Aspiration During the Swallow: (P) thin liquids, nectar-thick liquids, secondary to delayed swallow initiation or mistiming, secondary to reduced laryngeal elevation, secondary to reduced vestibular closure  Response to Aspiration: (P) response with cue only, could not clear subglottic material  Rosenbek's Scale: (P) thin:, nectar:, 8-->Level 8, honey:, 3-->Level 3, pudding/puree:, regular textures:, 1-->Level 1  Residue: (P) all consistencies tested, base of tongue, valleculae, posterior pharyngeal wall, secondary to reduced base of tongue retraction, secondary to reduced posterior pharyngeal wall stripping, secondary to reduced laryngeal elevation (> w/ pudding and solid )  Response to Residue: (P) cleared residue, with liquid wash  Attempted Compensatory Maneuvers: (P) bolus size, bolus presentation style, chin tuck, additional subsequent swallow, alternate liquids/solids, breath hold, throat clear after swallow  Response to Attempted Compensatory Maneuvers: (P) did not prevent aspiration  FEES Summary: (P) FEES completed. Trials given of thin via tsp, cup, and straw, nectar-thick via tsp, cup, honey-thick via tsp and cup, pudding, and solid. Moderate pharyngeal dysphagia. Swallow initiation delayed to the pyriform sinuses w/ all consistencies. Trace aspiration during the swallow w/ thin and nectar-thick liquids 2' delay and decr'd elevation and vestibular closure. Aspiration was silent, pt was u/a to fully clear subglottic material w/ cued cough. Intermittent trace penetration above the level of the vocal folds w/ honey-thick liquids 2' to delay and decr'd elevation and vestibular closure. No penetration or aspiration observed w/ pudding or solid. Moderate diffuse pharyngeal residue w/ pudding and solid 2' decr'd base of tongue retraction, posterior pharyngeal wall  contraction, and decr'd elevation. Pharyngeal residue decr'd w/ liquid wash. Compensatory strategies were attempted but unsuccessful in preventing aspiration. Recommend level 4 diet w/ honey-thick liquids, no straws, small bites and sips, and alternating bites and sips. Meds whole w/ puree as tolerated. Will plan to begin dysphagia tx.         Cervical Esophageal Phase              Prognosis                           Discharge Activity: As tolerated    SPECIAL Instructions: Patient requires that PICC line remains in place due to ongoing treatment of underlying condition.    Code Status/Level of Support:  Code Status and Medical Interventions:   Ordered at: 10/31/18 0124     Level Of Support Discussed With:    Patient     Code Status:    CPR     Medical Interventions (Level of Support Prior to Arrest):    Full       Future Appointments   Date Time Provider Department Center   12/3/2018 11:00 AM Desmond Cardoso MD [facility]       Additional Instructions for the Follow-ups that You Need to Schedule     Obtain Informed Consent   Nov 09, 2018      Informed Consent Given For:  Dr. Tyler PENALOZA         CBC and Differential   Nov 14, 2018      Manual Differential:  No         Comprehensive metabolic panel   Nov 14, 2018      Magnesium   Nov 14, 2018      Discharge Follow-up with Specialty: radiation oncology; 2 Weeks   As directed      Specialty:  radiation oncology    Follow Up:  2 Weeks               Time Spent on Discharge: 39 minutes    Electronically signed by Tricia Obrien II, DO, 11/16/18, 10:59 AM.

## 2018-11-16 NOTE — DISCHARGE PLACEMENT REQUEST
"Shauna Vera (63 y.o. Female)     Date of Birth Social Security Number Address Home Phone MRN    1955  [address]  [facility] [address] [phone] [id number]    Judaism Marital Status          Unknown        Admission Date Admission Type Admitting Provider Attending Provider Department, Room/Bed    10/31/18 Emergency Tricia Obrien II, Tricia Adame II,  [facility] 5B, N530/1    Discharge Date Discharge Disposition Discharge Destination         Home or Self Care              Attending Provider:  Tricia Obrien II, DO    Allergies:  Plavix [Clopidogrel Bisulfate], Codeine, Penicillins    Isolation:  None   Infection:  None   Code Status:  CPR    Ht:  154.9 cm (61\")   Wt:  89.8 kg (198 lb)    Admission Cmt:  None   Principal Problem:  None                Active Insurance as of 10/31/2018     Primary Coverage     Payor Plan Insurance Group Employer/Plan Group    MEDICARE MEDICARE A & B      Payor Plan Address Payor Plan Phone Number Payor Plan Fax Number Effective Dates    [address] [phone]  11/1/2008 - None Entered    [facility] [address]       Subscriber Name Subscriber Birth Date Member ID       SHAUNA VERA 1955 [id number]           Secondary Coverage     Payor Plan Insurance Group Employer/Plan Group    [place]      Pay[place] Address Payor Plan Phone Number Payor Plan Fax Number Effective Dates    [address] [address]   2/1/2014 - None Entered    [email or web address] [address]       Subscriber Name Subscriber Birth Date Member ID       SHAUNA VERA 1955 [id number]                 Emergency Contacts      (Rel.) Home Phone Work Phone Mobile Phone    Naresh,Jade (Daughter) [phone] -- --    DesireReyna (Sister) [phone] -- --            Insurance Information                MEDICARE/MEDICARE A & B Phone: [phone]    Subscriber: Shauna Vera Subscriber#: [id number]    Group#: "  Precert#:         [place] [facility] [place] [place] [place] Phone:     Subscriber: Shauna Valencia Subscriber#: [id number]    Group#:  Precert#:              History & Physical      Mickey Warner MD at 10/31/2018  7:09 PM              [facility] Medicine Services  HISTORY AND PHYSICAL    Patient Name: Shauna Valencia  : 1955  MRN: [id number]  Primary Care Physician: Rox Singleton MD  Date of admission: 10/31/2018      Subjective   Subjective     Chief Complaint:  Hyponatremia    HPI:  Shauna Valencia is a 63 y.o. female with history of obesity, HTN, HLD, DM2, and tobacco abuse, who was directed for admission by Dr Banks (her endocrinologist) after routine lab revealed significant hyponatremia. At the beginning of , it was normal, but per pt, she recently had routine labs in 2018 which also revealed hyponatremia (Na 120) and her PCP. Her PCP ordered CT chest, which had been scheduled, but have not been done yet. Pt denies any new medications or SSRI.  Pt is relatively asymptomatic. She only reports persistent nagging dry cough for several months now. No unintentional weight loss, but she does admit to being on a diet and not eating very much.    Review of Systems     Otherwise 10-system ROS reviewed and is negative except as mentioned in the HPI.    Personal History     Past Medical History:   Diagnosis Date   • Diabetes mellitus (CMS/HCC)    • Hyperlipidemia    • Pancreatitis        Past Surgical History:   Procedure Laterality Date   • CAROTID STENT     • COLONOSCOPY     • UPPER GASTROINTESTINAL ENDOSCOPY         Family History: family history includes Colon polyps in her mother; Ulcerative colitis in her sister.     Social History:  reports that she has been smoking Cigarettes.  She has been smoking about 0.50 packs per day. She does not have any smokeless tobacco history on file. She reports that she does not drink alcohol.  Social History     Social History  Narrative   • No narrative on file       Medications:  Reviewed and reconciled    Allergies   Allergen Reactions   • Plavix [Clopidogrel Bisulfate] Anaphylaxis   • Codeine    • Penicillins        Objective   Objective     Vital Signs:   Temp:  [97.4 °F (36.3 °C)-98.2 °F (36.8 °C)] 97.7 °F (36.5 °C)  Heart Rate:  [] 101  Resp:  [16-17] 16  BP: (109-164)/(64-96) 109/96        Physical Exam   General Assessment: No acute cardiopulmonary distress. Well developed and well nourished.    HEENT: NCAT, PERRL, MM moist    Neck: Supple    CVS: RRR, S1S2 normal, no murmurs    Resp: CTAB, no adventitious sound    Abd: soft, NT, ND, normal BS, no guarding or peritoneal signs    Ext: No edema, both calves are symmetric and NTTP    Neuro: Nonfocal    Skin: W/D/I. No rash.    Psych: Affect is appropriate    Results Reviewed:  I have personally reviewed current lab, radiology, and data and agree.      Results from last 7 days  Lab Units 10/31/18  1710   WBC 10*3/mm3 7.54   HEMOGLOBIN g/dL 11.7   HEMATOCRIT % 33.8*   PLATELETS 10*3/mm3 422       Results from last 7 days  Lab Units 10/31/18  1507   SODIUM mmol/L 117*   POTASSIUM mmol/L 4.4   CHLORIDE mmol/L 87*   CO2 mmol/L 25.0   BUN mg/dL 12   CREATININE mg/dL 0.67   GLUCOSE mg/dL 146*   CALCIUM mg/dL 8.8     Estimated Creatinine Clearance: 90.1 mL/min (by C-G formula based on SCr of 0.67 mg/dL).  Brief Urine Lab Results  (Last result in the past 365 days)      Color   Clarity   Blood   Leuk Est   Nitrite   Protein   CREAT   Urine HCG        10/31/18 1729 Dark Yellow(A) Clear Negative Negative Negative Negative             No results found for: BNP  Imaging Results (last 24 hours)     Procedure Component Value Units Date/Time    CT Chest Without Contrast [[id number]] Collected:  10/31/18 1701     Updated:  10/31/18 1930    Narrative:       EXAM:    CT Chest Without Intravenous Contrast     EXAM DATE/TIME:    10/31/2018 5:01 PM     CLINICAL HISTORY:    63 years old, female;  Hypo-osmolality and hyponatremia; Dyspnea, unspecified;   Signs and symptoms; Cough and shortness of breath; Prior surgery; Surgery date:   6+ months; Surgery type: 5 heart stents; Additional info: SOA, hyponatremia     TECHNIQUE:    Axial computed tomography images of the chest without intravenous contrast.    All CT scans at this facility use at least one of these dose optimization   techniques: automated exposure control; mA and/or kV adjustment per patient   size (includes targeted exams where dose is matched to clinical indication); or   iterative reconstruction.    Coronal reformatted images were created and reviewed.     COMPARISON:    No relevant prior studies available.     FINDINGS:     LUNGS/PLEURA: Suboptimally evaluated located and mildly spiculated RIGHT   hilar mass measuring at least 4.4 cm in the long axis. Nodular peribronchial   LEFT upper lobe and subpleural inferior lingula airspace opacities measuring up   to 1.9 cm and centrilobular nodules in the LEFT upper lobe. Calcific granuloma   in the RIGHT normal. No pleural effusion or pneumothorax. Complete obstruction   of the LEFT apical bronchus, no other central obstructive endobronchial mass or   abnormality.     MEDIASTINUM: Heart size is normal, with trace pericardial   thickening/effusion. Severe coronary arterial calcification/coronary stents.   Atherosclerotic disease of the aorta without aortic aneurysm. Bulky prevascular   lymph node adjacent to the aortic arch measuring 3.0 cm and LEFT hilar mass   which may represent lymph nodes or primary lung malignancy.     OTHER STRUCTURES: Chronic degenerative changes in the visualized spine.   Nonspecific bilateral perinephric fat stranding and renal cortical scarring.   Small LEFT adrenal nodule measuring 1.3 cm.       Impression:       1. Findings concerning for LEFT HILAR MALIGNANT SOFT TISSUE MASS with   postobstructive focal pneumonia/bronchiolitis in the LEFT upper lobe.   2. Likely  METASTATIC mediastinal and possibly LEFT hilar lymph nodes.   3. Coronary arterial calcification suggestive of CAD.   4. Other nonemergent/incidental findings as described.     THIS DOCUMENT HAS BEEN ELECTRONICALLY SIGNED BY BATOOL SAMANO MD             Assessment/Plan   Assessment / Plan     Active Hospital Problems    Diagnosis   • Acute hyponatremia   • Hyperlipidemia   • Type 2 diabetes mellitus (CMS/HCC)   • Tobacco abuse   • Obesity (BMI 30-39.9)         Assessment & Plan:  Shauna Valencia is a 63 y.o. female with history of obesity, HTN, HLD, DM2, and tobacco abuse, who was directed for admission by Dr Banks (her endocrinologist) after routine lab revealed significant hyponatremia. Pt is asymptomatic. CT chest noted as above (left hilar malignant soft tissue mass with post obstructive focal pneumonia/bronchiolitis in the JESSICA with likely metastatic mediastinal and left hilar lymph node. Hyponatremia is likely from paraneosplastic syndrome/SIAD.    - urine studies obtained in the ED pending  - correct Na with NS slowly for now and fluid restrition to 1L per day, monitor with q4h BMP.   - Consult pulm in am  - Consider nephrology consultation PRN  - Start Rocephin and Doxy  - Pharm consulted by ED to look at potential meds as the underlying culprit. I personally reviewed med recs, no obvious medication as culprit  - check TSH/A1C in am  - low dose SSI and adjust PRN    DVT prophylaxis: Lovenox    CODE STATUS:    Code Status and Medical Interventions:   Ordered at: 10/31/18 6393     Level Of Support Discussed With:    Patient     Code Status:    CPR     Medical Interventions (Level of Support Prior to Arrest):    Full       Admission Status:  I believe this patient meets INPATIENT status due to the need for care which can only be reasonably provided in an hospital setting such as aggressive/expedited ancillary services and/or consultation services, the necessity for IV medications, close physician monitoring and/or  the possible need for procedures.  In such, I feel patient’s risk for adverse outcomes and need for care warrant INPATIENT evaluation and predict the patient’s care encounter to likely last beyond 2 midnights.        Electronically signed by Mickey Warner MD, 10/31/18, 7:09 PM.          Electronically signed by Mickey Warner MD at 10/31/2018 10:37 PM            Discharge Summary      Tricia Obrien II, DO at 2018  7:28 AM              [facility] Medicine Services  DISCHARGE SUMMARY    Patient Name: Shauna Valencia  : 1955  MRN: [id number]    Date of Admission: 10/31/2018  Date of Discharge:  2018  Primary Care Physician: Rox Singleton MD    Consults     Date and Time Order Name Status Description    2018 0827 Inpatient Hematology & Oncology Consult Completed     2018 0937 Inpatient Nephrology Consult Completed     10/31/2018 2233 Inpatient Pulmonology Consult Completed         Hospital Course     Presenting Problem:   Acute hyponatremia [E87.1]    Active Hospital Problems    Diagnosis Date Noted   • Small cell lung cancer, left upper lobe (CMS/HCC) [C34.12] 2018   • Mass of upper lobe of left lung [R91.8] 2018   • Mediastinal lymphadenopathy [R59.0] 2018   • Acute hyponatremia [E87.1] 10/31/2018   • Hyperlipidemia [E78.5] 10/31/2018   • Type 2 diabetes mellitus (CMS/HCC) [E11.9] 10/31/2018   • Tobacco abuse [Z72.0] 10/31/2018   • Obesity (BMI 30-39.9) [E66.9] 10/31/2018      Resolved Hospital Problems   No resolved problems to display.          Hospital Course:  Shauna Valencia is a 63 y.o. 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    Limited stage small cell lung cancer: 62 y/o female admitted with above. During evaluation for her hyponatremia she was found to have left lung mass which after diagnostic eval returned  positive for small cell lung cancer. MRI brain, CT a/p, and bone scan showed no presence of further disease. She was seen by oncology and started on cisplatin and etoposide as inpatient which she tolerated well. She will follow with Dr. Cardoso upon d/c and will continue with cycle 2 of chemotherapy and radiation to start at that time as well to be arranged by Dr. Cardoso. I discussed the case with Dr. Briones prior to patient's discharge.    Hyponatremia due to SIADH from above: Up arrival, her sodium was noted to be 117. She was seen by nephrology who determined etiology was likely SIADH due to above. She was ultimately treated with Samsca to which she responded to appropriately. She will continue Samsca at discharge as directed by nephrology and will f/u with [facility] in [place] on 1/21/19.       Discharge Follow Up Recommendations for labs/diagnostics:   PCP in 1 week, Recommend bmp, Re: hyponatremia   Dr. Cardoso in 1-2 weeks   [facility] in [place] clinic on 1/21/19    Day of Discharge     HPI: Lying in bed with daughter sleeping at bedside. Patient upset that she didn't receive her Restoril quickly enough last night. Still wants to go home this pm after chemo.    Review of Systems  Gen- No fevers, chills  CV- No chest pain, palpitations  Resp- No cough, dyspnea  GI- No N/V/D, abd pain    Otherwise ROS is negative except as mentioned in the HPI.    Vital Signs:   Temp:  [97.4 °F (36.3 °C)-98.4 °F (36.9 °C)] 97.7 °F (36.5 °C)  Heart Rate:  [73-97] 87  Resp:  [18-20] 18  BP: (137-160)/(69-84) 143/69     Physical Exam:  Constitutional: No acute distress, awake, alert, appears comfortable  HENT: NCAT, mucous membranes moist  Respiratory: Clear to auscultation bilaterally, respiratory effort normal   Cardiovascular: RRR, no murmurs, rubs, or gallops, palpable pedal pulses bilaterally  Gastrointestinal: Positive bowel sounds, soft, nontender, nondistended  Musculoskeletal: No bilateral ankle edema  Psychiatric: Appropriate affect,  cooperative  Neurologic: Oriented x 3, strength symmetric in all extremities, Cranial Nerves grossly intact to confrontation, speech clear  Skin: No rashes    Pertinent  and/or Most Recent Results     Results from last 7 days   Lab Units  11/16/18   0750  11/15/18   0605  11/14/18   0644  11/13/18   0849  11/13/18   0626  11/13/18   0318  11/12/18   2340   11/12/18   0708   11/11/18   1339   11/11/18   0622   11/10/18   2344   11/10/18   0322   WBC 10*3/mm3   --   6.41   --    --    --    --    --    --   10.57   --    --    --   9.16   --    --    --   9.97   HEMOGLOBIN g/dL   --   10.9*   --    --    --    --    --    --   10.7*   --    --    --   10.1*   --    --    --   10.1*   HEMATOCRIT %   --   34.4*   --    --    --    --    --    --   33.0*   --    --    --   30.0*   --    --    --   30.6*   PLATELETS 10*3/mm3   --   360   --    --    --    --    --    --   370   --    --    --   358   --    --    --   338   SODIUM mmol/L  133  133  134  134  136  138  137  139   < >  136   < >  132   < >  134  134   < >  133   < >   --    POTASSIUM mmol/L  4.2  4.6  4.0  4.0   --    --    --    --    --   4.2   --   3.9   --   4.0   --   3.7   < >   --    CHLORIDE mmol/L  102  102  94*  94*   --    --    --    --    --   99   --   97*   --   97*   --   98*   < >   --    CO2 mmol/L  23.0  21.0  30.0  30.0   --    --    --    --    --   29.0   --   27.0   --   28.0   --   28.0   < >   --    BUN mg/dL  20  13  17  17   --    --    --    --    --   15   --   16   --   17   --   22   < >   --    CREATININE mg/dL  0.88  0.83  0.78  0.78   --    --    --    --    --   0.74   --   0.86   --   0.68   --   0.85   < >   --    GLUCOSE mg/dL  270*  273*  145*  145*   --    --    --    --    --   102*   --   98   --   89   --   190*   < >   --    CALCIUM mg/dL  8.8  8.8  9.2  9.2   --    --    --    --    --   9.3   --   9.5   --   8.8   --   8.9   < >   --     < > = values in this interval not displayed.     Results from last  7 days   Lab Units  11/14/18   0644   BILIRUBIN mg/dL  0.3   ALK PHOS U/L  59   ALT (SGPT) U/L  23   AST (SGOT) U/L  12           Invalid input(s): TG, LDLCALC, LDLREALC  Results from last 7 days   Lab Units  11/14/18   0644   CORTISOL mcg/dL  8.70     Brief Urine Lab Results  (Last result in the past 365 days)      Color   Clarity   Blood   Leuk Est   Nitrite   Protein   CREAT   Urine HCG        10/31/18 1729 Dark Yellow Clear Negative Negative Negative Negative               Microbiology Results Abnormal     Procedure Component Value - Date/Time    AFB Culture - Wash, Bronchus [[id number]] Collected:  11/09/18 1147    Lab Status:  Preliminary result Specimen:  Wash from Bronchus Updated:  11/14/18 1300     AFB Culture No AFB isolated at less than 1 week     AFB Stain Rare (1+) Epithelial cells per low power field      Few (2+) WBCs per low power field      No organisms seen      No acid fast bacilli seen on concentrated smear    Fungus Culture - Wash, Lung, Left Upper Lobe [[id number]] Collected:  11/06/18 1225    Lab Status:  Preliminary result Specimen:  Wash from Lung, Left Upper Lobe Updated:  11/13/18 1302     Fungus Culture No fungus isolated at 1 week    AFB Culture - Wash, Lung, Left Upper Lobe [[id number]] Collected:  11/06/18 1225    Lab Status:  Preliminary result Specimen:  Wash from Lung, Left Upper Lobe Updated:  11/13/18 1302     AFB Culture No AFB isolated at 1 week     AFB Stain No acid fast bacilli seen on concentrated smear    Fungus Smear - Wash, Bronchus [[id number]] Collected:  11/09/18 1147    Lab Status:  Final result Specimen:  Wash from Bronchus Updated:  11/12/18 1335     Fungal Stain No fungal elements seen      No Pneumocystis jirovecci (formerly Pneumocystis carinii) noted on smear.    Fungus Smear - Wash, Lung, Left Upper Lobe [[id number]] Collected:  11/06/18 1225    Lab Status:  Final result Specimen:  Wash from Lung, Left Upper Lobe Updated:  11/12/18 1301     Fungal Stain No fungal  elements seen      No Pneumocystis jirovecci (formerly Pneumocystis carinii) noted on smear.    Respiratory Culture - Wash, Bronchus [[id number]] Collected:  11/09/18 1147    Lab Status:  Final result Specimen:  Wash from Bronchus Updated:  11/11/18 0702     Respiratory Culture Light growth (2+) Normal Respiratory Shavon     Gram Stain Few (2+) WBCs per low power field      Occasional Epithelial cells per low power field      Few (2+) Gram positive cocci in pairs and clusters    Respiratory Culture - Wash, Lung, Left Upper Lobe [[id number]] Collected:  11/06/18 1225    Lab Status:  Final result Specimen:  Wash from Lung, Left Upper Lobe Updated:  11/08/18 0732     Respiratory Culture Scant growth (1+) Normal Respiratory Shavon     Gram Stain No WBCs or organisms seen          Imaging Results (all)     Procedure Component Value Units Date/Time    NM Bone Scan Whole Body [[id number]] Collected:  11/14/18 1803     Updated:  11/15/18 0826    Narrative:       EXAMINATION: NM BONE SCAN WHOLE-BODY - 11/14/2018     INDICATION: E87.4-Mfnh-ojtvzlgqwq and hyponatremia; R06.00-Dyspnea,  unspecified; R13.13-Dysphagia, pharyngeal phase; R91.8-Other nonspecific  abnormal finding of lung field; C34.12-Malignant neoplasm of upper lobe,  left bronchus or lung.     TECHNIQUE: Patient was injected with 27.5 mCi Technetium MDP and scanned  after a 5-hour delay.     COMPARISON: None.     FINDINGS: There are arthritic changes in the shoulders, cervical spine,  knees and ankles. There is absent tracer in the right femoral head  related to the right hip prosthesis. There is no focal or multifocal  pattern to suggest metastatic disease.        Impression:       Arthritic and postoperative changes described above. There  is no evidence of metastatic disease.     DICTATED:   11/14/2018  EDITED/ls :   11/14/2018          This report was finalized on 11/15/2018 8:24 AM by Dr. Julián Barrios MD.       CT Abdomen Pelvis With Contrast [[id number]]  Collected:  11/14/18 0925     Updated:  11/14/18 1011    Narrative:       EXAMINATION: CT ABDOMEN PELVIS W CONTRAST- 11/13/2018     INDICATION: small cell lung cancer; E87.7-Aril-cktguskagc and  hyponatremia; R06.00-Dyspnea, unspecified; R13.13-Dysphagia, pharyngeal  phase; R91.8-Other nonspecific abnormal finding of lung field;  R91.8-Other nonspecific abnormal finding of lung field; C34.12-Malignant  neoplasm of upper lobe, left bronchus or lung     TECHNIQUE:  Axial CT data of the abdomen and pelvis were acquired  helically following IV contrast administration. Multiplanar reformatted  images were generated and reviewed. The radiation dose reduction device  was turned on for each scan per the ALARA (As Low as Reasonably  Achievable) protocol     COMPARISONS:  03/28/2015     FINDINGS:       Calcified granuloma noted incidentally in the left lung base. There is a  cyst in the left kidney. The liver is without focal lesion. A calcified  granuloma is seen in the spleen, further evidence of healed  granulomatous disease. Gallbladder is likely surgically absent. The  adrenal glands and pancreas are normal. Right kidney is normal. No  dilated small or large bowel. No enlarged lymph nodes or free fluid.  Right hip arthroplasty hardware in place. No focal bone lesion  identified.       Impression:       No evidence of metastatic disease in the abdomen or pelvis.     D:  11/14/2018  E:  11/14/2018     This report was finalized on 11/14/2018 10:09 AM by Ap Wright.       MRI Brain With & Without Contrast [[id number]] Collected:  11/14/18 0925     Updated:  11/14/18 1011    Narrative:       EXAMINATION: MRI BRAIN W WO CONTRAST-11/13/2018:      INDICATION: Small cell lung cancer; E87.8-Ipnk-pzdbcgdpuy and  hyponatremia; R06.00-Dyspnea, unspecified; R13.13-Dysphagia, pharyngeal  phase; R91.8-Other nonspecific abnormal finding of lung field;  R91.8-Other nonspecific abnormal finding of lung field; C34.12-Malignant  neoplasm of  upper lobe, left bronchus or lung.     TECHNIQUE:  Multiplanar multisequence MRI of the brain was performed  without and with contrast.     COMPARISONS:  None.     FINDINGS:  Brain volume is within normal limits. Ventricles are normal  in size and configuration.  There is no abnormal signal on diffusion  weighted images. Scattered FLAIR signal abnormalities are nonspecific  but most likely attributable to chronic microangiopathy. There is no  abnormal intracranial enhancement. There is no mass effect and the basal  cisterns are patent. [place] flow voids are maintained.  No  significant abnormal sinonasal or temporal bone fluid is identified.       Impression:       No evidence of brain metastases.     D:  11/14/2018  E:  11/14/2018        This report was finalized on 11/14/2018 10:09 AM by Ap Wright.       XR Chest 1 View [[id number]] Collected:  11/09/18 1042     Updated:  11/09/18 1635    Narrative:       EXAMINATION: XR CHEST 1 VW- 11/09/2018     INDICATION: E87.1-Vjpa-guvoiaalem and hyponatremia; R06.00-Dyspnea,  unspecified; R13.13-Dysphagia, pharyngeal phase; R91.8-Other nonspecific  abnormal finding of lung field; R91.8-Other nonspecific abnormal finding  of lung field      COMPARISON: 11/06/2018     FINDINGS: Portable chest reveals improvement seen in aeration of the  left perihilar region. Underlying soft tissue mass suggesting adenopathy  remains. There is a PICC line catheter identified on the right tip in  the SVC. Cardiac silhouette is borderline enlarged. Lung volumes are  low.           Impression:       Soft tissue mass again seen in the left perihilar region.  PICC line catheter on the right tip in the SVC. No new focal parenchymal  opacification present.     D:  11/09/2018  E:  11/09/2018     This report was finalized on 11/9/2018 4:33 PM by Dr. Amelie Teran MD.       CT Chest With Contrast [[id number]] Collected:  11/08/18 1536     Updated:  11/08/18 1539    Narrative:        EXAMINATION: CT CHEST W CONTRAST- 11/08/2018     INDICATION: Mass or lump, thorax axilla     TECHNIQUE:  Axial CT data of the chest were acquired helically with  contrast.  Multiplanar reformatted images were generated and reviewed.   The radiation dose reduction device was turned on for each scan per the  ALARA (As Low as Reasonably Achievable) protocol      COMPARISONS:  10/31/2018     FINDINGS: Unchanged small nodule in the left upper lobe, 1.5 cm. Small  amount of right infrahilar airspace disease is seen, likely infectious  or inflammatory. Central airways are patent. Heart size is within normal  limits. Coronary artery disease noted. There is metastatic adenopathy in  the left hilum (largest collection of nodes 3.2 cm), prevascular station  (1.4 cm) and AP window (3.4 cm). The lymph nodes severely narrow the  pulmonary arterial branches to the left upper and lower lobes  bilaterally as well as narrowing of some of the left-sided pulmonary  veins. Right lung is clear. Unremarkable chest wall soft tissues.  Unchanged long-standing left adrenal thickening. Partially visualized  left renal cysts. No aggressive bone lesion.       Impression:          1. Small nodule in the left upper lobe could represent primary neoplasm.  2. Metastatic left hilar and mediastinal lymph nodes.  3. Narrowing of the left hilar vessels.     D:  11/08/2018  E:  11/08/2018     This report was finalized on 11/8/2018 3:37 PM by Ap Wright.       XR Chest 1 View [[id number]] Collected:  11/06/18 1325     Updated:  11/06/18 1343    Narrative:       EXAMINATION: XR CHEST 1 VW- 11/06/2018     INDICATION: E87.7-Eukv-fxzoyiqroy and hyponatremia; R06.00-Dyspnea,  unspecified; R13.13-Dysphagia, pharyngeal phase; R91.8-Other nonspecific  abnormal finding of lung field     COMPARISON: 11/05/2018     FINDINGS: Mildly increased opacifications left upper lobe likely post  bronchoscopy debris or postobstructive changes. Cardiac silhouette  unchanged. No  pneumothorax or pleural effusion.           Impression:       No postprocedural pneumothorax. Mildly increased  opacifications adjacent to the left suprahilar mass lesion may represent  post bronchoscopy debris material or postobstructive changes.     D:  11/06/2018  E:  11/06/2018     This report was finalized on 11/6/2018 1:41 PM by Dr. Johanthon Cameron.       XR Chest PA & Lateral [[id number]] Collected:  11/05/18 1626     Updated:  11/05/18 1636    Narrative:       EXAMINATION: XR CHEST PA AND LATERAL- 11/05/2018     INDICATION: E87.6-Fkoh-uphtsktyth and hyponatremia; R06.00-Dyspnea,  unspecified; R13.13-Dysphagia, pharyngeal phase; R91.8-Other nonspecific  abnormal finding of lung field; R91.8-Other nonspecific abnormal finding  of lung field      COMPARISON: CT chest dated 10/31/2018     FINDINGS: Rounded masslike opacity within the left suprahilar region  consistent with known mass lesion. No focal consolidation otherwise  noted. No pneumothorax or pleural effusion. Calcified granuloma left  lung base. Degenerative changes of the spine.           Impression:       Left suprahilar mass lesion seen better on recent CT chest  10/31/2018 without acute parenchymal process otherwise noted.     D:  11/05/2018  E:  11/05/2018     This report was finalized on 11/5/2018 4:34 PM by Dr. Johnathon Cameron.       Fiberoptic Endo (fees) [[id number]] Resulted:  11/01/18 1555     Updated:  11/01/18 1555    Narrative:       This procedure was auto-finalized with no dictation required.    CT Chest Without Contrast [[id number]] Collected:  10/31/18 1701     Updated:  10/31/18 1930    Narrative:       EXAM:    CT Chest Without Intravenous Contrast     EXAM DATE/TIME:    10/31/2018 5:01 PM     CLINICAL HISTORY:    63 years old, female; Hypo-osmolality and hyponatremia; Dyspnea, unspecified;   Signs and symptoms; Cough and shortness of breath; Prior surgery; Surgery date:   6+ months; Surgery type: 5 heart stents; Additional info: POP  hyponatremia     TECHNIQUE:    Axial computed tomography images of the chest without intravenous contrast.    All CT scans at this facility use at least one of these dose optimization   techniques: automated exposure control; mA and/or kV adjustment per patient   size (includes targeted exams where dose is matched to clinical indication); or   iterative reconstruction.    Coronal reformatted images were created and reviewed.     COMPARISON:    No relevant prior studies available.     FINDINGS:     LUNGS/PLEURA: Suboptimally evaluated located and mildly spiculated RIGHT   hilar mass measuring at least 4.4 cm in the long axis. Nodular peribronchial   LEFT upper lobe and subpleural inferior lingula airspace opacities measuring up   to 1.9 cm and centrilobular nodules in the LEFT upper lobe. Calcific granuloma   in the RIGHT normal. No pleural effusion or pneumothorax. Complete obstruction   of the LEFT apical bronchus, no other central obstructive endobronchial mass or   abnormality.     MEDIASTINUM: Heart size is normal, with trace pericardial   thickening/effusion. Severe coronary arterial calcification/coronary stents.   Atherosclerotic disease of the aorta without aortic aneurysm. Bulky prevascular   lymph node adjacent to the aortic arch measuring 3.0 cm and LEFT hilar mass   which may represent lymph nodes or primary lung malignancy.     OTHER STRUCTURES: Chronic degenerative changes in the visualized spine.   Nonspecific bilateral perinephric fat stranding and renal cortical scarring.   Small LEFT adrenal nodule measuring 1.3 cm.       Impression:       1. Findings concerning for LEFT HILAR MALIGNANT SOFT TISSUE MASS with   postobstructive focal pneumonia/bronchiolitis in the LEFT upper lobe.   2. Likely METASTATIC mediastinal and possibly LEFT hilar lymph nodes.   3. Coronary arterial calcification suggestive of CAD.   4. Other nonemergent/incidental findings as described.     THIS DOCUMENT HAS BEEN  ELECTRONICALLY SIGNED BY BATOOL SAMANO MD          Results for orders placed during the hospital encounter of 10/31/18   Duplex Venous Lower Extremity - Bilateral CAR    Narrative · There is evidence of superficial venous thrombosis within the left   saphenous vein  · No evidence of DVT          Results for orders placed during the hospital encounter of 10/31/18   Duplex Venous Lower Extremity - Bilateral CAR    Narrative · There is evidence of superficial venous thrombosis within the left   saphenous vein  · No evidence of DVT                Order Current Status    Fungus Culture - Wash, Bronchus In process    Ova & Parasite Examination - Wash, Lung, Left Upper Lobe In process    AFB Culture - Wash, Bronchus Preliminary result    AFB Culture - Wash, Lung, Left Upper Lobe Preliminary result    Fungus Culture - Wash, Lung, Left Upper Lobe Preliminary result        Discharge Details        Discharge Medications      New Medications      Instructions Start Date   dexamethasone 4 MG tablet  Commonly known as:  DECADRON   Take 2 tablets in the morning daily on days 2, 3 & 4.  Take with food.      HYDROcodone-acetaminophen 5-325 MG per tablet  Commonly known as:  NORCO   1 tablet, Oral, Every 4 Hours PRN      nicotine 21 MG/24HR patch  Commonly known as:  NICODERM CQ   1 patch, Transdermal, Every 24 Hours Scheduled      ondansetron 8 MG tablet  Commonly known as:  ZOFRAN   8 mg, Oral, 3 Times Daily PRN      tolvaptan 15 MG tablet tablet  Commonly known as:  SAMSCA   7.5 mg, Oral, Daily         Continue These Medications      Instructions Start Date   albuterol 108 (90 Base) MCG/ACT inhaler  Commonly known as:  PROVENTIL HFA;VENTOLIN HFA   2 puffs, Inhalation, Every 4 Hours PRN      aspirin 81 MG EC tablet   81 mg, Oral, Daily      BRILINTA 60 MG tablet tablet  Generic drug:  ticagrelor   60 mg, Oral, Daily      CLARITIN 10 MG capsule  Generic drug:  Loratadine   Oral, Daily      COREG 6.25 MG tablet  Generic drug:   carvedilol   6.25 mg, Oral, 2 Times Daily With Meals      famotidine 20 MG tablet  Commonly known as:  PEPCID   20 mg, Oral, 2 Times Daily      levothyroxine 75 MCG tablet  Commonly known as:  SYNTHROID, LEVOTHROID   75 mcg, Oral, Daily      pantoprazole 40 MG EC tablet  Commonly known as:  PROTONIX   40 mg, Oral, Daily      REPATHA SURECLICK 140 MG/ML solution auto-injector  Generic drug:  Evolocumab   140 mg, Subcutaneous, Every 14 Days      SITagliptin 100 MG tablet  Commonly known as:  JANUVIA   100 mg, Oral, Daily               Discharge Disposition:  Home or Self Care    Discharge Diet:  Diet Instructions     MBS/VFSS/FEES 11/01/18    Reason for Referral  Patient was referred for a FEES to assess the efficiency of his/her swallow function, rule out aspiration and make recommendations regarding safe dietary consistencies, effective compensatory strategies, and safe eating environment.                   Recommendations/Treatment            Risks/Benefits Reviewed: (P) risks/benefits explained, patient, family, agreed to eval  Nasal Entry: (P) right:  Special Considerations: (P) Scope #[id number]    SLP Swallowing Diagnosis: (P) functional oral phase, moderate, pharyngeal dysfunction  Functional Impact: (P) risk of aspiration/pneumonia  Rehab Potential/Prognosis, Swallowing: (P) good, to achieve stated therapy goals  Swallow Criteria for Skilled Therapeutic Interventions Met: (P) demonstrates skilled criteria    Therapy Frequency (Swallow): (P) 5 days per week  Predicted Duration Therapy Intervention (Days): (P) until discharge  SLP Diet Recommendation: (P) mechanical soft with no mixed consistencies, honey thick liquids  Recommended Precautions and Strategies: (P) upright posture during/after eating, small bites of food and sips of liquid, no straw, alternate between small bites of food and sips of liquid, other (see comments) (oral care BID/PRN)  SLP Rec. for Method of Medication Administration: (P) meds whole, with  pudding or applesauce, as tolerated  Monitor for Signs of Aspiration: (P) yes, notify SLP if any concerns, cough, gurgly voice, throat clearing  Anticipated Dischage Disposition: (P) unknown, anticipate therapy at next level of care      Oral Preparation/ Oral Phase       Oral Phase: (P) WFL    Pharyngeal Phase       Initiation of Pharyngeal Swallow: (P) bolus in pyriform sinuses  Pharyngeal Phase: (P) impaired pharyngeal phase of swallowing  Penetration During the Swallow: (P) honey-thick liquids, secondary to delayed swallow initiation or mistiming, secondary to reduced laryngeal elevation, secondary to reduced vestibular closure  Aspiration During the Swallow: (P) thin liquids, nectar-thick liquids, secondary to delayed swallow initiation or mistiming, secondary to reduced laryngeal elevation, secondary to reduced vestibular closure  Response to Aspiration: (P) response with cue only, could not clear subglottic material  Rosenbek's Scale: (P) thin:, nectar:, 8-->Level 8, honey:, 3-->Level 3, pudding/puree:, regular textures:, 1-->Level 1  Residue: (P) all consistencies tested, base of tongue, valleculae, posterior pharyngeal wall, secondary to reduced base of tongue retraction, secondary to reduced posterior pharyngeal wall stripping, secondary to reduced laryngeal elevation (> w/ pudding and solid )  Response to Residue: (P) cleared residue, with liquid wash  Attempted Compensatory Maneuvers: (P) bolus size, bolus presentation style, chin tuck, additional subsequent swallow, alternate liquids/solids, breath hold, throat clear after swallow  Response to Attempted Compensatory Maneuvers: (P) did not prevent aspiration  FEES Summary: (P) FEES completed. Trials given of thin via tsp, cup, and straw, nectar-thick via tsp, cup, honey-thick via tsp and cup, pudding, and solid. Moderate pharyngeal dysphagia. Swallow initiation delayed to the pyriform sinuses w/ all consistencies. Trace aspiration during the swallow w/  thin and nectar-thick liquids 2' delay and decr'd elevation and vestibular closure. Aspiration was silent, pt was u/a to fully clear subglottic material w/ cued cough. Intermittent trace penetration above the level of the vocal folds w/ honey-thick liquids 2' to delay and decr'd elevation and vestibular closure. No penetration or aspiration observed w/ pudding or solid. Moderate diffuse pharyngeal residue w/ pudding and solid 2' decr'd base of tongue retraction, posterior pharyngeal wall contraction, and decr'd elevation. Pharyngeal residue decr'd w/ liquid wash. Compensatory strategies were attempted but unsuccessful in preventing aspiration. Recommend level 4 diet w/ honey-thick liquids, no straws, small bites and sips, and alternating bites and sips. Meds whole w/ puree as tolerated. Will plan to begin dysphagia tx.         Cervical Esophageal Phase              Prognosis                           Discharge Activity: As tolerated      Code Status/Level of Support:  Code Status and Medical Interventions:   Ordered at: 10/31/18 6015     Level Of Support Discussed With:    Patient     Code Status:    CPR     Medical Interventions (Level of Support Prior to Arrest):    Full       No future appointments.    Additional Instructions for the Follow-ups that You Need to Schedule     Obtain Informed Consent   Nov 09, 2018      Informed Consent Given For:  Dr. Tyler PENALOZA         CBC and Differential   Nov 14, 2018      Manual Differential:  No         Comprehensive metabolic panel   Nov 14, 2018      Magnesium   Nov 14, 2018            Time Spent on Discharge: 39 minutes    Electronically signed by Tricia Obrien II, DO, 11/16/18, 10:59 AM.      Electronically signed by Tricia Obrien II, DO at 11/16/2018 11:08 AM

## 2018-11-16 NOTE — THERAPY TREATMENT NOTE
Acute Care - Speech Language Pathology NICU/PEDS Progress Note  [facility]       Patient Name: Shauna Valencia  : 1955  MRN: [id number]  Today's Date: 2018                   Admit Date: 10/31/2018      Visit Dx:      ICD-10-CM ICD-9-CM   1. Acute hyponatremia E87.1 276.1   2. Dyspnea, unspecified type R06.00 786.09   3. Pharyngeal dysphagia R13.13 787.23   4. Hilar mass R91.8 786.6   5. Mass of upper lobe of left lung R91.8 786.6   6. Small cell lung cancer, left upper lobe (CMS/HCC) C34.12 162.3       Patient Active Problem List   Diagnosis   • Acute hyponatremia   • Hyperlipidemia   • Type 2 diabetes mellitus (CMS/HCC)   • Tobacco abuse   • Obesity (BMI 30-39.9)   • Mass of upper lobe of left lung   • Mediastinal lymphadenopathy   • Small cell lung cancer, left upper lobe (CMS/HCC)                Therapy Treatment  Rehabilitation Treatment Summary     Row Name 18 0945             Treatment Time/Intention    Discipline  speech language pathologist  -VO      Document Type  therapy note (daily note)  -VO      Subjective Information  no complaints  -VO      Mode of Treatment  speech-language pathology  -VO      Patient/Family Observations  Dtr present  -VO      Care Plan Review  care plan/treatment goals reviewed;risks/benefits reviewed;current/potential barriers reviewed;patient/other agree to care plan  -VO      Care Plan Review, Other Participant(s)  daughter  -VO      Therapy Frequency (Swallow)  5 days per week;PRN  -VO      Patient Effort  good  -VO      Recorded by [VO] Jordyn Marks MA,CCC-SLP 18 1047      Row Name 18 0945             Pain Scale: Numbers Pre/Post-Treatment    Pain Scale: Numbers, Pretreatment  0/10 - no pain  -VO      Pain Scale: Numbers, Post-Treatment  0/10 - no pain  -VO      Recorded by [VO] Jordyn Marks MA,CCC-SLP 18 1047      Row Name 18 0945             Outcome Summary/Treatment Plan (SLP)    Daily Summary of Progress (SLP)   progress toward functional goals as expected  -VO      Barriers to Overall Progress (SLP)  Previous functional deficit, resistant to diet modifications  -VO      Plan for Continued Treatment (SLP)  Patient now on regular diet, thin liquids per patient/MD agreement (comfort diet, though still full code). Patient understands risks for aspiration. Completed swallowing exercises during treatment. Is in agreement to continue ST upon d/c through home health w/ possible repeat OP MBS.  -VO      Anticipated Dischage Disposition  home with home health;anticipate therapy at next level of care  -VO      Recorded by [VO] Jordyn Marks MA,CCC-SLP 11/16/18 1047        User Key  (r) = Recorded By, (t) = Taken By, (c) = Cosigned By    Initials Name Effective Dates Discipline    VO Jordyn Marks MA,CCC-SLP 10/24/18 -  SLP          SLP GOALS     Row Name 11/16/18 0945             Oral Nutrition/Hydration Goal 1 (SLP)    Oral Nutrition/Hydration Goal 1, SLP  LTG: Pt will tolerate regular diet and thin liquids (comfort diet) w/o difficulty on 100% of opportunities.  -VO      Time Frame (Oral Nutrition/Hydration Goal 1, SLP)  by discharge  -VO      Barriers (Oral Nutrition/Hydration Goal 1, SLP)  Revised to reflect comfort diet  -VO      Progress/Outcomes (Oral Nutrition/Hydration Goal 1, SLP)  goal revised this date  -VO         Pharyngeal Strengthening Exercise Goal 1 (SLP)    Activity (Pharyngeal Strengthening Goal 1, SLP)  increase timing;increase superior movement of the hyolaryngeal complex;increase squeeze/positive pressure generation;increase tongue base retraction;increase closure at entrance to airway/closure of airway at glottis  -VO      Increase Timing  prepping - 3 second prep or suck swallow or 3-step swallow  -VO      Increase Superior Movement of the Hyolaryngeal Complex  Mendelsohn;falsetto  -VO      Increase Closure at Entrance to Airway/Closure of Airway at Glottis  super-supraglottic swallow  -VO       Increase Squeeze/Positive Pressure Generation  hard effortful swallow  -VO      Increase Tongue Base Retraction  sunny  -VO      [place]/Accuracy (Pharyngeal Strengthening Goal 1, SLP)  independently (over 90% accuracy)  -VO      Time Frame (Pharyngeal Strengthening Goal 1, SLP)  short term goal (STG);by discharge  -VO      Barriers (Pharyngeal Strengthening Goal 1, SLP)  Able to demonstrate exercises w/ mild cueing  -VO      Progress/Outcomes (Pharyngeal Strengthening Goal 1, SLP)  good progress toward goal  -VO         Swallow Compensatory Strategies Goal 1 (SLP)    Activity (Swallow Compensatory Strategies/Techniques Goal 1, SLP)  compensatory strategies;during meal intake;during p.o. trials;small cup sips;alternate food/liquid intake;other (see comments)  -VO      [place]/Accuracy (Swallow Compensatory Strategies/Techniques Goal 1, SLP)  independently (over 90% accuracy)  -VO      Time Frame (Swallow Compensatory Strategies/Techniques Goal 1, SLP)  short term goal (STG);by discharge  -VO      Barriers (Swallow Compensatory Strategies/Techniques Goal 1, SLP)  No cueing needed for small sips  -VO      Progress/Outcomes (Swallow Compensatory Strategies/Techniques Goal 1, SLP)  continuing progress toward goal  -VO        User Key  (r) = Recorded By, (t) = Taken By, (c) = Cosigned By    Initials Name Provider Type    VO Jordyn Marks MA,CCC-SLP Speech and Language Pathologist          EDUCATION  The patient has been educated in the following areas:   Dysphagia (Swallowing Impairment).      SLP Recommendation and Plan                                      Plan for Continued Treatment (SLP): Patient now on regular diet, thin liquids per patient/MD agreement (comfort diet, though still full code). Patient understands risks for aspiration. Completed swallowing exercises during treatment. Is in agreement to continue ST upon d/c through home health w/ possible repeat OP MBS.  Plan of Care Review  Plan of  Care Reviewed With: patient, daughter  Daily Summary of Progress (SLP): progress toward functional goals as expected  Plan of Care Reviewed With: patient, daughter             Time Calculation:   Time Calculation- SLP     Row Name 11/16/18 1050             Time Calculation- SLP    SLP Start Time  0945  -VO      SLP Received On  11/16/18  -VO        User Key  (r) = Recorded By, (t) = Taken By, (c) = Cosigned By    Initials Name Provider Type    VO Jordyn Marks MA,CCC-SLP Speech and Language Pathologist             Therapy Charges for Today     Code Description Service Date Service Provider Modifiers Qty    [id number]  ST TREATMENT SWALLOW 3 11/16/2018 Jordyn Marks MA,CCC-SLP GN 1                    Jordyn Marks MA,FREEDOM-SLP  11/16/2018

## 2018-11-16 NOTE — PLAN OF CARE
Problem: Patient Care Overview  Goal: Plan of Care Review  Outcome: Ongoing (interventions implemented as appropriate)   11/16/18 9115   Coping/Psychosocial   Plan of Care Reviewed With patient;daughter   SLP treatment completed. Will continue to address dysphagia through treatment. Patient is tolerating regular diet, thin liquids comfortably, despite some recent nausea per patient report. Understands risk of aspiration and is willing to continue exercises. Spoke w/ patient about OP MBS and speech therapy upon d/c w/ home health. Pt in agreement. Please see note for further details and recommendations.

## 2018-11-16 NOTE — DISCHARGE INSTR - APPOINTMENTS
You may call direct RX and speak with Neda at [phone] x [phone]. She is the contact for your [facility].

## 2018-11-16 NOTE — PROGRESS NOTES
Case Management Discharge Note    Final Note: I spoke with patient and her daughter Jade again this am. They expect the [employer] delivery sometime today and will have someone retrieve it off her porch. I will let home health know of expected discharge tomorrow. [facility] has brought over her PICC line supplies to take home. Home Health tele is [phone], [phone]    Destination      No service has been selected for the patient.      Durable Medical Equipment      No service has been selected for the patient.      Dialysis/Infusion - Selection Complete      Service Provider Request Status Selected Services Address Phone Number Fax Number    [facility] Selected Infusion and IV Therapy [address], [facility] [address] [phone]       Svitlana Shah RN 11/16/2018 1241    Have obtain PICC line supplies through this agency.                  Home Medical Care - Selection Complete      Service Provider Request Status Selected Services Address Phone Number Fax Number    [facility] Selected Home Health Services centralized phone line, [phone]       Svitlana Shah RN 11/16/2018 0739    Local phone for [facility] is [phone]. The nurse will need to see you within 48 hours of discharge.                  Community Resources      No service has been selected for the patient.             Final Discharge Disposition Code: 06 - home with home health care

## 2018-11-16 NOTE — PROGRESS NOTES
"   LOS: 16 days    Patient Care Team:  Rox Singleton MD as PCP - General (Internal Medicine)    Reason For Visit:  F/U HYPONATREMIA.  Subjective           Review of Systems:    Pulm: No soa   CV:  No CP      Objective       aspirin 81 mg Oral Daily   carvedilol 6.25 mg Oral BID With Meals   cetirizine 10 mg Oral Daily   dexamethasone 12 mg Intravenous Q24H   enoxaparin 40 mg Subcutaneous Q24H   etoposide (VEPESID) chemo IVPB 100 mg/m2 (Treatment Plan Recorded) Intravenous Once   famotidine 20 mg Oral BID   insulin lispro 0-7 Units Subcutaneous 4x Daily With Meals & Nightly   levothyroxine 75 mcg Oral Daily   nicotine 1 patch Transdermal Q24H   pantoprazole 40 mg Oral Q AM   polyethylene glycol 17 g Oral Daily   sodium chloride 250 mL Intravenous Once   tolvaptan 7.5 mg Oral Daily       lactated ringers 9 mL/hr Last Rate: 9 mL/hr (11/09/18 1016)         Vital Signs:  Blood pressure 131/70, pulse 76, temperature 97.5 °F (36.4 °C), temperature source Oral, resp. rate 18, height 154.9 cm (61\"), weight 89.8 kg (198 lb), SpO2 95 %.    Flowsheet Rows      First Filed Value   Admission Height  154.9 cm (61\") Documented at 10/31/2018 1424   Admission Weight  89.8 kg (198 lb) Documented at 10/31/2018 1424          11/15 0701 - 11/16 0700  In: 1920 [P.O.:1920]  Out: 1950 [Urine:1950]    Physical Exam:    General Appearance: NAD, alert and cooperative, Ox3  Eyes: PER, conjunctivae and sclerae normal, no icterus  Lungs: respirations regular and unlabored, no crepitus, clear to auscultation  Heart/CV: regular rhythm & normal rate, no murmur, no gallop, no rub and no edema  Abdomen: not distended, soft, non-tender, no masses,  bowel sounds present  Skin: No rash, Warm and dry    Radiology:            Labs:  Results from last 7 days   Lab Units  11/15/18   0605  11/12/18   0708  11/11/18   0622   WBC 10*3/mm3  6.41  10.57  9.16   HEMOGLOBIN g/dL  10.9*  10.7*  10.1*   HEMATOCRIT %  34.4*  33.0*  30.0*   PLATELETS 10*3/mm3  " 360  370  358     Results from last 7 days   Lab Units  11/16/18   0750  11/15/18   0605  11/14/18   0644  11/13/18   0849   11/12/18   0708   SODIUM mmol/L  133  133  134  134  136   < >  136   POTASSIUM mmol/L  4.2  4.6  4.0  4.0   --    --   4.2   CHLORIDE mmol/L  102  102  94*  94*   --    --   99   CO2 mmol/L  23.0  21.0  30.0  30.0   --    --   29.0   BUN mg/dL  20  13  17  17   --    --   15   CREATININE mg/dL  0.88  0.83  0.78  0.78   --    --   0.74   CALCIUM mg/dL  8.8  8.8  9.2  9.2   --    --   9.3   MAGNESIUM mg/dL   --    --   1.9   --    --    --    ALBUMIN g/dL   --    --   4.10   --    --    --     < > = values in this interval not displayed.     Results from last 7 days   Lab Units  11/16/18   0750   GLUCOSE mg/dL  270*       Results from last 7 days   Lab Units  11/14/18   0644   ALK PHOS U/L  59   BILIRUBIN mg/dL  0.3   ALT (SGPT) U/L  23   AST (SGOT) U/L  12                 Estimated Creatinine Clearance: 68.5 mL/min (by C-G formula based on SCr of 0.88 mg/dL).      Assessment       Acute hyponatremia    Hyperlipidemia    Type 2 diabetes mellitus (CMS/HCC)    Tobacco abuse    Obesity (BMI 30-39.9)    Mass of upper lobe of left lung    Mediastinal lymphadenopathy    Small cell lung cancer, left upper lobe (CMS/HCC)            Impression: HYPONATREMIA. SIADH. NA STABLE.            Recommendations: HOME OK WITH RENAL ON TOLVAPTAN. F/U ARRANGED.      Benito Donovan MD  11/16/18  12:44 PM

## 2018-11-17 VITALS
BODY MASS INDEX: 37.38 KG/M2 | WEIGHT: 198 LBS | SYSTOLIC BLOOD PRESSURE: 156 MMHG | RESPIRATION RATE: 20 BRPM | TEMPERATURE: 97.8 F | OXYGEN SATURATION: 95 % | HEART RATE: 78 BPM | DIASTOLIC BLOOD PRESSURE: 70 MMHG | HEIGHT: 61 IN

## 2018-11-17 LAB
GLUCOSE BLDC GLUCOMTR-MCNC: 294 MG/DL (ref 70–130)
GLUCOSE BLDC GLUCOMTR-MCNC: 308 MG/DL (ref 70–130)
GLUCOSE BLDC GLUCOMTR-MCNC: 361 MG/DL (ref 70–130)

## 2018-11-17 PROCEDURE — 25010000002 ONDANSETRON PER 1 MG: Performed by: HOSPITALIST

## 2018-11-17 PROCEDURE — 82962 GLUCOSE BLOOD TEST: CPT

## 2018-11-17 PROCEDURE — 63710000001 INSULIN LISPRO (HUMAN) PER 5 UNITS: Performed by: HOSPITALIST

## 2018-11-17 PROCEDURE — 99233 SBSQ HOSP IP/OBS HIGH 50: CPT | Performed by: INTERNAL MEDICINE

## 2018-11-17 PROCEDURE — 25010000002 ENOXAPARIN PER 10 MG: Performed by: INTERNAL MEDICINE

## 2018-11-17 PROCEDURE — 99232 SBSQ HOSP IP/OBS MODERATE 35: CPT | Performed by: INTERNAL MEDICINE

## 2018-11-17 RX ORDER — ONDANSETRON 4 MG/1
4 TABLET, FILM COATED ORAL EVERY 8 HOURS PRN
Qty: 30 TABLET | Refills: 0 | Status: SHIPPED | OUTPATIENT
Start: 2018-11-17 | End: 2019-01-01

## 2018-11-17 RX ORDER — TEMAZEPAM 7.5 MG/1
7.5 CAPSULE ORAL NIGHTLY PRN
Qty: 3 CAPSULE | Refills: 0 | Status: SHIPPED | OUTPATIENT
Start: 2018-11-17 | End: 2019-01-01

## 2018-11-17 RX ORDER — TEMAZEPAM 7.5 MG/1
7.5 CAPSULE ORAL NIGHTLY PRN
Qty: 3 CAPSULE | Refills: 0 | Status: SHIPPED | OUTPATIENT
Start: 2018-11-17 | End: 2018-11-17

## 2018-11-17 RX ADMIN — HYDROCODONE POLISTIREX AND CHLORPHENIRAMINE POLISTIREX 2.5 ML: 10; 8 SUSPENSION, EXTENDED RELEASE ORAL at 09:38

## 2018-11-17 RX ADMIN — PANTOPRAZOLE SODIUM 40 MG: 40 TABLET, DELAYED RELEASE ORAL at 07:33

## 2018-11-17 RX ADMIN — FAMOTIDINE 20 MG: 20 TABLET ORAL at 08:20

## 2018-11-17 RX ADMIN — INSULIN LISPRO 4 UNITS: 100 INJECTION, SOLUTION INTRAVENOUS; SUBCUTANEOUS at 11:57

## 2018-11-17 RX ADMIN — ASPIRIN 81 MG: 81 TABLET, COATED ORAL at 08:21

## 2018-11-17 RX ADMIN — ONDANSETRON HYDROCHLORIDE 4 MG: 2 INJECTION, SOLUTION INTRAMUSCULAR; INTRAVENOUS at 08:39

## 2018-11-17 RX ADMIN — POLYETHYLENE GLYCOL (3350) 17 G: 17 POWDER, FOR SOLUTION ORAL at 08:21

## 2018-11-17 RX ADMIN — CARVEDILOL 6.25 MG: 6.25 TABLET, FILM COATED ORAL at 08:20

## 2018-11-17 RX ADMIN — NICOTINE 1 PATCH: 21 PATCH, EXTENDED RELEASE TRANSDERMAL at 08:19

## 2018-11-17 RX ADMIN — TOLVAPTAN 7.5 MG: 15 TABLET ORAL at 08:21

## 2018-11-17 RX ADMIN — INSULIN LISPRO 5 UNITS: 100 INJECTION, SOLUTION INTRAVENOUS; SUBCUTANEOUS at 08:17

## 2018-11-17 RX ADMIN — ENOXAPARIN SODIUM 40 MG: 40 INJECTION SUBCUTANEOUS at 07:33

## 2018-11-17 RX ADMIN — CETIRIZINE HYDROCHLORIDE 10 MG: 10 TABLET, FILM COATED ORAL at 08:21

## 2018-11-17 RX ADMIN — LEVOTHYROXINE SODIUM 75 MCG: 75 TABLET ORAL at 07:00

## 2018-11-17 NOTE — PROGRESS NOTES
DATE OF VISIT: 11/17/2018    Chief Complaint: Followup for limited stage small cell lung cancer     SUBJECTIVE: The patient was able to tolerate cycle #1 without any immediate complications.  She  denied any fever or  chills, no night sweats, denied any headaches    REVIEW OF SYSTEMS: All the other 9 systems are reviewed by me and negative  except what is mentioned in HPI.     PAST MEDICAL HISTORY/SOCIAL HISTORY/FAMILY HISTORY: Unchanged from my prior documentation done on November 14, 2018      Current Facility-Administered Medications:   •  acetaminophen (TYLENOL) tablet 650 mg, 650 mg, Oral, Q6H PRN, Maynor Ansari MD, 650 mg at 11/09/18 1625  •  aspirin EC tablet 81 mg, 81 mg, Oral, Daily, Mickey Warner MD, 81 mg at 11/17/18 0821  •  benzonatate (TESSALON) capsule 200 mg, 200 mg, Oral, TID PRN, Malia Solis MD, 200 mg at 11/06/18 2246  •  bisacodyl (DULCOLAX) suppository 10 mg, 10 mg, Rectal, Daily PRN, Malia Solis MD, 10 mg at 11/08/18 1759  •  carvedilol (COREG) tablet 6.25 mg, 6.25 mg, Oral, BID With Meals, Mickey Warner MD, 6.25 mg at 11/17/18 0820  •  cetirizine (zyrTEC) tablet 10 mg, 10 mg, Oral, Daily, Mickey Warner MD, 10 mg at 11/17/18 0821  •  dextrose (D50W) 25 g/ 50mL Intravenous Solution 25 g, 25 g, Intravenous, Q15 Min PRN, Mickey Warner MD  •  dextrose (GLUTOSE) oral gel 15 g, 15 g, Oral, Q15 Min PRN, Mickey Warner MD  •  enoxaparin (LOVENOX) syringe 40 mg, 40 mg, Subcutaneous, Q24H, Isaac Epstein MD, 40 mg at 11/17/18 0733  •  famotidine (PEPCID) tablet 20 mg, 20 mg, Oral, BID, Malia Solis MD, 20 mg at 11/17/18 0820  •  glucagon (human recombinant) (GLUCAGEN DIAGNOSTIC) injection 1 mg, 1 mg, Subcutaneous, PRN, Mickey Warner MD  •  HYDROcod Polst-CPM Polst ER (TUSSIONEX PENNKINETIC) 10-8 MG/5ML ER suspension 2.5 mL, 2.5 mL, Oral, Q12H PRN, Pam Abrams DO, 2.5 mL at 11/17/18 0938  •   HYDROcodone-acetaminophen (NORCO) 5-325 MG per tablet 1 tablet, 1 tablet, Oral, Q4H PRN, [place], [place], APRN, 1 tablet at 11/16/18 0914  •  insulin lispro (humaLOG) injection 0-7 Units, 0-7 Units, Subcutaneous, 4x Daily With Meals & Nightly, Mickey Warner MD, 4 Units at 11/17/18 1157  •  lactated ringers infusion, 9 mL/hr, Intravenous, Continuous, Sanjay Rose MD, Last Rate: 9 mL/hr at 11/09/18 1016  •  levothyroxine (SYNTHROID, LEVOTHROID) tablet 75 mcg, 75 mcg, Oral, Daily, Mickey Warner MD, 75 mcg at 11/17/18 0700  •  LORazepam (ATIVAN) tablet 1 mg, 1 mg, Oral, Daily PRN, Malia Solis MD, 1 mg at 11/16/18 0918  •  Magnesium Sulfate 2 gram Bolus, followed by 8 gram infusion (total Mg dose 10 grams)- Mg less than or equal to 1mg/dL, 2 g, Intravenous, PRN **OR** Magnesium Sulfate 2 gram / 50mL Infusion (GIVE X 3 BAGS TO EQUAL 6GM TOTAL DOSE) - Mg 1.1 - 1.5 mg/dl, 2 g, Intravenous, PRN, Last Rate: 25 mL/hr at 11/06/18 1841, 2 g at 11/06/18 1841 **OR** Magnesium Sulfate 4 gram infusion- Mg 1.6-1.9 mg/dL, 4 g, Intravenous, PRN, Maynor Ansari MD  •  melatonin sublingual tablet 5 mg, 5 mg, Sublingual, Nightly PRN, Malia Solis MD, 5 mg at 11/12/18 0214  •  nicotine (NICODERM CQ) 21 MG/24HR patch 1 patch, 1 patch, Transdermal, Q24H, Maynor Ansari MD, 1 patch at 11/17/18 0819  •  ondansetron (ZOFRAN) injection 4 mg, 4 mg, Intravenous, Q6H PRN, Maynor Ansari MD, 4 mg at 11/17/18 0839  •  pantoprazole (PROTONIX) EC tablet 40 mg, 40 mg, Oral, Q AM, Maynor Ansari MD, 40 mg at 11/17/18 0733  •  phenol (CHLORASEPTIC) 1.4 % liquid 2 spray, 2 spray, Mouth/Throat, Q2H PRN, Pam Abrams DO  •  polyethylene glycol 3350 powder (packet), 17 g, Oral, Daily, Malia Solis MD, 17 g at 11/17/18 0821  •  sodium chloride 0.9 % flush 10 mL, 10 mL, Intravenous, PRN, Isaac Epstein MD  •  temazepam (RESTORIL) capsule 7.5 mg, 7.5 mg, Oral, Nightly PRN, Pam Abrams  "L, DO, 7.5 mg at 11/16/18 3490  •  tolvaptan (SAMSCA) tablet 7.5 mg, 7.5 mg, Oral, Daily, Horacio Caruso MD, 7.5 mg at 11/17/18 0821    PHYSICAL EXAMINATION:   /70   Pulse 78   Temp 97.8 °F (36.6 °C) (Oral)   Resp 20   Ht 154.9 cm (61\")   Wt 89.8 kg (198 lb)   SpO2 95%   BMI 37.41 kg/m²    ECOG Performance Status: 1 - Symptomatic but completely ambulatory  GENERAL: Age appropriate. No acute distress.   NEURO/PSYCH: A&O x 3, strength 5/5 in all muscle groups  HEENT: Head atraumatic, normocephalic.   NECK: Supple. No JVD. No lymphadenopathy.   LUNGS: Clear to auscultation bilaterally. No wheezing. No rhonchi.   HEART: Regular rate and rhythm. S1, S2, no murmurs.   ABDOMEN: Soft, nontender, nondistended. Bowel sounds positive. No  hepatosplenomegaly.   EXTREMITIES: No clubbing, cyanosis, or edema.   SKIN: No rashes. No purpura.       Admission on 10/31/2018   No results displayed because visit has over 200 results.          No results found.    ASSESSMENT: The patient is a very pleasant 63 y.o. female  with limited stage small cell lung cancer    Problems list:  1.  Left upper lobe small cell lung cancer [id number] stage IIIa disease:  A.  Presented with shortness of breath.  B.  Status post bronchoscopy with a biopsy done on November 9, 2018 revealed small cell lung cancer   C. Started chemotherapy with cisplatin and etoposide November 15, 2018  2.  Hyponatremia  3.  Normocytic anemia   4.  Insomnia  5.  Chemotherapy-induced nausea    PLAN:  1.  I did go over the scan results with the patient and her family including her daughter and her friend at the bedside.  I expect her that her MRI brain was negative, CAT scan abdomen and pelvis as well as bone scan did not reveal any evidence of metastatic disease.  2.  The patient completes cycle #1.  She can be discharged home at this point.  She will follow-up with me this was cycle #2.  3.  The patient would be referred to radiation oncology as an " outpatient for concurrent radiation to be included between cycle #2 and #3 of chemotherapy.  4.  I gave patient 2 prescriptions for Zofran as well as Decadron.  Those were sent to her pharmacy.  For chemotherapy induced nausea.  5.  She will continue Restoril as needed for insomnia.  6. We reviewed the potential side effects of this regimen including fatigue, vomiting and nausea, hair loss, nephropathy, neuropathy, hearing loss, myelosuppression, and risk of infusion reaction.      Desmond Cardoso MD  11/17/2018

## 2018-11-17 NOTE — PROGRESS NOTES
[facility] Medicine Services  PROGRESS NOTE    Patient Name: Shauna Valencia  : 1955  MRN: [id number]    Date of Admission: 10/31/2018  Length of Stay: 17  Primary Care Physician: Rox Singleton MD    Subjective   Subjective     CC: f/u SCLC    HPI: Patient elected to cancel her discharge due to the late completion of chemotherapy and experiencing nausea with chemo. This morning she was apparently cursing out staff due to not receiving her wheatley this morning. Also became very angry at me when I told her I would not provide her a one month prescription for Restoril. Desperately wants to leave.    Review of Systems  Gen- No fevers, chills  CV- No chest pain, palpitations  Resp- No cough, dyspnea  GI- No N/V/D, abd pain    Otherwise ROS is negative except as mentioned in the HPI.    Objective   Objective     Vital Signs:   Temp:  [97.5 °F (36.4 °C)-98.5 °F (36.9 °C)] 97.8 °F (36.6 °C)  Heart Rate:  [76-92] 78  Resp:  [18-20] 20  BP: (131-170)/(69-76) 156/70        Physical Exam:  Constitutional: No acute distress, awake, alert  HENT: NCAT, mucous membranes moist  Respiratory: Clear to auscultation bilaterally, respiratory effort normal   Cardiovascular: RRR, no murmurs, rubs, or gallops, palpable pedal pulses bilaterally  Gastrointestinal: Positive bowel sounds, soft, nontender, nondistended  Musculoskeletal: No bilateral ankle edema  Psychiatric: Agitated  Neurologic: Oriented x 3, strength symmetric in all extremities, Cranial Nerves grossly intact to confrontation, speech clear  Skin: No rashes    Results Reviewed:  I have personally reviewed current lab, radiology, and data and agree.    Results from last 7 days   Lab Units  11/15/18   0605  18   0708  18   0622   WBC 10*3/mm3  6.41  10.57  9.16   HEMOGLOBIN g/dL  10.9*  10.7*  10.1*   HEMATOCRIT %  34.4*  33.0*  30.0*   PLATELETS 10*3/mm3  360  370  358     Results from last 7 days   Lab Units  18   0750   11/15/18   0605  11/14/18   0644   SODIUM mmol/L  133  133  134  134   POTASSIUM mmol/L  4.2  4.6  4.0  4.0   CHLORIDE mmol/L  102  102  94*  94*   CO2 mmol/L  23.0  21.0  30.0  30.0   BUN mg/dL  20  13  17  17   CREATININE mg/dL  0.88  0.83  0.78  0.78   GLUCOSE mg/dL  270*  273*  145*  145*   CALCIUM mg/dL  8.8  8.8  9.2  9.2   ALT (SGPT) U/L   --    --   23   AST (SGOT) U/L   --    --   12     Estimated Creatinine Clearance: 68.5 mL/min (by C-G formula based on SCr of 0.88 mg/dL).  No results found for: BNP    Microbiology Results Abnormal     Procedure Component Value - Date/Time    AFB Culture - Wash, Bronchus [[id number]] Collected:  11/09/18 1147    Lab Status:  Preliminary result Specimen:  Wash from Bronchus Updated:  11/16/18 1300     AFB Culture No AFB isolated at 1 week     AFB Stain Rare (1+) Epithelial cells per low power field      Few (2+) WBCs per low power field      No organisms seen      No acid fast bacilli seen on concentrated smear    Fungus Culture - Wash, Lung, Left Upper Lobe [[id number]] Collected:  11/06/18 1225    Lab Status:  Preliminary result Specimen:  Wash from Lung, Left Upper Lobe Updated:  11/13/18 1302     Fungus Culture No fungus isolated at 1 week    AFB Culture - Wash, Lung, Left Upper Lobe [[id number]] Collected:  11/06/18 1225    Lab Status:  Preliminary result Specimen:  Wash from Lung, Left Upper Lobe Updated:  11/13/18 1302     AFB Culture No AFB isolated at 1 week     AFB Stain No acid fast bacilli seen on concentrated smear    Fungus Smear - Wash, Bronchus [[id number]] Collected:  11/09/18 1147    Lab Status:  Final result Specimen:  Wash from Bronchus Updated:  11/12/18 1335     Fungal Stain No fungal elements seen      No Pneumocystis jirovecci (formerly Pneumocystis carinii) noted on smear.    Fungus Smear - Wash, Lung, Left Upper Lobe [[id number]] Collected:  11/06/18 1225    Lab Status:  Final result Specimen:  Wash from Lung, Left Upper Lobe Updated:  11/12/18  1301     Fungal Stain No fungal elements seen      No Pneumocystis jirovecci (formerly Pneumocystis carinii) noted on smear.    Respiratory Culture - Wash, Bronchus [[id number]] Collected:  11/09/18 1147    Lab Status:  Final result Specimen:  Wash from Bronchus Updated:  11/11/18 0702     Respiratory Culture Light growth (2+) Normal Respiratory Shavon     Gram Stain Few (2+) WBCs per low power field      Occasional Epithelial cells per low power field      Few (2+) Gram positive cocci in pairs and clusters    Respiratory Culture - Wash, Lung, Left Upper Lobe [[id number]] Collected:  11/06/18 1225    Lab Status:  Final result Specimen:  Wash from Lung, Left Upper Lobe Updated:  11/08/18 0732     Respiratory Culture Scant growth (1+) Normal Respiratory Shavon     Gram Stain No WBCs or organisms seen          Imaging Results (last 24 hours)     ** No results found for the last 24 hours. **             I have reviewed the medications.      aspirin 81 mg Oral Daily   carvedilol 6.25 mg Oral BID With Meals   cetirizine 10 mg Oral Daily   enoxaparin 40 mg Subcutaneous Q24H   famotidine 20 mg Oral BID   insulin lispro 0-7 Units Subcutaneous 4x Daily With Meals & Nightly   levothyroxine 75 mcg Oral Daily   nicotine 1 patch Transdermal Q24H   pantoprazole 40 mg Oral Q AM   polyethylene glycol 17 g Oral Daily   tolvaptan 7.5 mg Oral Daily         Assessment/Plan   Assessment / Plan     Active Hospital Problems    Diagnosis   • Small cell lung cancer, left upper lobe (CMS/HCC)   • Mass of upper lobe of left lung   • Mediastinal lymphadenopathy   • Acute hyponatremia   • Hyperlipidemia   • Type 2 diabetes mellitus (CMS/HCC)   • Tobacco abuse   • Obesity (BMI 30-39.9)       Brief Hospital Course to date:  Shauna Valencia is a 63 y.o. female here with hyponatremia, likely SIADH, and found to have a lung mass with bx and imaging showing limited stage Small Cell Lung Ca. Started on chemotherapy per Dr. Briones 11/14.        Assessment &  Plan:  Limited Stage Small Cell Lung Carcinoma   --s/p bronchoscopy 11/6, path negative malignant cells.  Repeat EBUS 11/9 with Dr. Scott, pathology pos for Small Cell Lung Carcinoma, cytology pos for Small Cell Carcinoma.  --Tolerated her chemo rather well. Home today with f/u with [facility] and Rad-onc.  --Zofran PO for d/c.     Hx of RCA stent, chart reviewed, 7/2017  --Resume asa and brillenta at d/c.     Hyponatremia  --probable SIADH due to lung mass.  --NAL following, continuing Tolvaptan. Will f/u as outpatient 1/21.     DM2, uncontrolled  --Hb A1C 7.3%. Resume home Januvia. F/U pcp to ensure glucose returns to normal after completing chemo.     Dysphagia  --SLP following, recommended honey thick diet- pt pretty resistant to modified diets in the past.  Pt wishes for comfort diet for now as pt unable to tolerate honey thick     R LE pain  --checked duplex, superficial clot only  --has hx of sciatica with R leg pain     Tobacco abuse     Obesity     HL  --resume repatha at d/c.     Anxiety   Insomnia  --3 day supply of restoril supplied.    Home today.     CODE STATUS:   Code Status and Medical Interventions:   Ordered at: 10/31/18 1786     Level Of Support Discussed With:    Patient     Code Status:    CPR     Medical Interventions (Level of Support Prior to Arrest):    Full       Electronically signed by Tricia Obrien II, DO, 11/17/18, 9:54 AM.

## 2018-11-17 NOTE — PLAN OF CARE
"Problem: Patient Care Overview  Goal: Plan of Care Review  Outcome: Ongoing (interventions implemented as appropriate)   11/17/18 2016   Coping/Psychosocial   Plan of Care Reviewed With patient   OTHER   Outcome Summary pt cursing and agitated because she did not get wheatley on her breakfast tray, states that the dietary worker does not like her and that she purposefully does not bring her wheatley, dietary order for wheatley is noted on the chart , told pt i would let my charge nurse know and call dietary to which her daughter became agitated and said she was going to get her some wheatley from somewhere else, pt states the dietary worker can \"shove this tray\", states \"i will be so glad to get out of here\"         "

## 2018-11-17 NOTE — PLAN OF CARE
Problem: Patient Care Overview  Goal: Plan of Care Review  Outcome: Ongoing (interventions implemented as appropriate)   11/17/18 9734   Coping/Psychosocial   Plan of Care Reviewed With patient   Plan of Care Review   Progress improving   OTHER   Outcome Summary nad, VSS, no acute events this shift, slept well, BG was elevated r/t recent steroid use, insulin given and provider notified, BG decreased after insulin, pt had many visitors, zofran given for nausea, declined pain meds, prn meds for sleep affective, will monitor

## 2018-11-18 ENCOUNTER — READMISSION MANAGEMENT (OUTPATIENT)
Dept: CALL CENTER | Facility: HOSPITAL | Age: 63
End: 2018-11-18

## 2018-11-18 NOTE — OUTREACH NOTE
Prep Survey      Responses   Facility patient discharged from?  [facility]   Is patient eligible?  Yes   Discharge diagnosis  Small cell lung cancer, left upper lobe   (chemotherapy and radiation to start)   Does the patient have one of the following disease processes/diagnoses(primary or secondary)?  Other   Does the patient have Home health ordered?  Yes   What is the Home health agency?   [facility]-PICC line dressing changes weekly and flushes through [facility]   Is there a DME ordered?  No   Prep survey completed?  Yes          Tasha Shah RN

## 2018-11-19 ENCOUNTER — READMISSION MANAGEMENT (OUTPATIENT)
Dept: CALL CENTER | Facility: HOSPITAL | Age: 63
End: 2018-11-19

## 2018-11-19 ENCOUNTER — TELEPHONE (OUTPATIENT)
Dept: ONCOLOGY | Facility: CLINIC | Age: 63
End: 2018-11-19

## 2018-11-19 RX ORDER — DIPHENHYDRAMINE, LIDOCAINE, NYSTATIN
KIT ORAL
Qty: 240 ML | Refills: 3 | Status: SHIPPED | OUTPATIENT
Start: 2018-11-19 | End: 2019-01-01

## 2018-11-19 NOTE — TELEPHONE ENCOUNTER
Given appt information and directions on decadron use with treatments.  mmw sent to the pharmacy for pt to use for redness and sore mouth

## 2018-11-19 NOTE — TELEPHONE ENCOUNTER
----- Message from Emilie Castro sent at 11/19/2018  4:30 PM EST -----  Regarding: BADIN - QUESTIONS   Contact: [phone]  JAQUELINE WITH [facility] WANTS TO VERIFY THAT SHE IS TO  CONTINUE DEXAMETHASONE, AND ALSO TH PATIENTS MOUTH HURTS. ALSO DOES SHE NEED AN APPOINTMENT ON FRIDAY

## 2018-11-19 NOTE — DISCHARGE PLACEMENT REQUEST
"Shauna Vera (63 y.o. Female)     Date of Birth Social Security Number Address Home Phone MRN    1955  [address]  [facility] [address] [phone] [id number]    Church Marital Status          Unknown        Admission Date Admission Type Admitting Provider Attending Provider Department, Room/Bed    10/31/18 Emergency Tricia Obrien II, [facility] 5B, N530/1    Discharge Date Discharge Disposition Discharge Destination        11/17/2018 Home or Self Care              Attending Provider:  (none)   Allergies:  Plavix [Clopidogrel Bisulfate], Codeine, Penicillins    Isolation:  None   Infection:  None   Code Status:  Prior    Ht:  154.9 cm (61\")   Wt:  89.8 kg (198 lb)    Admission Cmt:  None   Principal Problem:  None                Active Insurance as of 10/31/2018     Primary Coverage     Payor Plan Insurance Group Employer/Plan Group    MEDICARE MEDICARE A & B      Payor Plan Address Payor Plan Phone Number Payor Plan Fax Number Effective Dates    [address] [phone]  11/1/2008 - None Entered    [facility] [id number]       Subscriber Name Subscriber Birth Date Member ID       SHAUNA VERA 1955 [id number]           Secondary Coverage     Payor Plan Insurance Group Employer/Plan Group    [place]      Pay[place] Address Payor Plan Phone Number Payor Plan Fax Number Effective Dates    [address] [address]   2/1/2014 - None Entered    [place] [address]       Subscriber Name Subscriber Birth Date Member ID       SHAUNA VERA ESTHER 1955 [id number]                 Emergency Contacts      (Rel.) Home Phone Work Phone Mobile Phone    Quarter,Jade (Daughter) [phone] -- --    DesireReyna maguire (Sister) [phone] -- --            Insurance Information                MEDICARE/MEDICARE A & B Phone: [phone]    Subscriber: Shauna Vera Subscriber#: [id number]    Group#:  Precert#:         [place] " [place] Phone:     Subscriber: Shauna Valencia Subscriber#: [id number]    Group#:  Precert#:                Discharge Summary      Tricia Obrien VIRGIL, DO at 2018  7:28 AM              [facility] Medicine Services  DISCHARGE SUMMARY    Patient Name: Shauna Valencia  : 1955  MRN: [id number]    Date of Admission: 10/31/2018  Date of Discharge:  2018  Primary Care Physician: Rox Singleton MD    Consults     Date and Time Order Name Status Description    2018 0827 Inpatient Hematology & Oncology Consult Completed     2018 0937 Inpatient Nephrology Consult Completed     10/31/2018 2233 Inpatient Pulmonology Consult Completed         Hospital Course     Presenting Problem:   Acute hyponatremia [E87.1]    Active Hospital Problems    Diagnosis Date Noted   • Small cell lung cancer, left upper lobe (CMS/HCC) [C34.12] 2018   • Mass of upper lobe of left lung [R91.8] 2018   • Mediastinal lymphadenopathy [R59.0] 2018   • Acute hyponatremia [E87.1] 10/31/2018   • Hyperlipidemia [E78.5] 10/31/2018   • Type 2 diabetes mellitus (CMS/HCC) [E11.9] 10/31/2018   • Tobacco abuse [Z72.0] 10/31/2018   • Obesity (BMI 30-39.9) [E66.9] 10/31/2018      Resolved Hospital Problems   No resolved problems to display.          Hospital Course:  Shauna Valencia is a 63 y.o. 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    Limited stage small cell lung cancer: 64 y/o female admitted with above. During evaluation for her hyponatremia she was found to have left lung mass which after diagnostic eval returned positive for small cell lung cancer. MRI brain, CT a/p, and bone scan showed no presence of further disease. She was seen by oncology and started on cisplatin and etoposide as inpatient which she tolerated well. She will follow with Dr. Cardoso upon d/c  and will continue with cycle 2 of chemotherapy and radiation to start at that time as well to be arranged by Dr. Cardoso. I discussed the case with Dr. Briones prior to patient's discharge.    Hyponatremia due to SIADH from above: Up arrival, her sodium was noted to be 117. She was seen by nephrology who determined etiology was likely SIADH due to above. She was ultimately treated with Samsca to which she responded to appropriately. She will continue Samsca at discharge as directed by nephrology and will f/u with [facility] in [place] on 1/21/19.       Discharge Follow Up Recommendations for labs/diagnostics:   PCP in 1 week, Recommend bmp, Re: hyponatremia   Dr. Cardoso in 1-2 weeks   [facility] in [place] clinic on 1/21/19    Day of Discharge     HPI: Lying in bed with daughter sleeping at bedside. Patient upset that she didn't receive her Restoril quickly enough last night. Still wants to go home this pm after chemo.    Review of Systems  Gen- No fevers, chills  CV- No chest pain, palpitations  Resp- No cough, dyspnea  GI- No N/V/D, abd pain    Otherwise ROS is negative except as mentioned in the HPI.    Vital Signs:   Temp:  [97.5 °F (36.4 °C)-98.5 °F (36.9 °C)] 97.8 °F (36.6 °C)  Heart Rate:  [76-92] 78  Resp:  [18-20] 20  BP: (131-170)/(69-76) 156/70     Physical Exam:  Constitutional: No acute distress, awake, alert, appears comfortable  HENT: NCAT, mucous membranes moist  Respiratory: Clear to auscultation bilaterally, respiratory effort normal   Cardiovascular: RRR, no murmurs, rubs, or gallops, palpable pedal pulses bilaterally  Gastrointestinal: Positive bowel sounds, soft, nontender, nondistended  Musculoskeletal: No bilateral ankle edema  Psychiatric: Appropriate affect, cooperative  Neurologic: Oriented x 3, strength symmetric in all extremities, Cranial Nerves grossly intact to confrontation, speech clear  Skin: No rashes    Pertinent  and/or Most Recent Results     Results from last 7 days   Lab Units  11/16/18   4550   11/15/18   0605  11/14/18   0644  11/13/18   0849  11/13/18   0626  11/13/18   0318  11/12/18   2340   11/12/18   0708   11/11/18   1339   11/11/18   0622   11/10/18   2344   WBC 10*3/mm3   --   6.41   --    --    --    --    --    --   10.57   --    --    --   9.16   --    --    HEMOGLOBIN g/dL   --   10.9*   --    --    --    --    --    --   10.7*   --    --    --   10.1*   --    --    HEMATOCRIT %   --   34.4*   --    --    --    --    --    --   33.0*   --    --    --   30.0*   --    --    PLATELETS 10*3/mm3   --   360   --    --    --    --    --    --   370   --    --    --   358   --    --    SODIUM mmol/L  133  133  134  134  136  138  137  139   < >  136   < >  132   < >  134  134   < >  133   POTASSIUM mmol/L  4.2  4.6  4.0  4.0   --    --    --    --    --   4.2   --   3.9   --   4.0   --   3.7   CHLORIDE mmol/L  102  102  94*  94*   --    --    --    --    --   99   --   97*   --   97*   --   98*   CO2 mmol/L  23.0  21.0  30.0  30.0   --    --    --    --    --   29.0   --   27.0   --   28.0   --   28.0   BUN mg/dL  20  13  17  17   --    --    --    --    --   15   --   16   --   17   --   22   CREATININE mg/dL  0.88  0.83  0.78  0.78   --    --    --    --    --   0.74   --   0.86   --   0.68   --   0.85   GLUCOSE mg/dL  270*  273*  145*  145*   --    --    --    --    --   102*   --   98   --   89   --   190*   CALCIUM mg/dL  8.8  8.8  9.2  9.2   --    --    --    --    --   9.3   --   9.5   --   8.8   --   8.9    < > = values in this interval not displayed.     Results from last 7 days   Lab Units  11/14/18   0644   BILIRUBIN mg/dL  0.3   ALK PHOS U/L  59   ALT (SGPT) U/L  23   AST (SGOT) U/L  12           Invalid input(s): TG, LDLCALC, LDLREALC  Results from last 7 days   Lab Units  11/14/18   0644   CORTISOL mcg/dL  8.70     Brief Urine Lab Results  (Last result in the past 365 days)      Color   Clarity   Blood   Leuk Est   Nitrite   Protein   CREAT   Urine HCG        10/31/18 1729 Dark  Yellow Clear Negative Negative Negative Negative               Microbiology Results Abnormal     Procedure Component Value - Date/Time    AFB Culture - Wash, Bronchus [[id number]] Collected:  11/09/18 1147    Lab Status:  Preliminary result Specimen:  Wash from Bronchus Updated:  11/16/18 1300     AFB Culture No AFB isolated at 1 week     AFB Stain Rare (1+) Epithelial cells per low power field      Few (2+) WBCs per low power field      No organisms seen      No acid fast bacilli seen on concentrated smear    Fungus Culture - Wash, Lung, Left Upper Lobe [[id number]] Collected:  11/06/18 1225    Lab Status:  Preliminary result Specimen:  Wash from Lung, Left Upper Lobe Updated:  11/13/18 1302     Fungus Culture No fungus isolated at 1 week    AFB Culture - Wash, Lung, Left Upper Lobe [[id number]] Collected:  11/06/18 1225    Lab Status:  Preliminary result Specimen:  Wash from Lung, Left Upper Lobe Updated:  11/13/18 1302     AFB Culture No AFB isolated at 1 week     AFB Stain No acid fast bacilli seen on concentrated smear    Fungus Smear - Wash, Bronchus [[id number]] Collected:  11/09/18 1147    Lab Status:  Final result Specimen:  Wash from Bronchus Updated:  11/12/18 1335     Fungal Stain No fungal elements seen      No Pneumocystis jirovecci (formerly Pneumocystis carinii) noted on smear.    Fungus Smear - Wash, Lung, Left Upper Lobe [[id number]] Collected:  11/06/18 1225    Lab Status:  Final result Specimen:  Wash from Lung, Left Upper Lobe Updated:  11/12/18 1301     Fungal Stain No fungal elements seen      No Pneumocystis jirovecci (formerly Pneumocystis carinii) noted on smear.    Respiratory Culture - Wash, Bronchus [[id number]] Collected:  11/09/18 1147    Lab Status:  Final result Specimen:  Wash from Bronchus Updated:  11/11/18 0702     Respiratory Culture Light growth (2+) Normal Respiratory Shavon     Gram Stain Few (2+) WBCs per low power field      Occasional Epithelial cells per low power field      Few  (2+) Gram positive cocci in pairs and clusters    Respiratory Culture - Wash, Lung, Left Upper Lobe [[id number]] Collected:  11/06/18 1225    Lab Status:  Final result Specimen:  Wash from Lung, Left Upper Lobe Updated:  11/08/18 0732     Respiratory Culture Scant growth (1+) Normal Respiratory Shavon     Gram Stain No WBCs or organisms seen          Imaging Results (all)     Procedure Component Value Units Date/Time    NM Bone Scan Whole Body [[id number]] Collected:  11/14/18 1803     Updated:  11/15/18 0826    Narrative:       EXAMINATION: NM BONE SCAN WHOLE-BODY - 11/14/2018     INDICATION: E87.3-Oepb-pweslakihy and hyponatremia; R06.00-Dyspnea,  unspecified; R13.13-Dysphagia, pharyngeal phase; R91.8-Other nonspecific  abnormal finding of lung field; C34.12-Malignant neoplasm of upper lobe,  left bronchus or lung.     TECHNIQUE: Patient was injected with 27.5 mCi Technetium MDP and scanned  after a 5-hour delay.     COMPARISON: None.     FINDINGS: There are arthritic changes in the shoulders, cervical spine,  knees and ankles. There is absent tracer in the right femoral head  related to the right hip prosthesis. There is no focal or multifocal  pattern to suggest metastatic disease.        Impression:       Arthritic and postoperative changes described above. There  is no evidence of metastatic disease.     DICTATED:   11/14/2018  EDITED/ls :   11/14/2018          This report was finalized on 11/15/2018 8:24 AM by Dr. Julián Barrios MD.       CT Abdomen Pelvis With Contrast [[id number]] Collected:  11/14/18 0925     Updated:  11/14/18 1011    Narrative:       EXAMINATION: CT ABDOMEN PELVIS W CONTRAST- 11/13/2018     INDICATION: small cell lung cancer; E87.7-Lxll-zxkjhxijsf and  hyponatremia; R06.00-Dyspnea, unspecified; R13.13-Dysphagia, pharyngeal  phase; R91.8-Other nonspecific abnormal finding of lung field;  R91.8-Other nonspecific abnormal finding of lung field; C34.12-Malignant  neoplasm of upper lobe, left  bronchus or lung     TECHNIQUE:  Axial CT data of the abdomen and pelvis were acquired  helically following IV contrast administration. Multiplanar reformatted  images were generated and reviewed. The radiation dose reduction device  was turned on for each scan per the ALARA (As Low as Reasonably  Achievable) protocol     COMPARISONS:  03/28/2015     FINDINGS:       Calcified granuloma noted incidentally in the left lung base. There is a  cyst in the left kidney. The liver is without focal lesion. A calcified  granuloma is seen in the spleen, further evidence of healed  granulomatous disease. Gallbladder is likely surgically absent. The  adrenal glands and pancreas are normal. Right kidney is normal. No  dilated small or large bowel. No enlarged lymph nodes or free fluid.  Right hip arthroplasty hardware in place. No focal bone lesion  identified.       Impression:       No evidence of metastatic disease in the abdomen or pelvis.     D:  11/14/2018  E:  11/14/2018     This report was finalized on 11/14/2018 10:09 AM by Ap Wright.       MRI Brain With & Without Contrast [[id number]] Collected:  11/14/18 0925     Updated:  11/14/18 1011    Narrative:       EXAMINATION: MRI BRAIN W WO CONTRAST-11/13/2018:      INDICATION: Small cell lung cancer; E87.8-Wbgc-pikkyuwpxo and  hyponatremia; R06.00-Dyspnea, unspecified; R13.13-Dysphagia, pharyngeal  phase; R91.8-Other nonspecific abnormal finding of lung field;  R91.8-Other nonspecific abnormal finding of lung field; C34.12-Malignant  neoplasm of upper lobe, left bronchus or lung.     TECHNIQUE:  Multiplanar multisequence MRI of the brain was performed  without and with contrast.     COMPARISONS:  None.     FINDINGS:  Brain volume is within normal limits. Ventricles are normal  in size and configuration.  There is no abnormal signal on diffusion  weighted images. Scattered FLAIR signal abnormalities are nonspecific  but most likely attributable to chronic microangiopathy.  There is no  abnormal intracranial enhancement. There is no mass effect and the basal  cisterns are patent. [place] flow voids are maintained.  No  significant abnormal sinonasal or temporal bone fluid is identified.       Impression:       No evidence of brain metastases.     D:  11/14/2018  E:  11/14/2018        This report was finalized on 11/14/2018 10:09 AM by Ap Wright.       XR Chest 1 View [[id number]] Collected:  11/09/18 1042     Updated:  11/09/18 1635    Narrative:       EXAMINATION: XR CHEST 1 VW- 11/09/2018     INDICATION: E87.7-Jhib-fxmkvgrjnj and hyponatremia; R06.00-Dyspnea,  unspecified; R13.13-Dysphagia, pharyngeal phase; R91.8-Other nonspecific  abnormal finding of lung field; R91.8-Other nonspecific abnormal finding  of lung field      COMPARISON: 11/06/2018     FINDINGS: Portable chest reveals improvement seen in aeration of the  left perihilar region. Underlying soft tissue mass suggesting adenopathy  remains. There is a PICC line catheter identified on the right tip in  the SVC. Cardiac silhouette is borderline enlarged. Lung volumes are  low.           Impression:       Soft tissue mass again seen in the left perihilar region.  PICC line catheter on the right tip in the SVC. No new focal parenchymal  opacification present.     D:  11/09/2018  E:  11/09/2018     This report was finalized on 11/9/2018 4:33 PM by Dr. Amelie Teran MD.       CT Chest With Contrast [[id number]] Collected:  11/08/18 1536     Updated:  11/08/18 1539    Narrative:       EXAMINATION: CT CHEST W CONTRAST- 11/08/2018     INDICATION: Mass or lump, thorax axilla     TECHNIQUE:  Axial CT data of the chest were acquired helically with  contrast.  Multiplanar reformatted images were generated and reviewed.   The radiation dose reduction device was turned on for each scan per the  ALARA (As Low as Reasonably Achievable) protocol      COMPARISONS:  10/31/2018     FINDINGS: Unchanged small nodule in the left  upper lobe, 1.5 cm. Small  amount of right infrahilar airspace disease is seen, likely infectious  or inflammatory. Central airways are patent. Heart size is within normal  limits. Coronary artery disease noted. There is metastatic adenopathy in  the left hilum (largest collection of nodes 3.2 cm), prevascular station  (1.4 cm) and AP window (3.4 cm). The lymph nodes severely narrow the  pulmonary arterial branches to the left upper and lower lobes  bilaterally as well as narrowing of some of the left-sided pulmonary  veins. Right lung is clear. Unremarkable chest wall soft tissues.  Unchanged long-standing left adrenal thickening. Partially visualized  left renal cysts. No aggressive bone lesion.       Impression:          1. Small nodule in the left upper lobe could represent primary neoplasm.  2. Metastatic left hilar and mediastinal lymph nodes.  3. Narrowing of the left hilar vessels.     D:  11/08/2018  E:  11/08/2018     This report was finalized on 11/8/2018 3:37 PM by Ap Wright.       XR Chest 1 View [[id number]] Collected:  11/06/18 1325     Updated:  11/06/18 1343    Narrative:       EXAMINATION: XR CHEST 1 VW- 11/06/2018     INDICATION: E87.8-Mnnk-gtljmxbecy and hyponatremia; R06.00-Dyspnea,  unspecified; R13.13-Dysphagia, pharyngeal phase; R91.8-Other nonspecific  abnormal finding of lung field     COMPARISON: 11/05/2018     FINDINGS: Mildly increased opacifications left upper lobe likely post  bronchoscopy debris or postobstructive changes. Cardiac silhouette  unchanged. No pneumothorax or pleural effusion.           Impression:       No postprocedural pneumothorax. Mildly increased  opacifications adjacent to the left suprahilar mass lesion may represent  post bronchoscopy debris material or postobstructive changes.     D:  11/06/2018  E:  11/06/2018     This report was finalized on 11/6/2018 1:41 PM by Dr. Johnathon Cameron.       XR Chest PA & Lateral [[id number]] Collected:  11/05/18 1626     Updated:   11/05/18 1636    Narrative:       EXAMINATION: XR CHEST PA AND LATERAL- 11/05/2018     INDICATION: E87.2-Ogmt-xiugmwoyvr and hyponatremia; R06.00-Dyspnea,  unspecified; R13.13-Dysphagia, pharyngeal phase; R91.8-Other nonspecific  abnormal finding of lung field; R91.8-Other nonspecific abnormal finding  of lung field      COMPARISON: CT chest dated 10/31/2018     FINDINGS: Rounded masslike opacity within the left suprahilar region  consistent with known mass lesion. No focal consolidation otherwise  noted. No pneumothorax or pleural effusion. Calcified granuloma left  lung base. Degenerative changes of the spine.           Impression:       Left suprahilar mass lesion seen better on recent CT chest  10/31/2018 without acute parenchymal process otherwise noted.     D:  11/05/2018  E:  11/05/2018     This report was finalized on 11/5/2018 4:34 PM by Dr. Johnathon Cameron.       Fiberoptic Endo (fees) [[id number]] Resulted:  11/01/18 1555     Updated:  11/01/18 1555    Narrative:       This procedure was auto-finalized with no dictation required.    CT Chest Without Contrast [[id number]] Collected:  10/31/18 1701     Updated:  10/31/18 1930    Narrative:       EXAM:    CT Chest Without Intravenous Contrast     EXAM DATE/TIME:    10/31/2018 5:01 PM     CLINICAL HISTORY:    63 years old, female; Hypo-osmolality and hyponatremia; Dyspnea, unspecified;   Signs and symptoms; Cough and shortness of breath; Prior surgery; Surgery date:   6+ months; Surgery type: 5 heart stents; Additional info: SOA, hyponatremia     TECHNIQUE:    Axial computed tomography images of the chest without intravenous contrast.    All CT scans at this facility use at least one of these dose optimization   techniques: automated exposure control; mA and/or kV adjustment per patient   size (includes targeted exams where dose is matched to clinical indication); or   iterative reconstruction.    Coronal reformatted images were created and reviewed.      COMPARISON:    No relevant prior studies available.     FINDINGS:     LUNGS/PLEURA: Suboptimally evaluated located and mildly spiculated RIGHT   hilar mass measuring at least 4.4 cm in the long axis. Nodular peribronchial   LEFT upper lobe and subpleural inferior lingula airspace opacities measuring up   to 1.9 cm and centrilobular nodules in the LEFT upper lobe. Calcific granuloma   in the RIGHT normal. No pleural effusion or pneumothorax. Complete obstruction   of the LEFT apical bronchus, no other central obstructive endobronchial mass or   abnormality.     MEDIASTINUM: Heart size is normal, with trace pericardial   thickening/effusion. Severe coronary arterial calcification/coronary stents.   Atherosclerotic disease of the aorta without aortic aneurysm. Bulky prevascular   lymph node adjacent to the aortic arch measuring 3.0 cm and LEFT hilar mass   which may represent lymph nodes or primary lung malignancy.     OTHER STRUCTURES: Chronic degenerative changes in the visualized spine.   Nonspecific bilateral perinephric fat stranding and renal cortical scarring.   Small LEFT adrenal nodule measuring 1.3 cm.       Impression:       1. Findings concerning for LEFT HILAR MALIGNANT SOFT TISSUE MASS with   postobstructive focal pneumonia/bronchiolitis in the LEFT upper lobe.   2. Likely METASTATIC mediastinal and possibly LEFT hilar lymph nodes.   3. Coronary arterial calcification suggestive of CAD.   4. Other nonemergent/incidental findings as described.     THIS DOCUMENT HAS BEEN ELECTRONICALLY SIGNED BY BATOOL SAMANO MD          Results for orders placed during the hospital encounter of 10/31/18   Duplex Venous Lower Extremity - Bilateral CAR    Narrative · There is evidence of superficial venous thrombosis within the left   saphenous vein  · No evidence of DVT          Results for orders placed during the hospital encounter of 10/31/18   Duplex Venous Lower Extremity - Bilateral CAR    Narrative · There is  evidence of superficial venous thrombosis within the left   saphenous vein  · No evidence of DVT                Order Current Status    Fungus Culture - Wash, Bronchus In process    Ova & Parasite Examination - Wash, Lung, Left Upper Lobe In process    AFB Culture - Wash, Bronchus Preliminary result    AFB Culture - Wash, Lung, Left Upper Lobe Preliminary result    Fungus Culture - Wash, Lung, Left Upper Lobe Preliminary result        Discharge Details        Discharge Medications      New Medications      Instructions Start Date   dexamethasone 4 MG tablet  Commonly known as:  DECADRON   Take 2 tablets in the morning daily on days 2, 3 & 4.  Take with food.      LORCET 5-325 MG per tablet  Generic drug:  HYDROcodone-acetaminophen   1 tablet, Oral, Every 4 Hours PRN      nicotine 21 MG/24HR patch  Commonly known as:  NICODERM CQ   1 patch, Transdermal, Every 24 Hours Scheduled      ondansetron 8 MG tablet  Commonly known as:  ZOFRAN   8 mg, Oral, 3 Times Daily PRN      ondansetron 4 MG tablet  Commonly known as:  ZOFRAN   4 mg, Oral, Every 8 Hours PRN      temazepam 7.5 MG capsule  Commonly known as:  RESTORIL   7.5 mg, Oral, Nightly PRN      tolvaptan 15 MG tablet tablet  Commonly known as:  SAMSCA   7.5 mg, Oral, Daily         Continue These Medications      Instructions Start Date   albuterol 108 (90 Base) MCG/ACT inhaler  Commonly known as:  PROVENTIL HFA;VENTOLIN HFA   2 puffs, Inhalation, Every 4 Hours PRN      aspirin 81 MG EC tablet   81 mg, Oral, Daily      BRILINTA 60 MG tablet tablet  Generic drug:  ticagrelor   60 mg, Oral, Daily      CLARITIN 10 MG capsule  Generic drug:  Loratadine   Oral, Daily      COREG 6.25 MG tablet  Generic drug:  carvedilol   6.25 mg, Oral, 2 Times Daily With Meals      famotidine 20 MG tablet  Commonly known as:  PEPCID   20 mg, Oral, 2 Times Daily      levothyroxine 75 MCG tablet  Commonly known as:  SYNTHROID, LEVOTHROID   75 mcg, Oral, Daily      pantoprazole 40 MG EC  tablet  Commonly known as:  PROTONIX   40 mg, Oral, Daily      REPATHA SURECLICK 140 MG/ML solution auto-injector  Generic drug:  Evolocumab   140 mg, Subcutaneous, Every 14 Days      SITagliptin 100 MG tablet  Commonly known as:  JANUVIA   100 mg, Oral, Daily               Discharge Disposition:  Home or Self Care    Discharge Diet:  Diet Instructions     MBS/VFSS/FEES 11/01/18    Reason for Referral  Patient was referred for a FEES to assess the efficiency of his/her swallow function, rule out aspiration and make recommendations regarding safe dietary consistencies, effective compensatory strategies, and safe eating environment.                   Recommendations/Treatment            Risks/Benefits Reviewed: (P) risks/benefits explained, patient, family, agreed to eval  Nasal Entry: (P) right:  Special Considerations: (P) Scope #338    SLP Swallowing Diagnosis: (P) functional oral phase, moderate, pharyngeal dysfunction  Functional Impact: (P) risk of aspiration/pneumonia  Rehab Potential/Prognosis, Swallowing: (P) good, to achieve stated therapy goals  Swallow Criteria for Skilled Therapeutic Interventions Met: (P) demonstrates skilled criteria    Therapy Frequency (Swallow): (P) 5 days per week  Predicted Duration Therapy Intervention (Days): (P) until discharge  SLP Diet Recommendation: (P) mechanical soft with no mixed consistencies, honey thick liquids  Recommended Precautions and Strategies: (P) upright posture during/after eating, small bites of food and sips of liquid, no straw, alternate between small bites of food and sips of liquid, other (see comments) (oral care BID/PRN)  SLP Rec. for Method of Medication Administration: (P) meds whole, with pudding or applesauce, as tolerated  Monitor for Signs of Aspiration: (P) yes, notify SLP if any concerns, cough, gurgly voice, throat clearing  Anticipated Dischage Disposition: (P) unknown, anticipate therapy at next level of care      Oral Preparation/ Oral  Phase       Oral Phase: (P) WFL    Pharyngeal Phase       Initiation of Pharyngeal Swallow: (P) bolus in pyriform sinuses  Pharyngeal Phase: (P) impaired pharyngeal phase of swallowing  Penetration During the Swallow: (P) honey-thick liquids, secondary to delayed swallow initiation or mistiming, secondary to reduced laryngeal elevation, secondary to reduced vestibular closure  Aspiration During the Swallow: (P) thin liquids, nectar-thick liquids, secondary to delayed swallow initiation or mistiming, secondary to reduced laryngeal elevation, secondary to reduced vestibular closure  Response to Aspiration: (P) response with cue only, could not clear subglottic material  Rosenbek's Scale: (P) thin:, nectar:, 8-->Level 8, honey:, 3-->Level 3, pudding/puree:, regular textures:, 1-->Level 1  Residue: (P) all consistencies tested, base of tongue, valleculae, posterior pharyngeal wall, secondary to reduced base of tongue retraction, secondary to reduced posterior pharyngeal wall stripping, secondary to reduced laryngeal elevation (> w/ pudding and solid )  Response to Residue: (P) cleared residue, with liquid wash  Attempted Compensatory Maneuvers: (P) bolus size, bolus presentation style, chin tuck, additional subsequent swallow, alternate liquids/solids, breath hold, throat clear after swallow  Response to Attempted Compensatory Maneuvers: (P) did not prevent aspiration  FEES Summary: (P) FEES completed. Trials given of thin via tsp, cup, and straw, nectar-thick via tsp, cup, honey-thick via tsp and cup, pudding, and solid. Moderate pharyngeal dysphagia. Swallow initiation delayed to the pyriform sinuses w/ all consistencies. Trace aspiration during the swallow w/ thin and nectar-thick liquids 2' delay and decr'd elevation and vestibular closure. Aspiration was silent, pt was u/a to fully clear subglottic material w/ cued cough. Intermittent trace penetration above the level of the vocal folds w/ honey-thick liquids 2' to  delay and decr'd elevation and vestibular closure. No penetration or aspiration observed w/ pudding or solid. Moderate diffuse pharyngeal residue w/ pudding and solid 2' decr'd base of tongue retraction, posterior pharyngeal wall contraction, and decr'd elevation. Pharyngeal residue decr'd w/ liquid wash. Compensatory strategies were attempted but unsuccessful in preventing aspiration. Recommend level 4 diet w/ honey-thick liquids, no straws, small bites and sips, and alternating bites and sips. Meds whole w/ puree as tolerated. Will plan to begin dysphagia tx.         Cervical Esophageal Phase              Prognosis                           Discharge Activity: As tolerated    SPECIAL Instructions: Patient requires that PICC line remains in place due to ongoing treatment of underlying condition.    Code Status/Level of Support:  Code Status and Medical Interventions:   Ordered at: 10/31/18 3829     Level Of Support Discussed With:    Patient     Code Status:    CPR     Medical Interventions (Level of Support Prior to Arrest):    Full       Future Appointments   Date Time Provider Department Center   12/3/2018 11:00 AM Desmond Cardoso MD [facility]       Additional Instructions for the Follow-ups that You Need to Schedule     Obtain Informed Consent   Nov 09, 2018      Informed Consent Given For:  Dr. Tyler PENALOZA         CBC and Differential   Nov 14, 2018      Manual Differential:  No         Comprehensive metabolic panel   Nov 14, 2018      Magnesium   Nov 14, 2018      Discharge Follow-up with Specialty: radiation oncology; 2 Weeks   As directed      Specialty:  radiation oncology    Follow Up:  2 Weeks               Time Spent on Discharge: 39 minutes    Electronically signed by Tricia Obrien II, DO, 11/16/18, 10:59 AM.      Electronically signed by Tricia Obrien II, DO at 11/17/2018 10:00 AM

## 2018-11-19 NOTE — OUTREACH NOTE
Medical Week 1 Survey      Responses   Facility patient discharged from?  [facility]   Does the patient have one of the following disease processes/diagnoses(primary or secondary)?  Other   Is there a successful TCM telephone encounter documented?  No   Week 1 attempt successful?  Yes   Call start time  1612   Call end time  1623   Discharge diagnosis  Small cell lung cancer, left upper lobe   (chemotherapy and radiation to start)   Medication alerts for this patient  Pt not sure about when to take Decadron. I have read Dr Cardoso's note and have told pt to call his office for clarification.   Meds reviewed with patient/caregiver?  Yes   Is the patient having any side effects they believe may be caused by any medication additions or changes?  No   Does the patient have all medications ordered at discharge?  Yes   Is the patient taking all medications as directed (includes completed medication regime)?  Yes   Does the patient have a primary care provider?   Yes   Does the patient have an appointment with their PCP within 7 days of discharge?  Yes   Has the patient kept scheduled appointments due by today?  N/A   What is the Home health agency?   HH coming in to see her   Psychosocial issues?  No   Did the patient receive a copy of their discharge instructions?  Yes   Nursing interventions  Reviewed instructions with patient   What is the patient's perception of their health status since discharge?  Improving   Is the patient/caregiver able to teach back signs and symptoms related to disease process for when to call PCP?  Yes   Is the patient/caregiver able to teach back signs and symptoms related to disease process for when to call 911?  Yes   Is the patient/caregiver able to teach back the hierarchy of who to call/visit for symptoms/problems? PCP, Specialist, Home health nurse, Urgent Care, ED, 911  Yes   Additional teach back comments  enc pt to call Dr aCrdoso's office for f/u   Week 1 call completed?  Yes           Trisha Johnson RN

## 2018-11-27 ENCOUNTER — READMISSION MANAGEMENT (OUTPATIENT)
Dept: CALL CENTER | Facility: HOSPITAL | Age: 63
End: 2018-11-27

## 2018-11-27 NOTE — OUTREACH NOTE
Medical Week 2 Survey      Responses   Facility patient discharged from?  [facility]   Does the patient have one of the following disease processes/diagnoses(primary or secondary)?  Other   Week 2 attempt successful?  Yes   Call start time  1609   Discharge diagnosis  Small cell lung cancer, left upper lobe   (chemotherapy and radiation to start)   Call end time  1612   Medication alerts for this patient  Pt taking decadron and taking mouthwash for pain   Meds reviewed with patient/caregiver?  Yes   Is the patient taking all medications as directed (includes completed medication regime)?  Yes   Does the patient have a primary care provider?   Yes   Has the patient kept scheduled appointments due by today?  Yes   What is the Home health agency?   HH coming in to see her   Psychosocial issues?  No   What is the patient's perception of their health status since discharge?  Improving   Is the patient/caregiver able to teach back signs and symptoms related to disease process for when to call PCP?  Yes   Is the patient/caregiver able to teach back signs and symptoms related to disease process for when to call 911?  Yes   Is the patient/caregiver able to teach back the hierarchy of who to call/visit for symptoms/problems? PCP, Specialist, Home health nurse, Urgent Care, ED, 911  Yes   Week 2 Call Completed?  Yes          Trisha Johnson RN

## 2018-12-03 ENCOUNTER — OFFICE VISIT (OUTPATIENT)
Dept: ONCOLOGY | Facility: CLINIC | Age: 63
End: 2018-12-03

## 2018-12-03 VITALS
BODY MASS INDEX: 37.53 KG/M2 | HEART RATE: 81 BPM | HEIGHT: 61 IN | OXYGEN SATURATION: 98 % | DIASTOLIC BLOOD PRESSURE: 75 MMHG | RESPIRATION RATE: 18 BRPM | WEIGHT: 198.8 LBS | TEMPERATURE: 97.9 F | SYSTOLIC BLOOD PRESSURE: 154 MMHG

## 2018-12-03 DIAGNOSIS — C34.12 SMALL CELL LUNG CANCER, LEFT UPPER LOBE (HCC): Primary | ICD-10-CM

## 2018-12-03 PROCEDURE — 99214 OFFICE O/P EST MOD 30 MIN: CPT | Performed by: INTERNAL MEDICINE

## 2018-12-03 RX ORDER — SODIUM CHLORIDE 9 MG/ML
250 INJECTION, SOLUTION INTRAVENOUS ONCE
Status: CANCELLED | OUTPATIENT
Start: 2018-12-03

## 2018-12-03 RX ORDER — PALONOSETRON 0.05 MG/ML
0.25 INJECTION, SOLUTION INTRAVENOUS ONCE
Status: CANCELLED | OUTPATIENT
Start: 2018-12-03

## 2018-12-03 RX ORDER — INSULIN GLARGINE 100 [IU]/ML
20 INJECTION, SOLUTION SUBCUTANEOUS NIGHTLY
COMMUNITY
End: 2019-01-01

## 2018-12-03 RX ORDER — SODIUM CHLORIDE 9 MG/ML
250 INJECTION, SOLUTION INTRAVENOUS ONCE
Status: CANCELLED | OUTPATIENT
Start: 2018-12-06

## 2018-12-03 RX ORDER — CETIRIZINE HYDROCHLORIDE 10 MG/1
10 TABLET ORAL DAILY
COMMUNITY

## 2018-12-03 RX ORDER — SODIUM CHLORIDE 9 MG/ML
250 INJECTION, SOLUTION INTRAVENOUS ONCE
Status: CANCELLED | OUTPATIENT
Start: 2018-12-07

## 2018-12-03 NOTE — PROGRESS NOTES
DATE OF VISIT: 12/3/2018    REASON FOR VISIT: Followup for limited stage small cell lung cancer     HISTORY OF PRESENT ILLNESS: The patient is a very pleasant 63 y.o. female  with past medical history significant for limited stage small cell lung cancer diagnosed November 9, 2018.  She was started on chemotherapy using cisplatin and etoposide November 15, 2018. The patient is here today for cycle #2.    SUBJECTIVE: The patient has been doing fairly well. she is able to tolerate  her treatment without any serious side effects. she denied any fever or  chills, no night sweats, denied any headaches.  She had nausea but no vomiting.  She lost all of her hair.    PAST MEDICAL HISTORY/SOCIAL HISTORY/FAMILY HISTORY: Reviewed by me and unchanged from my documentation done on 12/03/18.    Review of Systems   Constitutional: Negative for activity change, appetite change, chills, fatigue, fever and unexpected weight change.   HENT: Negative for hearing loss, mouth sores, nosebleeds, sore throat and trouble swallowing.    Eyes: Negative for visual disturbance.   Respiratory: Negative for cough, chest tightness, shortness of breath and wheezing.    Cardiovascular: Negative for chest pain, palpitations and leg swelling.   Gastrointestinal: Positive for nausea. Negative for abdominal distention, abdominal pain, blood in stool, constipation, diarrhea, rectal pain and vomiting.   Endocrine: Negative for cold intolerance and heat intolerance.   Genitourinary: Negative for difficulty urinating, dysuria, frequency and urgency.   Musculoskeletal: Negative for arthralgias, back pain, gait problem, joint swelling and myalgias.   Skin: Negative for rash.   Neurological: Negative for dizziness, tremors, syncope, weakness, light-headedness, numbness and headaches.   Hematological: Negative for adenopathy. Does not bruise/bleed easily.   Psychiatric/Behavioral: Negative for confusion, sleep disturbance and suicidal ideas. The patient is not  nervous/anxious.          Current Outpatient Medications:   •  albuterol (PROVENTIL HFA;VENTOLIN HFA) 108 (90 Base) MCG/ACT inhaler, Inhale 2 puffs Every 4 (Four) Hours As Needed for Wheezing., Disp: , Rfl:   •  aspirin 81 MG EC tablet, Take 81 mg by mouth Daily., Disp: , Rfl:   •  carvedilol (COREG) 6.25 MG tablet, Take 6.25 mg by mouth 2 (Two) Times a Day With Meals., Disp: , Rfl:   •  cetirizine (zyrTEC) 10 MG tablet, Take 10 mg by mouth Daily., Disp: , Rfl:   •  dexamethasone (DECADRON) 4 MG tablet, Take 2 tablets in the morning daily on days 2, 3 & 4.  Take with food., Disp: 6 tablet, Rfl: 5  •  famotidine (PEPCID) 20 MG tablet, Take 20 mg by mouth 2 (Two) Times a Day., Disp: , Rfl:   •  insulin glargine (LANTUS) 100 UNIT/ML injection, Inject 10 Units under the skin into the appropriate area as directed Daily., Disp: , Rfl:   •  levothyroxine (SYNTHROID, LEVOTHROID) 75 MCG tablet, Take 75 mcg by mouth Daily., Disp: , Rfl:   •  nicotine (NICODERM CQ) 21 MG/24HR patch, Place 1 patch on the skin as directed by provider Daily., Disp: 28 each, Rfl: 0  •  nystatin susp + lidocaine viscous (MAGIC MOUTHWASH) oral suspension, 5-10 ml swish and spit or swallow QID prn, Disp: 240 mL, Rfl: 3  •  ondansetron (ZOFRAN) 4 MG tablet, Take 1 tablet by mouth Every 8 (Eight) Hours As Needed for Nausea or Vomiting., Disp: 30 tablet, Rfl: 0  •  ondansetron (ZOFRAN) 8 MG tablet, Take 1 tablet by mouth 3 (Three) Times a Day As Needed for Nausea or Vomiting., Disp: 30 tablet, Rfl: 5  •  pantoprazole (PROTONIX) 40 MG EC tablet, Take 40 mg by mouth Daily., Disp: , Rfl:   •  REPATHA SURECLICK 140 MG/ML solution auto-injector, Inject 140 mg under the skin into the appropriate area as directed Every 14 (Fourteen) Days., Disp: , Rfl:   •  SITagliptin (JANUVIA) 100 MG tablet, Take 100 mg by mouth Daily., Disp: , Rfl:   •  temazepam (RESTORIL) 7.5 MG capsule, Take 1 capsule by mouth At Night As Needed for Sleep or Anxiety., Disp: 3 capsule,  "Rfl: 0  •  ticagrelor (BRILINTA) 60 MG tablet tablet, Take 60 mg by mouth Daily., Disp: , Rfl:   •  tolvaptan (SAMSCA) 15 MG tablet tablet, Take 0.5 tablets by mouth Daily., Disp: 30 tablet, Rfl: 0    PHYSICAL EXAMINATION:   /75   Pulse 81   Temp 97.9 °F (36.6 °C) (Temporal)   Resp 18   Ht 154.9 cm (60.98\")   Wt 90.2 kg (198 lb 12.8 oz)   SpO2 98% Comment: RA  BMI 37.58 kg/m²    ECOG Performance Status: 1 - Symptomatic but completely ambulatory  General Appearance:  alert, cooperative, no apparent distress and appears stated age   Neurologic/Psychiatric: A&O x 3, gait steady, appropriate affect, strength 5/5 in all muscle groups   HEENT:  Normocephalic, without obvious abnormality, mucous membranes moist   Neck: Supple, symmetrical, trachea midline, no adenopathy;  No thyromegaly, masses, or tenderness   Lungs:   Clear to auscultation bilaterally; respirations regular, even, and unlabored bilaterally   Heart:  Regular rate and rhythm, no murmurs appreciated   Abdomen:   Soft, non-tender, non-distended and no organomegaly   Lymph nodes: No cervical, supraclavicular, inguinal or axillary adenopathy noted   Extremities: Normal, atraumatic; no clubbing, cyanosis, or edema    Skin: No rashes, ulcers, or suspicious lesions noted     No visits with results within 2 Week(s) from this visit.   Latest known visit with results is:   Admission on 10/31/2018, Discharged on 11/17/2018   No results displayed because visit has over 200 results.           Ct Chest With Contrast    Result Date: 11/8/2018  Narrative: EXAMINATION: CT CHEST W CONTRAST- 11/08/2018  INDICATION: Mass or lump, thorax axilla  TECHNIQUE:  Axial CT data of the chest were acquired helically with contrast.  Multiplanar reformatted images were generated and reviewed. The radiation dose reduction device was turned on for each scan per the ALARA (As Low as Reasonably Achievable) protocol  COMPARISONS:  10/31/2018  FINDINGS: Unchanged small nodule in " the left upper lobe, 1.5 cm. Small amount of right infrahilar airspace disease is seen, likely infectious or inflammatory. Central airways are patent. Heart size is within normal limits. Coronary artery disease noted. There is metastatic adenopathy in the left hilum (largest collection of nodes 3.2 cm), prevascular station (1.4 cm) and AP window (3.4 cm). The lymph nodes severely narrow the pulmonary arterial branches to the left upper and lower lobes bilaterally as well as narrowing of some of the left-sided pulmonary veins. Right lung is clear. Unremarkable chest wall soft tissues. Unchanged long-standing left adrenal thickening. Partially visualized left renal cysts. No aggressive bone lesion.      Impression:  1. Small nodule in the left upper lobe could represent primary neoplasm. 2. Metastatic left hilar and mediastinal lymph nodes. 3. Narrowing of the left hilar vessels.  D:  11/08/2018 E:  11/08/2018  This report was finalized on 11/8/2018 3:37 PM by Ap Wright.      Mri Brain With & Without Contrast    Result Date: 11/14/2018  Narrative: EXAMINATION: MRI BRAIN W WO CONTRAST-11/13/2018:  INDICATION: Small cell lung cancer; E87.6-Adwk-llazeffpli and hyponatremia; R06.00-Dyspnea, unspecified; R13.13-Dysphagia, pharyngeal phase; R91.8-Other nonspecific abnormal finding of lung field; R91.8-Other nonspecific abnormal finding of lung field; C34.12-Malignant neoplasm of upper lobe, left bronchus or lung.  TECHNIQUE:  Multiplanar multisequence MRI of the brain was performed without and with contrast.  COMPARISONS:  None.  FINDINGS:  Brain volume is within normal limits. Ventricles are normal in size and configuration.  There is no abnormal signal on diffusion weighted images. Scattered FLAIR signal abnormalities are nonspecific but most likely attributable to chronic microangiopathy. There is no abnormal intracranial enhancement. There is no mass effect and the basal cisterns are patent. [place] flow  voids are maintained.  No significant abnormal sinonasal or temporal bone fluid is identified.      Impression: No evidence of brain metastases.  D:  11/14/2018 E:  11/14/2018   This report was finalized on 11/14/2018 10:09 AM by Ap Wright.      Nm Bone Scan Whole Body    Result Date: 11/15/2018  Narrative: EXAMINATION: NM BONE SCAN WHOLE-BODY - 11/14/2018  INDICATION: E87.7-Zqro-timxluduyz and hyponatremia; R06.00-Dyspnea, unspecified; R13.13-Dysphagia, pharyngeal phase; R91.8-Other nonspecific abnormal finding of lung field; C34.12-Malignant neoplasm of upper lobe, left bronchus or lung.  TECHNIQUE: Patient was injected with 27.5 mCi Technetium MDP and scanned after a 5-hour delay.  COMPARISON: None.  FINDINGS: There are arthritic changes in the shoulders, cervical spine, knees and ankles. There is absent tracer in the right femoral head related to the right hip prosthesis. There is no focal or multifocal pattern to suggest metastatic disease.      Impression: Arthritic and postoperative changes described above. There is no evidence of metastatic disease.  DICTATED:   11/14/2018 EDITED/ls :   11/14/2018    This report was finalized on 11/15/2018 8:24 AM by Dr. Julián Barrios MD.      Ct Abdomen Pelvis With Contrast    Result Date: 11/14/2018  Narrative: EXAMINATION: CT ABDOMEN PELVIS W CONTRAST- 11/13/2018  INDICATION: small cell lung cancer; E87.6-Cbub-ftgdtevoyv and hyponatremia; R06.00-Dyspnea, unspecified; R13.13-Dysphagia, pharyngeal phase; R91.8-Other nonspecific abnormal finding of lung field; R91.8-Other nonspecific abnormal finding of lung field; C34.12-Malignant neoplasm of upper lobe, left bronchus or lung  TECHNIQUE:  Axial CT data of the abdomen and pelvis were acquired helically following IV contrast administration. Multiplanar reformatted images were generated and reviewed. The radiation dose reduction device was turned on for each scan per the ALARA (As Low as Reasonably Achievable) protocol   COMPARISONS:  03/28/2015  FINDINGS:   Calcified granuloma noted incidentally in the left lung base. There is a cyst in the left kidney. The liver is without focal lesion. A calcified granuloma is seen in the spleen, further evidence of healed granulomatous disease. Gallbladder is likely surgically absent. The adrenal glands and pancreas are normal. Right kidney is normal. No dilated small or large bowel. No enlarged lymph nodes or free fluid. Right hip arthroplasty hardware in place. No focal bone lesion identified.      Impression: No evidence of metastatic disease in the abdomen or pelvis.  D:  11/14/2018 E:  11/14/2018  This report was finalized on 11/14/2018 10:09 AM by Ap Wright.      Xr Chest 1 View    Result Date: 11/9/2018  Narrative: EXAMINATION: XR CHEST 1 VW- 11/09/2018  INDICATION: E87.4-Mtfy-hzpcswhssg and hyponatremia; R06.00-Dyspnea, unspecified; R13.13-Dysphagia, pharyngeal phase; R91.8-Other nonspecific abnormal finding of lung field; R91.8-Other nonspecific abnormal finding of lung field  COMPARISON: 11/06/2018  FINDINGS: Portable chest reveals improvement seen in aeration of the left perihilar region. Underlying soft tissue mass suggesting adenopathy remains. There is a PICC line catheter identified on the right tip in the SVC. Cardiac silhouette is borderline enlarged. Lung volumes are low.         Impression: Soft tissue mass again seen in the left perihilar region. PICC line catheter on the right tip in the SVC. No new focal parenchymal opacification present.  D:  11/09/2018 E:  11/09/2018  This report was finalized on 11/9/2018 4:33 PM by Dr. Amelie Teran MD.      Xr Chest 1 View    Result Date: 11/6/2018  Narrative: EXAMINATION: XR CHEST 1 VW- 11/06/2018  INDICATION: E87.9-Sjiq-jgkrandyiy and hyponatremia; R06.00-Dyspnea, unspecified; R13.13-Dysphagia, pharyngeal phase; R91.8-Other nonspecific abnormal finding of lung field  COMPARISON: 11/05/2018  FINDINGS: Mildly increased  opacifications left upper lobe likely post bronchoscopy debris or postobstructive changes. Cardiac silhouette unchanged. No pneumothorax or pleural effusion.         Impression: No postprocedural pneumothorax. Mildly increased opacifications adjacent to the left suprahilar mass lesion may represent post bronchoscopy debris material or postobstructive changes.  D:  11/06/2018 E:  11/06/2018  This report was finalized on 11/6/2018 1:41 PM by Dr. Johnathon Cameron.      Xr Chest Pa & Lateral    Result Date: 11/5/2018  Narrative: EXAMINATION: XR CHEST PA AND LATERAL- 11/05/2018  INDICATION: E87.2-Cqnt-aznfeoaylz and hyponatremia; R06.00-Dyspnea, unspecified; R13.13-Dysphagia, pharyngeal phase; R91.8-Other nonspecific abnormal finding of lung field; R91.8-Other nonspecific abnormal finding of lung field  COMPARISON: CT chest dated 10/31/2018  FINDINGS: Rounded masslike opacity within the left suprahilar region consistent with known mass lesion. No focal consolidation otherwise noted. No pneumothorax or pleural effusion. Calcified granuloma left lung base. Degenerative changes of the spine.         Impression: Left suprahilar mass lesion seen better on recent CT chest 10/31/2018 without acute parenchymal process otherwise noted.  D:  11/05/2018 E:  11/05/2018  This report was finalized on 11/5/2018 4:34 PM by Dr. Johnathon Cameron.        ASSESSMENT: The patient is a very pleasant 63 y.o. female  with  Left upper lobe small cell lung cancer [id number] stage IIIa disease:  A.  Presented with shortness of breath.  B.  Status post bronchoscopy with a biopsy done on November 9, 2018 revealed small cell lung cancer   C. Started chemotherapy with cisplatin and etoposide November 15, 2018  2.  Hyponatremia  3.  Normocytic anemia   4.  Insomnia  5.  Chemotherapy-induced nausea    PLAN:  1.  I will proceed with treatment as scheduled today cycle #2.  2.  The patient will follow-up with me in 3 weeks for cycle #3 out of 4-6 planned.  3.  I will  monitor patient blood work including blood counts kidney function liver function and electrolytes were  4.  I'll repeat patient's scans prior to cycle #4.  5.  I will continue Zofran as needed for chemotherapy induced nausea as well as Decadron on day 23 and 4.  6.  We'll continue Restoril as needed for insomnia.  7.  I discussed the case with Dr. Huggins from radiation oncology who kindly agreed to see the patient this week so we can get her started on concurrent radiation.  8. We reviewed the potential side effects of this regimen including fatigue, vomiting and nausea, hair loss, nephropathy, neuropathy, hearing loss, myelosuppression, and risk of infusion reaction.      Desmond Cardoso MD  12/3/2018

## 2018-12-04 ENCOUNTER — READMISSION MANAGEMENT (OUTPATIENT)
Dept: CALL CENTER | Facility: HOSPITAL | Age: 63
End: 2018-12-04

## 2018-12-04 NOTE — OUTREACH NOTE
Medical Week 3 Survey      Responses   Facility patient discharged from?  [facility]   Does the patient have one of the following disease processes/diagnoses(primary or secondary)?  Other   Week 3 attempt successful?  Yes   Call start time  1457   Call end time  1519   Discharge diagnosis  Small cell lung cancer, left upper lobe   (chemotherapy and radiation to start)   Is patient permission given to speak with other caregiver?  No   Meds reviewed with patient/caregiver?  Yes   Is the patient having any side effects they believe may be caused by any medication additions or changes?  No   Does the patient have all medications ordered at discharge?  Yes   Is the patient taking all medications as directed (includes completed medication regime)?  Yes   Medication comments  Patient states that she is scheduled to have her 2nd chemotherapy treatment tomorrow.    Does the patient have a primary care provider?   Yes   Does the patient have an appointment with their PCP within 7 days of discharge?  Yes   Has the patient kept scheduled appointments due by today?  Yes   What is the Home health agency?   [facility]   Has home health visited the patient within 72 hours of discharge?  Yes   Psychosocial issues?  Yes   Psychosocial comments  Patient verbalizes that it was shocking for all of her hair to come out this past week.    Did the patient receive a copy of their discharge instructions?  Yes   Nursing interventions  Reviewed instructions with patient   What is the patient's perception of their health status since discharge?  Improving   Is the patient/caregiver able to teach back signs and symptoms related to disease process for when to call PCP?  Yes   Is the patient/caregiver able to teach back signs and symptoms related to disease process for when to call 911?  Yes   Is the patient/caregiver able to teach back the hierarchy of who to call/visit for symptoms/problems? PCP, Specialist, Home health nurse, Urgent  [facility], ED, 911  Yes   Week 3 Call Completed?  Yes          Nati Lo RN

## 2018-12-05 ENCOUNTER — HOSPITAL ENCOUNTER (OUTPATIENT)
Dept: CARDIOLOGY | Facility: HOSPITAL | Age: 63
Discharge: HOME | End: 2018-12-05
Admitting: NURSE PRACTITIONER

## 2018-12-05 ENCOUNTER — EDUCATION (OUTPATIENT)
Dept: ONCOLOGY | Facility: HOSPITAL | Age: 63
End: 2018-12-05

## 2018-12-05 ENCOUNTER — DOCUMENTATION (OUTPATIENT)
Dept: NUTRITION | Facility: HOSPITAL | Age: 63
End: 2018-12-05

## 2018-12-05 ENCOUNTER — OFFICE VISIT (OUTPATIENT)
Dept: ONCOLOGY | Facility: CLINIC | Age: 63
End: 2018-12-05

## 2018-12-05 ENCOUNTER — INFUSION (OUTPATIENT)
Dept: ONCOLOGY | Facility: HOSPITAL | Age: 63
End: 2018-12-05

## 2018-12-05 ENCOUNTER — DOCUMENTATION (OUTPATIENT)
Dept: SOCIAL WORK | Facility: HOSPITAL | Age: 63
End: 2018-12-05

## 2018-12-05 VITALS
WEIGHT: 199.5 LBS | SYSTOLIC BLOOD PRESSURE: 130 MMHG | BODY MASS INDEX: 37.66 KG/M2 | HEIGHT: 61 IN | RESPIRATION RATE: 18 BRPM | OXYGEN SATURATION: 96 % | DIASTOLIC BLOOD PRESSURE: 72 MMHG | TEMPERATURE: 97.8 F | HEART RATE: 84 BPM

## 2018-12-05 DIAGNOSIS — C34.12 SMALL CELL LUNG CANCER, LEFT UPPER LOBE (HCC): ICD-10-CM

## 2018-12-05 DIAGNOSIS — M79.89 SWELLING OF RIGHT LOWER EXTREMITY: ICD-10-CM

## 2018-12-05 DIAGNOSIS — M79.89 SWELLING OF RIGHT LOWER EXTREMITY: Primary | ICD-10-CM

## 2018-12-05 DIAGNOSIS — C34.12 SMALL CELL LUNG CANCER, LEFT UPPER LOBE (HCC): Primary | ICD-10-CM

## 2018-12-05 LAB
ALBUMIN SERPL-MCNC: 4.17 G/DL (ref 3.2–4.8)
ALBUMIN/GLOB SERPL: 2.6 G/DL (ref 1.5–2.5)
ALP SERPL-CCNC: 76 U/L (ref 25–100)
ALT SERPL W P-5'-P-CCNC: 23 U/L (ref 7–40)
ANION GAP SERPL CALCULATED.3IONS-SCNC: 9 MMOL/L (ref 3–11)
AST SERPL-CCNC: 15 U/L (ref 0–33)
BH CV ECHO MEAS - BSA(HAYCOCK): 2 M^2
BH CV ECHO MEAS - BSA: 1.9 M^2
BH CV ECHO MEAS - BZI_BMI: 37.6 KILOGRAMS/M^2
BH CV ECHO MEAS - BZI_METRIC_HEIGHT: 154.9 CM
BH CV ECHO MEAS - BZI_METRIC_WEIGHT: 90.3 KG
BH CV LOWER VASCULAR LEFT COMMON FEMORAL AUGMENT: NORMAL
BH CV LOWER VASCULAR LEFT COMMON FEMORAL COMPRESS: NORMAL
BH CV LOWER VASCULAR LEFT COMMON FEMORAL PHASIC: NORMAL
BH CV LOWER VASCULAR LEFT COMMON FEMORAL SPONT: NORMAL
BH CV LOWER VASCULAR RIGHT COMMON FEMORAL AUGMENT: NORMAL
BH CV LOWER VASCULAR RIGHT COMMON FEMORAL COMPRESS: NORMAL
BH CV LOWER VASCULAR RIGHT COMMON FEMORAL PHASIC: NORMAL
BH CV LOWER VASCULAR RIGHT COMMON FEMORAL SPONT: NORMAL
BH CV LOWER VASCULAR RIGHT DISTAL FEMORAL COMPRESS: NORMAL
BH CV LOWER VASCULAR RIGHT GASTRONEMIUS COMPRESS: NORMAL
BH CV LOWER VASCULAR RIGHT GREATER SAPH AK COMPRESS: NORMAL
BH CV LOWER VASCULAR RIGHT LESSER SAPH COMPRESS: NORMAL
BH CV LOWER VASCULAR RIGHT MID FEMORAL AUGMENT: NORMAL
BH CV LOWER VASCULAR RIGHT MID FEMORAL COMPRESS: NORMAL
BH CV LOWER VASCULAR RIGHT MID FEMORAL PHASIC: NORMAL
BH CV LOWER VASCULAR RIGHT MID FEMORAL SPONT: NORMAL
BH CV LOWER VASCULAR RIGHT PERONEAL COMPRESS: NORMAL
BH CV LOWER VASCULAR RIGHT POPLITEAL AUGMENT: NORMAL
BH CV LOWER VASCULAR RIGHT POPLITEAL COMPRESS: NORMAL
BH CV LOWER VASCULAR RIGHT POPLITEAL PHASIC: NORMAL
BH CV LOWER VASCULAR RIGHT POPLITEAL SPONT: NORMAL
BH CV LOWER VASCULAR RIGHT POSTERIOR TIBIAL COMPRESS: NORMAL
BH CV LOWER VASCULAR RIGHT PROFUNDA FEMORAL COMPRESS: NORMAL
BH CV LOWER VASCULAR RIGHT PROXIMAL FEMORAL COMPRESS: NORMAL
BH CV LOWER VASCULAR RIGHT SAPHENOFEMORAL JUNCTION AUGMENT: NORMAL
BH CV LOWER VASCULAR RIGHT SAPHENOFEMORAL JUNCTION COMPRESS: NORMAL
BH CV LOWER VASCULAR RIGHT SAPHENOFEMORAL JUNCTION PHASIC: NORMAL
BH CV LOWER VASCULAR RIGHT SAPHENOFEMORAL JUNCTION SPONT: NORMAL
BILIRUB SERPL-MCNC: 0.2 MG/DL (ref 0.3–1.2)
BUN BLD-MCNC: 8 MG/DL (ref 9–23)
BUN/CREAT SERPL: 7.5 (ref 7–25)
CALCIUM SPEC-SCNC: 9.1 MG/DL (ref 8.7–10.4)
CHLORIDE SERPL-SCNC: 103 MMOL/L (ref 99–109)
CO2 SERPL-SCNC: 24 MMOL/L (ref 20–31)
CREAT BLD-MCNC: 1.06 MG/DL (ref 0.6–1.3)
CREAT BLDA-MCNC: 0.9 MG/DL (ref 0.6–1.3)
ERYTHROCYTE [DISTWIDTH] IN BLOOD BY AUTOMATED COUNT: 13.4 % (ref 11.3–14.5)
GFR SERPL CREATININE-BSD FRML MDRD: 53 ML/MIN/1.73
GLOBULIN UR ELPH-MCNC: 1.6 GM/DL
GLUCOSE BLD-MCNC: 256 MG/DL (ref 70–100)
HCT VFR BLD AUTO: 28.7 % (ref 34.5–44)
HGB BLD-MCNC: 10 G/DL (ref 11.5–15.5)
LYMPHOCYTES # BLD AUTO: 2.1 10*3/MM3 (ref 0.6–4.8)
LYMPHOCYTES NFR BLD AUTO: 32.5 % (ref 24–44)
MAGNESIUM SERPL-MCNC: 1.6 MG/DL (ref 1.3–2.7)
MCH RBC QN AUTO: 30.5 PG (ref 27–31)
MCHC RBC AUTO-ENTMCNC: 34.8 G/DL (ref 32–36)
MCV RBC AUTO: 87.6 FL (ref 80–99)
MONOCYTES # BLD AUTO: 0.7 10*3/MM3 (ref 0–1)
MONOCYTES NFR BLD AUTO: 11.5 % (ref 0–12)
NEUTROPHILS # BLD AUTO: 3.6 10*3/MM3 (ref 1.5–8.3)
NEUTROPHILS NFR BLD AUTO: 56 % (ref 41–71)
PLATELET # BLD AUTO: 368 10*3/MM3 (ref 150–450)
PMV BLD AUTO: 6.4 FL (ref 6–12)
POTASSIUM BLD-SCNC: 4.3 MMOL/L (ref 3.5–5.5)
PROT SERPL-MCNC: 5.8 G/DL (ref 5.7–8.2)
RBC # BLD AUTO: 3.28 10*6/MM3 (ref 3.89–5.14)
SODIUM BLD-SCNC: 136 MMOL/L (ref 132–146)
WBC NRBC COR # BLD: 6.5 10*3/MM3 (ref 3.5–10.8)

## 2018-12-05 PROCEDURE — 93971 EXTREMITY STUDY: CPT

## 2018-12-05 PROCEDURE — 85025 COMPLETE CBC W/AUTO DIFF WBC: CPT

## 2018-12-05 PROCEDURE — 25010000002 FOSAPREPITANT PER 1 MG: Performed by: INTERNAL MEDICINE

## 2018-12-05 PROCEDURE — 25010000002 CISPLATIN PER 50 MG: Performed by: INTERNAL MEDICINE

## 2018-12-05 PROCEDURE — 96365 THER/PROPH/DIAG IV INF INIT: CPT

## 2018-12-05 PROCEDURE — 96367 TX/PROPH/DG ADDL SEQ IV INF: CPT

## 2018-12-05 PROCEDURE — 25010000002 POTASSIUM CHLORIDE PER 2 MEQ OF POTASSIUM: Performed by: INTERNAL MEDICINE

## 2018-12-05 PROCEDURE — 99215 OFFICE O/P EST HI 40 MIN: CPT | Performed by: NURSE PRACTITIONER

## 2018-12-05 PROCEDURE — 96417 CHEMO IV INFUS EACH ADDL SEQ: CPT

## 2018-12-05 PROCEDURE — 96366 THER/PROPH/DIAG IV INF ADDON: CPT

## 2018-12-05 PROCEDURE — 96374 THER/PROPH/DIAG INJ IV PUSH: CPT

## 2018-12-05 PROCEDURE — 96375 TX/PRO/DX INJ NEW DRUG ADDON: CPT

## 2018-12-05 PROCEDURE — 82565 ASSAY OF CREATININE: CPT

## 2018-12-05 PROCEDURE — 93971 EXTREMITY STUDY: CPT | Performed by: INTERNAL MEDICINE

## 2018-12-05 PROCEDURE — 25010000002 MAGNESIUM SULFATE PER 500 MG OF MAGNESIUM: Performed by: INTERNAL MEDICINE

## 2018-12-05 PROCEDURE — 96360 HYDRATION IV INFUSION INIT: CPT

## 2018-12-05 PROCEDURE — 25010000002 FUROSEMIDE PER 20 MG: Performed by: NURSE PRACTITIONER

## 2018-12-05 PROCEDURE — 25010000002 ETOPOSIDE 500 MG/25ML SOLUTION 25 ML VIAL: Performed by: INTERNAL MEDICINE

## 2018-12-05 PROCEDURE — 25010000002 DEXAMETHASONE PER 1 MG: Performed by: INTERNAL MEDICINE

## 2018-12-05 PROCEDURE — 36592 COLLECT BLOOD FROM PICC: CPT

## 2018-12-05 PROCEDURE — 83735 ASSAY OF MAGNESIUM: CPT

## 2018-12-05 PROCEDURE — 80053 COMPREHEN METABOLIC PANEL: CPT

## 2018-12-05 PROCEDURE — 25010000002 PALONOSETRON PER 25 MCG: Performed by: INTERNAL MEDICINE

## 2018-12-05 PROCEDURE — 96413 CHEMO IV INFUSION 1 HR: CPT

## 2018-12-05 RX ORDER — DOCUSATE SODIUM 100 MG/1
100 CAPSULE, LIQUID FILLED ORAL 2 TIMES DAILY
Qty: 60 CAPSULE | Refills: 3 | Status: SHIPPED | OUTPATIENT
Start: 2018-12-05

## 2018-12-05 RX ORDER — LACTULOSE 10 G/15ML
20 SOLUTION ORAL 3 TIMES DAILY
Qty: 240 ML | Refills: 2 | Status: ON HOLD | OUTPATIENT
Start: 2018-12-05 | End: 2019-01-01 | Stop reason: SDUPTHER

## 2018-12-05 RX ORDER — SODIUM CHLORIDE 9 MG/ML
250 INJECTION, SOLUTION INTRAVENOUS ONCE
Status: COMPLETED | OUTPATIENT
Start: 2018-12-05 | End: 2018-12-05

## 2018-12-05 RX ORDER — FUROSEMIDE 10 MG/ML
20 INJECTION INTRAMUSCULAR; INTRAVENOUS ONCE
Status: COMPLETED | OUTPATIENT
Start: 2018-12-05 | End: 2018-12-05

## 2018-12-05 RX ORDER — PALONOSETRON 0.05 MG/ML
0.25 INJECTION, SOLUTION INTRAVENOUS ONCE
Status: COMPLETED | OUTPATIENT
Start: 2018-12-05 | End: 2018-12-05

## 2018-12-05 RX ORDER — DEXAMETHASONE 4 MG/1
8 TABLET ORAL
Qty: 24 TABLET | Refills: 0 | Status: SHIPPED | OUTPATIENT
Start: 2018-12-05 | End: 2019-01-01 | Stop reason: HOSPADM

## 2018-12-05 RX ORDER — NICOTINE 21 MG/24HR
1 PATCH, TRANSDERMAL 24 HOURS TRANSDERMAL EVERY 24 HOURS
COMMUNITY
End: 2019-01-01

## 2018-12-05 RX ADMIN — MAGNESIUM SULFATE HEPTAHYDRATE 1000 ML: 500 INJECTION, SOLUTION INTRAMUSCULAR; INTRAVENOUS at 11:08

## 2018-12-05 RX ADMIN — PALONOSETRON HYDROCHLORIDE 0.25 MG: 0.25 INJECTION, SOLUTION INTRAVENOUS at 10:50

## 2018-12-05 RX ADMIN — SODIUM CHLORIDE 150 MG: 9 INJECTION, SOLUTION INTRAVENOUS at 12:04

## 2018-12-05 RX ADMIN — MAGNESIUM SULFATE HEPTAHYDRATE 1000 ML: 500 INJECTION, SOLUTION INTRAMUSCULAR; INTRAVENOUS at 15:25

## 2018-12-05 RX ADMIN — FUROSEMIDE 20 MG: 10 INJECTION, SOLUTION INTRAMUSCULAR; INTRAVENOUS at 15:20

## 2018-12-05 RX ADMIN — SODIUM CHLORIDE 250 ML: 9 INJECTION, SOLUTION INTRAVENOUS at 10:48

## 2018-12-05 RX ADMIN — DEXAMETHASONE SODIUM PHOSPHATE 12 MG: 4 INJECTION, SOLUTION INTRAMUSCULAR; INTRAVENOUS at 12:42

## 2018-12-05 RX ADMIN — ETOPOSIDE 190 MG: 20 INJECTION, SOLUTION INTRAVENOUS at 13:09

## 2018-12-05 RX ADMIN — CISPLATIN 188 MG: 1 INJECTION INTRAVENOUS at 14:15

## 2018-12-05 NOTE — PROGRESS NOTES
SW met with pt during her infusion to provide support and resources.  Pt lives in [place], [place], about 2 hours away.  Pt stays with her daughter in [place] when she has medical appointments.  Pt states that she has a strong support system and has everything she needs at home.  SW provided support to pt and informed her of the role of oncology social work.  SW provided contact information for future needs or concerns.  SW available for ongoing support and resource needs.

## 2018-12-05 NOTE — PROGRESS NOTES
CHEMOTHERAPY PREPARATION    Shauna Valencia  [id number]  1955    Chief Complaint: No chief complaint on file.      History of present illness:  Shauna Valencia is a 63 y.o. year old female who is here today for chemotherapy preparation and needs assessment. The patient has been diagnosed with small cell lung cancer and is scheduled to begin treatment with cisplatin/etoposide.     Oncology History:     No history exists.       Past Medical History:   Diagnosis Date   • Diabetes mellitus (CMS/HCC)    • Hyperlipidemia    • Pancreatitis        Past Surgical History:   Procedure Laterality Date   • CAROTID STENT     • COLONOSCOPY     • UPPER GASTROINTESTINAL ENDOSCOPY         MEDICATIONS: The current medication list was reviewed and reconciled.     Allergies:  is allergic to plavix [clopidogrel bisulfate]; codeine; and penicillins.    Family History   Problem Relation Age of Onset   • Colon polyps Mother    • Ulcerative colitis Sister          Review of Systems   Constitutional: Positive for fatigue. Negative for fever and unexpected weight change.   HENT: Negative for congestion, hearing loss, sore throat and trouble swallowing.    Eyes: Negative for visual disturbance.   Respiratory: Negative for cough, shortness of breath and wheezing.    Cardiovascular: Positive for leg swelling. Negative for chest pain.        Right leg pain   Gastrointestinal: Positive for constipation. Negative for abdominal distention, abdominal pain, diarrhea, nausea and vomiting.   Endocrine: Negative.    Genitourinary: Negative.    Musculoskeletal: Negative for arthralgias, back pain and gait problem.   Skin: Negative.         alopecia   Allergic/Immunologic: Negative.    Neurological: Negative for dizziness, weakness, numbness and headaches.   Hematological: Negative for adenopathy. Does not bruise/bleed easily.   Psychiatric/Behavioral: Negative.    All other systems reviewed and are negative.      Physical Exam  Vital Signs: /72    "Pulse 84   Temp 97.8 °F (36.6 °C) (Temporal)   Resp 18   Ht 154.9 cm (60.98\")   Wt 90.5 kg (199 lb 8 oz)   SpO2 96%   BMI 37.71 kg/m²    General Appearance:  alert, cooperative, no apparent distress and appears stated age   Neurologic/Psychiatric: A&O x 3, gait steady, appropriate affect   HEENT:  Normocephalic, without obvious abnormality, mucous membranes moist   Lungs:   Clear to auscultation bilaterally; respirations regular, even, and unlabored bilaterally   Heart:  Regular rate and rhythm, no murmurs appreciated   Extremities: Normal, atraumatic; no clubbing, cyanosis, or edema    Skin: No rashes, lesions, or abnormal coloration noted     ECOG Performance Status: (1) Restricted in physically strenuous activity, ambulatory and able to do work of light nature          NEEDS ASSESSMENTS    Genetics  The patient's new diagnosis and family history have been reviewed for genetic counseling needs. A genetic referral is not recommended.     Psychosocial  The patient has completed a PHQ-9 Depression Screening and the Distress Thermometer (DT) today.   PHQ-9 results show 1-4 (Minimal Depression). The patient scored their distress today as 8 on a scale of 0-10 with 0 being no distress and 10 being extreme distress.   Problems marked by the patient as being an issue for them within the last week include physical problems.   Results were reviewed along with psychosocial resources offered by our cancer center. Our oncology social worker will be flagged for a DT score of 4 or above, and a same day call will be made for a score of 9 or 10. A mental health referral is not recommended at this time. The patient is not accepting of a referral to [facility].   Copies of patient's questionnaires will be scanned into EMR for details and further reference.    Barriers to care  A barriers form was also completed by the patient today. We discussed services offered by our facility to help her have adequate access to care. " "The patient was given the name and card for our Oncology Social Worker, Michell Vallejo. Based upon barriers assessment today, the patient will require a follow-up call from the  to further discuss needs.   A copy of the barriers form will also be scanned into EMR for details and further reference.     VAD Assessment  The patient and I discussed planned intervenous chemotherapy as well as other IV treatments that are often needed throughout the course of treatment. These may include, but are not limited to blood transfusions, antibiotics, and IV hydration. The vasculature does not appear to be adequate for multiple peripheral IVs throughout their treatment course. Discussed risks and benefits of VADs. The patient has a PICC line in place for venous access. She is having dressing changes completed through home health weekly.     Advanced Care Planning  The patient and I discussed advanced care planning, \"Conversations that Matter\".   This service was offered, free of charge, for development of advance directives with a certified ACP facilitator.  The patient does not have an up-to-date advanced directive. This document is not on file with our office. The patient is not interested in an appointment with one of our facilitators to create or update their advanced directives.      Palliative Care  The patient and I discussed palliative care services. Palliative care is not the same as Hospice care. This is specialized medical care for people living with serious illness with the goal of improving quality of life for the patient and their family. [facility] has partnered with [facility] to offer our patients outpatient palliative care early along with their treatment to assist in coordination of care, symptom management, pain management, and medical decision making.  Oncology criteria for palliative care referral is not met at this time. The patient is not interested in a palliative care " consultation.     Additional Referral needs  none      CHEMOTHERAPY EDUCATION    Booklets Given: Chemotherapy and You [x]  Eating Hints [x]    Sexuality/Fertility Books []      Chemotherapy/Biotherapy Education Sheets: (list all that apply)  nausea management, acid reflux management, diarrhea management, Cancer resourse contacts information, skin and mouth care, vaccination information and wig information                                                                                                                                                                 Chemotherapy Regimen:   Treatment Plans     Name Type Plan dates Plan Provider         Active    OP LUNG CISplatin 100 mg/m2 / Etoposide Q21D ONCOLOGY TREATMENT  11/13/2018 - Present Desmond Cardoso MD                    TOPICS EDUCATION PROVIDED COMMENTS   ANEMIA:  role of RBC, cause, s/s, ways to manage, role of transfusion [x]    THROMBOCYTOPENIA:  role of platelet, cause, s/s, ways to prevent bleeding, things to avoid, when to seek help [x]    NEUTROPENIA:  role of WBC, cause, infection precautions, s/s of infection, when to call MD [x]    NUTRITION & APPETITE CHANGES:  importance of maintaining healthy diet & weight, ways to manage to improve intake, dietary consult, exercise regimen [x]    DIARRHEA:  causes, s/s of dehydration, ways to manage, dietary changes, when to call MD [x]    CONSTIPATION:  causes, ways to manage, dietary changes, when to call MD [x] Rx fir Lactulose and Colace sent to pharmacy, discussed the improtance of daily bowel regimen for constipation, she will add Miralax daily and prune juice as needed    NAUSEA & VOMITING:  cause, use of antiemetics, dietary changes, when to call MD [x]    MOUTH SORES:  causes, oral care, ways to manage [x] Pt will continue use of magic mouthwash and start salt/soda rinses for oral care.    ALOPECIA:  cause, ways to manage, resources [x]    INFERTILITY & SEXUALITY:  causes, fertility preservation  options, sexuality changes, ways to manage, importance of birth control []    NERVOUS SYSTEM CHANGES:  causes, s/s, neuropathies, cognitive changes, ways to manage [x]    PAIN:  causes, ways to manage [x] ????   SKIN & NAIL CHANGES:  cause, s/s, ways to manage [x]    ORGAN TOXICITIES:  cause, s/s, need for diagnostic tests, labs, when to notify MD [x]    SURVIVORSHIP:  distress, distress assessment, secondary malignancies, early/late effects, follow-up, social issues, social support [x]    HOME CARE:  use of spill kits, storing of PO chemo, how to manage bodily fluids [x]    MISCELLANEOUS:  drug interactions, administration, vesicant, et [x] Continued need for PICC line care weekly at home through home health.        Assessment and Plan:    Diagnoses and all orders for this visit:    Swelling of right lower extremity  -     Duplex Venous Lower Extremity - Right CAR; Future    Small cell lung cancer, left upper lobe (CMS/HCC)  -     Duplex Venous Lower Extremity - Right CAR; Future    Other orders  -     lactulose (CHRONULAC) 10 GM/15ML solution; Take 30 mL by mouth 3 (Three) Times a Day. PRN constipation  -     dexamethasone (DECADRON) 4 MG tablet; Take 2 tablets by mouth Daily With Breakfast. On days 2, 3, and 4 after chemotherapy  -     docusate sodium (COLACE) 100 MG capsule; Take 1 capsule by mouth 2 (Two) Times a Day. Two capusles        This was a 50 minute face-to-face visit with 50 minutes spent in  counseling and coordination of care as documented above.   The patient and I have reviewed their new cancer diagnosis and scheduled treatment plan. Needs assessment was completed including genetics, psychosocial needs, barriers to care, VAD evaluation, advanced care planning, and palliative care services. Referrals have been ordered as appropriate based upon our evaluation and patient desires.     Chemotherapy teaching was also completed today as documented above. Adequate time was given to answer all questions  to her satisfaction. Patient and family are aware of their care team members and contact information if they have questions or problems throughout the treatment course. Needs assessments and education has been completed. The patient is adequately prepared to begin treatment as scheduled.     RX sent in for patient for Lactulose to be used three times per day as needed for constipation as well as Colace 100 mg twice a day for constipation. I encouraged her to add Miralax daily as well as high fiber diet and increased fluid intake to help with bowel function.     We will order right lower extremity duplex doppler to evaluate for possible DVT due to patient complaints of pain and swelling in her right lower leg with history of blood clots to this extremity. This will be scheduled today or tomorrow for evaluation.     She will meet with Dr. Huggins today for treatment planning for adjuvant radiation.     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     She will continue home health for weekly PICC line flushes. She will have PICC care done through the outpatient infusion center this week for infusion.     RX for Dexamethasone 4 mg tabs take 2 in AM on days 2, 3, and 4 following chemotherapy # 24 0 refills sent to pharmacy.     Olive Trujillo, APRN

## 2018-12-05 NOTE — PLAN OF CARE
Outpatient Infusion • [address] • [address] • [place] [id number] • [phone]      CHEMOTHERAPY EDUCATION SHEET    NAME:  Shauna Valencia      : 1955           DATE: 18    Booklets Given: Chemotherapy and You []  Eating Hints []    Sexuality/Fertility Books []     Chemotherapy/Biotherapy Education Sheets: (list all that apply)  [facility]                                                                                                                                                                Chemotherapy Regimen:  Cisplatin/etoposide (Cycle 2: 18)    TOPICS EDUCATION PROVIDED EDUCATION REINFORCED COMMENTS   ANEMIA:  role of RBC, cause, s/s, ways to manage, role of transfusion [x] [] Discussed role of RBCs, s/s, ways to manage.   THROMBOCYTOPENIA:  role of platelet, cause, s/s, ways to prevent bleeding, things to avoid, when to seek help [x] [] Discussed role of PLTs, risk of increasing bruising, prolonged bleeding, and when to seek medical attention.   NEUTROPENIA:  role of WBC, cause, infection precautions, s/s of infection, when to call MD [x] [] Discussed role of WBCs, s/s, definition of fever, infection prevention methods, and when to seek medical attention.   NUTRITION & APPETITE CHANGES:  importance of maintaining healthy diet & weight, ways to manage to improve intake, dietary consult, exercise regimen [] []    DIARRHEA:  causes, s/s of dehydration, ways to manage, dietary changes, when to call MD [] []    CONSTIPATION:  causes, ways to manage, dietary changes, when to call MD [x] [] Discussed ways to manage; patient has new rx for docusate and lactulose.    NAUSEA & VOMITING:  cause, use of antiemetics, dietary changes, when to call MD [x] [] Discussed cause and pharmacologic prevention/treatment.   MOUTH SORES:  causes, oral care, ways to manage [x] [] Discussed causes and ways to manage.  Patient has new rx for Magic mouthwash.  Provided recipe for salt/baking soda  rinses.   ALOPECIA:  cause, ways to manage, resources [x] [] Discussed onset and resources. Patient has two wigs.    INFERTILITY & SEXUALITY:  causes, fertility preservation options, sexuality changes, ways to manage, importance of birth control [] []    NERVOUS SYSTEM CHANGES:  causes, s/s, neuropathies, cognitive changes, ways to manage [x] [] Discussed risk of peripheral neuropathy and when to seek medical attention.   PAIN:  causes, ways to manage [] [] ????   SKIN & NAIL CHANGES:  cause, s/s, ways to manage [] []    ORGAN TOXICITIES:  cause, s/s, need for diagnostic tests, labs, when to notify MD [] []    SURVIVORSHIP:  distress, distress assessment, secondary malignancies, early/late effects, follow-up, social issues, social support [] []    HOME CARE:  use of spill kits, storing of PO chemo, how to manage bodily fluids [] []    MISCELLANEOUS:  drug interactions, administration, vesicant, et [x] [] Discussed risk of infusion reaction, s/s, and pharmacologic prevention.      Referrals:        Notes:   Provided chemocare sheets. Patient endorsed understanding and all questions were answered. Conducted medication  history and updated patient chart accordingly.

## 2018-12-06 ENCOUNTER — OFFICE VISIT (OUTPATIENT)
Dept: RADIATION ONCOLOGY | Facility: HOSPITAL | Age: 63
End: 2018-12-06

## 2018-12-06 ENCOUNTER — APPOINTMENT (OUTPATIENT)
Dept: ONCOLOGY | Facility: HOSPITAL | Age: 63
End: 2018-12-06

## 2018-12-06 ENCOUNTER — HOSPITAL ENCOUNTER (OUTPATIENT)
Dept: RADIATION ONCOLOGY | Facility: HOSPITAL | Age: 63
Setting detail: RADIATION/ONCOLOGY SERIES
Discharge: HOME | End: 2018-12-06

## 2018-12-06 ENCOUNTER — TELEPHONE (OUTPATIENT)
Dept: ONCOLOGY | Facility: CLINIC | Age: 63
End: 2018-12-06

## 2018-12-06 ENCOUNTER — INFUSION (OUTPATIENT)
Dept: ONCOLOGY | Facility: HOSPITAL | Age: 63
End: 2018-12-06

## 2018-12-06 VITALS
RESPIRATION RATE: 18 BRPM | WEIGHT: 197.8 LBS | TEMPERATURE: 96.7 F | BODY MASS INDEX: 37.39 KG/M2 | DIASTOLIC BLOOD PRESSURE: 92 MMHG | OXYGEN SATURATION: 94 % | SYSTOLIC BLOOD PRESSURE: 179 MMHG | HEART RATE: 98 BPM

## 2018-12-06 VITALS
DIASTOLIC BLOOD PRESSURE: 73 MMHG | WEIGHT: 199 LBS | BODY MASS INDEX: 37.57 KG/M2 | HEIGHT: 61 IN | HEART RATE: 100 BPM | TEMPERATURE: 97.2 F | RESPIRATION RATE: 18 BRPM | SYSTOLIC BLOOD PRESSURE: 146 MMHG

## 2018-12-06 DIAGNOSIS — C34.12 SMALL CELL LUNG CANCER, LEFT UPPER LOBE (HCC): Primary | ICD-10-CM

## 2018-12-06 DIAGNOSIS — C34.12 SMALL CELL LUNG CANCER, LEFT UPPER LOBE (HCC): ICD-10-CM

## 2018-12-06 LAB — GLUCOSE BLDC GLUCOMTR-MCNC: 506 MG/DL (ref 70–130)

## 2018-12-06 PROCEDURE — G0463 HOSPITAL OUTPT CLINIC VISIT: HCPCS | Performed by: RADIOLOGY

## 2018-12-06 PROCEDURE — 25010000002 ETOPOSIDE 1 GM/50ML SOLUTION 50 ML VIAL: Performed by: INTERNAL MEDICINE

## 2018-12-06 PROCEDURE — 96413 CHEMO IV INFUSION 1 HR: CPT

## 2018-12-06 PROCEDURE — 82962 GLUCOSE BLOOD TEST: CPT

## 2018-12-06 RX ORDER — HYDROCODONE BITARTRATE AND ACETAMINOPHEN 7.5; 325 MG/1; MG/1
1-2 TABLET ORAL
COMMUNITY
Start: 2015-12-31 | End: 2019-01-01

## 2018-12-06 RX ORDER — SODIUM CHLORIDE 9 MG/ML
250 INJECTION, SOLUTION INTRAVENOUS ONCE
Status: COMPLETED | OUTPATIENT
Start: 2018-12-06 | End: 2018-12-06

## 2018-12-06 RX ADMIN — ETOPOSIDE 190 MG: 20 INJECTION, SOLUTION, CONCENTRATE INTRAVENOUS at 10:34

## 2018-12-06 RX ADMIN — SODIUM CHLORIDE 250 ML: 9 INJECTION, SOLUTION INTRAVENOUS at 10:33

## 2018-12-06 NOTE — PROGRESS NOTES
CONSULTATION NOTE      :                                                          1955  DATE OF CONSULTATION:                       2018   REQUESTING PHYSICIAN:                   Desmond Cardoso MD  REASON FOR CONSULTATION:           Small Cell Lung Cancer  Cancer Staging  Stage IIIA (cT1b, cN2, cM0)    Thank you for requesting my services in evaluation of this pleasant individual.  I am seeing them in outpatient consultation regarding a diagnosis of small cell lung cancer.     BRIEF HISTORY:  The patient is a very pleasant 63 y.o. female  with multiple medical comorbidities including coronary artery disease, type 2 diabetes, hyperlipidemia, and COPD who presented on  with symptoms of shortness of breath and fatigue and was found to have mediastinal lymphadenopathy and a left upper lobe primary tumor consistent with malignancy and hyponatremia with a sodium of 117, suggesting SIADH.  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  She completed a staging workup including an MRI of the brain as well as a CT of the abdomen pelvis that has demonstrated no evidence of disease outside of the chest.  She was therefore considered to have limited stage small cell lung cancer, and began chemotherapy with cisplatin and etoposide on November 15, 2018.  She tolerated her first cycle well and her initial symptoms have returned back to baseline.  She started cycle 2 on 2018 (yesterday), and she is presenting to my clinic today for evaluation in a discussion regarding consolidative radiation therapy to the chest.  The only complaints she has today is related to fatigue.  She reports that her nausea is well controlled.  She denies any neurological symptoms.  Her most recent sodium  was 136, which was checked yesterday.    Allergies   Allergen Reactions   • Plavix [Clopidogrel Bisulfate] Anaphylaxis   • Codeine Other (See Comments)     Pt not certain of reatction   • Penicillins Other (See Comments)     Pt does not remember reaction       Social History     Socioeconomic History   • Marital status:      Spouse name: Not on file   • Number of children: Not on file   • Years of education: Not on file   • Highest education level: Not on file   Tobacco Use   • Smoking status: Former Smoker     Packs/day: 0.50     Types: Cigarettes   • Smokeless tobacco: Never Used   • Tobacco comment: quit 10/31/18   Substance and Sexual Activity   • Alcohol use: No   • Drug use: No   • Sexual activity: Defer       Past Medical History:   Diagnosis Date   • Arthritis    • Asthma    • COPD (chronic obstructive pulmonary disease) (CMS/HCC)    • Coronary artery disease     5 stents   • Diabetes mellitus (CMS/HCC)    • Disease of thyroid gland    • DVT (deep venous thrombosis) (CMS/HCC)     this year- per pt   • GERD (gastroesophageal reflux disease)    • Hyperlipidemia    • Lung cancer (CMS/HCC)    • Myocardial infarction (CMS/HCC)    • Pancreatitis    • Pancreatitis        family history includes Colon polyps in her mother; Ulcerative colitis in her sister.     Past Surgical History:   Procedure Laterality Date   • APPENDECTOMY     • CAROTID STENT      5 total   • CHOLECYSTECTOMY     • COLON SURGERY     • COLONOSCOPY     • HERNIA REPAIR     • HYSTERECTOMY     • OTHER SURGICAL HISTORY      bladder sling   • THYROIDECTOMY      1994   • TONSILLECTOMY     • TOTAL HIP ARTHROPLASTY Left    • UPPER GASTROINTESTINAL ENDOSCOPY          Review of Systems   Respiratory: Positive for shortness of breath (with exertion).    Cardiovascular: Positive for leg swelling (bilateral LE).   Gastrointestinal: Positive for constipation.   Neurological: Positive for headaches.   Hematological: Bruises/bleeds easily.    Psychiatric/Behavioral: The patient is nervous/anxious.    All other systems reviewed and are negative.          Objective   VITAL SIGNS:   Vitals:    12/06/18 0907   BP: 179/92   Pulse: 98   Resp: 18   Temp: 96.7 °F (35.9 °C)   TempSrc: Temporal   SpO2: 94%   Weight: 89.7 kg (197 lb 12.8 oz)   PainSc: 0-No pain        Karnofsky score: 80        Physical Exam   Constitutional: She is oriented to person, place, and time. She appears well-developed and well-nourished. No distress.   HENT:   Head: Normocephalic and atraumatic.   Mouth/Throat: Oropharynx is clear and moist.   Chemotherapy related alopecia   Eyes: Conjunctivae and EOM are normal. Pupils are equal, round, and reactive to light.   Neck: Normal range of motion. Neck supple.   Cardiovascular: Normal rate and regular rhythm. Exam reveals no friction rub.   No murmur heard.  Pulmonary/Chest: Effort normal and breath sounds normal. She has no wheezes.   Good air movement bilaterally   Abdominal: Soft. Bowel sounds are normal. She exhibits no distension and no mass. There is no tenderness.   Musculoskeletal: Normal range of motion. She exhibits no edema.   Lymphadenopathy:     She has no cervical adenopathy.   Neurological: She is alert and oriented to person, place, and time.   Skin: Skin is warm and dry.   Psychiatric: She has a normal mood and affect. Her behavior is normal. Judgment and thought content normal.   Nursing note and vitals reviewed.    IMAGING  I have personally reviewed the relevant imaging studies, as follows:  Ct Chest Without Contrast    Result Date: 10/31/2018  1. Findings concerning for LEFT HILAR MALIGNANT SOFT TISSUE MASS with postobstructive focal pneumonia/bronchiolitis in the LEFT upper lobe. 2. Likely METASTATIC mediastinal and possibly LEFT hilar lymph nodes. 3. Coronary arterial calcification suggestive of CAD. 4. Other nonemergent/incidental findings as described. THIS DOCUMENT HAS BEEN ELECTRONICALLY SIGNED BY BATOOL SAMANO  MD    Ct Chest With Contrast    Result Date: 11/8/2018   1. Small nodule in the left upper lobe could represent primary neoplasm. 2. Metastatic left hilar and mediastinal lymph nodes. 3. Narrowing of the left hilar vessels.  D:  11/08/2018 E:  11/08/2018  This report was finalized on 11/8/2018 3:37 PM by Ap Wright.      Mri Brain With & Without Contrast    Result Date: 11/14/2018  No evidence of brain metastases.  D:  11/14/2018 E:  11/14/2018   This report was finalized on 11/14/2018 10:09 AM by Ap Wright.      Nm Bone Scan Whole Body    Result Date: 11/15/2018  Arthritic and postoperative changes described above. There is no evidence of metastatic disease.  DICTATED:   11/14/2018 EDITED/ls :   11/14/2018    This report was finalized on 11/15/2018 8:24 AM by Dr. Julián Barrios MD.      Ct Abdomen Pelvis With Contrast    Result Date: 11/14/2018  No evidence of metastatic disease in the abdomen or pelvis.  D:  11/14/2018 E:  11/14/2018  This report was finalized on 11/14/2018 10:09 AM by Ap Wright.       PATHOLOGY  Bronchoscopy and Biopsy 11/9/2018:  1. STATION 4L TBNA:  Lymph node with metastatic small cell neuroendocrine carcinoma, see Microscopic Description.   2. FLOW CYTOMETRY:  Not performed.   3. LEFT MAINSTEM TBNA:  Small cell neuroendocrine carcinoma.   4. STATION 4L TBNA:  Lymph node with metastatic small cell neuroendocrine carcinoma.   5. LEFT LUNG UPPER LOBE TBNA:  Small cell neuroendocrine carcinoma.   6. LEFT LUNG UPPER LOBE, ENDOBRONCHIAL BIOPSY:  Fragments of benign bronchial mucosa.  Negative for malignancy.       The following portions of the patient's history were reviewed and updated as appropriate: allergies, current medications, past family history, past medical history, past social history, past surgical history and problem list.    Assessment  Mrs. Elias is a 63-year-old female with a limited stage small cell lung cancer involving the left upper lobe of the lung and mediastinum.  She  has already started systemic chemotherapy and will need to receive concurrent radiation therapy to the chest.  I discussed both the long course and short course radiation options with her today.  After full explanation of all risks and benefits, she has elected to undergo short course radiation, which will consist of 45 [place] delivered in 30 fractions twice daily, for 15 days total.  She signed informed consent today.  I will have her return to my clinic tomorrow in order to undergo a CT simulation and treatment planning session with the anticipation that she will begin radiation treatments concurrent with her third cycle of chemotherapy which is scheduled to begin on December 26, 2018.  I also briefly discussed the role of prophylactic cranial irradiation with her today, and once she completes all of her systemic therapy, I will reevaluate her for this additional treatment.    RECOMMENDATIONS:      Return in about 1 day (around 12/7/2018) for Radiation Simulation.  I will aim to have her radiation treatments ready to begin concurrent with cycle 3 of chemotherapy.    Shauna was seen today for lung cancer.    Diagnoses and all orders for this visit:    Small cell lung cancer, left upper lobe (CMS/HCC)    Thank you for allowing me to participate in the care of this individual.    Sincerely,       Ramone Huggins MD

## 2018-12-06 NOTE — TELEPHONE ENCOUNTER
"Returned called to patient and educated on dexamethasone possible elevation of blood sugar.  Will discuss with Dr Cardoso and Estela CASTRO upon their return to clinic.  Patient educated to call PCP and ask about adjustment of diabetic medication.  Patient reported that she did call PCP today and was told to take \"another shot\" advised to call back and discuss how to manage the days that she is taking her dexamethasone with treatment.    "

## 2018-12-06 NOTE — TELEPHONE ENCOUNTER
----- Message from Emilie Castro sent at 12/6/2018  4:07 PM EST -----  Regarding: JUSTIN- SUGAR WAS HIGH  Contact: [phone]  PATIENT CALLED AND SAID SHE HAD AN EPISODE TODAY AFTER CHEMO AND HER SUGAR .  IS IT DUE TO THE MEDICATION SHE WAS PRESCRIBED FOR HER BOWELS

## 2018-12-06 NOTE — PROGRESS NOTES
Oncology Nutrition Screening    Patient Name:  Shauna Valencia  YOB: 1955  MRN: [id number]  Date:  12/06/18  Physician:  Dr. Cardoso / Dr. Huggins    Type of Cancer Treatment:   Radiation/Cyberknife: consult 12/6/18  Chemotherapy:  Cisplatin(D1) / Etoposide(D1-3) - every 21 days     Patient Active Problem List   Diagnosis   • Acute hyponatremia   • Hyperlipidemia   • Type 2 diabetes mellitus (CMS/HCC)   • Tobacco abuse   • Obesity (BMI 30-39.9)   • Mass of upper lobe of left lung   • Mediastinal lymphadenopathy   • Small cell lung cancer, left upper lobe (CMS/HCC)       Current Outpatient Medications   Medication Sig Dispense Refill   • albuterol (PROVENTIL HFA;VENTOLIN HFA) 108 (90 Base) MCG/ACT inhaler Inhale 2 puffs Every 4 (Four) Hours As Needed for Wheezing.     • aspirin 81 MG EC tablet Take 81 mg by mouth Daily.     • carvedilol (COREG) 6.25 MG tablet Take 6.25 mg by mouth 2 (Two) Times a Day With Meals.     • cetirizine (zyrTEC) 10 MG tablet Take 10 mg by mouth Daily.     • dexamethasone (DECADRON) 4 MG tablet Take 2 tablets by mouth Daily With Breakfast. On days 2, 3, and 4 after chemotherapy 24 tablet 0   • docusate sodium (COLACE) 100 MG capsule Take 1 capsule by mouth 2 (Two) Times a Day. 60 capsule 3   • famotidine (PEPCID) 20 MG tablet Take 20 mg by mouth 2 (Two) Times a Day.     • HYDROcodone-acetaminophen (NORCO) 7.5-325 MG per tablet Take 1-2 tablets by mouth.     • insulin glargine (LANTUS) 100 UNIT/ML injection Inject 10 Units under the skin into the appropriate area as directed Daily.     • lactulose (CHRONULAC) 10 GM/15ML solution Take 30 mL by mouth 3 (Three) Times a Day as needed for constipation 240 mL 2   • levothyroxine (SYNTHROID, LEVOTHROID) 75 MCG tablet Take 75 mcg by mouth Daily.     • nicotine (NICODERM CQ) 14 MG/24HR patch Place 1 patch on the skin as directed by provider Daily.     • nystatin susp + lidocaine viscous (MAGIC MOUTHWASH) oral suspension 5-10 ml swish and  spit or swallow QID prn 240 mL 3   • ondansetron (ZOFRAN) 4 MG tablet Take 1 tablet by mouth Every 8 (Eight) Hours As Needed for Nausea or Vomiting. 30 tablet 0   • ondansetron (ZOFRAN) 8 MG tablet Take 1 tablet by mouth 3 (Three) Times a Day As Needed for Nausea or Vomiting. 30 tablet 5   • pantoprazole (PROTONIX) 40 MG EC tablet Take 40 mg by mouth Daily.     • REPATHA SURECLICK 140 MG/ML solution auto-injector Inject 140 mg under the skin into the appropriate area as directed Every 14 (Fourteen) Days.     • SITagliptin (JANUVIA) 100 MG tablet Take 100 mg by mouth Daily.     • temazepam (RESTORIL) 7.5 MG capsule Take 1 capsule by mouth At Night As Needed for Sleep or Anxiety. 3 capsule 0   • ticagrelor (BRILINTA) 60 MG tablet tablet Take 60 mg by mouth Daily.     • tolvaptan (SAMSCA) 15 MG tablet tablet Take 0.5 tablets by mouth Daily. 30 tablet 0     No current facility-administered medications for this visit.      Facility-Administered Medications Ordered in Other Visits   Medication Dose Route Frequency Provider Last Rate Last Dose   • etoposide (TOPOSAR) 190 mg in sodium chloride 0.9 % 559.5 mL chemo IVPB  100 mg/m2 (Treatment Plan Recorded) Intravenous Once Desmond Cardoso  mL/hr at 12/06/18 1034 190 mg at 12/06/18 1034       Glycemic Risk:   IDDM, Steriods    Weight:   Height: 61 inches  Weight: 199 lbs.  Usual Body Weight: same lbs.   BMI: 37.7  Obese  Weight has been stable    Oral Food Intake:  Regular Diet - No Restrictions    Hydration Status:   How many 8 ounce glass of water of fluid do you drink per day?  Specific amount not assessed at this time.    Enteral Feeding:   n/a    Nutrition Symptoms:   Mouth Sores  Constipation    Activity:   Not assessed at this time.     reports that she has quit smoking. Her smoking use included cigarettes. She smoked 0.50 packs per day. she has never used smokeless tobacco. She reports that she does not drink alcohol or use drugs.    Evaluation of Nutritional  "Risk:   Patient has been identified at nutritional risk due to diagnosis, treatment plan, and nutrition impact symptoms.  Met with patient during her chemo infusion appointment (D1,C2) - first cycle while inpatient at [facility].  She reports tolerating her 1st cycle fairly well but does report mouth sores and constipation.  She also reports seeing her primary care physician recently and has started on Lantus to aid with blood glucose control.    Provided and reviewed written diet material \"Blood Glucose Management\".  Discussed diabetes diet basics; steroids and its effects on blood sugar; and the importance of monitoring sugars at home - encouraged her to continue checking twice daily and to report elevated readings to her primary care physician.    Discussed the importance of good nutrition during her treatment course focusing on adequate calorie, protein, nutrient and fluid intake.  Advised her to be consuming smaller more frequent meals/snacks throughout the day to aid with potential nausea management.  Emphasized the importance of protein and its role in the diet; reviewed high protein foods; and recommended she have a protein source at each meal/snack.  Also emphasized the importance of hydration; reviewed good hydrating fluid options; and recommended she drink at least 64 ounces daily.  Instructed her to use the baking soda/salt solution multiple times throughout the day to aid oral care.  Reviewed the ACS nutrition booklet and suggested she use it to aid with symptom management and offered tips to aid with constipation.    Answered her questions and she voiced understanding of information discussed.  RD's contact information provided and encouraged her to call with questions.  Will monitor as needed during her treatment course.    Electronically signed by:  Amelie Briggs RD  11:06 AM  "

## 2018-12-07 ENCOUNTER — INFUSION (OUTPATIENT)
Dept: ONCOLOGY | Facility: HOSPITAL | Age: 63
End: 2018-12-07

## 2018-12-07 ENCOUNTER — HOSPITAL ENCOUNTER (OUTPATIENT)
Dept: RADIATION ONCOLOGY | Facility: HOSPITAL | Age: 63
Discharge: HOME | End: 2018-12-07

## 2018-12-07 VITALS
HEART RATE: 93 BPM | WEIGHT: 198 LBS | TEMPERATURE: 97.8 F | BODY MASS INDEX: 37.38 KG/M2 | RESPIRATION RATE: 18 BRPM | DIASTOLIC BLOOD PRESSURE: 79 MMHG | SYSTOLIC BLOOD PRESSURE: 181 MMHG | HEIGHT: 61 IN

## 2018-12-07 DIAGNOSIS — C34.12 SMALL CELL LUNG CANCER, LEFT UPPER LOBE (HCC): Primary | ICD-10-CM

## 2018-12-07 PROCEDURE — 25010000002 ETOPOSIDE 500 MG/25ML SOLUTION 25 ML VIAL: Performed by: INTERNAL MEDICINE

## 2018-12-07 PROCEDURE — 77290 THER RAD SIMULAJ FIELD CPLX: CPT | Performed by: RADIOLOGY

## 2018-12-07 PROCEDURE — 25010000002 ONDANSETRON PER 1 MG: Performed by: NURSE PRACTITIONER

## 2018-12-07 PROCEDURE — 96413 CHEMO IV INFUSION 1 HR: CPT

## 2018-12-07 PROCEDURE — 77470 SPECIAL RADIATION TREATMENT: CPT | Performed by: RADIOLOGY

## 2018-12-07 PROCEDURE — 96375 TX/PRO/DX INJ NEW DRUG ADDON: CPT

## 2018-12-07 PROCEDURE — 96377 APPLICATON ON-BODY INJECTOR: CPT

## 2018-12-07 PROCEDURE — 25010000002 DEXAMETHASONE PER 1 MG: Performed by: NURSE PRACTITIONER

## 2018-12-07 PROCEDURE — 25010000002 PEGFILGRASTIM 6 MG/0.6ML PREFILLED SYRINGE KIT: Performed by: INTERNAL MEDICINE

## 2018-12-07 RX ORDER — ONDANSETRON 2 MG/ML
12 INJECTION INTRAMUSCULAR; INTRAVENOUS ONCE
Status: COMPLETED | OUTPATIENT
Start: 2018-12-07 | End: 2018-12-07

## 2018-12-07 RX ORDER — DEXAMETHASONE SODIUM PHOSPHATE 4 MG/ML
4 INJECTION, SOLUTION INTRA-ARTICULAR; INTRALESIONAL; INTRAMUSCULAR; INTRAVENOUS; SOFT TISSUE ONCE
Status: COMPLETED | OUTPATIENT
Start: 2018-12-07 | End: 2018-12-07

## 2018-12-07 RX ADMIN — ETOPOSIDE 190 MG: 20 INJECTION, SOLUTION INTRAVENOUS at 13:28

## 2018-12-07 RX ADMIN — PEGFILGRASTIM 6 MG: KIT SUBCUTANEOUS at 14:33

## 2018-12-07 RX ADMIN — ONDANSETRON 12 MG: 2 INJECTION, SOLUTION INTRAMUSCULAR; INTRAVENOUS at 13:08

## 2018-12-07 RX ADMIN — DEXAMETHASONE SODIUM PHOSPHATE 4 MG: 4 INJECTION, SOLUTION INTRAMUSCULAR; INTRAVENOUS at 13:08

## 2018-12-11 ENCOUNTER — READMISSION MANAGEMENT (OUTPATIENT)
Dept: CALL CENTER | Facility: HOSPITAL | Age: 63
End: 2018-12-11

## 2018-12-11 ENCOUNTER — TELEPHONE (OUTPATIENT)
Dept: ONCOLOGY | Facility: CLINIC | Age: 63
End: 2018-12-11

## 2018-12-11 DIAGNOSIS — C34.12 SMALL CELL LUNG CANCER, LEFT UPPER LOBE (HCC): ICD-10-CM

## 2018-12-11 RX ORDER — PROMETHAZINE HYDROCHLORIDE 25 MG/1
25 TABLET ORAL EVERY 6 HOURS PRN
Qty: 45 TABLET | Refills: 5 | Status: SHIPPED | OUTPATIENT
Start: 2018-12-11 | End: 2019-01-01 | Stop reason: SDUPTHER

## 2018-12-11 RX ORDER — ONDANSETRON HYDROCHLORIDE 8 MG/1
8 TABLET, FILM COATED ORAL 3 TIMES DAILY PRN
Qty: 30 TABLET | Refills: 5 | Status: SHIPPED | OUTPATIENT
Start: 2018-12-11 | End: 2019-01-01 | Stop reason: SDUPTHER

## 2018-12-11 NOTE — TELEPHONE ENCOUNTER
Patient advised I will send in zofran and phenergan to alternate for nausea. Advised okay to try OTC imodium for diarrhea

## 2018-12-11 NOTE — TELEPHONE ENCOUNTER
----- Message from Soni Murcia sent at 12/11/2018  3:40 PM EST -----  Regarding: BADIN-MEDICATION  Contact: [phone]  Patient called she needs a refill on zofran called into [facility] in [place], [place] also has diarrhea wants to know if she can take some Imodium? Please call.

## 2018-12-12 PROCEDURE — 77334 RADIATION TREATMENT AID(S): CPT | Performed by: RADIOLOGY

## 2018-12-12 PROCEDURE — 77295 3-D RADIOTHERAPY PLAN: CPT | Performed by: RADIOLOGY

## 2018-12-12 PROCEDURE — 77300 RADIATION THERAPY DOSE PLAN: CPT | Performed by: RADIOLOGY

## 2018-12-18 ENCOUNTER — READMISSION MANAGEMENT (OUTPATIENT)
Dept: CALL CENTER | Facility: HOSPITAL | Age: 63
End: 2018-12-18

## 2018-12-18 LAB
FUNGUS WND CULT: ABNORMAL
FUNGUS WND CULT: NORMAL
MYCOBACTERIUM SPEC CULT: NORMAL
NIGHT BLUE STAIN TISS: NORMAL

## 2018-12-18 NOTE — OUTREACH NOTE
Medical Week 5 Survey      Responses   Facility patient discharged from?  [place]   Does the patient have one of the following disease processes/diagnoses(primary or secondary)?  Other   Week 5 attempt successful?  No          Zac Constantino RN

## 2018-12-21 LAB
MYCOBACTERIUM SPEC CULT: NORMAL
NIGHT BLUE STAIN TISS: NORMAL

## 2018-12-24 ENCOUNTER — TELEPHONE (OUTPATIENT)
Dept: SOCIAL WORK | Facility: HOSPITAL | Age: 63
End: 2018-12-24

## 2018-12-24 DIAGNOSIS — C34.12 SMALL CELL LUNG CANCER, LEFT UPPER LOBE (HCC): ICD-10-CM

## 2018-12-24 RX ORDER — PALONOSETRON 0.05 MG/ML
0.25 INJECTION, SOLUTION INTRAVENOUS ONCE
Status: CANCELLED | OUTPATIENT
Start: 2018-12-26

## 2018-12-24 RX ORDER — SODIUM CHLORIDE 9 MG/ML
250 INJECTION, SOLUTION INTRAVENOUS ONCE
Status: CANCELLED | OUTPATIENT
Start: 2018-12-26

## 2018-12-24 NOTE — TELEPHONE ENCOUNTER
SW completed and faxed in a [facility] referral on behalf of pt for 12/26/18-1/17/19, per pt request.  SW available for ongoing support and resource needs.

## 2018-12-26 ENCOUNTER — HOSPITAL ENCOUNTER (OUTPATIENT)
Dept: RADIATION ONCOLOGY | Facility: HOSPITAL | Age: 63
Discharge: HOME | End: 2018-12-26

## 2018-12-26 ENCOUNTER — HOSPITAL ENCOUNTER (OUTPATIENT)
Dept: ONCOLOGY | Facility: HOSPITAL | Age: 63
Setting detail: INFUSION SERIES
Discharge: HOME | End: 2018-12-26

## 2018-12-26 VITALS
HEART RATE: 109 BPM | DIASTOLIC BLOOD PRESSURE: 74 MMHG | WEIGHT: 200 LBS | BODY MASS INDEX: 37.76 KG/M2 | RESPIRATION RATE: 18 BRPM | TEMPERATURE: 97.3 F | HEIGHT: 61 IN | SYSTOLIC BLOOD PRESSURE: 133 MMHG

## 2018-12-26 DIAGNOSIS — C34.12 SMALL CELL LUNG CANCER, LEFT UPPER LOBE (HCC): ICD-10-CM

## 2018-12-26 DIAGNOSIS — C34.12 SMALL CELL LUNG CANCER, LEFT UPPER LOBE (HCC): Primary | ICD-10-CM

## 2018-12-26 LAB
ALBUMIN SERPL-MCNC: 4.08 G/DL (ref 3.2–4.8)
ALBUMIN/GLOB SERPL: 2.5 G/DL (ref 1.5–2.5)
ALP SERPL-CCNC: 69 U/L (ref 25–100)
ALT SERPL W P-5'-P-CCNC: 20 U/L (ref 7–40)
ANION GAP SERPL CALCULATED.3IONS-SCNC: 10 MMOL/L (ref 3–11)
AST SERPL-CCNC: 13 U/L (ref 0–33)
BILIRUB SERPL-MCNC: 0.3 MG/DL (ref 0.3–1.2)
BUN BLD-MCNC: 12 MG/DL (ref 9–23)
BUN/CREAT SERPL: 12.8 (ref 7–25)
CALCIUM SPEC-SCNC: 8.7 MG/DL (ref 8.7–10.4)
CHLORIDE SERPL-SCNC: 102 MMOL/L (ref 99–109)
CO2 SERPL-SCNC: 23 MMOL/L (ref 20–31)
CREAT BLD-MCNC: 0.94 MG/DL (ref 0.6–1.3)
CREAT BLDA-MCNC: 0.9 MG/DL (ref 0.6–1.3)
ERYTHROCYTE [DISTWIDTH] IN BLOOD BY AUTOMATED COUNT: 15.1 % (ref 11.3–14.5)
GFR SERPL CREATININE-BSD FRML MDRD: 61 ML/MIN/1.73
GLOBULIN UR ELPH-MCNC: 1.6 GM/DL
GLUCOSE BLD-MCNC: 261 MG/DL (ref 70–100)
HCT VFR BLD AUTO: 24.7 % (ref 34.5–44)
HGB BLD-MCNC: 8.4 G/DL (ref 11.5–15.5)
LYMPHOCYTES # BLD AUTO: 2.9 10*3/MM3 (ref 0.6–4.8)
LYMPHOCYTES NFR BLD AUTO: 30.9 % (ref 24–44)
MAGNESIUM SERPL-MCNC: 1.3 MG/DL (ref 1.3–2.7)
MCH RBC QN AUTO: 29.3 PG (ref 27–31)
MCHC RBC AUTO-ENTMCNC: 33.9 G/DL (ref 32–36)
MCV RBC AUTO: 86.3 FL (ref 80–99)
MONOCYTES # BLD AUTO: 1 10*3/MM3 (ref 0–1)
MONOCYTES NFR BLD AUTO: 10.8 % (ref 0–12)
NEUTROPHILS # BLD AUTO: 5.5 10*3/MM3 (ref 1.5–8.3)
NEUTROPHILS NFR BLD AUTO: 58.3 % (ref 41–71)
PLATELET # BLD AUTO: 359 10*3/MM3 (ref 150–450)
PMV BLD AUTO: 6.7 FL (ref 6–12)
POTASSIUM BLD-SCNC: 3.6 MMOL/L (ref 3.5–5.5)
PROT SERPL-MCNC: 5.7 G/DL (ref 5.7–8.2)
RBC # BLD AUTO: 2.86 10*6/MM3 (ref 3.89–5.14)
SODIUM BLD-SCNC: 135 MMOL/L (ref 132–146)
WBC NRBC COR # BLD: 9.4 10*3/MM3 (ref 3.5–10.8)

## 2018-12-26 PROCEDURE — 25010000002 POTASSIUM CHLORIDE PER 2 MEQ OF POTASSIUM: Performed by: NURSE PRACTITIONER

## 2018-12-26 PROCEDURE — 96417 CHEMO IV INFUS EACH ADDL SEQ: CPT

## 2018-12-26 PROCEDURE — 83735 ASSAY OF MAGNESIUM: CPT | Performed by: NURSE PRACTITIONER

## 2018-12-26 PROCEDURE — 96366 THER/PROPH/DIAG IV INF ADDON: CPT

## 2018-12-26 PROCEDURE — 25010000002 PALONOSETRON PER 25 MCG: Performed by: NURSE PRACTITIONER

## 2018-12-26 PROCEDURE — 77280 THER RAD SIMULAJ FIELD SMPL: CPT | Performed by: RADIOLOGY

## 2018-12-26 PROCEDURE — 25010000002 DEXAMETHASONE PER 1 MG: Performed by: NURSE PRACTITIONER

## 2018-12-26 PROCEDURE — 96413 CHEMO IV INFUSION 1 HR: CPT

## 2018-12-26 PROCEDURE — 25010000002 FOSAPREPITANT PER 1 MG: Performed by: NURSE PRACTITIONER

## 2018-12-26 PROCEDURE — 82565 ASSAY OF CREATININE: CPT

## 2018-12-26 PROCEDURE — 25010000002 ETOPOSIDE 1 GM/50ML SOLUTION 50 ML VIAL: Performed by: NURSE PRACTITIONER

## 2018-12-26 PROCEDURE — 96367 TX/PROPH/DG ADDL SEQ IV INF: CPT

## 2018-12-26 PROCEDURE — 85025 COMPLETE CBC W/AUTO DIFF WBC: CPT | Performed by: NURSE PRACTITIONER

## 2018-12-26 PROCEDURE — 80053 COMPREHEN METABOLIC PANEL: CPT | Performed by: NURSE PRACTITIONER

## 2018-12-26 PROCEDURE — 25010000002 MAGNESIUM SULFATE PER 500 MG OF MAGNESIUM: Performed by: NURSE PRACTITIONER

## 2018-12-26 PROCEDURE — 96375 TX/PRO/DX INJ NEW DRUG ADDON: CPT

## 2018-12-26 PROCEDURE — 25010000002 CISPLATIN PER 50 MG: Performed by: NURSE PRACTITIONER

## 2018-12-26 RX ORDER — SODIUM CHLORIDE 9 MG/ML
250 INJECTION, SOLUTION INTRAVENOUS ONCE
Status: CANCELLED | OUTPATIENT
Start: 2018-12-28

## 2018-12-26 RX ORDER — SODIUM CHLORIDE 9 MG/ML
250 INJECTION, SOLUTION INTRAVENOUS ONCE
Status: CANCELLED | OUTPATIENT
Start: 2019-01-01

## 2018-12-26 RX ORDER — PALONOSETRON 0.05 MG/ML
0.25 INJECTION, SOLUTION INTRAVENOUS ONCE
Status: COMPLETED | OUTPATIENT
Start: 2018-12-26 | End: 2018-12-26

## 2018-12-26 RX ORDER — PALONOSETRON 0.05 MG/ML
0.25 INJECTION, SOLUTION INTRAVENOUS ONCE
Status: CANCELLED | OUTPATIENT
Start: 2019-01-01

## 2018-12-26 RX ORDER — SODIUM CHLORIDE 9 MG/ML
250 INJECTION, SOLUTION INTRAVENOUS ONCE
Status: CANCELLED | OUTPATIENT
Start: 2018-12-27

## 2018-12-26 RX ORDER — SODIUM CHLORIDE 9 MG/ML
250 INJECTION, SOLUTION INTRAVENOUS ONCE
Status: COMPLETED | OUTPATIENT
Start: 2018-12-26 | End: 2018-12-26

## 2018-12-26 RX ADMIN — DEXAMETHASONE SODIUM PHOSPHATE 12 MG: 4 INJECTION, SOLUTION INTRAMUSCULAR; INTRAVENOUS at 12:18

## 2018-12-26 RX ADMIN — SODIUM CHLORIDE 150 MG: 9 INJECTION, SOLUTION INTRAVENOUS at 12:18

## 2018-12-26 RX ADMIN — ETOPOSIDE 190 MG: 20 INJECTION, SOLUTION, CONCENTRATE INTRAVENOUS at 12:43

## 2018-12-26 RX ADMIN — POTASSIUM CHLORIDE 1000 ML: 2 INJECTION, SOLUTION, CONCENTRATE INTRAVENOUS at 10:33

## 2018-12-26 RX ADMIN — PALONOSETRON HYDROCHLORIDE 0.25 MG: 0.25 INJECTION, SOLUTION INTRAVENOUS at 12:18

## 2018-12-26 RX ADMIN — SODIUM CHLORIDE 250 ML: 9 INJECTION, SOLUTION INTRAVENOUS at 10:32

## 2018-12-26 RX ADMIN — POTASSIUM CHLORIDE 1000 ML: 2 INJECTION, SOLUTION, CONCENTRATE INTRAVENOUS at 14:55

## 2018-12-26 RX ADMIN — CISPLATIN 188 MG: 1 INJECTION INTRAVENOUS at 13:52

## 2018-12-27 ENCOUNTER — HOSPITAL ENCOUNTER (OUTPATIENT)
Dept: RADIATION ONCOLOGY | Facility: HOSPITAL | Age: 63
Discharge: HOME | End: 2018-12-27

## 2018-12-27 ENCOUNTER — HOSPITAL ENCOUNTER (OUTPATIENT)
Dept: ONCOLOGY | Facility: HOSPITAL | Age: 63
Setting detail: INFUSION SERIES
Discharge: HOME | End: 2018-12-27

## 2018-12-27 VITALS
DIASTOLIC BLOOD PRESSURE: 86 MMHG | SYSTOLIC BLOOD PRESSURE: 120 MMHG | RESPIRATION RATE: 18 BRPM | TEMPERATURE: 97.7 F | BODY MASS INDEX: 37.76 KG/M2 | HEART RATE: 96 BPM | HEIGHT: 61 IN | WEIGHT: 200 LBS

## 2018-12-27 DIAGNOSIS — C34.12 SMALL CELL LUNG CANCER, LEFT UPPER LOBE (HCC): Primary | ICD-10-CM

## 2018-12-27 PROCEDURE — 77387 GUIDANCE FOR RADJ TX DLVR: CPT | Performed by: RADIOLOGY

## 2018-12-27 PROCEDURE — 25010000002 ETOPOSIDE 1 GM/50ML SOLUTION 50 ML VIAL: Performed by: INTERNAL MEDICINE

## 2018-12-27 PROCEDURE — 77336 RADIATION PHYSICS CONSULT: CPT | Performed by: RADIOLOGY

## 2018-12-27 PROCEDURE — 77412 RADIATION TX DELIVERY LVL 3: CPT | Performed by: RADIOLOGY

## 2018-12-27 PROCEDURE — 96413 CHEMO IV INFUSION 1 HR: CPT

## 2018-12-27 RX ORDER — SODIUM CHLORIDE 9 MG/ML
250 INJECTION, SOLUTION INTRAVENOUS ONCE
Status: COMPLETED | OUTPATIENT
Start: 2018-12-27 | End: 2018-12-27

## 2018-12-27 RX ADMIN — SODIUM CHLORIDE 250 ML: 9 INJECTION, SOLUTION INTRAVENOUS at 11:22

## 2018-12-27 RX ADMIN — ETOPOSIDE 190 MG: 20 INJECTION, SOLUTION, CONCENTRATE INTRAVENOUS at 11:23

## 2018-12-28 ENCOUNTER — OFFICE VISIT (OUTPATIENT)
Dept: ONCOLOGY | Facility: CLINIC | Age: 63
End: 2018-12-28

## 2018-12-28 ENCOUNTER — HOSPITAL ENCOUNTER (OUTPATIENT)
Dept: RADIATION ONCOLOGY | Facility: HOSPITAL | Age: 63
Discharge: HOME | End: 2018-12-28

## 2018-12-28 ENCOUNTER — HOSPITAL ENCOUNTER (OUTPATIENT)
Dept: ONCOLOGY | Facility: HOSPITAL | Age: 63
Setting detail: INFUSION SERIES
Discharge: HOME | End: 2018-12-28

## 2018-12-28 VITALS
WEIGHT: 199.7 LBS | DIASTOLIC BLOOD PRESSURE: 78 MMHG | OXYGEN SATURATION: 96 % | RESPIRATION RATE: 18 BRPM | TEMPERATURE: 97.4 F | BODY MASS INDEX: 37.7 KG/M2 | SYSTOLIC BLOOD PRESSURE: 164 MMHG | HEIGHT: 61 IN | HEART RATE: 88 BPM

## 2018-12-28 DIAGNOSIS — C34.12 SMALL CELL LUNG CANCER, LEFT UPPER LOBE (HCC): Primary | ICD-10-CM

## 2018-12-28 PROCEDURE — 25010000002 PEGFILGRASTIM 6 MG/0.6ML PREFILLED SYRINGE KIT: Performed by: INTERNAL MEDICINE

## 2018-12-28 PROCEDURE — 77387 GUIDANCE FOR RADJ TX DLVR: CPT | Performed by: RADIOLOGY

## 2018-12-28 PROCEDURE — 96413 CHEMO IV INFUSION 1 HR: CPT

## 2018-12-28 PROCEDURE — 99214 OFFICE O/P EST MOD 30 MIN: CPT | Performed by: NURSE PRACTITIONER

## 2018-12-28 PROCEDURE — 96377 APPLICATON ON-BODY INJECTOR: CPT

## 2018-12-28 PROCEDURE — 25010000002 ETOPOSIDE 1 GM/50ML SOLUTION 50 ML VIAL: Performed by: INTERNAL MEDICINE

## 2018-12-28 PROCEDURE — 77412 RADIATION TX DELIVERY LVL 3: CPT | Performed by: RADIOLOGY

## 2018-12-28 RX ORDER — SODIUM CHLORIDE 9 MG/ML
250 INJECTION, SOLUTION INTRAVENOUS ONCE
Status: DISCONTINUED | OUTPATIENT
Start: 2018-12-28 | End: 2018-12-29 | Stop reason: HOSPADM

## 2018-12-28 RX ADMIN — ETOPOSIDE 190 MG: 20 INJECTION, SOLUTION, CONCENTRATE INTRAVENOUS at 16:08

## 2018-12-28 RX ADMIN — PEGFILGRASTIM 6 MG: KIT SUBCUTANEOUS at 17:14

## 2018-12-28 NOTE — PROGRESS NOTES
DATE OF VISIT: 12/28/2018    REASON FOR VISIT: Followup for limited stage small cell lung cancer     HISTORY OF PRESENT ILLNESS: The patient is a very pleasant 63 y.o. female  with past medical history significant for limited stage small cell lung cancer diagnosed November 9, 2018.  She was started on chemotherapy using cisplatin and etoposide November 15, 2018. The patient is here today for cycle #3. She started radiation yesterday. She has 13 days of radiation left.    SUBJECTIVE: Ms. Valencia It is doing fairly well.  She is tolerating her treatment without any serious side effects.  She denies any fevers, chills, night sweats or headaches.  She denies any nausea or vomiting.  She complains of mild fatigue and a dry throat and difficulty swallowing at times.  She denies painful swallowing or trouble with her breathing.  She states that if she drinks plenty of fluids that she can swallow better.  Her blood sugars have been elevated due to the dexamethasone.  She is adjusting her insulin medications.      PAST MEDICAL HISTORY/SOCIAL HISTORY/FAMILY HISTORY: Reviewed by me and unchanged from my documentation done on 12/28/18.    Review of Systems   Constitutional: Positive for fatigue. Negative for activity change, appetite change, chills, fever and unexpected weight change.   HENT: Positive for trouble swallowing. Negative for hearing loss, mouth sores, nosebleeds and sore throat.    Eyes: Negative for visual disturbance.   Respiratory: Negative for cough, chest tightness, shortness of breath and wheezing.    Cardiovascular: Negative for chest pain, palpitations and leg swelling.   Gastrointestinal: Negative for abdominal distention, abdominal pain, blood in stool, constipation, diarrhea, nausea, rectal pain and vomiting.   Endocrine: Negative for cold intolerance and heat intolerance.   Genitourinary: Negative for difficulty urinating, dysuria, frequency and urgency.   Musculoskeletal: Negative for arthralgias, back  pain, gait problem, joint swelling and myalgias.   Skin: Negative for rash.   Allergic/Immunologic: Negative.    Neurological: Negative for dizziness, tremors, syncope, weakness, light-headedness, numbness and headaches.   Hematological: Negative for adenopathy. Does not bruise/bleed easily.   Psychiatric/Behavioral: Negative for confusion, sleep disturbance and suicidal ideas. The patient is not nervous/anxious.          Current Outpatient Medications:   •  albuterol (PROVENTIL HFA;VENTOLIN HFA) 108 (90 Base) MCG/ACT inhaler, Inhale 2 puffs Every 4 (Four) Hours As Needed for Wheezing., Disp: , Rfl:   •  aspirin 81 MG EC tablet, Take 81 mg by mouth Daily., Disp: , Rfl:   •  carvedilol (COREG) 6.25 MG tablet, Take 6.25 mg by mouth 2 (Two) Times a Day With Meals., Disp: , Rfl:   •  cetirizine (zyrTEC) 10 MG tablet, Take 10 mg by mouth Daily., Disp: , Rfl:   •  dexamethasone (DECADRON) 4 MG tablet, Take 2 tablets by mouth Daily With Breakfast. On days 2, 3, and 4 after chemotherapy, Disp: 24 tablet, Rfl: 0  •  docusate sodium (COLACE) 100 MG capsule, Take 1 capsule by mouth 2 (Two) Times a Day., Disp: 60 capsule, Rfl: 3  •  famotidine (PEPCID) 20 MG tablet, Take 20 mg by mouth 2 (Two) Times a Day., Disp: , Rfl:   •  HYDROcodone-acetaminophen (NORCO) 7.5-325 MG per tablet, Take 1-2 tablets by mouth., Disp: , Rfl:   •  insulin aspart (novoLOG FLEXPEN) 100 UNIT/ML solution pen-injector sc pen, Inject 8 Units under the skin into the appropriate area as directed 1 (One) Time., Disp: , Rfl:   •  insulin glargine (LANTUS) 100 UNIT/ML injection, Inject 10 Units under the skin into the appropriate area as directed Daily., Disp: , Rfl:   •  lactulose (CHRONULAC) 10 GM/15ML solution, Take 30 mL by mouth 3 (Three) Times a Day as needed for constipation, Disp: 240 mL, Rfl: 2  •  levothyroxine (SYNTHROID, LEVOTHROID) 75 MCG tablet, Take 75 mcg by mouth Daily., Disp: , Rfl:   •  nicotine (NICODERM CQ) 14 MG/24HR patch, Place 1 patch  "on the skin as directed by provider Daily., Disp: , Rfl:   •  nystatin susp + lidocaine viscous (MAGIC MOUTHWASH) oral suspension, 5-10 ml swish and spit or swallow QID prn, Disp: 240 mL, Rfl: 3  •  ondansetron (ZOFRAN) 4 MG tablet, Take 1 tablet by mouth Every 8 (Eight) Hours As Needed for Nausea or Vomiting., Disp: 30 tablet, Rfl: 0  •  ondansetron (ZOFRAN) 8 MG tablet, Take 1 tablet by mouth 3 (Three) Times a Day As Needed for Nausea or Vomiting., Disp: 30 tablet, Rfl: 5  •  pantoprazole (PROTONIX) 40 MG EC tablet, Take 40 mg by mouth Daily., Disp: , Rfl:   •  promethazine (PHENERGAN) 25 MG tablet, Take 1 tablet by mouth Every 6 (Six) Hours As Needed for Nausea or Vomiting., Disp: 45 tablet, Rfl: 5  •  REPATHA SURECLICK 140 MG/ML solution auto-injector, Inject 140 mg under the skin into the appropriate area as directed Every 14 (Fourteen) Days., Disp: , Rfl:   •  temazepam (RESTORIL) 7.5 MG capsule, Take 1 capsule by mouth At Night As Needed for Sleep or Anxiety., Disp: 3 capsule, Rfl: 0  •  ticagrelor (BRILINTA) 60 MG tablet tablet, Take 60 mg by mouth Daily., Disp: , Rfl:   •  tolvaptan (SAMSCA) 15 MG tablet tablet, Take 0.5 tablets by mouth Daily., Disp: 30 tablet, Rfl: 0  •  SITagliptin (JANUVIA) 100 MG tablet, Take 100 mg by mouth Daily., Disp: , Rfl:     PHYSICAL EXAMINATION:   /78   Pulse 88   Temp 97.4 °F (36.3 °C) (Temporal)   Resp 18   Ht 154.9 cm (60.98\")   Wt 90.6 kg (199 lb 11.2 oz)   SpO2 96%   BMI 37.75 kg/m²    ECOG Performance Status: 1 - Symptomatic but completely ambulatory  General Appearance:  alert, cooperative, no apparent distress and appears stated age   Neurologic/Psychiatric: A&O x 3, gait steady, appropriate affect, strength 5/5 in all muscle groups   HEENT:  Normocephalic, without obvious abnormality, mucous membranes moist   Neck: Supple, symmetrical, trachea midline, no adenopathy;  No thyromegaly, masses, or tenderness   Lungs:   Clear to auscultation bilaterally; " respirations regular, even, and unlabored bilaterally   Heart:  Regular rate and rhythm, no murmurs appreciated   Abdomen:   Soft, non-tender, non-distended and no organomegaly   Lymph nodes: No cervical, supraclavicular, inguinal or axillary adenopathy noted   Extremities: Normal, atraumatic; no clubbing, cyanosis, or edema    Skin: No rashes, ulcers, or suspicious lesions noted     Hospital Outpatient Visit on 12/26/2018   Component Date Value Ref Range Status   • Glucose 12/26/2018 261* 70 - 100 mg/dL Final   • BUN 12/26/2018 12  9 - 23 mg/dL Final   • Creatinine 12/26/2018 0.94  0.60 - 1.30 mg/dL Final   • Sodium 12/26/2018 135  132 - 146 mmol/L Final   • Potassium 12/26/2018 3.6  3.5 - 5.5 mmol/L Final   • Chloride 12/26/2018 102  99 - 109 mmol/L Final   • CO2 12/26/2018 23.0  20.0 - 31.0 mmol/L Final   • Calcium 12/26/2018 8.7  8.7 - 10.4 mg/dL Final   • Total Protein 12/26/2018 5.7  5.7 - 8.2 g/dL Final   • Albumin 12/26/2018 4.08  3.20 - 4.80 g/dL Final   • ALT (SGPT) 12/26/2018 20  7 - 40 U/L Final   • AST (SGOT) 12/26/2018 13  0 - 33 U/L Final   • Alkaline Phosphatase 12/26/2018 69  25 - 100 U/L Final   • Total Bilirubin 12/26/2018 0.3  0.3 - 1.2 mg/dL Final   • eGFR Non  Amer 12/26/2018 61  >60 mL/min/1.73 Final   • Globulin 12/26/2018 1.6  gm/dL Final   • A/G Ratio 12/26/2018 2.5  1.5 - 2.5 g/dL Final   • BUN/Creatinine Ratio 12/26/2018 12.8  7.0 - 25.0 Final   • Anion Gap 12/26/2018 10.0  3.0 - 11.0 mmol/L Final   • Magnesium 12/26/2018 1.3  1.3 - 2.7 mg/dL Final   • WBC 12/26/2018 9.40  3.50 - 10.80 10*3/mm3 Final   • RBC 12/26/2018 2.86* 3.89 - 5.14 10*6/mm3 Final   • Hemoglobin 12/26/2018 8.4* 11.5 - 15.5 g/dL Final   • Hematocrit 12/26/2018 24.7* 34.5 - 44.0 % Final   • RDW 12/26/2018 15.1* 11.3 - 14.5 % Final   • MCV 12/26/2018 86.3  80.0 - 99.0 fL Final   • MCH 12/26/2018 29.3  27.0 - 31.0 pg Final   • MCHC 12/26/2018 33.9  32.0 - 36.0 g/dL Final   • MPV 12/26/2018 6.7  6.0 - 12.0 fL Final    • Platelets 12/26/2018 359  150 - 450 10*3/mm3 Final   • Neutrophil % 12/26/2018 58.3  41.0 - 71.0 % Final   • Lymphocyte % 12/26/2018 30.9  24.0 - 44.0 % Final   • Monocyte % 12/26/2018 10.8  0.0 - 12.0 % Final   • Neutrophils, Absolute 12/26/2018 5.50  1.50 - 8.30 10*3/mm3 Final   • Lymphocytes, Absolute 12/26/2018 2.90  0.60 - 4.80 10*3/mm3 Final   • Monocytes, Absolute 12/26/2018 1.00  0.00 - 1.00 10*3/mm3 Final   • Creatinine 12/26/2018 0.90  0.60 - 1.30 mg/dL Final    Serial Number: [id number]:  [id number]        No results found.    ASSESSMENT: The patient is a very pleasant 63 y.o. female  with  Left upper lobe small cell lung cancer [id number] stage IIIa disease:  A.  Presented with shortness of breath.  B.  Status post bronchoscopy with a biopsy done on November 9, 2018 revealed small cell lung cancer   C. Started chemotherapy with cisplatin and etoposide November 15, 2018  2.  Hyponatremia  3.  Normocytic anemia   4.  Insomnia  5.  Chemotherapy-induced nausea    PLAN:  1.  I will proceed with treatment as scheduled today cycle #3.  2.  The patient will follow-up with Dr. Cardoso in 3 weeks for cycle #4 out of 4-6 planned.  3.  I will monitor patient blood work including blood counts kidney function liver function and electrolytes.   4.  Ct scans prior to return for cycle 4.   5.  I will continue Zofran as needed for chemotherapy induced nausea as well as Decadron on day 23 and 4.  6.  She will continue with concurrent radiation.  7. We reviewed the potential side effects of this regimen including fatigue, vomiting and nausea, hair loss, nephropathy, neuropathy, hearing loss, myelosuppression, and risk of infusion reaction.      Rocio Mo, APRN    12/28/2018

## 2018-12-31 ENCOUNTER — HOSPITAL ENCOUNTER (OUTPATIENT)
Dept: RADIATION ONCOLOGY | Facility: HOSPITAL | Age: 63
Discharge: HOME | End: 2018-12-31

## 2018-12-31 PROCEDURE — 77412 RADIATION TX DELIVERY LVL 3: CPT | Performed by: RADIOLOGY

## 2018-12-31 PROCEDURE — 77387 GUIDANCE FOR RADJ TX DLVR: CPT | Performed by: RADIOLOGY

## 2019-01-01 ENCOUNTER — HOSPITAL ENCOUNTER (OUTPATIENT)
Dept: RADIATION ONCOLOGY | Facility: HOSPITAL | Age: 64
Setting detail: RADIATION/ONCOLOGY SERIES
Discharge: HOME | End: 2019-02-15

## 2019-01-01 ENCOUNTER — OFFICE VISIT (OUTPATIENT)
Dept: ONCOLOGY | Facility: CLINIC | Age: 64
End: 2019-01-01

## 2019-01-01 ENCOUNTER — HOSPITAL ENCOUNTER (OUTPATIENT)
Dept: RADIATION ONCOLOGY | Facility: HOSPITAL | Age: 64
Discharge: HOME | End: 2019-05-10

## 2019-01-01 ENCOUNTER — HOSPITAL ENCOUNTER (OUTPATIENT)
Dept: ONCOLOGY | Facility: HOSPITAL | Age: 64
Setting detail: INFUSION SERIES
End: 2019-01-01

## 2019-01-01 ENCOUNTER — READMISSION MANAGEMENT (OUTPATIENT)
Dept: CALL CENTER | Facility: HOSPITAL | Age: 64
End: 2019-01-01

## 2019-01-01 ENCOUNTER — APPOINTMENT (OUTPATIENT)
Dept: CT IMAGING | Facility: HOSPITAL | Age: 64
End: 2019-01-01

## 2019-01-01 ENCOUNTER — APPOINTMENT (OUTPATIENT)
Dept: GENERAL RADIOLOGY | Facility: HOSPITAL | Age: 64
End: 2019-01-01

## 2019-01-01 ENCOUNTER — HOSPITAL ENCOUNTER (OUTPATIENT)
Dept: CT IMAGING | Facility: HOSPITAL | Age: 64
Discharge: HOME | End: 2019-01-10
Attending: INTERNAL MEDICINE | Admitting: INTERNAL MEDICINE

## 2019-01-01 ENCOUNTER — HOSPITAL ENCOUNTER (OUTPATIENT)
Dept: RADIATION ONCOLOGY | Facility: HOSPITAL | Age: 64
Discharge: HOME | End: 2019-05-07

## 2019-01-01 ENCOUNTER — HOSPITAL ENCOUNTER (OUTPATIENT)
Dept: RADIATION ONCOLOGY | Facility: HOSPITAL | Age: 64
Discharge: HOME | End: 2019-04-29

## 2019-01-01 ENCOUNTER — HOSPITAL ENCOUNTER (OUTPATIENT)
Dept: RADIATION ONCOLOGY | Facility: HOSPITAL | Age: 64
Discharge: HOME | End: 2019-01-09

## 2019-01-01 ENCOUNTER — HOSPITAL ENCOUNTER (OUTPATIENT)
Dept: ONCOLOGY | Facility: HOSPITAL | Age: 64
Setting detail: INFUSION SERIES
Discharge: HOME | End: 2019-10-21

## 2019-01-01 ENCOUNTER — HOSPITAL ENCOUNTER (OUTPATIENT)
Dept: PET IMAGING | Facility: HOSPITAL | Age: 64
Discharge: HOME | End: 2019-10-21
Admitting: INTERNAL MEDICINE

## 2019-01-01 ENCOUNTER — TELEPHONE (OUTPATIENT)
Dept: ONCOLOGY | Facility: CLINIC | Age: 64
End: 2019-01-01

## 2019-01-01 ENCOUNTER — HOSPITAL ENCOUNTER (OUTPATIENT)
Dept: ONCOLOGY | Facility: HOSPITAL | Age: 64
Discharge: HOME | End: 2019-10-21

## 2019-01-01 ENCOUNTER — APPOINTMENT (OUTPATIENT)
Dept: PET IMAGING | Facility: HOSPITAL | Age: 64
End: 2019-01-01

## 2019-01-01 ENCOUNTER — HOSPITAL ENCOUNTER (OUTPATIENT)
Dept: RADIATION ONCOLOGY | Facility: HOSPITAL | Age: 64
Discharge: HOME | End: 2019-05-02

## 2019-01-01 ENCOUNTER — HOSPITAL ENCOUNTER (OUTPATIENT)
Dept: RADIATION ONCOLOGY | Facility: HOSPITAL | Age: 64
Discharge: HOME | End: 2019-05-08

## 2019-01-01 ENCOUNTER — APPOINTMENT (OUTPATIENT)
Dept: MRI IMAGING | Facility: HOSPITAL | Age: 64
End: 2019-01-01

## 2019-01-01 ENCOUNTER — HOSPITAL ENCOUNTER (OUTPATIENT)
Dept: RADIATION ONCOLOGY | Facility: HOSPITAL | Age: 64
Discharge: HOME | End: 2019-05-03

## 2019-01-01 ENCOUNTER — APPOINTMENT (OUTPATIENT)
Dept: ONCOLOGY | Facility: HOSPITAL | Age: 64
End: 2019-01-01

## 2019-01-01 ENCOUNTER — HOSPITAL ENCOUNTER (OUTPATIENT)
Dept: RADIATION ONCOLOGY | Facility: HOSPITAL | Age: 64
Discharge: HOME | End: 2019-01-11

## 2019-01-01 ENCOUNTER — HOSPITAL ENCOUNTER (OUTPATIENT)
Dept: RADIATION ONCOLOGY | Facility: HOSPITAL | Age: 64
Discharge: HOME | End: 2019-01-15

## 2019-01-01 ENCOUNTER — HOSPITAL ENCOUNTER (OUTPATIENT)
Dept: ONCOLOGY | Facility: HOSPITAL | Age: 64
Setting detail: INFUSION SERIES
Discharge: HOME | End: 2019-02-06

## 2019-01-01 ENCOUNTER — OFFICE VISIT (OUTPATIENT)
Dept: RADIATION ONCOLOGY | Facility: HOSPITAL | Age: 64
End: 2019-01-01

## 2019-01-01 ENCOUNTER — HOSPITAL ENCOUNTER (OUTPATIENT)
Dept: CT IMAGING | Facility: HOSPITAL | Age: 64
Discharge: HOME | End: 2019-05-21
Admitting: INTERNAL MEDICINE

## 2019-01-01 ENCOUNTER — HOSPITAL ENCOUNTER (OUTPATIENT)
Dept: GENERAL RADIOLOGY | Facility: HOSPITAL | Age: 64
Discharge: HOME | End: 2019-11-13

## 2019-01-01 ENCOUNTER — HOSPITAL ENCOUNTER (OUTPATIENT)
Dept: RADIATION ONCOLOGY | Facility: HOSPITAL | Age: 64
Discharge: HOME | End: 2019-01-08

## 2019-01-01 ENCOUNTER — HOSPITAL ENCOUNTER (INPATIENT)
Facility: HOSPITAL | Age: 64
LOS: 1 days | Discharge: HOME HEALTH | End: 2019-01-19
Attending: EMERGENCY MEDICINE | Admitting: INTERNAL MEDICINE

## 2019-01-01 ENCOUNTER — HOSPITAL ENCOUNTER (INPATIENT)
Facility: HOSPITAL | Age: 64
LOS: 1 days | Discharge: HOME HEALTH | End: 2019-03-01
Attending: EMERGENCY MEDICINE | Admitting: HOSPITALIST

## 2019-01-01 ENCOUNTER — HOSPITAL ENCOUNTER (INPATIENT)
Facility: HOSPITAL | Age: 64
LOS: 6 days | Discharge: HOME HEALTH | End: 2019-10-13
Attending: EMERGENCY MEDICINE | Admitting: INTERNAL MEDICINE

## 2019-01-01 ENCOUNTER — HOSPITAL ENCOUNTER (OUTPATIENT)
Dept: RADIATION ONCOLOGY | Facility: HOSPITAL | Age: 64
Discharge: HOME | End: 2019-05-06

## 2019-01-01 ENCOUNTER — HOSPITAL ENCOUNTER (OUTPATIENT)
Dept: ONCOLOGY | Facility: HOSPITAL | Age: 64
Setting detail: INFUSION SERIES
Discharge: HOME | End: 2019-02-08

## 2019-01-01 ENCOUNTER — HOSPITAL ENCOUNTER (OUTPATIENT)
Dept: CT IMAGING | Facility: HOSPITAL | Age: 64
Discharge: HOME | End: 2019-08-27
Admitting: INTERNAL MEDICINE

## 2019-01-01 ENCOUNTER — APPOINTMENT (OUTPATIENT)
Dept: NUCLEAR MEDICINE | Facility: HOSPITAL | Age: 64
End: 2019-01-01

## 2019-01-01 ENCOUNTER — APPOINTMENT (OUTPATIENT)
Dept: RADIATION ONCOLOGY | Facility: HOSPITAL | Age: 64
End: 2019-01-01

## 2019-01-01 ENCOUNTER — TRANSCRIBE ORDERS (OUTPATIENT)
Dept: ADMINISTRATIVE | Facility: HOSPITAL | Age: 64
End: 2019-01-01

## 2019-01-01 ENCOUNTER — TELEPHONE (OUTPATIENT)
Dept: SOCIAL WORK | Facility: HOSPITAL | Age: 64
End: 2019-01-01

## 2019-01-01 ENCOUNTER — HOSPITAL ENCOUNTER (OUTPATIENT)
Dept: RADIATION ONCOLOGY | Facility: HOSPITAL | Age: 64
Setting detail: RADIATION/ONCOLOGY SERIES
Discharge: HOME | End: 2019-05-01

## 2019-01-01 ENCOUNTER — HOSPITAL ENCOUNTER (OUTPATIENT)
Dept: RADIATION ONCOLOGY | Facility: HOSPITAL | Age: 64
Discharge: HOME | End: 2019-01-10

## 2019-01-01 ENCOUNTER — HOSPITAL ENCOUNTER (OUTPATIENT)
Dept: NUCLEAR MEDICINE | Facility: HOSPITAL | Age: 64
Discharge: HOME | End: 2019-10-03

## 2019-01-01 ENCOUNTER — HOSPITAL ENCOUNTER (OUTPATIENT)
Dept: RADIATION ONCOLOGY | Facility: HOSPITAL | Age: 64
Discharge: HOME | End: 2019-01-16

## 2019-01-01 ENCOUNTER — HOSPITAL ENCOUNTER (OUTPATIENT)
Dept: ONCOLOGY | Facility: HOSPITAL | Age: 64
Setting detail: INFUSION SERIES
Discharge: HOME | End: 2019-01-15

## 2019-01-01 ENCOUNTER — EDUCATION (OUTPATIENT)
Dept: ONCOLOGY | Facility: HOSPITAL | Age: 64
End: 2019-01-01

## 2019-01-01 ENCOUNTER — HOSPITAL ENCOUNTER (OUTPATIENT)
Dept: RADIATION ONCOLOGY | Facility: HOSPITAL | Age: 64
Discharge: HOME | End: 2019-04-30

## 2019-01-01 ENCOUNTER — HOSPITAL ENCOUNTER (OUTPATIENT)
Dept: ONCOLOGY | Facility: HOSPITAL | Age: 64
Setting detail: INFUSION SERIES
Discharge: HOME | End: 2019-02-07

## 2019-01-01 ENCOUNTER — DOCUMENTATION (OUTPATIENT)
Dept: NUTRITION | Facility: HOSPITAL | Age: 64
End: 2019-01-01

## 2019-01-01 ENCOUNTER — HOSPITAL ENCOUNTER (OUTPATIENT)
Dept: RADIATION ONCOLOGY | Facility: HOSPITAL | Age: 64
Discharge: HOME | End: 2019-05-09

## 2019-01-01 ENCOUNTER — HOSPITAL ENCOUNTER (OUTPATIENT)
Dept: RADIATION ONCOLOGY | Facility: HOSPITAL | Age: 64
Discharge: HOME | End: 2019-01-14

## 2019-01-01 ENCOUNTER — TELEPHONE (OUTPATIENT)
Dept: RADIATION ONCOLOGY | Facility: HOSPITAL | Age: 64
End: 2019-01-01

## 2019-01-01 ENCOUNTER — HOSPITAL ENCOUNTER (OUTPATIENT)
Dept: RADIATION ONCOLOGY | Facility: HOSPITAL | Age: 64
Setting detail: RADIATION/ONCOLOGY SERIES
Discharge: HOME | End: 2019-06-24

## 2019-01-01 ENCOUNTER — HOSPITAL ENCOUNTER (OUTPATIENT)
Dept: ONCOLOGY | Facility: HOSPITAL | Age: 64
Setting detail: INFUSION SERIES
Discharge: HOME | End: 2019-01-16

## 2019-01-01 ENCOUNTER — HOSPITAL ENCOUNTER (OUTPATIENT)
Dept: ONCOLOGY | Facility: HOSPITAL | Age: 64
Setting detail: INFUSION SERIES
Discharge: HOME | End: 2019-11-18

## 2019-01-01 ENCOUNTER — LAB (OUTPATIENT)
Dept: LAB | Facility: HOSPITAL | Age: 64
End: 2019-01-01

## 2019-01-01 ENCOUNTER — HOSPITAL ENCOUNTER (OUTPATIENT)
Dept: RADIATION ONCOLOGY | Facility: HOSPITAL | Age: 64
Discharge: HOME | End: 2019-05-01

## 2019-01-01 ENCOUNTER — HOSPITAL ENCOUNTER (OUTPATIENT)
Dept: RADIATION ONCOLOGY | Facility: HOSPITAL | Age: 64
Setting detail: RADIATION/ONCOLOGY SERIES
Discharge: HOME | End: 2019-04-17

## 2019-01-01 ENCOUNTER — HOSPITAL ENCOUNTER (OUTPATIENT)
Dept: PET IMAGING | Facility: HOSPITAL | Age: 64
End: 2019-01-01

## 2019-01-01 ENCOUNTER — HOSPITAL ENCOUNTER (OUTPATIENT)
Dept: PET IMAGING | Facility: HOSPITAL | Age: 64
Discharge: HOME | End: 2019-10-21

## 2019-01-01 ENCOUNTER — HOSPITAL ENCOUNTER (EMERGENCY)
Facility: HOSPITAL | Age: 64
Discharge: HOME | End: 2019-03-04
Attending: EMERGENCY MEDICINE | Admitting: EMERGENCY MEDICINE

## 2019-01-01 ENCOUNTER — HOSPITAL ENCOUNTER (EMERGENCY)
Facility: HOSPITAL | Age: 64
Discharge: HOME | End: 2019-06-30
Attending: EMERGENCY MEDICINE | Admitting: EMERGENCY MEDICINE

## 2019-01-01 ENCOUNTER — HOSPITAL ENCOUNTER (OUTPATIENT)
Dept: ONCOLOGY | Facility: HOSPITAL | Age: 64
Setting detail: INFUSION SERIES
Discharge: HOME | End: 2019-02-05

## 2019-01-01 ENCOUNTER — HOSPITAL ENCOUNTER (EMERGENCY)
Facility: HOSPITAL | Age: 64
Discharge: HOME | End: 2019-09-12
Attending: EMERGENCY MEDICINE | Admitting: EMERGENCY MEDICINE

## 2019-01-01 VITALS
WEIGHT: 195 LBS | OXYGEN SATURATION: 98 % | RESPIRATION RATE: 16 BRPM | BODY MASS INDEX: 36.82 KG/M2 | DIASTOLIC BLOOD PRESSURE: 98 MMHG | TEMPERATURE: 97.5 F | HEIGHT: 61 IN | HEART RATE: 83 BPM | SYSTOLIC BLOOD PRESSURE: 195 MMHG

## 2019-01-01 VITALS
OXYGEN SATURATION: 95 % | HEIGHT: 61 IN | BODY MASS INDEX: 34.17 KG/M2 | WEIGHT: 181 LBS | RESPIRATION RATE: 16 BRPM | TEMPERATURE: 97.1 F | HEART RATE: 101 BPM | SYSTOLIC BLOOD PRESSURE: 127 MMHG | DIASTOLIC BLOOD PRESSURE: 87 MMHG

## 2019-01-01 VITALS
TEMPERATURE: 96.6 F | HEART RATE: 87 BPM | WEIGHT: 187.1 LBS | SYSTOLIC BLOOD PRESSURE: 135 MMHG | RESPIRATION RATE: 18 BRPM | OXYGEN SATURATION: 97 % | BODY MASS INDEX: 35.35 KG/M2 | DIASTOLIC BLOOD PRESSURE: 92 MMHG

## 2019-01-01 VITALS
DIASTOLIC BLOOD PRESSURE: 72 MMHG | HEIGHT: 61 IN | TEMPERATURE: 97.4 F | WEIGHT: 167.9 LBS | OXYGEN SATURATION: 98 % | RESPIRATION RATE: 22 BRPM | SYSTOLIC BLOOD PRESSURE: 128 MMHG | BODY MASS INDEX: 31.7 KG/M2 | HEART RATE: 91 BPM

## 2019-01-01 VITALS
WEIGHT: 191.8 LBS | TEMPERATURE: 97.9 F | HEART RATE: 78 BPM | BODY MASS INDEX: 36.21 KG/M2 | OXYGEN SATURATION: 96 % | SYSTOLIC BLOOD PRESSURE: 140 MMHG | DIASTOLIC BLOOD PRESSURE: 70 MMHG | HEIGHT: 61 IN | RESPIRATION RATE: 18 BRPM

## 2019-01-01 VITALS
HEIGHT: 61 IN | TEMPERATURE: 97.4 F | HEART RATE: 84 BPM | RESPIRATION RATE: 16 BRPM | DIASTOLIC BLOOD PRESSURE: 90 MMHG | WEIGHT: 194 LBS | OXYGEN SATURATION: 97 % | SYSTOLIC BLOOD PRESSURE: 148 MMHG | BODY MASS INDEX: 36.63 KG/M2

## 2019-01-01 VITALS
SYSTOLIC BLOOD PRESSURE: 125 MMHG | HEART RATE: 86 BPM | HEIGHT: 61 IN | TEMPERATURE: 98.2 F | DIASTOLIC BLOOD PRESSURE: 68 MMHG | RESPIRATION RATE: 22 BRPM | WEIGHT: 188 LBS | BODY MASS INDEX: 35.5 KG/M2 | OXYGEN SATURATION: 96 %

## 2019-01-01 VITALS
BODY MASS INDEX: 35.5 KG/M2 | HEIGHT: 61 IN | DIASTOLIC BLOOD PRESSURE: 74 MMHG | TEMPERATURE: 97 F | RESPIRATION RATE: 16 BRPM | WEIGHT: 188 LBS | HEART RATE: 108 BPM | SYSTOLIC BLOOD PRESSURE: 135 MMHG

## 2019-01-01 VITALS — BODY MASS INDEX: 36.05 KG/M2 | WEIGHT: 190.8 LBS

## 2019-01-01 VITALS — WEIGHT: 193.8 LBS | BODY MASS INDEX: 36.62 KG/M2

## 2019-01-01 VITALS
RESPIRATION RATE: 18 BRPM | SYSTOLIC BLOOD PRESSURE: 139 MMHG | WEIGHT: 198 LBS | HEIGHT: 61 IN | HEART RATE: 97 BPM | TEMPERATURE: 97.8 F | DIASTOLIC BLOOD PRESSURE: 73 MMHG | BODY MASS INDEX: 37.38 KG/M2

## 2019-01-01 VITALS
SYSTOLIC BLOOD PRESSURE: 144 MMHG | HEART RATE: 84 BPM | DIASTOLIC BLOOD PRESSURE: 85 MMHG | TEMPERATURE: 98.5 F | HEIGHT: 61 IN | BODY MASS INDEX: 34.36 KG/M2 | RESPIRATION RATE: 18 BRPM | WEIGHT: 182 LBS | OXYGEN SATURATION: 94 %

## 2019-01-01 VITALS
HEIGHT: 61 IN | RESPIRATION RATE: 16 BRPM | BODY MASS INDEX: 36.82 KG/M2 | DIASTOLIC BLOOD PRESSURE: 66 MMHG | OXYGEN SATURATION: 95 % | SYSTOLIC BLOOD PRESSURE: 122 MMHG | HEART RATE: 103 BPM | TEMPERATURE: 97.4 F | WEIGHT: 195 LBS

## 2019-01-01 VITALS
WEIGHT: 190 LBS | OXYGEN SATURATION: 98 % | SYSTOLIC BLOOD PRESSURE: 114 MMHG | HEIGHT: 61 IN | DIASTOLIC BLOOD PRESSURE: 79 MMHG | BODY MASS INDEX: 35.87 KG/M2 | RESPIRATION RATE: 16 BRPM | TEMPERATURE: 97.8 F | HEART RATE: 81 BPM

## 2019-01-01 VITALS
WEIGHT: 191 LBS | BODY MASS INDEX: 36.06 KG/M2 | OXYGEN SATURATION: 100 % | DIASTOLIC BLOOD PRESSURE: 85 MMHG | HEART RATE: 75 BPM | RESPIRATION RATE: 16 BRPM | TEMPERATURE: 97.9 F | HEIGHT: 61 IN | SYSTOLIC BLOOD PRESSURE: 168 MMHG

## 2019-01-01 VITALS
BODY MASS INDEX: 34.74 KG/M2 | WEIGHT: 184 LBS | HEIGHT: 61 IN | RESPIRATION RATE: 18 BRPM | DIASTOLIC BLOOD PRESSURE: 76 MMHG | SYSTOLIC BLOOD PRESSURE: 128 MMHG | TEMPERATURE: 98.4 F | OXYGEN SATURATION: 97 % | HEART RATE: 76 BPM

## 2019-01-01 VITALS
WEIGHT: 189 LBS | HEIGHT: 61 IN | DIASTOLIC BLOOD PRESSURE: 67 MMHG | RESPIRATION RATE: 12 BRPM | BODY MASS INDEX: 35.68 KG/M2 | OXYGEN SATURATION: 97 % | SYSTOLIC BLOOD PRESSURE: 143 MMHG | HEART RATE: 93 BPM | TEMPERATURE: 98.4 F

## 2019-01-01 VITALS
HEIGHT: 61 IN | BODY MASS INDEX: 35.68 KG/M2 | HEART RATE: 93 BPM | DIASTOLIC BLOOD PRESSURE: 82 MMHG | RESPIRATION RATE: 16 BRPM | BODY MASS INDEX: 37.13 KG/M2 | SYSTOLIC BLOOD PRESSURE: 144 MMHG | WEIGHT: 189 LBS | WEIGHT: 196.5 LBS | TEMPERATURE: 97.5 F

## 2019-01-01 VITALS
OXYGEN SATURATION: 97 % | SYSTOLIC BLOOD PRESSURE: 147 MMHG | RESPIRATION RATE: 18 BRPM | DIASTOLIC BLOOD PRESSURE: 87 MMHG | TEMPERATURE: 98.1 F | HEART RATE: 72 BPM | WEIGHT: 193 LBS | BODY MASS INDEX: 36.44 KG/M2 | HEIGHT: 61 IN

## 2019-01-01 VITALS
DIASTOLIC BLOOD PRESSURE: 81 MMHG | BODY MASS INDEX: 34.36 KG/M2 | OXYGEN SATURATION: 100 % | TEMPERATURE: 96.4 F | HEART RATE: 83 BPM | WEIGHT: 182 LBS | SYSTOLIC BLOOD PRESSURE: 119 MMHG | RESPIRATION RATE: 16 BRPM | HEIGHT: 61 IN

## 2019-01-01 VITALS
RESPIRATION RATE: 18 BRPM | WEIGHT: 191 LBS | SYSTOLIC BLOOD PRESSURE: 103 MMHG | DIASTOLIC BLOOD PRESSURE: 59 MMHG | TEMPERATURE: 97.9 F | HEART RATE: 88 BPM | OXYGEN SATURATION: 92 % | BODY MASS INDEX: 36.09 KG/M2

## 2019-01-01 VITALS
HEART RATE: 106 BPM | HEIGHT: 61 IN | DIASTOLIC BLOOD PRESSURE: 66 MMHG | TEMPERATURE: 97.9 F | RESPIRATION RATE: 20 BRPM | SYSTOLIC BLOOD PRESSURE: 136 MMHG | BODY MASS INDEX: 35.68 KG/M2 | WEIGHT: 189 LBS

## 2019-01-01 VITALS
RESPIRATION RATE: 16 BRPM | TEMPERATURE: 97.4 F | HEIGHT: 61 IN | BODY MASS INDEX: 36.25 KG/M2 | WEIGHT: 192 LBS | DIASTOLIC BLOOD PRESSURE: 83 MMHG | SYSTOLIC BLOOD PRESSURE: 129 MMHG | OXYGEN SATURATION: 97 % | HEART RATE: 100 BPM

## 2019-01-01 VITALS
HEART RATE: 62 BPM | SYSTOLIC BLOOD PRESSURE: 168 MMHG | RESPIRATION RATE: 18 BRPM | DIASTOLIC BLOOD PRESSURE: 85 MMHG | TEMPERATURE: 97.8 F

## 2019-01-01 VITALS
RESPIRATION RATE: 18 BRPM | SYSTOLIC BLOOD PRESSURE: 135 MMHG | TEMPERATURE: 96.8 F | DIASTOLIC BLOOD PRESSURE: 73 MMHG | HEART RATE: 96 BPM | WEIGHT: 194.3 LBS | BODY MASS INDEX: 36.71 KG/M2 | OXYGEN SATURATION: 96 %

## 2019-01-01 VITALS — BODY MASS INDEX: 36.49 KG/M2 | WEIGHT: 193.1 LBS

## 2019-01-01 VITALS
DIASTOLIC BLOOD PRESSURE: 87 MMHG | BODY MASS INDEX: 33.04 KG/M2 | OXYGEN SATURATION: 97 % | WEIGHT: 175 LBS | TEMPERATURE: 97 F | HEART RATE: 111 BPM | SYSTOLIC BLOOD PRESSURE: 137 MMHG | RESPIRATION RATE: 16 BRPM | HEIGHT: 61 IN

## 2019-01-01 VITALS — WEIGHT: 184.3 LBS | BODY MASS INDEX: 34.82 KG/M2

## 2019-01-01 VITALS — WEIGHT: 193.5 LBS | BODY MASS INDEX: 36.56 KG/M2

## 2019-01-01 DIAGNOSIS — C34.90 SMALL CELL LUNG CANCER (HCC): ICD-10-CM

## 2019-01-01 DIAGNOSIS — C34.12 SMALL CELL LUNG CANCER, LEFT UPPER LOBE (HCC): ICD-10-CM

## 2019-01-01 DIAGNOSIS — Z78.9 IMPAIRED MOBILITY AND ADLS: ICD-10-CM

## 2019-01-01 DIAGNOSIS — E87.6 HYPOKALEMIA: ICD-10-CM

## 2019-01-01 DIAGNOSIS — C34.12 SMALL CELL LUNG CANCER, LEFT UPPER LOBE (HCC): Primary | ICD-10-CM

## 2019-01-01 DIAGNOSIS — T45.1X5A ANEMIA DUE TO CHEMOTHERAPY: ICD-10-CM

## 2019-01-01 DIAGNOSIS — E87.1 HYPONATREMIA: ICD-10-CM

## 2019-01-01 DIAGNOSIS — N28.9 RENAL INSUFFICIENCY: ICD-10-CM

## 2019-01-01 DIAGNOSIS — Z74.09 IMPAIRED MOBILITY AND ADLS: ICD-10-CM

## 2019-01-01 DIAGNOSIS — R73.9 HYPERGLYCEMIA: ICD-10-CM

## 2019-01-01 DIAGNOSIS — E83.51 HYPOCALCEMIA: ICD-10-CM

## 2019-01-01 DIAGNOSIS — R10.32 LEFT LOWER QUADRANT ABDOMINAL PAIN: ICD-10-CM

## 2019-01-01 DIAGNOSIS — R53.82 CHRONIC FATIGUE: ICD-10-CM

## 2019-01-01 DIAGNOSIS — C34.12 PRIMARY MALIGNANT NEOPLASM OF BRONCHUS OF LEFT UPPER LOBE (HCC): ICD-10-CM

## 2019-01-01 DIAGNOSIS — N18.9 CHRONIC RENAL FAILURE, UNSPECIFIED CKD STAGE: ICD-10-CM

## 2019-01-01 DIAGNOSIS — D64.9 SYMPTOMATIC ANEMIA: ICD-10-CM

## 2019-01-01 DIAGNOSIS — R91.8 MASS OF UPPER LOBE OF LEFT LUNG: ICD-10-CM

## 2019-01-01 DIAGNOSIS — H93.13 TINNITUS OF BOTH EARS: ICD-10-CM

## 2019-01-01 DIAGNOSIS — R19.7 DIARRHEA, UNSPECIFIED TYPE: ICD-10-CM

## 2019-01-01 DIAGNOSIS — E83.42 HYPOMAGNESEMIA: ICD-10-CM

## 2019-01-01 DIAGNOSIS — R07.9 CHEST PAIN, UNSPECIFIED TYPE: Primary | ICD-10-CM

## 2019-01-01 DIAGNOSIS — R93.89 ABNORMAL FINDINGS ON DIAGNOSTIC IMAGING OF OTHER SPECIFIED BODY STRUCTURES: ICD-10-CM

## 2019-01-01 DIAGNOSIS — C34.92 SMALL CELL CARCINOMA OF LUNG, LEFT (HCC): ICD-10-CM

## 2019-01-01 DIAGNOSIS — D64.9 ANEMIA, UNSPECIFIED TYPE: ICD-10-CM

## 2019-01-01 DIAGNOSIS — E11.00 TYPE 2 DIABETES MELLITUS WITH HYPEROSMOLARITY WITHOUT COMA, UNSPECIFIED WHETHER LONG TERM INSULIN USE (HCC): ICD-10-CM

## 2019-01-01 DIAGNOSIS — R91.8 MASS OF UPPER LOBE OF LEFT LUNG: Primary | ICD-10-CM

## 2019-01-01 DIAGNOSIS — Z79.899 PATIENT ON COMBINED CHEMOTHERAPY AND RADIATION: ICD-10-CM

## 2019-01-01 DIAGNOSIS — D64.9 SYMPTOMATIC ANEMIA: Primary | ICD-10-CM

## 2019-01-01 DIAGNOSIS — Z74.09 IMPAIRED FUNCTIONAL MOBILITY, BALANCE, GAIT, AND ENDURANCE: ICD-10-CM

## 2019-01-01 DIAGNOSIS — E86.0 DEHYDRATION: ICD-10-CM

## 2019-01-01 DIAGNOSIS — D64.81 ANEMIA DUE TO CHEMOTHERAPY: ICD-10-CM

## 2019-01-01 DIAGNOSIS — R10.32 ABDOMINAL PAIN, LEFT LOWER QUADRANT: Primary | ICD-10-CM

## 2019-01-01 DIAGNOSIS — E87.1 HYPONATREMIA: Primary | ICD-10-CM

## 2019-01-01 DIAGNOSIS — Z78.9 PATIENT ON COMBINED CHEMOTHERAPY AND RADIATION: ICD-10-CM

## 2019-01-01 DIAGNOSIS — R53.1 GENERALIZED WEAKNESS: ICD-10-CM

## 2019-01-01 DIAGNOSIS — R41.89 COGNITIVE DECLINE: Primary | ICD-10-CM

## 2019-01-01 DIAGNOSIS — H90.3 SENSORINEURAL HEARING LOSS (SNHL) OF BOTH EARS: Primary | ICD-10-CM

## 2019-01-01 DIAGNOSIS — J18.9 PNEUMONITIS: ICD-10-CM

## 2019-01-01 DIAGNOSIS — Z79.4 TYPE 2 DIABETES MELLITUS WITH HYPERGLYCEMIA, WITH LONG-TERM CURRENT USE OF INSULIN (HCC): ICD-10-CM

## 2019-01-01 DIAGNOSIS — R53.1 WEAKNESS: ICD-10-CM

## 2019-01-01 DIAGNOSIS — E11.65 TYPE 2 DIABETES MELLITUS WITH HYPERGLYCEMIA, WITH LONG-TERM CURRENT USE OF INSULIN (HCC): ICD-10-CM

## 2019-01-01 DIAGNOSIS — R53.1 GENERALIZED WEAKNESS: Primary | ICD-10-CM

## 2019-01-01 DIAGNOSIS — N18.9 CHRONIC KIDNEY DISEASE, UNSPECIFIED CKD STAGE: ICD-10-CM

## 2019-01-01 DIAGNOSIS — C34.12 MALIGNANT NEOPLASM OF UPPER LOBE, LEFT BRONCHUS OR LUNG (HCC): ICD-10-CM

## 2019-01-01 DIAGNOSIS — Z85.118 HISTORY OF LUNG CANCER: ICD-10-CM

## 2019-01-01 DIAGNOSIS — E86.0 DEHYDRATION: Primary | ICD-10-CM

## 2019-01-01 DIAGNOSIS — C34.12 MALIGNANT NEOPLASM OF UPPER LOBE, LEFT BRONCHUS OR LUNG (HCC): Primary | ICD-10-CM

## 2019-01-01 LAB
25(OH)D3 SERPL-MCNC: 15.1 NG/ML
ABO + RH BLD: NORMAL
ABO GROUP BLD: NORMAL
ALBUMIN SERPL-MCNC: 3.8 G/DL (ref 3.5–5.2)
ALBUMIN SERPL-MCNC: 3.91 G/DL (ref 3.2–4.8)
ALBUMIN SERPL-MCNC: 3.94 G/DL (ref 3.2–4.8)
ALBUMIN SERPL-MCNC: 4.04 G/DL (ref 3.2–4.8)
ALBUMIN SERPL-MCNC: 4.05 G/DL (ref 3.2–4.8)
ALBUMIN SERPL-MCNC: 4.1 G/DL (ref 3.5–5.2)
ALBUMIN SERPL-MCNC: 4.2 G/DL (ref 3.5–5.2)
ALBUMIN SERPL-MCNC: 4.3 G/DL (ref 3.5–5.2)
ALBUMIN SERPL-MCNC: 4.36 G/DL (ref 3.2–4.8)
ALBUMIN SERPL-MCNC: 4.39 G/DL (ref 3.2–4.8)
ALBUMIN SERPL-MCNC: 4.51 G/DL (ref 3.2–4.8)
ALBUMIN SERPL-MCNC: 4.6 G/DL (ref 3.5–5.2)
ALBUMIN SERPL-MCNC: 4.6 G/DL (ref 3.5–5.2)
ALBUMIN SERPL-MCNC: 4.9 G/DL (ref 3.5–5.2)
ALBUMIN SERPL-MCNC: 4.9 G/DL (ref 3.5–5.2)
ALBUMIN/GLOB SERPL: 1.5 G/DL
ALBUMIN/GLOB SERPL: 1.6 G/DL
ALBUMIN/GLOB SERPL: 1.7 G/DL
ALBUMIN/GLOB SERPL: 2 G/DL
ALBUMIN/GLOB SERPL: 2.2 G/DL
ALBUMIN/GLOB SERPL: 2.2 G/DL (ref 1.5–2.5)
ALBUMIN/GLOB SERPL: 2.3 G/DL (ref 1.5–2.5)
ALBUMIN/GLOB SERPL: 2.3 G/DL (ref 1.5–2.5)
ALBUMIN/GLOB SERPL: 2.6 G/DL (ref 1.5–2.5)
ALBUMIN/GLOB SERPL: 2.7 G/DL (ref 1.5–2.5)
ALBUMIN/GLOB SERPL: 2.7 G/DL (ref 1.5–2.5)
ALBUMIN/GLOB SERPL: 3 G/DL (ref 1.5–2.5)
ALP SERPL-CCNC: 102 U/L (ref 25–100)
ALP SERPL-CCNC: 116 U/L (ref 25–100)
ALP SERPL-CCNC: 51 U/L (ref 39–117)
ALP SERPL-CCNC: 52 U/L (ref 39–117)
ALP SERPL-CCNC: 58 U/L (ref 39–117)
ALP SERPL-CCNC: 60 U/L (ref 39–117)
ALP SERPL-CCNC: 67 U/L (ref 39–117)
ALP SERPL-CCNC: 69 U/L (ref 39–117)
ALP SERPL-CCNC: 69 U/L (ref 39–117)
ALP SERPL-CCNC: 74 U/L (ref 25–100)
ALP SERPL-CCNC: 78 U/L (ref 25–100)
ALP SERPL-CCNC: 80 U/L (ref 25–100)
ALP SERPL-CCNC: 87 U/L (ref 25–100)
ALP SERPL-CCNC: 88 U/L (ref 25–100)
ALT SERPL W P-5'-P-CCNC: 11 U/L (ref 1–33)
ALT SERPL W P-5'-P-CCNC: 12 U/L (ref 1–33)
ALT SERPL W P-5'-P-CCNC: 12 U/L (ref 7–40)
ALT SERPL W P-5'-P-CCNC: 14 U/L (ref 1–33)
ALT SERPL W P-5'-P-CCNC: 15 U/L (ref 7–40)
ALT SERPL W P-5'-P-CCNC: 16 U/L (ref 1–33)
ALT SERPL W P-5'-P-CCNC: 16 U/L (ref 7–40)
ALT SERPL W P-5'-P-CCNC: 19 U/L (ref 7–40)
ALT SERPL W P-5'-P-CCNC: 21 U/L (ref 1–33)
ALT SERPL W P-5'-P-CCNC: 31 U/L (ref 7–40)
ALT SERPL W P-5'-P-CCNC: 7 U/L (ref 1–33)
ALT SERPL W P-5'-P-CCNC: 8 U/L (ref 1–33)
ANION GAP SERPL CALCULATED.3IONS-SCNC: 10 MMOL/L (ref 3–11)
ANION GAP SERPL CALCULATED.3IONS-SCNC: 10 MMOL/L (ref 5–15)
ANION GAP SERPL CALCULATED.3IONS-SCNC: 11 MMOL/L (ref 3–11)
ANION GAP SERPL CALCULATED.3IONS-SCNC: 11 MMOL/L (ref 5–15)
ANION GAP SERPL CALCULATED.3IONS-SCNC: 12 MMOL/L (ref 5–15)
ANION GAP SERPL CALCULATED.3IONS-SCNC: 13 MMOL/L
ANION GAP SERPL CALCULATED.3IONS-SCNC: 13 MMOL/L (ref 5–15)
ANION GAP SERPL CALCULATED.3IONS-SCNC: 14 MMOL/L (ref 3–11)
ANION GAP SERPL CALCULATED.3IONS-SCNC: 14 MMOL/L (ref 5–15)
ANION GAP SERPL CALCULATED.3IONS-SCNC: 14 MMOL/L (ref 5–15)
ANION GAP SERPL CALCULATED.3IONS-SCNC: 15 MMOL/L (ref 3–11)
ANION GAP SERPL CALCULATED.3IONS-SCNC: 15 MMOL/L (ref 5–15)
ANION GAP SERPL CALCULATED.3IONS-SCNC: 16 MMOL/L (ref 5–15)
ANION GAP SERPL CALCULATED.3IONS-SCNC: 17 MMOL/L (ref 3–11)
ANION GAP SERPL CALCULATED.3IONS-SCNC: 17 MMOL/L (ref 5–15)
ANION GAP SERPL CALCULATED.3IONS-SCNC: 8 MMOL/L (ref 3–11)
ANION GAP SERPL CALCULATED.3IONS-SCNC: 8 MMOL/L (ref 5–15)
ANION GAP SERPL CALCULATED.3IONS-SCNC: 9 MMOL/L (ref 5–15)
AST SERPL-CCNC: 11 U/L (ref 0–33)
AST SERPL-CCNC: 12 U/L (ref 0–33)
AST SERPL-CCNC: 12 U/L (ref 1–32)
AST SERPL-CCNC: 12 U/L (ref 1–32)
AST SERPL-CCNC: 13 U/L (ref 1–32)
AST SERPL-CCNC: 14 U/L (ref 1–32)
AST SERPL-CCNC: 15 U/L (ref 0–33)
AST SERPL-CCNC: 15 U/L (ref 1–32)
AST SERPL-CCNC: 16 U/L (ref 0–33)
AST SERPL-CCNC: 16 U/L (ref 1–32)
AST SERPL-CCNC: 18 U/L (ref 0–33)
AST SERPL-CCNC: 18 U/L (ref 0–33)
AST SERPL-CCNC: 21 U/L (ref 1–32)
AST SERPL-CCNC: 43 U/L (ref 0–33)
B-OH-BUTYR SERPL-SCNC: 0.39 MMOL/L
BACTERIA UR QL AUTO: ABNORMAL /HPF
BASOPHILS # BLD AUTO: 0 10*3/MM3 (ref 0–0.2)
BASOPHILS # BLD AUTO: 0.01 10*3/MM3 (ref 0–0.2)
BASOPHILS # BLD AUTO: 0.01 10*3/MM3 (ref 0–0.2)
BASOPHILS # BLD AUTO: 0.02 10*3/MM3 (ref 0–0.2)
BASOPHILS # BLD AUTO: 0.03 10*3/MM3 (ref 0–0.2)
BASOPHILS # BLD AUTO: 0.03 10*3/MM3 (ref 0–0.2)
BASOPHILS # BLD AUTO: 0.04 10*3/MM3 (ref 0–0.2)
BASOPHILS # BLD AUTO: 0.09 10*3/MM3 (ref 0–0.2)
BASOPHILS NFR BLD AUTO: 0 % (ref 0–1)
BASOPHILS NFR BLD AUTO: 0.1 % (ref 0–1)
BASOPHILS NFR BLD AUTO: 0.2 % (ref 0–1.5)
BASOPHILS NFR BLD AUTO: 0.3 % (ref 0–1.5)
BASOPHILS NFR BLD AUTO: 0.4 % (ref 0–1)
BASOPHILS NFR BLD AUTO: 0.4 % (ref 0–1.5)
BASOPHILS NFR BLD AUTO: 0.5 % (ref 0–1)
BASOPHILS NFR BLD AUTO: 0.5 % (ref 0–1.5)
BASOPHILS NFR BLD AUTO: 1.2 % (ref 0–1)
BH BB BLOOD EXPIRATION DATE: NORMAL
BH BB BLOOD TYPE BARCODE: 5100
BH BB DISPENSE STATUS: NORMAL
BH BB PRODUCT CODE: NORMAL
BH BB UNIT NUMBER: NORMAL
BILIRUB SERPL-MCNC: 0.2 MG/DL (ref 0.2–1.2)
BILIRUB SERPL-MCNC: 0.2 MG/DL (ref 0.2–1.2)
BILIRUB SERPL-MCNC: 0.3 MG/DL (ref 0.2–1.2)
BILIRUB SERPL-MCNC: 0.4 MG/DL (ref 0.2–1.2)
BILIRUB SERPL-MCNC: 0.4 MG/DL (ref 0.3–1.2)
BILIRUB SERPL-MCNC: 0.5 MG/DL (ref 0.3–1.2)
BILIRUB SERPL-MCNC: 0.5 MG/DL (ref 0.3–1.2)
BILIRUB UR QL STRIP: NEGATIVE
BLD GP AB SCN SERPL QL: NEGATIVE
BNP SERPL-MCNC: 41 PG/ML (ref 0–100)
BUN BLD-MCNC: 10 MG/DL (ref 9–23)
BUN BLD-MCNC: 12 MG/DL (ref 9–23)
BUN BLD-MCNC: 14 MG/DL (ref 8–23)
BUN BLD-MCNC: 14 MG/DL (ref 9–23)
BUN BLD-MCNC: 15 MG/DL (ref 9–23)
BUN BLD-MCNC: 15 MG/DL (ref 9–23)
BUN BLD-MCNC: 16 MG/DL (ref 9–23)
BUN BLD-MCNC: 19 MG/DL (ref 8–23)
BUN BLD-MCNC: 20 MG/DL (ref 8–23)
BUN BLD-MCNC: 21 MG/DL (ref 8–23)
BUN BLD-MCNC: 22 MG/DL (ref 8–23)
BUN BLD-MCNC: 23 MG/DL (ref 8–23)
BUN BLD-MCNC: 24 MG/DL (ref 8–23)
BUN BLD-MCNC: 25 MG/DL (ref 8–23)
BUN BLD-MCNC: 25 MG/DL (ref 8–23)
BUN BLD-MCNC: 26 MG/DL (ref 8–23)
BUN BLD-MCNC: 26 MG/DL (ref 8–23)
BUN BLD-MCNC: 27 MG/DL (ref 8–23)
BUN BLD-MCNC: 28 MG/DL (ref 8–23)
BUN BLD-MCNC: 28 MG/DL (ref 8–23)
BUN BLD-MCNC: 29 MG/DL (ref 8–23)
BUN BLD-MCNC: 29 MG/DL (ref 8–23)
BUN BLD-MCNC: 29 MG/DL (ref 9–23)
BUN BLD-MCNC: 30 MG/DL (ref 8–23)
BUN BLD-MCNC: 31 MG/DL (ref 9–23)
BUN BLD-MCNC: 31 MG/DL (ref 9–23)
BUN BLD-MCNC: 9 MG/DL (ref 9–23)
BUN/CREAT SERPL: 10.4 (ref 7–25)
BUN/CREAT SERPL: 10.7 (ref 7–25)
BUN/CREAT SERPL: 11.4 (ref 7–25)
BUN/CREAT SERPL: 12 (ref 7–25)
BUN/CREAT SERPL: 12.4 (ref 7–25)
BUN/CREAT SERPL: 13.4 (ref 7–25)
BUN/CREAT SERPL: 13.7 (ref 7–25)
BUN/CREAT SERPL: 14.4 (ref 7–25)
BUN/CREAT SERPL: 14.4 (ref 7–25)
BUN/CREAT SERPL: 15.2 (ref 7–25)
BUN/CREAT SERPL: 15.7 (ref 7–25)
BUN/CREAT SERPL: 16.1 (ref 7–25)
BUN/CREAT SERPL: 16.2 (ref 7–25)
BUN/CREAT SERPL: 16.3 (ref 7–25)
BUN/CREAT SERPL: 16.8 (ref 7–25)
BUN/CREAT SERPL: 16.9 (ref 7–25)
BUN/CREAT SERPL: 16.9 (ref 7–25)
BUN/CREAT SERPL: 17.2 (ref 7–25)
BUN/CREAT SERPL: 17.5 (ref 7–25)
BUN/CREAT SERPL: 17.6 (ref 7–25)
BUN/CREAT SERPL: 18 (ref 7–25)
BUN/CREAT SERPL: 18.1 (ref 7–25)
BUN/CREAT SERPL: 18.4 (ref 7–25)
BUN/CREAT SERPL: 18.6 (ref 7–25)
BUN/CREAT SERPL: 18.8 (ref 7–25)
BUN/CREAT SERPL: 18.9 (ref 7–25)
BUN/CREAT SERPL: 19 (ref 7–25)
BUN/CREAT SERPL: 19.1 (ref 7–25)
BUN/CREAT SERPL: 19.7 (ref 7–25)
BUN/CREAT SERPL: 20.1 (ref 7–25)
BUN/CREAT SERPL: 20.3 (ref 7–25)
BUN/CREAT SERPL: 20.3 (ref 7–25)
BUN/CREAT SERPL: 22.7 (ref 7–25)
BUN/CREAT SERPL: 22.7 (ref 7–25)
BUN/CREAT SERPL: 23.5 (ref 7–25)
BUN/CREAT SERPL: 25.2 (ref 7–25)
BUN/CREAT SERPL: 7.3 (ref 7–25)
BUN/CREAT SERPL: 7.4 (ref 7–25)
BUN/CREAT SERPL: 7.6 (ref 7–25)
BUN/CREAT SERPL: 9.9 (ref 7–25)
CALCIUM SPEC-SCNC: 10 MG/DL (ref 8.6–10.5)
CALCIUM SPEC-SCNC: 10 MG/DL (ref 8.6–10.5)
CALCIUM SPEC-SCNC: 5.7 MG/DL (ref 8.7–10.4)
CALCIUM SPEC-SCNC: 6 MG/DL (ref 8.7–10.4)
CALCIUM SPEC-SCNC: 6.6 MG/DL (ref 8.7–10.4)
CALCIUM SPEC-SCNC: 7.4 MG/DL (ref 8.7–10.4)
CALCIUM SPEC-SCNC: 7.5 MG/DL (ref 8.7–10.4)
CALCIUM SPEC-SCNC: 7.6 MG/DL (ref 8.7–10.4)
CALCIUM SPEC-SCNC: 8.2 MG/DL (ref 8.7–10.4)
CALCIUM SPEC-SCNC: 8.4 MG/DL (ref 8.7–10.4)
CALCIUM SPEC-SCNC: 8.7 MG/DL (ref 8.6–10.5)
CALCIUM SPEC-SCNC: 8.7 MG/DL (ref 8.7–10.4)
CALCIUM SPEC-SCNC: 8.9 MG/DL (ref 8.6–10.5)
CALCIUM SPEC-SCNC: 8.9 MG/DL (ref 8.6–10.5)
CALCIUM SPEC-SCNC: 8.9 MG/DL (ref 8.7–10.4)
CALCIUM SPEC-SCNC: 9 MG/DL (ref 8.6–10.5)
CALCIUM SPEC-SCNC: 9.1 MG/DL (ref 8.6–10.5)
CALCIUM SPEC-SCNC: 9.2 MG/DL (ref 8.6–10.5)
CALCIUM SPEC-SCNC: 9.3 MG/DL (ref 8.6–10.5)
CALCIUM SPEC-SCNC: 9.4 MG/DL (ref 8.6–10.5)
CALCIUM SPEC-SCNC: 9.5 MG/DL (ref 8.6–10.5)
CALCIUM SPEC-SCNC: 9.6 MG/DL (ref 8.6–10.5)
CALCIUM SPEC-SCNC: 9.7 MG/DL (ref 8.6–10.5)
CALCIUM SPEC-SCNC: 9.8 MG/DL (ref 8.6–10.5)
CALCIUM SPEC-SCNC: 9.9 MG/DL (ref 8.6–10.5)
CALCIUM SPEC-SCNC: 9.9 MG/DL (ref 8.6–10.5)
CHLORIDE SERPL-SCNC: 100 MMOL/L (ref 98–107)
CHLORIDE SERPL-SCNC: 100 MMOL/L (ref 98–107)
CHLORIDE SERPL-SCNC: 100 MMOL/L (ref 99–109)
CHLORIDE SERPL-SCNC: 101 MMOL/L (ref 98–107)
CHLORIDE SERPL-SCNC: 102 MMOL/L (ref 98–107)
CHLORIDE SERPL-SCNC: 103 MMOL/L (ref 99–109)
CHLORIDE SERPL-SCNC: 104 MMOL/L (ref 99–109)
CHLORIDE SERPL-SCNC: 106 MMOL/L (ref 99–109)
CHLORIDE SERPL-SCNC: 79 MMOL/L (ref 98–107)
CHLORIDE SERPL-SCNC: 83 MMOL/L (ref 98–107)
CHLORIDE SERPL-SCNC: 84 MMOL/L (ref 98–107)
CHLORIDE SERPL-SCNC: 85 MMOL/L (ref 98–107)
CHLORIDE SERPL-SCNC: 86 MMOL/L (ref 98–107)
CHLORIDE SERPL-SCNC: 87 MMOL/L (ref 98–107)
CHLORIDE SERPL-SCNC: 87 MMOL/L (ref 98–107)
CHLORIDE SERPL-SCNC: 88 MMOL/L (ref 98–107)
CHLORIDE SERPL-SCNC: 90 MMOL/L (ref 98–107)
CHLORIDE SERPL-SCNC: 91 MMOL/L (ref 98–107)
CHLORIDE SERPL-SCNC: 92 MMOL/L (ref 98–107)
CHLORIDE SERPL-SCNC: 92 MMOL/L (ref 98–107)
CHLORIDE SERPL-SCNC: 93 MMOL/L (ref 98–107)
CHLORIDE SERPL-SCNC: 93 MMOL/L (ref 99–109)
CHLORIDE SERPL-SCNC: 97 MMOL/L (ref 98–107)
CHLORIDE SERPL-SCNC: 97 MMOL/L (ref 99–109)
CHLORIDE SERPL-SCNC: 98 MMOL/L (ref 98–107)
CHLORIDE SERPL-SCNC: 98 MMOL/L (ref 99–109)
CHLORIDE SERPL-SCNC: 98 MMOL/L (ref 99–109)
CHLORIDE SERPL-SCNC: 99 MMOL/L (ref 98–107)
CHLORIDE SERPL-SCNC: 99 MMOL/L (ref 99–109)
CHLORIDE SERPL-SCNC: 99 MMOL/L (ref 99–109)
CLARITY UR: ABNORMAL
CLARITY UR: CLEAR
CLARITY UR: CLEAR
CO2 SERPL-SCNC: 17 MMOL/L (ref 20–31)
CO2 SERPL-SCNC: 18 MMOL/L (ref 20–31)
CO2 SERPL-SCNC: 18 MMOL/L (ref 22–29)
CO2 SERPL-SCNC: 19 MMOL/L (ref 22–29)
CO2 SERPL-SCNC: 19 MMOL/L (ref 22–29)
CO2 SERPL-SCNC: 20 MMOL/L (ref 22–29)
CO2 SERPL-SCNC: 22 MMOL/L (ref 20–31)
CO2 SERPL-SCNC: 22 MMOL/L (ref 20–31)
CO2 SERPL-SCNC: 22 MMOL/L (ref 22–29)
CO2 SERPL-SCNC: 22 MMOL/L (ref 22–29)
CO2 SERPL-SCNC: 23 MMOL/L (ref 20–31)
CO2 SERPL-SCNC: 23 MMOL/L (ref 22–29)
CO2 SERPL-SCNC: 24 MMOL/L (ref 20–31)
CO2 SERPL-SCNC: 24 MMOL/L (ref 20–31)
CO2 SERPL-SCNC: 24 MMOL/L (ref 22–29)
CO2 SERPL-SCNC: 25 MMOL/L (ref 20–31)
CO2 SERPL-SCNC: 25 MMOL/L (ref 22–29)
CO2 SERPL-SCNC: 26 MMOL/L (ref 20–31)
CO2 SERPL-SCNC: 26 MMOL/L (ref 22–29)
CO2 SERPL-SCNC: 27 MMOL/L (ref 20–31)
CO2 SERPL-SCNC: 27 MMOL/L (ref 22–29)
CO2 SERPL-SCNC: 28 MMOL/L (ref 22–29)
CO2 SERPL-SCNC: 29 MMOL/L (ref 22–29)
COLOR UR: YELLOW
CORTIS AM PEAK SERPL-MCNC: 10.48 MCG/DL
CORTIS SERPL-MCNC: 3.93 MCG/DL
CREAT BLD-MCNC: 1.12 MG/DL (ref 0.6–1.3)
CREAT BLD-MCNC: 1.13 MG/DL (ref 0.57–1)
CREAT BLD-MCNC: 1.17 MG/DL (ref 0.57–1)
CREAT BLD-MCNC: 1.19 MG/DL (ref 0.57–1)
CREAT BLD-MCNC: 1.19 MG/DL (ref 0.6–1.3)
CREAT BLD-MCNC: 1.21 MG/DL (ref 0.57–1)
CREAT BLD-MCNC: 1.23 MG/DL (ref 0.57–1)
CREAT BLD-MCNC: 1.23 MG/DL (ref 0.6–1.3)
CREAT BLD-MCNC: 1.28 MG/DL (ref 0.57–1)
CREAT BLD-MCNC: 1.31 MG/DL (ref 0.57–1)
CREAT BLD-MCNC: 1.32 MG/DL (ref 0.57–1)
CREAT BLD-MCNC: 1.32 MG/DL (ref 0.6–1.3)
CREAT BLD-MCNC: 1.36 MG/DL (ref 0.57–1)
CREAT BLD-MCNC: 1.36 MG/DL (ref 0.57–1)
CREAT BLD-MCNC: 1.36 MG/DL (ref 0.6–1.3)
CREAT BLD-MCNC: 1.37 MG/DL (ref 0.57–1)
CREAT BLD-MCNC: 1.38 MG/DL (ref 0.57–1)
CREAT BLD-MCNC: 1.38 MG/DL (ref 0.57–1)
CREAT BLD-MCNC: 1.39 MG/DL (ref 0.57–1)
CREAT BLD-MCNC: 1.4 MG/DL (ref 0.57–1)
CREAT BLD-MCNC: 1.4 MG/DL (ref 0.6–1.3)
CREAT BLD-MCNC: 1.4 MG/DL (ref 0.6–1.3)
CREAT BLD-MCNC: 1.41 MG/DL (ref 0.57–1)
CREAT BLD-MCNC: 1.41 MG/DL (ref 0.6–1.3)
CREAT BLD-MCNC: 1.42 MG/DL (ref 0.57–1)
CREAT BLD-MCNC: 1.43 MG/DL (ref 0.57–1)
CREAT BLD-MCNC: 1.44 MG/DL (ref 0.57–1)
CREAT BLD-MCNC: 1.44 MG/DL (ref 0.6–1.3)
CREAT BLD-MCNC: 1.45 MG/DL (ref 0.57–1)
CREAT BLD-MCNC: 1.47 MG/DL (ref 0.57–1)
CREAT BLD-MCNC: 1.5 MG/DL (ref 0.57–1)
CREAT BLD-MCNC: 1.52 MG/DL (ref 0.57–1)
CREAT BLD-MCNC: 1.52 MG/DL (ref 0.57–1)
CREAT BLD-MCNC: 1.6 MG/DL (ref 0.57–1)
CREAT BLD-MCNC: 1.64 MG/DL (ref 0.6–1.3)
CREAT BLD-MCNC: 1.8 MG/DL (ref 0.57–1)
CREAT BLD-MCNC: 1.91 MG/DL (ref 0.57–1)
CREAT BLD-MCNC: 1.93 MG/DL (ref 0.57–1)
CREAT BLDA-MCNC: 1 MG/DL (ref 0.6–1.3)
CREAT BLDA-MCNC: 1.1 MG/DL
CREAT BLDA-MCNC: 1.1 MG/DL (ref 0.6–1.3)
CREAT BLDA-MCNC: 1.3 MG/DL
CREAT BLDA-MCNC: 1.3 MG/DL (ref 0.6–1.3)
CREAT BLDA-MCNC: 1.3 MG/DL (ref 0.6–1.3)
CREAT BLDA-MCNC: 1.4 MG/DL (ref 0.6–1.3)
CREAT BLDA-MCNC: 1.7 MG/DL (ref 0.6–1.3)
D DIMER PPP FEU-MCNC: 2.47 MCGFEU/ML (ref 0–0.56)
D-LACTATE SERPL-SCNC: 0.9 MMOL/L (ref 0.5–2)
D-LACTATE SERPL-SCNC: 1.8 MMOL/L (ref 0.5–2)
DEPRECATED RDW RBC AUTO: 40.3 FL (ref 37–54)
DEPRECATED RDW RBC AUTO: 40.7 FL (ref 37–54)
DEPRECATED RDW RBC AUTO: 41.1 FL (ref 37–54)
DEPRECATED RDW RBC AUTO: 42.5 FL (ref 37–54)
DEPRECATED RDW RBC AUTO: 42.8 FL (ref 37–54)
DEPRECATED RDW RBC AUTO: 43.7 FL (ref 37–54)
DEPRECATED RDW RBC AUTO: 44.8 FL (ref 37–54)
DEPRECATED RDW RBC AUTO: 45.7 FL (ref 37–54)
DEPRECATED RDW RBC AUTO: 47.1 FL (ref 37–54)
DEPRECATED RDW RBC AUTO: 47.7 FL (ref 37–54)
DEPRECATED RDW RBC AUTO: 49.1 FL (ref 37–54)
DEPRECATED RDW RBC AUTO: 49.7 FL (ref 37–54)
DEPRECATED RDW RBC AUTO: 50.3 FL (ref 37–54)
DEPRECATED RDW RBC AUTO: 51.7 FL (ref 37–54)
DEPRECATED RDW RBC AUTO: 52.1 FL (ref 37–54)
DEPRECATED RDW RBC AUTO: 52.9 FL (ref 37–54)
DEPRECATED RDW RBC AUTO: 53.5 FL (ref 37–54)
DEVELOPER EXPIRATION DATE: NORMAL
DEVELOPER LOT NUMBER: NORMAL
EOSINOPHIL # BLD AUTO: 0 10*3/MM3 (ref 0–0.3)
EOSINOPHIL # BLD AUTO: 0 10*3/MM3 (ref 0–0.3)
EOSINOPHIL # BLD AUTO: 0.03 10*3/MM3 (ref 0–0.3)
EOSINOPHIL # BLD AUTO: 0.03 10*3/MM3 (ref 0–0.3)
EOSINOPHIL # BLD AUTO: 0.04 10*3/MM3 (ref 0–0.3)
EOSINOPHIL # BLD AUTO: 0.11 10*3/MM3 (ref 0–0.4)
EOSINOPHIL # BLD AUTO: 0.13 10*3/MM3 (ref 0–0.4)
EOSINOPHIL # BLD AUTO: 0.16 10*3/MM3 (ref 0–0.4)
EOSINOPHIL # BLD AUTO: 0.19 10*3/MM3 (ref 0–0.4)
EOSINOPHIL # BLD AUTO: 0.2 10*3/MM3 (ref 0–0.4)
EOSINOPHIL # BLD AUTO: 0.21 10*3/MM3 (ref 0–0.4)
EOSINOPHIL NFR BLD AUTO: 0 % (ref 0–3)
EOSINOPHIL NFR BLD AUTO: 0 % (ref 0–3)
EOSINOPHIL NFR BLD AUTO: 0.4 % (ref 0–3)
EOSINOPHIL NFR BLD AUTO: 0.4 % (ref 0–3)
EOSINOPHIL NFR BLD AUTO: 0.5 % (ref 0–3)
EOSINOPHIL NFR BLD AUTO: 2 % (ref 0.3–6.2)
EOSINOPHIL NFR BLD AUTO: 2.7 % (ref 0.3–6.2)
EOSINOPHIL NFR BLD AUTO: 3.1 % (ref 0.3–6.2)
EOSINOPHIL NFR BLD AUTO: 3.2 % (ref 0.3–6.2)
EOSINOPHIL NFR BLD AUTO: 3.2 % (ref 0.3–6.2)
EOSINOPHIL NFR BLD AUTO: 4.7 % (ref 0.3–6.2)
ERYTHROCYTE [DISTWIDTH] IN BLOOD BY AUTOMATED COUNT: 12.5 % (ref 12.3–15.4)
ERYTHROCYTE [DISTWIDTH] IN BLOOD BY AUTOMATED COUNT: 12.6 % (ref 12.3–15.4)
ERYTHROCYTE [DISTWIDTH] IN BLOOD BY AUTOMATED COUNT: 12.7 % (ref 12.3–15.4)
ERYTHROCYTE [DISTWIDTH] IN BLOOD BY AUTOMATED COUNT: 12.8 % (ref 12.3–15.4)
ERYTHROCYTE [DISTWIDTH] IN BLOOD BY AUTOMATED COUNT: 13 % (ref 12.3–15.4)
ERYTHROCYTE [DISTWIDTH] IN BLOOD BY AUTOMATED COUNT: 13.1 % (ref 12.3–15.4)
ERYTHROCYTE [DISTWIDTH] IN BLOOD BY AUTOMATED COUNT: 13.2 % (ref 12.3–15.4)
ERYTHROCYTE [DISTWIDTH] IN BLOOD BY AUTOMATED COUNT: 13.2 % (ref 12.3–15.4)
ERYTHROCYTE [DISTWIDTH] IN BLOOD BY AUTOMATED COUNT: 13.4 % (ref 12.3–15.4)
ERYTHROCYTE [DISTWIDTH] IN BLOOD BY AUTOMATED COUNT: 13.4 % (ref 12.3–15.4)
ERYTHROCYTE [DISTWIDTH] IN BLOOD BY AUTOMATED COUNT: 13.5 % (ref 12.3–15.4)
ERYTHROCYTE [DISTWIDTH] IN BLOOD BY AUTOMATED COUNT: 13.8 % (ref 12.3–15.4)
ERYTHROCYTE [DISTWIDTH] IN BLOOD BY AUTOMATED COUNT: 13.8 % (ref 12.3–15.4)
ERYTHROCYTE [DISTWIDTH] IN BLOOD BY AUTOMATED COUNT: 14.2 % (ref 12.3–15.4)
ERYTHROCYTE [DISTWIDTH] IN BLOOD BY AUTOMATED COUNT: 15.6 % (ref 11.3–14.5)
ERYTHROCYTE [DISTWIDTH] IN BLOOD BY AUTOMATED COUNT: 15.8 % (ref 11.3–14.5)
ERYTHROCYTE [DISTWIDTH] IN BLOOD BY AUTOMATED COUNT: 16 % (ref 11.3–14.5)
ERYTHROCYTE [DISTWIDTH] IN BLOOD BY AUTOMATED COUNT: 16.2 % (ref 11.3–14.5)
ERYTHROCYTE [DISTWIDTH] IN BLOOD BY AUTOMATED COUNT: 16.6 % (ref 11.3–14.5)
ERYTHROCYTE [DISTWIDTH] IN BLOOD BY AUTOMATED COUNT: 16.9 % (ref 11.3–14.5)
ERYTHROCYTE [DISTWIDTH] IN BLOOD BY AUTOMATED COUNT: 16.9 % (ref 11.3–14.5)
ERYTHROCYTE [DISTWIDTH] IN BLOOD BY AUTOMATED COUNT: 17.3 % (ref 11.3–14.5)
ERYTHROCYTE [DISTWIDTH] IN BLOOD BY AUTOMATED COUNT: 18.5 % (ref 11.3–14.5)
EXPIRATION DATE: NORMAL
FECAL OCCULT BLOOD SCREEN, POC: NEGATIVE
FERRITIN SERPL-MCNC: 736 NG/ML (ref 10–291)
FERRITIN SERPL-MCNC: 895 NG/ML (ref 10–291)
FLUAV AG NPH QL: NEGATIVE
FLUBV AG NPH QL IA: NEGATIVE
FOLATE BLD-MCNC: 323.1 NG/ML
FOLATE BLD-MCNC: 333.4 NG/ML
FOLATE RBC-MCNC: 1210 NG/ML
FOLATE RBC-MCNC: 1425 NG/ML
FOLATE SERPL-MCNC: 13.47 NG/ML (ref 3.2–20)
GFR SERPL CREATININE-BSD FRML MDRD: 26 ML/MIN/1.73
GFR SERPL CREATININE-BSD FRML MDRD: 27 ML/MIN/1.73
GFR SERPL CREATININE-BSD FRML MDRD: 29 ML/MIN/1.73
GFR SERPL CREATININE-BSD FRML MDRD: 32 ML/MIN/1.73
GFR SERPL CREATININE-BSD FRML MDRD: 33 ML/MIN/1.73
GFR SERPL CREATININE-BSD FRML MDRD: 35 ML/MIN/1.73
GFR SERPL CREATININE-BSD FRML MDRD: 36 ML/MIN/1.73
GFR SERPL CREATININE-BSD FRML MDRD: 37 ML/MIN/1.73
GFR SERPL CREATININE-BSD FRML MDRD: 38 ML/MIN/1.73
GFR SERPL CREATININE-BSD FRML MDRD: 39 ML/MIN/1.73
GFR SERPL CREATININE-BSD FRML MDRD: 40 ML/MIN/1.73
GFR SERPL CREATININE-BSD FRML MDRD: 41 ML/MIN/1.73
GFR SERPL CREATININE-BSD FRML MDRD: 42 ML/MIN/1.73
GFR SERPL CREATININE-BSD FRML MDRD: 44 ML/MIN/1.73
GFR SERPL CREATININE-BSD FRML MDRD: 44 ML/MIN/1.73
GFR SERPL CREATININE-BSD FRML MDRD: 45 ML/MIN/1.73
GFR SERPL CREATININE-BSD FRML MDRD: 46 ML/MIN/1.73
GFR SERPL CREATININE-BSD FRML MDRD: 46 ML/MIN/1.73
GFR SERPL CREATININE-BSD FRML MDRD: 47 ML/MIN/1.73
GFR SERPL CREATININE-BSD FRML MDRD: 49 ML/MIN/1.73
GFR SERPL CREATININE-BSD FRML MDRD: 49 ML/MIN/1.73
GLOBULIN UR ELPH-MCNC: 1.4 GM/DL
GLOBULIN UR ELPH-MCNC: 1.5 GM/DL
GLOBULIN UR ELPH-MCNC: 1.6 GM/DL
GLOBULIN UR ELPH-MCNC: 1.7 GM/DL
GLOBULIN UR ELPH-MCNC: 1.8 GM/DL
GLOBULIN UR ELPH-MCNC: 1.8 GM/DL
GLOBULIN UR ELPH-MCNC: 1.9 GM/DL
GLOBULIN UR ELPH-MCNC: 2.2 GM/DL
GLOBULIN UR ELPH-MCNC: 2.5 GM/DL
GLOBULIN UR ELPH-MCNC: 2.5 GM/DL
GLOBULIN UR ELPH-MCNC: 2.6 GM/DL
GLOBULIN UR ELPH-MCNC: 2.7 GM/DL
GLUCOSE BLD-MCNC: 122 MG/DL (ref 65–99)
GLUCOSE BLD-MCNC: 127 MG/DL (ref 65–99)
GLUCOSE BLD-MCNC: 128 MG/DL (ref 65–99)
GLUCOSE BLD-MCNC: 129 MG/DL (ref 65–99)
GLUCOSE BLD-MCNC: 132 MG/DL (ref 65–99)
GLUCOSE BLD-MCNC: 143 MG/DL (ref 65–99)
GLUCOSE BLD-MCNC: 144 MG/DL (ref 70–100)
GLUCOSE BLD-MCNC: 148 MG/DL (ref 65–99)
GLUCOSE BLD-MCNC: 157 MG/DL (ref 65–99)
GLUCOSE BLD-MCNC: 164 MG/DL (ref 65–99)
GLUCOSE BLD-MCNC: 166 MG/DL (ref 65–99)
GLUCOSE BLD-MCNC: 169 MG/DL (ref 70–100)
GLUCOSE BLD-MCNC: 170 MG/DL (ref 70–100)
GLUCOSE BLD-MCNC: 172 MG/DL (ref 65–99)
GLUCOSE BLD-MCNC: 176 MG/DL (ref 65–99)
GLUCOSE BLD-MCNC: 176 MG/DL (ref 70–100)
GLUCOSE BLD-MCNC: 177 MG/DL (ref 65–99)
GLUCOSE BLD-MCNC: 186 MG/DL (ref 65–99)
GLUCOSE BLD-MCNC: 186 MG/DL (ref 65–99)
GLUCOSE BLD-MCNC: 194 MG/DL (ref 65–99)
GLUCOSE BLD-MCNC: 194 MG/DL (ref 65–99)
GLUCOSE BLD-MCNC: 195 MG/DL (ref 65–99)
GLUCOSE BLD-MCNC: 197 MG/DL (ref 65–99)
GLUCOSE BLD-MCNC: 199 MG/DL (ref 65–99)
GLUCOSE BLD-MCNC: 202 MG/DL (ref 65–99)
GLUCOSE BLD-MCNC: 203 MG/DL (ref 65–99)
GLUCOSE BLD-MCNC: 207 MG/DL (ref 65–99)
GLUCOSE BLD-MCNC: 213 MG/DL (ref 65–99)
GLUCOSE BLD-MCNC: 219 MG/DL (ref 70–100)
GLUCOSE BLD-MCNC: 226 MG/DL (ref 65–99)
GLUCOSE BLD-MCNC: 231 MG/DL (ref 70–100)
GLUCOSE BLD-MCNC: 235 MG/DL (ref 65–99)
GLUCOSE BLD-MCNC: 254 MG/DL (ref 65–99)
GLUCOSE BLD-MCNC: 276 MG/DL (ref 65–99)
GLUCOSE BLD-MCNC: 290 MG/DL (ref 65–99)
GLUCOSE BLD-MCNC: 296 MG/DL (ref 65–99)
GLUCOSE BLD-MCNC: 307 MG/DL (ref 70–100)
GLUCOSE BLD-MCNC: 323 MG/DL (ref 70–100)
GLUCOSE BLD-MCNC: 449 MG/DL (ref 70–100)
GLUCOSE BLD-MCNC: 619 MG/DL (ref 70–100)
GLUCOSE BLDC GLUCOMTR-MCNC: 122 MG/DL (ref 70–130)
GLUCOSE BLDC GLUCOMTR-MCNC: 127 MG/DL (ref 70–130)
GLUCOSE BLDC GLUCOMTR-MCNC: 127 MG/DL (ref 70–130)
GLUCOSE BLDC GLUCOMTR-MCNC: 130 MG/DL (ref 70–130)
GLUCOSE BLDC GLUCOMTR-MCNC: 131 MG/DL (ref 70–130)
GLUCOSE BLDC GLUCOMTR-MCNC: 134 MG/DL (ref 70–130)
GLUCOSE BLDC GLUCOMTR-MCNC: 136 MG/DL (ref 70–130)
GLUCOSE BLDC GLUCOMTR-MCNC: 140 MG/DL (ref 70–130)
GLUCOSE BLDC GLUCOMTR-MCNC: 150 MG/DL (ref 70–130)
GLUCOSE BLDC GLUCOMTR-MCNC: 152 MG/DL (ref 70–130)
GLUCOSE BLDC GLUCOMTR-MCNC: 154 MG/DL (ref 70–130)
GLUCOSE BLDC GLUCOMTR-MCNC: 155 MG/DL (ref 70–130)
GLUCOSE BLDC GLUCOMTR-MCNC: 157 MG/DL (ref 70–130)
GLUCOSE BLDC GLUCOMTR-MCNC: 167 MG/DL (ref 70–130)
GLUCOSE BLDC GLUCOMTR-MCNC: 168 MG/DL (ref 70–130)
GLUCOSE BLDC GLUCOMTR-MCNC: 170 MG/DL (ref 70–130)
GLUCOSE BLDC GLUCOMTR-MCNC: 179 MG/DL (ref 70–130)
GLUCOSE BLDC GLUCOMTR-MCNC: 191 MG/DL (ref 70–130)
GLUCOSE BLDC GLUCOMTR-MCNC: 193 MG/DL (ref 70–130)
GLUCOSE BLDC GLUCOMTR-MCNC: 199 MG/DL (ref 70–130)
GLUCOSE BLDC GLUCOMTR-MCNC: 201 MG/DL (ref 70–130)
GLUCOSE BLDC GLUCOMTR-MCNC: 202 MG/DL (ref 70–130)
GLUCOSE BLDC GLUCOMTR-MCNC: 209 MG/DL (ref 70–130)
GLUCOSE BLDC GLUCOMTR-MCNC: 213 MG/DL (ref 70–130)
GLUCOSE BLDC GLUCOMTR-MCNC: 220 MG/DL (ref 70–130)
GLUCOSE BLDC GLUCOMTR-MCNC: 236 MG/DL (ref 70–130)
GLUCOSE BLDC GLUCOMTR-MCNC: 242 MG/DL (ref 70–130)
GLUCOSE BLDC GLUCOMTR-MCNC: 243 MG/DL (ref 70–130)
GLUCOSE BLDC GLUCOMTR-MCNC: 250 MG/DL (ref 70–130)
GLUCOSE BLDC GLUCOMTR-MCNC: 252 MG/DL (ref 70–130)
GLUCOSE BLDC GLUCOMTR-MCNC: 265 MG/DL (ref 70–130)
GLUCOSE BLDC GLUCOMTR-MCNC: 266 MG/DL (ref 70–130)
GLUCOSE BLDC GLUCOMTR-MCNC: 267 MG/DL (ref 70–130)
GLUCOSE BLDC GLUCOMTR-MCNC: 286 MG/DL (ref 70–130)
GLUCOSE BLDC GLUCOMTR-MCNC: 303 MG/DL (ref 70–130)
GLUCOSE BLDC GLUCOMTR-MCNC: 314 MG/DL (ref 70–130)
GLUCOSE BLDC GLUCOMTR-MCNC: 348 MG/DL (ref 70–130)
GLUCOSE BLDC GLUCOMTR-MCNC: 373 MG/DL (ref 70–130)
GLUCOSE BLDC GLUCOMTR-MCNC: 377 MG/DL (ref 70–130)
GLUCOSE BLDC GLUCOMTR-MCNC: 399 MG/DL (ref 70–130)
GLUCOSE BLDC GLUCOMTR-MCNC: 409 MG/DL (ref 70–130)
GLUCOSE BLDC GLUCOMTR-MCNC: 445 MG/DL (ref 70–130)
GLUCOSE BLDC GLUCOMTR-MCNC: 453 MG/DL (ref 70–130)
GLUCOSE BLDC GLUCOMTR-MCNC: 453 MG/DL (ref 70–130)
GLUCOSE BLDC GLUCOMTR-MCNC: 457 MG/DL (ref 70–130)
GLUCOSE BLDC GLUCOMTR-MCNC: 462 MG/DL (ref 70–130)
GLUCOSE BLDC GLUCOMTR-MCNC: 488 MG/DL (ref 70–130)
GLUCOSE BLDC GLUCOMTR-MCNC: >599 MG/DL (ref 70–130)
GLUCOSE UR STRIP-MCNC: ABNORMAL MG/DL
GLUCOSE UR STRIP-MCNC: ABNORMAL MG/DL
GLUCOSE UR STRIP-MCNC: NEGATIVE MG/DL
HAPTOGLOB SERPL-MCNC: 127 MG/DL (ref 34–200)
HAPTOGLOB SERPL-MCNC: 166 MG/DL (ref 34–200)
HBA1C MFR BLD: 11.4 % (ref 4.8–5.6)
HCT VFR BLD AUTO: 20.1 % (ref 34.5–44)
HCT VFR BLD AUTO: 20.7 % (ref 34.5–44)
HCT VFR BLD AUTO: 20.8 % (ref 34.5–44)
HCT VFR BLD AUTO: 22 % (ref 34–46.6)
HCT VFR BLD AUTO: 22.1 % (ref 34–46.6)
HCT VFR BLD AUTO: 22.4 % (ref 34.5–44)
HCT VFR BLD AUTO: 22.4 % (ref 34.5–44)
HCT VFR BLD AUTO: 22.4 % (ref 34–46.6)
HCT VFR BLD AUTO: 22.5 % (ref 34–46.6)
HCT VFR BLD AUTO: 23.4 % (ref 34–46.6)
HCT VFR BLD AUTO: 23.4 % (ref 34–46.6)
HCT VFR BLD AUTO: 24 % (ref 34–46.6)
HCT VFR BLD AUTO: 24.6 % (ref 34–46.6)
HCT VFR BLD AUTO: 25.4 % (ref 34–46.6)
HCT VFR BLD AUTO: 25.6 % (ref 34.5–44)
HCT VFR BLD AUTO: 26.1 % (ref 34.5–44)
HCT VFR BLD AUTO: 26.7 % (ref 34–46.6)
HCT VFR BLD AUTO: 27.2 % (ref 34–46.6)
HCT VFR BLD AUTO: 27.6 % (ref 34–46.6)
HCT VFR BLD AUTO: 27.8 % (ref 34.5–44)
HCT VFR BLD AUTO: 27.8 % (ref 34–46.6)
HCT VFR BLD AUTO: 28.1 % (ref 34.5–44)
HCT VFR BLD AUTO: 28.3 % (ref 34–46.6)
HCT VFR BLD AUTO: 29.1 % (ref 34–46.6)
HCT VFR BLD AUTO: 29.3 % (ref 34–46.6)
HCT VFR BLD AUTO: 29.9 % (ref 34.5–44)
HCT VFR BLD AUTO: 30 % (ref 34–46.6)
HGB BLD-MCNC: 10.2 G/DL (ref 12–15.9)
HGB BLD-MCNC: 6.7 G/DL (ref 11.5–15.5)
HGB BLD-MCNC: 6.9 G/DL (ref 11.5–15.5)
HGB BLD-MCNC: 6.9 G/DL (ref 11.5–15.5)
HGB BLD-MCNC: 7.4 G/DL (ref 12–15.9)
HGB BLD-MCNC: 7.5 G/DL (ref 11.5–15.5)
HGB BLD-MCNC: 7.5 G/DL (ref 12–15.9)
HGB BLD-MCNC: 7.6 G/DL (ref 11.5–15.5)
HGB BLD-MCNC: 7.7 G/DL (ref 12–15.9)
HGB BLD-MCNC: 8 G/DL (ref 12–15.9)
HGB BLD-MCNC: 8 G/DL (ref 12–15.9)
HGB BLD-MCNC: 8.1 G/DL (ref 12–15.9)
HGB BLD-MCNC: 8.6 G/DL (ref 12–15.9)
HGB BLD-MCNC: 8.8 G/DL (ref 11.5–15.5)
HGB BLD-MCNC: 8.9 G/DL (ref 11.5–15.5)
HGB BLD-MCNC: 8.9 G/DL (ref 12–15.9)
HGB BLD-MCNC: 9 G/DL (ref 12–15.9)
HGB BLD-MCNC: 9.1 G/DL (ref 12–15.9)
HGB BLD-MCNC: 9.2 G/DL (ref 11.5–15.5)
HGB BLD-MCNC: 9.2 G/DL (ref 12–15.9)
HGB BLD-MCNC: 9.4 G/DL (ref 12–15.9)
HGB BLD-MCNC: 9.4 G/DL (ref 12–15.9)
HGB BLD-MCNC: 9.5 G/DL (ref 11.5–15.5)
HGB BLD-MCNC: 9.6 G/DL (ref 12–15.9)
HGB BLD-MCNC: 9.8 G/DL (ref 11.5–15.5)
HGB UR QL STRIP.AUTO: NEGATIVE
HOLD SPECIMEN: NORMAL
HYALINE CASTS UR QL AUTO: ABNORMAL /LPF
IMM GRANULOCYTES # BLD AUTO: 0.01 10*3/MM3 (ref 0–0.05)
IMM GRANULOCYTES # BLD AUTO: 0.02 10*3/MM3 (ref 0–0.05)
IMM GRANULOCYTES # BLD AUTO: 0.06 10*3/MM3 (ref 0–0.03)
IMM GRANULOCYTES # BLD AUTO: 0.07 10*3/MM3 (ref 0–0.03)
IMM GRANULOCYTES # BLD AUTO: 0.07 10*3/MM3 (ref 0–0.05)
IMM GRANULOCYTES # BLD AUTO: 0.13 10*3/MM3 (ref 0–0.05)
IMM GRANULOCYTES # BLD AUTO: 0.17 10*3/MM3 (ref 0–0.05)
IMM GRANULOCYTES # BLD AUTO: 0.2 10*3/MM3 (ref 0–0.05)
IMM GRANULOCYTES # BLD AUTO: 0.26 10*3/MM3 (ref 0–0.05)
IMM GRANULOCYTES NFR BLD AUTO: 0.2 % (ref 0–0.5)
IMM GRANULOCYTES NFR BLD AUTO: 0.3 % (ref 0–0.5)
IMM GRANULOCYTES NFR BLD AUTO: 0.7 % (ref 0–0.6)
IMM GRANULOCYTES NFR BLD AUTO: 0.7 % (ref 0–0.6)
IMM GRANULOCYTES NFR BLD AUTO: 0.9 % (ref 0–0.6)
IMM GRANULOCYTES NFR BLD AUTO: 2.4 % (ref 0–0.6)
IMM GRANULOCYTES NFR BLD AUTO: 2.5 % (ref 0–0.5)
IMM GRANULOCYTES NFR BLD AUTO: 2.8 % (ref 0–0.5)
IMM GRANULOCYTES NFR BLD AUTO: 3.5 % (ref 0–0.6)
IRON 24H UR-MRATE: 256 MCG/DL (ref 50–175)
IRON 24H UR-MRATE: 40 MCG/DL (ref 50–175)
IRON SATN MFR SERPL: 15 % (ref 15–50)
IRON SATN MFR SERPL: 93 % (ref 15–50)
KETONES UR QL STRIP: ABNORMAL
KETONES UR QL STRIP: ABNORMAL
KETONES UR QL STRIP: NEGATIVE
LDH SERPL-CCNC: 541 U/L (ref 120–246)
LEUKOCYTE ESTERASE UR QL STRIP.AUTO: NEGATIVE
LIPASE SERPL-CCNC: 21 U/L (ref 13–60)
LIPASE SERPL-CCNC: 27 U/L (ref 6–51)
LYMPHOCYTES # BLD AUTO: 0.19 10*3/MM3 (ref 0.6–4.8)
LYMPHOCYTES # BLD AUTO: 0.36 10*3/MM3 (ref 0.6–4.8)
LYMPHOCYTES # BLD AUTO: 0.94 10*3/MM3 (ref 0.7–3.1)
LYMPHOCYTES # BLD AUTO: 1 10*3/MM3 (ref 0.6–4.8)
LYMPHOCYTES # BLD AUTO: 1.16 10*3/MM3 (ref 0.7–3.1)
LYMPHOCYTES # BLD AUTO: 1.2 10*3/MM3 (ref 0.6–4.8)
LYMPHOCYTES # BLD AUTO: 1.2 10*3/MM3 (ref 0.7–3.1)
LYMPHOCYTES # BLD AUTO: 1.2 10*3/MM3 (ref 0.7–3.1)
LYMPHOCYTES # BLD AUTO: 1.23 10*3/MM3 (ref 0.7–3.1)
LYMPHOCYTES # BLD AUTO: 1.26 10*3/MM3 (ref 0.7–3.1)
LYMPHOCYTES # BLD AUTO: 1.3 10*3/MM3 (ref 0.7–3.1)
LYMPHOCYTES # BLD AUTO: 1.31 10*3/MM3 (ref 0.7–3.1)
LYMPHOCYTES # BLD AUTO: 1.38 10*3/MM3 (ref 0.7–3.1)
LYMPHOCYTES # BLD AUTO: 1.5 10*3/MM3 (ref 0.6–4.8)
LYMPHOCYTES # BLD AUTO: 1.51 10*3/MM3 (ref 0.6–4.8)
LYMPHOCYTES # BLD AUTO: 1.65 10*3/MM3 (ref 0.6–4.8)
LYMPHOCYTES # BLD AUTO: 1.66 10*3/MM3 (ref 0.6–4.8)
LYMPHOCYTES NFR BLD AUTO: 17.5 % (ref 19.6–45.3)
LYMPHOCYTES NFR BLD AUTO: 18.1 % (ref 19.6–45.3)
LYMPHOCYTES NFR BLD AUTO: 19.5 % (ref 19.6–45.3)
LYMPHOCYTES NFR BLD AUTO: 19.9 % (ref 24–44)
LYMPHOCYTES NFR BLD AUTO: 2.3 % (ref 24–44)
LYMPHOCYTES NFR BLD AUTO: 20 % (ref 24–44)
LYMPHOCYTES NFR BLD AUTO: 20.1 % (ref 24–44)
LYMPHOCYTES NFR BLD AUTO: 21.1 % (ref 19.6–45.3)
LYMPHOCYTES NFR BLD AUTO: 21.4 % (ref 24–44)
LYMPHOCYTES NFR BLD AUTO: 22.6 % (ref 19.6–45.3)
LYMPHOCYTES NFR BLD AUTO: 23.7 % (ref 19.6–45.3)
LYMPHOCYTES NFR BLD AUTO: 23.7 % (ref 19.6–45.3)
LYMPHOCYTES NFR BLD AUTO: 24.6 % (ref 19.6–45.3)
LYMPHOCYTES NFR BLD AUTO: 29.1 % (ref 19.6–45.3)
LYMPHOCYTES NFR BLD AUTO: 3.6 % (ref 24–44)
LYMPHOCYTES NFR BLD AUTO: 38.3 % (ref 24–44)
LYMPHOCYTES NFR BLD AUTO: 45.6 % (ref 24–44)
Lab: NORMAL
MAGNESIUM SERPL-MCNC: 0.8 MG/DL (ref 1.3–2.7)
MAGNESIUM SERPL-MCNC: 1 MG/DL (ref 1.3–2.7)
MAGNESIUM SERPL-MCNC: 1.4 MG/DL (ref 1.6–2.4)
MAGNESIUM SERPL-MCNC: 1.7 MG/DL (ref 1.3–2.7)
MAGNESIUM SERPL-MCNC: 1.8 MG/DL (ref 1.6–2.4)
MAGNESIUM SERPL-MCNC: 2.8 MG/DL (ref 1.3–2.7)
MAGNESIUM SERPL-MCNC: <0.7 MG/DL (ref 1.3–2.7)
MAGNESIUM SERPL-MCNC: <0.7 MG/DL (ref 1.3–2.7)
MCH RBC QN AUTO: 28.9 PG (ref 27–31)
MCH RBC QN AUTO: 29.2 PG (ref 27–31)
MCH RBC QN AUTO: 29.7 PG (ref 27–31)
MCH RBC QN AUTO: 29.8 PG (ref 27–31)
MCH RBC QN AUTO: 29.9 PG (ref 27–31)
MCH RBC QN AUTO: 30 PG (ref 27–31)
MCH RBC QN AUTO: 30 PG (ref 27–31)
MCH RBC QN AUTO: 30.4 PG (ref 27–31)
MCH RBC QN AUTO: 30.5 PG (ref 26.6–33)
MCH RBC QN AUTO: 30.5 PG (ref 27–31)
MCH RBC QN AUTO: 30.6 PG (ref 26.6–33)
MCH RBC QN AUTO: 30.6 PG (ref 26.6–33)
MCH RBC QN AUTO: 30.8 PG (ref 26.6–33)
MCH RBC QN AUTO: 30.8 PG (ref 26.6–33)
MCH RBC QN AUTO: 30.9 PG (ref 26.6–33)
MCH RBC QN AUTO: 31.1 PG (ref 26.6–33)
MCH RBC QN AUTO: 31.1 PG (ref 26.6–33)
MCH RBC QN AUTO: 31.3 PG (ref 26.6–33)
MCH RBC QN AUTO: 31.4 PG (ref 26.6–33)
MCH RBC QN AUTO: 31.5 PG (ref 26.6–33)
MCH RBC QN AUTO: 31.6 PG (ref 26.6–33)
MCH RBC QN AUTO: 31.6 PG (ref 26.6–33)
MCH RBC QN AUTO: 31.8 PG (ref 26.6–33)
MCHC RBC AUTO-ENTMCNC: 31.4 G/DL (ref 31.5–35.7)
MCHC RBC AUTO-ENTMCNC: 32.8 G/DL (ref 32–36)
MCHC RBC AUTO-ENTMCNC: 33 G/DL (ref 31.5–35.7)
MCHC RBC AUTO-ENTMCNC: 33.1 G/DL (ref 32–36)
MCHC RBC AUTO-ENTMCNC: 33.2 G/DL (ref 31.5–35.7)
MCHC RBC AUTO-ENTMCNC: 33.2 G/DL (ref 32–36)
MCHC RBC AUTO-ENTMCNC: 33.3 G/DL (ref 31.5–35.7)
MCHC RBC AUTO-ENTMCNC: 33.3 G/DL (ref 32–36)
MCHC RBC AUTO-ENTMCNC: 33.3 G/DL (ref 32–36)
MCHC RBC AUTO-ENTMCNC: 33.5 G/DL (ref 31.5–35.7)
MCHC RBC AUTO-ENTMCNC: 33.5 G/DL (ref 31.5–35.7)
MCHC RBC AUTO-ENTMCNC: 33.5 G/DL (ref 32–36)
MCHC RBC AUTO-ENTMCNC: 33.9 G/DL (ref 31.5–35.7)
MCHC RBC AUTO-ENTMCNC: 34.1 G/DL (ref 31.5–35.7)
MCHC RBC AUTO-ENTMCNC: 34.1 G/DL (ref 32–36)
MCHC RBC AUTO-ENTMCNC: 34.1 G/DL (ref 32–36)
MCHC RBC AUTO-ENTMCNC: 34.3 G/DL (ref 32–36)
MCHC RBC AUTO-ENTMCNC: 34.4 G/DL (ref 31.5–35.7)
MCHC RBC AUTO-ENTMCNC: 34.6 G/DL (ref 31.5–35.7)
MCHC RBC AUTO-ENTMCNC: 35.6 G/DL (ref 31.5–35.7)
MCHC RBC AUTO-ENTMCNC: 36.2 G/DL (ref 31.5–35.7)
MCV RBC AUTO: 87 FL (ref 80–99)
MCV RBC AUTO: 87.2 FL (ref 79–97)
MCV RBC AUTO: 87.2 FL (ref 80–99)
MCV RBC AUTO: 87.9 FL (ref 79–97)
MCV RBC AUTO: 87.9 FL (ref 80–99)
MCV RBC AUTO: 88.3 FL (ref 79–97)
MCV RBC AUTO: 88.8 FL (ref 80–99)
MCV RBC AUTO: 89.4 FL (ref 80–99)
MCV RBC AUTO: 89.4 FL (ref 80–99)
MCV RBC AUTO: 90 FL (ref 79–97)
MCV RBC AUTO: 90 FL (ref 80–99)
MCV RBC AUTO: 90.3 FL (ref 80–99)
MCV RBC AUTO: 90.6 FL (ref 80–99)
MCV RBC AUTO: 91.3 FL (ref 79–97)
MCV RBC AUTO: 92.2 FL (ref 79–97)
MCV RBC AUTO: 92.4 FL (ref 79–97)
MCV RBC AUTO: 92.8 FL (ref 79–97)
MCV RBC AUTO: 92.8 FL (ref 79–97)
MCV RBC AUTO: 93 FL (ref 79–97)
MCV RBC AUTO: 93.1 FL (ref 79–97)
MCV RBC AUTO: 93.2 FL (ref 79–97)
MCV RBC AUTO: 93.4 FL (ref 79–97)
MCV RBC AUTO: 99 FL (ref 79–97)
MONOCYTES # BLD AUTO: 0.2 10*3/MM3 (ref 0–1)
MONOCYTES # BLD AUTO: 0.3 10*3/MM3 (ref 0–1)
MONOCYTES # BLD AUTO: 0.39 10*3/MM3 (ref 0–1)
MONOCYTES # BLD AUTO: 0.41 10*3/MM3 (ref 0.1–0.9)
MONOCYTES # BLD AUTO: 0.42 10*3/MM3 (ref 0.1–0.9)
MONOCYTES # BLD AUTO: 0.45 10*3/MM3 (ref 0.1–0.9)
MONOCYTES # BLD AUTO: 0.48 10*3/MM3 (ref 0–1)
MONOCYTES # BLD AUTO: 0.5 10*3/MM3 (ref 0.1–0.9)
MONOCYTES # BLD AUTO: 0.54 10*3/MM3 (ref 0.1–0.9)
MONOCYTES # BLD AUTO: 0.65 10*3/MM3 (ref 0.1–0.9)
MONOCYTES # BLD AUTO: 0.65 10*3/MM3 (ref 0.1–0.9)
MONOCYTES # BLD AUTO: 0.7 10*3/MM3 (ref 0–1)
MONOCYTES # BLD AUTO: 1.39 10*3/MM3 (ref 0–1)
MONOCYTES # BLD AUTO: 1.47 10*3/MM3 (ref 0–1)
MONOCYTES # BLD AUTO: 1.74 10*3/MM3 (ref 0–1)
MONOCYTES NFR BLD AUTO: 10.1 % (ref 0–12)
MONOCYTES NFR BLD AUTO: 10.3 % (ref 5–12)
MONOCYTES NFR BLD AUTO: 11.6 % (ref 5–12)
MONOCYTES NFR BLD AUTO: 16.9 % (ref 0–12)
MONOCYTES NFR BLD AUTO: 19.6 % (ref 0–12)
MONOCYTES NFR BLD AUTO: 22.4 % (ref 0–12)
MONOCYTES NFR BLD AUTO: 3.9 % (ref 0–12)
MONOCYTES NFR BLD AUTO: 5.8 % (ref 0–12)
MONOCYTES NFR BLD AUTO: 6.6 % (ref 5–12)
MONOCYTES NFR BLD AUTO: 8.6 % (ref 5–12)
MONOCYTES NFR BLD AUTO: 8.7 % (ref 5–12)
MONOCYTES NFR BLD AUTO: 8.8 % (ref 0–12)
MONOCYTES NFR BLD AUTO: 8.8 % (ref 5–12)
MONOCYTES NFR BLD AUTO: 9 % (ref 5–12)
MONOCYTES NFR BLD AUTO: 9.1 % (ref 5–12)
MONOCYTES NFR BLD AUTO: 9.3 % (ref 0–12)
MONOCYTES NFR BLD AUTO: 9.5 % (ref 5–12)
NEGATIVE CONTROL: NEGATIVE
NEUTROPHILS # BLD AUTO: 1.2 10*3/MM3 (ref 1.5–8.3)
NEUTROPHILS # BLD AUTO: 1.4 10*3/MM3 (ref 1.5–8.3)
NEUTROPHILS # BLD AUTO: 2.55 10*3/MM3 (ref 1.7–7)
NEUTROPHILS # BLD AUTO: 3.15 10*3/MM3 (ref 1.7–7)
NEUTROPHILS # BLD AUTO: 3.44 10*3/MM3 (ref 1.7–7)
NEUTROPHILS # BLD AUTO: 3.49 10*3/MM3 (ref 1.7–7)
NEUTROPHILS # BLD AUTO: 3.5 10*3/MM3 (ref 1.7–7)
NEUTROPHILS # BLD AUTO: 3.79 10*3/MM3 (ref 1.7–7)
NEUTROPHILS # BLD AUTO: 3.8 10*3/MM3 (ref 1.7–7)
NEUTROPHILS # BLD AUTO: 4.18 10*3/MM3 (ref 1.7–7)
NEUTROPHILS # BLD AUTO: 4.19 10*3/MM3 (ref 1.5–8.3)
NEUTROPHILS # BLD AUTO: 4.24 10*3/MM3 (ref 1.5–8.3)
NEUTROPHILS # BLD AUTO: 5.14 10*3/MM3 (ref 1.5–8.3)
NEUTROPHILS # BLD AUTO: 5.3 10*3/MM3 (ref 1.5–8.3)
NEUTROPHILS # BLD AUTO: 5.3 10*3/MM3 (ref 1.7–7)
NEUTROPHILS # BLD AUTO: 7.64 10*3/MM3 (ref 1.5–8.3)
NEUTROPHILS # BLD AUTO: 9.24 10*3/MM3 (ref 1.5–8.3)
NEUTROPHILS NFR BLD AUTO: 45.6 % (ref 41–71)
NEUTROPHILS NFR BLD AUTO: 51.6 % (ref 41–71)
NEUTROPHILS NFR BLD AUTO: 54.6 % (ref 41–71)
NEUTROPHILS NFR BLD AUTO: 55.8 % (ref 41–71)
NEUTROPHILS NFR BLD AUTO: 56.7 % (ref 42.7–76)
NEUTROPHILS NFR BLD AUTO: 61.2 % (ref 42.7–76)
NEUTROPHILS NFR BLD AUTO: 62.3 % (ref 41–71)
NEUTROPHILS NFR BLD AUTO: 63.6 % (ref 42.7–76)
NEUTROPHILS NFR BLD AUTO: 64.4 % (ref 42.7–76)
NEUTROPHILS NFR BLD AUTO: 66.2 % (ref 42.7–76)
NEUTROPHILS NFR BLD AUTO: 66.8 % (ref 42.7–76)
NEUTROPHILS NFR BLD AUTO: 67.2 % (ref 42.7–76)
NEUTROPHILS NFR BLD AUTO: 68.6 % (ref 42.7–76)
NEUTROPHILS NFR BLD AUTO: 70.8 % (ref 41–71)
NEUTROPHILS NFR BLD AUTO: 75.9 % (ref 42.7–76)
NEUTROPHILS NFR BLD AUTO: 91.7 % (ref 41–71)
NEUTROPHILS NFR BLD AUTO: 91.9 % (ref 41–71)
NITRITE UR QL STRIP: NEGATIVE
NRBC BLD AUTO-RTO: 0 /100 WBC (ref 0–0.2)
NT-PROBNP SERPL-MCNC: 163.3 PG/ML (ref 5–900)
OSMOLALITY SERPL: 255 MOSM/KG (ref 275–295)
OSMOLALITY UR: 575 MOSM/KG (ref 300–1100)
PH UR STRIP.AUTO: <=5 [PH] (ref 5–8)
PHOSPHATE SERPL-MCNC: 3.8 MG/DL (ref 2.5–4.5)
PLAT MORPH BLD: NORMAL
PLATELET # BLD AUTO: 201 10*3/MM3 (ref 150–450)
PLATELET # BLD AUTO: 214 10*3/MM3 (ref 140–450)
PLATELET # BLD AUTO: 222 10*3/MM3 (ref 140–450)
PLATELET # BLD AUTO: 223 10*3/MM3 (ref 140–450)
PLATELET # BLD AUTO: 225 10*3/MM3 (ref 150–450)
PLATELET # BLD AUTO: 227 10*3/MM3 (ref 140–450)
PLATELET # BLD AUTO: 229 10*3/MM3 (ref 140–450)
PLATELET # BLD AUTO: 239 10*3/MM3 (ref 140–450)
PLATELET # BLD AUTO: 241 10*3/MM3 (ref 150–450)
PLATELET # BLD AUTO: 252 10*3/MM3 (ref 150–450)
PLATELET # BLD AUTO: 253 10*3/MM3 (ref 140–450)
PLATELET # BLD AUTO: 258 10*3/MM3 (ref 150–450)
PLATELET # BLD AUTO: 258 10*3/MM3 (ref 150–450)
PLATELET # BLD AUTO: 259 10*3/MM3 (ref 140–450)
PLATELET # BLD AUTO: 266 10*3/MM3 (ref 140–450)
PLATELET # BLD AUTO: 269 10*3/MM3 (ref 140–450)
PLATELET # BLD AUTO: 275 10*3/MM3 (ref 140–450)
PLATELET # BLD AUTO: 280 10*3/MM3 (ref 140–450)
PLATELET # BLD AUTO: 296 10*3/MM3 (ref 150–450)
PLATELET # BLD AUTO: 302 10*3/MM3 (ref 140–450)
PLATELET # BLD AUTO: 307 10*3/MM3 (ref 150–450)
PLATELET # BLD AUTO: 324 10*3/MM3 (ref 140–450)
PLATELET # BLD AUTO: 350 10*3/MM3 (ref 150–450)
PMV BLD AUTO: 5.5 FL (ref 6–12)
PMV BLD AUTO: 5.8 FL (ref 6–12)
PMV BLD AUTO: 6.3 FL (ref 6–12)
PMV BLD AUTO: 6.7 FL (ref 6–12)
PMV BLD AUTO: 6.8 FL (ref 6–12)
PMV BLD AUTO: 7.1 FL (ref 6–12)
PMV BLD AUTO: 8.3 FL (ref 6–12)
PMV BLD AUTO: 8.4 FL (ref 6–12)
PMV BLD AUTO: 8.5 FL (ref 6–12)
PMV BLD AUTO: 8.6 FL (ref 6–12)
PMV BLD AUTO: 8.6 FL (ref 6–12)
PMV BLD AUTO: 8.7 FL (ref 6–12)
PMV BLD AUTO: 8.8 FL (ref 6–12)
PMV BLD AUTO: 8.8 FL (ref 6–12)
PMV BLD AUTO: 8.9 FL (ref 6–12)
PMV BLD AUTO: 9 FL (ref 6–12)
PMV BLD AUTO: 9.1 FL (ref 6–12)
PMV BLD AUTO: 9.1 FL (ref 6–12)
PMV BLD AUTO: 9.2 FL (ref 6–12)
POSITIVE CONTROL: POSITIVE
POTASSIUM BLD-SCNC: 3.1 MMOL/L (ref 3.5–5.5)
POTASSIUM BLD-SCNC: 3.3 MMOL/L (ref 3.5–5.5)
POTASSIUM BLD-SCNC: 3.4 MMOL/L (ref 3.5–5.5)
POTASSIUM BLD-SCNC: 3.5 MMOL/L (ref 3.5–5.2)
POTASSIUM BLD-SCNC: 3.6 MMOL/L (ref 3.5–5.2)
POTASSIUM BLD-SCNC: 3.7 MMOL/L (ref 3.5–5.5)
POTASSIUM BLD-SCNC: 3.8 MMOL/L (ref 3.5–5.2)
POTASSIUM BLD-SCNC: 3.9 MMOL/L (ref 3.5–5.2)
POTASSIUM BLD-SCNC: 3.9 MMOL/L (ref 3.5–5.2)
POTASSIUM BLD-SCNC: 3.9 MMOL/L (ref 3.5–5.5)
POTASSIUM BLD-SCNC: 4 MMOL/L (ref 3.5–5.2)
POTASSIUM BLD-SCNC: 4.1 MMOL/L (ref 3.5–5.2)
POTASSIUM BLD-SCNC: 4.1 MMOL/L (ref 3.5–5.2)
POTASSIUM BLD-SCNC: 4.2 MMOL/L (ref 3.5–5.2)
POTASSIUM BLD-SCNC: 4.3 MMOL/L (ref 3.5–5.2)
POTASSIUM BLD-SCNC: 4.4 MMOL/L (ref 3.5–5.2)
POTASSIUM BLD-SCNC: 4.4 MMOL/L (ref 3.5–5.2)
POTASSIUM BLD-SCNC: 4.4 MMOL/L (ref 3.5–5.5)
POTASSIUM BLD-SCNC: 4.4 MMOL/L (ref 3.5–5.5)
POTASSIUM BLD-SCNC: 4.5 MMOL/L (ref 3.5–5.2)
POTASSIUM BLD-SCNC: 4.5 MMOL/L (ref 3.5–5.5)
POTASSIUM BLD-SCNC: 4.6 MMOL/L (ref 3.5–5.2)
POTASSIUM BLD-SCNC: 4.6 MMOL/L (ref 3.5–5.2)
POTASSIUM BLD-SCNC: 4.6 MMOL/L (ref 3.5–5.5)
POTASSIUM BLD-SCNC: 4.7 MMOL/L (ref 3.5–5.2)
POTASSIUM BLD-SCNC: 4.7 MMOL/L (ref 3.5–5.2)
POTASSIUM BLD-SCNC: 4.9 MMOL/L (ref 3.5–5.2)
POTASSIUM BLD-SCNC: 5.8 MMOL/L (ref 3.5–5.5)
PROT SERPL-MCNC: 5.4 G/DL (ref 5.7–8.2)
PROT SERPL-MCNC: 5.6 G/DL (ref 5.7–8.2)
PROT SERPL-MCNC: 5.7 G/DL (ref 5.7–8.2)
PROT SERPL-MCNC: 5.8 G/DL (ref 5.7–8.2)
PROT SERPL-MCNC: 5.8 G/DL (ref 5.7–8.2)
PROT SERPL-MCNC: 6.2 G/DL (ref 5.7–8.2)
PROT SERPL-MCNC: 6.3 G/DL (ref 5.7–8.2)
PROT SERPL-MCNC: 6.3 G/DL (ref 6–8.5)
PROT SERPL-MCNC: 6.7 G/DL (ref 6–8.5)
PROT SERPL-MCNC: 6.9 G/DL (ref 6–8.5)
PROT SERPL-MCNC: 7 G/DL (ref 6–8.5)
PROT SERPL-MCNC: 7.1 G/DL (ref 6–8.5)
PROT SERPL-MCNC: 7.3 G/DL (ref 6–8.5)
PROT SERPL-MCNC: 7.4 G/DL (ref 6–8.5)
PROT UR QL STRIP: ABNORMAL
PROT UR QL STRIP: NEGATIVE
RBC # BLD AUTO: 2.24 10*6/MM3 (ref 3.89–5.14)
RBC # BLD AUTO: 2.3 10*6/MM3 (ref 3.89–5.14)
RBC # BLD AUTO: 2.36 10*6/MM3 (ref 3.77–5.28)
RBC # BLD AUTO: 2.39 10*6/MM3 (ref 3.89–5.14)
RBC # BLD AUTO: 2.42 10*6/MM3 (ref 3.77–5.28)
RBC # BLD AUTO: 2.49 10*6/MM3 (ref 3.77–5.28)
RBC # BLD AUTO: 2.5 10*6/MM3 (ref 3.89–5.14)
RBC # BLD AUTO: 2.56 10*6/MM3 (ref 3.77–5.28)
RBC # BLD AUTO: 2.57 10*6/MM3 (ref 3.77–5.28)
RBC # BLD AUTO: 2.57 10*6/MM3 (ref 3.89–5.14)
RBC # BLD AUTO: 2.65 10*6/MM3 (ref 3.77–5.28)
RBC # BLD AUTO: 2.73 10*6/MM3 (ref 3.77–5.28)
RBC # BLD AUTO: 2.82 10*6/MM3 (ref 3.77–5.28)
RBC # BLD AUTO: 2.88 10*6/MM3 (ref 3.89–5.14)
RBC # BLD AUTO: 2.95 10*6/MM3 (ref 3.77–5.28)
RBC # BLD AUTO: 2.96 10*6/MM3 (ref 3.77–5.28)
RBC # BLD AUTO: 2.97 10*6/MM3 (ref 3.89–5.14)
RBC # BLD AUTO: 3 10*6/MM3 (ref 3.77–5.28)
RBC # BLD AUTO: 3.05 10*6/MM3 (ref 3.77–5.28)
RBC # BLD AUTO: 3.08 10*6/MM3 (ref 3.89–5.14)
RBC # BLD AUTO: 3.15 10*6/MM3 (ref 3.77–5.28)
RBC # BLD AUTO: 3.22 10*6/MM3 (ref 3.77–5.28)
RBC # BLD AUTO: 3.3 10*6/MM3 (ref 3.89–5.14)
RBC # UR: ABNORMAL /HPF
RBC MORPH BLD: NORMAL
REF LAB TEST METHOD: ABNORMAL
RETICS/RBC NFR AUTO: 3.07 % (ref 0.5–1.5)
RETICS/RBC NFR AUTO: 3.13 % (ref 0.5–1.5)
RH BLD: POSITIVE
SODIUM BLD-SCNC: 116 MMOL/L (ref 136–145)
SODIUM BLD-SCNC: 117 MMOL/L (ref 136–145)
SODIUM BLD-SCNC: 119 MMOL/L (ref 136–145)
SODIUM BLD-SCNC: 120 MMOL/L (ref 136–145)
SODIUM BLD-SCNC: 121 MMOL/L (ref 136–145)
SODIUM BLD-SCNC: 123 MMOL/L (ref 136–145)
SODIUM BLD-SCNC: 124 MMOL/L (ref 132–146)
SODIUM BLD-SCNC: 124 MMOL/L (ref 136–145)
SODIUM BLD-SCNC: 125 MMOL/L (ref 136–145)
SODIUM BLD-SCNC: 125 MMOL/L (ref 136–145)
SODIUM BLD-SCNC: 126 MMOL/L (ref 136–145)
SODIUM BLD-SCNC: 127 MMOL/L (ref 136–145)
SODIUM BLD-SCNC: 127 MMOL/L (ref 136–145)
SODIUM BLD-SCNC: 129 MMOL/L (ref 136–145)
SODIUM BLD-SCNC: 130 MMOL/L (ref 132–146)
SODIUM BLD-SCNC: 131 MMOL/L (ref 132–146)
SODIUM BLD-SCNC: 131 MMOL/L (ref 136–145)
SODIUM BLD-SCNC: 132 MMOL/L (ref 136–145)
SODIUM BLD-SCNC: 132 MMOL/L (ref 136–145)
SODIUM BLD-SCNC: 133 MMOL/L (ref 132–146)
SODIUM BLD-SCNC: 133 MMOL/L (ref 132–146)
SODIUM BLD-SCNC: 133 MMOL/L (ref 136–145)
SODIUM BLD-SCNC: 135 MMOL/L (ref 132–146)
SODIUM BLD-SCNC: 135 MMOL/L (ref 136–145)
SODIUM BLD-SCNC: 135 MMOL/L (ref 136–145)
SODIUM BLD-SCNC: 136 MMOL/L (ref 136–145)
SODIUM BLD-SCNC: 137 MMOL/L (ref 132–146)
SODIUM BLD-SCNC: 137 MMOL/L (ref 136–145)
SODIUM BLD-SCNC: 138 MMOL/L (ref 132–146)
SODIUM BLD-SCNC: 138 MMOL/L (ref 132–146)
SODIUM BLD-SCNC: 138 MMOL/L (ref 136–145)
SODIUM BLD-SCNC: 141 MMOL/L (ref 132–146)
SODIUM BLD-SCNC: 141 MMOL/L (ref 136–145)
SODIUM UR-SCNC: 94 MMOL/L
SP GR UR STRIP: 1.02 (ref 1–1.03)
SP GR UR STRIP: 1.03 (ref 1–1.03)
SQUAMOUS #/AREA URNS HPF: ABNORMAL /HPF
T&S EXPIRATION DATE: NORMAL
T4 FREE SERPL-MCNC: 1.31 NG/DL (ref 0.93–1.7)
T4 FREE SERPL-MCNC: 1.42 NG/DL (ref 0.89–1.76)
T4 FREE SERPL-MCNC: 1.71 NG/DL (ref 0.93–1.7)
T4 FREE SERPL-MCNC: 1.85 NG/DL (ref 0.93–1.7)
TIBC SERPL-MCNC: 266 MCG/DL (ref 250–450)
TIBC SERPL-MCNC: 274 MCG/DL (ref 250–450)
TROPONIN I SERPL-MCNC: 0 NG/ML (ref 0–0.07)
TROPONIN T SERPL-MCNC: <0.01 NG/ML (ref 0–0.03)
TROPONIN T SERPL-MCNC: <0.01 NG/ML (ref 0–0.03)
TSH SERPL DL<=0.05 MIU/L-ACNC: 0.83 MIU/ML (ref 0.27–4.2)
TSH SERPL DL<=0.05 MIU/L-ACNC: 0.89 MIU/ML (ref 0.27–4.2)
TSH SERPL DL<=0.05 MIU/L-ACNC: 0.9 MIU/ML (ref 0.35–5.35)
TSH SERPL DL<=0.05 MIU/L-ACNC: 1.1 UIU/ML (ref 0.27–4.2)
TSH SERPL DL<=0.05 MIU/L-ACNC: 1.58 UIU/ML (ref 0.27–4.2)
TSH SERPL DL<=0.05 MIU/L-ACNC: 1.68 UIU/ML (ref 0.27–4.2)
UNIT  ABO: NORMAL
UNIT  RH: NORMAL
URATE SERPL-MCNC: 3.7 MG/DL (ref 2.4–5.7)
UROBILINOGEN UR QL STRIP: ABNORMAL
VIT B12 BLD-MCNC: 1593 PG/ML (ref 211–911)
VIT B12 BLD-MCNC: >2000 PG/ML (ref 211–911)
VIT B12 USB SERPL-MCNC: 1464 PG/ML (ref 725–2045)
WBC MORPH BLD: NORMAL
WBC NRBC COR # BLD: 10.07 10*3/MM3 (ref 3.5–10.8)
WBC NRBC COR # BLD: 10.65 10*3/MM3 (ref 3.4–10.8)
WBC NRBC COR # BLD: 2.6 10*3/MM3 (ref 3.5–10.8)
WBC NRBC COR # BLD: 2.7 10*3/MM3 (ref 3.5–10.8)
WBC NRBC COR # BLD: 4.5 10*3/MM3 (ref 3.4–10.8)
WBC NRBC COR # BLD: 4.74 10*3/MM3 (ref 3.4–10.8)
WBC NRBC COR # BLD: 4.89 10*3/MM3 (ref 3.4–10.8)
WBC NRBC COR # BLD: 5.19 10*3/MM3 (ref 3.4–10.8)
WBC NRBC COR # BLD: 5.3 10*3/MM3 (ref 3.4–10.8)
WBC NRBC COR # BLD: 5.46 10*3/MM3 (ref 3.4–10.8)
WBC NRBC COR # BLD: 5.5 10*3/MM3 (ref 3.4–10.8)
WBC NRBC COR # BLD: 5.61 10*3/MM3 (ref 3.4–10.8)
WBC NRBC COR # BLD: 5.97 10*3/MM3 (ref 3.4–10.8)
WBC NRBC COR # BLD: 6.31 10*3/MM3 (ref 3.4–10.8)
WBC NRBC COR # BLD: 7 10*3/MM3 (ref 3.4–10.8)
WBC NRBC COR # BLD: 7.5 10*3/MM3 (ref 3.5–10.8)
WBC NRBC COR # BLD: 7.51 10*3/MM3 (ref 3.5–10.8)
WBC NRBC COR # BLD: 7.67 10*3/MM3 (ref 3.4–10.8)
WBC NRBC COR # BLD: 7.76 10*3/MM3 (ref 3.5–10.8)
WBC NRBC COR # BLD: 8.24 10*3/MM3 (ref 3.5–10.8)
WBC NRBC COR # BLD: 8.31 10*3/MM3 (ref 3.5–10.8)
WBC NRBC COR # BLD: 8.57 10*3/MM3 (ref 3.4–10.8)
WBC NRBC COR # BLD: 9.63 10*3/MM3 (ref 3.5–10.8)
WBC UR QL AUTO: ABNORMAL /HPF
WHOLE BLOOD HOLD SPECIMEN: NORMAL

## 2019-01-01 PROCEDURE — 99214 OFFICE O/P EST MOD 30 MIN: CPT | Performed by: INTERNAL MEDICINE

## 2019-01-01 PROCEDURE — 63710000001 INSULIN LISPRO (HUMAN) PER 5 UNITS: Performed by: NURSE PRACTITIONER

## 2019-01-01 PROCEDURE — 0 XENON XE 133: Performed by: EMERGENCY MEDICINE

## 2019-01-01 PROCEDURE — 96413 CHEMO IV INFUSION 1 HR: CPT

## 2019-01-01 PROCEDURE — 99215 OFFICE O/P EST HI 40 MIN: CPT | Performed by: INTERNAL MEDICINE

## 2019-01-01 PROCEDURE — 85014 HEMATOCRIT: CPT | Performed by: INTERNAL MEDICINE

## 2019-01-01 PROCEDURE — 83010 ASSAY OF HAPTOGLOBIN QUANT: CPT | Performed by: INTERNAL MEDICINE

## 2019-01-01 PROCEDURE — 36415 COLL VENOUS BLD VENIPUNCTURE: CPT | Performed by: INTERNAL MEDICINE

## 2019-01-01 PROCEDURE — 80053 COMPREHEN METABOLIC PANEL: CPT | Performed by: EMERGENCY MEDICINE

## 2019-01-01 PROCEDURE — 85027 COMPLETE CBC AUTOMATED: CPT | Performed by: INTERNAL MEDICINE

## 2019-01-01 PROCEDURE — 80048 BASIC METABOLIC PNL TOTAL CA: CPT | Performed by: INTERNAL MEDICINE

## 2019-01-01 PROCEDURE — 82962 GLUCOSE BLOOD TEST: CPT

## 2019-01-01 PROCEDURE — 36430 TRANSFUSION BLD/BLD COMPNT: CPT

## 2019-01-01 PROCEDURE — 99223 1ST HOSP IP/OBS HIGH 75: CPT | Performed by: INTERNAL MEDICINE

## 2019-01-01 PROCEDURE — 85025 COMPLETE CBC W/AUTO DIFF WBC: CPT | Performed by: INTERNAL MEDICINE

## 2019-01-01 PROCEDURE — 82565 ASSAY OF CREATININE: CPT

## 2019-01-01 PROCEDURE — 77336 RADIATION PHYSICS CONSULT: CPT | Performed by: RADIOLOGY

## 2019-01-01 PROCEDURE — 25010000002 MAGNESIUM SULFATE IN D5W 1G/100ML (PREMIX) 1-5 GM/100ML-% SOLUTION: Performed by: INTERNAL MEDICINE

## 2019-01-01 PROCEDURE — 77412 RADIATION TX DELIVERY LVL 3: CPT | Performed by: RADIOLOGY

## 2019-01-01 PROCEDURE — 84443 ASSAY THYROID STIM HORMONE: CPT | Performed by: PHYSICIAN ASSISTANT

## 2019-01-01 PROCEDURE — 85027 COMPLETE CBC AUTOMATED: CPT | Performed by: HOSPITALIST

## 2019-01-01 PROCEDURE — 82608 B-12 BINDING CAPACITY: CPT | Performed by: NURSE PRACTITIONER

## 2019-01-01 PROCEDURE — 99233 SBSQ HOSP IP/OBS HIGH 50: CPT | Performed by: INTERNAL MEDICINE

## 2019-01-01 PROCEDURE — 84439 ASSAY OF FREE THYROXINE: CPT | Performed by: EMERGENCY MEDICINE

## 2019-01-01 PROCEDURE — 97116 GAIT TRAINING THERAPY: CPT

## 2019-01-01 PROCEDURE — 25010000002 ETOPOSIDE 1 GM/50ML SOLUTION 50 ML VIAL: Performed by: INTERNAL MEDICINE

## 2019-01-01 PROCEDURE — 77307 TELETHX ISODOSE PLAN CPLX: CPT | Performed by: RADIOLOGY

## 2019-01-01 PROCEDURE — 96360 HYDRATION IV INFUSION INIT: CPT

## 2019-01-01 PROCEDURE — 83735 ASSAY OF MAGNESIUM: CPT | Performed by: INTERNAL MEDICINE

## 2019-01-01 PROCEDURE — 83550 IRON BINDING TEST: CPT | Performed by: INTERNAL MEDICINE

## 2019-01-01 PROCEDURE — 83735 ASSAY OF MAGNESIUM: CPT

## 2019-01-01 PROCEDURE — 80053 COMPREHEN METABOLIC PANEL: CPT | Performed by: INTERNAL MEDICINE

## 2019-01-01 PROCEDURE — 82010 KETONE BODYS QUAN: CPT | Performed by: NURSE PRACTITIONER

## 2019-01-01 PROCEDURE — 25010000002 HEPARIN (PORCINE) PER 1000 UNITS: Performed by: PHYSICIAN ASSISTANT

## 2019-01-01 PROCEDURE — 77001 FLUOROGUIDE FOR VEIN DEVICE: CPT

## 2019-01-01 PROCEDURE — 82306 VITAMIN D 25 HYDROXY: CPT | Performed by: HOSPITALIST

## 2019-01-01 PROCEDURE — 86900 BLOOD TYPING SEROLOGIC ABO: CPT

## 2019-01-01 PROCEDURE — 71250 CT THORAX DX C-: CPT

## 2019-01-01 PROCEDURE — 84439 ASSAY OF FREE THYROXINE: CPT | Performed by: INTERNAL MEDICINE

## 2019-01-01 PROCEDURE — 25010000002 HYDROMORPHONE PER 4 MG: Performed by: EMERGENCY MEDICINE

## 2019-01-01 PROCEDURE — 85025 COMPLETE CBC W/AUTO DIFF WBC: CPT | Performed by: EMERGENCY MEDICINE

## 2019-01-01 PROCEDURE — 77280 THER RAD SIMULAJ FIELD SMPL: CPT | Performed by: RADIOLOGY

## 2019-01-01 PROCEDURE — 63710000001 PROMETHAZINE PER 25 MG: Performed by: NURSE PRACTITIONER

## 2019-01-01 PROCEDURE — 36415 COLL VENOUS BLD VENIPUNCTURE: CPT

## 2019-01-01 PROCEDURE — 93005 ELECTROCARDIOGRAM TRACING: CPT | Performed by: EMERGENCY MEDICINE

## 2019-01-01 PROCEDURE — 84443 ASSAY THYROID STIM HORMONE: CPT | Performed by: EMERGENCY MEDICINE

## 2019-01-01 PROCEDURE — 85014 HEMATOCRIT: CPT | Performed by: NURSE PRACTITIONER

## 2019-01-01 PROCEDURE — 86901 BLOOD TYPING SEROLOGIC RH(D): CPT | Performed by: EMERGENCY MEDICINE

## 2019-01-01 PROCEDURE — G0463 HOSPITAL OUTPT CLINIC VISIT: HCPCS | Performed by: RADIOLOGY

## 2019-01-01 PROCEDURE — 99239 HOSP IP/OBS DSCHRG MGMT >30: CPT | Performed by: NURSE PRACTITIONER

## 2019-01-01 PROCEDURE — 86901 BLOOD TYPING SEROLOGIC RH(D): CPT | Performed by: NURSE PRACTITIONER

## 2019-01-01 PROCEDURE — 74176 CT ABD & PELVIS W/O CONTRAST: CPT

## 2019-01-01 PROCEDURE — 25010000002 FUROSEMIDE PER 20 MG: Performed by: INTERNAL MEDICINE

## 2019-01-01 PROCEDURE — 82607 VITAMIN B-12: CPT | Performed by: NURSE PRACTITIONER

## 2019-01-01 PROCEDURE — 77387 GUIDANCE FOR RADJ TX DLVR: CPT | Performed by: RADIOLOGY

## 2019-01-01 PROCEDURE — 99284 EMERGENCY DEPT VISIT MOD MDM: CPT

## 2019-01-01 PROCEDURE — 71045 X-RAY EXAM CHEST 1 VIEW: CPT

## 2019-01-01 PROCEDURE — 96365 THER/PROPH/DIAG IV INF INIT: CPT

## 2019-01-01 PROCEDURE — 83735 ASSAY OF MAGNESIUM: CPT | Performed by: EMERGENCY MEDICINE

## 2019-01-01 PROCEDURE — 83935 ASSAY OF URINE OSMOLALITY: CPT | Performed by: PHYSICIAN ASSISTANT

## 2019-01-01 PROCEDURE — 25010000002 IOPAMIDOL 61 % SOLUTION: Performed by: EMERGENCY MEDICINE

## 2019-01-01 PROCEDURE — 25010000002 ONDANSETRON PER 1 MG: Performed by: INTERNAL MEDICINE

## 2019-01-01 PROCEDURE — 25010000002 FOSAPREPITANT PER 1 MG: Performed by: INTERNAL MEDICINE

## 2019-01-01 PROCEDURE — A9540 TC99M MAA: HCPCS | Performed by: EMERGENCY MEDICINE

## 2019-01-01 PROCEDURE — 97162 PT EVAL MOD COMPLEX 30 MIN: CPT

## 2019-01-01 PROCEDURE — 63710000001 INSULIN DETEMIR PER 5 UNITS: Performed by: INTERNAL MEDICINE

## 2019-01-01 PROCEDURE — 85025 COMPLETE CBC W/AUTO DIFF WBC: CPT

## 2019-01-01 PROCEDURE — 25010000002 ONDANSETRON PER 1 MG: Performed by: EMERGENCY MEDICINE

## 2019-01-01 PROCEDURE — 86850 RBC ANTIBODY SCREEN: CPT | Performed by: INTERNAL MEDICINE

## 2019-01-01 PROCEDURE — 83540 ASSAY OF IRON: CPT | Performed by: NURSE PRACTITIONER

## 2019-01-01 PROCEDURE — 63710000001 DEXAMETHASONE PER 0.25 MG: Performed by: NURSE PRACTITIONER

## 2019-01-01 PROCEDURE — 99232 SBSQ HOSP IP/OBS MODERATE 35: CPT | Performed by: INTERNAL MEDICINE

## 2019-01-01 PROCEDURE — 0 TECHNETIUM ALBUMIN AGGREGATED: Performed by: EMERGENCY MEDICINE

## 2019-01-01 PROCEDURE — 86900 BLOOD TYPING SEROLOGIC ABO: CPT | Performed by: NURSE PRACTITIONER

## 2019-01-01 PROCEDURE — P9016 RBC LEUKOCYTES REDUCED: HCPCS

## 2019-01-01 PROCEDURE — 25010000002 HYDROMORPHONE PER 4 MG: Performed by: INTERNAL MEDICINE

## 2019-01-01 PROCEDURE — 77417 THER RADIOLOGY PORT IMAGE(S): CPT | Performed by: RADIOLOGY

## 2019-01-01 PROCEDURE — 83540 ASSAY OF IRON: CPT | Performed by: INTERNAL MEDICINE

## 2019-01-01 PROCEDURE — 85027 COMPLETE CBC AUTOMATED: CPT | Performed by: PHYSICIAN ASSISTANT

## 2019-01-01 PROCEDURE — 96367 TX/PROPH/DG ADDL SEQ IV INF: CPT

## 2019-01-01 PROCEDURE — 96417 CHEMO IV INFUS EACH ADDL SEQ: CPT

## 2019-01-01 PROCEDURE — A9552 F18 FDG: HCPCS | Performed by: INTERNAL MEDICINE

## 2019-01-01 PROCEDURE — A9558 XE133 XENON 10MCI: HCPCS | Performed by: EMERGENCY MEDICINE

## 2019-01-01 PROCEDURE — 25010000002 PALONOSETRON PER 25 MCG: Performed by: INTERNAL MEDICINE

## 2019-01-01 PROCEDURE — 25010000002 ONDANSETRON PER 1 MG: Performed by: PHYSICIAN ASSISTANT

## 2019-01-01 PROCEDURE — 25010000002 HYDROCORTISONE SODIUM SUCCINATE 100 MG RECONSTITUTED SOLUTION: Performed by: INTERNAL MEDICINE

## 2019-01-01 PROCEDURE — 77334 RADIATION TREATMENT AID(S): CPT | Performed by: RADIOLOGY

## 2019-01-01 PROCEDURE — 81003 URINALYSIS AUTO W/O SCOPE: CPT | Performed by: EMERGENCY MEDICINE

## 2019-01-01 PROCEDURE — 82270 OCCULT BLOOD FECES: CPT | Performed by: PHYSICIAN ASSISTANT

## 2019-01-01 PROCEDURE — 78815 PET IMAGE W/CT SKULL-THIGH: CPT

## 2019-01-01 PROCEDURE — 25010000002 ONDANSETRON PER 1 MG: Performed by: NURSE PRACTITIONER

## 2019-01-01 PROCEDURE — 86923 COMPATIBILITY TEST ELECTRIC: CPT

## 2019-01-01 PROCEDURE — A9503 TC99M MEDRONATE: HCPCS | Performed by: NURSE PRACTITIONER

## 2019-01-01 PROCEDURE — 83880 ASSAY OF NATRIURETIC PEPTIDE: CPT | Performed by: EMERGENCY MEDICINE

## 2019-01-01 PROCEDURE — 25010000002 PROCHLORPERAZINE 10 MG/2ML SOLUTION: Performed by: PHYSICIAN ASSISTANT

## 2019-01-01 PROCEDURE — 63710000001 DIPHENHYDRAMINE PER 50 MG: Performed by: INTERNAL MEDICINE

## 2019-01-01 PROCEDURE — 84443 ASSAY THYROID STIM HORMONE: CPT | Performed by: INTERNAL MEDICINE

## 2019-01-01 PROCEDURE — 85025 COMPLETE CBC W/AUTO DIFF WBC: CPT | Performed by: NURSE PRACTITIONER

## 2019-01-01 PROCEDURE — 25010000002 MAGNESIUM SULFATE 2 GM/50ML SOLUTION: Performed by: HOSPITALIST

## 2019-01-01 PROCEDURE — 83036 HEMOGLOBIN GLYCOSYLATED A1C: CPT | Performed by: NURSE PRACTITIONER

## 2019-01-01 PROCEDURE — 83550 IRON BINDING TEST: CPT | Performed by: NURSE PRACTITIONER

## 2019-01-01 PROCEDURE — G0378 HOSPITAL OBSERVATION PER HR: HCPCS

## 2019-01-01 PROCEDURE — 85018 HEMOGLOBIN: CPT | Performed by: INTERNAL MEDICINE

## 2019-01-01 PROCEDURE — 77290 THER RAD SIMULAJ FIELD CPLX: CPT | Performed by: RADIOLOGY

## 2019-01-01 PROCEDURE — 83615 LACTATE (LD) (LDH) ENZYME: CPT | Performed by: INTERNAL MEDICINE

## 2019-01-01 PROCEDURE — 86900 BLOOD TYPING SEROLOGIC ABO: CPT | Performed by: EMERGENCY MEDICINE

## 2019-01-01 PROCEDURE — 86901 BLOOD TYPING SEROLOGIC RH(D): CPT

## 2019-01-01 PROCEDURE — 99285 EMERGENCY DEPT VISIT HI MDM: CPT

## 2019-01-01 PROCEDURE — 83690 ASSAY OF LIPASE: CPT | Performed by: EMERGENCY MEDICINE

## 2019-01-01 PROCEDURE — 25010000002 PROMETHAZINE PER 50 MG

## 2019-01-01 PROCEDURE — 84443 ASSAY THYROID STIM HORMONE: CPT

## 2019-01-01 PROCEDURE — 25010000002 CISPLATIN PER 50 MG: Performed by: INTERNAL MEDICINE

## 2019-01-01 PROCEDURE — 96366 THER/PROPH/DIAG IV INF ADDON: CPT

## 2019-01-01 PROCEDURE — 84300 ASSAY OF URINE SODIUM: CPT | Performed by: PHYSICIAN ASSISTANT

## 2019-01-01 PROCEDURE — 81001 URINALYSIS AUTO W/SCOPE: CPT | Performed by: EMERGENCY MEDICINE

## 2019-01-01 PROCEDURE — 25010000002 IOPAMIDOL 61 % SOLUTION: Performed by: INTERNAL MEDICINE

## 2019-01-01 PROCEDURE — 25010000002 MAGNESIUM SULFATE PER 500 MG OF MAGNESIUM: Performed by: INTERNAL MEDICINE

## 2019-01-01 PROCEDURE — 82607 VITAMIN B-12: CPT | Performed by: INTERNAL MEDICINE

## 2019-01-01 PROCEDURE — 85379 FIBRIN DEGRADATION QUANT: CPT | Performed by: EMERGENCY MEDICINE

## 2019-01-01 PROCEDURE — 83605 ASSAY OF LACTIC ACID: CPT | Performed by: EMERGENCY MEDICINE

## 2019-01-01 PROCEDURE — 83010 ASSAY OF HAPTOGLOBIN QUANT: CPT | Performed by: NURSE PRACTITIONER

## 2019-01-01 PROCEDURE — 84484 ASSAY OF TROPONIN QUANT: CPT

## 2019-01-01 PROCEDURE — 25010000002 HYDROMORPHONE 1 MG/ML SOLUTION: Performed by: EMERGENCY MEDICINE

## 2019-01-01 PROCEDURE — 87804 INFLUENZA ASSAY W/OPTIC: CPT | Performed by: EMERGENCY MEDICINE

## 2019-01-01 PROCEDURE — 63710000001 INSULIN LISPRO (HUMAN) PER 5 UNITS: Performed by: PHYSICIAN ASSISTANT

## 2019-01-01 PROCEDURE — 25010000002 NIVOLUMAB 240 MG/24ML SOLUTION 24 ML VIAL: Performed by: INTERNAL MEDICINE

## 2019-01-01 PROCEDURE — 86900 BLOOD TYPING SEROLOGIC ABO: CPT | Performed by: INTERNAL MEDICINE

## 2019-01-01 PROCEDURE — 25010000002 PEGFILGRASTIM 6 MG/0.6ML PREFILLED SYRINGE KIT: Performed by: INTERNAL MEDICINE

## 2019-01-01 PROCEDURE — 96375 TX/PRO/DX INJ NEW DRUG ADDON: CPT

## 2019-01-01 PROCEDURE — 82533 TOTAL CORTISOL: CPT | Performed by: PHYSICIAN ASSISTANT

## 2019-01-01 PROCEDURE — 99239 HOSP IP/OBS DSCHRG MGMT >30: CPT | Performed by: HOSPITALIST

## 2019-01-01 PROCEDURE — 80053 COMPREHEN METABOLIC PANEL: CPT

## 2019-01-01 PROCEDURE — 97530 THERAPEUTIC ACTIVITIES: CPT

## 2019-01-01 PROCEDURE — 97110 THERAPEUTIC EXERCISES: CPT

## 2019-01-01 PROCEDURE — 96374 THER/PROPH/DIAG INJ IV PUSH: CPT

## 2019-01-01 PROCEDURE — 97165 OT EVAL LOW COMPLEX 30 MIN: CPT

## 2019-01-01 PROCEDURE — 78582 LUNG VENTILAT&PERFUS IMAGING: CPT

## 2019-01-01 PROCEDURE — 74177 CT ABD & PELVIS W/CONTRAST: CPT

## 2019-01-01 PROCEDURE — 83735 ASSAY OF MAGNESIUM: CPT | Performed by: HOSPITALIST

## 2019-01-01 PROCEDURE — 84484 ASSAY OF TROPONIN QUANT: CPT | Performed by: EMERGENCY MEDICINE

## 2019-01-01 PROCEDURE — 25010000002 PROMETHAZINE PER 50 MG: Performed by: EMERGENCY MEDICINE

## 2019-01-01 PROCEDURE — 96415 CHEMO IV INFUSION ADDL HR: CPT

## 2019-01-01 PROCEDURE — 99214 OFFICE O/P EST MOD 30 MIN: CPT | Performed by: NURSE PRACTITIONER

## 2019-01-01 PROCEDURE — 25010000002 POTASSIUM CHLORIDE PER 2 MEQ OF POTASSIUM: Performed by: INTERNAL MEDICINE

## 2019-01-01 PROCEDURE — 81001 URINALYSIS AUTO W/SCOPE: CPT | Performed by: NURSE PRACTITIONER

## 2019-01-01 PROCEDURE — 96377 APPLICATON ON-BODY INJECTOR: CPT

## 2019-01-01 PROCEDURE — 96376 TX/PRO/DX INJ SAME DRUG ADON: CPT

## 2019-01-01 PROCEDURE — 82746 ASSAY OF FOLIC ACID SERUM: CPT | Performed by: NURSE PRACTITIONER

## 2019-01-01 PROCEDURE — 85007 BL SMEAR W/DIFF WBC COUNT: CPT | Performed by: EMERGENCY MEDICINE

## 2019-01-01 PROCEDURE — 63710000001 INSULIN LISPRO (HUMAN) PER 5 UNITS: Performed by: INTERNAL MEDICINE

## 2019-01-01 PROCEDURE — P9612 CATHETERIZE FOR URINE SPEC: HCPCS

## 2019-01-01 PROCEDURE — 86901 BLOOD TYPING SEROLOGIC RH(D): CPT | Performed by: INTERNAL MEDICINE

## 2019-01-01 PROCEDURE — 82728 ASSAY OF FERRITIN: CPT | Performed by: INTERNAL MEDICINE

## 2019-01-01 PROCEDURE — 99232 SBSQ HOSP IP/OBS MODERATE 35: CPT | Performed by: NURSE PRACTITIONER

## 2019-01-01 PROCEDURE — 0 TECHNETIUM MEDRONATE KIT: Performed by: NURSE PRACTITIONER

## 2019-01-01 PROCEDURE — 99220 PR INITIAL OBSERVATION CARE/DAY 70 MINUTES: CPT | Performed by: INTERNAL MEDICINE

## 2019-01-01 PROCEDURE — 85045 AUTOMATED RETICULOCYTE COUNT: CPT | Performed by: INTERNAL MEDICINE

## 2019-01-01 PROCEDURE — 78306 BONE IMAGING WHOLE BODY: CPT

## 2019-01-01 PROCEDURE — 25010000002 PROMETHAZINE PER 50 MG: Performed by: PHYSICIAN ASSISTANT

## 2019-01-01 PROCEDURE — 82728 ASSAY OF FERRITIN: CPT | Performed by: NURSE PRACTITIONER

## 2019-01-01 PROCEDURE — 80053 COMPREHEN METABOLIC PANEL: CPT | Performed by: HOSPITALIST

## 2019-01-01 PROCEDURE — 25010000002 NIVOLUMAB 100 MG/10ML SOLUTION 10 ML VIAL: Performed by: INTERNAL MEDICINE

## 2019-01-01 PROCEDURE — 63710000001 INSULIN REGULAR HUMAN PER 5 UNITS: Performed by: PHYSICIAN ASSISTANT

## 2019-01-01 PROCEDURE — 83930 ASSAY OF BLOOD OSMOLALITY: CPT | Performed by: PHYSICIAN ASSISTANT

## 2019-01-01 PROCEDURE — 93005 ELECTROCARDIOGRAM TRACING: CPT

## 2019-01-01 PROCEDURE — 82747 ASSAY OF FOLIC ACID RBC: CPT | Performed by: NURSE PRACTITIONER

## 2019-01-01 PROCEDURE — 94640 AIRWAY INHALATION TREATMENT: CPT

## 2019-01-01 PROCEDURE — 0 FLUDEOXYGLUCOSE F18 SOLUTION: Performed by: INTERNAL MEDICINE

## 2019-01-01 PROCEDURE — 70450 CT HEAD/BRAIN W/O DYE: CPT

## 2019-01-01 PROCEDURE — 25010000002 NIVOLUMAB 40 MG/4ML SOLUTION 4 ML VIAL: Performed by: INTERNAL MEDICINE

## 2019-01-01 PROCEDURE — 71260 CT THORAX DX C+: CPT

## 2019-01-01 PROCEDURE — 25010000002 CALCIUM GLUCONATE PER 10 ML: Performed by: EMERGENCY MEDICINE

## 2019-01-01 PROCEDURE — 96361 HYDRATE IV INFUSION ADD-ON: CPT

## 2019-01-01 PROCEDURE — 84550 ASSAY OF BLOOD/URIC ACID: CPT | Performed by: PHYSICIAN ASSISTANT

## 2019-01-01 PROCEDURE — 80048 BASIC METABOLIC PNL TOTAL CA: CPT | Performed by: PHYSICIAN ASSISTANT

## 2019-01-01 PROCEDURE — 86850 RBC ANTIBODY SCREEN: CPT | Performed by: EMERGENCY MEDICINE

## 2019-01-01 PROCEDURE — 80069 RENAL FUNCTION PANEL: CPT | Performed by: INTERNAL MEDICINE

## 2019-01-01 PROCEDURE — 82533 TOTAL CORTISOL: CPT | Performed by: INTERNAL MEDICINE

## 2019-01-01 PROCEDURE — 85045 AUTOMATED RETICULOCYTE COUNT: CPT | Performed by: NURSE PRACTITIONER

## 2019-01-01 PROCEDURE — 25010000002 CALCIUM GLUCONATE PER 10 ML: Performed by: HOSPITALIST

## 2019-01-01 PROCEDURE — 25010000002 DEXAMETHASONE PER 1 MG: Performed by: INTERNAL MEDICINE

## 2019-01-01 PROCEDURE — 86850 RBC ANTIBODY SCREEN: CPT | Performed by: NURSE PRACTITIONER

## 2019-01-01 PROCEDURE — 82747 ASSAY OF FOLIC ACID RBC: CPT | Performed by: INTERNAL MEDICINE

## 2019-01-01 RX ORDER — ACETAMINOPHEN 650 MG/1
650 SUPPOSITORY RECTAL EVERY 4 HOURS PRN
Status: DISCONTINUED | OUTPATIENT
Start: 2019-01-01 | End: 2019-01-01

## 2019-01-01 RX ORDER — SODIUM CHLORIDE 1000 MG
1 TABLET, SOLUBLE MISCELLANEOUS DAILY
Qty: 30 TABLET | Refills: 0 | Status: SHIPPED | OUTPATIENT
Start: 2019-01-01

## 2019-01-01 RX ORDER — LISINOPRIL 10 MG/1
TABLET ORAL
COMMUNITY
End: 2019-01-01 | Stop reason: HOSPADM

## 2019-01-01 RX ORDER — HYDROMORPHONE HYDROCHLORIDE 1 MG/ML
0.5 INJECTION, SOLUTION INTRAMUSCULAR; INTRAVENOUS; SUBCUTANEOUS ONCE
Status: COMPLETED | OUTPATIENT
Start: 2019-01-01 | End: 2019-01-01

## 2019-01-01 RX ORDER — SODIUM CHLORIDE 9 MG/ML
250 INJECTION, SOLUTION INTRAVENOUS ONCE
Status: CANCELLED | OUTPATIENT
Start: 2019-01-01

## 2019-01-01 RX ORDER — PROCHLORPERAZINE EDISYLATE 5 MG/ML
5 INJECTION INTRAMUSCULAR; INTRAVENOUS EVERY 6 HOURS PRN
Status: DISCONTINUED | OUTPATIENT
Start: 2019-01-01 | End: 2019-01-01 | Stop reason: HOSPADM

## 2019-01-01 RX ORDER — HYDROCODONE BITARTRATE AND ACETAMINOPHEN 5; 325 MG/1; MG/1
1 TABLET ORAL EVERY 8 HOURS
Qty: 35 TABLET | Refills: 0
Start: 2019-01-01 | End: 2019-01-01 | Stop reason: SDUPTHER

## 2019-01-01 RX ORDER — SODIUM CHLORIDE 1000 MG
1 TABLET, SOLUBLE MISCELLANEOUS
Status: DISCONTINUED | OUTPATIENT
Start: 2019-01-01 | End: 2019-01-01

## 2019-01-01 RX ORDER — POTASSIUM CHLORIDE 750 MG/1
40 CAPSULE, EXTENDED RELEASE ORAL AS NEEDED
Status: DISCONTINUED | OUTPATIENT
Start: 2019-01-01 | End: 2019-01-01 | Stop reason: HOSPADM

## 2019-01-01 RX ORDER — CARVEDILOL 6.25 MG/1
6.25 TABLET ORAL 2 TIMES DAILY WITH MEALS
Status: DISCONTINUED | OUTPATIENT
Start: 2019-01-01 | End: 2019-01-01 | Stop reason: HOSPADM

## 2019-01-01 RX ORDER — ONDANSETRON HYDROCHLORIDE 8 MG/1
TABLET, FILM COATED ORAL
Qty: 90 TABLET | Refills: 5 | Status: SHIPPED | OUTPATIENT
Start: 2019-01-01

## 2019-01-01 RX ORDER — SODIUM CHLORIDE 9 MG/ML
250 INJECTION, SOLUTION INTRAVENOUS ONCE
Status: COMPLETED | OUTPATIENT
Start: 2019-01-01 | End: 2019-01-01

## 2019-01-01 RX ORDER — ACETAMINOPHEN 160 MG/5ML
650 SOLUTION ORAL EVERY 4 HOURS PRN
Status: DISCONTINUED | OUTPATIENT
Start: 2019-01-01 | End: 2019-01-01

## 2019-01-01 RX ORDER — DIPHENHYDRAMINE HCL 25 MG
25 CAPSULE ORAL ONCE
Status: COMPLETED | OUTPATIENT
Start: 2019-01-01 | End: 2019-01-01

## 2019-01-01 RX ORDER — OXYCODONE AND ACETAMINOPHEN 7.5; 325 MG/1; MG/1
1 TABLET ORAL EVERY 6 HOURS PRN
Qty: 120 TABLET | Refills: 0 | Status: SHIPPED | OUTPATIENT
Start: 2019-01-01 | End: 2019-01-01 | Stop reason: DRUGHIGH

## 2019-01-01 RX ORDER — LIDOCAINE 50 MG/G
1 PATCH TOPICAL
Status: DISCONTINUED | OUTPATIENT
Start: 2019-01-01 | End: 2019-01-01 | Stop reason: HOSPADM

## 2019-01-01 RX ORDER — SODIUM CHLORIDE 0.9 % (FLUSH) 0.9 %
10 SYRINGE (ML) INJECTION AS NEEDED
Status: DISCONTINUED | OUTPATIENT
Start: 2019-01-01 | End: 2019-01-01 | Stop reason: HOSPADM

## 2019-01-01 RX ORDER — PANTOPRAZOLE SODIUM 40 MG/1
40 TABLET, DELAYED RELEASE ORAL DAILY
Status: DISCONTINUED | OUTPATIENT
Start: 2019-01-01 | End: 2019-01-01 | Stop reason: HOSPADM

## 2019-01-01 RX ORDER — FAMOTIDINE 20 MG/1
20 TABLET, FILM COATED ORAL 2 TIMES DAILY
Status: DISCONTINUED | OUTPATIENT
Start: 2019-01-01 | End: 2019-01-01 | Stop reason: HOSPADM

## 2019-01-01 RX ORDER — FAMOTIDINE 10 MG/ML
20 INJECTION, SOLUTION INTRAVENOUS ONCE
Status: COMPLETED | OUTPATIENT
Start: 2019-01-01 | End: 2019-01-01

## 2019-01-01 RX ORDER — DEXTROSE MONOHYDRATE 25 G/50ML
25 INJECTION, SOLUTION INTRAVENOUS
Status: DISCONTINUED | OUTPATIENT
Start: 2019-01-01 | End: 2019-01-01 | Stop reason: HOSPADM

## 2019-01-01 RX ORDER — ONDANSETRON 4 MG/1
4 TABLET, FILM COATED ORAL EVERY 6 HOURS PRN
Status: DISCONTINUED | OUTPATIENT
Start: 2019-01-01 | End: 2019-01-01 | Stop reason: HOSPADM

## 2019-01-01 RX ORDER — SUCRALFATE 1 G/1
1 TABLET ORAL
Status: DISCONTINUED | OUTPATIENT
Start: 2019-01-01 | End: 2019-01-01 | Stop reason: HOSPADM

## 2019-01-01 RX ORDER — ONDANSETRON 2 MG/ML
8 INJECTION INTRAMUSCULAR; INTRAVENOUS ONCE
Status: COMPLETED | OUTPATIENT
Start: 2019-01-01 | End: 2019-01-01

## 2019-01-01 RX ORDER — PROMETHAZINE HYDROCHLORIDE 25 MG/ML
12.5 INJECTION, SOLUTION INTRAMUSCULAR; INTRAVENOUS ONCE
Status: COMPLETED | OUTPATIENT
Start: 2019-01-01 | End: 2019-01-01

## 2019-01-01 RX ORDER — MAGNESIUM SULFATE HEPTAHYDRATE 40 MG/ML
2 INJECTION, SOLUTION INTRAVENOUS AS NEEDED
Status: DISCONTINUED | OUTPATIENT
Start: 2019-01-01 | End: 2019-01-01 | Stop reason: HOSPADM

## 2019-01-01 RX ORDER — SODIUM CHLORIDE 1000 MG
1 TABLET, SOLUBLE MISCELLANEOUS DAILY
Status: DISCONTINUED | OUTPATIENT
Start: 2019-01-01 | End: 2019-01-01

## 2019-01-01 RX ORDER — LIDOCAINE 50 MG/G
1 PATCH TOPICAL
Qty: 30 PATCH | Refills: 0 | Status: SHIPPED | OUTPATIENT
Start: 2019-01-01 | End: 2019-01-01

## 2019-01-01 RX ORDER — DOXYCYCLINE HYCLATE 100 MG/1
100 TABLET, DELAYED RELEASE ORAL 2 TIMES DAILY
Qty: 20 TABLET | Refills: 0 | Status: SHIPPED | OUTPATIENT
Start: 2019-01-01 | End: 2019-01-01

## 2019-01-01 RX ORDER — SODIUM CHLORIDE 0.9 % (FLUSH) 0.9 %
10 SYRINGE (ML) INJECTION AS NEEDED
Status: CANCELLED | OUTPATIENT
Start: 2019-01-01

## 2019-01-01 RX ORDER — CETIRIZINE HYDROCHLORIDE 10 MG/1
10 TABLET ORAL DAILY
Status: DISCONTINUED | OUTPATIENT
Start: 2019-01-01 | End: 2019-01-01 | Stop reason: HOSPADM

## 2019-01-01 RX ORDER — ONDANSETRON 2 MG/ML
4 INJECTION INTRAMUSCULAR; INTRAVENOUS EVERY 6 HOURS PRN
Status: DISCONTINUED | OUTPATIENT
Start: 2019-01-01 | End: 2019-01-01 | Stop reason: HOSPADM

## 2019-01-01 RX ORDER — HYDROCODONE BITARTRATE AND ACETAMINOPHEN 5; 325 MG/1; MG/1
1 TABLET ORAL EVERY 8 HOURS
Status: DISCONTINUED | OUTPATIENT
Start: 2019-01-01 | End: 2019-01-01 | Stop reason: HOSPADM

## 2019-01-01 RX ORDER — HYDROCODONE BITARTRATE AND ACETAMINOPHEN 5; 325 MG/1; MG/1
1 TABLET ORAL EVERY 6 HOURS PRN
Status: DISCONTINUED | OUTPATIENT
Start: 2019-01-01 | End: 2019-01-01 | Stop reason: HOSPADM

## 2019-01-01 RX ORDER — MAGNESIUM OXIDE 400 MG/1
400 TABLET ORAL 2 TIMES DAILY
Qty: 60 TABLET | Refills: 5 | Status: SHIPPED | OUTPATIENT
Start: 2019-01-01 | End: 2019-01-01

## 2019-01-01 RX ORDER — ONDANSETRON 2 MG/ML
4 INJECTION INTRAMUSCULAR; INTRAVENOUS ONCE
Status: COMPLETED | OUTPATIENT
Start: 2019-01-01 | End: 2019-01-01

## 2019-01-01 RX ORDER — DEXAMETHASONE 4 MG/1
8 TABLET ORAL
Status: DISCONTINUED | OUTPATIENT
Start: 2019-01-01 | End: 2019-01-01

## 2019-01-01 RX ORDER — LEVOTHYROXINE SODIUM 0.07 MG/1
75 TABLET ORAL DAILY
Status: DISCONTINUED | OUTPATIENT
Start: 2019-01-01 | End: 2019-01-01 | Stop reason: HOSPADM

## 2019-01-01 RX ORDER — HYDROCODONE BITARTRATE AND ACETAMINOPHEN 7.5; 325 MG/1; MG/1
1 TABLET ORAL EVERY 6 HOURS PRN
Status: DISCONTINUED | OUTPATIENT
Start: 2019-01-01 | End: 2019-01-01

## 2019-01-01 RX ORDER — ALBUTEROL SULFATE 2.5 MG/3ML
2.5 SOLUTION RESPIRATORY (INHALATION) EVERY 6 HOURS PRN
Status: DISCONTINUED | OUTPATIENT
Start: 2019-01-01 | End: 2019-01-01 | Stop reason: HOSPADM

## 2019-01-01 RX ORDER — MAGNESIUM SULFATE HEPTAHYDRATE 40 MG/ML
4 INJECTION, SOLUTION INTRAVENOUS AS NEEDED
Status: DISCONTINUED | OUTPATIENT
Start: 2019-01-01 | End: 2019-01-01 | Stop reason: HOSPADM

## 2019-01-01 RX ORDER — FLUDROCORTISONE ACETATE 0.1 MG/1
100 TABLET ORAL DAILY
Status: DISCONTINUED | OUTPATIENT
Start: 2019-01-01 | End: 2019-01-01 | Stop reason: HOSPADM

## 2019-01-01 RX ORDER — HYDROCORTISONE 5 MG/1
25 TABLET ORAL DAILY
Qty: 150 TABLET | Refills: 0 | Status: SHIPPED | OUTPATIENT
Start: 2019-01-01 | End: 2019-01-01

## 2019-01-01 RX ORDER — DIPHENHYDRAMINE HCL 25 MG
25 CAPSULE ORAL ONCE
Status: CANCELLED | OUTPATIENT
Start: 2019-01-01 | End: 2019-01-01

## 2019-01-01 RX ORDER — ONDANSETRON 2 MG/ML
8 INJECTION INTRAMUSCULAR; INTRAVENOUS ONCE
Status: CANCELLED
Start: 2019-01-01 | End: 2019-01-01

## 2019-01-01 RX ORDER — MAGNESIUM SULFATE HEPTAHYDRATE 40 MG/ML
2 INJECTION, SOLUTION INTRAVENOUS ONCE
Status: DISCONTINUED | OUTPATIENT
Start: 2019-01-01 | End: 2019-01-01

## 2019-01-01 RX ORDER — PANTOPRAZOLE SODIUM 40 MG/10ML
40 INJECTION, POWDER, LYOPHILIZED, FOR SOLUTION INTRAVENOUS ONCE
Status: COMPLETED | OUTPATIENT
Start: 2019-01-01 | End: 2019-01-01

## 2019-01-01 RX ORDER — NICOTINE POLACRILEX 4 MG
15 LOZENGE BUCCAL
Status: DISCONTINUED | OUTPATIENT
Start: 2019-01-01 | End: 2019-01-01 | Stop reason: HOSPADM

## 2019-01-01 RX ORDER — FENTANYL 25 UG/H
1 PATCH TRANSDERMAL
Qty: 10 PATCH | Refills: 0 | Status: SHIPPED | OUTPATIENT
Start: 2019-01-01

## 2019-01-01 RX ORDER — ALPRAZOLAM 0.25 MG/1
0.25 TABLET ORAL 3 TIMES DAILY PRN
Qty: 20 TABLET | Refills: 0
Start: 2019-01-01 | End: 2019-01-01

## 2019-01-01 RX ORDER — MEMANTINE HYDROCHLORIDE 10 MG/1
10 TABLET ORAL 2 TIMES DAILY
Qty: 60 TABLET | Refills: 2 | Status: SHIPPED | OUTPATIENT
Start: 2019-01-01 | End: 2019-01-01

## 2019-01-01 RX ORDER — PREDNISONE 10 MG/1
30 TABLET ORAL DAILY
Qty: 40 TABLET | Refills: 0 | Status: SHIPPED | OUTPATIENT
Start: 2019-01-01 | End: 2019-01-01 | Stop reason: SDUPTHER

## 2019-01-01 RX ORDER — LEVOTHYROXINE SODIUM 0.07 MG/1
75 TABLET ORAL
Status: DISCONTINUED | OUTPATIENT
Start: 2019-01-01 | End: 2019-01-01 | Stop reason: HOSPADM

## 2019-01-01 RX ORDER — TEMAZEPAM 7.5 MG/1
7.5 CAPSULE ORAL NIGHTLY PRN
Status: DISCONTINUED | OUTPATIENT
Start: 2019-01-01 | End: 2019-01-01 | Stop reason: HOSPADM

## 2019-01-01 RX ORDER — OXYCODONE HYDROCHLORIDE AND ACETAMINOPHEN 5; 325 MG/1; MG/1
TABLET ORAL
Refills: 0 | COMMUNITY
Start: 2019-01-01 | End: 2019-01-01 | Stop reason: DRUGHIGH

## 2019-01-01 RX ORDER — ACETAMINOPHEN 325 MG/1
650 TABLET ORAL ONCE
Status: COMPLETED | OUTPATIENT
Start: 2019-01-01 | End: 2019-01-01

## 2019-01-01 RX ORDER — AMMONIA INHALANTS 0.04 G/.3ML
INHALANT RESPIRATORY (INHALATION)
Status: DISPENSED
Start: 2019-01-01 | End: 2019-01-01

## 2019-01-01 RX ORDER — FLUDROCORTISONE ACETATE 0.1 MG/1
0.1 TABLET ORAL DAILY
Qty: 30 TABLET | Refills: 0 | Status: SHIPPED | OUTPATIENT
Start: 2019-01-01 | End: 2019-01-01

## 2019-01-01 RX ORDER — SODIUM CHLORIDE 1000 MG
1 TABLET, SOLUBLE MISCELLANEOUS DAILY
Status: DISCONTINUED | OUTPATIENT
Start: 2019-01-01 | End: 2019-01-01 | Stop reason: HOSPADM

## 2019-01-01 RX ORDER — ALPRAZOLAM 0.25 MG/1
0.25 TABLET ORAL 3 TIMES DAILY PRN
Status: DISCONTINUED | OUTPATIENT
Start: 2019-01-01 | End: 2019-01-01 | Stop reason: HOSPADM

## 2019-01-01 RX ORDER — SODIUM CHLORIDE 9 MG/ML
250 INJECTION, SOLUTION INTRAVENOUS ONCE
Status: DISCONTINUED | OUTPATIENT
Start: 2019-01-01 | End: 2019-01-01 | Stop reason: HOSPADM

## 2019-01-01 RX ORDER — CETIRIZINE HYDROCHLORIDE 10 MG/1
5 TABLET ORAL DAILY
Status: DISCONTINUED | OUTPATIENT
Start: 2019-01-01 | End: 2019-01-01 | Stop reason: HOSPADM

## 2019-01-01 RX ORDER — PALONOSETRON 0.05 MG/ML
0.25 INJECTION, SOLUTION INTRAVENOUS ONCE
Status: CANCELLED | OUTPATIENT
Start: 2019-01-01

## 2019-01-01 RX ORDER — POTASSIUM CHLORIDE 7.45 MG/ML
10 INJECTION INTRAVENOUS
Status: DISCONTINUED | OUTPATIENT
Start: 2019-01-01 | End: 2019-01-01 | Stop reason: HOSPADM

## 2019-01-01 RX ORDER — MAGNESIUM SULFATE 1 G/100ML
1 INJECTION INTRAVENOUS ONCE
Status: COMPLETED | OUTPATIENT
Start: 2019-01-01 | End: 2019-01-01

## 2019-01-01 RX ORDER — SUCRALFATE ORAL 1 G/10ML
1 SUSPENSION ORAL 4 TIMES DAILY
Status: DISCONTINUED | OUTPATIENT
Start: 2019-01-01 | End: 2019-01-01

## 2019-01-01 RX ORDER — OXYCODONE HYDROCHLORIDE 15 MG/1
15 TABLET ORAL EVERY 4 HOURS PRN
Qty: 180 TABLET | Refills: 0 | Status: SHIPPED | OUTPATIENT
Start: 2019-01-01

## 2019-01-01 RX ORDER — PROMETHAZINE HYDROCHLORIDE 25 MG/1
25 TABLET ORAL EVERY 6 HOURS PRN
Qty: 45 TABLET | Refills: 5 | Status: SHIPPED | OUTPATIENT
Start: 2019-01-01

## 2019-01-01 RX ORDER — DULOXETIN HYDROCHLORIDE 30 MG/1
30 CAPSULE, DELAYED RELEASE ORAL DAILY
Status: DISCONTINUED | OUTPATIENT
Start: 2019-01-01 | End: 2019-01-01 | Stop reason: HOSPADM

## 2019-01-01 RX ORDER — SODIUM CHLORIDE 9 MG/ML
125 INJECTION, SOLUTION INTRAVENOUS CONTINUOUS
Status: DISCONTINUED | OUTPATIENT
Start: 2019-01-01 | End: 2019-01-01 | Stop reason: HOSPADM

## 2019-01-01 RX ORDER — MAGNESIUM SULFATE HEPTAHYDRATE 40 MG/ML
2 INJECTION, SOLUTION INTRAVENOUS
Status: DISCONTINUED | OUTPATIENT
Start: 2019-01-01 | End: 2019-01-01

## 2019-01-01 RX ORDER — SUCRALFATE ORAL 1 G/10ML
1 SUSPENSION ORAL 4 TIMES DAILY
Qty: 420 ML | Refills: 2 | Status: SHIPPED | OUTPATIENT
Start: 2019-01-01 | End: 2019-01-01

## 2019-01-01 RX ORDER — HYDROMORPHONE HYDROCHLORIDE 1 MG/ML
0.5 INJECTION, SOLUTION INTRAMUSCULAR; INTRAVENOUS; SUBCUTANEOUS
Status: DISCONTINUED | OUTPATIENT
Start: 2019-01-01 | End: 2019-01-01

## 2019-01-01 RX ORDER — CHOLECALCIFEROL (VITAMIN D3) 125 MCG
5 CAPSULE ORAL NIGHTLY PRN
Status: DISCONTINUED | OUTPATIENT
Start: 2019-01-01 | End: 2019-01-01 | Stop reason: HOSPADM

## 2019-01-01 RX ORDER — SODIUM CHLORIDE 9 MG/ML
250 INJECTION, SOLUTION INTRAVENOUS AS NEEDED
Status: DISCONTINUED | OUTPATIENT
Start: 2019-01-01 | End: 2019-01-01 | Stop reason: HOSPADM

## 2019-01-01 RX ORDER — PROMETHAZINE HYDROCHLORIDE 12.5 MG/1
25 TABLET ORAL EVERY 8 HOURS PRN
Qty: 20 TABLET | Refills: 0 | Status: SHIPPED | OUTPATIENT
Start: 2019-01-01 | End: 2019-01-01

## 2019-01-01 RX ORDER — LACTULOSE 10 G/15ML
SOLUTION ORAL; RECTAL
Refills: 3 | COMMUNITY
Start: 2019-01-01

## 2019-01-01 RX ORDER — DOCUSATE SODIUM 100 MG/1
100 CAPSULE, LIQUID FILLED ORAL 2 TIMES DAILY PRN
Status: DISCONTINUED | OUTPATIENT
Start: 2019-01-01 | End: 2019-01-01 | Stop reason: HOSPADM

## 2019-01-01 RX ORDER — SODIUM CHLORIDE 9 MG/ML
250 INJECTION, SOLUTION INTRAVENOUS AS NEEDED
Status: CANCELLED | OUTPATIENT
Start: 2019-01-01

## 2019-01-01 RX ORDER — HYDROCODONE BITARTRATE AND ACETAMINOPHEN 7.5; 325 MG/1; MG/1
1 TABLET ORAL EVERY 6 HOURS PRN
COMMUNITY
End: 2019-01-01 | Stop reason: HOSPADM

## 2019-01-01 RX ORDER — DULOXETIN HYDROCHLORIDE 30 MG/1
30 CAPSULE, DELAYED RELEASE ORAL DAILY
Start: 2019-01-01

## 2019-01-01 RX ORDER — SODIUM CHLORIDE 0.9 % (FLUSH) 0.9 %
3-10 SYRINGE (ML) INJECTION AS NEEDED
Status: DISCONTINUED | OUTPATIENT
Start: 2019-01-01 | End: 2019-01-01 | Stop reason: HOSPADM

## 2019-01-01 RX ORDER — CYCLOBENZAPRINE HCL 10 MG
5 TABLET ORAL EVERY 6 HOURS PRN
Status: DISCONTINUED | OUTPATIENT
Start: 2019-01-01 | End: 2019-01-01 | Stop reason: HOSPADM

## 2019-01-01 RX ORDER — FENTANYL 12 UG/H
1 PATCH TRANSDERMAL
COMMUNITY
End: 2019-01-01 | Stop reason: DRUGHIGH

## 2019-01-01 RX ORDER — FUROSEMIDE 10 MG/ML
20 INJECTION INTRAMUSCULAR; INTRAVENOUS ONCE
Status: COMPLETED | OUTPATIENT
Start: 2019-01-01 | End: 2019-01-01

## 2019-01-01 RX ORDER — POTASSIUM CHLORIDE 750 MG/1
20 CAPSULE, EXTENDED RELEASE ORAL ONCE
Status: COMPLETED | OUTPATIENT
Start: 2019-01-01 | End: 2019-01-01

## 2019-01-01 RX ORDER — TOLVAPTAN 15 MG/1
7.5 TABLET ORAL DAILY
Status: DISCONTINUED | OUTPATIENT
Start: 2019-01-01 | End: 2019-01-01 | Stop reason: HOSPADM

## 2019-01-01 RX ORDER — HYDROCODONE BITARTRATE AND ACETAMINOPHEN 5; 325 MG/1; MG/1
1 TABLET ORAL EVERY 8 HOURS PRN
Status: DISCONTINUED | OUTPATIENT
Start: 2019-01-01 | End: 2019-01-01

## 2019-01-01 RX ORDER — OXYCODONE HYDROCHLORIDE 15 MG/1
15 TABLET ORAL EVERY 4 HOURS PRN
Qty: 180 TABLET | Refills: 0
Start: 2019-01-01 | End: 2019-01-01 | Stop reason: SDUPTHER

## 2019-01-01 RX ORDER — PALONOSETRON 0.05 MG/ML
0.25 INJECTION, SOLUTION INTRAVENOUS ONCE
Status: COMPLETED | OUTPATIENT
Start: 2019-01-01 | End: 2019-01-01

## 2019-01-01 RX ORDER — SUCRALFATE ORAL 1 G/10ML
1 SUSPENSION ORAL 4 TIMES DAILY
Status: DISCONTINUED | OUTPATIENT
Start: 2019-01-01 | End: 2019-01-01 | Stop reason: RX

## 2019-01-01 RX ORDER — PROMETHAZINE HYDROCHLORIDE 25 MG/1
25 TABLET ORAL EVERY 6 HOURS PRN
Status: DISCONTINUED | OUTPATIENT
Start: 2019-01-01 | End: 2019-01-01 | Stop reason: HOSPADM

## 2019-01-01 RX ORDER — POTASSIUM CHLORIDE 1.5 G/1.77G
40 POWDER, FOR SOLUTION ORAL AS NEEDED
Status: DISCONTINUED | OUTPATIENT
Start: 2019-01-01 | End: 2019-01-01 | Stop reason: HOSPADM

## 2019-01-01 RX ORDER — SODIUM CHLORIDE 0.9 % (FLUSH) 0.9 %
3 SYRINGE (ML) INJECTION EVERY 12 HOURS SCHEDULED
Status: DISCONTINUED | OUTPATIENT
Start: 2019-01-01 | End: 2019-01-01 | Stop reason: HOSPADM

## 2019-01-01 RX ORDER — HYDROCODONE BITARTRATE AND ACETAMINOPHEN 7.5; 325 MG/1; MG/1
1 TABLET ORAL EVERY 8 HOURS
Status: DISCONTINUED | OUTPATIENT
Start: 2019-01-01 | End: 2019-01-01

## 2019-01-01 RX ORDER — SODIUM CHLORIDE 9 MG/ML
50 INJECTION, SOLUTION INTRAVENOUS CONTINUOUS
Status: DISCONTINUED | OUTPATIENT
Start: 2019-01-01 | End: 2019-01-01 | Stop reason: HOSPADM

## 2019-01-01 RX ORDER — ONDANSETRON 4 MG/1
TABLET, FILM COATED ORAL
Status: COMPLETED
Start: 2019-01-01 | End: 2019-01-01

## 2019-01-01 RX ORDER — PROMETHAZINE HYDROCHLORIDE 25 MG/ML
INJECTION, SOLUTION INTRAMUSCULAR; INTRAVENOUS
Status: COMPLETED
Start: 2019-01-01 | End: 2019-01-01

## 2019-01-01 RX ORDER — SUCRALFATE 1 G/1
1 TABLET ORAL 4 TIMES DAILY
Status: DISCONTINUED | OUTPATIENT
Start: 2019-01-01 | End: 2019-01-01 | Stop reason: HOSPADM

## 2019-01-01 RX ORDER — SODIUM CHLORIDE 0.9 % (FLUSH) 0.9 %
10 SYRINGE (ML) INJECTION EVERY 12 HOURS SCHEDULED
Status: DISCONTINUED | OUTPATIENT
Start: 2019-01-01 | End: 2019-01-01 | Stop reason: HOSPADM

## 2019-01-01 RX ORDER — ACETAMINOPHEN 325 MG/1
650 TABLET ORAL EVERY 6 HOURS PRN
Status: DISCONTINUED | OUTPATIENT
Start: 2019-01-01 | End: 2019-01-01 | Stop reason: HOSPADM

## 2019-01-01 RX ORDER — DICYCLOMINE HCL 20 MG
20 TABLET ORAL EVERY 8 HOURS PRN
Status: DISCONTINUED | OUTPATIENT
Start: 2019-01-01 | End: 2019-01-01 | Stop reason: HOSPADM

## 2019-01-01 RX ORDER — ACETAMINOPHEN 325 MG/1
650 TABLET ORAL ONCE
Status: DISCONTINUED | OUTPATIENT
Start: 2019-01-01 | End: 2019-01-01 | Stop reason: HOSPADM

## 2019-01-01 RX ORDER — ONDANSETRON 4 MG/1
4 TABLET, FILM COATED ORAL EVERY 8 HOURS PRN
Qty: 15 TABLET | Refills: 0 | Status: SHIPPED | OUTPATIENT
Start: 2019-01-01 | End: 2019-01-01 | Stop reason: SDUPTHER

## 2019-01-01 RX ORDER — ACETAMINOPHEN 650 MG/1
650 SUPPOSITORY RECTAL ONCE
Status: COMPLETED | OUTPATIENT
Start: 2019-01-01 | End: 2019-01-01

## 2019-01-01 RX ORDER — ERGOCALCIFEROL 1.25 MG/1
50000 CAPSULE ORAL WEEKLY
Qty: 4 CAPSULE | Refills: 0 | Status: SHIPPED | OUTPATIENT
Start: 2019-01-01 | End: 2019-01-01

## 2019-01-01 RX ORDER — FENTANYL 25 UG/H
1 PATCH TRANSDERMAL
Qty: 10 PATCH | Refills: 0
Start: 2019-01-01 | End: 2019-01-01 | Stop reason: SDUPTHER

## 2019-01-01 RX ORDER — LACTULOSE 10 G/15ML
20 SOLUTION ORAL 3 TIMES DAILY
Status: DISCONTINUED | OUTPATIENT
Start: 2019-01-01 | End: 2019-01-01 | Stop reason: HOSPADM

## 2019-01-01 RX ORDER — PROCHLORPERAZINE 25 MG
25 SUPPOSITORY, RECTAL RECTAL EVERY 12 HOURS PRN
Status: DISCONTINUED | OUTPATIENT
Start: 2019-01-01 | End: 2019-01-01 | Stop reason: HOSPADM

## 2019-01-01 RX ORDER — HEPARIN SODIUM 5000 [USP'U]/ML
5000 INJECTION, SOLUTION INTRAVENOUS; SUBCUTANEOUS EVERY 12 HOURS SCHEDULED
Status: DISCONTINUED | OUTPATIENT
Start: 2019-01-01 | End: 2019-01-01 | Stop reason: HOSPADM

## 2019-01-01 RX ORDER — ONDANSETRON 4 MG/1
4 TABLET, FILM COATED ORAL EVERY 6 HOURS PRN
Status: DISCONTINUED | OUTPATIENT
Start: 2019-01-01 | End: 2019-01-01

## 2019-01-01 RX ORDER — ACETAMINOPHEN 500 MG
1000 TABLET ORAL ONCE
Status: COMPLETED | OUTPATIENT
Start: 2019-01-01 | End: 2019-01-01

## 2019-01-01 RX ORDER — TC 99M MEDRONATE 20 MG/10ML
26.1 INJECTION, POWDER, LYOPHILIZED, FOR SOLUTION INTRAVENOUS
Status: COMPLETED | OUTPATIENT
Start: 2019-01-01 | End: 2019-01-01

## 2019-01-01 RX ORDER — HYDROCODONE BITARTRATE AND ACETAMINOPHEN 5; 325 MG/1; MG/1
1 TABLET ORAL EVERY 6 HOURS PRN
Start: 2019-01-01 | End: 2019-01-01

## 2019-01-01 RX ORDER — PREDNISONE 10 MG/1
30 TABLET ORAL DAILY
Qty: 40 TABLET | Refills: 0 | Status: SHIPPED | OUTPATIENT
Start: 2019-01-01 | End: 2019-01-01

## 2019-01-01 RX ORDER — HYDROCORTISONE 10 MG/1
50 TABLET ORAL
Qty: 15 TABLET | Refills: 0 | Status: SHIPPED | OUTPATIENT
Start: 2019-01-01 | End: 2019-01-01

## 2019-01-01 RX ORDER — OXYCODONE AND ACETAMINOPHEN 7.5; 325 MG/1; MG/1
1 TABLET ORAL EVERY 6 HOURS PRN
Qty: 120 TABLET | Refills: 0 | Status: SHIPPED | OUTPATIENT
Start: 2019-01-01 | End: 2019-01-01 | Stop reason: SDUPTHER

## 2019-01-01 RX ORDER — ASPIRIN 81 MG/1
81 TABLET ORAL DAILY
Status: DISCONTINUED | OUTPATIENT
Start: 2019-01-01 | End: 2019-01-01 | Stop reason: HOSPADM

## 2019-01-01 RX ORDER — GABAPENTIN 100 MG/1
CAPSULE ORAL
Refills: 0 | COMMUNITY
Start: 2019-01-01

## 2019-01-01 RX ORDER — ACETAMINOPHEN 325 MG/1
650 TABLET ORAL EVERY 4 HOURS PRN
Status: DISCONTINUED | OUTPATIENT
Start: 2019-01-01 | End: 2019-01-01 | Stop reason: HOSPADM

## 2019-01-01 RX ORDER — DOCUSATE SODIUM 100 MG/1
100 CAPSULE, LIQUID FILLED ORAL 2 TIMES DAILY
Status: DISCONTINUED | OUTPATIENT
Start: 2019-01-01 | End: 2019-01-01 | Stop reason: HOSPADM

## 2019-01-01 RX ORDER — LACTULOSE 10 G/15ML
20 SOLUTION ORAL DAILY PRN
Qty: 210 ML | Refills: 0 | Status: SHIPPED | OUTPATIENT
Start: 2019-01-01

## 2019-01-01 RX ORDER — PROCHLORPERAZINE MALEATE 5 MG/1
5 TABLET ORAL EVERY 6 HOURS PRN
Status: DISCONTINUED | OUTPATIENT
Start: 2019-01-01 | End: 2019-01-01 | Stop reason: HOSPADM

## 2019-01-01 RX ORDER — ACETAMINOPHEN 325 MG/1
650 TABLET ORAL EVERY 4 HOURS PRN
Status: DISCONTINUED | OUTPATIENT
Start: 2019-01-01 | End: 2019-01-01

## 2019-01-01 RX ORDER — DICYCLOMINE HCL 20 MG
20 TABLET ORAL EVERY 8 HOURS PRN
Qty: 20 TABLET | Refills: 0 | Status: SHIPPED | OUTPATIENT
Start: 2019-01-01 | End: 2019-01-01

## 2019-01-01 RX ORDER — LIDOCAINE AND PRILOCAINE 25; 25 MG/G; MG/G
CREAM TOPICAL AS NEEDED
Qty: 30 G | Refills: 3 | Status: SHIPPED | OUTPATIENT
Start: 2019-01-01

## 2019-01-01 RX ORDER — SODIUM CHLORIDE 9 MG/ML
100 INJECTION, SOLUTION INTRAVENOUS CONTINUOUS
Status: DISCONTINUED | OUTPATIENT
Start: 2019-01-01 | End: 2019-01-01

## 2019-01-01 RX ORDER — FUROSEMIDE 10 MG/ML
20 INJECTION INTRAMUSCULAR; INTRAVENOUS ONCE
Status: CANCELLED | OUTPATIENT
Start: 2019-01-01 | End: 2019-01-01

## 2019-01-01 RX ORDER — ACETAMINOPHEN 325 MG/1
650 TABLET ORAL ONCE
Status: CANCELLED | OUTPATIENT
Start: 2019-01-01 | End: 2019-01-01

## 2019-01-01 RX ORDER — ERGOCALCIFEROL 1.25 MG/1
50000 CAPSULE ORAL WEEKLY
Qty: 4 CAPSULE | Refills: 0 | Status: SHIPPED | OUTPATIENT
Start: 2019-01-01 | End: 2019-01-01 | Stop reason: SDUPTHER

## 2019-01-01 RX ADMIN — LACTULOSE 20 G: 10 SOLUTION ORAL at 17:18

## 2019-01-01 RX ADMIN — LACTULOSE 20 G: 10 SOLUTION ORAL at 16:21

## 2019-01-01 RX ADMIN — DOCUSATE SODIUM 100 MG: 100 CAPSULE, LIQUID FILLED ORAL at 09:27

## 2019-01-01 RX ADMIN — HEPARIN 500 UNITS: 100 SYRINGE at 16:05

## 2019-01-01 RX ADMIN — ONDANSETRON 4 MG: 2 INJECTION INTRAMUSCULAR; INTRAVENOUS at 15:13

## 2019-01-01 RX ADMIN — SODIUM CHLORIDE, PRESERVATIVE FREE 10 ML: 5 INJECTION INTRAVENOUS at 09:15

## 2019-01-01 RX ADMIN — POTASSIUM CHLORIDE 1000 ML: 2 INJECTION, SOLUTION, CONCENTRATE INTRAVENOUS at 14:58

## 2019-01-01 RX ADMIN — MAGNESIUM SULFATE HEPTAHYDRATE 2 G: 40 INJECTION, SOLUTION INTRAVENOUS at 23:31

## 2019-01-01 RX ADMIN — INSULIN LISPRO 2 UNITS: 100 INJECTION, SOLUTION INTRAVENOUS; SUBCUTANEOUS at 21:13

## 2019-01-01 RX ADMIN — CETIRIZINE HYDROCHLORIDE 5 MG: 10 TABLET, FILM COATED ORAL at 10:52

## 2019-01-01 RX ADMIN — CETIRIZINE HYDROCHLORIDE 5 MG: 10 TABLET, FILM COATED ORAL at 08:54

## 2019-01-01 RX ADMIN — SODIUM CHLORIDE 1000 ML: 9 INJECTION, SOLUTION INTRAVENOUS at 22:22

## 2019-01-01 RX ADMIN — HEPARIN SODIUM 5000 UNITS: 5000 INJECTION INTRAVENOUS; SUBCUTANEOUS at 09:37

## 2019-01-01 RX ADMIN — HYDROCODONE BITARTRATE AND ACETAMINOPHEN 1 TABLET: 7.5; 325 TABLET ORAL at 17:17

## 2019-01-01 RX ADMIN — SUCRALFATE 1 G: 1 TABLET ORAL at 09:57

## 2019-01-01 RX ADMIN — PROMETHAZINE HYDROCHLORIDE 12.5 MG: 25 INJECTION, SOLUTION INTRAMUSCULAR; INTRAVENOUS at 22:28

## 2019-01-01 RX ADMIN — FAMOTIDINE 20 MG: 20 TABLET, FILM COATED ORAL at 08:59

## 2019-01-01 RX ADMIN — Medication 400 MG: at 09:27

## 2019-01-01 RX ADMIN — FLUDROCORTISONE ACETATE 100 MCG: 0.1 TABLET ORAL at 09:25

## 2019-01-01 RX ADMIN — SODIUM CHLORIDE 1000 ML: 9 INJECTION, SOLUTION INTRAVENOUS at 01:32

## 2019-01-01 RX ADMIN — Medication 1 G: at 18:02

## 2019-01-01 RX ADMIN — INSULIN LISPRO 7 UNITS: 100 INJECTION, SOLUTION INTRAVENOUS; SUBCUTANEOUS at 08:17

## 2019-01-01 RX ADMIN — NYSTATIN 10 ML: 100000 SUSPENSION ORAL at 18:24

## 2019-01-01 RX ADMIN — Medication 26.1 MILLICURIE: at 10:15

## 2019-01-01 RX ADMIN — DEXAMETHASONE SODIUM PHOSPHATE 12 MG: 4 INJECTION, SOLUTION INTRA-ARTICULAR; INTRALESIONAL; INTRAMUSCULAR; INTRAVENOUS; SOFT TISSUE at 10:39

## 2019-01-01 RX ADMIN — PROMETHAZINE HYDROCHLORIDE 12.5 MG: 25 INJECTION INTRAMUSCULAR; INTRAVENOUS at 03:59

## 2019-01-01 RX ADMIN — PROMETHAZINE HYDROCHLORIDE 25 MG: 25 TABLET ORAL at 15:50

## 2019-01-01 RX ADMIN — HYDROMORPHONE HYDROCHLORIDE 0.5 MG: 1 INJECTION, SOLUTION INTRAMUSCULAR; INTRAVENOUS; SUBCUTANEOUS at 10:23

## 2019-01-01 RX ADMIN — ONDANSETRON 4 MG: 2 INJECTION INTRAMUSCULAR; INTRAVENOUS at 22:18

## 2019-01-01 RX ADMIN — HYDROCODONE BITARTRATE AND ACETAMINOPHEN 1 TABLET: 7.5; 325 TABLET ORAL at 04:04

## 2019-01-01 RX ADMIN — FUROSEMIDE 20 MG: 10 INJECTION, SOLUTION INTRAMUSCULAR; INTRAVENOUS at 14:19

## 2019-01-01 RX ADMIN — POTASSIUM CHLORIDE 40 MEQ: 750 CAPSULE, EXTENDED RELEASE ORAL at 01:56

## 2019-01-01 RX ADMIN — PALONOSETRON HYDROCHLORIDE 0.25 MG: 0.25 INJECTION, SOLUTION INTRAVENOUS at 12:56

## 2019-01-01 RX ADMIN — INSULIN LISPRO 2 UNITS: 100 INJECTION, SOLUTION INTRAVENOUS; SUBCUTANEOUS at 09:28

## 2019-01-01 RX ADMIN — ONDANSETRON 4 MG: 2 INJECTION INTRAMUSCULAR; INTRAVENOUS at 01:50

## 2019-01-01 RX ADMIN — SODIUM CHLORIDE, PRESERVATIVE FREE 10 ML: 5 INJECTION INTRAVENOUS at 20:07

## 2019-01-01 RX ADMIN — LACTULOSE 20 G: 10 SOLUTION ORAL at 08:22

## 2019-01-01 RX ADMIN — PANTOPRAZOLE SODIUM 40 MG: 40 TABLET, DELAYED RELEASE ORAL at 05:45

## 2019-01-01 RX ADMIN — HEPARIN SODIUM 5000 UNITS: 5000 INJECTION INTRAVENOUS; SUBCUTANEOUS at 21:13

## 2019-01-01 RX ADMIN — FLUDROCORTISONE ACETATE 100 MCG: 0.1 TABLET ORAL at 17:44

## 2019-01-01 RX ADMIN — POTASSIUM CHLORIDE 40 MEQ: 750 CAPSULE, EXTENDED RELEASE ORAL at 17:30

## 2019-01-01 RX ADMIN — HYDROMORPHONE HYDROCHLORIDE 0.5 MG: 1 INJECTION, SOLUTION INTRAMUSCULAR; INTRAVENOUS; SUBCUTANEOUS at 20:07

## 2019-01-01 RX ADMIN — TOLVAPTAN 7.5 MG: 15 TABLET ORAL at 08:18

## 2019-01-01 RX ADMIN — ACETAMINOPHEN 650 MG: 325 TABLET ORAL at 17:17

## 2019-01-01 RX ADMIN — CARVEDILOL 6.25 MG: 6.25 TABLET, FILM COATED ORAL at 18:02

## 2019-01-01 RX ADMIN — HYDROCORTISONE SODIUM SUCCINATE 50 MG: 100 INJECTION, POWDER, FOR SOLUTION INTRAMUSCULAR; INTRAVENOUS at 20:07

## 2019-01-01 RX ADMIN — INSULIN LISPRO 3 UNITS: 100 INJECTION, SOLUTION INTRAVENOUS; SUBCUTANEOUS at 16:36

## 2019-01-01 RX ADMIN — SUCRALFATE 1 G: 1 TABLET ORAL at 16:27

## 2019-01-01 RX ADMIN — SODIUM CHLORIDE, PRESERVATIVE FREE 10 ML: 5 INJECTION INTRAVENOUS at 09:27

## 2019-01-01 RX ADMIN — HEPARIN SODIUM 5000 UNITS: 5000 INJECTION INTRAVENOUS; SUBCUTANEOUS at 08:18

## 2019-01-01 RX ADMIN — SODIUM CHLORIDE 125 ML/HR: 9 INJECTION, SOLUTION INTRAVENOUS at 05:38

## 2019-01-01 RX ADMIN — LACTULOSE 20 G: 10 SOLUTION ORAL at 16:34

## 2019-01-01 RX ADMIN — INSULIN DETEMIR 10 UNITS: 100 INJECTION, SOLUTION SUBCUTANEOUS at 08:19

## 2019-01-01 RX ADMIN — HYDROCODONE BITARTRATE AND ACETAMINOPHEN 1 TABLET: 7.5; 325 TABLET ORAL at 16:34

## 2019-01-01 RX ADMIN — HYDROCORTISONE SODIUM SUCCINATE 100 MG: 100 INJECTION, POWDER, FOR SOLUTION INTRAMUSCULAR; INTRAVENOUS at 01:27

## 2019-01-01 RX ADMIN — HEPARIN SODIUM 5000 UNITS: 5000 INJECTION INTRAVENOUS; SUBCUTANEOUS at 20:40

## 2019-01-01 RX ADMIN — FLUDROCORTISONE ACETATE 100 MCG: 0.1 TABLET ORAL at 09:14

## 2019-01-01 RX ADMIN — CARVEDILOL 6.25 MG: 6.25 TABLET, FILM COATED ORAL at 09:38

## 2019-01-01 RX ADMIN — ONDANSETRON 4 MG: 2 INJECTION INTRAMUSCULAR; INTRAVENOUS at 21:07

## 2019-01-01 RX ADMIN — DICYCLOMINE HYDROCHLORIDE 20 MG: 20 TABLET ORAL at 09:25

## 2019-01-01 RX ADMIN — ETOPOSIDE 190 MG: 20 INJECTION, SOLUTION, CONCENTRATE INTRAVENOUS at 14:25

## 2019-01-01 RX ADMIN — SUCRALFATE 1 G: 1 TABLET ORAL at 17:18

## 2019-01-01 RX ADMIN — INSULIN LISPRO 2 UNITS: 100 INJECTION, SOLUTION INTRAVENOUS; SUBCUTANEOUS at 22:11

## 2019-01-01 RX ADMIN — FAMOTIDINE 20 MG: 20 TABLET ORAL at 20:47

## 2019-01-01 RX ADMIN — Medication 400 MG: at 02:06

## 2019-01-01 RX ADMIN — INSULIN LISPRO 3 UNITS: 100 INJECTION, SOLUTION INTRAVENOUS; SUBCUTANEOUS at 21:10

## 2019-01-01 RX ADMIN — CARVEDILOL 6.25 MG: 6.25 TABLET, FILM COATED ORAL at 17:13

## 2019-01-01 RX ADMIN — LACTULOSE 20 G: 10 SOLUTION ORAL at 21:08

## 2019-01-01 RX ADMIN — LEVOTHYROXINE SODIUM 75 MCG: 75 TABLET ORAL at 10:53

## 2019-01-01 RX ADMIN — SODIUM CHLORIDE 480 MG: 9 INJECTION, SOLUTION INTRAVENOUS at 12:37

## 2019-01-01 RX ADMIN — SODIUM CHLORIDE 100 ML/HR: 9 INJECTION, SOLUTION INTRAVENOUS at 15:27

## 2019-01-01 RX ADMIN — PROCHLORPERAZINE EDISYLATE 5 MG: 5 INJECTION INTRAMUSCULAR; INTRAVENOUS at 03:59

## 2019-01-01 RX ADMIN — SODIUM CHLORIDE 125 ML/HR: 9 INJECTION, SOLUTION INTRAVENOUS at 23:26

## 2019-01-01 RX ADMIN — PANTOPRAZOLE SODIUM 40 MG: 40 TABLET, DELAYED RELEASE ORAL at 09:26

## 2019-01-01 RX ADMIN — SODIUM CHLORIDE 500 ML: 9 INJECTION, SOLUTION INTRAVENOUS at 22:22

## 2019-01-01 RX ADMIN — CETIRIZINE HYDROCHLORIDE 10 MG: 10 TABLET, FILM COATED ORAL at 09:27

## 2019-01-01 RX ADMIN — ONDANSETRON 4 MG: 2 INJECTION INTRAMUSCULAR; INTRAVENOUS at 15:17

## 2019-01-01 RX ADMIN — HYDROMORPHONE HYDROCHLORIDE 0.5 MG: 1 INJECTION, SOLUTION INTRAMUSCULAR; INTRAVENOUS; SUBCUTANEOUS at 02:50

## 2019-01-01 RX ADMIN — DOCUSATE SODIUM 100 MG: 100 CAPSULE, LIQUID FILLED ORAL at 08:15

## 2019-01-01 RX ADMIN — LEVOTHYROXINE SODIUM 75 MCG: 75 TABLET ORAL at 09:27

## 2019-01-01 RX ADMIN — IOPAMIDOL 75 ML: 612 INJECTION, SOLUTION INTRAVENOUS at 23:12

## 2019-01-01 RX ADMIN — HYDROCODONE BITARTRATE AND ACETAMINOPHEN 1 TABLET: 7.5; 325 TABLET ORAL at 14:02

## 2019-01-01 RX ADMIN — PANTOPRAZOLE SODIUM 40 MG: 40 TABLET, DELAYED RELEASE ORAL at 10:49

## 2019-01-01 RX ADMIN — CARVEDILOL 6.25 MG: 6.25 TABLET, FILM COATED ORAL at 17:18

## 2019-01-01 RX ADMIN — LEVOTHYROXINE SODIUM 75 MCG: 75 TABLET ORAL at 05:45

## 2019-01-01 RX ADMIN — CISPLATIN 188 MG: 1 INJECTION INTRAVENOUS at 13:20

## 2019-01-01 RX ADMIN — ALPRAZOLAM 0.25 MG: 0.25 TABLET ORAL at 02:50

## 2019-01-01 RX ADMIN — SUCRALFATE 1 G: 1 TABLET ORAL at 20:47

## 2019-01-01 RX ADMIN — HYDROCODONE BITARTRATE AND ACETAMINOPHEN 1 TABLET: 7.5; 325 TABLET ORAL at 13:30

## 2019-01-01 RX ADMIN — HYDROCORTISONE SODIUM SUCCINATE 50 MG: 100 INJECTION, POWDER, FOR SOLUTION INTRAMUSCULAR; INTRAVENOUS at 20:41

## 2019-01-01 RX ADMIN — HYDROCODONE BITARTRATE AND ACETAMINOPHEN 1 TABLET: 5; 325 TABLET ORAL at 13:18

## 2019-01-01 RX ADMIN — Medication 1 G: at 08:39

## 2019-01-01 RX ADMIN — POTASSIUM CHLORIDE 40 MEQ: 750 CAPSULE, EXTENDED RELEASE ORAL at 20:46

## 2019-01-01 RX ADMIN — SUCRALFATE 1 G: 1 TABLET ORAL at 09:26

## 2019-01-01 RX ADMIN — SODIUM CHLORIDE 1000 ML: 9 INJECTION, SOLUTION INTRAVENOUS at 21:53

## 2019-01-01 RX ADMIN — FLUDROCORTISONE ACETATE 100 MCG: 0.1 TABLET ORAL at 08:39

## 2019-01-01 RX ADMIN — ASPIRIN 81 MG: 81 TABLET, COATED ORAL at 08:17

## 2019-01-01 RX ADMIN — SODIUM CHLORIDE 250 ML: 9 INJECTION, SOLUTION INTRAVENOUS at 15:26

## 2019-01-01 RX ADMIN — FAMOTIDINE 20 MG: 20 TABLET ORAL at 21:01

## 2019-01-01 RX ADMIN — HYDROMORPHONE HYDROCHLORIDE 1 MG: 1 INJECTION, SOLUTION INTRAMUSCULAR; INTRAVENOUS; SUBCUTANEOUS at 21:53

## 2019-01-01 RX ADMIN — LIDOCAINE 1 PATCH: 50 PATCH TOPICAL at 14:02

## 2019-01-01 RX ADMIN — HYDROCODONE BITARTRATE AND ACETAMINOPHEN 1 TABLET: 7.5; 325 TABLET ORAL at 03:19

## 2019-01-01 RX ADMIN — SUCRALFATE 1 G: 1 TABLET ORAL at 21:02

## 2019-01-01 RX ADMIN — INSULIN LISPRO 4 UNITS: 100 INJECTION, SOLUTION INTRAVENOUS; SUBCUTANEOUS at 09:15

## 2019-01-01 RX ADMIN — TICAGRELOR 60 MG: 60 TABLET ORAL at 10:02

## 2019-01-01 RX ADMIN — HYDROCODONE BITARTRATE AND ACETAMINOPHEN 1 TABLET: 7.5; 325 TABLET ORAL at 22:17

## 2019-01-01 RX ADMIN — FAMOTIDINE 20 MG: 20 TABLET ORAL at 09:57

## 2019-01-01 RX ADMIN — INSULIN LISPRO 9 UNITS: 100 INJECTION, SOLUTION INTRAVENOUS; SUBCUTANEOUS at 17:00

## 2019-01-01 RX ADMIN — ONDANSETRON HYDROCHLORIDE 4 MG: 4 TABLET, FILM COATED ORAL at 12:16

## 2019-01-01 RX ADMIN — INSULIN LISPRO 2 UNITS: 100 INJECTION, SOLUTION INTRAVENOUS; SUBCUTANEOUS at 12:59

## 2019-01-01 RX ADMIN — HYDROMORPHONE HYDROCHLORIDE 0.5 MG: 1 INJECTION, SOLUTION INTRAMUSCULAR; INTRAVENOUS; SUBCUTANEOUS at 23:38

## 2019-01-01 RX ADMIN — ACETAMINOPHEN 650 MG: 325 TABLET ORAL at 11:38

## 2019-01-01 RX ADMIN — SUCRALFATE 1 G: 1 TABLET ORAL at 12:17

## 2019-01-01 RX ADMIN — CETIRIZINE HYDROCHLORIDE 5 MG: 10 TABLET, FILM COATED ORAL at 09:26

## 2019-01-01 RX ADMIN — PANTOPRAZOLE SODIUM 40 MG: 40 INJECTION, POWDER, FOR SOLUTION INTRAVENOUS at 01:33

## 2019-01-01 RX ADMIN — SODIUM CHLORIDE 150 MG: 9 INJECTION, SOLUTION INTRAVENOUS at 10:20

## 2019-01-01 RX ADMIN — ETOPOSIDE 190 MG: 20 INJECTION, SOLUTION, CONCENTRATE INTRAVENOUS at 14:32

## 2019-01-01 RX ADMIN — ONDANSETRON 4 MG: 2 INJECTION INTRAMUSCULAR; INTRAVENOUS at 11:01

## 2019-01-01 RX ADMIN — Medication 400 MG: at 08:59

## 2019-01-01 RX ADMIN — TICAGRELOR 60 MG: 60 TABLET ORAL at 08:29

## 2019-01-01 RX ADMIN — ACETAMINOPHEN 650 MG: 325 TABLET ORAL at 03:07

## 2019-01-01 RX ADMIN — FAMOTIDINE 20 MG: 20 TABLET ORAL at 08:17

## 2019-01-01 RX ADMIN — ONDANSETRON 8 MG: 2 INJECTION INTRAMUSCULAR; INTRAVENOUS at 12:22

## 2019-01-01 RX ADMIN — LIDOCAINE 1 PATCH: 50 PATCH TOPICAL at 13:18

## 2019-01-01 RX ADMIN — INSULIN LISPRO 3 UNITS: 100 INJECTION, SOLUTION INTRAVENOUS; SUBCUTANEOUS at 11:46

## 2019-01-01 RX ADMIN — INSULIN LISPRO 3 UNITS: 100 INJECTION, SOLUTION INTRAVENOUS; SUBCUTANEOUS at 12:43

## 2019-01-01 RX ADMIN — CETIRIZINE HYDROCHLORIDE 5 MG: 10 TABLET, FILM COATED ORAL at 08:17

## 2019-01-01 RX ADMIN — CARVEDILOL 6.25 MG: 6.25 TABLET, FILM COATED ORAL at 09:58

## 2019-01-01 RX ADMIN — INSULIN LISPRO 2 UNITS: 100 INJECTION, SOLUTION INTRAVENOUS; SUBCUTANEOUS at 20:16

## 2019-01-01 RX ADMIN — CETIRIZINE HYDROCHLORIDE 5 MG: 10 TABLET, FILM COATED ORAL at 08:21

## 2019-01-01 RX ADMIN — PANTOPRAZOLE SODIUM 40 MG: 40 TABLET, DELAYED RELEASE ORAL at 08:17

## 2019-01-01 RX ADMIN — HYDROMORPHONE HYDROCHLORIDE 0.5 MG: 1 INJECTION, SOLUTION INTRAMUSCULAR; INTRAVENOUS; SUBCUTANEOUS at 09:24

## 2019-01-01 RX ADMIN — ONDANSETRON 4 MG: 2 INJECTION INTRAMUSCULAR; INTRAVENOUS at 01:32

## 2019-01-01 RX ADMIN — INSULIN LISPRO 3 UNITS: 100 INJECTION, SOLUTION INTRAVENOUS; SUBCUTANEOUS at 22:15

## 2019-01-01 RX ADMIN — ETOPOSIDE 190 MG: 20 INJECTION, SOLUTION, CONCENTRATE INTRAVENOUS at 12:44

## 2019-01-01 RX ADMIN — HYDROCODONE BITARTRATE AND ACETAMINOPHEN 1 TABLET: 7.5; 325 TABLET ORAL at 01:27

## 2019-01-01 RX ADMIN — LEVOTHYROXINE SODIUM 75 MCG: 75 TABLET ORAL at 05:27

## 2019-01-01 RX ADMIN — HEPARIN SODIUM 5000 UNITS: 5000 INJECTION INTRAVENOUS; SUBCUTANEOUS at 09:12

## 2019-01-01 RX ADMIN — LIDOCAINE HYDROCHLORIDE 10 ML: 20 SOLUTION ORAL; TOPICAL at 09:49

## 2019-01-01 RX ADMIN — INSULIN LISPRO 5 UNITS: 100 INJECTION, SOLUTION INTRAVENOUS; SUBCUTANEOUS at 09:12

## 2019-01-01 RX ADMIN — LEVOTHYROXINE SODIUM 75 MCG: 75 TABLET ORAL at 04:04

## 2019-01-01 RX ADMIN — Medication 1 G: at 08:17

## 2019-01-01 RX ADMIN — INSULIN LISPRO 5 UNITS: 100 INJECTION, SOLUTION INTRAVENOUS; SUBCUTANEOUS at 13:02

## 2019-01-01 RX ADMIN — HYDROCODONE BITARTRATE AND ACETAMINOPHEN 1 TABLET: 7.5; 325 TABLET ORAL at 11:46

## 2019-01-01 RX ADMIN — INSULIN LISPRO 7 UNITS: 100 INJECTION, SOLUTION INTRAVENOUS; SUBCUTANEOUS at 21:14

## 2019-01-01 RX ADMIN — LACTULOSE 20 G: 10 SOLUTION ORAL at 17:33

## 2019-01-01 RX ADMIN — INSULIN LISPRO 3 UNITS: 100 INJECTION, SOLUTION INTRAVENOUS; SUBCUTANEOUS at 11:47

## 2019-01-01 RX ADMIN — INSULIN LISPRO 9 UNITS: 100 INJECTION, SOLUTION INTRAVENOUS; SUBCUTANEOUS at 13:01

## 2019-01-01 RX ADMIN — HYDROMORPHONE HYDROCHLORIDE 0.5 MG: 1 INJECTION, SOLUTION INTRAMUSCULAR; INTRAVENOUS; SUBCUTANEOUS at 04:56

## 2019-01-01 RX ADMIN — POTASSIUM CHLORIDE 1000 ML: 2 INJECTION, SOLUTION, CONCENTRATE INTRAVENOUS at 11:18

## 2019-01-01 RX ADMIN — CARVEDILOL 6.25 MG: 6.25 TABLET, FILM COATED ORAL at 09:27

## 2019-01-01 RX ADMIN — PEGFILGRASTIM 6 MG: KIT SUBCUTANEOUS at 15:28

## 2019-01-01 RX ADMIN — Medication 1 G: at 16:29

## 2019-01-01 RX ADMIN — HYDROMORPHONE HYDROCHLORIDE 0.5 MG: 1 INJECTION, SOLUTION INTRAMUSCULAR; INTRAVENOUS; SUBCUTANEOUS at 16:21

## 2019-01-01 RX ADMIN — FLUDEOXYGLUCOSE F18 1 DOSE: 300 INJECTION INTRAVENOUS at 08:00

## 2019-01-01 RX ADMIN — SODIUM CHLORIDE, PRESERVATIVE FREE 10 ML: 5 INJECTION INTRAVENOUS at 22:24

## 2019-01-01 RX ADMIN — PROMETHAZINE HYDROCHLORIDE 12.5 MG: 25 INJECTION INTRAMUSCULAR; INTRAVENOUS at 22:28

## 2019-01-01 RX ADMIN — CARVEDILOL 6.25 MG: 6.25 TABLET, FILM COATED ORAL at 09:20

## 2019-01-01 RX ADMIN — INSULIN LISPRO 2 UNITS: 100 INJECTION, SOLUTION INTRAVENOUS; SUBCUTANEOUS at 13:30

## 2019-01-01 RX ADMIN — HYDROMORPHONE HYDROCHLORIDE 0.5 MG: 1 INJECTION, SOLUTION INTRAMUSCULAR; INTRAVENOUS; SUBCUTANEOUS at 22:57

## 2019-01-01 RX ADMIN — LEVOTHYROXINE SODIUM 75 MCG: 75 TABLET ORAL at 05:41

## 2019-01-01 RX ADMIN — HEPARIN SODIUM 5000 UNITS: 5000 INJECTION INTRAVENOUS; SUBCUTANEOUS at 20:06

## 2019-01-01 RX ADMIN — SODIUM CHLORIDE 250 ML: 9 INJECTION, SOLUTION INTRAVENOUS at 12:34

## 2019-01-01 RX ADMIN — Medication 1 G: at 09:25

## 2019-01-01 RX ADMIN — HEPARIN SODIUM 5000 UNITS: 5000 INJECTION INTRAVENOUS; SUBCUTANEOUS at 08:54

## 2019-01-01 RX ADMIN — INSULIN LISPRO 12 UNITS: 100 INJECTION, SOLUTION INTRAVENOUS; SUBCUTANEOUS at 06:59

## 2019-01-01 RX ADMIN — Medication 1 G: at 17:29

## 2019-01-01 RX ADMIN — POTASSIUM CHLORIDE 40 MEQ: 750 CAPSULE, EXTENDED RELEASE ORAL at 14:35

## 2019-01-01 RX ADMIN — PROCHLORPERAZINE EDISYLATE 5 MG: 5 INJECTION INTRAMUSCULAR; INTRAVENOUS at 20:53

## 2019-01-01 RX ADMIN — INSULIN LISPRO 3 UNITS: 100 INJECTION, SOLUTION INTRAVENOUS; SUBCUTANEOUS at 21:41

## 2019-01-01 RX ADMIN — INSULIN LISPRO 2 UNITS: 100 INJECTION, SOLUTION INTRAVENOUS; SUBCUTANEOUS at 20:40

## 2019-01-01 RX ADMIN — CARVEDILOL 6.25 MG: 6.25 TABLET, FILM COATED ORAL at 17:19

## 2019-01-01 RX ADMIN — LACTULOSE 20 G: 10 SOLUTION ORAL at 09:14

## 2019-01-01 RX ADMIN — HYDROCORTISONE 50 MG: 20 TABLET ORAL at 09:59

## 2019-01-01 RX ADMIN — FAMOTIDINE 20 MG: 20 TABLET, FILM COATED ORAL at 09:26

## 2019-01-01 RX ADMIN — INSULIN LISPRO 2 UNITS: 100 INJECTION, SOLUTION INTRAVENOUS; SUBCUTANEOUS at 09:04

## 2019-01-01 RX ADMIN — DULOXETINE HYDROCHLORIDE 30 MG: 30 CAPSULE, DELAYED RELEASE ORAL at 09:59

## 2019-01-01 RX ADMIN — INSULIN LISPRO 9 UNITS: 100 INJECTION, SOLUTION INTRAVENOUS; SUBCUTANEOUS at 17:19

## 2019-01-01 RX ADMIN — FLUDROCORTISONE ACETATE 100 MCG: 0.1 TABLET ORAL at 09:38

## 2019-01-01 RX ADMIN — ACETAMINOPHEN 650 MG: 325 TABLET ORAL at 14:44

## 2019-01-01 RX ADMIN — ASPIRIN 81 MG: 81 TABLET, COATED ORAL at 09:13

## 2019-01-01 RX ADMIN — SODIUM CHLORIDE 500 ML: 9 INJECTION, SOLUTION INTRAVENOUS at 00:23

## 2019-01-01 RX ADMIN — LEVOTHYROXINE SODIUM 75 MCG: 75 TABLET ORAL at 05:26

## 2019-01-01 RX ADMIN — NYSTATIN 10 ML: 100000 SUSPENSION ORAL at 09:39

## 2019-01-01 RX ADMIN — ONDANSETRON 4 MG: 2 INJECTION INTRAMUSCULAR; INTRAVENOUS at 21:53

## 2019-01-01 RX ADMIN — ONDANSETRON 4 MG: 2 INJECTION INTRAMUSCULAR; INTRAVENOUS at 12:17

## 2019-01-01 RX ADMIN — CETIRIZINE HYDROCHLORIDE 10 MG: 10 TABLET, FILM COATED ORAL at 08:59

## 2019-01-01 RX ADMIN — INSULIN LISPRO 8 UNITS: 100 INJECTION, SOLUTION INTRAVENOUS; SUBCUTANEOUS at 20:51

## 2019-01-01 RX ADMIN — HEPARIN SODIUM 5000 UNITS: 5000 INJECTION INTRAVENOUS; SUBCUTANEOUS at 09:26

## 2019-01-01 RX ADMIN — PANTOPRAZOLE SODIUM 40 MG: 40 TABLET, DELAYED RELEASE ORAL at 09:37

## 2019-01-01 RX ADMIN — HYDROMORPHONE HYDROCHLORIDE 0.5 MG: 1 INJECTION, SOLUTION INTRAMUSCULAR; INTRAVENOUS; SUBCUTANEOUS at 00:32

## 2019-01-01 RX ADMIN — HYDROCORTISONE SODIUM SUCCINATE 50 MG: 100 INJECTION, POWDER, FOR SOLUTION INTRAMUSCULAR; INTRAVENOUS at 09:39

## 2019-01-01 RX ADMIN — Medication 1 G: at 11:49

## 2019-01-01 RX ADMIN — XENON XE-133 26.4 MILLICURIE: 10 GAS RESPIRATORY (INHALATION) at 17:13

## 2019-01-01 RX ADMIN — INSULIN HUMAN 10 UNITS: 100 INJECTION, SOLUTION PARENTERAL at 01:35

## 2019-01-01 RX ADMIN — CYCLOBENZAPRINE HYDROCHLORIDE 5 MG: 10 TABLET, FILM COATED ORAL at 12:05

## 2019-01-01 RX ADMIN — SUCRALFATE 1 G: 1 TABLET ORAL at 16:53

## 2019-01-01 RX ADMIN — FLUDROCORTISONE ACETATE 100 MCG: 0.1 TABLET ORAL at 08:54

## 2019-01-01 RX ADMIN — MAGNESIUM SULFATE HEPTAHYDRATE 2 G: 40 INJECTION, SOLUTION INTRAVENOUS at 17:13

## 2019-01-01 RX ADMIN — CARVEDILOL 6.25 MG: 6.25 TABLET, FILM COATED ORAL at 10:50

## 2019-01-01 RX ADMIN — INSULIN LISPRO 2 UNITS: 100 INJECTION, SOLUTION INTRAVENOUS; SUBCUTANEOUS at 08:18

## 2019-01-01 RX ADMIN — SODIUM CHLORIDE 150 MG: 9 INJECTION, SOLUTION INTRAVENOUS at 12:57

## 2019-01-01 RX ADMIN — ETOPOSIDE 190 MG: 20 INJECTION, SOLUTION, CONCENTRATE INTRAVENOUS at 14:21

## 2019-01-01 RX ADMIN — CETIRIZINE HYDROCHLORIDE 5 MG: 10 TABLET, FILM COATED ORAL at 09:13

## 2019-01-01 RX ADMIN — PROCHLORPERAZINE EDISYLATE 5 MG: 5 INJECTION INTRAMUSCULAR; INTRAVENOUS at 09:24

## 2019-01-01 RX ADMIN — POTASSIUM CHLORIDE 20 MEQ: 750 CAPSULE, EXTENDED RELEASE ORAL at 02:08

## 2019-01-01 RX ADMIN — HYDROCORTISONE SODIUM SUCCINATE 100 MG: 100 INJECTION, POWDER, FOR SOLUTION INTRAMUSCULAR; INTRAVENOUS at 18:01

## 2019-01-01 RX ADMIN — HYDROCODONE BITARTRATE AND ACETAMINOPHEN 1 TABLET: 7.5; 325 TABLET ORAL at 21:13

## 2019-01-01 RX ADMIN — ALBUTEROL SULFATE 2.5 MG: 2.5 SOLUTION RESPIRATORY (INHALATION) at 02:55

## 2019-01-01 RX ADMIN — HYDROMORPHONE HYDROCHLORIDE 0.5 MG: 1 INJECTION, SOLUTION INTRAMUSCULAR; INTRAVENOUS; SUBCUTANEOUS at 16:28

## 2019-01-01 RX ADMIN — INSULIN LISPRO 7 UNITS: 100 INJECTION, SOLUTION INTRAVENOUS; SUBCUTANEOUS at 09:13

## 2019-01-01 RX ADMIN — HYDROMORPHONE HYDROCHLORIDE 0.5 MG: 1 INJECTION, SOLUTION INTRAMUSCULAR; INTRAVENOUS; SUBCUTANEOUS at 19:26

## 2019-01-01 RX ADMIN — ONDANSETRON 4 MG: 2 INJECTION INTRAMUSCULAR; INTRAVENOUS at 00:24

## 2019-01-01 RX ADMIN — CETIRIZINE HYDROCHLORIDE 5 MG: 10 TABLET, FILM COATED ORAL at 09:38

## 2019-01-01 RX ADMIN — HYDROCORTISONE SODIUM SUCCINATE 50 MG: 100 INJECTION, POWDER, FOR SOLUTION INTRAMUSCULAR; INTRAVENOUS at 21:13

## 2019-01-01 RX ADMIN — HYDROCODONE BITARTRATE AND ACETAMINOPHEN 1 TABLET: 7.5; 325 TABLET ORAL at 09:26

## 2019-01-01 RX ADMIN — ASPIRIN 81 MG: 81 TABLET, COATED ORAL at 08:10

## 2019-01-01 RX ADMIN — INSULIN LISPRO 6 UNITS: 100 INJECTION, SOLUTION INTRAVENOUS; SUBCUTANEOUS at 12:47

## 2019-01-01 RX ADMIN — PANTOPRAZOLE SODIUM 40 MG: 40 TABLET, DELAYED RELEASE ORAL at 08:21

## 2019-01-01 RX ADMIN — LACTULOSE 20 G: 10 SOLUTION ORAL at 22:10

## 2019-01-01 RX ADMIN — SODIUM CHLORIDE 250 ML: 9 INJECTION, SOLUTION INTRAVENOUS at 12:30

## 2019-01-01 RX ADMIN — PANTOPRAZOLE SODIUM 40 MG: 40 TABLET, DELAYED RELEASE ORAL at 09:14

## 2019-01-01 RX ADMIN — HYDROMORPHONE HYDROCHLORIDE 0.5 MG: 1 INJECTION, SOLUTION INTRAMUSCULAR; INTRAVENOUS; SUBCUTANEOUS at 20:04

## 2019-01-01 RX ADMIN — NYSTATIN 10 ML: 100000 SUSPENSION ORAL at 12:48

## 2019-01-01 RX ADMIN — HYDROCODONE BITARTRATE AND ACETAMINOPHEN 1 TABLET: 7.5; 325 TABLET ORAL at 07:30

## 2019-01-01 RX ADMIN — PANTOPRAZOLE SODIUM 40 MG: 40 TABLET, DELAYED RELEASE ORAL at 05:27

## 2019-01-01 RX ADMIN — CARVEDILOL 6.25 MG: 6.25 TABLET, FILM COATED ORAL at 09:14

## 2019-01-01 RX ADMIN — PANTOPRAZOLE SODIUM 40 MG: 40 TABLET, DELAYED RELEASE ORAL at 08:54

## 2019-01-01 RX ADMIN — SODIUM CHLORIDE 50 ML/HR: 9 INJECTION, SOLUTION INTRAVENOUS at 04:38

## 2019-01-01 RX ADMIN — SODIUM CHLORIDE, PRESERVATIVE FREE 10 ML: 5 INJECTION INTRAVENOUS at 21:15

## 2019-01-01 RX ADMIN — POTASSIUM CHLORIDE 1000 ML: 2 INJECTION, SOLUTION, CONCENTRATE INTRAVENOUS at 14:30

## 2019-01-01 RX ADMIN — ONDANSETRON 8 MG: 2 INJECTION INTRAMUSCULAR; INTRAVENOUS at 14:17

## 2019-01-01 RX ADMIN — HYDROCORTISONE SODIUM SUCCINATE 50 MG: 100 INJECTION, POWDER, FOR SOLUTION INTRAMUSCULAR; INTRAVENOUS at 21:41

## 2019-01-01 RX ADMIN — ASPIRIN 81 MG: 81 TABLET, COATED ORAL at 09:57

## 2019-01-01 RX ADMIN — CARVEDILOL 6.25 MG: 6.25 TABLET, FILM COATED ORAL at 09:25

## 2019-01-01 RX ADMIN — CALCIUM GLUCONATE: 94 INJECTION, SOLUTION INTRAVENOUS at 02:06

## 2019-01-01 RX ADMIN — ASPIRIN 81 MG: 81 TABLET, COATED ORAL at 08:54

## 2019-01-01 RX ADMIN — TOLVAPTAN 7.5 MG: 15 TABLET ORAL at 10:52

## 2019-01-01 RX ADMIN — HYDROCORTISONE SODIUM SUCCINATE 100 MG: 100 INJECTION, POWDER, FOR SOLUTION INTRAMUSCULAR; INTRAVENOUS at 09:13

## 2019-01-01 RX ADMIN — INSULIN LISPRO 5 UNITS: 100 INJECTION, SOLUTION INTRAVENOUS; SUBCUTANEOUS at 17:00

## 2019-01-01 RX ADMIN — MAGNESIUM SULFATE HEPTAHYDRATE 2 G: 40 INJECTION, SOLUTION INTRAVENOUS at 01:57

## 2019-01-01 RX ADMIN — Medication 1 DOSE: at 17:32

## 2019-01-01 RX ADMIN — MAGNESIUM SULFATE HEPTAHYDRATE 1 G: 1 INJECTION, SOLUTION INTRAVENOUS at 12:32

## 2019-01-01 RX ADMIN — LIDOCAINE HYDROCHLORIDE 10 ML: 20 SOLUTION ORAL; TOPICAL at 16:59

## 2019-01-01 RX ADMIN — SODIUM CHLORIDE 250 ML: 9 INJECTION, SOLUTION INTRAVENOUS at 14:17

## 2019-01-01 RX ADMIN — FAMOTIDINE 20 MG: 20 TABLET, FILM COATED ORAL at 20:46

## 2019-01-01 RX ADMIN — FLUDROCORTISONE ACETATE 100 MCG: 0.1 TABLET ORAL at 08:17

## 2019-01-01 RX ADMIN — INSULIN LISPRO 2 UNITS: 100 INJECTION, SOLUTION INTRAVENOUS; SUBCUTANEOUS at 17:44

## 2019-01-01 RX ADMIN — SUCRALFATE 1 G: 1 TABLET ORAL at 12:47

## 2019-01-01 RX ADMIN — SODIUM CHLORIDE 250 ML: 9 INJECTION, SOLUTION INTRAVENOUS at 13:57

## 2019-01-01 RX ADMIN — SODIUM CHLORIDE, PRESERVATIVE FREE 10 ML: 5 INJECTION INTRAVENOUS at 21:11

## 2019-01-01 RX ADMIN — INSULIN LISPRO 5 UNITS: 100 INJECTION, SOLUTION INTRAVENOUS; SUBCUTANEOUS at 12:51

## 2019-01-01 RX ADMIN — ASPIRIN 81 MG: 81 TABLET, COATED ORAL at 09:37

## 2019-01-01 RX ADMIN — HEPARIN SODIUM 5000 UNITS: 5000 INJECTION INTRAVENOUS; SUBCUTANEOUS at 21:41

## 2019-01-01 RX ADMIN — Medication 1 G: at 12:06

## 2019-01-01 RX ADMIN — CISPLATIN 142 MG: 1 INJECTION INTRAVENOUS at 13:51

## 2019-01-01 RX ADMIN — HYDROMORPHONE HYDROCHLORIDE 0.5 MG: 1 INJECTION, SOLUTION INTRAMUSCULAR; INTRAVENOUS; SUBCUTANEOUS at 11:55

## 2019-01-01 RX ADMIN — PANTOPRAZOLE SODIUM 40 MG: 40 TABLET, DELAYED RELEASE ORAL at 08:59

## 2019-01-01 RX ADMIN — CARVEDILOL 6.25 MG: 6.25 TABLET, FILM COATED ORAL at 08:59

## 2019-01-01 RX ADMIN — INSULIN LISPRO 4 UNITS: 100 INJECTION, SOLUTION INTRAVENOUS; SUBCUTANEOUS at 17:29

## 2019-01-01 RX ADMIN — DOCUSATE SODIUM 100 MG: 100 CAPSULE, LIQUID FILLED ORAL at 08:59

## 2019-01-01 RX ADMIN — DULOXETINE HYDROCHLORIDE 30 MG: 30 CAPSULE, DELAYED RELEASE ORAL at 09:38

## 2019-01-01 RX ADMIN — DIPHENHYDRAMINE HYDROCHLORIDE 25 MG: 25 CAPSULE ORAL at 11:38

## 2019-01-01 RX ADMIN — CARVEDILOL 6.25 MG: 6.25 TABLET, FILM COATED ORAL at 08:17

## 2019-01-01 RX ADMIN — INSULIN DETEMIR 10 UNITS: 100 INJECTION, SOLUTION SUBCUTANEOUS at 09:48

## 2019-01-01 RX ADMIN — INSULIN DETEMIR 10 UNITS: 100 INJECTION, SOLUTION SUBCUTANEOUS at 09:09

## 2019-01-01 RX ADMIN — LEVOTHYROXINE SODIUM 75 MCG: 75 TABLET ORAL at 10:29

## 2019-01-01 RX ADMIN — HYDROCORTISONE SODIUM SUCCINATE 100 MG: 100 INJECTION, POWDER, FOR SOLUTION INTRAMUSCULAR; INTRAVENOUS at 17:44

## 2019-01-01 RX ADMIN — LACTULOSE 20 G: 10 SOLUTION ORAL at 09:26

## 2019-01-01 RX ADMIN — LEVOTHYROXINE SODIUM 75 MCG: 75 TABLET ORAL at 05:30

## 2019-01-01 RX ADMIN — HYDROCORTISONE SODIUM SUCCINATE 100 MG: 100 INJECTION, POWDER, FOR SOLUTION INTRAMUSCULAR; INTRAVENOUS at 02:34

## 2019-01-01 RX ADMIN — Medication 1 G: at 12:05

## 2019-01-01 RX ADMIN — HYDROMORPHONE HYDROCHLORIDE 0.5 MG: 1 INJECTION, SOLUTION INTRAMUSCULAR; INTRAVENOUS; SUBCUTANEOUS at 23:35

## 2019-01-01 RX ADMIN — LEVOTHYROXINE SODIUM 75 MCG: 75 TABLET ORAL at 04:56

## 2019-01-01 RX ADMIN — SODIUM CHLORIDE 250 ML: 9 INJECTION, SOLUTION INTRAVENOUS at 10:37

## 2019-01-01 RX ADMIN — LACTULOSE 20 G: 10 SOLUTION ORAL at 21:41

## 2019-01-01 RX ADMIN — ALPRAZOLAM 0.25 MG: 0.25 TABLET ORAL at 16:34

## 2019-01-01 RX ADMIN — HEPARIN SODIUM 5000 UNITS: 5000 INJECTION INTRAVENOUS; SUBCUTANEOUS at 08:12

## 2019-01-01 RX ADMIN — ONDANSETRON 8 MG: 2 INJECTION INTRAMUSCULAR; INTRAVENOUS at 13:59

## 2019-01-01 RX ADMIN — LEVOTHYROXINE SODIUM 75 MCG: 75 TABLET ORAL at 06:06

## 2019-01-01 RX ADMIN — ETOPOSIDE 190 MG: 20 INJECTION, SOLUTION, CONCENTRATE INTRAVENOUS at 13:30

## 2019-01-01 RX ADMIN — SUCRALFATE 1 G: 1 TABLET ORAL at 20:46

## 2019-01-01 RX ADMIN — HYDROCORTISONE SODIUM SUCCINATE 100 MG: 100 INJECTION, POWDER, FOR SOLUTION INTRAMUSCULAR; INTRAVENOUS at 10:23

## 2019-01-01 RX ADMIN — ONDANSETRON HYDROCHLORIDE 4 MG: 4 TABLET, FILM COATED ORAL at 21:28

## 2019-01-01 RX ADMIN — FUROSEMIDE 20 MG: 10 INJECTION, SOLUTION INTRAMUSCULAR; INTRAVENOUS at 21:41

## 2019-01-01 RX ADMIN — MAGNESIUM SULFATE HEPTAHYDRATE 2 G: 40 INJECTION, SOLUTION INTRAVENOUS at 16:26

## 2019-01-01 RX ADMIN — ONDANSETRON 4 MG: 2 INJECTION INTRAMUSCULAR; INTRAVENOUS at 09:04

## 2019-01-01 RX ADMIN — ASPIRIN 81 MG: 81 TABLET, COATED ORAL at 09:25

## 2019-01-01 RX ADMIN — CETIRIZINE HYDROCHLORIDE 5 MG: 10 TABLET, FILM COATED ORAL at 08:09

## 2019-01-01 RX ADMIN — HYDROCODONE BITARTRATE AND ACETAMINOPHEN 1 TABLET: 5; 325 TABLET ORAL at 12:59

## 2019-01-01 RX ADMIN — IOPAMIDOL 80 ML: 612 INJECTION, SOLUTION INTRAVENOUS at 10:20

## 2019-01-01 RX ADMIN — LIDOCAINE 1 PATCH: 50 PATCH TOPICAL at 12:58

## 2019-01-01 RX ADMIN — INSULIN LISPRO 2 UNITS: 100 INJECTION, SOLUTION INTRAVENOUS; SUBCUTANEOUS at 14:25

## 2019-01-01 RX ADMIN — HEPARIN SODIUM 5000 UNITS: 5000 INJECTION INTRAVENOUS; SUBCUTANEOUS at 22:12

## 2019-01-01 RX ADMIN — DULOXETINE HYDROCHLORIDE 30 MG: 30 CAPSULE, DELAYED RELEASE ORAL at 14:02

## 2019-01-01 RX ADMIN — SUCRALFATE 1 G: 1 TABLET ORAL at 08:17

## 2019-01-01 RX ADMIN — FAMOTIDINE 20 MG: 20 TABLET ORAL at 10:51

## 2019-01-01 RX ADMIN — CARVEDILOL 6.25 MG: 6.25 TABLET, FILM COATED ORAL at 17:44

## 2019-01-01 RX ADMIN — FAMOTIDINE 20 MG: 10 INJECTION, SOLUTION INTRAVENOUS at 04:00

## 2019-01-01 RX ADMIN — PANTOPRAZOLE SODIUM 40 MG: 40 TABLET, DELAYED RELEASE ORAL at 08:09

## 2019-01-01 RX ADMIN — HEPARIN SODIUM 5000 UNITS: 5000 INJECTION INTRAVENOUS; SUBCUTANEOUS at 21:05

## 2019-01-01 RX ADMIN — LIDOCAINE 1 PATCH: 50 PATCH TOPICAL at 12:15

## 2019-01-01 RX ADMIN — CARVEDILOL 6.25 MG: 6.25 TABLET, FILM COATED ORAL at 17:29

## 2019-01-01 RX ADMIN — HYDROCODONE BITARTRATE AND ACETAMINOPHEN 1 TABLET: 7.5; 325 TABLET ORAL at 08:42

## 2019-01-01 RX ADMIN — PROCHLORPERAZINE EDISYLATE 5 MG: 5 INJECTION INTRAMUSCULAR; INTRAVENOUS at 16:21

## 2019-01-01 RX ADMIN — CARVEDILOL 6.25 MG: 6.25 TABLET, FILM COATED ORAL at 08:54

## 2019-01-01 RX ADMIN — HYDROMORPHONE HYDROCHLORIDE 0.5 MG: 1 INJECTION, SOLUTION INTRAMUSCULAR; INTRAVENOUS; SUBCUTANEOUS at 10:20

## 2019-01-01 RX ADMIN — DEXAMETHASONE SODIUM PHOSPHATE 12 MG: 4 INJECTION, SOLUTION INTRA-ARTICULAR; INTRALESIONAL; INTRAMUSCULAR; INTRAVENOUS; SOFT TISSUE at 12:56

## 2019-01-01 RX ADMIN — Medication 1 G: at 09:49

## 2019-01-01 RX ADMIN — MAGNESIUM SULFATE HEPTAHYDRATE 2 G: 40 INJECTION, SOLUTION INTRAVENOUS at 20:46

## 2019-01-01 RX ADMIN — ONDANSETRON 4 MG: 2 INJECTION INTRAMUSCULAR; INTRAVENOUS at 22:12

## 2019-01-01 RX ADMIN — DICYCLOMINE HYDROCHLORIDE 20 MG: 20 TABLET ORAL at 12:14

## 2019-01-01 RX ADMIN — LEVOTHYROXINE SODIUM 75 MCG: 75 TABLET ORAL at 08:59

## 2019-01-01 RX ADMIN — HYDROCODONE BITARTRATE AND ACETAMINOPHEN 1 TABLET: 5; 325 TABLET ORAL at 05:29

## 2019-01-01 RX ADMIN — HYDROMORPHONE HYDROCHLORIDE 0.5 MG: 1 INJECTION, SOLUTION INTRAMUSCULAR; INTRAVENOUS; SUBCUTANEOUS at 13:56

## 2019-01-01 RX ADMIN — HYDROCORTISONE SODIUM SUCCINATE 50 MG: 100 INJECTION, POWDER, FOR SOLUTION INTRAMUSCULAR; INTRAVENOUS at 09:26

## 2019-01-01 RX ADMIN — ACETAMINOPHEN 1000 MG: 500 TABLET, FILM COATED ORAL at 05:27

## 2019-01-01 RX ADMIN — HYDROCODONE BITARTRATE AND ACETAMINOPHEN 1 TABLET: 7.5; 325 TABLET ORAL at 16:29

## 2019-01-01 RX ADMIN — ASPIRIN 81 MG: 81 TABLET, COATED ORAL at 10:52

## 2019-01-01 RX ADMIN — LEVOTHYROXINE SODIUM 75 MCG: 75 TABLET ORAL at 07:03

## 2019-01-01 RX ADMIN — CETIRIZINE HYDROCHLORIDE 5 MG: 10 TABLET, FILM COATED ORAL at 08:16

## 2019-01-01 RX ADMIN — LACTULOSE 20 G: 10 SOLUTION ORAL at 09:38

## 2019-01-01 RX ADMIN — INSULIN LISPRO 4 UNITS: 100 INJECTION, SOLUTION INTRAVENOUS; SUBCUTANEOUS at 17:46

## 2019-01-01 RX ADMIN — SUCRALFATE 1 G: 1 TABLET ORAL at 14:25

## 2019-01-01 RX ADMIN — PALONOSETRON HYDROCHLORIDE 0.25 MG: 0.25 INJECTION, SOLUTION INTRAVENOUS at 10:38

## 2019-01-01 RX ADMIN — HYDROCODONE BITARTRATE AND ACETAMINOPHEN 1 TABLET: 7.5; 325 TABLET ORAL at 21:25

## 2019-01-01 RX ADMIN — Medication 1 G: at 12:43

## 2019-01-01 RX ADMIN — SODIUM CHLORIDE 250 ML: 9 INJECTION, SOLUTION INTRAVENOUS at 12:54

## 2019-01-01 RX ADMIN — POTASSIUM CHLORIDE 1000 ML: 2 INJECTION, SOLUTION, CONCENTRATE INTRAVENOUS at 10:40

## 2019-01-01 RX ADMIN — CARVEDILOL 6.25 MG: 6.25 TABLET, FILM COATED ORAL at 16:30

## 2019-01-01 RX ADMIN — PROMETHAZINE HYDROCHLORIDE 12.5 MG: 25 INJECTION INTRAMUSCULAR; INTRAVENOUS at 03:23

## 2019-01-01 RX ADMIN — ASPIRIN 81 MG: 81 TABLET, COATED ORAL at 08:21

## 2019-01-01 RX ADMIN — ACETAMINOPHEN 650 MG: 325 TABLET ORAL at 21:07

## 2019-01-01 RX ADMIN — Medication 1 G: at 17:44

## 2019-01-01 RX ADMIN — SODIUM CHLORIDE 1000 ML: 9 INJECTION, SOLUTION INTRAVENOUS at 04:27

## 2019-01-01 RX ADMIN — SODIUM CHLORIDE 50 ML/HR: 9 INJECTION, SOLUTION INTRAVENOUS at 10:18

## 2019-01-01 RX ADMIN — Medication 1 G: at 08:54

## 2019-01-01 RX ADMIN — Medication 1 G: at 17:17

## 2019-01-01 RX ADMIN — ALPRAZOLAM 0.25 MG: 0.25 TABLET ORAL at 20:06

## 2019-01-01 RX ADMIN — CARVEDILOL 6.25 MG: 6.25 TABLET, FILM COATED ORAL at 16:53

## 2019-01-01 RX ADMIN — DEXAMETHASONE 8 MG: 4 TABLET ORAL at 10:49

## 2019-01-01 RX ADMIN — INSULIN LISPRO 4 UNITS: 100 INJECTION, SOLUTION INTRAVENOUS; SUBCUTANEOUS at 18:01

## 2019-01-01 RX ADMIN — Medication 1 G: at 09:13

## 2019-01-01 RX ADMIN — ONDANSETRON 4 MG: 2 INJECTION INTRAMUSCULAR; INTRAVENOUS at 16:32

## 2019-01-01 RX ADMIN — CARVEDILOL 6.25 MG: 6.25 TABLET, FILM COATED ORAL at 08:21

## 2019-01-01 RX ADMIN — ONDANSETRON 4 MG: 4 TABLET, FILM COATED ORAL at 05:49

## 2019-01-01 RX ADMIN — SODIUM CHLORIDE 480 MG: 9 INJECTION, SOLUTION INTRAVENOUS at 15:28

## 2019-01-01 RX ADMIN — HEPARIN SODIUM 5000 UNITS: 5000 INJECTION INTRAVENOUS; SUBCUTANEOUS at 08:21

## 2019-01-01 RX ADMIN — DEXAMETHASONE 8 MG: 4 TABLET ORAL at 08:16

## 2019-01-01 RX ADMIN — HEPARIN SODIUM 5000 UNITS: 5000 INJECTION INTRAVENOUS; SUBCUTANEOUS at 01:50

## 2019-01-01 RX ADMIN — HYDROCODONE BITARTRATE AND ACETAMINOPHEN 1 TABLET: 7.5; 325 TABLET ORAL at 05:41

## 2019-01-01 RX ADMIN — ALPRAZOLAM 0.25 MG: 0.25 TABLET ORAL at 14:42

## 2019-01-01 RX ADMIN — SODIUM CHLORIDE 100 ML/HR: 9 INJECTION, SOLUTION INTRAVENOUS at 09:51

## 2019-01-01 RX ADMIN — SUCRALFATE 1 G: 1 TABLET ORAL at 13:03

## 2019-01-01 RX ADMIN — NYSTATIN 10 ML: 100000 SUSPENSION ORAL at 13:24

## 2019-01-01 RX ADMIN — DOCUSATE SODIUM 100 MG: 100 CAPSULE, LIQUID FILLED ORAL at 10:51

## 2019-01-01 RX ADMIN — CARVEDILOL 6.25 MG: 6.25 TABLET, FILM COATED ORAL at 11:46

## 2019-01-01 RX ADMIN — ONDANSETRON 4 MG: 2 INJECTION INTRAMUSCULAR; INTRAVENOUS at 10:20

## 2019-01-01 RX ADMIN — CETIRIZINE HYDROCHLORIDE 5 MG: 10 TABLET, FILM COATED ORAL at 09:57

## 2019-01-01 RX ADMIN — DICYCLOMINE HYDROCHLORIDE 20 MG: 20 TABLET ORAL at 05:01

## 2019-01-01 RX ADMIN — HYDROCODONE BITARTRATE AND ACETAMINOPHEN 1 TABLET: 5; 325 TABLET ORAL at 21:13

## 2019-01-01 RX ADMIN — CALCIUM GLUCONATE 2 G: 98 INJECTION, SOLUTION INTRAVENOUS at 15:15

## 2019-01-01 RX ADMIN — DULOXETINE HYDROCHLORIDE 30 MG: 30 CAPSULE, DELAYED RELEASE ORAL at 08:10

## 2019-01-02 ENCOUNTER — HOSPITAL ENCOUNTER (OUTPATIENT)
Dept: RADIATION ONCOLOGY | Facility: HOSPITAL | Age: 64
Discharge: HOME | End: 2019-01-02

## 2019-01-02 ENCOUNTER — HOSPITAL ENCOUNTER (OUTPATIENT)
Dept: RADIATION ONCOLOGY | Facility: HOSPITAL | Age: 64
Setting detail: RADIATION/ONCOLOGY SERIES
Discharge: HOME | End: 2019-01-02

## 2019-01-02 ENCOUNTER — HOSPITAL ENCOUNTER (OUTPATIENT)
Dept: ONCOLOGY | Facility: HOSPITAL | Age: 64
Setting detail: INFUSION SERIES
Discharge: HOME | End: 2019-01-02

## 2019-01-02 VITALS
SYSTOLIC BLOOD PRESSURE: 88 MMHG | RESPIRATION RATE: 18 BRPM | TEMPERATURE: 97.5 F | HEART RATE: 112 BPM | HEIGHT: 61 IN | DIASTOLIC BLOOD PRESSURE: 54 MMHG | WEIGHT: 187 LBS | BODY MASS INDEX: 35.3 KG/M2

## 2019-01-02 VITALS — WEIGHT: 187.1 LBS | BODY MASS INDEX: 35.37 KG/M2

## 2019-01-02 DIAGNOSIS — C34.12 SMALL CELL LUNG CANCER, LEFT UPPER LOBE (HCC): Primary | ICD-10-CM

## 2019-01-02 PROCEDURE — 96374 THER/PROPH/DIAG INJ IV PUSH: CPT

## 2019-01-02 PROCEDURE — 96360 HYDRATION IV INFUSION INIT: CPT

## 2019-01-02 PROCEDURE — 96361 HYDRATE IV INFUSION ADD-ON: CPT

## 2019-01-02 PROCEDURE — 77412 RADIATION TX DELIVERY LVL 3: CPT | Performed by: RADIOLOGY

## 2019-01-02 PROCEDURE — 77387 GUIDANCE FOR RADJ TX DLVR: CPT | Performed by: RADIOLOGY

## 2019-01-02 PROCEDURE — 96375 TX/PRO/DX INJ NEW DRUG ADDON: CPT

## 2019-01-02 PROCEDURE — 25010000002 ONDANSETRON PER 1 MG: Performed by: RADIOLOGY

## 2019-01-02 RX ORDER — SODIUM CHLORIDE 9 MG/ML
999 INJECTION, SOLUTION INTRAVENOUS ONCE
Status: COMPLETED | OUTPATIENT
Start: 2019-01-02 | End: 2019-01-02

## 2019-01-02 RX ORDER — SODIUM CHLORIDE 9 MG/ML
999 INJECTION, SOLUTION INTRAVENOUS ONCE
Status: CANCELLED | OUTPATIENT
Start: 2019-01-02

## 2019-01-02 RX ORDER — ONDANSETRON 2 MG/ML
8 INJECTION INTRAMUSCULAR; INTRAVENOUS ONCE
Status: COMPLETED | OUTPATIENT
Start: 2019-01-02 | End: 2019-01-02

## 2019-01-02 RX ADMIN — ONDANSETRON 8 MG: 2 INJECTION INTRAMUSCULAR; INTRAVENOUS at 10:41

## 2019-01-02 RX ADMIN — SODIUM CHLORIDE 999 ML: 9 INJECTION, SOLUTION INTRAVENOUS at 10:30

## 2019-01-03 ENCOUNTER — HOSPITAL ENCOUNTER (OUTPATIENT)
Dept: RADIATION ONCOLOGY | Facility: HOSPITAL | Age: 64
Discharge: HOME | End: 2019-01-03

## 2019-01-03 ENCOUNTER — APPOINTMENT (OUTPATIENT)
Dept: RADIATION ONCOLOGY | Facility: HOSPITAL | Age: 64
End: 2019-01-03

## 2019-01-03 PROCEDURE — 77336 RADIATION PHYSICS CONSULT: CPT | Performed by: RADIOLOGY

## 2019-01-03 PROCEDURE — 77412 RADIATION TX DELIVERY LVL 3: CPT | Performed by: RADIOLOGY

## 2019-01-03 PROCEDURE — 77387 GUIDANCE FOR RADJ TX DLVR: CPT | Performed by: RADIOLOGY

## 2019-01-04 ENCOUNTER — HOSPITAL ENCOUNTER (OUTPATIENT)
Dept: RADIATION ONCOLOGY | Facility: HOSPITAL | Age: 64
Discharge: HOME | End: 2019-01-04

## 2019-01-04 PROCEDURE — 77412 RADIATION TX DELIVERY LVL 3: CPT | Performed by: RADIOLOGY

## 2019-01-04 PROCEDURE — 77387 GUIDANCE FOR RADJ TX DLVR: CPT | Performed by: RADIOLOGY

## 2019-01-07 ENCOUNTER — HOSPITAL ENCOUNTER (OUTPATIENT)
Dept: RADIATION ONCOLOGY | Facility: HOSPITAL | Age: 64
Discharge: HOME | End: 2019-01-07

## 2019-01-07 PROCEDURE — 77412 RADIATION TX DELIVERY LVL 3: CPT | Performed by: RADIOLOGY

## 2019-01-07 PROCEDURE — 77387 GUIDANCE FOR RADJ TX DLVR: CPT | Performed by: RADIOLOGY

## 2019-01-08 NOTE — PROGRESS NOTES
"[facility]    Radiation: Has completed 13/30 treatments  Chemotherapy:  Cisplatin(D1) / Etoposide(D1-3) - every 21 days / completed 3/6 cycles    Weight 193.1 lbs / weight gain 5.1 lbs past week    FU with patient.  She states that she has begun to experience more discomfort in the chest wall, making swallowing more difficult.  She received a refill for MMW yesterday; reviewed instructions for taking prior to meals to assist with the discomfort management; stressed importance of soft moist foods, \"grazing\", continued focus on maintaining weight with higher calorie, protein, nutrient dense diet.  Experiencing taste changes and has sore tongue that interferes with eating; reviewed the benefit of the baking soda, salt and water mouth rinses with encouragement to use numerous times throughout the day.  Discussed tips for management of taste changes.  Will continue to follow.              "

## 2019-01-15 NOTE — PROGRESS NOTES
"[facility]    Radiation: Has completed 24/30 treatments  Chemotherapy:  Cisplatin(D1) / Etoposide(D1-3) - every 21 days / completed 3/6 cycles - scheduled for cycle 4 today    Weight 193.8 lbs.    Pt. ecstatic after Dr. Cardoso's visit this morning and noting scan results noted \"significant\" reduction in the size of the tumor burden.    She continues with worsening swallowing difficulties; reinforced the indication for modification of consistencies and textures and avoidance of foods / beverages that are at temperature extremes.  She is avoiding spicy and highly seasoned foods related to irritation to the esophageal lining.      "

## 2019-01-15 NOTE — PROGRESS NOTES
DATE OF VISIT: 1/15/2019    REASON FOR VISIT: Followup for limited stage small cell lung cancer     HISTORY OF PRESENT ILLNESS: The patient is a very pleasant 63 y.o. female  with past medical history significant for limited stage small cell lung cancer diagnosed November 9, 2018.  She was started on chemotherapy using cisplatin and etoposide November 15, 2018. The patient is here today for cycle #3.    SUBJECTIVE: The patient has been doing fairly well. she is able to tolerate  her treatment without any serious side effects. she denied any fever or  chills, no night sweats, denied any headaches.  She had nausea but no vomiting.  She lost all of her hair.    PAST MEDICAL HISTORY/SOCIAL HISTORY/FAMILY HISTORY: Reviewed by me and unchanged from my documentation done on 01/15/19.    Review of Systems   Constitutional: Negative for activity change, appetite change, chills, fatigue, fever and unexpected weight change.   HENT: Negative for hearing loss, mouth sores, nosebleeds, sore throat and trouble swallowing.    Eyes: Negative for visual disturbance.   Respiratory: Negative for cough, chest tightness, shortness of breath and wheezing.    Cardiovascular: Negative for chest pain, palpitations and leg swelling.   Gastrointestinal: Positive for nausea. Negative for abdominal distention, abdominal pain, blood in stool, constipation, diarrhea, rectal pain and vomiting.   Endocrine: Negative for cold intolerance and heat intolerance.   Genitourinary: Negative for difficulty urinating, dysuria, frequency and urgency.   Musculoskeletal: Negative for arthralgias, back pain, gait problem, joint swelling and myalgias.   Skin: Negative for rash.   Neurological: Negative for dizziness, tremors, syncope, weakness, light-headedness, numbness and headaches.   Hematological: Negative for adenopathy. Does not bruise/bleed easily.   Psychiatric/Behavioral: Negative for confusion, sleep disturbance and suicidal ideas. The patient is not  nervous/anxious.          Current Outpatient Medications:   •  albuterol (PROVENTIL HFA;VENTOLIN HFA) 108 (90 Base) MCG/ACT inhaler, Inhale 2 puffs Every 4 (Four) Hours As Needed for Wheezing., Disp: , Rfl:   •  aspirin 81 MG EC tablet, Take 81 mg by mouth Daily., Disp: , Rfl:   •  carvedilol (COREG) 6.25 MG tablet, Take 6.25 mg by mouth 2 (Two) Times a Day With Meals., Disp: , Rfl:   •  cetirizine (zyrTEC) 10 MG tablet, Take 10 mg by mouth Daily., Disp: , Rfl:   •  dexamethasone (DECADRON) 4 MG tablet, Take 2 tablets by mouth Daily With Breakfast. On days 2, 3, and 4 after chemotherapy, Disp: 24 tablet, Rfl: 0  •  docusate sodium (COLACE) 100 MG capsule, Take 1 capsule by mouth 2 (Two) Times a Day., Disp: 60 capsule, Rfl: 3  •  famotidine (PEPCID) 20 MG tablet, Take 20 mg by mouth 2 (Two) Times a Day., Disp: , Rfl:   •  HYDROcodone-acetaminophen (NORCO) 7.5-325 MG per tablet, Take 1-2 tablets by mouth., Disp: , Rfl:   •  insulin aspart (novoLOG FLEXPEN) 100 UNIT/ML solution pen-injector sc pen, Inject 8 Units under the skin into the appropriate area as directed 1 (One) Time., Disp: , Rfl:   •  insulin glargine (LANTUS) 100 UNIT/ML injection, Inject 10 Units under the skin into the appropriate area as directed Daily., Disp: , Rfl:   •  lactulose (CHRONULAC) 10 GM/15ML solution, Take 30 mL by mouth 3 (Three) Times a Day as needed for constipation, Disp: 240 mL, Rfl: 2  •  levothyroxine (SYNTHROID, LEVOTHROID) 75 MCG tablet, Take 75 mcg by mouth Daily., Disp: , Rfl:   •  Magic mouthwash Radonc, Swish and swallow 5-10 mL 4 (Four) Times a Day Before Meals & at Bedtime., Disp: 480 mL, Rfl: 2  •  nicotine (NICODERM CQ) 14 MG/24HR patch, Place 1 patch on the skin as directed by provider Daily., Disp: , Rfl:   •  nystatin susp + lidocaine viscous (MAGIC MOUTHWASH) oral suspension, 5-10 ml swish and spit or swallow QID prn, Disp: 240 mL, Rfl: 3  •  ondansetron (ZOFRAN) 4 MG tablet, Take 1 tablet by mouth Every 8 (Eight)  "Hours As Needed for Nausea or Vomiting., Disp: 30 tablet, Rfl: 0  •  ondansetron (ZOFRAN) 8 MG tablet, Take 1 tablet by mouth 3 (Three) Times a Day As Needed for Nausea or Vomiting., Disp: 30 tablet, Rfl: 5  •  pantoprazole (PROTONIX) 40 MG EC tablet, Take 40 mg by mouth Daily., Disp: , Rfl:   •  promethazine (PHENERGAN) 25 MG tablet, Take 1 tablet by mouth Every 6 (Six) Hours As Needed for Nausea or Vomiting., Disp: 45 tablet, Rfl: 5  •  REPATHA SURECLICK 140 MG/ML solution auto-injector, Inject 140 mg under the skin into the appropriate area as directed Every 14 (Fourteen) Days., Disp: , Rfl:   •  SITagliptin (JANUVIA) 100 MG tablet, Take 100 mg by mouth Daily., Disp: , Rfl:   •  sucralfate (CARAFATE) 1 GM/10ML suspension, Take 10 mL by mouth 4 (Four) Times a Day., Disp: 420 mL, Rfl: 2  •  temazepam (RESTORIL) 7.5 MG capsule, Take 1 capsule by mouth At Night As Needed for Sleep or Anxiety., Disp: 3 capsule, Rfl: 0  •  ticagrelor (BRILINTA) 60 MG tablet tablet, Take 60 mg by mouth Daily., Disp: , Rfl:   •  tolvaptan (SAMSCA) 15 MG tablet tablet, Take 0.5 tablets by mouth Daily., Disp: 30 tablet, Rfl: 0  •  zolpidem (AMBIEN) 10 MG tablet, Take 1 tablet by mouth every night at bedtime., Disp: 30 tablet, Rfl: 0    PHYSICAL EXAMINATION:   /66   Pulse 103   Temp 97.4 °F (36.3 °C) (Temporal)   Resp 16   Ht 154.9 cm (61\")   Wt 88.5 kg (195 lb)   SpO2 95%   BMI 36.84 kg/m²    ECOG Performance Status: 1 - Symptomatic but completely ambulatory  General Appearance:  alert, cooperative, no apparent distress and appears stated age   Neurologic/Psychiatric: A&O x 3, gait steady, appropriate affect, strength 5/5 in all muscle groups   HEENT:  Normocephalic, without obvious abnormality, mucous membranes moist   Neck: Supple, symmetrical, trachea midline, no adenopathy;  No thyromegaly, masses, or tenderness   Lungs:   Clear to auscultation bilaterally; respirations regular, even, and unlabored bilaterally   Heart:  " Regular rate and rhythm, no murmurs appreciated   Abdomen:   Soft, non-tender, non-distended and no organomegaly   Lymph nodes: No cervical, supraclavicular, inguinal or axillary adenopathy noted   Extremities: Normal, atraumatic; no clubbing, cyanosis, or edema    Skin: No rashes, ulcers, or suspicious lesions noted     No visits with results within 2 Week(s) from this visit.   Latest known visit with results is:   Hospital Outpatient Visit on 12/26/2018   Component Date Value Ref Range Status   • Glucose 12/26/2018 261* 70 - 100 mg/dL Final   • BUN 12/26/2018 12  9 - 23 mg/dL Final   • Creatinine 12/26/2018 0.94  0.60 - 1.30 mg/dL Final   • Sodium 12/26/2018 135  132 - 146 mmol/L Final   • Potassium 12/26/2018 3.6  3.5 - 5.5 mmol/L Final   • Chloride 12/26/2018 102  99 - 109 mmol/L Final   • CO2 12/26/2018 23.0  20.0 - 31.0 mmol/L Final   • Calcium 12/26/2018 8.7  8.7 - 10.4 mg/dL Final   • Total Protein 12/26/2018 5.7  5.7 - 8.2 g/dL Final   • Albumin 12/26/2018 4.08  3.20 - 4.80 g/dL Final   • ALT (SGPT) 12/26/2018 20  7 - 40 U/L Final   • AST (SGOT) 12/26/2018 13  0 - 33 U/L Final   • Alkaline Phosphatase 12/26/2018 69  25 - 100 U/L Final   • Total Bilirubin 12/26/2018 0.3  0.3 - 1.2 mg/dL Final   • eGFR Non  Amer 12/26/2018 61  >60 mL/min/1.73 Final   • Globulin 12/26/2018 1.6  gm/dL Final   • A/G Ratio 12/26/2018 2.5  1.5 - 2.5 g/dL Final   • BUN/Creatinine Ratio 12/26/2018 12.8  7.0 - 25.0 Final   • Anion Gap 12/26/2018 10.0  3.0 - 11.0 mmol/L Final   • Magnesium 12/26/2018 1.3  1.3 - 2.7 mg/dL Final   • WBC 12/26/2018 9.40  3.50 - 10.80 10*3/mm3 Final   • RBC 12/26/2018 2.86* 3.89 - 5.14 10*6/mm3 Final   • Hemoglobin 12/26/2018 8.4* 11.5 - 15.5 g/dL Final   • Hematocrit 12/26/2018 24.7* 34.5 - 44.0 % Final   • RDW 12/26/2018 15.1* 11.3 - 14.5 % Final   • MCV 12/26/2018 86.3  80.0 - 99.0 fL Final   • MCH 12/26/2018 29.3  27.0 - 31.0 pg Final   • MCHC 12/26/2018 33.9  32.0 - 36.0 g/dL Final   • MPV  12/26/2018 6.7  6.0 - 12.0 fL Final   • Platelets 12/26/2018 359  150 - 450 10*3/mm3 Final   • Neutrophil % 12/26/2018 58.3  41.0 - 71.0 % Final   • Lymphocyte % 12/26/2018 30.9  24.0 - 44.0 % Final   • Monocyte % 12/26/2018 10.8  0.0 - 12.0 % Final   • Neutrophils, Absolute 12/26/2018 5.50  1.50 - 8.30 10*3/mm3 Final   • Lymphocytes, Absolute 12/26/2018 2.90  0.60 - 4.80 10*3/mm3 Final   • Monocytes, Absolute 12/26/2018 1.00  0.00 - 1.00 10*3/mm3 Final   • Creatinine 12/26/2018 0.90  0.60 - 1.30 mg/dL Final    Serial Number: [id number]:  [id number]        Ct Chest With Contrast    Result Date: 1/10/2019  Narrative: EXAMINATION: CT CHEST W/CONTRAST - 1/10/2019  INDICATION: C34.12-Malignant neoplasm of upper lobe, left bronchus or lung.  TECHNIQUE: CT scan of the chest was performed following intravenous contrast.  The radiation dose reduction device was turned on for each scan per the ALARA (As Low as Reasonably Achievable) protocol.  COMPARISON: 11/8/2018  FINDINGS: There is no axillary lymphadenopathy. There is no mediastinal or hilar adenopathy. There is no pericardial or pleural effusion. Images displayed at lung window settings reveal no mass, consolidation or nodule. There are scattered calcified granulomas. There has been resolution of mediastinal adenopathy, bulky left hilar lymphadenopathy and the left upper lobe pulmonary nodule is no longer present.      Impression: Negative CT scan of the chest. There has been dramatic interval improvement when compared to previous examination of 11/8/2018.  DICTATED:   1/10/2019 EDITED/ls :   1/10/2019   This report was finalized on 1/10/2019 4:28 PM by Dr. Julián Barrios MD.      Ct Abdomen Pelvis With Contrast    Result Date: 1/10/2019  Narrative: EXAMINATION: CT ABDOMEN PELVIS W/CONTRAST - 1/10/2019  INDICATION: C34.12-Malignant neoplasm of upper lobe, left bronchus or lung.  TECHNIQUE: CT scan of abdomen pelvis performed following intravenous contrast.  The radiation  dose reduction device was turned on for each scan per the ALARA (As Low as Reasonably Achievable) protocol.  COMPARISON: 11/13/2018  FINDINGS: The liver and spleen are normal. There is no adrenal mass. The pancreas is normal. There is no renal mass other than a simple cyst in the left kidney. There is no ascites or aneurysm or retroperitoneal lymphadenopathy. There is no pelvic mass or fluid. There is no inguinal lymphadenopathy.      Impression: Negative CT scan of the abdomen and pelvis.  DICTATED:   1/10/2019 EDITED/ls :   1/10/2019   This report was finalized on 1/10/2019 4:27 PM by Dr. Julián Barrios MD.        ASSESSMENT: The patient is a very pleasant 63 y.o. female  with  Left upper lobe small cell lung cancer [id number] stage IIIa disease:  A.  Presented with shortness of breath.  B.  Status post bronchoscopy with a biopsy done on November 9, 2018 revealed small cell lung cancer   C. Started chemotherapy with cisplatin and etoposide November 15, 2018, status post 3 cycles  2.  Hyponatremia  3.  Normocytic anemia   4.  Insomnia  5.  Chemotherapy-induced nausea  6.  Treatment induced esophagitis  7.  Treatment induced dehydration    PLAN:  1.  I will proceed with treatment as scheduled today cycle #4.  2.  The patient will follow-up with me in 3 weeks for cycle #5 out of 6 planned.  3.  I will monitor patient blood work including blood counts kidney function liver function and electrolytes were  4.  I did go over the scan results with the patient and her family, I reviewed the films myself, I compared the current scan to previous study.  Patient had a dramatic response to treatment.  I will repeat her scans in 3 months which would be due April 2019.  5.  I will continue Zofran as needed for chemotherapy induced nausea as well as Decadron on day 23 and 4.  6.  We'll continue Restoril as needed for insomnia.  7.  The patient will complete her concurrent radiation on January 18, 2019.  8. We reviewed the potential  side effects of this regimen including fatigue, vomiting and nausea, hair loss, nephropathy, neuropathy, hearing loss, myelosuppression, and risk of infusion reaction.  9.  I will continue magic mouthwash as needed for treatment induced esophagitis.  10.  We'll continue IV fluid as needed for dehydration    Desmond Cardoso MD  1/15/2019

## 2019-01-16 NOTE — TELEPHONE ENCOUNTER
----- Message from Krista Short RN sent at 1/16/2019  1:24 PM EST -----  Regarding: BADIN: antiemetics  Patient states she is having nausea that is refractory to Zofran.  She is requesting Scopolamine patches.   MD extension in [place] called, no answer.    Thanks!  Krista, [phone]

## 2019-01-16 NOTE — TELEPHONE ENCOUNTER
----- Message from Monica Nix RN sent at 1/16/2019  3:07 PM EST -----  Regarding:  pt  Pt states she cannot take Phenergan PO b/c restless legs. Pt would prefer patch. She is at radiation and said she can be reached on her cell. Thanks, Monica [phone]

## 2019-01-17 PROBLEM — E87.5 HYPERKALEMIA: Status: ACTIVE | Noted: 2019-01-01

## 2019-01-17 PROBLEM — E86.0 DEHYDRATION: Status: ACTIVE | Noted: 2019-01-01

## 2019-01-17 PROBLEM — D64.9 ANEMIA: Status: ACTIVE | Noted: 2019-01-01

## 2019-01-17 NOTE — H&P
[facility] Medicine Services  HISTORY AND PHYSICAL    Patient Name: Shauna Valencia  : 1955  MRN: [id number]  Primary Care Physician: Rox Singleton MD  Date of admission: 2019      Subjective   Subjective     Chief Complaint:  Hyperglycemia    HPI:  Shauna Valencia is a 63 y.o. female who presents to the ED with complaints hyperglycemia. 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 She reports yesterday she was nauseous all day and at night checked her glucose level just prior to taking her dose of Lantis and her blood glucose level was too high for her monitor to read. She has also had diarrhea for the last three days, took Imodium with mild relief, but has had 5 episodes of diarrhea today without blood or mucus.  She also complains of loss of appetite, fatigue, abdominal distention, and nausea.  She denies fever, chills, shortness of air, chest pain, vomiting, or any other complaints at this time.  She did have radiation yesterday morning. Today will be her final round of chemo and radiation. She had been getting chemo 3 times per week and radiation 15 days in a row twice daily.  Labs revealed an elevated glucose, potassium, BUN, creatinine, and a H&H of 6.9/20.8.  She was given one liter of saline and 10 units of Insulin in the ED.  Patient is being admitted to the Hospitalist for further evaluation and management.        Review of Systems   Constitutional: Positive for activity change, appetite change and fatigue. Negative for chills, diaphoresis, fever and unexpected weight change.   HENT: Negative.    Eyes: Negative.    Respiratory: Positive for cough. Negative for shortness of breath and wheezing.    Cardiovascular: Negative.    Gastrointestinal: Positive for abdominal distention, diarrhea and nausea. Negative for abdominal pain, blood in stool, constipation and vomiting.   Endocrine: Negative.     Genitourinary: Negative.    Musculoskeletal: Positive for neck pain. Negative for arthralgias, back pain, gait problem, joint swelling, myalgias and neck stiffness.   Skin: Negative.    Allergic/Immunologic: Negative.    Neurological: Negative.    Hematological: Negative.    Psychiatric/Behavioral: Negative.         Otherwise 10-system ROS reviewed and is negative except as mentioned in the HPI.    Personal History     Past Medical History:   Diagnosis Date   • Arthritis    • Asthma    • COPD (chronic obstructive pulmonary disease) (CMS/HCC)    • Coronary artery disease     5 stents   • Diabetes mellitus (CMS/HCC)    • Disease of thyroid gland    • DVT (deep venous thrombosis) (CMS/HCC)     this year- per pt   • GERD (gastroesophageal reflux disease)    • Hyperlipidemia    • Lung cancer (CMS/HCC)    • Myocardial infarction (CMS/HCC)    • Pancreatitis    • Pancreatitis        Past Surgical History:   Procedure Laterality Date   • APPENDECTOMY     • BRONCHOSCOPY N/A 11/6/2018    Procedure: BRONCHOSCOPY WITH ENDOBRONCHIAL ULTRASOUND, biopsies, brushing and washing.;  Surgeon: Isaac Epstein MD;  Location:  [facility];  Service: Pulmonary   • BRONCHOSCOPY N/A 11/9/2018    Procedure: BRONCHOSCOPY WITH ENDOBRONCHIAL ULTRASOUND;  Surgeon: Clay Scott MD;  Location:  [facility];  Service: Pulmonary   • CAROTID STENT      5 total   • CHOLECYSTECTOMY     • COLON SURGERY     • COLONOSCOPY     • HERNIA REPAIR     • HYSTERECTOMY     • OTHER SURGICAL HISTORY      bladder sling   • THYROIDECTOMY      1994   • TONSILLECTOMY     • TOTAL HIP ARTHROPLASTY Left    • UPPER GASTROINTESTINAL ENDOSCOPY         Family History: family history includes Colon polyps in her mother; Ulcerative colitis in her sister. Otherwise pertinent FHx was reviewed and unremarkable.     Social History:  reports that she has quit smoking. Her smoking use included cigarettes. She smoked 0.50 packs per day. she has never used smokeless  tobacco. She reports that she does not drink alcohol or use drugs.  Social History     Social History Narrative   • Not on file       Medications:    Available home medication information reviewed.    (Not in a hospital admission)    Allergies   Allergen Reactions   • Plavix [Clopidogrel Bisulfate] Anaphylaxis   • Codeine Other (See Comments)     Pt not certain of reatction   • Penicillins Other (See Comments)     Pt does not remember reaction       Objective   Objective     Vital Signs:   Temp:  [97.8 °F (36.6 °C)-98.2 °F (36.8 °C)] 98.2 °F (36.8 °C)  Heart Rate:  [63-99] 72  Resp:  [18] 18  BP: (125-172)/(61-88) 140/76        Physical Exam   Constitutional: She is oriented to person, place, and time. She appears well-developed. No distress.   HENT:   Head: Normocephalic.   Eyes: Pupils are equal, round, and reactive to light.   Neck: Normal range of motion. Neck supple.   Cardiovascular: Normal rate, regular rhythm, normal heart sounds and intact distal pulses. Exam reveals no gallop and no friction rub.   No murmur heard.  Pulmonary/Chest: Effort normal and breath sounds normal. No stridor. No respiratory distress. She has no wheezes. She has no rales. She exhibits no tenderness.   Abdominal: Soft. Bowel sounds are normal. She exhibits no distension and no mass. There is no tenderness. There is no rebound and no guarding.   Musculoskeletal: Normal range of motion. She exhibits no edema, tenderness or deformity.   Neurological: She is alert and oriented to person, place, and time. No cranial nerve deficit.   Skin: Skin is warm and dry. No rash noted. She is not diaphoretic. No erythema. No pallor.   Psychiatric: She has a normal mood and affect. Her behavior is normal. Judgment and thought content normal.          Results Reviewed:  I have personally reviewed current lab, radiology, and data and agree.    Results from last 7 days   Lab Units 01/17/19  0439   WBC 10*3/mm3 8.31   HEMOGLOBIN g/dL 6.9*   HEMATOCRIT %  20.8*   PLATELETS 10*3/mm3 225     Results from last 7 days   Lab Units 01/17/19  0005   SODIUM mmol/L 124*   POTASSIUM mmol/L 5.8*   CHLORIDE mmol/L 93*   CO2 mmol/L 17.0*   BUN mg/dL 29*   CREATININE mg/dL 1.44*   GLUCOSE mg/dL 619*   CALCIUM mg/dL 8.7   ALT (SGPT) U/L 31   AST (SGOT) U/L 43*     Estimated Creatinine Clearance: 41.3 mL/min (A) (by C-G formula based on SCr of 1.44 mg/dL (H)).  Brief Urine Lab Results  (Last result in the past 365 days)      Color   Clarity   Blood   Leuk Est   Nitrite   Protein   CREAT   Urine HCG        01/16/19 2357 Yellow Clear Negative Negative Negative Negative             No results found for: BNP  Imaging Results (last 24 hours)     ** No results found for the last 24 hours. **             Assessment/Plan   Assessment / Plan     Active Hospital Problems    Diagnosis Date Noted   • Dehydration [E86.0] 01/17/2019   • Anemia [D64.9] 01/17/2019   • Hyperkalemia [E87.5] 01/17/2019   • Small cell lung cancer, left upper lobe (CMS/HCC) [C34.12] 11/13/2018   • Hyperlipidemia [E78.5] 10/31/2018   • Type 2 diabetes mellitus (CMS/HCC) [E11.9] 10/31/2018       ASSESSMENT and PLAN:    -Hyperglycemia with a history of T2DM   -has been on decadron with her chemo   -a1c   -fsbg with ssi   -levemir 10 units nightly    -PABLO secondary to dehydration   -received 1 liter of saline in the ED   -NS @ 100 ml/hr     -Anemia   -stool for occult blood is negative   -type and screen   -transfuse 1 unit of PRBC now   -anemia profile    -Small cell lung cancer   -today is supposed to be her last day of chemo/radiation   -consult Dr. Cardoso    -Hyperkalemia   -resolved     -HLD    DVT prophylaxis:  Mechanical    CODE STATUS:    Code Status and Medical Interventions:   Ordered at: 01/17/19 0502     Level Of Support Discussed With:    Patient     Code Status:    CPR     Medical Interventions (Level of Support Prior to Arrest):    Full       Electronically signed by MATTHEW Iqbal, 01/17/19, 4:16  AM.    Brief Attending Admission Attestation     I have seen and examined the patient, performing an independent face-to-face diagnostic evaluation with plan of care reviewed and developed with the advanced practice clinician (APC).      Brief Summary Statement/HPI:   Shauna Valencia is a 63 y.o. female with history of small cell lung cancer of the left upper lobe and is currently on chemoradiation therapy.  Patient tells me she came to the ER because her blood glucose was very high and she could not get her glucometer to read the exact value.  She reports that she has been on some steroids due to her cancer and she attributes her elevated glucose to the high-dose steroids.  Patient denies vomiting though she's had nausea for several hours.  She denies fever, chills, chest pain, shortness of breath, abdominal pain but she reports about 5 episodes of non bloody diarrhea.  Initial blood glucose on presentation was 619mg/dl.  Also there is a slight bump in her creatinine due to dehydration.  Patient received 10 units of IV insulin and 1 L of normal saline in the ER.  Blood glucose seems to be trending down.  We'll continue IV hydration, insulin therapy and monitor renal function.  Her hemoglobin has dropped to 6.9 from 7.5.  We'll transfuse him packed red cells and recheck H&H.  There is no evidence of active bleed.  Patient may be able to go home tomorrow morning if she remains clinically stable.      Attending Physical Exam:  Constitutional: No acute distress, awake, alert and oriented x 4.  Eyes: PERRLA, sclerae anicteric, no conjunctival injection  HENT: NCAT, mucous membranes are dry.  Neck: Supple, no thyromegaly, no lymphadenopathy, trachea midline  Respiratory: Clear to auscultation bilaterally, nonlabored respirations   Cardiovascular: RRR, no murmurs, rubs, or gallops, palpable pedal pulses bilaterally  Gastrointestinal: Positive bowel sounds, soft, nontender, nondistended  Musculoskeletal: No bilateral ankle  edema, no clubbing or cyanosis to extremities  Psychiatric: Appropriate affect, cooperative  Neurologic: Oriented x 3, strength symmetric in all extremities, Cranial Nerves grossly intact, speech clear  Skin: No rashes      Brief Assessment/Plan :  See above for further detailed assessment and plan developed with APC which I have reviewed and/or edited.      Electronically signed by Daryn Crain MD, 01/17/19, 5:20 AM.

## 2019-01-17 NOTE — PROGRESS NOTES
I saw Mrs. Valencia today as an inpatient.  She has limited stage small cell lung cancer and has been receiving concurrent chemo radiation therapy. Her last treatment was on 1/16/2019, and she is unfortunately admitted now with symptoms of intractable nausea, uncontrolled diabetes with fingerstick blood sugars in excess of 400, and esophagitis limiting oral intake. Since being admitted, she states she is feeling better, and her nausea is  improving. Blood sugars are now being controlled with supplemental insulin. She continues to state that she has sore swallowing. As far as her radiation is concerned, She actually only has one more day of treatment remaining. It would be in her best interest to complete treatment, and as she is improving I will try to get these completed tomorrow.  In addition, I will add to her orders magic mouthwash as she’s been using this as an outp atient for symptomatic management of her esophagitis.

## 2019-01-17 NOTE — ED PROVIDER NOTES
"Subjective   Ms. Shauna Valencia is a 63 y.o. female who presents to the ED with c/o hyperglycemia, nausea, diarrhea, and dehydration. 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 She reports yesterday she was nauseous all day and at night checked her glucose level just prior to taking her dose of Lantus and her blood glucose level was \"too high\" for her monitor to read. She has also had diarrhea and took Imodium with mild relief but has had 5 episodes of diarrhea today without blood or mucus. She was experiencing dysuria but reports this resolved. She did have radiation yesterday morning. Today will be her final round of radiation. She has had radiation twice daily for a total of 15 days.  Her chemotherapy is every 21 days with 3 days a week.  Dr. Cardoso is her oncologist and he advised her that this last week of radiation would be the hardest on patient. There are no other acute symptoms at this time.        History provided by:  Patient  Hyperglycemia   Blood sugar level PTA:  Too high to read  Severity:  Moderate  Onset quality:  Sudden  Duration:  1 day  Timing:  Constant  Progression:  Unchanged  Chronicity:  Recurrent  Diabetes status:  Controlled with insulin  Time since last antidiabetic medication:  3 hours  Relieved by:  Nothing  Associated symptoms: dysuria (resolved now.), nausea and weakness (generally weak)    Associated symptoms: no abdominal pain, no fever, no shortness of breath and no vomiting    Risk factors: obesity        Review of Systems   Constitutional: Positive for appetite change (anorexia). Negative for fever.   Respiratory: Negative for cough, chest tightness and shortness of breath.         History of small cell carcinoma to JESSICA.  Pt followed by Dr. Cardoso.   Gastrointestinal: Positive for diarrhea and nausea. Negative for abdominal pain, blood in stool and vomiting.   Endocrine:     "    Hyperglycemic   Genitourinary: Positive for decreased urine volume and dysuria (resolved now.). Negative for flank pain, frequency and urgency.   Musculoskeletal: Negative.    Neurological: Positive for weakness (generally weak).   All other systems reviewed and are negative.      Past Medical History:   Diagnosis Date   • Arthritis    • Asthma    • COPD (chronic obstructive pulmonary disease) (CMS/HCC)    • Coronary artery disease     5 stents   • Diabetes mellitus (CMS/HCC)    • Disease of thyroid gland    • DVT (deep venous thrombosis) (CMS/[facility])     this year- per pt   • GERD (gastroesophageal reflux disease)    • Hyperlipidemia    • Lung cancer (CMS/HCC)    • Myocardial infarction (CMS/HCC)    • Pancreatitis    • Pancreatitis        Allergies   Allergen Reactions   • Plavix [Clopidogrel Bisulfate] Anaphylaxis   • Codeine Other (See Comments)     Pt not certain of reatction   • Penicillins Other (See Comments)     Pt does not remember reaction       Past Surgical History:   Procedure Laterality Date   • APPENDECTOMY     • BRONCHOSCOPY N/A 11/6/2018    Procedure: BRONCHOSCOPY WITH ENDOBRONCHIAL ULTRASOUND, biopsies, brushing and washing.;  Surgeon: Isaac Epstein MD;  Location:  [facility];  Service: Pulmonary   • BRONCHOSCOPY N/A 11/9/2018    Procedure: BRONCHOSCOPY WITH ENDOBRONCHIAL ULTRASOUND;  Surgeon: Clay Scott MD;  Location:  [facility];  Service: Pulmonary   • CAROTID STENT      5 total   • CHOLECYSTECTOMY     • COLON SURGERY     • COLONOSCOPY     • HERNIA REPAIR     • HYSTERECTOMY     • OTHER SURGICAL HISTORY      bladder sling   • THYROIDECTOMY      1994   • TONSILLECTOMY     • TOTAL HIP ARTHROPLASTY Left    • UPPER GASTROINTESTINAL ENDOSCOPY         Family History   Problem Relation Age of Onset   • Colon polyps Mother    • Ulcerative colitis Sister        Social History     Socioeconomic History   • Marital status:      Spouse name: Not on file   • Number of children: Not  on file   • Years of education: Not on file   • Highest education level: Not on file   Tobacco Use   • Smoking status: Former Smoker     Packs/day: 0.50     Types: Cigarettes   • Smokeless tobacco: Never Used   • Tobacco comment: quit 10/31/18   Substance and Sexual Activity   • Alcohol use: No   • Drug use: No   • Sexual activity: Defer         Objective   Physical Exam   Constitutional: She is oriented to person, place, and time. She appears well-developed and well-nourished. No distress.   Pt appears generally weak.   HENT:   Head: Normocephalic and atraumatic.   Nose: Nose normal.   Mouth/Throat: Mucous membranes are dry.   Eyes: Conjunctivae and EOM are normal. Pupils are equal, round, and reactive to light. No scleral icterus.   Neck: Normal range of motion. Neck supple.   Cardiovascular: Normal rate, regular rhythm and normal heart sounds.   No murmur heard.  Pulmonary/Chest: Effort normal and breath sounds normal. No respiratory distress.   Abdominal: Soft. Bowel sounds are normal. There is no tenderness.   Central obesity. No flank or CVA tenderness.   Genitourinary: Rectal exam shows guaiac negative stool.   Genitourinary Comments: No gross blood on digital rectal exam.   Musculoskeletal: Normal range of motion. She exhibits no edema.   Neurological: She is alert and oriented to person, place, and time. She has normal strength. No cranial nerve deficit or sensory deficit.   Generally weak.   Skin: Skin is warm and dry. There is pallor.   Psychiatric: She has a normal mood and affect. Her behavior is normal.   Nursing note and vitals reviewed.      Procedures         ED Course  ED Course as of Jan 17 0442   Thu Jan 17, 2019   0433 CBC revealed anemia with H&H is 7.5 and 22.4.  2 days ago, H&H was essentially the same and patient was advised that anemia had worsened when she was receiving chemotherapy.  They did not recommend blood transfusion at that time.  White blood cell count and platelet counts also  normal.  Chemistries showed Serum Glucose of 619.  Creatinine is 1.44 and  Na+ is 124.  Stool guiac was negative.  Patient was given aggressive IVF and Regular Insulin.  Repeat accucheck was 488.  [FC]   0438 Patient's nausea and indigestion have improved over the course of the ER evaluation.  Due to dehydration and significant hyperglycemia with patient undergoing chemotherapy and radiation, recommend admission for IV fluid resuscitation, blood sugar control, as well as anti-emetics.  Patient and daughter are agreeable.  I paced the hospitalist, Dr. Crain, to discuss admission.  [FC]   0439 Discussed admission with Dr. Crain, and he is agreeable to admission on telemetry.   [FC]      ED Course User Index  [FC] Jenni Meléndez, NADEGE     Recent Results (from the past 24 hour(s))   Urinalysis With Microscopic If Indicated (No Culture) - Urine, Clean Catch    Collection Time: 01/16/19 11:57 PM   Result Value Ref Range    Color, UA Yellow Yellow, Straw    Appearance, UA Clear Clear    pH, UA <=5.0 5.0 - 8.0    Specific Gravity, UA 1.029 1.001 - 1.030    Glucose, UA >=1000 mg/dL (3+) (A) Negative    Ketones, UA Negative Negative    Bilirubin, UA Negative Negative    Blood, UA Negative Negative    Protein, UA Negative Negative    Leuk Esterase, UA Negative Negative    Nitrite, UA Negative Negative    Urobilinogen, UA 0.2 E.U./dL 0.2 - 1.0 E.U./dL   Comprehensive Metabolic Panel    Collection Time: 01/17/19 12:05 AM   Result Value Ref Range    Glucose 619 (C) 70 - 100 mg/dL    BUN 29 (H) 9 - 23 mg/dL    Creatinine 1.44 (H) 0.60 - 1.30 mg/dL    Sodium 124 (L) 132 - 146 mmol/L    Potassium 5.8 (H) 3.5 - 5.5 mmol/L    Chloride 93 (L) 99 - 109 mmol/L    CO2 17.0 (L) 20.0 - 31.0 mmol/L    Calcium 8.7 8.7 - 10.4 mg/dL    Total Protein 6.3 5.7 - 8.2 g/dL    Albumin 4.39 3.20 - 4.80 g/dL    ALT (SGPT) 31 7 - 40 U/L    AST (SGOT) 43 (H) 0 - 33 U/L    Alkaline Phosphatase 102 (H) 25 - 100 U/L    Total Bilirubin 0.5 0.3 - 1.2 mg/dL     eGFR Non African Amer 37 (L) >60 mL/min/1.73    Globulin 1.9 gm/dL    A/G Ratio 2.3 1.5 - 2.5 g/dL    BUN/Creatinine Ratio 20.1 7.0 - 25.0    Anion Gap 14.0 (H) 3.0 - 11.0 mmol/L   Lipase    Collection Time: 01/17/19 12:05 AM   Result Value Ref Range    Lipase 27 6 - 51 U/L   Green Top (Gel)    Collection Time: 01/17/19 12:05 AM   Result Value Ref Range    Extra Tube Hold for add-ons.    Lavender Top    Collection Time: 01/17/19 12:05 AM   Result Value Ref Range    Extra Tube hold for add-on    CBC Auto Differential    Collection Time: 01/17/19 12:05 AM   Result Value Ref Range    WBC 10.07 3.50 - 10.80 10*3/mm3    RBC 2.57 (L) 3.89 - 5.14 10*6/mm3    Hemoglobin 7.5 (L) 11.5 - 15.5 g/dL    Hematocrit 22.4 (L) 34.5 - 44.0 %    MCV 87.2 80.0 - 99.0 fL    MCH 29.2 27.0 - 31.0 pg    MCHC 33.5 32.0 - 36.0 g/dL    RDW 16.9 (H) 11.3 - 14.5 %    RDW-SD 52.1 37.0 - 54.0 fl    MPV 9.0 6.0 - 12.0 fL    Platelets 258 150 - 450 10*3/mm3    Neutrophil % 91.7 (H) 41.0 - 71.0 %    Lymphocyte % 3.6 (L) 24.0 - 44.0 %    Monocyte % 3.9 0.0 - 12.0 %    Eosinophil % 0.0 0.0 - 3.0 %    Basophil % 0.1 0.0 - 1.0 %    Immature Grans % 0.7 (H) 0.0 - 0.6 %    Neutrophils, Absolute 9.24 (H) 1.50 - 8.30 10*3/mm3    Lymphocytes, Absolute 0.36 (L) 0.60 - 4.80 10*3/mm3    Monocytes, Absolute 0.39 0.00 - 1.00 10*3/mm3    Eosinophils, Absolute 0.00 0.00 - 0.30 10*3/mm3    Basophils, Absolute 0.01 0.00 - 0.20 10*3/mm3    Immature Grans, Absolute 0.07 (H) 0.00 - 0.03 10*3/mm3   POC Glucose Once    Collection Time: 01/17/19  2:42 AM   Result Value Ref Range    Glucose 488 (C) 70 - 130 mg/dL   POCT Occult Blood, stool    Collection Time: 01/17/19  2:48 AM   Result Value Ref Range    Fecal Occult Blood Negative Negative    Lot Number [id number]     Expiration Date 7/2,021     DEVELOPER LOT NUMBER [id number]     DEVELOPER EXPIRATION DATE 112,021     Positive Control Positive Positive    Negative Control Negative Negative     Note: In addition to lab results from  this visit, the labs listed above may include labs taken at another facility or during a different encounter within the last 24 hours. Please correlate lab times with ED admission and discharge times for further clarification of the services performed during this visit.    No orders to display     Vitals:    01/17/19 0344 01/17/19 0350 01/17/19 0400 01/17/19 0400   BP:    140/76   Pulse: 80 79 76    Resp:       Temp:       TempSrc:       SpO2: 93% 92% 93%    Weight:       Height:         Medications   sodium chloride 0.9 % flush 10 mL (not administered)   sodium chloride 0.9 % infusion (not administered)   sodium chloride 0.9 % bolus 1,000 mL (0 mL Intravenous Stopped 1/17/19 0249)   ondansetron (ZOFRAN) injection 4 mg (4 mg Intravenous Given 1/17/19 0132)   pantoprazole (PROTONIX) injection 40 mg (40 mg Intravenous Given 1/17/19 0133)   insulin regular (humuLIN R,novoLIN R) injection 10 Units (10 Units Intravenous Given 1/17/19 0135)   promethazine (PHENERGAN) injection 12.5 mg (12.5 mg Intravenous Given 1/17/19 0359)   famotidine (PEPCID) injection 20 mg (20 mg Intravenous Given 1/17/19 0400)     ECG/EMG Results (last 24 hours)     ** No results found for the last 24 hours. **                        [facility]    Final diagnoses:   Dehydration   Diarrhea, unspecified type   Small cell carcinoma of lung, left (CMS/HCC)   Patient on combined chemotherapy and radiation   Hyponatremia   Type 2 diabetes mellitus with hyperglycemia, with long-term current use of insulin (CMS/HCC)   Anemia due to chemotherapy       Documentation assistance provided by barbie Moreno.  Information recorded by the barbie was done at my direction and has been verified and validated by me.     Franco Moreno  01/17/19 0127       Jenni Meléndez PA-C  01/17/19 1612

## 2019-01-17 NOTE — PROGRESS NOTES
DATE OF VISIT: 1/17/2019    Chief Complaint: Followup for limited stage SCLCA     SUBJECTIVE: The patient has been doing fairly well.  She  denied any fever or  chills, no night sweats, denied any headaches    REVIEW OF SYSTEMS: All the other 9 systems are reviewed by me and negative  except what is mentioned in HPI.     PAST MEDICAL HISTORY/SOCIAL HISTORY/FAMILY HISTORY: Unchanged from my prior documentation done on 1/161/9      Current Facility-Administered Medications:   •  acetaminophen (TYLENOL) tablet 650 mg, 650 mg, Oral, Q4H PRN, Kenya Donovan APRN  •  acetaminophen (TYLENOL) tablet 650 mg, 650 mg, Oral, Once, Desmond Cardoso MD  •  aspirin EC tablet 81 mg, 81 mg, Oral, Daily, Tish Vallejo APRN, 81 mg at 01/17/19 1052  •  carvedilol (COREG) tablet 6.25 mg, 6.25 mg, Oral, BID With Meals, Tish Vallejo APRN, 6.25 mg at 01/17/19 1718  •  cetirizine (zyrTEC) tablet 5 mg, 5 mg, Oral, Daily, Tish Vallejo APRN, 5 mg at 01/17/19 1052  •  dexamethasone (DECADRON) tablet 8 mg, 8 mg, Oral, Daily With Breakfast, Tish Vallejo APRN, 8 mg at 01/17/19 1049  •  dextrose (D50W) 25 g/ 50mL Intravenous Solution 25 g, 25 g, Intravenous, Q15 Min PRN, Tish Vallejo APRN  •  dextrose (GLUTOSE) oral gel 15 g, 15 g, Oral, Q15 Min PRN, Tish Vallejo APRN  •  docusate sodium (COLACE) capsule 100 mg, 100 mg, Oral, BID, Tish Vallejo APRN, 100 mg at 01/17/19 1051  •  famotidine (PEPCID) tablet 20 mg, 20 mg, Oral, BID, Tish Vallejo APRN, 20 mg at 01/17/19 1051  •  glucagon (human recombinant) (GLUCAGEN DIAGNOSTIC) injection 1 mg, 1 mg, Subcutaneous, PRN, Tish Vallejo APRN  •  insulin detemir (LEVEMIR) injection 10 Units, 10 Units, Subcutaneous, Daily, Tricia Obrien II, DO, 10 Units at 01/17/19 0948  •  insulin lispro (humaLOG) injection 0-9 Units, 0-9 Units, Subcutaneous, 4x Daily With Meals & Nightly, Tish Vallejo, APRN, 9 Units at 01/17/19  "1719  •  levothyroxine (SYNTHROID, LEVOTHROID) tablet 75 mcg, 75 mcg, Oral, Q AM, Tish Vallejo APRN, 75 mcg at 01/17/19 1053  •  magic mouthwash oral supsension 10 mL, 10 mL, Swish & Swallow, Q4H PRN, Ramone Huggins MD, 10 mL at 01/17/19 1824  •  ondansetron (ZOFRAN) tablet 4 mg, 4 mg, Oral, Q6H PRN **OR** ondansetron (ZOFRAN) injection 4 mg, 4 mg, Intravenous, Q6H PRN, Zion, Kenya, APRN, 4 mg at 01/17/19 1101  •  pantoprazole (PROTONIX) EC tablet 40 mg, 40 mg, Oral, Daily, Tish Vallejo APRN, 40 mg at 01/17/19 1049  •  promethazine (PHENERGAN) tablet 25 mg, 25 mg, Oral, Q6H PRN, Tish Vallejo APRN, 25 mg at 01/17/19 1550  •  sodium chloride 0.9 % flush 10 mL, 10 mL, Intravenous, PRN, Nixon Garg MD  •  sodium chloride 0.9 % infusion, 125 mL/hr, Intravenous, Continuous, Jenni Meléndez PA-C, Stopped at 01/17/19 0951  •  sodium chloride 0.9 % infusion, 100 mL/hr, Intravenous, Continuous, Kenya Donovan APRN, Last Rate: 100 mL/hr at 01/17/19 1527, 100 mL/hr at 01/17/19 1527  •  sucralfate (CARAFATE) tablet 1 g, 1 g, Oral, 4x Daily, Tish Vallejo APRN, 1 g at 01/17/19 1718  •  temazepam (RESTORIL) capsule 7.5 mg, 7.5 mg, Oral, Nightly PRN, Tish Vallejo APRN  •  ticagrelor (BRILINTA) tablet tablet 60 mg, 60 mg, Oral, Daily, Tish Vallejo APRN  •  ticagrelor (BRILINTA) tablet tablet 60 mg, 60 mg, Oral, Once, Tish Vallejo APRN  •  tolvaptan (SAMSCA) tablet 7.5 mg, 7.5 mg, Oral, Daily, Tish Vallejo APRN, 7.5 mg at 01/17/19 1052    PHYSICAL EXAMINATION:   /85   Pulse 64   Temp 98.1 °F (36.7 °C) (Oral)   Resp 18   Ht 154.9 cm (61\")   Wt 87.5 kg (193 lb)   SpO2 95%   BMI 36.47 kg/m²    ECOG Performance Status: 1 - Symptomatic but completely ambulatory  GENERAL: Age appropriate. No acute distress.   NEURO/PSYCH: A&O x 3, strength 5/5 in all muscle groups  HEENT: Head atraumatic, normocephalic.   NECK: Supple. No JVD. " No lymphadenopathy.   LUNGS: Clear to auscultation bilaterally. No wheezing. No rhonchi.   HEART: Regular rate and rhythm. S1, S2, no murmurs.   ABDOMEN: Soft, nontender, nondistended. Bowel sounds positive. No  hepatosplenomegaly.   EXTREMITIES: No clubbing, cyanosis, or edema.   SKIN: No rashes. No purpura.       Admission on 01/16/2019   Component Date Value Ref Range Status   • Glucose 01/17/2019 619* 70 - 100 mg/dL Final    Confirmed/called   • BUN 01/17/2019 29* 9 - 23 mg/dL Final   • Creatinine 01/17/2019 1.44* 0.60 - 1.30 mg/dL Final   • Sodium 01/17/2019 124* 132 - 146 mmol/L Final   • Potassium 01/17/2019 5.8* 3.5 - 5.5 mmol/L Final   • Chloride 01/17/2019 93* 99 - 109 mmol/L Final   • CO2 01/17/2019 17.0* 20.0 - 31.0 mmol/L Final   • Calcium 01/17/2019 8.7  8.7 - 10.4 mg/dL Final   • Total Protein 01/17/2019 6.3  5.7 - 8.2 g/dL Final   • Albumin 01/17/2019 4.39  3.20 - 4.80 g/dL Final   • ALT (SGPT) 01/17/2019 31  7 - 40 U/L Final   • AST (SGOT) 01/17/2019 43* 0 - 33 U/L Final   • Alkaline Phosphatase 01/17/2019 102* 25 - 100 U/L Final   • Total Bilirubin 01/17/2019 0.5  0.3 - 1.2 mg/dL Final   • eGFR Non African Amer 01/17/2019 37* >60 mL/min/1.73 Final   • Globulin 01/17/2019 1.9  gm/dL Final   • A/G Ratio 01/17/2019 2.3  1.5 - 2.5 g/dL Final   • BUN/Creatinine Ratio 01/17/2019 20.1  7.0 - 25.0 Final   • Anion Gap 01/17/2019 14.0* 3.0 - 11.0 mmol/L Final   • Lipase 01/17/2019 27  6 - 51 U/L Final   • Color, UA 01/16/2019 Yellow  Yellow, Straw Final   • Appearance, UA 01/16/2019 Clear  Clear Final   • pH, UA 01/16/2019 <=5.0  5.0 - 8.0 Final   • Specific Gravity, UA 01/16/2019 1.029  1.001 - 1.030 Final   • Glucose, UA 01/16/2019 >=1000 mg/dL (3+)* Negative Final   • Ketones, UA 01/16/2019 Negative  Negative Final   • Bilirubin, UA 01/16/2019 Negative  Negative Final   • Blood, UA 01/16/2019 Negative  Negative Final   • Protein, UA 01/16/2019 Negative  Negative Final   • Leuk Esterase, UA 01/16/2019  Negative  Negative Final   • Nitrite, UA 01/16/2019 Negative  Negative Final   • Urobilinogen, UA 01/16/2019 0.2 E.U./dL  0.2 - 1.0 E.U./dL Final   • Extra Tube 01/17/2019 hold for add-on   Final    Auto resulted   • Extra Tube 01/17/2019 Hold for add-ons.   Final    Auto resulted.   • Extra Tube 01/17/2019 hold for add-on   Final    Auto resulted   • Extra Tube 01/17/2019 Hold for add-ons.   Final    Auto resulted.   • WBC 01/17/2019 10.07  3.50 - 10.80 10*3/mm3 Final   • RBC 01/17/2019 2.57* 3.89 - 5.14 10*6/mm3 Final   • Hemoglobin 01/17/2019 7.5* 11.5 - 15.5 g/dL Final   • Hematocrit 01/17/2019 22.4* 34.5 - 44.0 % Final   • MCV 01/17/2019 87.2  80.0 - 99.0 fL Final   • MCH 01/17/2019 29.2  27.0 - 31.0 pg Final   • MCHC 01/17/2019 33.5  32.0 - 36.0 g/dL Final   • RDW 01/17/2019 16.9* 11.3 - 14.5 % Final   • RDW-SD 01/17/2019 52.1  37.0 - 54.0 fl Final   • MPV 01/17/2019 9.0  6.0 - 12.0 fL Final   • Platelets 01/17/2019 258  150 - 450 10*3/mm3 Final   • Neutrophil % 01/17/2019 91.7* 41.0 - 71.0 % Final   • Lymphocyte % 01/17/2019 3.6* 24.0 - 44.0 % Final   • Monocyte % 01/17/2019 3.9  0.0 - 12.0 % Final   • Eosinophil % 01/17/2019 0.0  0.0 - 3.0 % Final   • Basophil % 01/17/2019 0.1  0.0 - 1.0 % Final   • Immature Grans % 01/17/2019 0.7* 0.0 - 0.6 % Final   • Neutrophils, Absolute 01/17/2019 9.24* 1.50 - 8.30 10*3/mm3 Final   • Lymphocytes, Absolute 01/17/2019 0.36* 0.60 - 4.80 10*3/mm3 Final   • Monocytes, Absolute 01/17/2019 0.39  0.00 - 1.00 10*3/mm3 Final   • Eosinophils, Absolute 01/17/2019 0.00  0.00 - 0.30 10*3/mm3 Final   • Basophils, Absolute 01/17/2019 0.01  0.00 - 0.20 10*3/mm3 Final   • Immature Grans, Absolute 01/17/2019 0.07* 0.00 - 0.03 10*3/mm3 Final   • Fecal Occult Blood 01/17/2019 Negative  Negative Final   • Lot Number 01/17/2019 [id number]   Final   • Expiration Date 01/17/2019 7/2,021   Final   • DEVELOPER LOT NUMBER 01/17/2019 [id number]   Final   • DEVELOPER EXPIRATION DATE 01/17/2019 112,021    Final   • Positive Control 01/17/2019 Positive  Positive Final   • Negative Control 01/17/2019 Negative  Negative Final   • Glucose 01/17/2019 488* 70 - 130 mg/dL Final   • Beta-Hydroxybutyrate Quant 01/17/2019 0.390  <=0.500 mmol/L Final   • Glucose 01/17/2019 449* 70 - 100 mg/dL Final    Confirmed/called   • BUN 01/17/2019 31* 9 - 23 mg/dL Final   • Creatinine 01/17/2019 1.32* 0.60 - 1.30 mg/dL Final   • Sodium 01/17/2019 130* 132 - 146 mmol/L Final   • Potassium 01/17/2019 4.4  3.5 - 5.5 mmol/L Final   • Chloride 01/17/2019 98* 99 - 109 mmol/L Final   • CO2 01/17/2019 22.0  20.0 - 31.0 mmol/L Final   • Calcium 01/17/2019 8.2* 8.7 - 10.4 mg/dL Final   • eGFR Non African Amer 01/17/2019 41* >60 mL/min/1.73 Final   • BUN/Creatinine Ratio 01/17/2019 23.5  7.0 - 25.0 Final   • Anion Gap 01/17/2019 10.0  3.0 - 11.0 mmol/L Final   • WBC 01/17/2019 8.31  3.50 - 10.80 10*3/mm3 Final   • RBC 01/17/2019 2.39* 3.89 - 5.14 10*6/mm3 Final   • Hemoglobin 01/17/2019 6.9* 11.5 - 15.5 g/dL Final   • Hematocrit 01/17/2019 20.8* 34.5 - 44.0 % Final   • MCV 01/17/2019 87.0  80.0 - 99.0 fL Final   • MCH 01/17/2019 28.9  27.0 - 31.0 pg Final   • MCHC 01/17/2019 33.2  32.0 - 36.0 g/dL Final   • RDW 01/17/2019 16.9* 11.3 - 14.5 % Final   • RDW-SD 01/17/2019 51.7  37.0 - 54.0 fl Final   • MPV 01/17/2019 9.0  6.0 - 12.0 fL Final   • Platelets 01/17/2019 225  150 - 450 10*3/mm3 Final   • Neutrophil % 01/17/2019 91.9* 41.0 - 71.0 % Final   • Lymphocyte % 01/17/2019 2.3* 24.0 - 44.0 % Final   • Monocyte % 01/17/2019 5.8  0.0 - 12.0 % Final   • Eosinophil % 01/17/2019 0.0  0.0 - 3.0 % Final   • Basophil % 01/17/2019 0.0  0.0 - 1.0 % Final   • Immature Grans % 01/17/2019 0.7* 0.0 - 0.6 % Final   • Neutrophils, Absolute 01/17/2019 7.64  1.50 - 8.30 10*3/mm3 Final   • Lymphocytes, Absolute 01/17/2019 0.19* 0.60 - 4.80 10*3/mm3 Final   • Monocytes, Absolute 01/17/2019 0.48  0.00 - 1.00 10*3/mm3 Final   • Eosinophils, Absolute 01/17/2019 0.00  0.00  - 0.30 10*3/mm3 Final   • Basophils, Absolute 01/17/2019 0.00  0.00 - 0.20 10*3/mm3 Final   • Immature Grans, Absolute 01/17/2019 0.06* 0.00 - 0.03 10*3/mm3 Final   • ABO Type 01/17/2019 O   Final   • RH type 01/17/2019 Positive   Final   • Antibody Screen 01/17/2019 Negative   Final   • T&S Expiration Date 01/17/2019 1/20/2019 11:59:59 PM   Final   • Vitamin B-12 01/17/2019 >2,000* 211 - 911 pg/mL Final   • Folate 01/17/2019 13.47  3.20 - 20.00 ng/mL Final    Results may be falsely increased if patient taking Biotin.   • Ferritin 01/17/2019 895.00* 10.00 - 291.00 ng/mL Final   • Iron 01/17/2019 256* 50 - 175 mcg/dL Final   • TIBC 01/17/2019 274  250 - 450 mcg/dL Final   • Iron Saturation 01/17/2019 93* 15 - 50 % Final   • Reticulocyte % 01/17/2019 3.13* 0.50 - 1.50 % Final   • Hemoglobin A1C 01/17/2019 11.40* 4.80 - 5.60 % Final   • Glucose 01/17/2019 453* 70 - 130 mg/dL Final   • Glucose 01/16/2019 >599* 70 - 130 mg/dL Final   • Glucose 01/17/2019 377* 70 - 130 mg/dL Final   • ABO Type 01/17/2019 O   Final   • RH type 01/17/2019 Positive   Final   • Product Code 01/17/2019 C5725P61   Final   • Unit Number 01/17/2019 [id number]   Final   • UNIT  ABO 01/17/2019 O   Final   • UNIT  RH 01/17/2019 POS   Final   • Dispense Status 01/17/2019 IS   Final   • Blood Type 01/17/2019 OPOS   Final   • Blood Expiration Date 01/17/2019 566856006443   Final   • Blood Type Barcode 01/17/2019 5100   Final   • Product Code 01/17/2019 [id number]   Final   • Unit Number 01/17/2019 [id number]   Final   • UNIT  ABO 01/17/2019 O   Final   • UNIT  RH 01/17/2019 POS   Final   • Dispense Status 01/17/2019 IS   Final   • Blood Type 01/17/2019 OPOS   Final   • Blood Expiration Date 01/17/2019 [id number]   Final   • Blood Type Barcode 01/17/2019 5100   Final   • Glucose 01/17/2019 314* 70 - 130 mg/dL Final   • Glucose 01/17/2019 445* 70 - 130 mg/dL Final       No results found.    ASSESSMENT: The patient is a very pleasant 63 y.o.  female  with dehydration      PLAN:  1. Dehydration: continue IVF.  2. N/V: Continue Iv antiemetics. Patient is feeling better.  3. SCLCA: Will skip day 3 of cycle number 4. She will complete her last two doses of radiation today.  4. Uncontrolled DM: continue insulin.  Okay to discharge patient home in the morning she will follow-up with me as scheduled.      Desmond Cardoso MD  1/17/2019

## 2019-01-17 NOTE — PLAN OF CARE
Problem: Patient Care Overview  Goal: Plan of Care Review   01/17/19 3015   Coping/Psychosocial   Plan of Care Reviewed With patient;daughter     Goal: Interprofessional Rounds/Family Conf  Outcome: Ongoing (interventions implemented as appropriate)      Problem: Anemia (Adult)  Goal: Identify Related Risk Factors and Signs and Symptoms  Outcome: Ongoing (interventions implemented as appropriate)    Goal: Symptom Improvement  Outcome: Ongoing (interventions implemented as appropriate)

## 2019-01-17 NOTE — CONSULTS
Subjective     CHIEF COMPLAINT: Nausea vomiting and weakness    HISTORY OF PRESENT ILLNESS:  The patient is a 63 y.o. female, referred by Tricia Obrien II, DO for limited stage small cell lung cancer.  The patient completed cycle #4 of chemotherapy with cisplatin and etoposide January 16, 2019.  She presented to [facility] emergency room last night complaining of persistent nausea and vomiting as well as just fatigue and weakness.  Her blood sugars been running high at home.  Denied any fever or chills, no recent travel or ill contacts.  Never had this problem before.  Her nausea has been usually well-controlled with oral antiemetics at home but not this time.  In the emergency room patient blood REVEALED severe anemia with hemoglobin of 6.9 as well as hyponatremia and hyperglycemia.  The patient was admitted to the hospitalist service and I was consulted to further assist in her care.  When I saw the patient today she is laying comfortable in bed, she is feeling significantly better.      REVIEW OF SYSTEMS:  A 14 point review of systems was performed and is negative except as noted above.    Past Medical History:   Diagnosis Date   • Arthritis    • Asthma    • COPD (chronic obstructive pulmonary disease) (CMS/HCC)    • Coronary artery disease     5 stents   • Diabetes mellitus (CMS/HCC)    • Disease of thyroid gland    • DVT (deep venous thrombosis) (CMS/HCC)     this year- per pt   • GERD (gastroesophageal reflux disease)    • Hyperlipidemia    • Lung cancer (CMS/HCC)    • Myocardial infarction (CMS/HCC)    • Pancreatitis    • Pancreatitis        Current Facility-Administered Medications on File Prior to Encounter   Medication Dose Route Frequency Provider Last Rate Last Dose   • [COMPLETED] etoposide (TOPOSAR) 190 mg in Sodium chloride 0.9 % 559.5 mL chemo IVPB  100 mg/m2 (Treatment Plan Recorded) Intravenous Once Desmond Cardoso MD   Stopped at 01/16/19 1430   • [DISCONTINUED] Sodium chloride  0.9 % infusion 250 mL  250 mL Intravenous Once Desmond Cardoso MD         Current Outpatient Medications on File Prior to Encounter   Medication Sig Dispense Refill   • albuterol (PROVENTIL HFA;VENTOLIN HFA) 108 (90 Base) MCG/ACT inhaler Inhale 2 puffs Every 4 (Four) Hours As Needed for Wheezing.     • aspirin 81 MG EC tablet Take 81 mg by mouth Daily.     • carvedilol (COREG) 6.25 MG tablet Take 6.25 mg by mouth 2 (Two) Times a Day With Meals.     • cetirizine (zyrTEC) 10 MG tablet Take 10 mg by mouth Daily.     • dexamethasone (DECADRON) 4 MG tablet Take 2 tablets by mouth Daily With Breakfast. On days 2, 3, and 4 after chemotherapy 24 tablet 0   • docusate sodium (COLACE) 100 MG capsule Take 1 capsule by mouth 2 (Two) Times a Day. 60 capsule 3   • famotidine (PEPCID) 20 MG tablet Take 20 mg by mouth 2 (Two) Times a Day.     • HYDROcodone-acetaminophen (NORCO) 7.5-325 MG per tablet Take 1-2 tablets by mouth.     • insulin aspart (novoLOG FLEXPEN) 100 UNIT/ML solution pen-injector sc pen Inject 8 Units under the skin into the appropriate area as directed 1 (One) Time.     • insulin glargine (LANTUS) 100 UNIT/ML injection Inject 10 Units under the skin into the appropriate area as directed Daily.     • lactulose (CHRONULAC) 10 GM/15ML solution Take 30 mL by mouth 3 (Three) Times a Day as needed for constipation 240 mL 2   • levothyroxine (SYNTHROID, LEVOTHROID) 75 MCG tablet Take 75 mcg by mouth Daily.     • Magic mouthwash Radonc Swish and swallow 5-10 mL 4 (Four) Times a Day Before Meals & at Bedtime. 480 mL 2   • nicotine (NICODERM CQ) 14 MG/24HR patch Place 1 patch on the skin as directed by provider Daily.     • nystatin susp + lidocaine viscous (MAGIC MOUTHWASH) oral suspension 5-10 ml swish and spit or swallow QID prn 240 mL 3   • ondansetron (ZOFRAN) 4 MG tablet Take 1 tablet by mouth Every 8 (Eight) Hours As Needed for Nausea or Vomiting. 30 tablet 0   • ondansetron (ZOFRAN) 8 MG tablet Take 1 tablet by  mouth 3 (Three) Times a Day As Needed for Nausea or Vomiting. 30 tablet 5   • pantoprazole (PROTONIX) 40 MG EC tablet Take 40 mg by mouth Daily.     • promethazine (PHENERGAN) 25 MG tablet Take 1 tablet by mouth Every 6 (Six) Hours As Needed for Nausea or Vomiting. 45 tablet 5   • REPATHA SURECLICK 140 MG/ML solution auto-injector Inject 140 mg under the skin into the appropriate area as directed Every 14 (Fourteen) Days.     • SITagliptin (JANUVIA) 100 MG tablet Take 100 mg by mouth Daily.     • sucralfate (CARAFATE) 1 GM/10ML suspension Take 10 mL by mouth 4 (Four) Times a Day. 420 mL 2   • temazepam (RESTORIL) 7.5 MG capsule Take 1 capsule by mouth At Night As Needed for Sleep or Anxiety. 3 capsule 0   • ticagrelor (BRILINTA) 60 MG tablet tablet Take 60 mg by mouth Daily.     • tolvaptan (SAMSCA) 15 MG tablet tablet Take 0.5 tablets by mouth Daily. 30 tablet 0   • zolpidem (AMBIEN) 10 MG tablet Take 1 tablet by mouth every night at bedtime. 30 tablet 0   • [] granisetron (SANCUSO) 3.1 MG/24HR Place 1 patch on the skin as directed by provider 1 (One) Time for 1 dose. 1 day prior to chemotherapy and leave for 7 days to upper arm 1 patch 0       Allergies   Allergen Reactions   • Plavix [Clopidogrel Bisulfate] Anaphylaxis   • Codeine Other (See Comments)     Pt not certain of reatction   • Penicillins Other (See Comments)     Pt does not remember reaction       Past Surgical History:   Procedure Laterality Date   • APPENDECTOMY     • BRONCHOSCOPY N/A 2018    Procedure: BRONCHOSCOPY WITH ENDOBRONCHIAL ULTRASOUND, biopsies, brushing and washing.;  Surgeon: Isaac Epstein MD;  Location:  [facility];  Service: Pulmonary   • BRONCHOSCOPY N/A 2018    Procedure: BRONCHOSCOPY WITH ENDOBRONCHIAL ULTRASOUND;  Surgeon: Clay Scott MD;  Location:  [facility];  Service: Pulmonary   • CAROTID STENT      5 total   • CHOLECYSTECTOMY     • COLON SURGERY     • COLONOSCOPY     • HERNIA REPAIR     •  HYSTERECTOMY     • OTHER SURGICAL HISTORY      bladder sling   • THYROIDECTOMY      1994   • TONSILLECTOMY     • TOTAL HIP ARTHROPLASTY Left    • UPPER GASTROINTESTINAL ENDOSCOPY         OB History   No data available       Social History     Socioeconomic History   • Marital status:      Spouse name: Not on file   • Number of children: Not on file   • Years of education: Not on file   • Highest education level: Not on file   Tobacco Use   • Smoking status: Former Smoker     Packs/day: 0.50     Types: Cigarettes   • Smokeless tobacco: Never Used   • Tobacco comment: quit 10/31/18   Substance and Sexual Activity   • Alcohol use: No   • Drug use: No   • Sexual activity: Defer       Family History   Problem Relation Age of Onset   • Colon polyps Mother    • Ulcerative colitis Sister        Objective     Vitals:    01/17/19 0659 01/17/19 0700 01/17/19 0800 01/17/19 1000   BP:  147/82 150/84 146/82   Pulse: 75  68 65   Resp:       Temp:       TempSrc:       SpO2: 94%  94% 96%   Weight:       Height:                ECOG Performance Status: 1 - Symptomatic but completely ambulatory  General: well appearing female in no acute distress  Neuro/Psych: A&O x 3, gait steady, appropriate affect, strength 5/5 in all muscle groups  HEENT: sclera anicteric, oropharynx clear  Lymphatics: no cervical, supraclavicular, or axillary adenopathy  Cardiovascular: regular rate and rhythm, no murmurs  Lungs: clear to auscultation bilaterally  Abdomen: soft, nontender, nondistended.  No palpable organomegaly  Extremeties: no lower extremity edema  Skin: no rashes, lesions, bruising, or petechiae      Admission on 01/16/2019   Component Date Value Ref Range Status   • Glucose 01/17/2019 619* 70 - 100 mg/dL Final    Confirmed/called   • BUN 01/17/2019 29* 9 - 23 mg/dL Final   • Creatinine 01/17/2019 1.44* 0.60 - 1.30 mg/dL Final   • Sodium 01/17/2019 124* 132 - 146 mmol/L Final   • Potassium 01/17/2019 5.8* 3.5 - 5.5 mmol/L Final   •  Chloride 01/17/2019 93* 99 - 109 mmol/L Final   • CO2 01/17/2019 17.0* 20.0 - 31.0 mmol/L Final   • Calcium 01/17/2019 8.7  8.7 - 10.4 mg/dL Final   • Total Protein 01/17/2019 6.3  5.7 - 8.2 g/dL Final   • Albumin 01/17/2019 4.39  3.20 - 4.80 g/dL Final   • ALT (SGPT) 01/17/2019 31  7 - 40 U/L Final   • AST (SGOT) 01/17/2019 43* 0 - 33 U/L Final   • Alkaline Phosphatase 01/17/2019 102* 25 - 100 U/L Final   • Total Bilirubin 01/17/2019 0.5  0.3 - 1.2 mg/dL Final   • eGFR Non African Amer 01/17/2019 37* >60 mL/min/1.73 Final   • Globulin 01/17/2019 1.9  gm/dL Final   • A/G Ratio 01/17/2019 2.3  1.5 - 2.5 g/dL Final   • BUN/Creatinine Ratio 01/17/2019 20.1  7.0 - 25.0 Final   • Anion Gap 01/17/2019 14.0* 3.0 - 11.0 mmol/L Final   • Lipase 01/17/2019 27  6 - 51 U/L Final   • Color, UA 01/16/2019 Yellow  Yellow, Straw Final   • Appearance, UA 01/16/2019 Clear  Clear Final   • pH, UA 01/16/2019 <=5.0  5.0 - 8.0 Final   • Specific Gravity, UA 01/16/2019 1.029  1.001 - 1.030 Final   • Glucose, UA 01/16/2019 >=1000 mg/dL (3+)* Negative Final   • Ketones, UA 01/16/2019 Negative  Negative Final   • Bilirubin, UA 01/16/2019 Negative  Negative Final   • Blood, UA 01/16/2019 Negative  Negative Final   • Protein, UA 01/16/2019 Negative  Negative Final   • Leuk Esterase, UA 01/16/2019 Negative  Negative Final   • Nitrite, UA 01/16/2019 Negative  Negative Final   • Urobilinogen, UA 01/16/2019 0.2 E.U./dL  0.2 - 1.0 E.U./dL Final   • Extra Tube 01/17/2019 hold for add-on   Final    Auto resulted   • Extra Tube 01/17/2019 Hold for add-ons.   Final    Auto resulted.   • Extra Tube 01/17/2019 hold for add-on   Final    Auto resulted   • Extra Tube 01/17/2019 Hold for add-ons.   Final    Auto resulted.   • WBC 01/17/2019 10.07  3.50 - 10.80 10*3/mm3 Final   • RBC 01/17/2019 2.57* 3.89 - 5.14 10*6/mm3 Final   • Hemoglobin 01/17/2019 7.5* 11.5 - 15.5 g/dL Final   • Hematocrit 01/17/2019 22.4* 34.5 - 44.0 % Final   • MCV 01/17/2019 87.2   80.0 - 99.0 fL Final   • MCH 01/17/2019 29.2  27.0 - 31.0 pg Final   • MCHC 01/17/2019 33.5  32.0 - 36.0 g/dL Final   • RDW 01/17/2019 16.9* 11.3 - 14.5 % Final   • RDW-SD 01/17/2019 52.1  37.0 - 54.0 fl Final   • MPV 01/17/2019 9.0  6.0 - 12.0 fL Final   • Platelets 01/17/2019 258  150 - 450 10*3/mm3 Final   • Neutrophil % 01/17/2019 91.7* 41.0 - 71.0 % Final   • Lymphocyte % 01/17/2019 3.6* 24.0 - 44.0 % Final   • Monocyte % 01/17/2019 3.9  0.0 - 12.0 % Final   • Eosinophil % 01/17/2019 0.0  0.0 - 3.0 % Final   • Basophil % 01/17/2019 0.1  0.0 - 1.0 % Final   • Immature Grans % 01/17/2019 0.7* 0.0 - 0.6 % Final   • Neutrophils, Absolute 01/17/2019 9.24* 1.50 - 8.30 10*3/mm3 Final   • Lymphocytes, Absolute 01/17/2019 0.36* 0.60 - 4.80 10*3/mm3 Final   • Monocytes, Absolute 01/17/2019 0.39  0.00 - 1.00 10*3/mm3 Final   • Eosinophils, Absolute 01/17/2019 0.00  0.00 - 0.30 10*3/mm3 Final   • Basophils, Absolute 01/17/2019 0.01  0.00 - 0.20 10*3/mm3 Final   • Immature Grans, Absolute 01/17/2019 0.07* 0.00 - 0.03 10*3/mm3 Final   • Fecal Occult Blood 01/17/2019 Negative  Negative Final   • Lot Number 01/17/2019 [id number]   Final   • Expiration Date 01/17/2019 7/2,021   Final   • DEVELOPER LOT NUMBER 01/17/2019 [id number]   Final   • DEVELOPER EXPIRATION DATE 01/17/2019 112,021   Final   • Positive Control 01/17/2019 Positive  Positive Final   • Negative Control 01/17/2019 Negative  Negative Final   • Glucose 01/17/2019 488* 70 - 130 mg/dL Final   • Beta-Hydroxybutyrate Quant 01/17/2019 0.390  <=0.500 mmol/L Final   • Glucose 01/17/2019 449* 70 - 100 mg/dL Final    Confirmed/called   • BUN 01/17/2019 31* 9 - 23 mg/dL Final   • Creatinine 01/17/2019 1.32* 0.60 - 1.30 mg/dL Final   • Sodium 01/17/2019 130* 132 - 146 mmol/L Final   • Potassium 01/17/2019 4.4  3.5 - 5.5 mmol/L Final   • Chloride 01/17/2019 98* 99 - 109 mmol/L Final   • CO2 01/17/2019 22.0  20.0 - 31.0 mmol/L Final   • Calcium 01/17/2019 8.2* 8.7 - 10.4 mg/dL Final    • eGFR Non African Amer 01/17/2019 41* >60 mL/min/1.73 Final   • BUN/Creatinine Ratio 01/17/2019 23.5  7.0 - 25.0 Final   • Anion Gap 01/17/2019 10.0  3.0 - 11.0 mmol/L Final   • WBC 01/17/2019 8.31  3.50 - 10.80 10*3/mm3 Final   • RBC 01/17/2019 2.39* 3.89 - 5.14 10*6/mm3 Final   • Hemoglobin 01/17/2019 6.9* 11.5 - 15.5 g/dL Final   • Hematocrit 01/17/2019 20.8* 34.5 - 44.0 % Final   • MCV 01/17/2019 87.0  80.0 - 99.0 fL Final   • MCH 01/17/2019 28.9  27.0 - 31.0 pg Final   • MCHC 01/17/2019 33.2  32.0 - 36.0 g/dL Final   • RDW 01/17/2019 16.9* 11.3 - 14.5 % Final   • RDW-SD 01/17/2019 51.7  37.0 - 54.0 fl Final   • MPV 01/17/2019 9.0  6.0 - 12.0 fL Final   • Platelets 01/17/2019 225  150 - 450 10*3/mm3 Final   • Neutrophil % 01/17/2019 91.9* 41.0 - 71.0 % Final   • Lymphocyte % 01/17/2019 2.3* 24.0 - 44.0 % Final   • Monocyte % 01/17/2019 5.8  0.0 - 12.0 % Final   • Eosinophil % 01/17/2019 0.0  0.0 - 3.0 % Final   • Basophil % 01/17/2019 0.0  0.0 - 1.0 % Final   • Immature Grans % 01/17/2019 0.7* 0.0 - 0.6 % Final   • Neutrophils, Absolute 01/17/2019 7.64  1.50 - 8.30 10*3/mm3 Final   • Lymphocytes, Absolute 01/17/2019 0.19* 0.60 - 4.80 10*3/mm3 Final   • Monocytes, Absolute 01/17/2019 0.48  0.00 - 1.00 10*3/mm3 Final   • Eosinophils, Absolute 01/17/2019 0.00  0.00 - 0.30 10*3/mm3 Final   • Basophils, Absolute 01/17/2019 0.00  0.00 - 0.20 10*3/mm3 Final   • Immature Grans, Absolute 01/17/2019 0.06* 0.00 - 0.03 10*3/mm3 Final   • ABO Type 01/17/2019 O   Final   • RH type 01/17/2019 Positive   Final   • Antibody Screen 01/17/2019 Negative   Final   • T&S Expiration Date 01/17/2019 1/20/2019 11:59:59 PM   Final   • Vitamin B-12 01/17/2019 >2,000* 211 - 911 pg/mL Final   • Folate 01/17/2019 13.47  3.20 - 20.00 ng/mL Final    Results may be falsely increased if patient taking Biotin.   • Ferritin 01/17/2019 895.00* 10.00 - 291.00 ng/mL Final   • Iron 01/17/2019 256* 50 - 175 mcg/dL Final   • TIBC 01/17/2019 274  250  - 450 mcg/dL Final   • Iron Saturation 01/17/2019 93* 15 - 50 % Final   • Reticulocyte % 01/17/2019 3.13* 0.50 - 1.50 % Final   • Hemoglobin A1C 01/17/2019 11.40* 4.80 - 5.60 % Final   • Glucose 01/17/2019 453* 70 - 130 mg/dL Final   • Glucose 01/16/2019 >599* 70 - 130 mg/dL Final   • Glucose 01/17/2019 377* 70 - 130 mg/dL Final        No results found.    ASSESSMENT 63 years old female with intractable nausea vomiting    PROBLEM LIST   1.  Intractable nausea vomiting: Induced by chemotherapy  2.  Symptomatic anemia: Induced by chemotherapy  3.  Limited stage small cell lung cancer: Completed concurrent radiation as well as 4 cycles of chemotherapy January 16, 2019.  4.  Radiation induced esophagitis  5.  Uncontrolled type 2 diabetes  7.  Dehydration    PLAN  1.  I reviewed the patient's chart including admission note blood work results  2.  We'll continue IV fluid as well as IV antiemetics.  3.  We'll transfuse 2 units of blood given her symptomatic severe anemia.  The patient had 1 unit ordered I will add another units.  4.  We'll monitor patient blood work.  5.  Insulin for better diabetes control.  6.  We'll continue hypertonic's as well as carafte for esophagitis.        Desmond Cardoso MD    1/17/2019

## 2019-01-17 NOTE — PROGRESS NOTES
Patient admitted early this am with hyperglycemia, dehydration. Patient's blood glucose has been uncontrolled for quite some time with A1C noted to be 11. Needs insulin at d/c. Will ask DM educator to see. Also noted to have anemia and hyponatremia upon arrival. Will transfuse 1 unit blood this am. Hyponatremia much better after IVF in ED. Has had problems with this in past. Currently undergoing chemotherapy with Dr. Cardoso, he has been consulted. Hopefully home in 1-2 days once blood glucose better.

## 2019-01-17 NOTE — PROGRESS NOTES
Discharge Planning Assessment  [facility]     Patient Name: Shauna Valencia  MRN: [id number]  Today's Date: 1/17/2019    Admit Date: 1/16/2019    Discharge Needs Assessment     Row Name 01/17/19 1200       Living Environment    Lives With  alone Pt resides in [facility]    Unique Family Situation  Pt is currently staying at [facility] for chemo treatments and her sister in law Licha is staying with her.     Current Living Arrangements  home/apartment/condo    Primary Care Provided by  self    Provides Primary Care For  no one    Family Caregiver if Needed  child(prakash), adult;sibling(s)    Family Caregiver Names  Sister in law- Licha daughter0 Jade    Quality of Family Relationships  helpful;involved;supportive    Able to Return to Prior Arrangements  yes       Resource/Environmental Concerns    Resource/Environmental Concerns  none       Transition Planning    Patient/Family Anticipates Transition to  home with family    Patient/Family Anticipated Services at Transition  none    Transportation Anticipated  family or friend will provide       Discharge Needs Assessment    Readmission Within the Last 30 Days  no previous admission in last 30 days    Concerns to be Addressed  discharge planning    Equipment Currently Used at Home  none    Equipment Needed After Discharge  none    Outpatient/Agency/Support Group Needs  homecare agency    Discharge Facility/Level of Care Needs  home with home health    Offered/Gave Vendor List  no    Patient's Choice of Community Agency(s)  Pt is current with [facility] in [facility] for Skilled nursing, picc line care.         Discharge Plan     Row Name 01/17/19 1207       Plan    Plan  home    Patient/Family in Agreement with Plan  yes    Plan Comments   CM spoke with pt and sister in law Licha at bedside. Pt resides in [facility], alone however currently staying at [facility] for chemo/radiation treatments.pt is current with [facility] in  [facility] for Skilled nursing, picc line care, please notify at d/c [phone]. Per pt she is waiting on approval for Zofan at her pharmacy currently, she is going to have her sister in law check on the status of this. Pt denies further needs at this time.       Final Discharge Disposition Code  06 - home with home health care        Destination      No service coordination in this encounter.      Durable Medical Equipment      No service coordination in this encounter.      Dialysis/Infusion      No service coordination in this encounter.      Home Medical Care      No service coordination in this encounter.      Community Resources      No service coordination in this encounter.          Demographic Summary     Row Name 01/17/19 1158       General Information    Referral Source  admission list    Reason for Consult  discharge planning    Preferred Language  English    General Information Comments  PCP- Mani       Contact Information    Contact Information Comments  [phone]        Functional Status     Row Name 01/17/19 1159       Functional Status    Usual Activity Tolerance  moderate    Current Activity Tolerance  fair       Functional Status, IADL    Medications  independent    Meal Preparation  assistive person    Housekeeping  assistive person    Laundry  assistive person    Shopping  assistive person       Employment/    Employment/ Comments  pt confirms she has Medicare and [employer] insurance, pt denies concerns or disruption in coverage. Pt confirms she has prescription coverage reports she is waiting on a prior auth for Zofran.         Psychosocial    No documentation.       Abuse/Neglect    No documentation.       Legal    No documentation.       Substance Abuse    No documentation.       Patient Forms    No documentation.           Najma Lema

## 2019-01-18 NOTE — DISCHARGE PLACEMENT REQUEST
"Please see home health order attached   Will call on day of discharge     Thank you   Tish   [phone]     Shauna Vera (63 y.o. Female)     Date of Birth Social Security Number Address Home Phone MRN    1955  [address]  [facility] [address] [phone] [id number]    Jehovah's witness Marital Status          Unknown        Admission Date Admission Type Admitting Provider Attending Provider Department, Room/Bed    1/16/19 Emergency Josh Shearer MD Sloan, Walker E, MD [facility] 6B, N630/1    Discharge Date Discharge Disposition Discharge Destination                       Attending Provider:  Josh Shearer MD    Allergies:  Plavix [Clopidogrel Bisulfate], Codeine, Penicillins    Isolation:  None   Infection:  None   Code Status:  CPR    Ht:  154.9 cm (61\")   Wt:  87.5 kg (193 lb)    Admission Cmt:  None   Principal Problem:  None                Active Insurance as of 1/16/2019     Primary Coverage     Payor Plan Insurance Group Employer/Plan Group    MEDICARE MEDICARE A & B      Payor Plan Address Payor Plan Phone Number Payor Plan Fax Number Effective Dates    [address] [phone]  11/1/2008 - None Entered    [facility] [id number]       Subscriber Name Subscriber Birth Date Member ID       SHAUNA VERA 1955 [id number]           Secondary Coverage     Payor Plan Insurance Group Employer/Plan Group    [place]      Payor Plan Address Payor Plan Phone Number Payor Plan Fax Number Effective Dates    [address] [address]   2/1/2014 - None Entered    [place] [address]       Subscriber Name Subscriber Birth Date Member ID       SHAUNA VERA 1955 [id number]                 Emergency Contacts      (Rel.) Home Phone Work Phone Mobile Phone    Jade Infante (Daughter) [phone] -- --    DesireReyna (Sister) [phone] -- --        [address]  [address] [facility] " [address]  Phone:  [phone]  Fax:   Date: 2019      Ambulatory Referral to Home Health     Patient:  Shauna Valencia MRN:  [id number]   [address]  [facility] [address] :  1955  SSN:    Phone: [phone] Sex:  F      INSURANCE PAYOR PLAN GROUP # SUBSCRIBER ID   Primary:  Secondary:    MEDICARE  [id number]  [id number]      [id number]  [id number]      Referring Provider Information:  ANDREA UMAÑA Phone: [phone] Fax:       Referral Information:   # Visits:  1 Referral Type: Home Health [42]   Urgency:  Routine Referral Reason: Specialty Services Required   Start Date: 2019 End Date:  To be determined by Insurer   Diagnosis: Dehydration (E86.0 [ICD-10-CM] 276.51 [ICD-9-CM])  Patient on combined chemotherapy and radiation (Z79.899,Z78.9 [ICD-10-CM] V58.69,V49.89 [ICD-9-CM])  Anemia due to chemotherapy (D64.81,T45.1X5A [ICD-10-CM] 285.3,E933.1 [ICD-9-CM])  Small cell lung cancer, left upper lobe (CMS/HCC) (C34.12 [ICD-10-CM] 162.3 [ICD-9-CM])      Refer to Dept:   Refer to Provider:   Refer to Facility:       Face to Face Visit Date: 2019  Follow-up Provider for Plan of Care? I treated the patient in an acute care facility and will not continue treatment after discharge.  Follow-up Provider: PATTI DUNHAM [[id number]]  Reason/Clinical Findings: dehydration, Small cell lung cancer, left upper lobe, anemia  Describe mobility limitations that make leaving home difficult: chemo/radiation, immunocompromised, weakness, anemia, impaired mobility, impaired ADLs  Nursing/Therapeutic Services Requested: Skilled Nursing  Skilled nursing orders: Medication education  Skilled nursing orders: PICC line care/instruction  Skilled nursing orders: Cardiopulmonary assessments     This document serves as a request of services and does not constitute Insurance authorization or approval of services.  To determine eligibility, please contact the members Insurance  carrier to verify and review coverage.     If you have medical questions regarding this request for services. Please contact [address] at [phone] between the hours of 8:00am - 5:00pm (Mon-Fri).       Verbal Order Mode: Per protocol: cosign required  Authorizing Provider: Josh Shearer MD  Authorizing Provider's NPI: [id number]     Order Entered By: Tish Stephens RN 2019 11:39 AM     Electronically signed by:  Josh Shearer MD 2019 11:42 AM                History & Physical      AliuDaryn MD at 2019  4:16 AM              [facility]  HISTORY AND PHYSICAL    Patient Name: Shauna Valencia  : 1955  MRN: [id number]  Primary Care Physician: Rox Singleton MD  Date of admission: 2019      Subjective   Subjective     Chief Complaint:  Hyperglycemia    HPI:  Shauna Valencia is a 63 y.o. female who presents to the ED with complaints hyperglycemia. 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 She reports yesterday she was nauseous all day and at night checked her glucose level just prior to taking her dose of Lantis and her blood glucose level was too high for her monitor to read. She has also had diarrhea for the last three days, took Imodium with mild relief, but has had 5 episodes of diarrhea today without blood or mucus.  She also complains of loss of appetite, fatigue, abdominal distention, and nausea.  She denies fever, chills, shortness of air, chest pain, vomiting, or any other complaints at this time.  She did have radiation yesterday morning. Today will be her final round of chemo and radiation. She had been getting chemo 3 times per week and radiation 15 days in a row twice daily.  Labs revealed an elevated glucose, potassium, BUN, creatinine, and a H&H of 6.9/20.8.  She was given one liter of saline and 10 units of Insulin in  the ED.  Patient is being admitted to the Hospitalist for further evaluation and management.        Review of Systems   Constitutional: Positive for activity change, appetite change and fatigue. Negative for chills, diaphoresis, fever and unexpected weight change.   HENT: Negative.    Eyes: Negative.    Respiratory: Positive for cough. Negative for shortness of breath and wheezing.    Cardiovascular: Negative.    Gastrointestinal: Positive for abdominal distention, diarrhea and nausea. Negative for abdominal pain, blood in stool, constipation and vomiting.   Endocrine: Negative.    Genitourinary: Negative.    Musculoskeletal: Positive for neck pain. Negative for arthralgias, back pain, gait problem, joint swelling, myalgias and neck stiffness.   Skin: Negative.    Allergic/Immunologic: Negative.    Neurological: Negative.    Hematological: Negative.    Psychiatric/Behavioral: Negative.         Otherwise 10-system ROS reviewed and is negative except as mentioned in the HPI.    Personal History     Past Medical History:   Diagnosis Date   • Arthritis    • Asthma    • COPD (chronic obstructive pulmonary disease) (CMS/HCC)    • Coronary artery disease     5 stents   • Diabetes mellitus (CMS/HCC)    • Disease of thyroid gland    • DVT (deep venous thrombosis) (CMS/HCC)     this year- per pt   • GERD (gastroesophageal reflux disease)    • Hyperlipidemia    • Lung cancer (CMS/HCC)    • Myocardial infarction (CMS/HCC)    • Pancreatitis    • Pancreatitis        Past Surgical History:   Procedure Laterality Date   • APPENDECTOMY     • BRONCHOSCOPY N/A 11/6/2018    Procedure: BRONCHOSCOPY WITH ENDOBRONCHIAL ULTRASOUND, biopsies, brushing and washing.;  Surgeon: Isaac Epstein MD;  Location:  [facility];  Service: Pulmonary   • BRONCHOSCOPY N/A 11/9/2018    Procedure: BRONCHOSCOPY WITH ENDOBRONCHIAL ULTRASOUND;  Surgeon: Clay Scott MD;  Location:  [facility];  Service: Pulmonary   • CAROTID STENT      5 total    • CHOLECYSTECTOMY     • COLON SURGERY     • COLONOSCOPY     • HERNIA REPAIR     • HYSTERECTOMY     • OTHER SURGICAL HISTORY      bladder sling   • THYROIDECTOMY      1994   • TONSILLECTOMY     • TOTAL HIP ARTHROPLASTY Left    • UPPER GASTROINTESTINAL ENDOSCOPY         Family History: family history includes Colon polyps in her mother; Ulcerative colitis in her sister. Otherwise pertinent FHx was reviewed and unremarkable.     Social History:  reports that she has quit smoking. Her smoking use included cigarettes. She smoked 0.50 packs per day. she has never used smokeless tobacco. She reports that she does not drink alcohol or use drugs.  Social History     Social History Narrative   • Not on file       Medications:    Available home medication information reviewed.    (Not in a hospital admission)    Allergies   Allergen Reactions   • Plavix [Clopidogrel Bisulfate] Anaphylaxis   • Codeine Other (See Comments)     Pt not certain of reatction   • Penicillins Other (See Comments)     Pt does not remember reaction       Objective   Objective     Vital Signs:   Temp:  [97.8 °F (36.6 °C)-98.2 °F (36.8 °C)] 98.2 °F (36.8 °C)  Heart Rate:  [63-99] 72  Resp:  [18] 18  BP: (125-172)/(61-88) 140/76        Physical Exam   Constitutional: She is oriented to person, place, and time. She appears well-developed. No distress.   HENT:   Head: Normocephalic.   Eyes: Pupils are equal, round, and reactive to light.   Neck: Normal range of motion. Neck supple.   Cardiovascular: Normal rate, regular rhythm, normal heart sounds and intact distal pulses. Exam reveals no gallop and no friction rub.   No murmur heard.  Pulmonary/Chest: Effort normal and breath sounds normal. No stridor. No respiratory distress. She has no wheezes. She has no rales. She exhibits no tenderness.   Abdominal: Soft. Bowel sounds are normal. She exhibits no distension and no mass. There is no tenderness. There is no rebound and no guarding.   Musculoskeletal:  Normal range of motion. She exhibits no edema, tenderness or deformity.   Neurological: She is alert and oriented to person, place, and time. No cranial nerve deficit.   Skin: Skin is warm and dry. No rash noted. She is not diaphoretic. No erythema. No pallor.   Psychiatric: She has a normal mood and affect. Her behavior is normal. Judgment and thought content normal.          Results Reviewed:  I have personally reviewed current lab, radiology, and data and agree.    Results from last 7 days   Lab Units 01/17/19  0439   WBC 10*3/mm3 8.31   HEMOGLOBIN g/dL 6.9*   HEMATOCRIT % 20.8*   PLATELETS 10*3/mm3 225     Results from last 7 days   Lab Units 01/17/19  0005   SODIUM mmol/L 124*   POTASSIUM mmol/L 5.8*   CHLORIDE mmol/L 93*   CO2 mmol/L 17.0*   BUN mg/dL 29*   CREATININE mg/dL 1.44*   GLUCOSE mg/dL 619*   CALCIUM mg/dL 8.7   ALT (SGPT) U/L 31   AST (SGOT) U/L 43*     Estimated Creatinine Clearance: 41.3 mL/min (A) (by C-G formula based on SCr of 1.44 mg/dL (H)).  Brief Urine Lab Results  (Last result in the past 365 days)      Color   Clarity   Blood   Leuk Est   Nitrite   Protein   CREAT   Urine HCG        01/16/19 2357 Yellow Clear Negative Negative Negative Negative             No results found for: BNP  Imaging Results (last 24 hours)     ** No results found for the last 24 hours. **             Assessment/Plan   Assessment / Plan     Active Hospital Problems    Diagnosis Date Noted   • Dehydration [E86.0] 01/17/2019   • Anemia [D64.9] 01/17/2019   • Hyperkalemia [E87.5] 01/17/2019   • Small cell lung cancer, left upper lobe (CMS/HCC) [C34.12] 11/13/2018   • Hyperlipidemia [E78.5] 10/31/2018   • Type 2 diabetes mellitus (CMS/HCC) [E11.9] 10/31/2018       ASSESSMENT and PLAN:    -Hyperglycemia with a history of T2DM   -has been on decadron with her chemo   -a1c   -fsbg with ssi   -levemir 10 units nightly    -PABLO secondary to dehydration   -received 1 liter of saline in the ED   -NS @ 100  ml/hr     -Anemia   -stool for occult blood is negative   -type and screen   -transfuse 1 unit of PRBC now   -anemia profile    -Small cell lung cancer   -today is supposed to be her last day of chemo/radiation   -consult Dr. Cardoso    -Hyperkalemia   -resolved     -HLD    DVT prophylaxis:  Mechanical    CODE STATUS:    Code Status and Medical Interventions:   Ordered at: 01/17/19 0502     Level Of Support Discussed With:    Patient     Code Status:    CPR     Medical Interventions (Level of Support Prior to Arrest):    Full       Electronically signed by MATTHEW Iqbal, 01/17/19, 4:16 AM.    Brief Attending Admission Attestation     I have seen and examined the patient, performing an independent face-to-face diagnostic evaluation with plan of care reviewed and developed with the advanced practice clinician (APC).      Brief Summary Statement/HPI:   Shauna Valencia is a 63 y.o. female with history of small cell lung cancer of the left upper lobe and is currently on chemoradiation therapy.  Patient tells me she came to the ER because her blood glucose was very high and she could not get her glucometer to read the exact value.  She reports that she has been on some steroids due to her cancer and she attributes her elevated glucose to the high-dose steroids.  Patient denies vomiting though she's had nausea for several hours.  She denies fever, chills, chest pain, shortness of breath, abdominal pain but she reports about 5 episodes of non bloody diarrhea.  Initial blood glucose on presentation was 619mg/dl.  Also there is a slight bump in her creatinine due to dehydration.  Patient received 10 units of IV insulin and 1 L of normal saline in the ER.  Blood glucose seems to be trending down.  We'll continue IV hydration, insulin therapy and monitor renal function.  Her hemoglobin has dropped to 6.9 from 7.5.  We'll transfuse him packed red cells and recheck H&H.  There is no evidence of active bleed.  Patient may  be able to go home tomorrow morning if she remains clinically stable.      Attending Physical Exam:  Constitutional: No acute distress, awake, alert and oriented x 4.  Eyes: PERRLA, sclerae anicteric, no conjunctival injection  HENT: NCAT, mucous membranes are dry.  Neck: Supple, no thyromegaly, no lymphadenopathy, trachea midline  Respiratory: Clear to auscultation bilaterally, nonlabored respirations   Cardiovascular: RRR, no murmurs, rubs, or gallops, palpable pedal pulses bilaterally  Gastrointestinal: Positive bowel sounds, soft, nontender, nondistended  Musculoskeletal: No bilateral ankle edema, no clubbing or cyanosis to extremities  Psychiatric: Appropriate affect, cooperative  Neurologic: Oriented x 3, strength symmetric in all extremities, Cranial Nerves grossly intact, speech clear  Skin: No rashes      Brief Assessment/Plan :  See above for further detailed assessment and plan developed with APC which I have reviewed and/or edited.      Electronically signed by Daryn Crain MD, 01/17/19, 5:20 AM.             Electronically signed by Daryn Crain MD at 1/17/2019  5:30 AM          Physician Progress Notes (most recent note)      Ramone Huggins MD at 1/17/2019  4:28 PM        I saw Mrs. Valencia today as an inpatient.  She has limited stage small cell lung cancer and has been receiving concurrent chemo radiation therapy. Her last treatment was on 1/16/2019, and she is unfortunately admitted now with symptoms of intractable nausea, uncontrolled diabetes with fingerstick blood sugars in excess of 400, and esophagitis limiting oral intake. Since being admitted, she states she is feeling better, and her nausea is  improving. Blood sugars are now being controlled with supplemental insulin. She continues to state that she has sore swallowing. As far as her radiation is concerned, She actually only has one more day of treatment remaining. It would be in her best interest to complete  treatment, and as she is improving I will try to get these completed tomorrow.  In addition, I will add to her orders magic mouthwash as she’s been using this as an outp atient for symptomatic management of her esophagitis.   Electronically signed by Ramone Huggins MD at 1/17/2019  4:30 PM

## 2019-01-18 NOTE — PROGRESS NOTES
Continued Stay Note   [facility]     Patient Name: Shauna Valencia  MRN: [id number]  Today's Date: 1/18/2019    Admit Date: 1/16/2019    Discharge Plan     Row Name 01/18/19 1148       Plan    Plan  Home with home health     Patient/Family in Agreement with Plan  yes    Plan Comments  Patients goal remains to return home when medically ready - New home health order faxed to [facility] [phone]. CM will need to call them on day of discharge P [phone]. Skilled nursing will be following her for Picc line care. - CM following - nils [phone]     Final Discharge Disposition Code  06 - home with home health care        Discharge Codes    No documentation.             Nils Stephens RN

## 2019-01-18 NOTE — PLAN OF CARE
Problem: Patient Care Overview  Goal: Plan of Care Review  Outcome: Ongoing (interventions implemented as appropriate)   01/18/19 3025   Coping/Psychosocial   Plan of Care Reviewed With patient   Coping/Psychosocial   Patient Agreement with Plan of Care agrees   Plan of Care Review   Progress no change

## 2019-01-18 NOTE — PROGRESS NOTES
[facility]  PROGRESS NOTE    Patient Name: Shauna Valencia  : 1955  MRN: [id number]    Date of Admission: 2019  Length of Stay: 0  Primary Care Physician: Kurt Matson MD    Subjective   Subjective     CC:  hyperglycemia    HPI:  Feeling a bit better today. Eating well.    Review of Systems  Gen- No fevers, chills  CV- No chest pain, palpitations  Resp- No cough, dyspnea  GI- No N/V/D, abd pain        Otherwise ROS is negative except as mentioned in the HPI.    Objective   Objective     Vital Signs:   Temp:  [97.3 °F (36.3 °C)-98.3 °F (36.8 °C)] 97.9 °F (36.6 °C)  Heart Rate:  [56-74] 65  Resp:  [16-18] 18  BP: (119-167)/(57-89) 126/77        Physical Exam:  Constitutional -no acute distress, non toxic, in bed  HEENT-NCAT, mucous membranes moist  CV-RRR, S1 S2 normal, no m/r/g  Resp-CTAB, no wheezes, rhonchi or rales  Abd-soft, non-tender, non-distended, normo active bowel sounds, overweight  Ext-No lower extremity cyanosis, clubbing or edema bilaterally  Neuro-alert and oriented, speech clear, moves all extremities   Psych-normal affect   Skin- No rash on exposed UE or LE bilaterally    Results Reviewed:  I have personally reviewed current lab, radiology, and data and agree.    Results from last 7 days   Lab Units 19  1028 19  0439 19  0005 01/15/19  1045   WBC 10*3/mm3  --  8.31 10.07 7.50   HEMOGLOBIN g/dL 9.5* 6.9* 7.5* 7.6*   HEMATOCRIT % 28.1* 20.8* 22.4* 22.4*   PLATELETS 10*3/mm3  --  225 258 252     Results from last 7 days   Lab Units 19  0439 19  0005 01/15/19  1101 01/15/19  1045   SODIUM mmol/L 130* 124*  --  133   POTASSIUM mmol/L 4.4 5.8*  --  4.6   CHLORIDE mmol/L 98* 93*  --  97*   CO2 mmol/L 22.0 17.0*  --  26.0   BUN mg/dL 31* 29*  --  15   CREATININE mg/dL 1.32* 1.44* 1.00 1.12   GLUCOSE mg/dL 449* 619*  --  323*   CALCIUM mg/dL 8.2* 8.7  --  8.9   ALT (SGPT) U/L  --  31  --  19   AST (SGOT) U/L  --  43*  --  12      Estimated Creatinine Clearance: 45 mL/min (A) (by C-G formula based on SCr of 1.32 mg/dL (H)).    No results found for: BNP    Microbiology Results Abnormal     None          Imaging Results (last 24 hours)     ** No results found for the last 24 hours. **               I have reviewed the medications:    Current Facility-Administered Medications:   •  acetaminophen (TYLENOL) tablet 650 mg, 650 mg, Oral, Q4H PRN, Kenya Donovan APRN, 650 mg at 01/17/19 2107  •  acetaminophen (TYLENOL) tablet 650 mg, 650 mg, Oral, Once, Desmond Cardoso MD  •  aspirin EC tablet 81 mg, 81 mg, Oral, Daily, Tish Vallejo, APRN, 81 mg at 01/18/19 0817  •  carvedilol (COREG) tablet 6.25 mg, 6.25 mg, Oral, BID With Meals, Tish Vallejo, APRN, 6.25 mg at 01/18/19 0817  •  cetirizine (zyrTEC) tablet 5 mg, 5 mg, Oral, Daily, Tish Vallejo, APRN, 5 mg at 01/18/19 0816  •  dextrose (D50W) 25 g/ 50mL Intravenous Solution 25 g, 25 g, Intravenous, Q15 Min PRN, Tish Vallejo, APRN  •  dextrose (GLUTOSE) oral gel 15 g, 15 g, Oral, Q15 Min PRN, Tish Vallejo W, APRN  •  docusate sodium (COLACE) capsule 100 mg, 100 mg, Oral, BID, Tish Vallejo, APRN, 100 mg at 01/18/19 0815  •  famotidine (PEPCID) tablet 20 mg, 20 mg, Oral, BID, Tish Vallejo W, APRN, 20 mg at 01/18/19 0817  •  glucagon (human recombinant) (GLUCAGEN DIAGNOSTIC) injection 1 mg, 1 mg, Subcutaneous, PRN, Tish Vallejo W, APRN  •  [START ON 1/19/2019] insulin detemir (LEVEMIR) injection 10 Units, 10 Units, Subcutaneous, Daily, Josh Shearer MD  •  insulin lispro (humaLOG) injection 0-9 Units, 0-9 Units, Subcutaneous, 4x Daily With Meals & Nightly, Tish Vallejo, APRN, 9 Units at 01/18/19 1301  •  insulin lispro (humaLOG) injection 5 Units, 5 Units, Subcutaneous, TID With Meals, Josh Shearer MD, 5 Units at 01/18/19 1302  •  levothyroxine (SYNTHROID, LEVOTHROID) tablet 75 mcg, 75 mcg, Oral, Q AM, Tish Vallejo,  APRN, 75 mcg at 01/18/19 0545  •  magic mouthwash oral supsension 10 mL, 10 mL, Swish & Swallow, Q4H PRN, Ramone Huggins MD, 10 mL at 01/18/19 0939  •  ondansetron (ZOFRAN) tablet 4 mg, 4 mg, Oral, Q6H PRN, 4 mg at 01/18/19 0549 **OR** ondansetron (ZOFRAN) injection 4 mg, 4 mg, Intravenous, Q6H PRN, Gladis Donovana, APRN, 4 mg at 01/17/19 1101  •  pantoprazole (PROTONIX) EC tablet 40 mg, 40 mg, Oral, Daily, Tish Vallejo APRN, 40 mg at 01/18/19 0545  •  promethazine (PHENERGAN) tablet 25 mg, 25 mg, Oral, Q6H PRN, Tish Vallejo APRN, 25 mg at 01/17/19 1550  •  sodium chloride 0.9 % flush 10 mL, 10 mL, Intravenous, PRN, Nixon Garg MD  •  sodium chloride 0.9 % infusion, 125 mL/hr, Intravenous, Continuous, ConnJenni PA-C, Stopped at 01/17/19 0951  •  sucralfate (CARAFATE) tablet 1 g, 1 g, Oral, 4x Daily, Tish Vallejo APRN, 1 g at 01/18/19 0817  •  temazepam (RESTORIL) capsule 7.5 mg, 7.5 mg, Oral, Nightly PRN, Tish Vallejo, APRN  •  ticagrelor (BRILINTA) tablet tablet 60 mg, 60 mg, Oral, Daily, Tish Vallejo APRN, 60 mg at 01/18/19 0829  •  ticagrelor (BRILINTA) tablet tablet 60 mg, 60 mg, Oral, Once, Tish Vallejo, APRN  •  tolvaptan (SAMSCA) tablet 7.5 mg, 7.5 mg, Oral, Daily, Tish Vallejo APRN, 7.5 mg at 01/18/19 0818      Assessment/Plan   Assessment / Plan     Active Hospital Problems    Diagnosis Date Noted   • Dehydration [E86.0] 01/17/2019   • Anemia [D64.9] 01/17/2019   • Hyperkalemia [E87.5] 01/17/2019   • Small cell lung cancer, left upper lobe (CMS/HCC) [C34.12] 11/13/2018   • Hyperlipidemia [E78.5] 10/31/2018   • Type 2 diabetes mellitus (CMS/HCC) [E11.9] 10/31/2018          Brief Hospital Course to date:  Shauna ESTHER Valencia is a 63 y.o. female with history of DMII and small cell carcinoma, currently undergoing chemotherapy, presents with hyperglycemia    Hyperglycemia  - steroid induced in setting of DMII and ongoing  chemotherapy  - added prandial insulin today  - last dose of dexamethasone today as well  - check glucose qac, hs, midnight and prn  - continue IV fluids  - if improved tomorrow, possible discharge home, but will need close follow up with PCP as suspect future adjustments in insulin dosing will be needed. Perhaps back on orals when chemotherapy complete    Hyponatremia  - suspect current element of pseudohyponatremia due to elevated glucose  - remains on tolvaptan started during prior hospitalization    Anemia  -s/p 2 units PRBCs    DVT Prophylaxis:  mechanical    Disposition: I expect the patient to be discharged home in 1 day    CODE STATUS:   Code Status and Medical Interventions:   Ordered at: 01/17/19 0502     Level Of Support Discussed With:    Patient     Code Status:    CPR     Medical Interventions (Level of Support Prior to Arrest):    Full         Electronically signed by Josh Shearer MD, 01/18/19, 1:02 PM.

## 2019-01-18 NOTE — PROGRESS NOTES
"Adult Nutrition  Assessment/PES    Patient Name:  Shauna Valencia  YOB: 1955  MRN: [id number]  Admit Date:  1/16/2019    Assessment Date:  1/18/2019      Reason for Assessment     Row Name 01/18/19 1412          Reason for Assessment    Reason For Assessment  -- nsg adm database; 45 mins     Diagnosis  -- DM and hyperglycemia, dehydration, small cell lung cancer and is currently taking chemo and radiation therapy.          Nutrition/Diet History     Row Name 01/18/19 1419 01/18/19 1417       Nutrition/Diet History    Factors Affecting Nutritional Intake  -- pt/dtr give report that pt has been eating approx 50% of usual po intake during the past 2 weeks prior to adm.  -- pt reports that she has appetite, but has had decreased po intake during the past 2 weeks 2nd sx of \"burning\" in esophagus, which she attributes to radiation tx.          Anthropometrics     Row Name 01/18/19 1417 01/18/19 1415       Anthropometrics    Weight  --  -- ht=61in, uo=740nc; BMI=36.4       Usual Body Weight (UBW)    Weight Loss  -- pt states WGR=[id number], and reports no significant wt changes prior to adm.    --              Nutrition Prescription Ordered     Row Name 01/18/19 1415          Nutrition Prescription PO    Common Modifiers  Consistent Carbohydrate         Evaluation of Received Nutrient/Fluid Intake     Row Name 01/18/19 1415          PO Evaluation    Number of Days PO Intake Evaluated  Insufficient Data    Reviewed lab values; noted recent Hemoglobin A1C=11.4%            Problem/Interventions:  Problem 1     Row Name 01/18/19 1424          Nutrition Diagnoses Problem 1    Problem 1  Predicted Suboptimal Intake     Etiology (related to)  Factors Affecting Nutrition pt describes \"burning\" sensation in esophagus since starting radiation tx     Signs/Symptoms (evidenced by)  -- report of less than nl po intake during the past 2 weeks.                 Intervention Goal     Row Name 01/18/19 1424          Intervention " Goal    PO  Establish PO         Nutrition Intervention     Row Name 01/18/19 1426          Nutrition Intervention    RD/Tech Action  Follow Tx progress;Interview for preference;Encourage intake   pt has some complaints about  staff; RD contacted supervisor for  to speak with pt/dtr.          Nutrition Prescription     Row Name 01/18/19 1427          Nutrition Prescription PO    PO Prescription  Begin/change supplement     Supplement  Boost Glucose Control     Supplement Frequency  Daily         Education/Evaluation     Row Name 01/18/19 1427 01/18/19 1410       Education    Education  --  -- DM edu offered; pt states she has had previous DM diet edu at endocrinologist's office and states she plans to seek further outpt DM diet edu (and plans to speak with PCP about referral to RD in [place]) after d/c [facility]       Monitor/Evaluation    Monitor  Per protocol  Per protocol          Electronically signed by:  Beryl Nixon MS,RD,LD  01/18/19 2:27 PM

## 2019-01-18 NOTE — PLAN OF CARE
Problem: Patient Care Overview  Goal: Plan of Care Review  Outcome: Ongoing (interventions implemented as appropriate)   01/18/19 0139   Coping/Psychosocial   Plan of Care Reviewed With patient   Coping/Psychosocial   Patient Agreement with Plan of Care agrees   Plan of Care Review   Progress no change       Problem: Anemia (Adult)  Goal: Identify Related Risk Factors and Signs and Symptoms  Outcome: Ongoing (interventions implemented as appropriate)   01/18/19 0139   Anemia (Adult)   Related Risk Factors (Anemia) chemotherapy   Signs and Symptoms (Anemia) fatigue

## 2019-01-19 NOTE — PROGRESS NOTES
Case Management Discharge Note    Final Note: I spoke with Nisreen and notified her that patient is returning home today.  I also spoke with daughter and informed her that someone with [facility] would be contacting them to arrange first return visit.      Destination      No service has been selected for the patient.      Durable Medical Equipment      No service has been selected for the patient.      Dialysis/Infusion      No service has been selected for the patient.      Home Medical Care - Selection Complete      Service Provider Request Status Selected Services Address Phone Number Fax Number    [facility] Selected Home Health Services [address] , [place] [address] [phone]      Community Resources      No service has been selected for the patient.             Final Discharge Disposition Code: 06 - home with home health care

## 2019-01-19 NOTE — DISCHARGE INSTRUCTIONS
Blood sugar    0 units  Blood sugar 140-180 3 units  Blood sugar 181-240 4 units  Blood sugar 241-300 6 units  Blood sugar 301-350 8 units  Blood sugar 351-400 10 units  >401 Call PCP

## 2019-01-19 NOTE — PLAN OF CARE
Problem: Patient Care Overview  Goal: Plan of Care Review  Outcome: Ongoing (interventions implemented as appropriate)   01/19/19 0314   Coping/Psychosocial   Plan of Care Reviewed With patient   Coping/Psychosocial   Patient Agreement with Plan of Care agrees   Plan of Care Review   Progress improving       Problem: Anemia (Adult)  Goal: Identify Related Risk Factors and Signs and Symptoms  Outcome: Ongoing (interventions implemented as appropriate)   01/19/19 0314   Anemia (Adult)   Related Risk Factors (Anemia) chemotherapy   Signs and Symptoms (Anemia) fatigue;pallor;tongue sore

## 2019-01-19 NOTE — PLAN OF CARE
Problem: Patient Care Overview  Goal: Individualization and Mutuality  Outcome: Outcome(s) achieved Date Met: 01/19/19    Goal: Discharge Needs Assessment  Outcome: Outcome(s) achieved Date Met: 01/19/19    Goal: Interprofessional Rounds/Family Conf  Outcome: Outcome(s) achieved Date Met: 01/19/19      Problem: Anemia (Adult)  Goal: Symptom Improvement  Outcome: Outcome(s) achieved Date Met: 01/19/19      Problem: Patient Care Overview  Goal: Plan of Care Review  Outcome: Outcome(s) achieved Date Met: 01/19/19    Goal: Individualization and Mutuality  Outcome: Outcome(s) achieved Date Met: 01/19/19    Goal: Discharge Needs Assessment  Outcome: Outcome(s) achieved Date Met: 01/19/19    Goal: Interprofessional Rounds/Family Conf  Outcome: Outcome(s) achieved Date Met: 01/19/19

## 2019-01-19 NOTE — PROGRESS NOTES
Continued Stay Note  [facility]     Patient Name: Shauna Valencia  MRN: [id number]  Today's Date: 1/19/2019    Admit Date: 1/16/2019    Discharge Plan     Row Name 01/19/19 1333       Plan    Plan Comments  Left voicemail with on-call nurse at [facility].  Asked her to return my call so I can confirm she knows patient is being discharged today.        Discharge Codes    No documentation.       Expected Discharge Date and Time     Expected Discharge Date Expected Discharge Time    Jan 19, 2019             Soni Tavera

## 2019-01-19 NOTE — DISCHARGE SUMMARY
[facility]  DISCHARGE SUMMARY    Patient Name: Shauna Valencia  : 1955  MRN: [id number]    Date of Admission: 2019  Date of Discharge:  2019  Primary Care Physician: Kurt Matson MD    Consults     Date and Time Order Name Status Description    2019 0900 Hematology & Oncology Inpatient Consult Completed           Hospital Course     Presenting Problem:   Dehydration [E86.0]  Anemia [D64.9]    Active Hospital Problems    Diagnosis Date Noted   • Dehydration [E86.0] 2019   • Anemia [D64.9] 2019   • Hyperkalemia [E87.5] 2019   • Small cell lung cancer, left upper lobe (CMS/HCC) [C34.12] 2018   • Hyperlipidemia [E78.5] 10/31/2018   • Type 2 diabetes mellitus (CMS/HCC) [E11.9] 10/31/2018      Resolved Hospital Problems   No resolved problems to display.          Hospital Course:  Shauna Valencia is a 63 y.o. female with history of DMII and small cell carcinoma of the left upper lobe, currently undergoing chemotherapy and radiation therapy at the direction of Dr. Cardoso.  She presented to [facility] ED on 19 with hyperglycemia and was admitted for further evaluation and correction of glucose greater than 300 and associated dehydration with PABLO. Of note, she had been on decadron with chemotherapy treatment.      The patient was rehydrated with IV fluids and given basal insulin as well as meal time insulin for better control.  Anemia was noted on labs.  Stool for occult blood was negative, the patient was transfused two units of PRBCs.  Hemoglobin a1c was found to be 11.40, indicating poor glucose control.  Hyponatremia was noted as well but suspected to be an element of pseudohyponatremia due to significantly elevated glucose levels.      The patient's renal function normalized, hemoglobin has stabilized at 9.5.  Her blood glucose readings for the last 24 hours have ranged from 286-373.  Decadron was discontinued and it is thought that  "blood sugars will further stabilize in days to come.    Dr. Cardoso was consulted and followed the patient throughout this hospitalization.     At this time, the patient is clinically stable for discharge home.  She is eager to go.  She reports that she has \"lots of Novolog insulin\" at home and she is comfortable following a sliding scale with this.  She has been cleared by both Dr. Cardoso and hospitalist services to go home.      She will need to stop steroids at home, continue Lantus basal insulin of 20 units at night, as well as use her Novolog Flexpen on sliding scale as directed.  I have encouraged her to monitor her glucose closely.  She will need to follow up with her PCP and Dr. Cadroso at earliest available appointment.  It is recommended that BMP be rechecked within 1 week.      Novolog Flexpen Sliding Scale Instructions    Blood sugar    0 units  Blood sugar 140-180 3 units  Blood sugar 181-240 4 units  Blood sugar 241-300 6 units  Blood sugar 301-350 8 units  Blood sugar 351-400 10 units  >401 Call PCP      Discharge Follow Up Recommendations for labs/diagnostics:  Follow up with PCP at earliest available appointment for Blood sugar monitoring/management  Follow up with Dr. Cardoso at next available appointment    Day of Discharge     HPI:   Patient resting in bed, visitor at bedside.  Patient denies any new events overnight.  She is eager to go home once blood sugars are stable enough.  She continues to have intermittent nausea that is controlled with anti-emetics.      Review of Systems  Gen- No fevers, chills  CV- No chest pain, palpitations  Resp- No cough, dyspnea  GI- No V/D, abd pain, + Nausea  Otherwise ROS is negative except as mentioned in the HPI.    Vital Signs:   Temp:  [97.6 °F (36.4 °C)-98.2 °F (36.8 °C)] 98.1 °F (36.7 °C)  Heart Rate:  [52-75] 58  Resp:  [18] 18  BP: (134-162)/(75-93) 147/87     Physical Exam:  Constitutional -no acute distress, non toxic, in bed  HEENT-NCAT, mucous " membranes moist  CV-RRR, S1 S2 normal, no m/r/g  Resp-CTAB, no wheezes, rhonchi or rales  Abd-soft, non-tender, non-distended, normo active bowel sounds, overweight  Ext-No lower extremity cyanosis, clubbing or edema bilaterally  Neuro-alert and oriented, speech clear, moves all extremities   Psych-normal affect   Skin- No rash on exposed UE or LE bilaterally      Pertinent  and/or Most Recent Results     Results from last 7 days   Lab Units 01/19/19  0607 01/18/19  1028 01/17/19  0831 01/17/19  0439 01/17/19  0005 01/15/19  1101 01/15/19  1045   WBC 10*3/mm3  --   --   --  8.31 10.07  --  7.50   HEMOGLOBIN g/dL  --  9.5*  --  6.9* 7.5*  --  7.6*   HEMATOCRIT %  --  28.1* 23.4* 20.8* 22.4*  --  22.4*   PLATELETS 10*3/mm3  --   --   --  225 258  --  252   SODIUM mmol/L 131*  --   --  130* 124*  --  133   POTASSIUM mmol/L 3.3*  --   --  4.4 5.8*  --  4.6   CHLORIDE mmol/L 98*  --   --  98* 93*  --  97*   CO2 mmol/L 22.0  --   --  22.0 17.0*  --  26.0   BUN mg/dL 31*  --   --  31* 29*  --  15   CREATININE mg/dL 1.23  --   --  1.32* 1.44* 1.00 1.12   GLUCOSE mg/dL 307*  --   --  449* 619*  --  323*   CALCIUM mg/dL 7.6*  --   --  8.2* 8.7  --  8.9     Results from last 7 days   Lab Units 01/17/19  0005 01/15/19  1045   BILIRUBIN mg/dL 0.5 0.4   ALK PHOS U/L 102* 116*   ALT (SGPT) U/L 31 19   AST (SGOT) U/L 43* 12           Invalid input(s): TG, LDLCALC, LDLREALC  Results from last 7 days   Lab Units 01/17/19  0439   HEMOGLOBIN A1C % 11.40*       Brief Urine Lab Results  (Last result in the past 365 days)      Color   Clarity   Blood   Leuk Est   Nitrite   Protein   CREAT   Urine HCG        01/16/19 2357 Yellow Clear Negative Negative Negative Negative               Microbiology Results Abnormal     None          Imaging Results (all)     None          Results for orders placed during the hospital encounter of 12/05/18   Duplex Venous Lower Extremity - Right CAR    Narrative · Normal right lower extremity venous duplex  scan.          Results for orders placed during the hospital encounter of 12/05/18   Duplex Venous Lower Extremity - Right CAR    Narrative · Normal right lower extremity venous duplex scan.                Order Current Status    B-12 Binding Capacity In process    [place] Draw In process        Discharge Details        Discharge Medications      Changes to Medications      Instructions Start Date   insulin aspart 100 UNIT/ML solution pen-injector sc pen  Commonly known as:  novoLOG FLEXPEN  What changed:    · how much to take  · when to take this  · additional instructions   0-10 Units, Subcutaneous, 3 Times Daily With Meals, SEE instructions for Sliding Scale         Continue These Medications      Instructions Start Date   albuterol sulfate  (90 Base) MCG/ACT inhaler  Commonly known as:  PROVENTIL HFA;VENTOLIN HFA;PROAIR HFA   2 puffs, Inhalation, Every 4 Hours PRN      aspirin 81 MG EC tablet   81 mg, Oral, Daily      BRILINTA 60 MG tablet tablet  Generic drug:  ticagrelor   60 mg, Oral, Daily      cetirizine 10 MG tablet  Commonly known as:  zyrTEC   10 mg, Oral, Daily      COREG 6.25 MG tablet  Generic drug:  carvedilol   6.25 mg, Oral, 2 Times Daily With Meals      famotidine 20 MG tablet  Commonly known as:  PEPCID   20 mg, Oral, 2 Times Daily      insulin glargine 100 UNIT/ML injection  Commonly known as:  LANTUS   20 Units, Subcutaneous, Nightly      lactulose 10 GM/15ML solution  Commonly known as:  CHRONULAC   20 g, Oral, 3 Times Daily      levothyroxine 75 MCG tablet  Commonly known as:  SYNTHROID, LEVOTHROID   75 mcg, Oral, Daily      Magic mouthwash Radonc   5-10 mL, Swish & Swallow, 4 Times Daily Before Meals & Nightly      nicotine 14 MG/24HR patch  Commonly known as:  NICODERM CQ   1 patch, Transdermal, Every 24 Hours      ondansetron 8 MG tablet  Commonly known as:  ZOFRAN   8 mg, Oral, 3 Times Daily PRN      pantoprazole 40 MG EC tablet  Commonly known as:  PROTONIX   40 mg, Oral, Daily       promethazine 25 MG tablet  Commonly known as:  PHENERGAN   25 mg, Oral, Every 6 Hours PRN      STOOL SOFTENER 100 MG capsule  Generic drug:  docusate sodium   100 mg, Oral, 2 Times Daily      sucralfate 1 GM/10ML suspension  Commonly known as:  CARAFATE   1 g, Oral, 4 Times Daily      tolvaptan 15 MG tablet tablet  Commonly known as:  SAMSCA   7.5 mg, Oral, Daily      zolpidem 10 MG tablet  Commonly known as:  AMBIEN   10 mg, Oral, Every Night at Bedtime         Stop These Medications    dexamethasone 4 MG tablet  Commonly known as:  DECADRON            Allergies   Allergen Reactions   • Plavix [Clopidogrel Bisulfate] Anaphylaxis   • Codeine Other (See Comments)     Pt not certain of reatction   • Penicillins Other (See Comments)     Pt does not remember reaction         Discharge Disposition:  Home or Self Care    Discharge Diet:  Diet Order   Procedures   • Diet Regular; Consistent Carbohydrate         Discharge Activity:   Activity Instructions     Activity as Tolerated            Special Instructions:    Novolog Flexpen Sliding Scale Instructions    Blood sugar    0 units  Blood sugar 140-180 3 units  Blood sugar 181-240 4 units  Blood sugar 241-300 6 units  Blood sugar 301-350 8 units  Blood sugar 351-400 10 units  >401 Call PCP        CODE STATUS:    Code Status and Medical Interventions:   Ordered at: 01/17/19 0502     Level Of Support Discussed With:    Patient     Code Status:    CPR     Medical Interventions (Level of Support Prior to Arrest):    Full         Future Appointments   Date Time Provider Department Center   2/5/2019  8:30 AM Desmond Cardoso MD [facility] ONC DO[facility] DOE   2/5/2019  9:00 AM CHAIR 16  DOE OPI DOE   2/6/2019  1:00 PM CHAIR 18 [facility] DOE OPI DO[facility]   2/7/2019  1:00 PM CHAIR 7 [facility] DOE OPI DO[facility]   2/15/2019  9:00 AM Ramone Huggins MD NEE RAON DOE None       Additional Instructions for the Follow-ups that You Need to Schedule     Ambulatory Referral to Home Health   As directed       Face to Face Visit Date:  1/18/2019    Follow-up Provider for Plan of Care?:  I treated the patient in an acute care facility and will not continue treatment after discharge.    Follow-up Provider:  PATTI MATSON [[id number]]    Reason/Clinical Findings:  dehydration, Small cell lung cancer, left upper lobe, anemia    Describe mobility limitations that make leaving home difficult:  chemo/radiation, immunocompromised, weakness, anemia, impaired mobility, impaired ADLs    Nursing/Therapeutic Services Requested:  Skilled Nursing    Skilled nursing orders:  Medication education PICC line care/instruction Cardiopulmonary assessments         Discharge Follow-up with PCP   As directed       Currently Documented PCP:    Patti Matson MD    PCP Phone Number:    [phone]     Follow Up Details:  Follow up with PCP at earliest [facility] appointment.               Time Spent on Discharge:  37  minutes    Electronically signed by MATTHEW Berger, 01/19/19, 1:45 PM.

## 2019-01-20 NOTE — OUTREACH NOTE
Prep Survey      Responses   Facility patient discharged from?  [facility]   Is patient eligible?  Yes   Discharge diagnosis  Dehydration   Does the patient have one of the following disease processes/diagnoses(primary or secondary)?  Other   Does the patient have Home health ordered?  Yes   What is the Home health agency?   [facility]   Is there a DME ordered?  No   Prep survey completed?  Yes          Heidi Llamas RN

## 2019-01-21 NOTE — OUTREACH NOTE
Medical Week 1 Survey      Responses   Facility patient discharged from?  [facility]   Does the patient have one of the following disease processes/diagnoses(primary or secondary)?  Other   Is there a successful TCM telephone encounter documented?  No   Week 1 attempt successful?  No   Unsuccessful attempts  Attempt 1          Reyna Reyes RN

## 2019-01-21 NOTE — THERAPY TREATMENT NOTE
01/18/2019  RADIATION ONCOLOGY    Ms. Valencia was seen for status check today.  She is tolerating treatment well, has no new complaints and will complete this afternoon. A follow up appointment with Dr. Huggins is scheduled for 02/15/2019.

## 2019-01-22 NOTE — OUTREACH NOTE
Medical Week 1 Survey      Responses   Facility patient discharged from?  [facility]   Does the patient have one of the following disease processes/diagnoses(primary or secondary)?  Other   Is there a successful TCM telephone encounter documented?  No   Week 1 attempt successful?  No   Unsuccessful attempts  Attempt 2          Jeannine Swan RN

## 2019-01-28 NOTE — OUTREACH NOTE
Medical Week 2 Survey      Responses   Facility patient discharged from?  [facility]   Does the patient have one of the following disease processes/diagnoses(primary or secondary)?  Other   Week 2 attempt successful?  Yes   Call start time  1338   Discharge diagnosis  Dehydration   Call end time  1352   Meds reviewed with patient/caregiver?  Yes   Is the patient having any side effects they believe may be caused by any medication additions or changes?  No   Does the patient have all medications ordered at discharge?  No   What is keeping the patient from filling the prescriptions?  Script on hold per patient   Is the patient taking all medications as directed (includes completed medication regime)?  Yes   Does the patient have a primary care provider?   Yes   Does the patient have an appointment with their PCP within 7 days of discharge?  Yes   Has the patient kept scheduled appointments due by today?  Yes   What is the Home health agency?   [facility]   Has home health visited the patient within 72 hours of discharge?  Yes   Psychosocial issues?  No   Did the patient receive a copy of their discharge instructions?  Yes   Nursing interventions  Reviewed instructions with patient   What is the patient's perception of their health status since discharge?  Improving   Is the patient/caregiver able to teach back signs and symptoms related to disease process for when to call PCP?  Yes   Is the patient/caregiver able to teach back signs and symptoms related to disease process for when to call 911?  Yes   Is the patient/caregiver able to teach back the hierarchy of who to call/visit for symptoms/problems? PCP, Specialist, Home health nurse, Urgent Care, ED, 911  Yes   Additional teach back comments  [facility] still on hold, Dr. Robbins office is working on it.  Pt says she is doing ok.   Week 2 Call Completed?  Yes          Rachell Shah RN

## 2019-01-31 NOTE — THERAPY DISCHARGE NOTE
DATE OF COMPLETION: 1/19/2019  DIAGNOSIS: Limited Stage Small Cell Lung Cancer    REFERRING: Desmond Cardoso MD        Shauna Perez completed radiation therapy today.      BACKGROUND: Shauna Valencia 63-year-old female who is been diagnosed with a limited stage small cell lung cancer.  She received consolidative thoracic radiation therapy which coincided beginning with her second cycle of systemic chemotherapy and was delivered as detailed below:    Treatment Summary     Dates of Therapy: 12/27/2019 - 1/18/2019  Treatment Site: Left Upper Lobe and Mediastinum  Dose: 45 Gy in 30 fractions of 1.5 Gy each delivered twice daily  Technique: Her treatments were delivered using oblique photon fields with mixed 6 and 10 MV energy covering all the areas of PET positive disease, while minimizing the dose of radiation to the esophagus and spinal cord.    Treatment Course and Tolerance: She developed the expected grade 2 esophagitis as well as fatigue.  Unfortunately she had progressive issues related to uncontrolled blood sugars which seem to correlate with her high-dose steroids that were needed to block acute toxicity of chemotherapy, and during her final week of treatment she required hospital admission for IV rehydration and adjustment of her diabetes medications.  Because of his hospital admission, she had a 2 day treatment break, but otherwise she completed all remaining treatments as scheduled.    The initial follow up visit will be in 1 month.    Shauna Valencia knows to call if any problems or concerns develop in the meantime.     Electronically signed by: Ramone Huggins MD                    Cc: Kurt Matson MD

## 2019-02-05 NOTE — OUTREACH NOTE
Medical Week 3 Survey      Responses   Facility patient discharged from?  [facility]   Does the patient have one of the following disease processes/diagnoses(primary or secondary)?  Other   Week 3 attempt successful?  No   Unsuccessful attempts  Attempt 1          Trisha Johnson RN

## 2019-02-05 NOTE — CODE DOCUMENTATION
Patient's chemo tx deferred until 2-6 due to HGB=6.7, MG=0.8. Patient received 1 gram MG and 2 units of blood today.

## 2019-02-05 NOTE — TELEPHONE ENCOUNTER
----- Message from Gloria Humphreys RN sent at 2/5/2019 10:05 AM EST -----  Regarding: Dr. Cardoso - Abnormal lab results  Patient hemoglobin 6.7. Scheduled for chemotherapy today. Please advise. Thanks, Gloria GREEN ext. [phone]

## 2019-02-05 NOTE — TELEPHONE ENCOUNTER
----- Message from Heena Newman RN sent at 2/5/2019 10:41 AM EST -----      Critical Test Results      MD: Janae    Date: 2/5/2019     Critical test result: Mag 0.8    Time results received: 10:40

## 2019-02-05 NOTE — PROGRESS NOTES
DATE OF VISIT: 2/5/2019    REASON FOR VISIT: Followup for limited stage small cell lung cancer     HISTORY OF PRESENT ILLNESS: The patient is a very pleasant 63 y.o. female  with past medical history significant for limited stage small cell lung cancer diagnosed November 9, 2018.  She was started on chemotherapy using cisplatin and etoposide November 15, 2018. The patient is here today for cycle #5.    SUBJECTIVE: The patient is here today with her daughter.  Since her last visit she was admitted to the hospital with symptomatic anemia as well as uncontrolled hyperglycemia.  She is feeling better today she is complaining of fatigue.    PAST MEDICAL HISTORY/SOCIAL HISTORY/FAMILY HISTORY: Reviewed by me and unchanged from my documentation done on 02/05/19.    Review of Systems   Constitutional: Negative for activity change, appetite change, chills, fatigue, fever and unexpected weight change.   HENT: Negative for hearing loss, mouth sores, nosebleeds, sore throat and trouble swallowing.    Eyes: Negative for visual disturbance.   Respiratory: Negative for cough, chest tightness, shortness of breath and wheezing.    Cardiovascular: Negative for chest pain, palpitations and leg swelling.   Gastrointestinal: Positive for nausea. Negative for abdominal distention, abdominal pain, blood in stool, constipation, diarrhea, rectal pain and vomiting.   Endocrine: Negative for cold intolerance and heat intolerance.   Genitourinary: Negative for difficulty urinating, dysuria, frequency and urgency.   Musculoskeletal: Negative for arthralgias, back pain, gait problem, joint swelling and myalgias.   Skin: Negative for rash.   Neurological: Negative for dizziness, tremors, syncope, weakness, light-headedness, numbness and headaches.   Hematological: Negative for adenopathy. Does not bruise/bleed easily.   Psychiatric/Behavioral: Negative for confusion, sleep disturbance and suicidal ideas. The patient is not nervous/anxious.           Current Outpatient Medications:   •  albuterol (PROVENTIL HFA;VENTOLIN HFA) 108 (90 Base) MCG/ACT inhaler, Inhale 2 puffs Every 4 (Four) Hours As Needed for Wheezing., Disp: , Rfl:   •  aspirin 81 MG EC tablet, Take 81 mg by mouth Daily., Disp: , Rfl:   •  carvedilol (COREG) 6.25 MG tablet, Take 6.25 mg by mouth 2 (Two) Times a Day With Meals., Disp: , Rfl:   •  cetirizine (zyrTEC) 10 MG tablet, Take 10 mg by mouth Daily., Disp: , Rfl:   •  docusate sodium (COLACE) 100 MG capsule, Take 1 capsule by mouth 2 (Two) Times a Day., Disp: 60 capsule, Rfl: 3  •  famotidine (PEPCID) 20 MG tablet, Take 20 mg by mouth 2 (Two) Times a Day., Disp: , Rfl:   •  insulin aspart (novoLOG FLEXPEN) 100 UNIT/ML solution pen-injector sc pen, Inject 0-10 Units under the skin into the appropriate area as directed 3 (Three) Times a Day With Meals. SEE instructions for Sliding Scale, Disp: 15 mL, Rfl: 0  •  insulin glargine (LANTUS) 100 UNIT/ML injection, Inject 20 Units under the skin into the appropriate area as directed Every Night., Disp: , Rfl:   •  lactulose (CHRONULAC) 10 GM/15ML solution, Take 30 mL by mouth 3 (Three) Times a Day as needed for constipation, Disp: 240 mL, Rfl: 2  •  levothyroxine (SYNTHROID, LEVOTHROID) 75 MCG tablet, Take 75 mcg by mouth Daily., Disp: , Rfl:   •  Magic mouthwash Radonc, Swish and swallow 5-10 mL 4 (Four) Times a Day Before Meals & at Bedtime., Disp: 480 mL, Rfl: 2  •  nicotine (NICODERM CQ) 14 MG/24HR patch, Place 1 patch on the skin as directed by provider Daily., Disp: , Rfl:   •  ondansetron (ZOFRAN) 8 MG tablet, Take 1 tablet by mouth 3 (Three) Times a Day As Needed for Nausea or Vomiting., Disp: 30 tablet, Rfl: 5  •  pantoprazole (PROTONIX) 40 MG EC tablet, Take 40 mg by mouth Daily., Disp: , Rfl:   •  promethazine (PHENERGAN) 25 MG tablet, Take 1 tablet by mouth Every 6 (Six) Hours As Needed for Nausea or Vomiting., Disp: 45 tablet, Rfl: 5  •  sucralfate (CARAFATE) 1 GM/10ML suspension,  "Take 10 mL by mouth 4 (Four) Times a Day., Disp: 420 mL, Rfl: 2  •  ticagrelor (BRILINTA) 60 MG tablet tablet, Take 60 mg by mouth Daily., Disp: , Rfl:   •  tolvaptan (SAMSCA) 15 MG tablet tablet, Take 0.5 tablets by mouth Daily., Disp: 30 tablet, Rfl: 0  •  zolpidem (AMBIEN) 10 MG tablet, Take 1 tablet by mouth every night at bedtime., Disp: 30 tablet, Rfl: 0    PHYSICAL EXAMINATION:   /67   Pulse 93   Temp 98.4 °F (36.9 °C) (Temporal)   Resp 12   Ht 154.9 cm (61\")   Wt 85.7 kg (189 lb)   SpO2 97%   BMI 35.71 kg/m²    ECOG Performance Status: 1 - Symptomatic but completely ambulatory  General Appearance:  alert, cooperative, no apparent distress and appears stated age   Neurologic/Psychiatric: A&O x 3, gait steady, appropriate affect, strength 5/5 in all muscle groups   HEENT:  Normocephalic, without obvious abnormality, mucous membranes moist   Neck: Supple, symmetrical, trachea midline, no adenopathy;  No thyromegaly, masses, or tenderness   Lungs:   Clear to auscultation bilaterally; respirations regular, even, and unlabored bilaterally   Heart:  Regular rate and rhythm, no murmurs appreciated   Abdomen:   Soft, non-tender, non-distended and no organomegaly   Lymph nodes: No cervical, supraclavicular, inguinal or axillary adenopathy noted   Extremities: Normal, atraumatic; no clubbing, cyanosis, or edema    Skin: No rashes, ulcers, or suspicious lesions noted     No visits with results within 2 Week(s) from this visit.   Latest known visit with results is:   Admission on 01/16/2019, Discharged on 01/19/2019   Component Date Value Ref Range Status   • Glucose 01/17/2019 619* 70 - 100 mg/dL Final    Confirmed/called   • BUN 01/17/2019 29* 9 - 23 mg/dL Final   • Creatinine 01/17/2019 1.44* 0.60 - 1.30 mg/dL Final   • Sodium 01/17/2019 124* 132 - 146 mmol/L Final   • Potassium 01/17/2019 5.8* 3.5 - 5.5 mmol/L Final   • Chloride 01/17/2019 93* 99 - 109 mmol/L Final   • CO2 01/17/2019 17.0* 20.0 - 31.0 " mmol/L Final   • Calcium 01/17/2019 8.7  8.7 - 10.4 mg/dL Final   • Total Protein 01/17/2019 6.3  5.7 - 8.2 g/dL Final   • Albumin 01/17/2019 4.39  3.20 - 4.80 g/dL Final   • ALT (SGPT) 01/17/2019 31  7 - 40 U/L Final   • AST (SGOT) 01/17/2019 43* 0 - 33 U/L Final   • Alkaline Phosphatase 01/17/2019 102* 25 - 100 U/L Final   • Total Bilirubin 01/17/2019 0.5  0.3 - 1.2 mg/dL Final   • eGFR Non African Amer 01/17/2019 37* >60 mL/min/1.73 Final   • Globulin 01/17/2019 1.9  gm/dL Final   • A/G Ratio 01/17/2019 2.3  1.5 - 2.5 g/dL Final   • BUN/Creatinine Ratio 01/17/2019 20.1  7.0 - 25.0 Final   • Anion Gap 01/17/2019 14.0* 3.0 - 11.0 mmol/L Final   • Lipase 01/17/2019 27  6 - 51 U/L Final   • Color, UA 01/16/2019 Yellow  Yellow, Straw Final   • Appearance, UA 01/16/2019 Clear  Clear Final   • pH, UA 01/16/2019 <=5.0  5.0 - 8.0 Final   • Specific Gravity, UA 01/16/2019 1.029  1.001 - 1.030 Final   • Glucose, UA 01/16/2019 >=1000 mg/dL (3+)* Negative Final   • Ketones, UA 01/16/2019 Negative  Negative Final   • Bilirubin, UA 01/16/2019 Negative  Negative Final   • Blood, UA 01/16/2019 Negative  Negative Final   • Protein, UA 01/16/2019 Negative  Negative Final   • Leuk Esterase, UA 01/16/2019 Negative  Negative Final   • Nitrite, UA 01/16/2019 Negative  Negative Final   • Urobilinogen, UA 01/16/2019 0.2 E.U./dL  0.2 - 1.0 E.U./dL Final   • Extra Tube 01/17/2019 hold for add-on   Final    Auto resulted   • Extra Tube 01/17/2019 Hold for add-ons.   Final    Auto resulted.   • Extra Tube 01/17/2019 hold for add-on   Final    Auto resulted   • Extra Tube 01/17/2019 Hold for add-ons.   Final    Auto resulted.   • WBC 01/17/2019 10.07  3.50 - 10.80 10*3/mm3 Final   • RBC 01/17/2019 2.57* 3.89 - 5.14 10*6/mm3 Final   • Hemoglobin 01/17/2019 7.5* 11.5 - 15.5 g/dL Final   • Hematocrit 01/17/2019 22.4* 34.5 - 44.0 % Final   • MCV 01/17/2019 87.2  80.0 - 99.0 fL Final   • MCH 01/17/2019 29.2  27.0 - 31.0 pg Final   • MCHC  01/17/2019 33.5  32.0 - 36.0 g/dL Final   • RDW 01/17/2019 16.9* 11.3 - 14.5 % Final   • RDW-SD 01/17/2019 52.1  37.0 - 54.0 fl Final   • MPV 01/17/2019 9.0  6.0 - 12.0 fL Final   • Platelets 01/17/2019 258  150 - 450 10*3/mm3 Final   • Neutrophil % 01/17/2019 91.7* 41.0 - 71.0 % Final   • Lymphocyte % 01/17/2019 3.6* 24.0 - 44.0 % Final   • Monocyte % 01/17/2019 3.9  0.0 - 12.0 % Final   • Eosinophil % 01/17/2019 0.0  0.0 - 3.0 % Final   • Basophil % 01/17/2019 0.1  0.0 - 1.0 % Final   • Immature Grans % 01/17/2019 0.7* 0.0 - 0.6 % Final   • Neutrophils, Absolute 01/17/2019 9.24* 1.50 - 8.30 10*3/mm3 Final   • Lymphocytes, Absolute 01/17/2019 0.36* 0.60 - 4.80 10*3/mm3 Final   • Monocytes, Absolute 01/17/2019 0.39  0.00 - 1.00 10*3/mm3 Final   • Eosinophils, Absolute 01/17/2019 0.00  0.00 - 0.30 10*3/mm3 Final   • Basophils, Absolute 01/17/2019 0.01  0.00 - 0.20 10*3/mm3 Final   • Immature Grans, Absolute 01/17/2019 0.07* 0.00 - 0.03 10*3/mm3 Final   • Fecal Occult Blood 01/17/2019 Negative  Negative Final   • Lot Number 01/17/2019 [id number]   Final   • Expiration Date 01/17/2019 7/2,021   Final   • DEVELOPER LOT NUMBER 01/17/2019 [id number]   Final   • DEVELOPER EXPIRATION DATE 01/17/2019 112,021   Final   • Positive Control 01/17/2019 Positive  Positive Final   • Negative Control 01/17/2019 Negative  Negative Final   • Glucose 01/17/2019 488* 70 - 130 mg/dL Final   • Beta-Hydroxybutyrate Quant 01/17/2019 0.390  <=0.500 mmol/L Final   • Glucose 01/17/2019 449* 70 - 100 mg/dL Final    Confirmed/called   • BUN 01/17/2019 31* 9 - 23 mg/dL Final   • Creatinine 01/17/2019 1.32* 0.60 - 1.30 mg/dL Final   • Sodium 01/17/2019 130* 132 - 146 mmol/L Final   • Potassium 01/17/2019 4.4  3.5 - 5.5 mmol/L Final   • Chloride 01/17/2019 98* 99 - 109 mmol/L Final   • CO2 01/17/2019 22.0  20.0 - 31.0 mmol/L Final   • Calcium 01/17/2019 8.2* 8.7 - 10.4 mg/dL Final   • eGFR Non African Amer 01/17/2019 41* >60 mL/min/1.73 Final   •  BUN/Creatinine Ratio 01/17/2019 23.5  7.0 - 25.0 Final   • Anion Gap 01/17/2019 10.0  3.0 - 11.0 mmol/L Final   • WBC 01/17/2019 8.31  3.50 - 10.80 10*3/mm3 Final   • RBC 01/17/2019 2.39* 3.89 - 5.14 10*6/mm3 Final   • Hemoglobin 01/17/2019 6.9* 11.5 - 15.5 g/dL Final   • Hematocrit 01/17/2019 20.8* 34.5 - 44.0 % Final   • MCV 01/17/2019 87.0  80.0 - 99.0 fL Final   • MCH 01/17/2019 28.9  27.0 - 31.0 pg Final   • MCHC 01/17/2019 33.2  32.0 - 36.0 g/dL Final   • RDW 01/17/2019 16.9* 11.3 - 14.5 % Final   • RDW-SD 01/17/2019 51.7  37.0 - 54.0 fl Final   • MPV 01/17/2019 9.0  6.0 - 12.0 fL Final   • Platelets 01/17/2019 225  150 - 450 10*3/mm3 Final   • Neutrophil % 01/17/2019 91.9* 41.0 - 71.0 % Final   • Lymphocyte % 01/17/2019 2.3* 24.0 - 44.0 % Final   • Monocyte % 01/17/2019 5.8  0.0 - 12.0 % Final   • Eosinophil % 01/17/2019 0.0  0.0 - 3.0 % Final   • Basophil % 01/17/2019 0.0  0.0 - 1.0 % Final   • Immature Grans % 01/17/2019 0.7* 0.0 - 0.6 % Final   • Neutrophils, Absolute 01/17/2019 7.64  1.50 - 8.30 10*3/mm3 Final   • Lymphocytes, Absolute 01/17/2019 0.19* 0.60 - 4.80 10*3/mm3 Final   • Monocytes, Absolute 01/17/2019 0.48  0.00 - 1.00 10*3/mm3 Final   • Eosinophils, Absolute 01/17/2019 0.00  0.00 - 0.30 10*3/mm3 Final   • Basophils, Absolute 01/17/2019 0.00  0.00 - 0.20 10*3/mm3 Final   • Immature Grans, Absolute 01/17/2019 0.06* 0.00 - 0.03 10*3/mm3 Final   • ABO Type 01/17/2019 O   Final   • RH type 01/17/2019 Positive   Final   • Antibody Screen 01/17/2019 Negative   Final   • T&S Expiration Date 01/17/2019 1/20/2019 11:59:59 PM   Final   • Vitamin B-12 01/17/2019 >2,000* 211 - 911 pg/mL Final   • Vitamin B12 Binding Capacity 01/17/2019 1464  725 - 2045 pg/mL Final   • Folate 01/17/2019 13.47  3.20 - 20.00 ng/mL Final    Results may be falsely increased if patient taking Biotin.   • Folate, Hemolysate 01/17/2019 333.4  Not Estab. ng/mL Final   • Hematocrit 01/17/2019 23.4* 34.0 - 46.6 % Final   • RBC  Folate 01/17/2019 1425  >498 ng/mL Final   • Ferritin 01/17/2019 895.00* 10.00 - 291.00 ng/mL Final   • Haptoglobin 01/17/2019 127  34 - 200 mg/dL Final   • Iron 01/17/2019 256* 50 - 175 mcg/dL Final   • TIBC 01/17/2019 274  250 - 450 mcg/dL Final   • Iron Saturation 01/17/2019 93* 15 - 50 % Final   • Reticulocyte % 01/17/2019 3.13* 0.50 - 1.50 % Final   • Hemoglobin A1C 01/17/2019 11.40* 4.80 - 5.60 % Final   • Glucose 01/17/2019 453* 70 - 130 mg/dL Final   • Glucose 01/16/2019 >599* 70 - 130 mg/dL Final   • Glucose 01/17/2019 377* 70 - 130 mg/dL Final   • ABO Type 01/17/2019 O   Final   • RH type 01/17/2019 Positive   Final   • Product Code 01/18/2019 E4756H45   Final   • Unit Number 01/18/2019 [id number]   Final   • UNIT  ABO 01/18/2019 O   Final   • UNIT  RH 01/18/2019 POS   Final   • Dispense Status 01/18/2019 PT   Final   • Blood Type 01/18/2019 OPOS   Final   • Blood Expiration Date 01/18/2019 201902062359   Final   • Blood Type Barcode 01/18/2019 5100   Final   • Product Code 01/18/2019 [id number]   Final   • Unit Number 01/18/2019 [id number]   Final   • UNIT  ABO 01/18/2019 O   Final   • UNIT  RH 01/18/2019 POS   Final   • Dispense Status 01/18/2019 PT   Final   • Blood Type 01/18/2019 OPOS   Final   • Blood Expiration Date 01/18/2019 201902062359   Final   • Blood Type Barcode 01/18/2019 5100   Final   • Glucose 01/17/2019 314* 70 - 130 mg/dL Final   • Glucose 01/17/2019 445* 70 - 130 mg/dL Final   • Glucose 01/17/2019 399* 70 - 130 mg/dL Final   • Hemoglobin 01/18/2019 9.5* 11.5 - 15.5 g/dL Final   • Hematocrit 01/18/2019 28.1* 34.5 - 44.0 % Final   • Glucose 01/18/2019 348* 70 - 130 mg/dL Final   • Glucose 01/18/2019 409* 70 - 130 mg/dL Final   • Glucose 01/18/2019 453* 70 - 130 mg/dL Final   • Glucose 01/18/2019 462* 70 - 130 mg/dL Final   • Glucose 01/18/2019 457* 70 - 130 mg/dL Final   • Glucose 01/18/2019 373* 70 - 130 mg/dL Final   • Glucose 01/19/2019 307* 70 - 100 mg/dL Final   • BUN  01/19/2019 31* 9 - 23 mg/dL Final   • Creatinine 01/19/2019 1.23  0.60 - 1.30 mg/dL Final   • Sodium 01/19/2019 131* 132 - 146 mmol/L Final   • Potassium 01/19/2019 3.3* 3.5 - 5.5 mmol/L Final    Verified by repeat analysis.    • Chloride 01/19/2019 98* 99 - 109 mmol/L Final   • CO2 01/19/2019 22.0  20.0 - 31.0 mmol/L Final   • Calcium 01/19/2019 7.6* 8.7 - 10.4 mg/dL Final   • eGFR Non African Amer 01/19/2019 44* >60 mL/min/1.73 Final   • BUN/Creatinine Ratio 01/19/2019 25.2* 7.0 - 25.0 Final   • Anion Gap 01/19/2019 11.0  3.0 - 11.0 mmol/L Final   • Glucose 01/19/2019 303* 70 - 130 mg/dL Final   • Glucose 01/19/2019 286* 70 - 130 mg/dL Final        Ct Chest With Contrast    Result Date: 1/10/2019  Narrative: EXAMINATION: CT CHEST W/CONTRAST - 1/10/2019  INDICATION: C34.12-Malignant neoplasm of upper lobe, left bronchus or lung.  TECHNIQUE: CT scan of the chest was performed following intravenous contrast.  The radiation dose reduction device was turned on for each scan per the ALARA (As Low as Reasonably Achievable) protocol.  COMPARISON: 11/8/2018  FINDINGS: There is no axillary lymphadenopathy. There is no mediastinal or hilar adenopathy. There is no pericardial or pleural effusion. Images displayed at lung window settings reveal no mass, consolidation or nodule. There are scattered calcified granulomas. There has been resolution of mediastinal adenopathy, bulky left hilar lymphadenopathy and the left upper lobe pulmonary nodule is no longer present.      Impression: Negative CT scan of the chest. There has been dramatic interval improvement when compared to previous examination of 11/8/2018.  DICTATED:   1/10/2019 EDITED/ls :   1/10/2019   This report was finalized on 1/10/2019 4:28 PM by Dr. Julián Barrios MD.      Ct Abdomen Pelvis With Contrast    Result Date: 1/10/2019  Narrative: EXAMINATION: CT ABDOMEN PELVIS W/CONTRAST - 1/10/2019  INDICATION: C34.12-Malignant neoplasm of upper lobe, left bronchus or lung.   TECHNIQUE: CT scan of abdomen pelvis performed following intravenous contrast.  The radiation dose reduction device was turned on for each scan per the ALARA (As Low as Reasonably Achievable) protocol.  COMPARISON: 11/13/2018  FINDINGS: The liver and spleen are normal. There is no adrenal mass. The pancreas is normal. There is no renal mass other than a simple cyst in the left kidney. There is no ascites or aneurysm or retroperitoneal lymphadenopathy. There is no pelvic mass or fluid. There is no inguinal lymphadenopathy.      Impression: Negative CT scan of the abdomen and pelvis.  DICTATED:   1/10/2019 EDITED/ls :   1/10/2019   This report was finalized on 1/10/2019 4:27 PM by Dr. Julián Barrois MD.        ASSESSMENT: The patient is a very pleasant 63 y.o. female  with  Left upper lobe small cell lung cancer [id number] stage IIIa disease:  A.  Presented with shortness of breath.  B.  Status post bronchoscopy with a biopsy done on November 9, 2018 revealed small cell lung cancer   C. Started chemotherapy with cisplatin and etoposide November 15, 2018, status post 4 cycles  D. completed concurrent radiation January 18, 2019  2.  Hyponatremia  3.  Normocytic anemia   4.  Insomnia  5.  Chemotherapy-induced nausea  6.  Treatment induced esophagitis  7.  Treatment induced dehydration  8.  Type 2 diabetes    PLAN:  1.  I will proceed with treatment as scheduled today cycle #5.  2.  The patient will follow-up with me in 4 weeks for cycle #6 out of 6 planned.  3.  I will monitor patient blood work including blood counts kidney function liver function and electrolytes were  4. I will repeat her scans in 3 months which would be due April 2019.  5.  I will continue Zofran as needed for chemotherapy induced nausea as well as Decadron on day 2,3, and 4.  6.  We'll continue Restoril as needed for insomnia.  7.  The patient completed her concurrent radiation on January 18, 2019.  8. We reviewed the potential side effects of this  regimen including fatigue, vomiting and nausea, hair loss, nephropathy, neuropathy, hearing loss, myelosuppression, and risk of infusion reaction.  9.  I will continue magic mouthwash as needed for treatment induced esophagitis.  10.  We'll continue IV fluid as needed for dehydration  11.  We'll adjust the patient insulin based on her blood sugar readings.  I explained to her that she is getting pretreatment steroids which contributing to her worsening hyperglycemia.  12.  The patient did receive 2 units of blood at the hospital.  We'll continue transfusions as needed for hemoglobin less than 8.  13.  I will reduce her cisplatin dose by 25% for cycle #5 and 6 secondary to severe nausea and vomiting.  Desmond Cardoso MD  2/5/2019

## 2019-02-10 NOTE — OUTREACH NOTE
Medical Week 4 Survey      Responses   Facility patient discharged from?  [facility]   Does the patient have one of the following disease processes/diagnoses(primary or secondary)?  Other   Week 4 attempt successful?  Yes   Call start time  1708   Call end time  1710   Discharge diagnosis  Dehydration   Person spoke with today (if not patient) and relationship  daughter, Jade Dasilva reviewed with patient/caregiver?  Yes   Is the patient having any side effects they believe may be caused by any medication additions or changes?  No   Prescription comments  finished last dose of steroid this am   Is the patient taking all medications as directed (includes completed medication regime)?  Yes   Has the patient kept scheduled appointments due by today?  Yes   Comments  has appt on 3/5 for last chemo treatment   Psychosocial issues?  No   Comments  Blood glucose up to 500 a few days ago while taking steroid. This was only episode when it was elevated.   this am.     What is the patient's perception of their health status since discharge?  Improving   Is the patient/caregiver able to teach back signs and symptoms related to disease process for when to call PCP?  Yes   Is the patient/caregiver able to teach back signs and symptoms related to disease process for when to call 911?  Yes   Is the patient/caregiver able to teach back the hierarchy of who to call/visit for symptoms/problems? PCP, Specialist, Home health nurse, Urgent Care, ED, 911  Yes   Week 4 Call Completed?  Yes   Would the patient like one additional call?  No   Graduated  Yes   Did the patient feel the follow up calls were helpful during their recovery period?  Yes   Was the number of calls appropriate?  Yes          Yvette Jackson RN

## 2019-02-15 NOTE — PROGRESS NOTES
FOLLOW UP NOTE    PATIENT:                                                      Shauna Valencia  MEDICAL RECORD #:                        [id number]  :                                                          1955  COMPLETION DATE:    2019  DIAGNOSIS:     Cancer Staging  Small cell lung cancer, left upper lobe (CMS/HCC)  Staging form: Lung, AJCC 8th Edition  - Clinical stage from 2018: Stage IIIA (cT1b, cN2, cM0) - Signed by Ramone Huggins MD on 2018    BRIEF HISTORY:  I saw Mrs. Valencia today in follow-up related to her limited stage small cell lung cancer.  She is been receiving systemic chemotherapy and she completed thoracic radiation therapy to a dose of 45 [place], delivered in a hyperfractionated treatment course one month ago.  She returns today for a scheduled follow-up visit.  At this point, she has completed a total of 5 cycles of cisplatin and etoposide, and is scheduled for her sixth and final chemotherapy treatment on 2019.  From a symptomatic standpoint, she continues to report fatigue.  She states that her symptoms of sore swallowing has improved and that she only occasionally develops reflux like symptoms.  Her other main complaint today is related to bilateral ringing in her ears, which has been constant, and started following her last cycle of chemotherapy.  She also states that she thinks she may have had some hearing loss.  She specifically states that she has not informed Dr. Cardoso of this yet.    MEDICATIONS: Medication reconciliation for the patient was reviewed and confirmed in the electronic medical record.    Review of Systems   Constitutional: Positive for fatigue.   HENT:   Positive for tinnitus and trouble swallowing (reports irritation with eating still-will continue maalox).    Respiratory: Positive for cough (occ-clear sputum).    Gastrointestinal: Positive for constipation and nausea.   Hematological: Bruises/bleeds easily.    Psychiatric/Behavioral: Positive for sleep disturbance.   All other systems reviewed and are negative.      KPS 80%    Physical Exam   Constitutional: She is oriented to person, place, and time. She appears well-developed and well-nourished. No distress.   Slightly pale   HENT:   Head: Normocephalic and atraumatic.   Mouth/Throat: Oropharynx is clear and moist.   Treatment related alopecia   Eyes: Conjunctivae and EOM are normal. Pupils are equal, round, and reactive to light.   Neck: Normal range of motion. Neck supple.   Cardiovascular: Normal rate and regular rhythm. Exam reveals no friction rub.   No murmur heard.  Pulmonary/Chest: Effort normal and breath sounds normal. She has no wheezes.   Abdominal: Soft. Bowel sounds are normal. She exhibits no distension and no mass. There is no tenderness.   Musculoskeletal: Normal range of motion. She exhibits no edema.   Lymphadenopathy:     She has no cervical adenopathy.   Neurological: She is alert and oriented to person, place, and time.   Skin: Skin is warm and dry.   Minimal erythema over the left superior lateral back consistent with a radiation skin reaction   Psychiatric: She has a normal mood and affect. Her behavior is normal. Judgment and thought content normal.   Nursing note and vitals reviewed.      VITAL SIGNS:   Vitals:    02/15/19 0907   BP: 135/92   Pulse: 87   Resp: 18   Temp: 96.6 °F (35.9 °C)   TempSrc: Temporal   SpO2: 97%   Weight: 84.9 kg (187 lb 1.6 oz)   PainSc: 0-No pain       The following portions of the patient's history were reviewed and updated as appropriate: allergies, current medications, past family history, past medical history, past social history, past surgical history and problem list.  IMAGING  I have personally reviewed the relevant imaging studies, as follows:  Ct Chest Without Contrast    Result Date: 10/31/2018  1. Findings concerning for LEFT HILAR MALIGNANT SOFT TISSUE MASS with postobstructive focal  pneumonia/bronchiolitis in the LEFT upper lobe. 2. Likely METASTATIC mediastinal and possibly LEFT hilar lymph nodes. 3. Coronary arterial calcification suggestive of CAD. 4. Other nonemergent/incidental findings as described. THIS DOCUMENT HAS BEEN ELECTRONICALLY SIGNED BY BATOOL SAMANO MD    Ct Chest With Contrast    Result Date: 1/10/2019  Negative CT scan of the chest. There has been dramatic interval improvement when compared to previous examination of 11/8/2018.  DICTATED:   1/10/2019 EDITED/ls :   1/10/2019   This report was finalized on 1/10/2019 4:28 PM by Dr. Julián Barrios MD.      Ct Chest With Contrast    Result Date: 11/8/2018   1. Small nodule in the left upper lobe could represent primary neoplasm. 2. Metastatic left hilar and mediastinal lymph nodes. 3. Narrowing of the left hilar vessels.  D:  11/08/2018 E:  11/08/2018  This report was finalized on 11/8/2018 3:37 PM by Ap Wright.      Mri Brain With & Without Contrast    Result Date: 11/14/2018  No evidence of brain metastases.  D:  11/14/2018 E:  11/14/2018   This report was finalized on 11/14/2018 10:09 AM by Ap Wright.      Nm Bone Scan Whole Body    Result Date: 11/15/2018  Arthritic and postoperative changes described above. There is no evidence of metastatic disease.  DICTATED:   11/14/2018 EDITED/ls :   11/14/2018    This report was finalized on 11/15/2018 8:24 AM by Dr. Julián Barrios MD.      Ct Abdomen Pelvis With Contrast    Result Date: 1/10/2019  Negative CT scan of the abdomen and pelvis.  DICTATED:   1/10/2019 EDITED/ls :   1/10/2019   This report was finalized on 1/10/2019 4:27 PM by Dr. Julián aBrrios MD.             Shauna was seen today for lung cancer.    Diagnoses and all orders for this visit:    Sensorineural hearing loss (SNHL) of both ears  -     Comprehensive Hearing Test; Future    Mass of upper lobe of left lung    Tinnitus of both ears  -     Comprehensive Hearing Test; Future    Small cell lung cancer (CMS/HCC)          IMPRESSION:  Mrs. Valencia is a 63 year old with limited stage small cell lung cancer who is one month out from completion of thoracic radiation and is nearing completion of chemotherapy.  I am very pleased with her progress and recovery.  She is scheduled to have repeat imaging following her last cycle which should take place in April 2019.  I discussed the role of prophylactic cranial irradiation, and how this has been shown to prevent the develop of CNS disease and also to prolong overall survival.  She is agreeable, so I will see her back after her scans are completed and we can coordinate for her to receive PCI, which will consist of 25 Gy in 10 fractions and I also discussed the use of memantine concurrently to limit and/or prevent cognitive changes.    In addition, she reported tinnitus and hearing loss today which is new since her last cycle of chemotherapy.  This could be related to the Cisplatin she has been receiving which is known to cause ototoxicity.  I have ordered a comprehensive hearing test to be performed prior to her next cycle of chemotherapy which should be completed prior to her next visit with Dr. Cardoso.    RECOMMENDATIONS:      Return in about 2 months (around 4/15/2019) for Office Visit, Radiation Simulation.    Ramone Huggins MD    Errors in dictation may reflect use of voice recognition software and not all errors in transcription may have been detected prior to signing.

## 2019-02-28 PROBLEM — E83.42 HYPOMAGNESEMIA: Status: ACTIVE | Noted: 2019-01-01

## 2019-02-28 PROBLEM — E83.51 HYPOCALCEMIA: Status: ACTIVE | Noted: 2019-01-01

## 2019-02-28 PROBLEM — N18.30 CKD (CHRONIC KIDNEY DISEASE) STAGE 3, GFR 30-59 ML/MIN (HCC): Status: ACTIVE | Noted: 2019-01-01

## 2019-02-28 PROBLEM — R11.2 NAUSEA & VOMITING: Status: ACTIVE | Noted: 2019-01-01

## 2019-02-28 PROBLEM — D64.9 SYMPTOMATIC ANEMIA: Status: ACTIVE | Noted: 2019-01-01

## 2019-02-28 PROBLEM — R53.1 GENERALIZED WEAKNESS: Status: ACTIVE | Noted: 2019-01-01

## 2019-03-01 NOTE — DISCHARGE SUMMARY
[facility] Services  DISCHARGE SUMMARY    Patient Name: Shauna Valencia  : 1955  MRN: [id number]    Date of Admission: 2019  Date of Discharge:  3/1/2019  Primary Care Physician: Kurt Matson MD    Consults     Date and Time Order Name Status Description    2019 0242 Inpatient Hematology & Oncology Consult Completed           Hospital Course     Presenting Problem:   Symptomatic anemia [D64.9]    Active Hospital Problems    Diagnosis Date Noted   • Symptomatic anemia [D64.9] 2019   • Generalized weakness [R53.1] 2019   • Nausea & vomiting [R11.2] 2019   • CKD (chronic kidney disease) stage 3, GFR 30-59 ml/min (CMS/[facility]) [N18.3] 2019   • Hypomagnesemia [E83.42] 2019   • Hypocalcemia [E83.51] 2019   • Small cell lung cancer, left upper lobe (CMS/[facility]) [C34.12] 2018   • Type 2 diabetes mellitus (CMS/[facility]) [E11.9] 10/31/2018   • Hyperlipidemia [E78.5] 10/31/2018      Resolved Hospital Problems   No resolved problems to display.          Hospital Course:  Shauna Valencia is a 63 y.o. female here with acute nausea and vomiting with electrolyte disturbances and mild PABLO. Her PABLO has resolved with IVF. Her low magnesium has resolved with replacement. She has hypocalcemia that has improved with replacement, but not resolved. She will be discharged on calcium and Vitamin D supplements at discharge with follow up with Dr. Cardoso on Tuesday. A 25-OH Vitamin D level is pending. Her nausea and vomiting have resolved and she is tolerating PO. Her volume status has improved after IVF.    She was admitted with chemotherapy induced anemia and transfused 2 Units PRBC with marked improvement. Her hemoglobin is stable and she is ready for discharge.    Day of Discharge     HPI:   No complaints. She denies n/v. No diarrhea. Tolerating PO. Denies dyspnea.    Review of Systems  Otherwise ROS is negative except as mentioned in the HPI.    Vital Signs:    Temp:  [97.5 °F (36.4 °C)-98.7 °F (37.1 °C)] 97.9 °F (36.6 °C)  Heart Rate:  [72-86] 78  Resp:  [16-21] 18  BP: (110-160)/(70-83) 140/70     Physical Exam:  NAD, alert and oriented  OP clear, MMM  Neck supple  No LAD  RRR  CTAB  +BS, ND, NT  POLLACK  No c/c/e  No rashes  Pale    Pertinent  and/or Most Recent Results     Results from last 7 days   Lab Units 03/01/19  0456 02/28/19  1012 02/28/19  0024   WBC 10*3/mm3 9.63 7.51 8.24   HEMOGLOBIN g/dL 9.2* 8.9* 6.9*   HEMATOCRIT % 27.8* 26.1* 20.7*   PLATELETS 10*3/mm3 307 258 296   SODIUM mmol/L 137 141 138   POTASSIUM mmol/L 4.4 3.1* 3.4*   CHLORIDE mmol/L 106 104 99   CO2 mmol/L 23.0 27.0 24.0   BUN mg/dL 9 10 12   CREATININE mg/dL 1.19 1.36* 1.64*   GLUCOSE mg/dL 169* 144* 231*   CALCIUM mg/dL 6.6* 6.0* 5.7*     Results from last 7 days   Lab Units 03/01/19  0456 02/28/19  0024   BILIRUBIN mg/dL 0.4 0.4   ALK PHOS U/L 78 74   ALT (SGPT) U/L 15 15   AST (SGOT) U/L 15 16           Invalid input(s): TG, LDLCALC, LDLREALC  Results from last 7 days   Lab Units 02/28/19  0037 02/28/19  0024   TSH mIU/mL  --  0.902   TROPONIN I ng/mL 0.00  --        Brief Urine Lab Results  (Last result in the past 365 days)      Color   Clarity   Blood   Leuk Est   Nitrite   Protein   CREAT   Urine HCG        02/28/19 0128 Yellow Clear Negative Negative Negative 30 mg/dL (1+)               Microbiology Results Abnormal     Procedure Component Value - Date/Time    Influenza Antigen, Rapid - Swab, Nasopharynx [[id number]]  (Normal) Collected:  02/28/19 0025    Lab Status:  Final result Specimen:  Swab from Nasopharynx Updated:  02/28/19 0049     Influenza A Ag, EIA Negative     Influenza B Ag, EIA Negative          Imaging Results (all)     Procedure Component Value Units Date/Time    CT Abdomen Pelvis Without Contrast [[id number]] Collected:  02/28/19 0147     Updated:  02/28/19 0208    Narrative:       EXAM:    CT Abdomen and Pelvis Without Contrast     EXAM DATE/TIME:    2/28/2019 1:47 AM      CLINICAL HISTORY:    63 years old, female; Anemia, unspecified; Signs and symptoms; Nausea and   vomiting; Prior surgery; Additional info: N/v     TECHNIQUE:    Axial computed tomography images of the abdomen and pelvis without contrast.    All CT scans at this facility use at least one of these dose optimization   techniques: automated exposure control; mA and/or kV adjustment per patient   size (includes targeted exams where dose is matched to clinical indication); or   iterative reconstruction.    Coronal reformatted images were created and reviewed.     COMPARISON:    CT ABDOMEN PELVIS W CONTRAST 1/10/2019 10:17 AM     FINDINGS:    Lower thorax:  Calcified granuloma within the posterior left lower lobe.     ABDOMEN:    Liver: Normal.    Gallbladder and bile ducts: Normal    Pancreas: Normal.    Spleen:  Splenic calcifications, compatible with prior granulomatous disease.    Adrenals: Normal.     Kidneys and ureters:  Bilateral simple renal cysts, the largest on the left   measuring approximately 4 cm in diameter.    Stomach and bowel:  Colonic diverticulosis.    Appendix: No evidence of appendicitis.     PELVIS:    Bladder: Unremarkable as visualized.    Reproductive: Unremarkable as visualized.     ABDOMEN and PELVIS:    Intraperitoneal space: Normal. No free air. No significant fluid collection.    Bones/joints:  Right hip arthroplasty, without acute complications. Multilevel   thoracolumbar spine degenerative changes.    Soft tissues: Normal.    Vasculature:  Atherosclerotic calcification of the visualized coronary   arteries. Phleboliths within the pelvis.    Lymph nodes: Normal. No enlarged lymph nodes.       Impression:       No acute abdominal or pelvic abnormality.    THIS DOCUMENT HAS BEEN ELECTRONICALLY SIGNED BY SUZANNE SHAW MD    XR Chest 1 View [[id number]] Collected:  02/27/19 2259     Updated:  02/27/19 8296    Narrative:       EXAM:    XR Chest, 1 View     EXAM DATE/TIME:    2/27/2019 10:59 PM      CLINICAL HISTORY:    63 years old, female; Pain; Other: Weakness; Additional info: Weak/dizzy/ams   triage protocol     TECHNIQUE:    XR of the chest, 1 view.     COMPARISON:    CR XR CHEST 1 VW 11/9/2018 5:54 AM     FINDINGS:    Lungs: Normal.    Pleural space: Normal.    Heart/Mediastinum: Normal.    Bones/joints: No acute abnormality.       Impression:       No acute findings.     THIS DOCUMENT HAS BEEN ELECTRONICALLY SIGNED BY SUZANNE SHAW MD          Results for orders placed during the hospital encounter of 12/05/18   Duplex Venous Lower Extremity - Right CAR    Narrative · Normal right lower extremity venous duplex scan.          Results for orders placed during the hospital encounter of 12/05/18   Duplex Venous Lower Extremity - Right CAR    Narrative · Normal right lower extremity venous duplex scan.                Order Current Status    Folate RBC In process        Discharge Details        Discharge Medications      New Medications      Instructions Start Date   calcium carbonate 1250 MG capsule   1,250 mg, Oral, 3 Times Daily      vitamin D 61744 units capsule capsule  Commonly known as:  ERGOCALCIFEROL   50,000 Units, Oral, Weekly         Changes to Medications      Instructions Start Date   STOOL SOFTENER 100 MG capsule  Generic drug:  docusate sodium  What changed:    · when to take this  · reasons to take this   100 mg, Oral, 2 Times Daily         Continue These Medications      Instructions Start Date   albuterol sulfate  (90 Base) MCG/ACT inhaler  Commonly known as:  PROVENTIL HFA;VENTOLIN HFA;PROAIR HFA   2 puffs, Inhalation, Every 4 Hours PRN      aspirin 81 MG EC tablet   81 mg, Oral, Daily      BRILINTA 60 MG tablet tablet  Generic drug:  ticagrelor   60 mg, Oral, Daily      cetirizine 10 MG tablet  Commonly known as:  zyrTEC   10 mg, Oral, Daily      COREG 6.25 MG tablet  Generic drug:  carvedilol   6.25 mg, Oral, 2 Times Daily With Meals      famotidine 20 MG tablet  Commonly known  as:  PEPCID   20 mg, Oral, 2 Times Daily      insulin aspart 100 UNIT/ML solution pen-injector sc pen  Commonly known as:  novoLOG FLEXPEN   0-10 Units, Subcutaneous, 3 Times Daily With Meals, SEE instructions for Sliding Scale      insulin glargine 100 UNIT/ML injection  Commonly known as:  LANTUS   20 Units, Subcutaneous, Nightly      lactulose 10 GM/15ML solution  Commonly known as:  CHRONULAC   20 g, Oral, 3 Times Daily      levothyroxine 75 MCG tablet  Commonly known as:  SYNTHROID, LEVOTHROID   75 mcg, Oral, Daily      Magic mouthwash Radonc   5-10 mL, Swish & Swallow, 4 Times Daily Before Meals & Nightly      magnesium oxide 400 MG tablet  Commonly known as:  MAG-OX   400 mg, Oral, 2 Times Daily      nicotine 14 MG/24HR patch  Commonly known as:  NICODERM CQ   1 patch, Transdermal, Every 24 Hours      ondansetron 8 MG tablet  Commonly known as:  ZOFRAN   TAKE ONE TABLET BY MOUTH THREE TIMES A DAY AS NEEDED FOR NAUSEA AND VOMITING      pantoprazole 40 MG EC tablet  Commonly known as:  PROTONIX   40 mg, Oral, Daily      promethazine 25 MG tablet  Commonly known as:  PHENERGAN   25 mg, Oral, Every 6 Hours PRN      sucralfate 1 GM/10ML suspension  Commonly known as:  CARAFATE   1 g, Oral, 4 Times Daily      zolpidem 10 MG tablet  Commonly known as:  AMBIEN   10 mg, Oral, Every Night at Bedtime             Allergies   Allergen Reactions   • Plavix [Clopidogrel Bisulfate] Anaphylaxis   • Codeine Other (See Comments)     Pt not certain of reatction   • Penicillins Other (See Comments)     Pt does not remember reaction         Discharge Disposition:  Home or Self Care    Discharge Diet:  Diet Order   Procedures   • Diet Regular; Cardiac, Consistent Carbohydrate, Renal         Discharge Activity:   Activity Instructions     Activity as Tolerated              CODE STATUS:    Code Status and Medical Interventions:   Ordered at: 02/28/19 0228     Code Status:    CPR     Medical Interventions (Level of Support Prior to  Arrest):    Full         Future Appointments   Date Time Provider Department Center   3/5/2019  8:30 AM Desmond Cardoso MD [facility] ONC [facility] DO[facility]   3/5/2019  9:00 AM ROOM B  DOE OPI DOE   3/6/2019  1:00 PM CHAIR 14  DOE OPI DO[facility]   3/7/2019  1:00 PM ROOM C  DO[facility] OPI DOE   4/17/2019  9:30 AM Ramone Huggins MD NEE RAON DOE None   4/17/2019 10:00 AM  DOE RAD ONC CT  DOE RO DOE       Additional Instructions for the Follow-ups that You Need to Schedule     Discharge Follow-up with PCP   As directed       Currently Documented PCP:    Kurt Matson MD    PCP Phone Number:    [phone]     Follow Up Details:  1-2 weeks         Discharge Follow-up with Specified Provider: Janae on Tuesday as scheduled   As directed      To:  Janae on Tuesday as scheduled               Time Spent on Discharge:  38 minutes    Electronically signed by Maynor Ansari MD, 03/01/19, 9:13 AM.

## 2019-03-01 NOTE — PROGRESS NOTES
Case Management Discharge Note    Final Note: I notified Riki at [facility] of the DC and faxed him resume orders and hospital info.    Destination      No service has been selected for the patient.      Durable Medical Equipment      No service has been selected for the patient.      Dialysis/Infusion      No service has been selected for the patient.      Home Medical Care - Selection Complete      Service Provider Request Status Selected Services Address Phone Number Fax Number    [facility] Selected Home Health Services centralized phone line, [place] [phone]      [facility]      No service has been selected for the patient.             Final Discharge Disposition Code: 06 - home with home health care

## 2019-03-01 NOTE — DISCHARGE PLACEMENT REQUEST
"Shauna Valencia (63 y.o. Female)   To [facility]  From Rocio Hutchins [phone]  [address] [address]  Phone:  [phone]  Fax:          Patient:     Shauna Valencia MRN:  [id number]   [address]  [facility] [address] :  1955  SSN:    Phone: [phone] Sex:  F      INSURANCE PAYOR PLAN GROUP # SUBSCRIBER ID   Primary:  Secondary:    MEDICARE  [facility] [id number]  [id number]      [id number]  [id number]   Admitting Diagnosis: Symptomatic anemia [D64.9]  Order Date:  Mar 1, 2019         Case Management  Consult       (Order ID: [id number])     Diagnosis:         Priority:  Routine Expected Date:   Expiration Date:        Interval:   Count:    Reason for Consult? Resume home health     Specimen Type:   Specimen Source:   Specimen Taken Date:   Specimen Taken Time:                   Verbal Order Mode: Verbal with readback   Authorizing Provider: Maynor Ansari MD  Authorizing Provider's NPI: [id number]     Order Entered By: Rocio Hutchins RN 3/1/2019 12:14 PM                   Date of Birth Social Security Number Address Home Phone MRN    1955  [address]  [facility] [address] [phone] [id number]    Jain Marital Status          Unknown        Admission Date Admission Type Admitting Provider Attending Provider Department, Room/Bed    19 Emergency Myanor Ansari MD Russell, Marc P, MD [address], [address]    Discharge Date Discharge Disposition Discharge Destination         Home or Self Care              Attending Provider:  Maynor Ansari MD    Allergies:  Plavix [Clopidogrel Bisulfate], Codeine, Penicillins    Isolation:  None   Infection:  None   Code Status:  CPR    Ht:  154.9 cm (61\")   Wt:  87 kg (191 lb 12.8 oz)    Admission Cmt:  None   Principal Problem:  None                Active Insurance as of 2019     Primary " Coverage     Payor Plan Insurance Group Employer/Plan Group    MEDICARE MEDICARE A & B      Payor Plan Address Payor Plan Phone Number Payor Plan Fax Number Effective Dates    [address] [phone]  2008 - None Entered    [facility] [address]       Subscriber Name Subscriber Birth Date Member ID       SHAUNA VERA 1955 [id number]           Secondary Coverage     Payor Plan Insurance Group Employer/Plan Group    [place]      Payor Plan Address Payor Plan Phone Number Payor Plan Fax Number Effective Dates    [address] [address]   2014 - None Entered    [place] [address]       Subscriber Name Subscriber Birth Date Member ID       SHAUNA VERA 1955 [id number]                 Emergency Contacts      (Rel.) Home Phone Work Phone Mobile Phone    Jade Infante (Daughter) [phone] -- --    Reyan Phipps (Sister) [phone] -- --               History & Physical      Daryn Crain MD at 2019  2:43 AM              [facility] Medicine Services  HISTORY AND PHYSICAL    Patient Name: Shauna Vera  : 1955  MRN: [id number]  Primary Care Physician: Kurt Matson MD  Date of admission: 2019      Subjective   Subjective     Chief Complaint:  Nausea, vomiting and generalized weakness.    HPI:  Shauna Vera is a 63 y.o. female who has history of left upper lobe small cell lung cancer and currently undergoing chemotherapy.  Patient is followed by Dr. Cardoso.  Other chronic medical conditions include CKD stage III, COPD, CAD, type 2 diabetes mellitus.  Patient presents to the emergency room with complaint of sudden onset of nausea, nonbloody emesis associated with abdominal discomfort.  Patient reports that her symptoms started early morning yesterday and persisted all through the day.  Symptoms reportedly started after eating some burger.  Patient states she has been so weak and unable to perform her usual  activities.  However, patient denies hematemesis, hemoptysis, hematochezia or passage of dark stools.  Abdominal discomfort reportedly started after several episodes of vomiting.  She denies any fever, chills, chest pain, syncope, dizziness, diarrhea or urinary frequency/dysuria.  Labs are significant for hemoglobin of 6.9 and hematocrit of 20.7.  Other significant labs include magnesium of less than 0.7, calcium of 5.7 with a normal albumin of 4.05 and potassium of 3.4.  Abdominal/pelvic CT scan is unremarkable.  Magnesium and calcium supplementations have been initiated in the ER.  Patient is typed and screened for 2 units of packed red blood cells will be transfused when blood is available.  Patient is admitted for further care.    Review of Systems   Constitutional: Positive for activity change and fatigue. Negative for appetite change, chills, diaphoresis, fever and unexpected weight change.   HENT: Negative for congestion, sore throat, trouble swallowing and voice change.    Eyes: Negative for visual disturbance.   Respiratory: Negative for cough, chest tightness, shortness of breath and wheezing.    Cardiovascular: Negative for chest pain, palpitations and leg swelling.   Gastrointestinal: Positive for abdominal pain, nausea and vomiting. Negative for abdominal distention, blood in stool, constipation and diarrhea.   Endocrine: Negative for polyuria.   Genitourinary: Negative for dysuria, flank pain, frequency, hematuria, pelvic pain and urgency.   Musculoskeletal: Negative for arthralgias, back pain, myalgias, neck pain and neck stiffness.   Skin: Negative for rash.   Neurological: Positive for light-headedness. Negative for dizziness, seizures, syncope, speech difficulty, weakness and headaches.   Psychiatric/Behavioral: Negative for agitation, confusion and hallucinations.   All other systems reviewed and are negative.         Personal History     Past Medical History:   Diagnosis Date   • Arthritis    •  Asthma    • COPD (chronic obstructive pulmonary disease) (CMS/HCC)    • Coronary artery disease     5 stents   • Diabetes mellitus (CMS/HCC)    • Disease of thyroid gland    • DVT (deep venous thrombosis) (CMS/HCC)     this year- per pt   • GERD (gastroesophageal reflux disease)    • Hyperlipidemia    • Lung cancer (CMS/HCC)    • Myocardial infarction (CMS/HCC)    • Pancreatitis    • Pancreatitis        Past Surgical History:   Procedure Laterality Date   • APPENDECTOMY     • BRONCHOSCOPY N/A 11/6/2018    Procedure: BRONCHOSCOPY WITH ENDOBRONCHIAL ULTRASOUND, biopsies, brushing and washing.;  Surgeon: Isaac Epstein MD;  Location:  [facility];  Service: Pulmonary   • BRONCHOSCOPY N/A 11/9/2018    Procedure: BRONCHOSCOPY WITH ENDOBRONCHIAL ULTRASOUND;  Surgeon: Clay Scott MD;  Location:  [facility];  Service: Pulmonary   • CAROTID STENT      5 total   • CHOLECYSTECTOMY     • COLON SURGERY     • COLONOSCOPY     • HERNIA REPAIR     • HYSTERECTOMY     • OTHER SURGICAL HISTORY      bladder sling   • THYROIDECTOMY      1994   • TONSILLECTOMY     • TOTAL HIP ARTHROPLASTY Left    • UPPER GASTROINTESTINAL ENDOSCOPY         Family History: family history includes Colon polyps in her mother; Ulcerative colitis in her sister. Otherwise pertinent FHx was reviewed and unremarkable.     Social History:  reports that she has quit smoking. Her smoking use included cigarettes. She smoked 0.50 packs per day. she has never used smokeless tobacco. She reports that she does not drink alcohol or use drugs.  Social History     Social History Narrative   • Not on file       Medications:    Available home medication information reviewed.    (Not in a hospital admission)    Allergies   Allergen Reactions   • Plavix [Clopidogrel Bisulfate] Anaphylaxis   • Codeine Other (See Comments)     Pt not certain of reatction   • Penicillins Other (See Comments)     Pt does not remember reaction       Objective   Objective     Vital  Signs:   Temp:  [98 °F (36.7 °C)] 98 °F (36.7 °C)  Heart Rate:  [87] 87  Resp:  [18] 18  BP: (147)/(76) 147/76        Physical Exam   Constitutional: No acute distress, awake, alert  Eyes: PERRLA, sclerae anicteric, no conjunctival injection  HENT: NCAT, mucous membranes moist  Neck: Supple, no thyromegaly, no lymphadenopathy, trachea midline  Respiratory: Clear to auscultation bilaterally, nonlabored respirations   Cardiovascular: RRR, no murmurs, rubs, or gallops, palpable pedal pulses bilaterally  Gastrointestinal: Positive bowel sounds, soft, nontender, nondistended  Musculoskeletal: No bilateral ankle edema, no clubbing or cyanosis to extremities  Psychiatric: Appropriate affect, cooperative  Neurologic: Oriented x 3, strength symmetric in all extremities, Cranial Nerves grossly intact, speech clear  Skin: No rashes    Results Reviewed:  I have personally reviewed current lab, radiology, and data and agree.    Results from last 7 days   Lab Units 02/28/19  0024   WBC 10*3/mm3 8.24   HEMOGLOBIN g/dL 6.9*   HEMATOCRIT % 20.7*   PLATELETS 10*3/mm3 296     Results from last 7 days   Lab Units 02/28/19  0037 02/28/19  0024   SODIUM mmol/L  --  138   POTASSIUM mmol/L  --  3.4*   CHLORIDE mmol/L  --  99   CO2 mmol/L  --  24.0   BUN mg/dL  --  12   CREATININE mg/dL  --  1.64*   GLUCOSE mg/dL  --  231*   CALCIUM mg/dL  --  5.7*   ALT (SGPT) U/L  --  15   AST (SGOT) U/L  --  16   TROPONIN I ng/mL 0.00  --      Estimated Creatinine Clearance: 35.6 mL/min (A) (by C-G formula based on SCr of 1.64 mg/dL (H)).  Brief Urine Lab Results  (Last result in the past 365 days)      Color   Clarity   Blood   Leuk Est   Nitrite   Protein   CREAT   Urine HCG        02/28/19 0128 Yellow Clear Negative Negative Negative 30 mg/dL (1+)             No results found for: BNP  Imaging Results (last 24 hours)     Procedure Component Value Units Date/Time    CT Abdomen Pelvis Without Contrast [[id number]] Collected:  02/28/19 0147     Updated:   02/28/19 0208    Narrative:       EXAM:    CT Abdomen and Pelvis Without Contrast     EXAM DATE/TIME:    2/28/2019 1:47 AM     CLINICAL HISTORY:    63 years old, female; Anemia, unspecified; Signs and symptoms; Nausea and   vomiting; Prior surgery; Additional info: N/v     TECHNIQUE:    Axial computed tomography images of the abdomen and pelvis without contrast.    All CT scans at this facility use at least one of these dose optimization   techniques: automated exposure control; mA and/or kV adjustment per patient   size (includes targeted exams where dose is matched to clinical indication); or   iterative reconstruction.    Coronal reformatted images were created and reviewed.     COMPARISON:    CT ABDOMEN PELVIS W CONTRAST 1/10/2019 10:17 AM     FINDINGS:    Lower thorax:  Calcified granuloma within the posterior left lower lobe.     ABDOMEN:    Liver: Normal.    Gallbladder and bile ducts: Normal    Pancreas: Normal.    Spleen:  Splenic calcifications, compatible with prior granulomatous disease.    Adrenals: Normal.     Kidneys and ureters:  Bilateral simple renal cysts, the largest on the left   measuring approximately 4 cm in diameter.    Stomach and bowel:  Colonic diverticulosis.    Appendix: No evidence of appendicitis.     PELVIS:    Bladder: Unremarkable as visualized.    Reproductive: Unremarkable as visualized.     ABDOMEN and PELVIS:    Intraperitoneal space: Normal. No free air. No significant fluid collection.    Bones/joints:  Right hip arthroplasty, without acute complications. Multilevel   thoracolumbar spine degenerative changes.    Soft tissues: Normal.    Vasculature:  Atherosclerotic calcification of the visualized coronary   arteries. Phleboliths within the pelvis.    Lymph nodes: Normal. No enlarged lymph nodes.       Impression:       No acute abdominal or pelvic abnormality.    THIS DOCUMENT HAS BEEN ELECTRONICALLY SIGNED BY SUZANNE SHAW MD    XR Chest 1 View [[id number]] Collected:   02/27/19 2259     Updated:  02/27/19 2346    Narrative:       EXAM:    XR Chest, 1 View     EXAM DATE/TIME:    2/27/2019 10:59 PM     CLINICAL HISTORY:    63 years old, female; Pain; Other: Weakness; Additional info: Weak/dizzy/ams   triage protocol     TECHNIQUE:    XR of the chest, 1 view.     COMPARISON:    CR XR CHEST 1 VW 11/9/2018 5:54 AM     FINDINGS:    Lungs: Normal.    Pleural space: Normal.    Heart/Mediastinum: Normal.    Bones/joints: No acute abnormality.       Impression:       No acute findings.     THIS DOCUMENT HAS BEEN ELECTRONICALLY SIGNED BY SUZANNE SHAW MD             Assessment/Plan   Assessment / Plan     Active Hospital Problems    Diagnosis Date Noted   • Symptomatic anemia [D64.9] 02/28/2019   • Generalized weakness [R53.1] 02/28/2019   • Nausea & vomiting [R11.2] 02/28/2019   • CKD (chronic kidney disease) stage 3, GFR 30-59 ml/min (CMS/HCC) [N18.3] 02/28/2019   • Hypomagnesemia [E83.42] 02/28/2019   • Hypocalcemia [E83.51] 02/28/2019   • Small cell lung cancer, left upper lobe (CMS/HCC) [C34.12] 11/13/2018   • Type 2 diabetes mellitus (CMS/HCC) [E11.9] 10/31/2018   • Hyperlipidemia [E78.5] 10/31/2018     Will admit to medical inpatient service.  Patient is typed and screened for 2 units of packed red blood cells.  We will transfuse packed red blood cells as soon as available.  Meanwhile anemia workup ordered and in progress.  Electrolyte abnormalities are been supplemented and will recheck in the morning.  This is most likely due to poor oral intake.  We will consult Dr. Cardoso in the morning as patient is currently undergoing chemotherapy.  No evidence of acute pathology on CT abdomen/pelvis.  Patient is therefore allowed to eat consistent carbohydrates/renal diet as tolerated.  Fingerstick glucose monitoring sliding scale insulin protocol.  Activities as tolerated.    DVT prophylaxis:  Bilateral SCDs.    Code Status and Medical Interventions:   Ordered at: 02/28/19 0228     Code Status:     CPR     Medical Interventions (Level of Support Prior to Arrest):    Full       Electronically signed by Daryn Crain MD, 02/28/19, 2:44 AM.        Electronically signed by Daryn Crain MD at 2/28/2019  3:06 AM       Hospital Medications (active)       Dose Frequency Start End    albuterol (PROVENTIL) nebulizer solution 0.083% 2.5 mg/3mL 2.5 mg Every 6 Hours PRN 2/28/2019     Sig - Route: Take 2.5 mg by nebulization Every 6 (Six) Hours As Needed for Wheezing or Shortness of Air. - Nebulization    calcium gluconate 2 g in sodium chloride 0.9 % 100 mL IVPB 2 g Once 2/28/2019 2/28/2019    Sig - Route: Infuse 2 g into a venous catheter 1 (One) Time. - Intravenous    carvedilol (COREG) tablet 6.25 mg 6.25 mg 2 Times Daily With Meals 2/28/2019     Sig - Route: Take 1 tablet by mouth 2 (Two) Times a Day With Meals. - Oral    cetirizine (zyrTEC) tablet 10 mg 10 mg Daily 2/28/2019     Sig - Route: Take 1 tablet by mouth Daily. - Oral    dextrose (D50W) 25 g/ 50mL Intravenous Solution 25 g 25 g Every 15 Minutes PRN 2/28/2019     Sig - Route: Infuse 50 mL into a venous catheter Every 15 (Fifteen) Minutes As Needed for Low Blood Sugar (Blood Sugar Less Than 70). - Intravenous    dextrose (D50W) 25 g/ 50mL Intravenous Solution 25 g 25 g Every 15 Minutes PRN 2/28/2019     Sig - Route: Infuse 50 mL into a venous catheter Every 15 (Fifteen) Minutes As Needed for Low Blood Sugar (Blood Sugar Less Than 70). - Intravenous    dextrose (GLUTOSE) oral gel 15 g 15 g Every 15 Minutes PRN 2/28/2019     Sig - Route: Take 15 g by mouth Every 15 (Fifteen) Minutes As Needed for Low Blood Sugar (Blood sugar less than 70). - Oral    dextrose (GLUTOSE) oral gel 15 g 15 g Every 15 Minutes PRN 2/28/2019     Sig - Route: Take 15 g by mouth Every 15 (Fifteen) Minutes As Needed for Low Blood Sugar (Blood sugar less than 70). - Oral    docusate sodium (COLACE) capsule 100 mg 100 mg 2 Times Daily 2/28/2019     Sig - Route: Take 1 capsule  "by mouth 2 (Two) Times a Day. - Oral    famotidine (PEPCID) tablet 20 mg 20 mg 2 Times Daily 2/28/2019     Sig - Route: Take 1 tablet by mouth 2 (Two) Times a Day. - Oral    glucagon (human recombinant) (GLUCAGEN DIAGNOSTIC) injection 1 mg 1 mg As Needed 2/28/2019     Sig - Route: Inject 1 mg under the skin into the appropriate area as directed As Needed (Blood Glucose Less Than 70). - Subcutaneous    glucagon (human recombinant) (GLUCAGEN DIAGNOSTIC) injection 1 mg 1 mg As Needed 2/28/2019     Sig - Route: Inject 1 mg under the skin into the appropriate area as directed As Needed (Blood Glucose Less Than 70). - Subcutaneous    insulin lispro (humaLOG) injection 0-7 Units 0-7 Units 4 Times Daily With Meals & Nightly 2/28/2019     Sig - Route: Inject 0-7 Units under the skin into the appropriate area as directed 4 (Four) Times a Day With Meals & at Bedtime. - Subcutaneous    levothyroxine (SYNTHROID, LEVOTHROID) tablet 75 mcg 75 mcg Daily 2/28/2019     Sig - Route: Take 1 tablet by mouth Daily. - Oral    magnesium oxide (MAGOX) tablet 400 mg 400 mg Daily 2/28/2019     Sig - Route: Take 1 tablet by mouth Daily. - Oral    Magnesium Sulfate 2 gram / 50mL Infusion (GIVE X 3 BAGS TO EQUAL 6GM TOTAL DOSE) - Mg 1.1 - 1.5 mg/dl 2 g As Needed 2/28/2019     Sig - Route: Infuse 50 mL into a venous catheter As Needed (See Administration Instructions). - Intravenous    Linked Group 1:  \"Or\" Linked Group Details        Magnesium Sulfate 2 gram Bolus, followed by 8 gram infusion (total Mg dose 10 grams)- Mg less than or equal to 1mg/dL 2 g As Needed 2/28/2019     Sig - Route: Infuse 50 mL into a venous catheter As Needed (See Administration Instructions). - Intravenous    Linked Group 1:  \"Or\" Linked Group Details        Magnesium Sulfate 4 gram infusion- Mg 1.6-1.9 mg/dL 4 g As Needed 2/28/2019     Sig - Route: Infuse 100 mL into a venous catheter As Needed (See Administration Instructions). - Intravenous    Linked Group 1:  \"Or\" " "Linked Group Details        ondansetron (ZOFRAN) injection 4 mg 4 mg Every 6 Hours PRN 2/28/2019     Sig - Route: Infuse 2 mL into a venous catheter Every 6 (Six) Hours As Needed for Nausea or Vomiting. - Intravenous    pantoprazole (PROTONIX) EC tablet 40 mg 40 mg Daily 2/28/2019     Sig - Route: Take 1 tablet by mouth Daily. - Oral    potassium chloride (KLOR-CON) packet 40 mEq 40 mEq As Needed 2/28/2019     Sig - Route: Take 40 mEq by mouth As Needed (potassium replacement, see admin instructions). - Oral    Linked Group 2:  \"Or\" Linked Group Details        potassium chloride (MICRO-K) CR capsule 40 mEq 40 mEq As Needed 2/28/2019     Sig - Route: Take 4 capsules by mouth As Needed (potassium replacement.  see admin instructions). - Oral    Linked Group 2:  \"Or\" Linked Group Details        potassium chloride 10 mEq in 100 mL IVPB 10 mEq Every 1 Hour PRN 2/28/2019     Sig - Route: Infuse 100 mL into a venous catheter Every 1 (One) Hour As Needed (potassium protocol PERIPHERAL - see admin instructions). - Intravenous    Linked Group 2:  \"Or\" Linked Group Details        sodium chloride 0.9 % flush 10 mL 10 mL As Needed 2/27/2019     Sig - Route: Infuse 10 mL into a venous catheter As Needed for Line Care. - Intravenous    Cosign for Ordering: Accepted by Huseyin Hunt MD on 2/28/2019  4:43 AM    sodium chloride 0.9 % flush 3 mL 3 mL Every 12 Hours Scheduled 2/28/2019     Sig - Route: Infuse 3 mL into a venous catheter Every 12 (Twelve) Hours. - Intravenous    sodium chloride 0.9 % flush 3-10 mL 3-10 mL As Needed 2/28/2019     Sig - Route: Infuse 3-10 mL into a venous catheter As Needed for Line Care. - Intravenous    sodium chloride 0.9 % infusion 50 mL/hr Continuous 2/28/2019     Sig - Route: Infuse 50 mL/hr into a venous catheter Continuous. - Intravenous    sucralfate (CARAFATE) tablet 1 g 1 g 4 Times Daily Before Meals & Nightly 2/28/2019     Sig - Route: Take 1 tablet by mouth 4 (Four) Times a Day Before " Meals & at Bedtime. - Oral    magnesium sulfate 2g/50 mL (PREMIX) infusion (Discontinued) 2 g Once 2019    Sig - Route: Infuse 50 mL into a venous catheter 1 (One) Time. - Intravenous    magnesium sulfate 2g/50 mL (PREMIX) infusion (Discontinued) 2 g Every 2 Hours 2019    Sig - Route: Infuse 50 mL into a venous catheter Every 2 (Two) Hours. - Intravenous    magnesium sulfate 2g/50 mL (PREMIX) infusion (Discontinued) 2 g Every 2 Hours 2019    Sig - Route: Infuse 50 mL into a venous catheter Every 2 (Two) Hours. - Intravenous               Discharge Summary      Maynor Ansari MD at 3/1/2019  9:13 AM              [facility] Medicine Services  DISCHARGE SUMMARY    Patient Name: Shauna Valencia  : 1955  MRN: [id number]    Date of Admission: 2019  Date of Discharge:  3/1/2019  Primary Care Physician: Kurt Matson MD    Consults     Date and Time Order Name Status Description    2019 0242 Inpatient Hematology & Oncology Consult Completed           Hospital Course     Presenting Problem:   Symptomatic anemia [D64.9]    Active Hospital Problems    Diagnosis Date Noted   • Symptomatic anemia [D64.9] 2019   • Generalized weakness [R53.1] 2019   • Nausea & vomiting [R11.2] 2019   • CKD (chronic kidney disease) stage 3, GFR 30-59 ml/min (CMS/[facility]) [N18.3] 2019   • Hypomagnesemia [E83.42] 2019   • Hypocalcemia [E83.51] 2019   • Small cell lung cancer, left upper lobe (CMS/[facility]) [C34.12] 2018   • Type 2 diabetes mellitus (CMS/HCC) [E11.9] 10/31/2018   • Hyperlipidemia [E78.5] 10/31/2018      Resolved Hospital Problems   No resolved problems to display.          Hospital Course:  Shauna Valencia is a 63 y.o. female here with acute nausea and vomiting with electrolyte disturbances and mild PABLO. Her PABLO has resolved with IVF. Her low magnesium has resolved with replacement. She has hypocalcemia that has  improved with replacement, but not resolved. She will be discharged on calcium and Vitamin D supplements at discharge with follow up with Dr. Cardoso on Tuesday. A 25-OH Vitamin D level is pending. Her nausea and vomiting have resolved and she is tolerating PO. Her volume status has improved after IVF.    She was admitted with chemotherapy induced anemia and transfused 2 Units PRBC with marked improvement. Her hemoglobin is stable and she is ready for discharge.    Day of Discharge     HPI:   No complaints. She denies n/v. No diarrhea. Tolerating PO. Denies dyspnea.    Review of Systems  Otherwise ROS is negative except as mentioned in the HPI.    Vital Signs:   Temp:  [97.5 °F (36.4 °C)-98.7 °F (37.1 °C)] 97.9 °F (36.6 °C)  Heart Rate:  [72-86] 78  Resp:  [16-21] 18  BP: (110-160)/(70-83) 140/70     Physical Exam:  NAD, alert and oriented  OP clear, MMM  Neck supple  No LAD  RRR  CTAB  +BS, ND, NT  POLLACK  No c/c/e  No rashes  Pale    Pertinent  and/or Most Recent Results     Results from last 7 days   Lab Units 03/01/19  0456 02/28/19  1012 02/28/19  0024   WBC 10*3/mm3 9.63 7.51 8.24   HEMOGLOBIN g/dL 9.2* 8.9* 6.9*   HEMATOCRIT % 27.8* 26.1* 20.7*   PLATELETS 10*3/mm3 307 258 296   SODIUM mmol/L 137 141 138   POTASSIUM mmol/L 4.4 3.1* 3.4*   CHLORIDE mmol/L 106 104 99   CO2 mmol/L 23.0 27.0 24.0   BUN mg/dL 9 10 12   CREATININE mg/dL 1.19 1.36* 1.64*   GLUCOSE mg/dL 169* 144* 231*   CALCIUM mg/dL 6.6* 6.0* 5.7*     Results from last 7 days   Lab Units 03/01/19  0456 02/28/19  0024   BILIRUBIN mg/dL 0.4 0.4   ALK PHOS U/L 78 74   ALT (SGPT) U/L 15 15   AST (SGOT) U/L 15 16           Invalid input(s): TG, LDLCALC, LDLREALC  Results from last 7 days   Lab Units 02/28/19  0037 02/28/19  0024   TSH mIU/mL  --  0.902   TROPONIN I ng/mL 0.00  --        Brief Urine Lab Results  (Last result in the past 365 days)      Color   Clarity   Blood   Leuk Est   Nitrite   Protein   CREAT   Urine HCG        02/28/19 0128 Yellow Clear  Negative Negative Negative 30 mg/dL (1+)               Microbiology Results Abnormal     Procedure Component Value - Date/Time    Influenza Antigen, Rapid - Swab, Nasopharynx [[id number]]  (Normal) Collected:  02/28/19 0025    Lab Status:  Final result Specimen:  Swab from Nasopharynx Updated:  02/28/19 0049     Influenza A Ag, EIA Negative     Influenza B Ag, EIA Negative          Imaging Results (all)     Procedure Component Value Units Date/Time    CT Abdomen Pelvis Without Contrast [[id number]] Collected:  02/28/19 0147     Updated:  02/28/19 0208    Narrative:       EXAM:    CT Abdomen and Pelvis Without Contrast     EXAM DATE/TIME:    2/28/2019 1:47 AM     CLINICAL HISTORY:    63 years old, female; Anemia, unspecified; Signs and symptoms; Nausea and   vomiting; Prior surgery; Additional info: N/v     TECHNIQUE:    Axial computed tomography images of the abdomen and pelvis without contrast.    All CT scans at this facility use at least one of these dose optimization   techniques: automated exposure control; mA and/or kV adjustment per patient   size (includes targeted exams where dose is matched to clinical indication); or   iterative reconstruction.    Coronal reformatted images were created and reviewed.     COMPARISON:    CT ABDOMEN PELVIS W CONTRAST 1/10/2019 10:17 AM     FINDINGS:    Lower thorax:  Calcified granuloma within the posterior left lower lobe.     ABDOMEN:    Liver: Normal.    Gallbladder and bile ducts: Normal    Pancreas: Normal.    Spleen:  Splenic calcifications, compatible with prior granulomatous disease.    Adrenals: Normal.     Kidneys and ureters:  Bilateral simple renal cysts, the largest on the left   measuring approximately 4 cm in diameter.    Stomach and bowel:  Colonic diverticulosis.    Appendix: No evidence of appendicitis.     PELVIS:    Bladder: Unremarkable as visualized.    Reproductive: Unremarkable as visualized.     ABDOMEN and PELVIS:    Intraperitoneal space: Normal. No  free air. No significant fluid collection.    Bones/joints:  Right hip arthroplasty, without acute complications. Multilevel   thoracolumbar spine degenerative changes.    Soft tissues: Normal.    Vasculature:  Atherosclerotic calcification of the visualized coronary   arteries. Phleboliths within the pelvis.    Lymph nodes: Normal. No enlarged lymph nodes.       Impression:       No acute abdominal or pelvic abnormality.    THIS DOCUMENT HAS BEEN ELECTRONICALLY SIGNED BY SUZANNE SHAW MD    XR Chest 1 View [[id number]] Collected:  02/27/19 2259     Updated:  02/27/19 2346    Narrative:       EXAM:    XR Chest, 1 View     EXAM DATE/TIME:    2/27/2019 10:59 PM     CLINICAL HISTORY:    63 years old, female; Pain; Other: Weakness; Additional info: Weak/dizzy/ams   triage protocol     TECHNIQUE:    XR of the chest, 1 view.     COMPARISON:    CR XR CHEST 1 VW 11/9/2018 5:54 AM     FINDINGS:    Lungs: Normal.    Pleural space: Normal.    Heart/Mediastinum: Normal.    Bones/joints: No acute abnormality.       Impression:       No acute findings.     THIS DOCUMENT HAS BEEN ELECTRONICALLY SIGNED BY SUZANNE SHAW MD          Results for orders placed during the hospital encounter of 12/05/18   Duplex Venous Lower Extremity - Right CAR    Narrative · Normal right lower extremity venous duplex scan.          Results for orders placed during the hospital encounter of 12/05/18   Duplex Venous Lower Extremity - Right CAR    Narrative · Normal right lower extremity venous duplex scan.                Order Current Status    Folate RBC In process        Discharge Details        Discharge Medications      New Medications      Instructions Start Date   calcium carbonate 1250 MG capsule   1,250 mg, Oral, 3 Times Daily      vitamin D 77216 units capsule capsule  Commonly known as:  ERGOCALCIFEROL   50,000 Units, Oral, Weekly         Changes to Medications      Instructions Start Date   STOOL SOFTENER 100 MG capsule  Generic drug:  docusate  sodium  What changed:    · when to take this  · reasons to take this   100 mg, Oral, 2 Times Daily         Continue These Medications      Instructions Start Date   albuterol sulfate  (90 Base) MCG/ACT inhaler  Commonly known as:  PROVENTIL HFA;VENTOLIN HFA;PROAIR HFA   2 puffs, Inhalation, Every 4 Hours PRN      aspirin 81 MG EC tablet   81 mg, Oral, Daily      BRILINTA 60 MG tablet tablet  Generic drug:  ticagrelor   60 mg, Oral, Daily      cetirizine 10 MG tablet  Commonly known as:  zyrTEC   10 mg, Oral, Daily      COREG 6.25 MG tablet  Generic drug:  carvedilol   6.25 mg, Oral, 2 Times Daily With Meals      famotidine 20 MG tablet  Commonly known as:  PEPCID   20 mg, Oral, 2 Times Daily      insulin aspart 100 UNIT/ML solution pen-injector sc pen  Commonly known as:  novoLOG FLEXPEN   0-10 Units, Subcutaneous, 3 Times Daily With Meals, SEE instructions for Sliding Scale      insulin glargine 100 UNIT/ML injection  Commonly known as:  LANTUS   20 Units, Subcutaneous, Nightly      lactulose 10 GM/15ML solution  Commonly known as:  CHRONULAC   20 g, Oral, 3 Times Daily      levothyroxine 75 MCG tablet  Commonly known as:  SYNTHROID, LEVOTHROID   75 mcg, Oral, Daily      Magic mouthwash Radonc   5-10 mL, Swish & Swallow, 4 Times Daily Before Meals & Nightly      magnesium oxide 400 MG tablet  Commonly known as:  MAG-OX   400 mg, Oral, 2 Times Daily      nicotine 14 MG/24HR patch  Commonly known as:  NICODERM CQ   1 patch, Transdermal, Every 24 Hours      ondansetron 8 MG tablet  Commonly known as:  ZOFRAN   TAKE ONE TABLET BY MOUTH THREE TIMES A DAY AS NEEDED FOR NAUSEA AND VOMITING      pantoprazole 40 MG EC tablet  Commonly known as:  PROTONIX   40 mg, Oral, Daily      promethazine 25 MG tablet  Commonly known as:  PHENERGAN   25 mg, Oral, Every 6 Hours PRN      sucralfate 1 GM/10ML suspension  Commonly known as:  CARAFATE   1 g, Oral, 4 Times Daily      zolpidem 10 MG tablet  Commonly known as:  AMBIEN    10 mg, Oral, Every Night at Bedtime             Allergies   Allergen Reactions   • Plavix [Clopidogrel Bisulfate] Anaphylaxis   • Codeine Other (See Comments)     Pt not certain of reatction   • Penicillins Other (See Comments)     Pt does not remember reaction         Discharge Disposition:  Home or Self Care    Discharge Diet:  Diet Order   Procedures   • Diet Regular; Cardiac, Consistent Carbohydrate, Renal         Discharge Activity:   Activity Instructions     Activity as Tolerated              CODE STATUS:    Code Status and Medical Interventions:   Ordered at: 02/28/19 0228     Code Status:    CPR     Medical Interventions (Level of Support Prior to Arrest):    Full         Future Appointments   Date Time Provider Department Center   3/5/2019  8:30 AM Desmond Cardoso MD [facility]   3/5/2019  9:00 AM ROOM B [facility]   3/6/2019  1:00 PM CHAIR 14 [facility]   3/7/2019  1:00 PM ROOM C  [facility]   4/17/2019  9:30 AM Ramone Huggins MD [facility] None   4/17/2019 10:00 AM  [facility] ONC CT  [facility]       Additional Instructions for the Follow-ups that You Need to Schedule     Discharge Follow-up with PCP   As directed       Currently Documented PCP:    Kurt Matson MD    PCP Phone Number:    [phone]     Follow Up Details:  1-2 weeks         Discharge Follow-up with Specified Provider: Janae on Tuesday as scheduled   As directed      To:  Janae on Tuesday as scheduled               Time Spent on Discharge:  38 minutes    Electronically signed by Maynor Ansari MD, 03/01/19, 9:13 AM.        Electronically signed by Maynor Ansari MD at 3/1/2019  9:17 AM

## 2019-03-03 NOTE — OUTREACH NOTE
Prep Survey      Responses   Facility patient discharged from?  [facility]   Is patient eligible?  Yes   Discharge diagnosis  Symptomatic anemia    Does the patient have one of the following disease processes/diagnoses(primary or secondary)?  Other   Does the patient have Home health ordered?  Yes   What is the Home health agency?   [facility]   Is there a DME ordered?  No   Prep survey completed?  Yes          Rachell Shah RN

## 2019-03-04 NOTE — ED PROVIDER NOTES
Subjective   63-year-old white female with history of small cell lung CA complaining of acute onset of left flank pain since last night.  Patient actually states that it is in her lower lungs.  Patient states that it hurts to take a deep breath and noticed that she was short of breath today.  Patient has had a history of DVT in the past.  Patient denies any abdominal pain, documented fever, vomiting, urinary complaints, or other concerns.  Patient is due to start chemotherapy back up this week.        History provided by:  Patient  Chest Pain   Pain location:  L chest  Pain quality: sharp    Pain radiates to:  Does not radiate  Pain severity:  Moderate  Onset quality:  Gradual  Timing:  Constant  Progression:  Worsening  Chronicity:  New  Context: breathing    Relieved by:  Nothing  Worsened by:  Deep breathing  Ineffective treatments:  None tried  Associated symptoms: shortness of breath    Associated symptoms: no abdominal pain, no cough, no fever, no near-syncope and no palpitations    Risk factors: prior DVT/PE    Risk factors: no diabetes mellitus        Review of Systems   Constitutional: Negative for fever.   Respiratory: Positive for shortness of breath. Negative for cough.    Cardiovascular: Positive for chest pain. Negative for palpitations and near-syncope.   Gastrointestinal: Negative for abdominal pain.   All other systems reviewed and are negative.      Past Medical History:   Diagnosis Date   • Arthritis    • Asthma    • COPD (chronic obstructive pulmonary disease) (CMS/HCC)    • Coronary artery disease     5 stents   • Diabetes mellitus (CMS/HCC)    • Disease of thyroid gland    • DVT (deep venous thrombosis) (CMS/HCC)     this year- per pt   • GERD (gastroesophageal reflux disease)    • Hyperlipidemia    • Lung cancer (CMS/HCC)    • Myocardial infarction (CMS/HCC)    • Pancreatitis    • Pancreatitis        Allergies   Allergen Reactions   • Plavix [Clopidogrel Bisulfate] Anaphylaxis   • Codeine  Other (See Comments)     Pt not certain of reatction   • Penicillins Other (See Comments)     Pt does not remember reaction       Past Surgical History:   Procedure Laterality Date   • APPENDECTOMY     • BRONCHOSCOPY N/A 11/6/2018    Procedure: BRONCHOSCOPY WITH ENDOBRONCHIAL ULTRASOUND, biopsies, brushing and washing.;  Surgeon: Isaac Epstein MD;  Location:  [facility];  Service: Pulmonary   • BRONCHOSCOPY N/A 11/9/2018    Procedure: BRONCHOSCOPY WITH ENDOBRONCHIAL ULTRASOUND;  Surgeon: Clay Scott MD;  Location:  [facility];  Service: Pulmonary   • CAROTID STENT      5 total   • CHOLECYSTECTOMY     • COLON SURGERY     • COLONOSCOPY     • HERNIA REPAIR     • HYSTERECTOMY     • OTHER SURGICAL HISTORY      bladder sling   • THYROIDECTOMY      1994   • TONSILLECTOMY     • TOTAL HIP ARTHROPLASTY Left    • UPPER GASTROINTESTINAL ENDOSCOPY         Family History   Problem Relation Age of Onset   • Colon polyps Mother    • Ulcerative colitis Sister        Social History     Socioeconomic History   • Marital status:      Spouse name: Not on file   • Number of children: Not on file   • Years of education: Not on file   • Highest education level: Not on file   Tobacco Use   • Smoking status: Former Smoker     Packs/day: 0.50     Types: Cigarettes   • Smokeless tobacco: Never Used   • Tobacco comment: quit 10/31/18   Substance and Sexual Activity   • Alcohol use: No   • Drug use: No   • Sexual activity: Defer           Objective   Physical Exam   Constitutional: She appears well-developed and well-nourished.   HENT:   Head: Normocephalic and atraumatic.   Mouth/Throat: Oropharynx is clear and moist.   Eyes: Pupils are equal, round, and reactive to light.   Neck: Normal range of motion. Neck supple.   Cardiovascular: Normal rate and normal heart sounds.   No murmur heard.  Pulmonary/Chest: Effort normal and breath sounds normal. She has no decreased breath sounds. She has no wheezes. She has no  rhonchi.   Abdominal: Soft. Bowel sounds are normal. She exhibits no mass. There is no tenderness.   Musculoskeletal: Normal range of motion.   Neurological: She is alert.   Skin: Skin is warm and dry. Capillary refill takes less than 2 seconds. No rash noted.   Psychiatric: She has a normal mood and affect. Her behavior is normal.   Nursing note and vitals reviewed.      Procedures           ED Course  ED Course as of Mar 04 2037   Mon Mar 04, 2019   2034 Discussed this case with Dr. Buck.  He agrees with the plan in place.  [JI]      ED Course User Index  [JI] Charlie Salgado PA          Recent Results (from the past 24 hour(s))   POC Troponin, Rapid    Collection Time: 03/04/19  2:51 PM   Result Value Ref Range    Troponin I 0.00 0.00 - 0.07 ng/mL   BNP    Collection Time: 03/04/19  2:53 PM   Result Value Ref Range    BNP 41.0 0.0 - 100.0 pg/mL   Light Blue Top    Collection Time: 03/04/19  2:53 PM   Result Value Ref Range    Extra Tube hold for add-on    Lavender Top    Collection Time: 03/04/19  2:53 PM   Result Value Ref Range    Extra Tube hold for add-on    CBC Auto Differential    Collection Time: 03/04/19  2:53 PM   Result Value Ref Range    WBC 7.76 3.50 - 10.80 10*3/mm3    RBC 3.30 (L) 3.89 - 5.14 10*6/mm3    Hemoglobin 9.8 (L) 11.5 - 15.5 g/dL    Hematocrit 29.9 (L) 34.5 - 44.0 %    MCV 90.6 80.0 - 99.0 fL    MCH 29.7 27.0 - 31.0 pg    MCHC 32.8 32.0 - 36.0 g/dL    RDW 16.2 (H) 11.3 - 14.5 %    RDW-SD 53.5 37.0 - 54.0 fl    MPV 8.9 6.0 - 12.0 fL    Platelets 350 150 - 450 10*3/mm3    Neutrophil % 54.6 41.0 - 71.0 %    Lymphocyte % 21.4 (L) 24.0 - 44.0 %    Monocyte % 22.4 (H) 0.0 - 12.0 %    Eosinophil % 0.4 0.0 - 3.0 %    Basophil % 1.2 (H) 0.0 - 1.0 %    Immature Grans % 0.9 (H) 0.0 - 0.6 %    Neutrophils, Absolute 4.24 1.50 - 8.30 10*3/mm3    Lymphocytes, Absolute 1.66 0.60 - 4.80 10*3/mm3    Monocytes, Absolute 1.74 (H) 0.00 - 1.00 10*3/mm3    Eosinophils, Absolute 0.03 0.00 - 0.30 10*3/mm3     Basophils, Absolute 0.09 0.00 - 0.20 10*3/mm3    Immature Grans, Absolute 0.07 (H) 0.00 - 0.05 10*3/mm3   D-dimer, Quantitative    Collection Time: 03/04/19  2:53 PM   Result Value Ref Range    D-Dimer, Quantitative 2.47 (H) 0.00 - 0.56 MCGFEU/mL   Lactic Acid, Plasma    Collection Time: 03/04/19  2:54 PM   Result Value Ref Range    Lactate 0.9 0.5 - 2.0 mmol/L   Comprehensive Metabolic Panel    Collection Time: 03/04/19  3:22 PM   Result Value Ref Range    Glucose 170 (H) 70 - 100 mg/dL    BUN 16 9 - 23 mg/dL    Creatinine 1.40 (H) 0.60 - 1.30 mg/dL    Sodium 138 132 - 146 mmol/L    Potassium 4.5 3.5 - 5.5 mmol/L    Chloride 103 99 - 109 mmol/L    CO2 18.0 (L) 20.0 - 31.0 mmol/L    Calcium 8.4 (L) 8.7 - 10.4 mg/dL    Total Protein 6.2 5.7 - 8.2 g/dL    Albumin 4.51 3.20 - 4.80 g/dL    ALT (SGPT) 12 7 - 40 U/L    AST (SGOT) 11 0 - 33 U/L    Alkaline Phosphatase 87 25 - 100 U/L    Total Bilirubin 0.4 0.3 - 1.2 mg/dL    eGFR Non African Amer 38 (L) >60 mL/min/1.73    Globulin 1.7 gm/dL    A/G Ratio 2.7 (H) 1.5 - 2.5 g/dL    BUN/Creatinine Ratio 11.4 7.0 - 25.0    Anion Gap 17.0 (H) 3.0 - 11.0 mmol/L   Green Top (Gel)    Collection Time: 03/04/19  3:22 PM   Result Value Ref Range    Extra Tube Hold for add-ons.    Gold Top - SST    Collection Time: 03/04/19  3:22 PM   Result Value Ref Range    Extra Tube Hold for add-ons.    POC Troponin, Rapid    Collection Time: 03/04/19  6:02 PM   Result Value Ref Range    Troponin I 0.00 0.00 - 0.07 ng/mL     Note: In addition to lab results from this visit, the labs listed above may include labs taken at another facility or during a different encounter within the last 24 hours. Please correlate lab times with ED admission and discharge times for further clarification of the services performed during this visit.    NM Lung Ventilation Perfusion   Preliminary Result   Normal exam               XR Chest 1 View   Final Result   No acute cardiopulmonary disease.       D:  03/04/2019    E:  03/04/2019       This report was finalized on 3/4/2019 4:14 PM by Dr. Amelie Teran MD.            Vitals:    03/04/19 1907 03/04/19 1936 03/04/19 1946 03/04/19 1952   BP:  (!) 185/101 168/85    BP Location:       Patient Position:       Pulse: 78 79 75    Resp: 18   16   Temp:       TempSrc:       SpO2:  99% 100%    Weight:       Height:         Medications   sodium chloride 0.9 % flush 10 mL (not administered)   xenon xe 133 gas 10 mCi 26.4 millicurie (26.4 millicuries Inhalation Given 3/4/19 1713)   technetium albumin aggregated (MAA) solution 1 dose (1 dose Intravenous Given 3/4/19 1732)     ECG/EMG Results (last 24 hours)     Procedure Component Value Units Date/Time    ECG 12 Lead [[id number]] Collected:  03/04/19 1445     Updated:  03/04/19 1547    Narrative:       Test Reason : SOA Protocol  Blood Pressure : **/** mmHG  Vent. Rate : 080 BPM     Atrial Rate : 080 BPM     P-R Int : 166 ms          QRS Dur : 066 ms      QT Int : 388 ms       P-R-T Axes : 044 -06 038 degrees     QTc Int : 447 ms    Normal sinus rhythm  Low voltage QRS  Borderline ECG  When compared with ECG of 27-FEB-2019 23:19,  QT has shortened  Confirmed by CODY WISEMAN MD ([id number]) on 3/4/2019 3:47:38 PM    Referred By:  EDMD           Confirmed By:CODY WISEMAN MD    ECG 12 Lead [[id number]] Collected:  03/04/19 1800     Updated:  03/04/19 1802        ECG 12 Lead         ECG 12 Lead   Final Result   Test Reason : SOA Protocol   Blood Pressure : **/** mmHG   Vent. Rate : 080 BPM     Atrial Rate : 080 BPM      P-R Int : 166 ms          QRS Dur : 066 ms       QT Int : 388 ms       P-R-T Axes : 044 -06 038 degrees      QTc Int : 447 ms      Normal sinus rhythm   Low voltage QRS   Borderline ECG   When compared with ECG of 27-FEB-2019 23:19,   QT has shortened   Confirmed by CODY WISEMAN MD ([id number]) on 3/4/2019 3:47:38 PM      Referred By:  EDMD           Confirmed By:CODY WISEMAN MD                MDM      Final diagnoses:   Chest  pain, unspecified type            Charlie Salgado PA  03/04/19 2037

## 2019-03-05 NOTE — DISCHARGE INSTRUCTIONS
Follow-up with your doctor as scheduled tomorrow.  You may take Tylenol as needed for pain.  Return if shortness of breath or pain worsens.  Follow-up with your oncologist as planned.

## 2019-03-05 NOTE — PROGRESS NOTES
DATE OF VISIT: 3/5/2019    REASON FOR VISIT: Followup for limited stage small cell lung cancer     HISTORY OF PRESENT ILLNESS: The patient is a very pleasant 63 y.o. female  with past medical history significant for limited stage small cell lung cancer diagnosed November 9, 2018.  She was started on chemotherapy using cisplatin and etoposide November 15, 2018. She completed 5 cycles on February 2019. Cycle 6 was not given secondary to progressive fatigue and multiple side effects. The patient is here today for scheduled follow up visit.    SUBJECTIVE: The patient is here today with her daughter and sister.  Since her last visit she was admitted to the hospital with symptomatic anemia as well as dehydration and vomiting.  She is feeling better today she is complaining of fatigue.     PAST MEDICAL HISTORY/SOCIAL HISTORY/FAMILY HISTORY: Reviewed by me and unchanged from my documentation done on 03/05/19.    Review of Systems   Constitutional: Negative for activity change, appetite change, chills, fatigue, fever and unexpected weight change.   HENT: Negative for hearing loss, mouth sores, nosebleeds, sore throat and trouble swallowing.    Eyes: Negative for visual disturbance.   Respiratory: Negative for cough, chest tightness, shortness of breath and wheezing.    Cardiovascular: Negative for chest pain, palpitations and leg swelling.   Gastrointestinal: Positive for nausea. Negative for abdominal distention, abdominal pain, blood in stool, constipation, diarrhea, rectal pain and vomiting.   Endocrine: Negative for cold intolerance and heat intolerance.   Genitourinary: Negative for difficulty urinating, dysuria, frequency and urgency.   Musculoskeletal: Negative for arthralgias, back pain, gait problem, joint swelling and myalgias.   Skin: Negative for rash.   Neurological: Negative for dizziness, tremors, syncope, weakness, light-headedness, numbness and headaches.   Hematological: Negative for adenopathy. Does not  bruise/bleed easily.   Psychiatric/Behavioral: Negative for confusion, sleep disturbance and suicidal ideas. The patient is not nervous/anxious.          Current Outpatient Medications:   •  albuterol (PROVENTIL HFA;VENTOLIN HFA) 108 (90 Base) MCG/ACT inhaler, Inhale 2 puffs Every 4 (Four) Hours As Needed for Wheezing., Disp: , Rfl:   •  aspirin 81 MG EC tablet, Take 81 mg by mouth Daily., Disp: , Rfl:   •  calcium carbonate 1250 MG capsule, Take 1 capsule by mouth 3 (Three) Times a Day., Disp: 90 capsule, Rfl: 11  •  carvedilol (COREG) 6.25 MG tablet, Take 6.25 mg by mouth 2 (Two) Times a Day With Meals., Disp: , Rfl:   •  cetirizine (zyrTEC) 10 MG tablet, Take 10 mg by mouth Daily., Disp: , Rfl:   •  docusate sodium (COLACE) 100 MG capsule, Take 1 capsule by mouth 2 (Two) Times a Day. (Patient taking differently: Take 100 mg by mouth 2 (Two) Times a Day As Needed.), Disp: 60 capsule, Rfl: 3  •  famotidine (PEPCID) 20 MG tablet, Take 20 mg by mouth 2 (Two) Times a Day., Disp: , Rfl:   •  insulin aspart (novoLOG FLEXPEN) 100 UNIT/ML solution pen-injector sc pen, Inject 0-10 Units under the skin into the appropriate area as directed 3 (Three) Times a Day With Meals. SEE instructions for Sliding Scale, Disp: 15 mL, Rfl: 0  •  insulin glargine (LANTUS) 100 UNIT/ML injection, Inject 20 Units under the skin into the appropriate area as directed Every Night., Disp: , Rfl:   •  lactulose (CHRONULAC) 10 GM/15ML solution, Take 30 mL by mouth 3 (Three) Times a Day as needed for constipation, Disp: 240 mL, Rfl: 2  •  levothyroxine (SYNTHROID, LEVOTHROID) 75 MCG tablet, Take 75 mcg by mouth Daily., Disp: , Rfl:   •  Magic mouthwash Radonc, Swish and swallow 5-10 mL 4 (Four) Times a Day Before Meals & at Bedtime., Disp: 480 mL, Rfl: 2  •  magnesium oxide (MAG-OX) 400 MG tablet, Take 1 tablet by mouth 2 (Two) Times a Day., Disp: 60 tablet, Rfl: 5  •  nicotine (NICODERM CQ) 14 MG/24HR patch, Place 1 patch on the skin as directed  "by provider Daily., Disp: , Rfl:   •  ondansetron (ZOFRAN) 8 MG tablet, TAKE ONE TABLET BY MOUTH THREE TIMES A DAY AS NEEDED FOR NAUSEA AND VOMITING, Disp: 90 tablet, Rfl: 5  •  pantoprazole (PROTONIX) 40 MG EC tablet, Take 40 mg by mouth Daily., Disp: , Rfl:   •  promethazine (PHENERGAN) 25 MG tablet, Take 1 tablet by mouth Every 6 (Six) Hours As Needed for Nausea or Vomiting., Disp: 45 tablet, Rfl: 5  •  sucralfate (CARAFATE) 1 GM/10ML suspension, Take 10 mL by mouth 4 (Four) Times a Day., Disp: 420 mL, Rfl: 2  •  ticagrelor (BRILINTA) 60 MG tablet tablet, Take 60 mg by mouth Daily., Disp: , Rfl:   •  vitamin D (ERGOCALCIFEROL) 86880 units capsule capsule, Take 1 capsule by mouth 1 (One) Time Per Week., Disp: 4 capsule, Rfl: 0  •  zolpidem (AMBIEN) 10 MG tablet, Take 1 tablet by mouth every night at bedtime., Disp: 30 tablet, Rfl: 0  No current facility-administered medications for this visit.     PHYSICAL EXAMINATION:   BP (!) 195/98   Pulse 83   Temp 97.5 °F (36.4 °C) (Temporal)   Resp 16   Ht 154.9 cm (61\")   Wt 88.5 kg (195 lb)   SpO2 98%   BMI 36.84 kg/m²    ECOG Performance Status: 1 - Symptomatic but completely ambulatory  General Appearance:  alert, cooperative, no apparent distress and appears stated age   Neurologic/Psychiatric: A&O x 3, gait steady, appropriate affect, strength 5/5 in all muscle groups   HEENT:  Normocephalic, without obvious abnormality, mucous membranes moist   Neck: Supple, symmetrical, trachea midline, no adenopathy;  No thyromegaly, masses, or tenderness   Lungs:   Clear to auscultation bilaterally; respirations regular, even, and unlabored bilaterally   Heart:  Regular rate and rhythm, no murmurs appreciated   Abdomen:   Soft, non-tender, non-distended and no organomegaly   Lymph nodes: No cervical, supraclavicular, inguinal or axillary adenopathy noted   Extremities: Normal, atraumatic; no clubbing, cyanosis, or edema    Skin: No rashes, ulcers, or suspicious lesions noted "     Admission on 03/04/2019, Discharged on 03/04/2019   Component Date Value Ref Range Status   • Glucose 03/04/2019 170* 70 - 100 mg/dL Final   • BUN 03/04/2019 16  9 - 23 mg/dL Final   • Creatinine 03/04/2019 1.40* 0.60 - 1.30 mg/dL Final   • Sodium 03/04/2019 138  132 - 146 mmol/L Final   • Potassium 03/04/2019 4.5  3.5 - 5.5 mmol/L Final   • Chloride 03/04/2019 103  99 - 109 mmol/L Final   • CO2 03/04/2019 18.0* 20.0 - 31.0 mmol/L Final   • Calcium 03/04/2019 8.4* 8.7 - 10.4 mg/dL Final   • Total Protein 03/04/2019 6.2  5.7 - 8.2 g/dL Final   • Albumin 03/04/2019 4.51  3.20 - 4.80 g/dL Final   • ALT (SGPT) 03/04/2019 12  7 - 40 U/L Final   • AST (SGOT) 03/04/2019 11  0 - 33 U/L Final   • Alkaline Phosphatase 03/04/2019 87  25 - 100 U/L Final   • Total Bilirubin 03/04/2019 0.4  0.3 - 1.2 mg/dL Final   • eGFR Non African Amer 03/04/2019 38* >60 mL/min/1.73 Final   • Globulin 03/04/2019 1.7  gm/dL Final   • A/G Ratio 03/04/2019 2.7* 1.5 - 2.5 g/dL Final   • BUN/Creatinine Ratio 03/04/2019 11.4  7.0 - 25.0 Final   • Anion Gap 03/04/2019 17.0* 3.0 - 11.0 mmol/L Final   • BNP 03/04/2019 41.0  0.0 - 100.0 pg/mL Final    Results may be falsely decreased if patient taking Biotin.   • Extra Tube 03/04/2019 hold for add-on   Final    Auto resulted   • Extra Tube 03/04/2019 Hold for add-ons.   Final    Auto resulted.   • Extra Tube 03/04/2019 hold for add-on   Final    Auto resulted   • Extra Tube 03/04/2019 Hold for add-ons.   Final    Auto resulted.   • WBC 03/04/2019 7.76  3.50 - 10.80 10*3/mm3 Final   • RBC 03/04/2019 3.30* 3.89 - 5.14 10*6/mm3 Final   • Hemoglobin 03/04/2019 9.8* 11.5 - 15.5 g/dL Final   • Hematocrit 03/04/2019 29.9* 34.5 - 44.0 % Final   • MCV 03/04/2019 90.6  80.0 - 99.0 fL Final   • MCH 03/04/2019 29.7  27.0 - 31.0 pg Final   • MCHC 03/04/2019 32.8  32.0 - 36.0 g/dL Final   • RDW 03/04/2019 16.2* 11.3 - 14.5 % Final   • RDW-SD 03/04/2019 53.5  37.0 - 54.0 fl Final   • MPV 03/04/2019 8.9  6.0 - 12.0  fL Final   • Platelets 03/04/2019 350  150 - 450 10*3/mm3 Final   • Neutrophil % 03/04/2019 54.6  41.0 - 71.0 % Final   • Lymphocyte % 03/04/2019 21.4* 24.0 - 44.0 % Final   • Monocyte % 03/04/2019 22.4* 0.0 - 12.0 % Final   • Eosinophil % 03/04/2019 0.4  0.0 - 3.0 % Final   • Basophil % 03/04/2019 1.2* 0.0 - 1.0 % Final   • Immature Grans % 03/04/2019 0.9* 0.0 - 0.6 % Final   • Neutrophils, Absolute 03/04/2019 4.24  1.50 - 8.30 10*3/mm3 Final   • Lymphocytes, Absolute 03/04/2019 1.66  0.60 - 4.80 10*3/mm3 Final   • Monocytes, Absolute 03/04/2019 1.74* 0.00 - 1.00 10*3/mm3 Final   • Eosinophils, Absolute 03/04/2019 0.03  0.00 - 0.30 10*3/mm3 Final   • Basophils, Absolute 03/04/2019 0.09  0.00 - 0.20 10*3/mm3 Final   • Immature Grans, Absolute 03/04/2019 0.07* 0.00 - 0.05 10*3/mm3 Final   • Lactate 03/04/2019 0.9  0.5 - 2.0 mmol/L Final    Falsely depressed results may occur on samples drawn from patients receiving N-Acetylcysteine (NAC) or Metamizole.   • Troponin I 03/04/2019 0.00  0.00 - 0.07 ng/mL Final    Serial Number: [id number]:  [id number]   • D-Dimer, Quantitative 03/04/2019 2.47* 0.00 - 0.56 MCGFEU/mL Final   • Troponin I 03/04/2019 0.00  0.00 - 0.07 ng/mL Final    Serial Number: [id number]:  [id number]   Admission on 02/27/2019, Discharged on 03/01/2019   Component Date Value Ref Range Status   • Glucose 02/28/2019 231* 70 - 100 mg/dL Final   • BUN 02/28/2019 12  9 - 23 mg/dL Final   • Creatinine 02/28/2019 1.64* 0.60 - 1.30 mg/dL Final   • Sodium 02/28/2019 138  132 - 146 mmol/L Final   • Potassium 02/28/2019 3.4* 3.5 - 5.5 mmol/L Final   • Chloride 02/28/2019 99  99 - 109 mmol/L Final   • CO2 02/28/2019 24.0  20.0 - 31.0 mmol/L Final   • Calcium 02/28/2019 5.7* 8.7 - 10.4 mg/dL Final   • Total Protein 02/28/2019 5.8  5.7 - 8.2 g/dL Final   • Albumin 02/28/2019 4.05  3.20 - 4.80 g/dL Final   • ALT (SGPT) 02/28/2019 15  7 - 40 U/L Final   • AST (SGOT) 02/28/2019 16  0 - 33 U/L Final   • Alkaline  Phosphatase 02/28/2019 74  25 - 100 U/L Final   • Total Bilirubin 02/28/2019 0.4  0.3 - 1.2 mg/dL Final   • eGFR Non African Amer 02/28/2019 32* >60 mL/min/1.73 Final   • Globulin 02/28/2019 1.8  gm/dL Final   • A/G Ratio 02/28/2019 2.3  1.5 - 2.5 g/dL Final   • BUN/Creatinine Ratio 02/28/2019 7.3  7.0 - 25.0 Final   • Anion Gap 02/28/2019 15.0* 3.0 - 11.0 mmol/L Final   • Magnesium 02/28/2019 <0.7* 1.3 - 2.7 mg/dL Final   • Color, UA 02/28/2019 Yellow  Yellow, Straw Final   • Appearance, UA 02/28/2019 Clear  Clear Final   • pH, UA 02/28/2019 <=5.0  5.0 - 8.0 Final   • Specific Gravity, UA 02/28/2019 1.021  1.001 - 1.030 Final   • Glucose, UA 02/28/2019 >=1000 mg/dL (3+)* Negative Final   • Ketones, UA 02/28/2019 Trace* Negative Final   • Bilirubin, UA 02/28/2019 Negative  Negative Final   • Blood, UA 02/28/2019 Negative  Negative Final   • Protein, UA 02/28/2019 30 mg/dL (1+)* Negative Final   • Leuk Esterase, UA 02/28/2019 Negative  Negative Final   • Nitrite, UA 02/28/2019 Negative  Negative Final   • Urobilinogen, UA 02/28/2019 1.0 E.U./dL  0.2 - 1.0 E.U./dL Final   • Extra Tube 02/28/2019 hold for add-on   Final    Auto resulted   • Extra Tube 02/28/2019 Hold for add-ons.   Final    Auto resulted.   • Extra Tube 02/28/2019 hold for add-on   Final    Auto resulted   • Extra Tube 02/28/2019 Hold for add-ons.   Final    Auto resulted.   • WBC 02/28/2019 8.24  3.50 - 10.80 10*3/mm3 Final   • RBC 02/28/2019 2.30* 3.89 - 5.14 10*6/mm3 Final   • Hemoglobin 02/28/2019 6.9* 11.5 - 15.5 g/dL Final   • Hematocrit 02/28/2019 20.7* 34.5 - 44.0 % Final   • MCV 02/28/2019 90.0  80.0 - 99.0 fL Final   • MCH 02/28/2019 30.0  27.0 - 31.0 pg Final   • MCHC 02/28/2019 33.3  32.0 - 36.0 g/dL Final   • RDW 02/28/2019 15.8* 11.3 - 14.5 % Final   • RDW-SD 02/28/2019 49.7  37.0 - 54.0 fl Final   • MPV 02/28/2019 9.1  6.0 - 12.0 fL Final   • Platelets 02/28/2019 296  150 - 450 10*3/mm3 Final   • Neutrophil % 02/28/2019 62.3  41.0 - 71.0  % Final   • Lymphocyte % 02/28/2019 20.0* 24.0 - 44.0 % Final   • Monocyte % 02/28/2019 16.9* 0.0 - 12.0 % Final   • Eosinophil % 02/28/2019 0.4  0.0 - 3.0 % Final   • Basophil % 02/28/2019 0.4  0.0 - 1.0 % Final   • Immature Grans % 02/28/2019 2.4* 0.0 - 0.6 % Final   • Neutrophils, Absolute 02/28/2019 5.14  1.50 - 8.30 10*3/mm3 Final   • Lymphocytes, Absolute 02/28/2019 1.65  0.60 - 4.80 10*3/mm3 Final   • Monocytes, Absolute 02/28/2019 1.39* 0.00 - 1.00 10*3/mm3 Final   • Eosinophils, Absolute 02/28/2019 0.03  0.00 - 0.30 10*3/mm3 Final   • Basophils, Absolute 02/28/2019 0.03  0.00 - 0.20 10*3/mm3 Final   • Immature Grans, Absolute 02/28/2019 0.20* 0.00 - 0.05 10*3/mm3 Final   • Influenza A Ag, EIA 02/28/2019 Negative  Negative Final   • Influenza B Ag, EIA 02/28/2019 Negative  Negative Final   • Free T4 02/28/2019 1.42  0.89 - 1.76 ng/dL Final   • TSH 02/28/2019 0.902  0.350 - 5.350 mIU/mL Final   • RBC Morphology 02/28/2019 Normal  Normal Final   • WBC Morphology 02/28/2019 Normal  Normal Final   • Platelet Morphology 02/28/2019 Normal  Normal Final   • Troponin I 02/28/2019 0.00  0.00 - 0.07 ng/mL Final    Serial Number: [id number]:  [id number]   • ABO Type 02/28/2019 O   Final   • RH type 02/28/2019 Positive   Final   • Antibody Screen 02/28/2019 Negative   Final   • T&S Expiration Date 02/28/2019 3/3/2019 11:59:59 PM   Final   • Product Code 03/01/2019 S8131A59   Final   • Unit Number 03/01/2019 [id number]   Final   • UNIT  ABO 03/01/2019 O   Final   • UNIT  RH 03/01/2019 POS   Final   • Dispense Status 03/01/2019 PT   Final   • Blood Type 03/01/2019 OPOS   Final   • Blood Expiration Date 03/01/2019 397797232714   Final   • Blood Type Barcode 03/01/2019 5100   Final   • Product Code 03/01/2019 T0972M64   Final   • Unit Number 03/01/2019 [id number]   Final   • UNIT  ABO 03/01/2019 O   Final   • UNIT  RH 03/01/2019 POS   Final   • Dispense Status 03/01/2019 PT   Final   • Blood Type 03/01/2019 OPOS    Final   • Blood Expiration Date 03/01/2019 [id number]   Final   • Blood Type Barcode 03/01/2019 5100   Final   • RBC, UA 02/28/2019 3-6* None Seen, 0-2 /HPF Final   • WBC, UA 02/28/2019 0-2  None Seen, 0-2 /HPF Final   • Bacteria, UA 02/28/2019 Trace  None Seen, Trace /HPF Final   • Squamous Epithelial Cells, UA 02/28/2019 3-6* None Seen, 0-2 /HPF Final   • Hyaline Casts, UA 02/28/2019 7-12  0 - 6 /LPF Final   • Methodology 02/28/2019 Automated Microscopy   Final   • Iron 02/28/2019 40* 50 - 175 mcg/dL Final   • TIBC 02/28/2019 266  250 - 450 mcg/dL Final   • Iron Saturation 02/28/2019 15  15 - 50 % Final   • Ferritin 02/28/2019 736.00* 10.00 - 291.00 ng/mL Final   • Folate, Hemolysate 02/28/2019 323.1  Not Estab. ng/mL Final   • Hematocrit 02/28/2019 26.7* 34.0 - 46.6 % Final   • RBC Folate 02/28/2019 1210  >498 ng/mL Final   • Vitamin B-12 02/28/2019 1,593* 211 - 911 pg/mL Final   • Haptoglobin 02/28/2019 166  34 - 200 mg/dL Final   • LDH 02/28/2019 541* 120 - 246 U/L Final   • Reticulocyte % 02/28/2019 3.07* 0.50 - 1.50 % Final   • WBC 02/28/2019 7.51  3.50 - 10.80 10*3/mm3 Final   • RBC 02/28/2019 2.97* 3.89 - 5.14 10*6/mm3 Final   • Hemoglobin 02/28/2019 8.9* 11.5 - 15.5 g/dL Final   • Hematocrit 02/28/2019 26.1* 34.5 - 44.0 % Final   • MCV 02/28/2019 87.9  80.0 - 99.0 fL Final   • MCH 02/28/2019 30.0  27.0 - 31.0 pg Final   • MCHC 02/28/2019 34.1  32.0 - 36.0 g/dL Final   • RDW 02/28/2019 15.6* 11.3 - 14.5 % Final   • RDW-SD 02/28/2019 49.1  37.0 - 54.0 fl Final   • MPV 02/28/2019 8.8  6.0 - 12.0 fL Final   • Platelets 02/28/2019 258  150 - 450 10*3/mm3 Final   • Neutrophil % 02/28/2019 55.8  41.0 - 71.0 % Final   • Lymphocyte % 02/28/2019 20.1* 24.0 - 44.0 % Final   • Monocyte % 02/28/2019 19.6* 0.0 - 12.0 % Final   • Eosinophil % 02/28/2019 0.5  0.0 - 3.0 % Final   • Basophil % 02/28/2019 0.5  0.0 - 1.0 % Final   • Immature Grans % 02/28/2019 3.5* 0.0 - 0.6 % Final   • Neutrophils, Absolute 02/28/2019  4.19  1.50 - 8.30 10*3/mm3 Final   • Lymphocytes, Absolute 02/28/2019 1.51  0.60 - 4.80 10*3/mm3 Final   • Monocytes, Absolute 02/28/2019 1.47* 0.00 - 1.00 10*3/mm3 Final   • Eosinophils, Absolute 02/28/2019 0.04  0.00 - 0.30 10*3/mm3 Final   • Basophils, Absolute 02/28/2019 0.04  0.00 - 0.20 10*3/mm3 Final   • Immature Grans, Absolute 02/28/2019 0.26* 0.00 - 0.05 10*3/mm3 Final   • Glucose 02/28/2019 144* 70 - 100 mg/dL Final   • BUN 02/28/2019 10  9 - 23 mg/dL Final   • Creatinine 02/28/2019 1.36* 0.60 - 1.30 mg/dL Final   • Sodium 02/28/2019 141  132 - 146 mmol/L Final   • Potassium 02/28/2019 3.1* 3.5 - 5.5 mmol/L Final   • Chloride 02/28/2019 104  99 - 109 mmol/L Final   • CO2 02/28/2019 27.0  20.0 - 31.0 mmol/L Final   • Calcium 02/28/2019 6.0* 8.7 - 10.4 mg/dL Final   • eGFR Non African Amer 02/28/2019 40* >60 mL/min/1.73 Final   • BUN/Creatinine Ratio 02/28/2019 7.4  7.0 - 25.0 Final   • Anion Gap 02/28/2019 10.0  3.0 - 11.0 mmol/L Final   • Glucose 02/28/2019 131* 70 - 130 mg/dL Final   • Glucose 02/28/2019 150* 70 - 130 mg/dL Final   • Magnesium 02/28/2019 <0.7* 1.3 - 2.7 mg/dL Final   • Glucose 02/28/2019 213* 70 - 130 mg/dL Final   • Glucose 02/28/2019 236* 70 - 130 mg/dL Final   • WBC 03/01/2019 9.63  3.50 - 10.80 10*3/mm3 Final   • RBC 03/01/2019 3.08* 3.89 - 5.14 10*6/mm3 Final   • Hemoglobin 03/01/2019 9.2* 11.5 - 15.5 g/dL Final   • Hematocrit 03/01/2019 27.8* 34.5 - 44.0 % Final   • MCV 03/01/2019 90.3  80.0 - 99.0 fL Final   • MCH 03/01/2019 29.9  27.0 - 31.0 pg Final   • MCHC 03/01/2019 33.1  32.0 - 36.0 g/dL Final   • RDW 03/01/2019 16.6* 11.3 - 14.5 % Final   • RDW-SD 03/01/2019 52.9  37.0 - 54.0 fl Final   • MPV 03/01/2019 9.2  6.0 - 12.0 fL Final   • Platelets 03/01/2019 307  150 - 450 10*3/mm3 Final   • Glucose 03/01/2019 169* 70 - 100 mg/dL Final   • BUN 03/01/2019 9  9 - 23 mg/dL Final   • Creatinine 03/01/2019 1.19  0.60 - 1.30 mg/dL Final   • Sodium 03/01/2019 137  132 - 146 mmol/L Final    • Potassium 03/01/2019 4.4  3.5 - 5.5 mmol/L Final   • Chloride 03/01/2019 106  99 - 109 mmol/L Final   • CO2 03/01/2019 23.0  20.0 - 31.0 mmol/L Final   • Calcium 03/01/2019 6.6* 8.7 - 10.4 mg/dL Final   • Total Protein 03/01/2019 5.7  5.7 - 8.2 g/dL Final   • Albumin 03/01/2019 3.91  3.20 - 4.80 g/dL Final   • ALT (SGPT) 03/01/2019 15  7 - 40 U/L Final   • AST (SGOT) 03/01/2019 15  0 - 33 U/L Final   • Alkaline Phosphatase 03/01/2019 78  25 - 100 U/L Final   • Total Bilirubin 03/01/2019 0.4  0.3 - 1.2 mg/dL Final   • eGFR Non African Amer 03/01/2019 46* >60 mL/min/1.73 Final   • Globulin 03/01/2019 1.8  gm/dL Final   • A/G Ratio 03/01/2019 2.2  1.5 - 2.5 g/dL Final   • BUN/Creatinine Ratio 03/01/2019 7.6  7.0 - 25.0 Final   • Anion Gap 03/01/2019 8.0  3.0 - 11.0 mmol/L Final   • Magnesium 03/01/2019 2.8* 1.3 - 2.7 mg/dL Final   • Glucose 03/01/2019 154* 70 - 130 mg/dL Final   • 25 Hydroxy, Vitamin D 03/01/2019 15.1  ng/ml Final   • Glucose 03/01/2019 179* 70 - 130 mg/dL Final        Ct Abdomen Pelvis Without Contrast    Addendum Date: 3/4/2019 Addendum:   ADDENDUM: The gallbladder and uterus are surgically absent. THIS DOCUMENT HAS BEEN ELECTRONICALLY SIGNED BY SUZANNE SHAW MD    Result Date: 3/4/2019  Narrative: EXAM:  CT Abdomen and Pelvis Without Contrast EXAM DATE/TIME:  2/28/2019 1:47 AM CLINICAL HISTORY:  63 years old, female; Anemia, unspecified; Signs and symptoms; Nausea and vomiting; Prior surgery; Additional info: N/v TECHNIQUE:  Axial computed tomography images of the abdomen and pelvis without contrast.  All CT scans at this facility use at least one of these dose optimization techniques: automated exposure control; mA and/or kV adjustment per patient size (includes targeted exams where dose is matched to clinical indication); or iterative reconstruction.  Coronal reformatted images were created and reviewed. COMPARISON:  CT ABDOMEN PELVIS W CONTRAST 1/10/2019 10:17 AM FINDINGS:  Lower thorax:   Calcified granuloma within the posterior left lower lobe. ABDOMEN:  Liver: Normal.  Gallbladder and bile ducts: Normal  Pancreas: Normal.  Spleen:  Splenic calcifications, compatible with prior granulomatous disease.  Adrenals: Normal.   Kidneys and ureters:  Bilateral simple renal cysts, the largest on the left measuring approximately 4 cm in diameter.  Stomach and bowel:  Colonic diverticulosis.  Appendix: No evidence of appendicitis. PELVIS:  Bladder: Unremarkable as visualized.  Reproductive: Unremarkable as visualized. ABDOMEN and PELVIS:  Intraperitoneal space: Normal. No free air. No significant fluid collection.  Bones/joints:  Right hip arthroplasty, without acute complications. Multilevel thoracolumbar spine degenerative changes.  Soft tissues: Normal.  Vasculature:  Atherosclerotic calcification of the visualized coronary arteries. Phleboliths within the pelvis.  Lymph nodes: Normal. No enlarged lymph nodes.     Impression: No acute abdominal or pelvic abnormality. THIS DOCUMENT HAS BEEN ELECTRONICALLY SIGNED BY SUZANNE Cleaning Lung Ventilation Perfusion    Result Date: 3/5/2019  Narrative: EXAMINATION: NM LUNG VENTILATION PERFUSION- 03/04/2019  INDICATION: Difficulty breathing, deep breath increased pain  TECHNIQUE: 24.6 mCi of xenon-133 was inhaled and wash-in, equilibrium, washout images were obtained in the posterior projection. Perfusion imaging was obtained after intravenous administration of 5.38 mCi of technetium 99 MAA.  COMPARISON: Chest x-ray dated the same date  FINDINGS: There is homogeneous uptake of tracer seen on perfusion imaging with no evidence of segmental or subsegmental defect identified. Wash-in equilibrium and washout images revealed homogeneous uptake of tracer. No significant delay in washout of tracer to suggest an component.      Impression: Normal exam.  D:  03/05/2019 E:  03/05/2019        Xr Chest 1 View    Result Date: 3/4/2019  Narrative: EXAMINATION: XR CHEST 1 VW-  03/04/2019  INDICATION: SOA triage protocol  COMPARISON: 02/27/2019  FINDINGS: Portable chest reveals cardiac and mediastinal silhouettes within normal limits. Lung fields are clear. No focal parenchymal opacification present.  No pleural effusion or pneumothorax. Bony structures are unremarkable.      Impression: No acute cardiopulmonary disease.  D:  03/04/2019 E:  03/04/2019  This report was finalized on 3/4/2019 4:14 PM by Dr. Amelie Teran MD.      Xr Chest 1 View    Result Date: 2/27/2019  Narrative: EXAM:  XR Chest, 1 View EXAM DATE/TIME:  2/27/2019 10:59 PM CLINICAL HISTORY:  63 years old, female; Pain; Other: Weakness; Additional info: Weak/dizzy/ams triage protocol TECHNIQUE:  XR of the chest, 1 view. COMPARISON:  CR XR CHEST 1 VW 11/9/2018 5:54 AM FINDINGS:  Lungs: Normal.  Pleural space: Normal.  Heart/Mediastinum: Normal.  Bones/joints: No acute abnormality.     Impression: No acute findings. THIS DOCUMENT HAS BEEN ELECTRONICALLY SIGNED BY SUZANEN SHAW MD      ASSESSMENT: The patient is a very pleasant 63 y.o. female  with  Left upper lobe small cell lung cancer [id number] stage IIIa disease:  A.  Presented with shortness of breath.  B.  Status post bronchoscopy with a biopsy done on November 9, 2018 revealed small cell lung cancer   C. Started chemotherapy with cisplatin and etoposide November 15, 2018, status post 5 cycles, completed February 2019.  D. Completed concurrent radiation January 18, 2019  2.  Normocytic anemia   3.  Insomnia  4.  Nausea  5. Type 2 diabetes    PLAN:  1.  I will stop chemotherapy at this point given her multiple side effects after last cycle of chemotherapy with severe anemia requiring blood transfusions, severe nausea vomiting with dehydration requiring hospital admission, and progressive fatigue.  2.  We discussed the role of prophylactic whole brain radiation, patient is interested.  She would keep her follow-up appointment with radiation oncology next month.  3. I will  repeat her scans prior to return.  4.  The patient will follow up with me in 2 months.  5.  I will continue Zofran as needed for nausea.  6.  We'll continue Restoril as needed for insomnia.  7.  We will continue insulin for type 2 diabetes.  Desmond Cardoso MD  3/5/2019

## 2019-03-05 NOTE — OUTREACH NOTE
Medical Week 1 Survey      Responses   Facility patient discharged from?  [place]   Does the patient have one of the following disease processes/diagnoses(primary or secondary)?  Other   Is there a successful TCM telephone encounter documented?  No   Week 1 attempt successful?  Yes   Call start time  1723   Call end time  1726   Discharge diagnosis  Symptomatic anemia    Meds reviewed with patient/caregiver?  Yes   Is the patient having any side effects they believe may be caused by any medication additions or changes?  No   Does the patient have all medications ordered at discharge?  No   Prescription comments  did not get Calcium yet, was not ready, will  today   Is the patient taking all medications as directed (includes completed medication regime)?  Yes   Does the patient have a primary care provider?   Yes   Does the patient have an appointment with their PCP within 7 days of discharge?  Yes   Has the patient kept scheduled appointments due by today?  Yes   What is the Home health agency?   [facility]   Has home health visited the patient within 72 hours of discharge?  Yes   Psychosocial issues?  Yes   Comments  blood sugars have been stable   Did the patient receive a copy of their discharge instructions?  Yes   Nursing interventions  Reviewed instructions with patient   What is the patient's perception of their health status since discharge?  Improving   Is the patient/caregiver able to teach back signs and symptoms related to disease process for when to call PCP?  Yes   Is the patient/caregiver able to teach back signs and symptoms related to disease process for when to call 911?  Yes   Is the patient/caregiver able to teach back the hierarchy of who to call/visit for symptoms/problems? PCP, Specialist, Home health nurse, Urgent Care, ED, 911  Yes   Week 1 call completed?  Yes          Namrata Hawkins RN

## 2019-03-13 NOTE — OUTREACH NOTE
"Medical Week 2 Survey      Responses   Facility patient discharged from?  [facility]   Does the patient have one of the following disease processes/diagnoses(primary or secondary)?  Other   Week 2 attempt successful?  Yes   Call start time  1148   Discharge diagnosis  Symptomatic anemia    Call end time  1153   Meds reviewed with patient/caregiver?  Yes   Is the patient having any side effects they believe may be caused by any medication additions or changes?  No   Does the patient have all medications ordered at discharge?  Yes   Is the patient taking all medications as directed (includes completed medication regime)?  Yes   Does the patient have a primary care provider?   Yes   Does the patient have an appointment with their PCP within 7 days of discharge?  Yes   Has the patient kept scheduled appointments due by today?  Yes   What is the [facility] health agency?   [email or web address]   Has home health visited the patient within 72 hours of discharge?  Yes   Psychosocial issues?  No   Did the patient receive a copy of their discharge instructions?  Yes   Nursing interventions  Reviewed instructions with patient   What is the patient's perception of their health status since discharge?  Improving   Is the patient/caregiver able to teach back signs and symptoms related to disease process for when to call PCP?  Yes   Is the patient/caregiver able to teach back signs and symptoms related to disease process for when to call 911?  Yes   Is the patient/caregiver able to teach back the hierarchy of who to call/visit for symptoms/problems? PCP, Specialist, Home health nurse, Urgent Care, ED, 911  Yes   Additional teach back comments  Pt says she is \"feeling better\". Had episode of vomiting last night. Having no nausea.  nurse visited today . WIll see \"Hearing Dr tomorrow\". Saw PCp on 3/11. Advised to call PCP if vomiting/ abd pain continures   Week 2 Call Completed?  Yes          Janie Bonilla RN  "

## 2019-03-21 NOTE — OUTREACH NOTE
Medical Week 3 Survey      Responses   Facility patient discharged from?  [facility]   Does the patient have one of the following disease processes/diagnoses(primary or secondary)?  Other   Week 3 attempt successful?  No   Unsuccessful attempts  Attempt 2          Trisha Johnson RN

## 2019-04-17 NOTE — PROGRESS NOTES
RE-EVALUATION    PATIENT:                                                      Shauna Valencia  :                                                          1955  DATE:                          2019   DIAGNOSIS:     Cognitive decline    Small cell lung cancer, left upper lobe (CMS/HCC)       BRIEF HISTORY:  The patient is a very pleasant 64 y.o. female  with limited stage small cell lung cancer diagnosed in 2018.  She was treated with systemic chemotherapy using cisplatin and etoposide and received 5 out of a planned 6 cycles.  I treated her with thoracic radiation therapy beginning with her second cycle of chemotherapy.  She received 45 Gy in 30 fractions of 1.5 Gy each, completing on 2019.  She has since recovered from her acute side effects, and reports symptoms of fatigue, dyspnea on exertion, and neuropathy.  She began developing tinnitus after her 4th cycle, and had a hearing test following the 5th that showed a sensineural hearing loss.  Due to that as well as her progressive anemia, the 6th cycle of chemotherapy was held.  She presents today for re-evaluation in anticipation of undergoing prophylactic cranial irradiation.    Allergies   Allergen Reactions   • Plavix [Clopidogrel Bisulfate] Anaphylaxis   • Codeine Other (See Comments)     Pt not certain of reatction   • Penicillins Other (See Comments)     Pt does not remember reaction       Review of Systems   Constitutional: Positive for fatigue.   HENT:   Positive for hearing loss (waiting on hearing aids) and tinnitus.    Respiratory: Positive for shortness of breath (with exertion) and wheezing (occ).    Gastrointestinal: Positive for constipation.   Neurological:        Left hand neuropathy   Hematological: Bruises/bleeds easily.   Psychiatric/Behavioral: Positive for sleep disturbance.   All other systems reviewed and are negative.          Objective   VITAL SIGNS:   Vitals:    19 0950   BP: 135/73   Pulse: 96   Resp:  18   Temp: 96.8 °F (36 °C)   TempSrc: Temporal   SpO2: 96%   Weight: 88.1 kg (194 lb 4.8 oz)   PainSc: 0-No pain        KPS 80%    Physical Exam   Constitutional: She is oriented to person, place, and time. She appears well-developed and well-nourished. No distress.   HENT:   Head: Normocephalic and atraumatic.   Mouth/Throat: Oropharynx is clear and moist.   Treatment related alopecia   Eyes: Conjunctivae and EOM are normal. Pupils are equal, round, and reactive to light.   Neck: Normal range of motion. Neck supple.   Cardiovascular: Normal rate and regular rhythm. Exam reveals no friction rub.   No murmur heard.  Pulmonary/Chest: Effort normal and breath sounds normal. She has no wheezes.   Abdominal: Soft. Bowel sounds are normal. She exhibits no distension and no mass. There is no tenderness.   Musculoskeletal: Normal range of motion. She exhibits no edema.   Lymphadenopathy:     She has no cervical adenopathy.   Neurological: She is alert and oriented to person, place, and time. She displays normal reflexes. No cranial nerve deficit. She exhibits normal muscle tone. Coordination normal.   Describes numbness in the left hand and fingertips   Skin: Skin is warm and dry.   Psychiatric: She has a normal mood and affect. Her behavior is normal. Judgment and thought content normal.   Nursing note and vitals reviewed.    IMAGING  I have personally reviewed the relevant imaging studies, as follows:  Ct Abdomen Pelvis Without Contrast    Addendum Date: 3/4/2019    ADDENDUM: The gallbladder and uterus are surgically absent. THIS DOCUMENT HAS BEEN ELECTRONICALLY SIGNED BY SUZANNE SHAW MD    Result Date: 3/4/2019  No acute abdominal or pelvic abnormality. THIS DOCUMENT HAS BEEN ELECTRONICALLY SIGNED BY SUZANNE SHAW MD    Ct Chest With Contrast    Result Date: 1/10/2019  Negative CT scan of the chest. There has been dramatic interval improvement when compared to previous examination of 11/8/2018.  DICTATED:   1/10/2019  EDITED/ls :   1/10/2019   This report was finalized on 1/10/2019 4:28 PM by Dr. Julián Barrios MD.         The following portions of the patient's history were reviewed and updated as appropriate: allergies, current medications, past family history, past medical history, past social history, past surgical history and problem list.    Diagnoses and all orders for this visit:    Cognitive decline  -     memantine (NAMENDA) 10 MG tablet; Take 1 tablet by mouth 2 (Two) Times a Day. Start 1/2 tab 2 times per day for 3 days, then take 1 tablet by mouth 2 times per day    Small cell lung cancer, left upper lobe (CMS/HCC)      IMPRESSION:  Ms. Valencia is a 64 year old female with limited stage small cell lung cancer who has had a very good response to definitive therapy and at this point has no known active disease.  I met with the patient, her daughter, and her friend today and discussed the importance of prophylactic cranial irradiation with a explanation of the risks and benefits.  She is agreeable and signed informed consent.  I also discussed using Memantine concurrently as a means of limiting short term memory loss, which has been shown in a randomized trial by the [facility] to reduce the potential impairment on cognition caused by whole brain radiation therapy.  I sent this prescription to her pharmacy.   She will require a dose of 25 Gy delivered in 10 fractions over 2 weeks.  She has requested to be arranged to stay at the [facility] and we will work with our  to coordinate this.  She completed a CT planning session today and I will aim to start her treatments in the next 1-2 weeks.    RECOMMENDATIONS:    Return to clinic in 1-2 weeks to begin radiation treatments.       Ramone Huggins MD

## 2019-04-18 NOTE — TELEPHONE ENCOUNTER
SW completed and faxed in a [facility] referral for 4/29-5/10, per pt request.  SW available for ongoing support and resource needs.

## 2019-05-01 NOTE — OUTREACH NOTE
Medical Week 4 Survey      Responses   Facility patient discharged from?  [place] [facility]   Does the patient have one of the following disease processes/diagnoses(primary or secondary)?  Other   Week 4 attempt successful?  Yes   Call start time  1520   Call end time  1533   Discharge diagnosis  Small cell lung cancer, left upper lobe   (chemotherapy and radiation to start)   Is patient permission given to speak with other caregiver?  No   Meds reviewed with patient/caregiver?  Yes   Is the patient having any side effects they believe may be caused by any medication additions or changes?  Yes   Side effects comments   nausea and diarrhea since chemotherapy treatment.    Is the patient taking all medications as directed (includes completed medication regime)?  Yes   Medication comments  Advised patient to call Dr Cardoso's office/cancer center and report nausea / diarrhea. Advised to ask if OK to take immodium and ask for prescription refill for zofran.    Has the patient kept scheduled appointments due by today?  Yes   Is the patient still receiving Home Health Services?  N/A   What is the patient's perception of their health status since discharge?  Worsening   Is the patient/caregiver able to teach back signs and symptoms related to disease process for when to call PCP?  Yes   Is the patient/caregiver able to teach back signs and symptoms related to disease process for when to call 911?  Yes   Is the patient/caregiver able to teach back the hierarchy of who to call/visit for symptoms/problems? PCP, Specialist, Home health nurse, Urgent Care, ED, 911  Yes   Additional teach back comments  Advised patient to call Dr Cardoso's office and report feeling very poorly with nausea and diarrhea. Encouraged patient to try to drink frequent small sips to maintain hydration.    Week 4 Call Completed?  Yes   Would the patient like one additional call?  Yes          Nati Lo RN  
You can access the [facility] Patient Portal, offered by [facility], by registering with the following website: [email or web address]

## 2019-05-06 NOTE — PROGRESS NOTES
DATE OF VISIT: 5/7/2019    REASON FOR VISIT: Followup for limited stage small cell lung cancer     HISTORY OF PRESENT ILLNESS: The patient is a very pleasant 64 y.o. female  with past medical history significant for limited stage small cell lung cancer diagnosed November 9, 2018.  She was started on chemotherapy using cisplatin and etoposide November 15, 2018. She completed 5 cycles on February 2019. Cycle 6 was not given secondary to progressive fatigue and multiple side effects. The patient is here today for scheduled follow up visit.    SUBJECTIVE: The patient is here today with her daughter and sister in law.  She is complaining of fatigue.  She is undergoing phylactic whole brain radiation.  She is complaining numbness fingers tips and toes.  She is complaining of shortness of breath with exertion.  She is complaining of mild nausea but no vomiting.    PAST MEDICAL HISTORY/SOCIAL HISTORY/FAMILY HISTORY: Reviewed by me and unchanged from my documentation done on 05/07/19.    Review of Systems   Constitutional: Negative for activity change, appetite change, chills, fatigue, fever and unexpected weight change.   HENT: Negative for hearing loss, mouth sores, nosebleeds, sore throat and trouble swallowing.    Eyes: Negative for visual disturbance.   Respiratory: Positive for shortness of breath. Negative for cough, chest tightness and wheezing.    Cardiovascular: Negative for chest pain, palpitations and leg swelling.   Gastrointestinal: Positive for nausea. Negative for abdominal distention, abdominal pain, blood in stool, constipation, diarrhea, rectal pain and vomiting.   Endocrine: Negative for cold intolerance and heat intolerance.   Genitourinary: Negative for difficulty urinating, dysuria, frequency and urgency.   Musculoskeletal: Negative for arthralgias, back pain, gait problem, joint swelling and myalgias.   Skin: Negative for rash.   Neurological: Positive for numbness. Negative for dizziness, tremors,  syncope, weakness, light-headedness and headaches.   Hematological: Negative for adenopathy. Does not bruise/bleed easily.   Psychiatric/Behavioral: Negative for confusion, sleep disturbance and suicidal ideas. The patient is not nervous/anxious.          Current Outpatient Medications:   •  albuterol (PROVENTIL HFA;VENTOLIN HFA) 108 (90 Base) MCG/ACT inhaler, Inhale 2 puffs Every 4 (Four) Hours As Needed for Wheezing., Disp: , Rfl:   •  aspirin 81 MG EC tablet, Take 81 mg by mouth Daily., Disp: , Rfl:   •  calcium carbonate 1250 MG capsule, Take 1 capsule by mouth 3 (Three) Times a Day., Disp: 90 capsule, Rfl: 11  •  carvedilol (COREG) 6.25 MG tablet, Take 6.25 mg by mouth 2 (Two) Times a Day With Meals., Disp: , Rfl:   •  cetirizine (zyrTEC) 10 MG tablet, Take 10 mg by mouth Daily., Disp: , Rfl:   •  docusate sodium (COLACE) 100 MG capsule, Take 1 capsule by mouth 2 (Two) Times a Day. (Patient taking differently: Take 100 mg by mouth 2 (Two) Times a Day As Needed.), Disp: 60 capsule, Rfl: 3  •  famotidine (PEPCID) 20 MG tablet, Take 20 mg by mouth 2 (Two) Times a Day., Disp: , Rfl:   •  insulin aspart (novoLOG FLEXPEN) 100 UNIT/ML solution pen-injector sc pen, Inject 0-10 Units under the skin into the appropriate area as directed 3 (Three) Times a Day With Meals. SEE instructions for Sliding Scale, Disp: 15 mL, Rfl: 0  •  insulin glargine (LANTUS) 100 UNIT/ML injection, Inject 20 Units under the skin into the appropriate area as directed Every Night., Disp: , Rfl:   •  lactulose (CHRONULAC) 10 GM/15ML solution, Take 30 mL by mouth 3 (Three) Times a Day as needed for constipation, Disp: 240 mL, Rfl: 2  •  levothyroxine (SYNTHROID, LEVOTHROID) 75 MCG tablet, Take 75 mcg by mouth Daily., Disp: , Rfl:   •  Magic mouthwash Radonc, Swish and swallow 5-10 mL 4 (Four) Times a Day Before Meals & at Bedtime., Disp: 480 mL, Rfl: 2  •  magnesium oxide (MAG-OX) 400 MG tablet, Take 1 tablet by mouth 2 (Two) Times a Day., Disp:  "60 tablet, Rfl: 5  •  memantine (NAMENDA) 10 MG tablet, Take 1 tablet by mouth 2 (Two) Times a Day. Start 1/2 tab 2 times per day for 3 days, then take 1 tablet by mouth 2 times per day, Disp: 60 tablet, Rfl: 2  •  ondansetron (ZOFRAN) 8 MG tablet, TAKE ONE TABLET BY MOUTH THREE TIMES A DAY AS NEEDED FOR NAUSEA AND VOMITING, Disp: 90 tablet, Rfl: 5  •  pantoprazole (PROTONIX) 40 MG EC tablet, Take 40 mg by mouth Daily., Disp: , Rfl:   •  promethazine (PHENERGAN) 25 MG tablet, Take 1 tablet by mouth Every 6 (Six) Hours As Needed for Nausea or Vomiting., Disp: 45 tablet, Rfl: 5  •  sucralfate (CARAFATE) 1 GM/10ML suspension, Take 10 mL by mouth 4 (Four) Times a Day., Disp: 420 mL, Rfl: 2  •  ticagrelor (BRILINTA) 60 MG tablet tablet, Take 60 mg by mouth Daily., Disp: , Rfl:   •  vitamin D (ERGOCALCIFEROL) 03443 units capsule capsule, Take 1 capsule by mouth 1 (One) Time Per Week., Disp: 4 capsule, Rfl: 0  •  zolpidem (AMBIEN) 10 MG tablet, Take 1 tablet by mouth every night at bedtime., Disp: 30 tablet, Rfl: 0    PHYSICAL EXAMINATION:   Ht 154.9 cm (61\")   Wt 87.1 kg (192 lb)   BMI 36.28 kg/m²    ECOG Performance Status: 1 - Symptomatic but completely ambulatory  General Appearance:  alert, cooperative, no apparent distress and appears stated age   Neurologic/Psychiatric: A&O x 3, gait steady, appropriate affect, strength 5/5 in all muscle groups   HEENT:  Normocephalic, without obvious abnormality, mucous membranes moist   Neck: Supple, symmetrical, trachea midline, no adenopathy;  No thyromegaly, masses, or tenderness   Lungs:   Clear to auscultation bilaterally; respirations regular, even, and unlabored bilaterally   Heart:  Regular rate and rhythm, no murmurs appreciated   Abdomen:   Soft, non-tender, non-distended and no organomegaly   Lymph nodes: No cervical, supraclavicular, inguinal or axillary adenopathy noted   Extremities: Normal, atraumatic; no clubbing, cyanosis, or edema    Skin: No rashes, ulcers, or " suspicious lesions noted     No visits with results within 2 Week(s) from this visit.   Latest known visit with results is:   Admission on 03/04/2019, Discharged on 03/04/2019   Component Date Value Ref Range Status   • Glucose 03/04/2019 170* 70 - 100 mg/dL Final   • BUN 03/04/2019 16  9 - 23 mg/dL Final   • Creatinine 03/04/2019 1.40* 0.60 - 1.30 mg/dL Final   • Sodium 03/04/2019 138  132 - 146 mmol/L Final   • Potassium 03/04/2019 4.5  3.5 - 5.5 mmol/L Final   • Chloride 03/04/2019 103  99 - 109 mmol/L Final   • CO2 03/04/2019 18.0* 20.0 - 31.0 mmol/L Final   • Calcium 03/04/2019 8.4* 8.7 - 10.4 mg/dL Final   • Total Protein 03/04/2019 6.2  5.7 - 8.2 g/dL Final   • Albumin 03/04/2019 4.51  3.20 - 4.80 g/dL Final   • ALT (SGPT) 03/04/2019 12  7 - 40 U/L Final   • AST (SGOT) 03/04/2019 11  0 - 33 U/L Final   • Alkaline Phosphatase 03/04/2019 87  25 - 100 U/L Final   • Total Bilirubin 03/04/2019 0.4  0.3 - 1.2 mg/dL Final   • eGFR Non African Amer 03/04/2019 38* >60 mL/min/1.73 Final   • Globulin 03/04/2019 1.7  gm/dL Final   • A/G Ratio 03/04/2019 2.7* 1.5 - 2.5 g/dL Final   • BUN/Creatinine Ratio 03/04/2019 11.4  7.0 - 25.0 Final   • Anion Gap 03/04/2019 17.0* 3.0 - 11.0 mmol/L Final   • BNP 03/04/2019 41.0  0.0 - 100.0 pg/mL Final    Results may be falsely decreased if patient taking Biotin.   • Extra Tube 03/04/2019 hold for add-on   Final    Auto resulted   • Extra Tube 03/04/2019 Hold for add-ons.   Final    Auto resulted.   • Extra Tube 03/04/2019 hold for add-on   Final    Auto resulted   • Extra Tube 03/04/2019 Hold for add-ons.   Final    Auto resulted.   • WBC 03/04/2019 7.76  3.50 - 10.80 10*3/mm3 Final   • RBC 03/04/2019 3.30* 3.89 - 5.14 10*6/mm3 Final   • Hemoglobin 03/04/2019 9.8* 11.5 - 15.5 g/dL Final   • Hematocrit 03/04/2019 29.9* 34.5 - 44.0 % Final   • MCV 03/04/2019 90.6  80.0 - 99.0 fL Final   • MCH 03/04/2019 29.7  27.0 - 31.0 pg Final   • MCHC 03/04/2019 32.8  32.0 - 36.0 g/dL Final   • RDW  03/04/2019 16.2* 11.3 - 14.5 % Final   • RDW-SD 03/04/2019 53.5  37.0 - 54.0 fl Final   • MPV 03/04/2019 8.9  6.0 - 12.0 fL Final   • Platelets 03/04/2019 350  150 - 450 10*3/mm3 Final   • Neutrophil % 03/04/2019 54.6  41.0 - 71.0 % Final   • Lymphocyte % 03/04/2019 21.4* 24.0 - 44.0 % Final   • Monocyte % 03/04/2019 22.4* 0.0 - 12.0 % Final   • Eosinophil % 03/04/2019 0.4  0.0 - 3.0 % Final   • Basophil % 03/04/2019 1.2* 0.0 - 1.0 % Final   • Immature Grans % 03/04/2019 0.9* 0.0 - 0.6 % Final   • Neutrophils, Absolute 03/04/2019 4.24  1.50 - 8.30 10*3/mm3 Final   • Lymphocytes, Absolute 03/04/2019 1.66  0.60 - 4.80 10*3/mm3 Final   • Monocytes, Absolute 03/04/2019 1.74* 0.00 - 1.00 10*3/mm3 Final   • Eosinophils, Absolute 03/04/2019 0.03  0.00 - 0.30 10*3/mm3 Final   • Basophils, Absolute 03/04/2019 0.09  0.00 - 0.20 10*3/mm3 Final   • Immature Grans, Absolute 03/04/2019 0.07* 0.00 - 0.05 10*3/mm3 Final   • Lactate 03/04/2019 0.9  0.5 - 2.0 mmol/L Final    Falsely depressed results may occur on samples drawn from patients receiving N-Acetylcysteine (NAC) or Metamizole.   • Troponin I 03/04/2019 0.00  0.00 - 0.07 ng/mL Final    Serial Number: [id number]:  [id number]   • D-Dimer, Quantitative 03/04/2019 2.47* 0.00 - 0.56 MCGFEU/mL Final   • Troponin I 03/04/2019 0.00  0.00 - 0.07 ng/mL Final    Serial Number: [id number]:  [id number]        No results found.    ASSESSMENT: The patient is a very pleasant 64 y.o. female  with  Left upper lobe small cell lung cancer [id number] stage IIIa disease:  A.  Presented with shortness of breath.  B.  Status post bronchoscopy with a biopsy done on November 9, 2018 revealed small cell lung cancer   C. Started chemotherapy with cisplatin and etoposide November 15, 2018, status post 5 cycles, completed February 2019.  D. Completed concurrent radiation January 18, 2019  E.  Will complete prophylactic whole brain radiation May 13 2019  2.  Normocytic anemia   3.  Insomnia  4.   Nausea  5. Type 2 diabetes    PLAN:  1.  I will order scans prior to return.  2.  The patient will complete her prophylactic whole brain radiation in 4 days.    3.  I will order labs on return including CBC CMP serum TSH and magnesium level given her neurologic symptoms.  4.  The patient will follow up with me in 2 weeks.  5.  I will continue Zofran as needed for nausea.  6.  We'll continue Restoril as needed for insomnia.  7.  We will continue insulin for type 2 diabetes.  Desmond Cardoso MD  5/7/2019

## 2019-05-20 NOTE — PROGRESS NOTES
DATE OF VISIT: 5/21/2019    REASON FOR VISIT: Followup for limited stage small cell lung cancer     HISTORY OF PRESENT ILLNESS: The patient is a very pleasant 64 y.o. female  with past medical history significant for limited stage small cell lung cancer diagnosed November 9, 2018.  She was started on chemotherapy using cisplatin and etoposide November 15, 2018. She completed 5 cycles on February 2019. Cycle 6 was not given secondary to progressive fatigue and multiple side effects. The patient is here today for scheduled follow up visit.    SUBJECTIVE: The patient is here today with her daughter and sister in law.     PAST MEDICAL HISTORY/SOCIAL HISTORY/FAMILY HISTORY: Reviewed by me and unchanged from my documentation done on 05/21/19.    Review of Systems   Constitutional: Negative for activity change, appetite change, chills, fatigue, fever and unexpected weight change.   HENT: Negative for hearing loss, mouth sores, nosebleeds, sore throat and trouble swallowing.    Eyes: Negative for visual disturbance.   Respiratory: Positive for shortness of breath. Negative for cough, chest tightness and wheezing.    Cardiovascular: Negative for chest pain, palpitations and leg swelling.   Gastrointestinal: Positive for nausea. Negative for abdominal distention, abdominal pain, blood in stool, constipation, diarrhea, rectal pain and vomiting.   Endocrine: Negative for cold intolerance and heat intolerance.   Genitourinary: Negative for difficulty urinating, dysuria, frequency and urgency.   Musculoskeletal: Negative for arthralgias, back pain, gait problem, joint swelling and myalgias.   Skin: Negative for rash.   Neurological: Positive for numbness. Negative for dizziness, tremors, syncope, weakness, light-headedness and headaches.   Hematological: Negative for adenopathy. Does not bruise/bleed easily.   Psychiatric/Behavioral: Negative for confusion, sleep disturbance and suicidal ideas. The patient is not nervous/anxious.           Current Outpatient Medications:   •  albuterol (PROVENTIL HFA;VENTOLIN HFA) 108 (90 Base) MCG/ACT inhaler, Inhale 2 puffs Every 4 (Four) Hours As Needed for Wheezing., Disp: , Rfl:   •  aspirin 81 MG EC tablet, Take 81 mg by mouth Daily., Disp: , Rfl:   •  calcium carbonate 1250 MG capsule, Take 1 capsule by mouth 3 (Three) Times a Day., Disp: 90 capsule, Rfl: 11  •  carvedilol (COREG) 6.25 MG tablet, Take 6.25 mg by mouth 2 (Two) Times a Day With Meals., Disp: , Rfl:   •  cetirizine (zyrTEC) 10 MG tablet, Take 10 mg by mouth Daily., Disp: , Rfl:   •  docusate sodium (COLACE) 100 MG capsule, Take 1 capsule by mouth 2 (Two) Times a Day. (Patient taking differently: Take 100 mg by mouth 2 (Two) Times a Day As Needed.), Disp: 60 capsule, Rfl: 3  •  famotidine (PEPCID) 20 MG tablet, Take 20 mg by mouth 2 (Two) Times a Day., Disp: , Rfl:   •  insulin aspart (novoLOG FLEXPEN) 100 UNIT/ML solution pen-injector sc pen, Inject 0-10 Units under the skin into the appropriate area as directed 3 (Three) Times a Day With Meals. SEE instructions for Sliding Scale, Disp: 15 mL, Rfl: 0  •  insulin glargine (LANTUS) 100 UNIT/ML injection, Inject 20 Units under the skin into the appropriate area as directed Every Night., Disp: , Rfl:   •  lactulose (CHRONULAC) 10 GM/15ML solution, Take 30 mL by mouth 3 (Three) Times a Day as needed for constipation, Disp: 240 mL, Rfl: 2  •  levothyroxine (SYNTHROID, LEVOTHROID) 75 MCG tablet, Take 75 mcg by mouth Daily., Disp: , Rfl:   •  Magic mouthwash Radonc, Swish and swallow 5-10 mL 4 (Four) Times a Day Before Meals & at Bedtime., Disp: 480 mL, Rfl: 2  •  magnesium oxide (MAG-OX) 400 MG tablet, Take 1 tablet by mouth 2 (Two) Times a Day., Disp: 60 tablet, Rfl: 5  •  memantine (NAMENDA) 10 MG tablet, Take 1 tablet by mouth 2 (Two) Times a Day. Start 1/2 tab 2 times per day for 3 days, then take 1 tablet by mouth 2 times per day, Disp: 60 tablet, Rfl: 2  •  ondansetron (ZOFRAN) 8 MG  "tablet, TAKE ONE TABLET BY MOUTH THREE TIMES A DAY AS NEEDED FOR NAUSEA AND VOMITING, Disp: 90 tablet, Rfl: 5  •  pantoprazole (PROTONIX) 40 MG EC tablet, Take 40 mg by mouth Daily., Disp: , Rfl:   •  promethazine (PHENERGAN) 25 MG tablet, Take 1 tablet by mouth Every 6 (Six) Hours As Needed for Nausea or Vomiting., Disp: 45 tablet, Rfl: 5  •  sucralfate (CARAFATE) 1 GM/10ML suspension, Take 10 mL by mouth 4 (Four) Times a Day., Disp: 420 mL, Rfl: 2  •  ticagrelor (BRILINTA) 60 MG tablet tablet, Take 60 mg by mouth Daily., Disp: , Rfl:   •  vitamin D (ERGOCALCIFEROL) 91177 units capsule capsule, Take 1 capsule by mouth 1 (One) Time Per Week., Disp: 4 capsule, Rfl: 0  •  zolpidem (AMBIEN) 10 MG tablet, Take 1 tablet by mouth every night at bedtime., Disp: 30 tablet, Rfl: 0    PHYSICAL EXAMINATION:   /90   Pulse 84   Temp 97.4 °F (36.3 °C) (Temporal)   Resp 16   Ht 154.9 cm (61\")   Wt 88 kg (194 lb)   SpO2 97%   BMI 36.66 kg/m²    ECOG Performance Status: 1 - Symptomatic but completely ambulatory  General Appearance:  alert, cooperative, no apparent distress and appears stated age   Neurologic/Psychiatric: A&O x 3, gait steady, appropriate affect, strength 5/5 in all muscle groups   HEENT:  Normocephalic, without obvious abnormality, mucous membranes moist   Neck: Supple, symmetrical, trachea midline, no adenopathy;  No thyromegaly, masses, or tenderness   Lungs:   Clear to auscultation bilaterally; respirations regular, even, and unlabored bilaterally   Heart:  Regular rate and rhythm, no murmurs appreciated   Abdomen:   Soft, non-tender, non-distended and no organomegaly   Lymph nodes: No cervical, supraclavicular, inguinal or axillary adenopathy noted   Extremities: Normal, atraumatic; no clubbing, cyanosis, or edema    Skin: No rashes, ulcers, or suspicious lesions noted     Hospital Outpatient Visit on 05/21/2019   Component Date Value Ref Range Status   • Creatinine 05/21/2019 1.70* 0.60 - 1.30 " mg/dL Final    Serial Number: [id number]:  [id number]        Ct Abdomen Pelvis Without Contrast    Result Date: 5/21/2019  Narrative: EXAMINATION: CT CHEST WO CONTRAST-, CT ABDOMEN/PELVIS WO CONTRAST-05/21/2019:  INDICATION: Restaging lung cancer; C34.12-Malignant neoplasm of upper lobe, left bronchus or lung.  TECHNIQUE: 5 mm unenhanced images through the chest, abdomen and pelvis.  The radiation dose reduction device was turned on for each scan per the ALARA (As Low as Reasonably Achievable) protocol.  COMPARISON: Abdomen and pelvis CT scan 02/28/2019. Chest CT scan 01/10/2019.  FINDINGS:  CHEST CT SCAN WITHOUT CONTRAST:  By report, the previous abdomen and pelvis CT scan was negative. The previous chest CT scan report showed marked improvement, essentially negative for residual disease. Today's study shows new dense groundglass disease of the left upper lobe and superior segment of the left lower lobe sharply circumscribed, typical for postirradiation change. The lungs elsewhere appear clear except for granulomatous calcifications. There is no pleural effusion. Mediastinal window images show no evidence of adenopathy. There is trace pericardial fluid or pericardial thickening. The bony structures appear intact.      Impression: New left perihilar disease, with appearance of postirradiation change. Please correlate with timing and site of the patient's treatment. No new chest disease elsewhere.  ABDOMEN AND PELVIS CT SCAN WITHOUT CONTRAST:  No significant abnormalities are noted of the liver, spleen, pancreas, adrenal glands, or right kidney for a non-IV contrast enhanced exam. There are stable left renal cysts. No upper abdominal adenopathy, ascites, or inflammatory change is seen. Bowel loops are normal in caliber.  Regarding the lower abdomen and pelvis, no evidence of mass, adenopathy, ascites or acute inflammatory change is seen. Note is made of the patient's previous right hip replacement and resulting  streak artifact. The bony structures elsewhere appear unremarkable.  IMPRESSION: No evidence of intra-abdominal or intrapelvic metastatic disease or other acute disease.  D:  05/21/2019 E:  05/21/2019       Ct Chest Without Contrast    Result Date: 5/21/2019  Narrative: EXAMINATION: CT CHEST WO CONTRAST-, CT ABDOMEN/PELVIS WO CONTRAST-05/21/2019:  INDICATION: Restaging lung cancer; C34.12-Malignant neoplasm of upper lobe, left bronchus or lung.  TECHNIQUE: 5 mm unenhanced images through the chest, abdomen and pelvis.  The radiation dose reduction device was turned on for each scan per the ALARA (As Low as Reasonably Achievable) protocol.  COMPARISON: Abdomen and pelvis CT scan 02/28/2019. Chest CT scan 01/10/2019.  FINDINGS:  CHEST CT SCAN WITHOUT CONTRAST:  By report, the previous abdomen and pelvis CT scan was negative. The previous chest CT scan report showed marked improvement, essentially negative for residual disease. Today's study shows new dense groundglass disease of the left upper lobe and superior segment of the left lower lobe sharply circumscribed, typical for postirradiation change. The lungs elsewhere appear clear except for granulomatous calcifications. There is no pleural effusion. Mediastinal window images show no evidence of adenopathy. There is trace pericardial fluid or pericardial thickening. The bony structures appear intact.      Impression: New left perihilar disease, with appearance of postirradiation change. Please correlate with timing and site of the patient's treatment. No new chest disease elsewhere.  ABDOMEN AND PELVIS CT SCAN WITHOUT CONTRAST:  No significant abnormalities are noted of the liver, spleen, pancreas, adrenal glands, or right kidney for a non-IV contrast enhanced exam. There are stable left renal cysts. No upper abdominal adenopathy, ascites, or inflammatory change is seen. Bowel loops are normal in caliber.  Regarding the lower abdomen and pelvis, no evidence of mass,  adenopathy, ascites or acute inflammatory change is seen. Note is made of the patient's previous right hip replacement and resulting streak artifact. The bony structures elsewhere appear unremarkable.  IMPRESSION: No evidence of intra-abdominal or intrapelvic metastatic disease or other acute disease.  D:  05/21/2019 E:  05/21/2019         ASSESSMENT: The patient is a very pleasant 64 y.o. female  with  Left upper lobe small cell lung cancer [id number] stage IIIa disease:  A.  Presented with shortness of breath.  B.  Status post bronchoscopy with a biopsy done on November 9, 2018 revealed small cell lung cancer   C. Started chemotherapy with cisplatin and etoposide November 15, 2018, status post 5 cycles, completed February 2019.  D. Completed concurrent radiation January 18, 2019  E.  Will complete prophylactic whole brain radiation May 13 2019  2.  Normocytic anemia   3.  Insomnia  4.  Nausea  5. Type 2 diabetes    PLAN:  1.  I did go over the scan results with the patient and her family, I reviewed the films myself, I went over the pictures with them.  She does have postradiation changes in the left upper lobe.  I will do 3 months follow-up study which should be due August 2019.  2.  The patient completed prophylactic whole brain radiation.    3.  I will continue to monitor labs including CBC CMP serum TSH.  4.  The patient will follow up with me in 3 month.   5.  I will continue Zofran as needed for nausea.  6.  We'll continue Restoril as needed for insomnia.  7.  We will continue insulin for type 2 diabetes.  Desmond Cardoso MD  5/21/2019

## 2019-05-21 NOTE — TELEPHONE ENCOUNTER
----- Message from Emilie Castro sent at 5/21/2019  1:21 PM EDT -----  Regarding: JUSTIN - PATIENT IS ON TABLE CURRENTLY   Contact: [phone]  [facility] WITH [facility] OUT PATIENT DIAGNOSTIC CALLED BECAUSE THE PATIENTS KIDNEY FUNCTION IS NOT GOOD ENOUGH FOR THE CONTRAST. WHAT DO YOU WANT THEM TO DO? PATIENT IS ON TABLE NOW

## 2019-05-24 NOTE — RADIATION COMPLETION NOTES
DATE OF COMPLETION: 5/10/2019  DIAGNOSIS: Small Cell Lung Cancer    REFERRING: Desmond Cardoso MD        Shauna Perez completed radiation therapy today.      BACKGROUND: Shauna Valencia is a 64 year old female with small cell lung cancer, who has completed initial therapy.  She underwent prophylactic cranial irradiation as detailed below:    Treatment Summary     Dates of Therapy: 4/16/2019 - 5/10/2019  Treatment Site: Whole brain  Dose: 25 Gy in 10 fractions of 2.5 Gy each  Technique: Opposed lateral photon fields with 6X energy.    Treatment Course and Tolerance: Ms. Valencia tolerated her radiation therapy treatments very well.  She did not develop any acute radiation related toxicities.  She was started on memantine for cognitive protection, which she will continue for the next 2 months.    The initial follow up visit will be in 1 month.    Shauna Valencia knows to call if any problems or concerns develop in the meantime.     Electronically signed by: Ramone Huggins MD                    Cc: Vidya Chávez MD

## 2019-06-24 NOTE — PROGRESS NOTES
FOLLOW UP NOTE    PATIENT:                                                      Shauna Valencia  MEDICAL RECORD #:                        [id number]  :                                                          1955  COMPLETION DATE:    5/10/2019  DIAGNOSIS:     Cancer Staging  Small cell lung cancer, left upper lobe (CMS/HCC)  Staging form: Lung, AJCC 8th Edition  - Clinical stage from 2018: Stage IIIA (cT1b, cN2, cM0) - Signed by Ramone Huggins MD on 2018    BRIEF HISTORY:  Mrs. Valencia returns to clinic today for a scheduled one-month follow-up after completing prophylactic cranial irradiation for her limited stage small cell lung cancer.  She states that she feels like she has been doing rather well.  She continues to describe fatigue, but states this is improving.  She denies any difficulty with memory, headaches, nausea, or other pulmonary complaints.  Her only other chief complaint is related to occasional lightheadedness and dizzy that occurs mostly in the late mornings to early afternoon hours.    MEDICATIONS: Medication reconciliation for the patient was reviewed and confirmed in the electronic medical record.    Review of Systems   Constitutional: Positive for fatigue.   HENT:   Positive for hearing loss (hearing aids) and tinnitus.    Respiratory: Positive for cough (dry) and shortness of breath (with exertion).    Gastrointestinal: Positive for constipation and nausea.   Neurological: Positive for dizziness (upon standing) and headaches (occ upon waking).        Reports staggers when first getting up   All other systems reviewed and are negative.      KPS 80%    Physical Exam   Constitutional: She is oriented to person, place, and time. She appears well-developed and well-nourished. No distress.   HENT:   Head: Normocephalic and atraumatic.   Mouth/Throat: Oropharynx is clear and moist.   Treatment related alopecia, with midline sparing which is the result from scalp sparing  radiation therapy   Eyes: Conjunctivae and EOM are normal. Pupils are equal, round, and reactive to light.   Neck: Normal range of motion. Neck supple.   Cardiovascular: Normal rate and regular rhythm. Exam reveals no friction rub.   No murmur heard.  Pulmonary/Chest: Effort normal and breath sounds normal. She has no wheezes.   Abdominal: Soft. Bowel sounds are normal. She exhibits no distension and no mass. There is no tenderness.   Musculoskeletal: Normal range of motion. She exhibits no edema.   Lymphadenopathy:     She has no cervical adenopathy.   Neurological: She is alert and oriented to person, place, and time.   Skin: Skin is warm and dry.   Psychiatric: She has a normal mood and affect. Her behavior is normal. Judgment and thought content normal.   Nursing note and vitals reviewed.      VITAL SIGNS:   Vitals:    06/24/19 1032   BP: 103/59  Comment: 84/67 after walking- recheck sitting 103/59   Pulse: 88   Resp: 18   Temp: 97.9 °F (36.6 °C)   TempSrc: Temporal   SpO2: 92%   Weight: 86.6 kg (191 lb)   PainSc: 0-No pain       The following portions of the patient's history were reviewed and updated as appropriate: allergies, current medications, past family history, past medical history, past social history, past surgical history and problem list.         Shauna was seen today for lung cancer.    Diagnoses and all orders for this visit:    Small cell lung cancer, left upper lobe (CMS/HCC)         IMPRESSION:  Mrs. Valencia is a 64-year-old female with limited stage small cell lung cancer, who has now completed all planned therapy.  She appears to have had a very good result and is already scheduled to have repeat CT scans in 3 months time when she is seen again by Dr. Cardoso.  I believe she would benefit from reducing some of her blood pressure medications, as her blood pressure today was 84/67 and only went up to 103/59.  I discussed cutting her lisinopril in half as well as increasing her oral intake of fluids.   She stated that she will be seen by her primary care physician in the next 3 weeks and can discuss this further.  I scheduled her to return to my clinic in 6 months.    RECOMMENDATIONS:      Return in about 6 months (around 12/24/2019) for Office Visit.    Ramone Huggins MD    Errors in dictation may reflect use of voice recognition software and not all errors in transcription may have been detected prior to signing.

## 2019-07-09 NOTE — PROGRESS NOTES
DATE OF VISIT: 7/9/2019    REASON FOR VISIT: Followup for limited stage small cell lung cancer     HISTORY OF PRESENT ILLNESS: The patient is a very pleasant 64 y.o. female  with past medical history significant for limited stage small cell lung cancer diagnosed November 9, 2018.  She was started on chemotherapy using cisplatin and etoposide November 15, 2018. She completed 5 cycles on February 2019. Cycle 6 was not given secondary to progressive fatigue and multiple side effects. The patient is here today with a new complaint.    SUBJECTIVE: The patient is here today with her daughter.  She is been sleepy and drowsy.  She been having problem walking.  She is complained of lower extremities weakness.  She has been having poor appetite.    PAST MEDICAL HISTORY/SOCIAL HISTORY/FAMILY HISTORY: Reviewed by me and unchanged from my documentation done on 07/09/19.    Review of Systems   Constitutional: Positive for fatigue. Negative for activity change, appetite change, chills, fever and unexpected weight change.   HENT: Negative for hearing loss, mouth sores, nosebleeds, sore throat and trouble swallowing.    Eyes: Negative for visual disturbance.   Respiratory: Positive for shortness of breath. Negative for cough, chest tightness and wheezing.    Cardiovascular: Negative for chest pain, palpitations and leg swelling.   Gastrointestinal: Positive for nausea. Negative for abdominal distention, abdominal pain, blood in stool, constipation, diarrhea, rectal pain and vomiting.   Endocrine: Negative for cold intolerance and heat intolerance.   Genitourinary: Negative for difficulty urinating, dysuria, frequency and urgency.   Musculoskeletal: Negative for arthralgias, back pain, gait problem, joint swelling and myalgias.   Skin: Negative for rash.   Neurological: Positive for weakness and numbness. Negative for dizziness, tremors, syncope, light-headedness and headaches.   Hematological: Negative for adenopathy. Does not  bruise/bleed easily.   Psychiatric/Behavioral: Negative for confusion, sleep disturbance and suicidal ideas. The patient is not nervous/anxious.          Current Outpatient Medications:   •  albuterol (PROVENTIL HFA;VENTOLIN HFA) 108 (90 Base) MCG/ACT inhaler, Inhale 2 puffs Every 4 (Four) Hours As Needed for Wheezing., Disp: , Rfl:   •  aspirin 81 MG EC tablet, Take 81 mg by mouth Daily., Disp: , Rfl:   •  calcium carbonate 1250 MG capsule, Take 1 capsule by mouth 3 (Three) Times a Day., Disp: 90 capsule, Rfl: 11  •  carvedilol (COREG) 6.25 MG tablet, Take 6.25 mg by mouth 2 (Two) Times a Day With Meals., Disp: , Rfl:   •  cetirizine (zyrTEC) 10 MG tablet, Take 10 mg by mouth Daily., Disp: , Rfl:   •  docusate sodium (COLACE) 100 MG capsule, Take 1 capsule by mouth 2 (Two) Times a Day. (Patient taking differently: Take 100 mg by mouth 2 (Two) Times a Day As Needed.), Disp: 60 capsule, Rfl: 3  •  doxycycline (DORYX) 100 MG enteric coated tablet, Take 1 tablet by mouth 2 (Two) Times a Day., Disp: 20 tablet, Rfl: 0  •  famotidine (PEPCID) 20 MG tablet, Take 20 mg by mouth 2 (Two) Times a Day., Disp: , Rfl:   •  insulin aspart (novoLOG FLEXPEN) 100 UNIT/ML solution pen-injector sc pen, Inject 0-10 Units under the skin into the appropriate area as directed 3 (Three) Times a Day With Meals. SEE instructions for Sliding Scale, Disp: 15 mL, Rfl: 0  •  insulin glargine (LANTUS) 100 UNIT/ML injection, Inject 20 Units under the skin into the appropriate area as directed Every Night., Disp: , Rfl:   •  lactulose (CHRONULAC) 10 GM/15ML solution, Take 30 mL by mouth 3 (Three) Times a Day as needed for constipation, Disp: 240 mL, Rfl: 2  •  levothyroxine (SYNTHROID, LEVOTHROID) 75 MCG tablet, Take 75 mcg by mouth Daily., Disp: , Rfl:   •  lisinopril (PRINIVIL,ZESTRIL) 10 MG tablet, lisinopril 10 mg tablet, Disp: , Rfl:   •  Magic mouthwash Radonc, Swish and swallow 5-10 mL 4 (Four) Times a Day Before Meals & at Bedtime., Disp:  "480 mL, Rfl: 2  •  magnesium oxide (MAG-OX) 400 MG tablet, Take 1 tablet by mouth 2 (Two) Times a Day., Disp: 60 tablet, Rfl: 5  •  memantine (NAMENDA) 10 MG tablet, Take 1 tablet by mouth 2 (Two) Times a Day. Start 1/2 tab 2 times per day for 3 days, then take 1 tablet by mouth 2 times per day, Disp: 60 tablet, Rfl: 2  •  ondansetron (ZOFRAN) 8 MG tablet, TAKE ONE TABLET BY MOUTH THREE TIMES A DAY AS NEEDED FOR NAUSEA AND VOMITING, Disp: 90 tablet, Rfl: 5  •  pantoprazole (PROTONIX) 40 MG EC tablet, Take 40 mg by mouth Daily., Disp: , Rfl:   •  predniSONE (DELTASONE) 10 MG tablet, Take 3 tablets by mouth Daily. 30 mg daily with breakfast for 5 days, taper by 10 mg every 5 day then stop, Disp: 40 tablet, Rfl: 0  •  promethazine (PHENERGAN) 25 MG tablet, Take 1 tablet by mouth Every 6 (Six) Hours As Needed for Nausea or Vomiting., Disp: 45 tablet, Rfl: 5  •  sucralfate (CARAFATE) 1 GM/10ML suspension, Take 10 mL by mouth 4 (Four) Times a Day., Disp: 420 mL, Rfl: 2  •  ticagrelor (BRILINTA) 60 MG tablet tablet, Take 60 mg by mouth Daily., Disp: , Rfl:   •  vitamin D (ERGOCALCIFEROL) 71460 units capsule capsule, Take 1 capsule by mouth 1 (One) Time Per Week., Disp: 4 capsule, Rfl: 0  •  zolpidem (AMBIEN) 10 MG tablet, Take 1 tablet by mouth every night at bedtime., Disp: 30 tablet, Rfl: 0    PHYSICAL EXAMINATION:   /81   Pulse 83   Temp 96.4 °F (35.8 °C) (Tympanic)   Resp 16   Ht 154.9 cm (61\")   Wt 82.6 kg (182 lb)   SpO2 100%   BMI 34.39 kg/m²    ECOG Performance Status: 1 - Symptomatic but completely ambulatory  General Appearance:  alert, cooperative, no apparent distress and appears stated age   Neurologic/Psychiatric: A&O x 3, gait steady, appropriate affect, strength 5/5 in all muscle groups   HEENT:  Normocephalic, without obvious abnormality, mucous membranes moist   Neck: Supple, symmetrical, trachea midline, no adenopathy;  No thyromegaly, masses, or tenderness   Lungs:   Clear to auscultation " bilaterally; respirations regular, even, and unlabored bilaterally   Heart:  Regular rate and rhythm, no murmurs appreciated   Abdomen:   Soft, non-tender, non-distended and no organomegaly   Lymph nodes: No cervical, supraclavicular, inguinal or axillary adenopathy noted   Extremities: Normal, atraumatic; no clubbing, cyanosis, or edema    Skin: No rashes, ulcers, or suspicious lesions noted     Lab on 07/09/2019   Component Date Value Ref Range Status   • WBC 07/09/2019 5.30  3.40 - 10.80 10*3/mm3 Final   • RBC 07/09/2019 3.22* 3.77 - 5.28 10*6/mm3 Final   • Hemoglobin 07/09/2019 10.2* 12.0 - 15.9 g/dL Final   • Hematocrit 07/09/2019 30.0* 34.0 - 46.6 % Final   • RDW 07/09/2019 13.2  12.3 - 15.4 % Final   • MCV 07/09/2019 93.1  79.0 - 97.0 fL Final   • MCH 07/09/2019 31.6  26.6 - 33.0 pg Final   • MCHC 07/09/2019 33.9  31.5 - 35.7 g/dL Final   • MPV 07/09/2019 7.1  6.0 - 12.0 fL Final   • Platelets 07/09/2019 227  140 - 450 10*3/mm3 Final   • Neutrophil % 07/09/2019 66.8  42.7 - 76.0 % Final   • Lymphocyte % 07/09/2019 23.7  19.6 - 45.3 % Final   • Monocyte % 07/09/2019 9.5  5.0 - 12.0 % Final   • Neutrophils, Absolute 07/09/2019 3.50  1.70 - 7.00 10*3/mm3 Final   • Lymphocytes, Absolute 07/09/2019 1.30  0.70 - 3.10 10*3/mm3 Final   • Monocytes, Absolute 07/09/2019 0.50  0.10 - 0.90 10*3/mm3 Final   Admission on 06/30/2019, Discharged on 06/30/2019   Component Date Value Ref Range Status   • Glucose 06/30/2019 177* 65 - 99 mg/dL Final   • BUN 06/30/2019 23  8 - 23 mg/dL Final   • Creatinine 06/30/2019 1.91* 0.57 - 1.00 mg/dL Final   • Sodium 06/30/2019 137  136 - 145 mmol/L Final   • Potassium 06/30/2019 4.2  3.5 - 5.2 mmol/L Final   • Chloride 06/30/2019 98  98 - 107 mmol/L Final   • CO2 06/30/2019 24.0  22.0 - 29.0 mmol/L Final   • Calcium 06/30/2019 10.0  8.6 - 10.5 mg/dL Final   • Total Protein 06/30/2019 7.3  6.0 - 8.5 g/dL Final   • Albumin 06/30/2019 4.60  3.50 - 5.20 g/dL Final   • ALT (SGPT) 06/30/2019 7   1 - 33 U/L Final   • AST (SGOT) 06/30/2019 12  1 - 32 U/L Final    Specimen hemolyzed.  Results may be affected.   • Alkaline Phosphatase 06/30/2019 52  39 - 117 U/L Final   • Total Bilirubin 06/30/2019 0.3  0.2 - 1.2 mg/dL Final   • eGFR Non African Amer 06/30/2019 27* >60 mL/min/1.73 Final   • Globulin 06/30/2019 2.7  gm/dL Final   • A/G Ratio 06/30/2019 1.7  g/dL Final   • BUN/Creatinine Ratio 06/30/2019 12.0  7.0 - 25.0 Final   • Anion Gap 06/30/2019 15.0  5.0 - 15.0 mmol/L Final   • Troponin T 06/30/2019 <0.010  0.000 - 0.030 ng/mL Final   • Magnesium 06/30/2019 1.8  1.6 - 2.4 mg/dL Final   • Color, UA 06/30/2019 Yellow  Yellow, Straw Final   • Appearance, UA 06/30/2019 Cloudy* Clear Final   • pH, UA 06/30/2019 <=5.0  5.0 - 8.0 Final   • Specific Gravity, UA 06/30/2019 1.019  1.001 - 1.030 Final   • Glucose, UA 06/30/2019 Negative  Negative Final   • Ketones, UA 06/30/2019 Negative  Negative Final   • Bilirubin, UA 06/30/2019 Negative  Negative Final   • Blood, UA 06/30/2019 Negative  Negative Final   • Protein, UA 06/30/2019 Negative  Negative Final   • Leuk Esterase, UA 06/30/2019 Negative  Negative Final   • Nitrite, UA 06/30/2019 Negative  Negative Final   • Urobilinogen, UA 06/30/2019 0.2 E.U./dL  0.2 - 1.0 E.U./dL Final   • Extra Tube 06/30/2019 hold for add-on   Final    Auto resulted   • Extra Tube 06/30/2019 Hold for add-ons.   Final    Auto resulted.   • Extra Tube 06/30/2019 hold for add-on   Final    Auto resulted   • Extra Tube 06/30/2019 Hold for add-ons.   Final    Auto resulted.   • WBC 06/30/2019 5.61  3.40 - 10.80 10*3/mm3 Final   • RBC 06/30/2019 3.15* 3.77 - 5.28 10*6/mm3 Final   • Hemoglobin 06/30/2019 9.6* 12.0 - 15.9 g/dL Final   • Hematocrit 06/30/2019 29.1* 34.0 - 46.6 % Final   • MCV 06/30/2019 92.4  79.0 - 97.0 fL Final   • MCH 06/30/2019 30.5  26.6 - 33.0 pg Final   • MCHC 06/30/2019 33.0  31.5 - 35.7 g/dL Final   • RDW 06/30/2019 12.7  12.3 - 15.4 % Final   • RDW-SD 06/30/2019 42.5   37.0 - 54.0 fl Final   • MPV 06/30/2019 8.8  6.0 - 12.0 fL Final   • Platelets 06/30/2019 269  140 - 450 10*3/mm3 Final   • Neutrophil % 06/30/2019 61.2  42.7 - 76.0 % Final   • Lymphocyte % 06/30/2019 24.6  19.6 - 45.3 % Final   • Monocyte % 06/30/2019 11.6  5.0 - 12.0 % Final   • Eosinophil % 06/30/2019 2.0  0.3 - 6.2 % Final   • Basophil % 06/30/2019 0.4  0.0 - 1.5 % Final   • Immature Grans % 06/30/2019 0.2  0.0 - 0.5 % Final   • Neutrophils, Absolute 06/30/2019 3.44  1.70 - 7.00 10*3/mm3 Final   • Lymphocytes, Absolute 06/30/2019 1.38  0.70 - 3.10 10*3/mm3 Final   • Monocytes, Absolute 06/30/2019 0.65  0.10 - 0.90 10*3/mm3 Final   • Eosinophils, Absolute 06/30/2019 0.11  0.00 - 0.40 10*3/mm3 Final   • Basophils, Absolute 06/30/2019 0.02  0.00 - 0.20 10*3/mm3 Final   • Immature Grans, Absolute 06/30/2019 0.01  0.00 - 0.05 10*3/mm3 Final   • nRBC 06/30/2019 0.0  0.0 - 0.2 /100 WBC Final   • TSH 06/30/2019 0.893  0.270 - 4.200 mIU/mL Final   • Free T4 06/30/2019 1.31  0.93 - 1.70 ng/dL Final   • RBC, UA 06/30/2019 0-2  None Seen, 0-2 /HPF Final   • WBC, UA 06/30/2019 3-5* None Seen, 0-2 /HPF Final   • Bacteria, UA 06/30/2019 2+* None Seen, Trace /HPF Final   • Squamous Epithelial Cells, UA 06/30/2019 3-6* None Seen, 0-2 /HPF Final   • Hyaline Casts, UA 06/30/2019 7-12  0 - 6 /LPF Final   • Methodology 06/30/2019 Automated Microscopy   Final        Ct Chest Without Contrast    Result Date: 6/30/2019  Narrative: CT Chest WO INDICATION: Generalized weakness and fatigue. Patient has recently completed chemotherapy and radiation treatment. TECHNIQUE: CT of the thorax without contrast. Coronal and sagittal reconstructions were obtained.  Radiation dose reduction techniques included automated exposure control or exposure modulation based on body size. Count of known CT and cardiac nuc med studies performed in previous 12 months: 7. COMPARISON: CT chest 5/21/2019 FINDINGS: Left upper lobe and left perihilar parenchymal  opacity with air bronchograms which is improved from 5/21/2019 and may represent postradiation pneumonitis or sequela postobstructive pneumonitis adjacent to the patient's primary lung cancer. Small amount of left hilar/perihilar density which may represent adenopathy and/or the patient's primary malignancy. The remainder the lung parenchyma appears normal. No effusions. Small amount of pericardial fluid. Coronary artery calcifications noted. Aorta and pulmonary vessels unremarkable. Calcified subcarinal lymph nodes. Visualized upper abdomen remarkable for left renal cyst. Osseous structures and soft tissues appear normal.     Impression: Left hilar soft tissue density and parenchymal opacity primarily within the superior segment of the left lower lobe but also within the left upper lobe and is most compatible with postobstructive and/or post radiation pneumonitis. This appears improved from 5/21/2019. Some of the residual density may reflect patient's known primary lung cancer. Signer Name: JIMMIE Jackson MD  Signed: 6/30/2019 6:49 AM  Workstation Name: [email or web address]     Xr Chest 1 View    Result Date: 6/30/2019  Narrative: CR Chest 1 Vw INDICATION: Generalized weakness for one day. History of lung cancer and COPD. COMPARISON:  3/4/2019 FINDINGS: Single portable AP view of the chest.  Scarring and/or infiltrate left perihilar region left AP window. This may correspond to the site of patient's primary lung cancer. The right lung is clear. No effusions. Heart and great vessels unremarkable.     Impression: Increasing left perihilar and suprahilar density with interstitial prominence may represent the sequela of treated lung cancer. Some superimposed pneumonitis not excluded. Signer Name: JIMMIE Jackson MD  Signed: 6/30/2019 4:47 AM  Workstation Name: [email or web address]       ASSESSMENT: The patient is a very pleasant 64 y.o. female  with  Left upper lobe small cell lung cancer [id number] stage IIIa disease:  [place]   Presented with shortness of breath.  B.  Status post bronchoscopy with a biopsy done on November 9, 2018 revealed small cell lung cancer   C. Started chemotherapy with cisplatin and etoposide November 15, 2018, status post 5 cycles, completed February 2019.  D. Completed concurrent radiation January 18, 2019  E.  Will complete prophylactic whole brain radiation May 13 2019  2.  Normocytic anemia   3.  Insomnia  4.  Nausea  5. Type 2 diabetes    PLAN:  1.  I did go over the scan results with the patient and her family, I explained to her the radiation pneumonitis area is getting better.  I will do 3 months follow-up study which should be due August 2019.  2.  The patient symptoms could be explained by her recent whole brain radiation.  I will start her on prednisone 30 mg daily with 5 mg taper every 5 days to see if might help with her energy and appetite.  3.  I will continue to monitor labs including CBC CMP serum TSH.  Those will be checked today.  4.  The patient will follow up with me as scheduled.   5.  I will continue Zofran as needed for nausea.  6.  We'll continue Restoril as needed for insomnia.  7.  We will continue insulin for type 2 diabetes.  Desmond Cardoso MD  7/9/2019

## 2019-08-27 NOTE — PROGRESS NOTES
DATE OF VISIT: 8/27/2019    REASON FOR VISIT: Followup for limited stage small cell lung cancer     HISTORY OF PRESENT ILLNESS: The patient is a very pleasant 64 y.o. female  with past medical history significant for limited stage small cell lung cancer diagnosed November 9, 2018.  She was started on chemotherapy using cisplatin and etoposide November 15, 2018. She completed 5 cycles on February 2019. Cycle 6 was not given secondary to progressive fatigue and multiple side effects. The patient is here today for scheduled follow-up visit.    SUBJECTIVE: The patient is here today with her daughter.  She is doing fairly well but is complaining of dizziness and sometimes imbalance.  She is anxious about the scan results.  Appetite is coming back.  Her energy is slowly improving.    PAST MEDICAL HISTORY/SOCIAL HISTORY/FAMILY HISTORY: Reviewed by me and unchanged from my documentation done on 08/27/19.    Review of Systems   Constitutional: Positive for fatigue. Negative for activity change, appetite change, chills, fever and unexpected weight change.   HENT: Negative for hearing loss, mouth sores, nosebleeds, sore throat and trouble swallowing.    Eyes: Negative for visual disturbance.   Respiratory: Positive for shortness of breath. Negative for cough, chest tightness and wheezing.    Cardiovascular: Negative for chest pain, palpitations and leg swelling.   Gastrointestinal: Positive for nausea. Negative for abdominal distention, abdominal pain, blood in stool, constipation, diarrhea, rectal pain and vomiting.   Endocrine: Negative for cold intolerance and heat intolerance.   Genitourinary: Negative for difficulty urinating, dysuria, frequency and urgency.   Musculoskeletal: Negative for arthralgias, back pain, gait problem, joint swelling and myalgias.   Skin: Negative for rash.   Neurological: Positive for weakness and numbness. Negative for dizziness, tremors, syncope, light-headedness and headaches.   Hematological:  Negative for adenopathy. Does not bruise/bleed easily.   Psychiatric/Behavioral: Negative for confusion, sleep disturbance and suicidal ideas. The patient is not nervous/anxious.          Current Outpatient Medications:   •  albuterol (PROVENTIL HFA;VENTOLIN HFA) 108 (90 Base) MCG/ACT inhaler, Inhale 2 puffs Every 4 (Four) Hours As Needed for Wheezing., Disp: , Rfl:   •  aspirin 81 MG EC tablet, Take 81 mg by mouth Daily., Disp: , Rfl:   •  carvedilol (COREG) 6.25 MG tablet, Take 6.25 mg by mouth 2 (Two) Times a Day With Meals., Disp: , Rfl:   •  cetirizine (zyrTEC) 10 MG tablet, Take 10 mg by mouth Daily., Disp: , Rfl:   •  docusate sodium (COLACE) 100 MG capsule, Take 1 capsule by mouth 2 (Two) Times a Day. (Patient taking differently: Take 100 mg by mouth 2 (Two) Times a Day As Needed.), Disp: 60 capsule, Rfl: 3  •  famotidine (PEPCID) 20 MG tablet, Take 20 mg by mouth 2 (Two) Times a Day., Disp: , Rfl:   •  insulin aspart (novoLOG FLEXPEN) 100 UNIT/ML solution pen-injector sc pen, Inject 0-10 Units under the skin into the appropriate area as directed 3 (Three) Times a Day With Meals. SEE instructions for Sliding Scale, Disp: 15 mL, Rfl: 0  •  lactulose (CHRONULAC) 10 GM/15ML solution, Take 30 mL by mouth 3 (Three) Times a Day as needed for constipation, Disp: 240 mL, Rfl: 2  •  levothyroxine (SYNTHROID, LEVOTHROID) 75 MCG tablet, Take 75 mcg by mouth Daily., Disp: , Rfl:   •  lisinopril (PRINIVIL,ZESTRIL) 10 MG tablet, lisinopril 10 mg tablet, Disp: , Rfl:   •  Magic mouthwash Radonc, Swish and swallow 5-10 mL 4 (Four) Times a Day Before Meals & at Bedtime., Disp: 480 mL, Rfl: 2  •  ondansetron (ZOFRAN) 8 MG tablet, TAKE ONE TABLET BY MOUTH THREE TIMES A DAY AS NEEDED FOR NAUSEA AND VOMITING, Disp: 90 tablet, Rfl: 5  •  pantoprazole (PROTONIX) 40 MG EC tablet, Take 40 mg by mouth Daily., Disp: , Rfl:   •  promethazine (PHENERGAN) 25 MG tablet, Take 1 tablet by mouth Every 6 (Six) Hours As Needed for Nausea or  "Vomiting., Disp: 45 tablet, Rfl: 5    PHYSICAL EXAMINATION:   /79   Pulse 81   Temp 97.8 °F (36.6 °C) (Temporal)   Resp 16   Ht 154.9 cm (61\")   Wt 86.2 kg (190 lb)   SpO2 98%   BMI 35.90 kg/m²    ECOG Performance Status: 1 - Symptomatic but completely ambulatory  General Appearance:  alert, cooperative, no apparent distress and appears stated age   Neurologic/Psychiatric: A&O x 3, gait steady, appropriate affect, strength 5/5 in all muscle groups   HEENT:  Normocephalic, without obvious abnormality, mucous membranes moist   Neck: Supple, symmetrical, trachea midline, no adenopathy;  No thyromegaly, masses, or tenderness   Lungs:   Clear to auscultation bilaterally; respirations regular, even, and unlabored bilaterally   Heart:  Regular rate and rhythm, no murmurs appreciated   Abdomen:   Soft, non-tender, non-distended and no organomegaly   Lymph nodes: No cervical, supraclavicular, inguinal or axillary adenopathy noted   Extremities: Normal, atraumatic; no clubbing, cyanosis, or edema    Skin: No rashes, ulcers, or suspicious lesions noted     No visits with results within 2 Week(s) from this visit.   Latest known visit with results is:   Lab on 07/09/2019   Component Date Value Ref Range Status   • WBC 07/09/2019 5.30  3.40 - 10.80 10*3/mm3 Final   • RBC 07/09/2019 3.22* 3.77 - 5.28 10*6/mm3 Final   • Hemoglobin 07/09/2019 10.2* 12.0 - 15.9 g/dL Final   • Hematocrit 07/09/2019 30.0* 34.0 - 46.6 % Final   • RDW 07/09/2019 13.2  12.3 - 15.4 % Final   • MCV 07/09/2019 93.1  79.0 - 97.0 fL Final   • MCH 07/09/2019 31.6  26.6 - 33.0 pg Final   • MCHC 07/09/2019 33.9  31.5 - 35.7 g/dL Final   • MPV 07/09/2019 7.1  6.0 - 12.0 fL Final   • Platelets 07/09/2019 227  140 - 450 10*3/mm3 Final   • Neutrophil % 07/09/2019 66.8  42.7 - 76.0 % Final   • Lymphocyte % 07/09/2019 23.7  19.6 - 45.3 % Final   • Monocyte % 07/09/2019 9.5  5.0 - 12.0 % Final   • Neutrophils, Absolute 07/09/2019 3.50  1.70 - 7.00 10*3/mm3 " Final   • Lymphocytes, Absolute 07/09/2019 1.30  0.70 - 3.10 10*3/mm3 Final   • Monocytes, Absolute 07/09/2019 0.50  0.10 - 0.90 10*3/mm3 Final        Ct Abdomen Pelvis Without Contrast    Result Date: 8/27/2019  Narrative: EXAMINATION: CT CHEST WO CONTRAST-, CT ABDOMEN AND PELVIS WO CONTRAST-  INDICATION: C34.12-Malignant neoplasm of upper lobe, left bronchus or lung; restaging lung cancer, history of chemotherapy and radiation.  TECHNIQUE: Multiple axial CT imaging was obtained of the chest, abdomen and pelvis without the administration of oral or intravenous contrast.  The radiation dose reduction device was turned on for each scan per the ALARA (As Low as Reasonably Achievable) protocol.  COMPARISON: 06/30/2019 and 05/21/2019.  FINDINGS: CHEST: The right lobe of the thyroid has been surgically removed. The left lobe is grossly unremarkable. No mediastinal mass. There is prominence again seen of the left suprahilar region with some extension seen posteriorly into the left upper lobe. The markings are stable with air bronchograms present. Findings when compared to the prior study are somewhat improved. The consolidation opacification is decreasing in its size in the interval. The remainder of the lung fields are clear. A calcified granuloma is identified within the left lung base. No pleural effusion or pneumothorax. No mediastinal mass. Cardiac chambers are within normal limits. No pericardial effusion. The right hilar region is unremarkable. Degenerative change is seen within the spine.  ABDOMEN: The liver is homogeneous in appearance. The spleen is unremarkable. Cysts identified on the left kidney. The pancreas is homogeneous. No abdominal or retroperitoneal lymphadenopathy. Abdominal portion of the gastrointestinal tract is within normal limits. Vascular calcification is seen within the abdominal aorta and iliac vessels.  PELVIS: The pelvic organs are unremarkable. The pelvic portion of the gastrointestinal  tract is within normal limits. No free fluid or free air. No abnormal mass or fluid collection is identified. Hardware with streak artifact identified from the left hip. No pelvic adenopathy.      Impression: Some improvement seen in the opacity extending posteriorly into the left upper lobe from the left suprahilar region. The remainder of the chest is stable and unremarkable. The abdomen and pelvis is also stable with no evidence of metastatic disease. No acute intraabdominal or pelvic abnormality present.  D:  08/27/2019 E:  08/27/2019       Ct Chest Without Contrast    Result Date: 8/27/2019  Narrative: EXAMINATION: CT CHEST WO CONTRAST-, CT ABDOMEN AND PELVIS WO CONTRAST-  INDICATION: C34.12-Malignant neoplasm of upper lobe, left bronchus or lung; restaging lung cancer, history of chemotherapy and radiation.  TECHNIQUE: Multiple axial CT imaging was obtained of the chest, abdomen and pelvis without the administration of oral or intravenous contrast.  The radiation dose reduction device was turned on for each scan per the ALARA (As Low as Reasonably Achievable) protocol.  COMPARISON: 06/30/2019 and 05/21/2019.  FINDINGS: CHEST: The right lobe of the thyroid has been surgically removed. The left lobe is grossly unremarkable. No mediastinal mass. There is prominence again seen of the left suprahilar region with some extension seen posteriorly into the left upper lobe. The markings are stable with air bronchograms present. Findings when compared to the prior study are somewhat improved. The consolidation opacification is decreasing in its size in the interval. The remainder of the lung fields are clear. A calcified granuloma is identified within the left lung base. No pleural effusion or pneumothorax. No mediastinal mass. Cardiac chambers are within normal limits. No pericardial effusion. The right hilar region is unremarkable. Degenerative change is seen within the spine.  ABDOMEN: The liver is homogeneous in  appearance. The spleen is unremarkable. Cysts identified on the left kidney. The pancreas is homogeneous. No abdominal or retroperitoneal lymphadenopathy. Abdominal portion of the gastrointestinal tract is within normal limits. Vascular calcification is seen within the abdominal aorta and iliac vessels.  PELVIS: The pelvic organs are unremarkable. The pelvic portion of the gastrointestinal tract is within normal limits. No free fluid or free air. No abnormal mass or fluid collection is identified. Hardware with streak artifact identified from the left hip. No pelvic adenopathy.      Impression: Some improvement seen in the opacity extending posteriorly into the left upper lobe from the left suprahilar region. The remainder of the chest is stable and unremarkable. The abdomen and pelvis is also stable with no evidence of metastatic disease. No acute intraabdominal or pelvic abnormality present.  D:  08/27/2019 E:  08/27/2019         ASSESSMENT: The patient is a very pleasant 64 y.o. female  with  Left upper lobe small cell lung cancer [id number] stage IIIa disease:  A.  Presented with shortness of breath.  B.  Status post bronchoscopy with a biopsy done on November 9, 2018 revealed small cell lung cancer   C. Started chemotherapy with cisplatin and etoposide November 15, 2018, status post 5 cycles, completed February 2019.  D. Completed concurrent radiation January 18, 2019  E.  Will complete prophylactic whole brain radiation May 13 2019  2.  Normocytic anemia   3.  Insomnia  4.  Nausea  5. Type 2 diabetes    PLAN:  1.  I did go over the scan results with the patient and her family, I explained to her the radiation pneumonitis area is getting better.  I will do 3 months follow-up study which should be due end of November 2019.  2.  I will order MRI brain to further evaluate her neurologic symptoms we will do this with without contrast.  3.  I will continue to monitor labs including CBC CMP serum TSH.    4.  The patient  will follow up with me in 3 months.   5.  I will continue Zofran as needed for nausea.  6.  We'll continue Restoril as needed for insomnia.  7.  We will continue insulin for type 2 diabetes.  Desmond Cardoso MD  8/27/2019

## 2019-08-27 NOTE — TELEPHONE ENCOUNTER
----- Message from Soni ISELA Murcia sent at 8/27/2019  9:32 AM EDT -----  Regarding: JUSTIN-CT SCAN  Contact: [phone]  Kajal with CT called she will need to get the CT scan order changed with contrast due to her CRE 1.9 and GFR 27 patient is there now. Please call.

## 2019-09-12 NOTE — ED PROVIDER NOTES
Subjective   64-year-old female presents for evaluation of abdominal pain.  The patient states that for the past 2 weeks she has been experiencing persistent pain in her left flank and left lower quadrant.  She went to her local emergency department in [facility] regarding her symptoms last week where she reports having a negative CT scan.  She was started on Levaquin for potential diverticulitis but states that despite compliance her symptoms are not improving.  Actually, over the past 2 days she states that the pain is increased.  Today she had an episode of vomiting.  She states that she has not had a good bowel movement for the past 3 days.  She denies any fever, sick contacts, recent travel, recent diet changes.  Pain is currently 7 out of 10 in severity with no aggravating or alleviating factors.  No rashes.        History provided by:  Patient  Abdominal Pain   Pain location:  L flank  Pain severity:  Moderate  Onset quality:  Sudden  Duration:  2 weeks (worse in the last 2 days)  Timing:  Constant  Progression:  Waxing and waning  Chronicity:  New  Relieved by:  Nothing  Worsened by:  Nothing  Ineffective treatments: Levaquin.  Associated symptoms: constipation and vomiting (1 episode)    Associated symptoms: no fever        Review of Systems   Constitutional: Negative for appetite change and fever.   Gastrointestinal: Positive for abdominal pain, constipation and vomiting (1 episode).   Skin: Negative for rash.   All other systems reviewed and are negative.      Past Medical History:   Diagnosis Date   • Arthritis    • Asthma    • COPD (chronic obstructive pulmonary disease) (CMS/HCC)    • Coronary artery disease     5 stents   • Diabetes mellitus (CMS/HCC)    • Disease of thyroid gland    • DVT (deep venous thrombosis) (CMS/HCC)     this year- per pt   • GERD (gastroesophageal reflux disease)    • Hyperlipidemia    • Hypertension    • Lung cancer (CMS/HCC)    • Myocardial infarction (CMS/HCC)    •  Pancreatitis    • Pancreatitis    • Renal disorder        Allergies   Allergen Reactions   • Plavix [Clopidogrel Bisulfate] Anaphylaxis   • Codeine Other (See Comments)     Pt not certain of reatction   • Penicillins Other (See Comments)     Pt does not remember reaction       Past Surgical History:   Procedure Laterality Date   • APPENDECTOMY     • BRONCHOSCOPY N/A 11/6/2018    Procedure: BRONCHOSCOPY WITH ENDOBRONCHIAL ULTRASOUND, biopsies, brushing and washing.;  Surgeon: Isaac Epstein MD;  Location:  [facility];  Service: Pulmonary   • BRONCHOSCOPY N/A 11/9/2018    Procedure: BRONCHOSCOPY WITH ENDOBRONCHIAL ULTRASOUND;  Surgeon: Clay Scott MD;  Location:  [facility];  Service: Pulmonary   • CAROTID STENT      5 total   • CHOLECYSTECTOMY     • COLON SURGERY     • COLONOSCOPY     • HERNIA REPAIR     • HYSTERECTOMY     • OTHER SURGICAL HISTORY      bladder sling   • THYROIDECTOMY      1994   • TONSILLECTOMY     • TOTAL HIP ARTHROPLASTY Left    • UPPER GASTROINTESTINAL ENDOSCOPY         Family History   Problem Relation Age of Onset   • Colon polyps Mother    • Ulcerative colitis Sister        Social History     Socioeconomic History   • Marital status:      Spouse name: Not on file   • Number of children: Not on file   • Years of education: Not on file   • Highest education level: Not on file   Tobacco Use   • Smoking status: Former Smoker     Packs/day: 0.50     Types: Cigarettes   • Smokeless tobacco: Never Used   • Tobacco comment: quit 10/31/18   Substance and Sexual Activity   • Alcohol use: No   • Drug use: No   • Sexual activity: Defer         Objective   Physical Exam   Constitutional: She is oriented to person, place, and time. She appears well-developed and well-nourished. No distress.   Well-appearing female in no acute distress   HENT:   Head: Normocephalic and atraumatic.   Mouth/Throat: Oropharynx is clear and moist.   Neck: Normal range of motion. Neck supple.    Cardiovascular: Normal rate, regular rhythm and normal heart sounds. Exam reveals no gallop and no friction rub.   No murmur heard.  Pulmonary/Chest: Effort normal and breath sounds normal. No respiratory distress. She has no wheezes. She has no rales.   Abdominal: Soft. Normal appearance and bowel sounds are normal. She exhibits no distension and no mass. There is tenderness. There is guarding. There is no rebound.   Focal tenderness noted to left lower quadrant and left upper quadrant, voluntary guarding noted with palpation, no pain out of proportion to exam   Genitourinary:   Genitourinary Comments: No CVA tenderness noted   Musculoskeletal: Normal range of motion.   Neurological: She is alert and oriented to person, place, and time.   Skin: Skin is warm and dry. No rash noted. She is not diaphoretic. No erythema.   No dermatomal rash noted left flank   Psychiatric: She has a normal mood and affect. Judgment and thought content normal.   Nursing note and vitals reviewed.      Procedures         ED Course  ED Course as of Sep 11 2348   Wed Sep 11, 2019   2128 64-year-old female presents for evaluation of left-sided abdominal pain for the past 2 weeks.  On arrival to the ED, patient nontoxic-appearing.  Exam remarkable for focal tenderness to left lower quadrant and left upper quadrant with voluntary guarding noted.  No pain out of proportion to exam.  We will obtain labs and imaging and will reassess following initial interventions.  [DD]   2214 Chest x-ray negative for acute/emergent cardiothoracic process.  [DD]   2230 Labs remarkable for mild anemia, unchanged from baseline as well as mild renal insufficiency, unchanged from baseline.  [DD]   2338 Aside from increased metastatic lymphadenopathy, CT otherwise unremarkable for acute/emergent/surgical intra-abdominal process.  [DD]   2347 Upon reevaluation, patient is significantly improved.  She is now tolerating p.o.  Her pain is adequately controlled.  Doubt  "emergent intra-abdominal process at this time based on exam, gestalt, and objective findings in the ED.  Scripts for Bentyl, Zofran, and Phenergan as needed.  The patient will follow up with her primary care physician within the next week.  Agreeable with plan and given appropriate strict return precautions.  [DD]      ED Course User Index  [DD] Huseyin Hunt MD     No results found for this or any previous visit (from the past 24 hour(s)).  Note: In addition to lab results from this visit, the labs listed above may include labs taken at another facility or during a different encounter within the last 24 hours. Please correlate lab times with ED admission and discharge times for further clarification of the services performed during this visit.    No orders to display     Vitals:    09/11/19 2106   Pulse: 97   Resp: 18   Temp: 98 °F (36.7 °C)   TempSrc: Oral   SpO2: 99%   Weight: 83.5 kg (184 lb)   Height: 154.9 cm (61\")     Medications - No data to display  ECG/EMG Results (last 24 hours)     ** No results found for the last 24 hours. **        No orders to display                   Recent Results (from the past 24 hour(s))   Comprehensive Metabolic Panel    Collection Time: 09/11/19  9:46 PM   Result Value Ref Range    Glucose 199 (H) 65 - 99 mg/dL    BUN 20 8 - 23 mg/dL    Creatinine 1.93 (H) 0.57 - 1.00 mg/dL    Sodium 138 136 - 145 mmol/L    Potassium 4.1 3.5 - 5.2 mmol/L    Chloride 100 98 - 107 mmol/L    CO2 25.0 22.0 - 29.0 mmol/L    Calcium 9.7 8.6 - 10.5 mg/dL    Total Protein 7.1 6.0 - 8.5 g/dL    Albumin 4.90 3.50 - 5.20 g/dL    ALT (SGPT) 8 1 - 33 U/L    AST (SGOT) 12 1 - 32 U/L    Alkaline Phosphatase 51 39 - 117 U/L    Total Bilirubin 0.3 0.2 - 1.2 mg/dL    eGFR Non African Amer 26 (L) >60 mL/min/1.73    Globulin 2.2 gm/dL    A/G Ratio 2.2 g/dL    BUN/Creatinine Ratio 10.4 7.0 - 25.0    Anion Gap 13.0 5.0 - 15.0 mmol/L   Lipase    Collection Time: 09/11/19  9:46 PM   Result Value Ref Range    " Lipase 21 13 - 60 U/L   CBC Auto Differential    Collection Time: 09/11/19  9:46 PM   Result Value Ref Range    WBC 4.89 3.40 - 10.80 10*3/mm3    RBC 2.73 (L) 3.77 - 5.28 10*6/mm3    Hemoglobin 8.6 (L) 12.0 - 15.9 g/dL    Hematocrit 25.4 (L) 34.0 - 46.6 %    MCV 93.0 79.0 - 97.0 fL    MCH 31.5 26.6 - 33.0 pg    MCHC 33.9 31.5 - 35.7 g/dL    RDW 14.2 12.3 - 15.4 %    RDW-SD 47.7 37.0 - 54.0 fl    MPV 8.9 6.0 - 12.0 fL    Platelets 223 140 - 450 10*3/mm3    Neutrophil % 64.4 42.7 - 76.0 %    Lymphocyte % 23.7 19.6 - 45.3 %    Monocyte % 8.6 5.0 - 12.0 %    Eosinophil % 2.7 0.3 - 6.2 %    Basophil % 0.4 0.0 - 1.5 %    Immature Grans % 0.2 0.0 - 0.5 %    Neutrophils, Absolute 3.15 1.70 - 7.00 10*3/mm3    Lymphocytes, Absolute 1.16 0.70 - 3.10 10*3/mm3    Monocytes, Absolute 0.42 0.10 - 0.90 10*3/mm3    Eosinophils, Absolute 0.13 0.00 - 0.40 10*3/mm3    Basophils, Absolute 0.02 0.00 - 0.20 10*3/mm3    Immature Grans, Absolute 0.01 0.00 - 0.05 10*3/mm3    nRBC 0.0 0.0 - 0.2 /100 WBC   Lactic Acid, Plasma    Collection Time: 09/11/19  9:46 PM   Result Value Ref Range    Lactate 1.8 0.5 - 2.0 mmol/L   Urinalysis With Microscopic If Indicated (No Culture) - Urine, Clean Catch    Collection Time: 09/11/19  9:47 PM   Result Value Ref Range    Color, UA Yellow Yellow, Straw    Appearance, UA Cloudy (A) Clear    pH, UA <=5.0 5.0 - 8.0    Specific Gravity, UA 1.021 1.001 - 1.030    Glucose, UA Negative Negative    Ketones, UA Trace (A) Negative    Bilirubin, UA Negative Negative    Blood, UA Negative Negative    Protein, UA Negative Negative    Leuk Esterase, UA Negative Negative    Nitrite, UA Negative Negative    Urobilinogen, UA 1.0 E.U./dL 0.2 - 1.0 E.U./dL   Urinalysis, Microscopic Only - Urine, Clean Catch    Collection Time: 09/11/19  9:47 PM   Result Value Ref Range    RBC, UA 0-2 None Seen, 0-2 /HPF    WBC, UA 13-20 (A) None Seen, 0-2 /HPF    Bacteria, UA Trace None Seen, Trace /HPF    Squamous Epithelial Cells, UA 3-6  "(A) None Seen, 0-2 /HPF    Methodology Manual Light Microscopy      Note: In addition to lab results from this visit, the labs listed above may include labs taken at another facility or during a different encounter within the last 24 hours. Please correlate lab times with ED admission and discharge times for further clarification of the services performed during this visit.    CT Abdomen Pelvis With Contrast   Final Result   Negative CT of the abdomen and pelvis, no acute abnormality. No renal or bowel or biliary obstruction.      Slightly increasing retrocrural and periesophageal metastatic lymphadenopathy.         Signer Name: Julián Boyer MD    Signed: 9/11/2019 11:35 PM    Workstation Name: [id number]     [facility]      XR Chest 1 View   Final Result   No change in the left suprahilar opacity compared with 6/30/2019. This presumably reflects the patient's known lung carcinoma surrounding radiation fibrosis. Clinical correlation and correlation with patient history recommended.      Signer Name: Trev Gallego MD    Signed: 9/11/2019 10:09 PM    Workstation Name: [id number]     [facility]        Vitals:    09/11/19 2106   BP: 131/78   Pulse: 97   Resp: 18   Temp: 98 °F (36.7 °C)   TempSrc: Oral   SpO2: 99%   Weight: 83.5 kg (184 lb)   Height: 154.9 cm (61\")     Medications   HYDROmorphone (DILAUDID) injection 0.5 mg (not administered)   sodium chloride 0.9 % bolus 1,000 mL (0 mL Intravenous Stopped 9/11/19 2223)   HYDROmorphone (DILAUDID) injection 1 mg (1 mg Intravenous Given 9/11/19 2153)   ondansetron (ZOFRAN) injection 4 mg (4 mg Intravenous Given 9/11/19 2153)   promethazine (PHENERGAN) injection 12.5 mg (12.5 mg Intravenous Given 9/11/19 2228)   iopamidol (ISOVUE-300) 61 % injection 100 mL (75 mL Intravenous Given 9/11/19 2312)     ECG/EMG Results (last 24 hours)     ** No results found for the last 24 hours. **        No orders to display "           MDM    Final diagnoses:   Anemia, unspecified type   Chronic kidney disease, unspecified CKD stage   Abdominal pain, left lower quadrant       Documentation assistance provided by barbie Miller.  Information recorded by the scribe was done at my direction and has been verified and validated by me.     Maria De Jesus Miller  09/11/19 2331       Huseyin Hunt MD  09/11/19 6042

## 2019-09-17 NOTE — TELEPHONE ENCOUNTER
----- Message from Chet Browne sent at 9/16/2019  2:49 PM EDT -----  Contact: [phone]  Pt states she was in the ED on Wed, 09/11 and had a CT done (CT abd/pelvis w/ contrast) - the ED doctor mentioned enlarged nodes (?) - pt would like for Dr. Cardoso to 'take a look' at this CT and let her know what he thinks.  Please call pt to discuss.

## 2019-09-17 NOTE — TELEPHONE ENCOUNTER
Returned call to clinic and left message that Dr. Cardoso has reviewed CT scan that was done at ER visit this month.  Dr. Cardoso reported stable CT scan.  Keep scheduled follow up visit.  Patient can return call with any further questions.

## 2019-10-06 PROBLEM — I25.10 CAD (CORONARY ARTERY DISEASE): Status: ACTIVE | Noted: 2019-01-01

## 2019-10-06 PROBLEM — J44.9 COPD (CHRONIC OBSTRUCTIVE PULMONARY DISEASE) (HCC): Status: ACTIVE | Noted: 2019-01-01

## 2019-10-06 PROBLEM — E87.1 HYPONATREMIA: Status: ACTIVE | Noted: 2019-01-01

## 2019-10-07 NOTE — PLAN OF CARE
Problem: Patient Care Overview  Goal: Plan of Care Review  Outcome: Ongoing (interventions implemented as appropriate)   10/07/19 0634   Coping/Psychosocial   Plan of Care Reviewed With patient   Plan of Care Review   Progress no change   OTHER   Outcome Summary Pt rested well. VSS. NSR. RA. Reports pain in back/side r/t fall. Relieved with tylenol.

## 2019-10-07 NOTE — ED PROVIDER NOTES
"Yarelis Valencia is a 64 y.o.female who presents to the emergency department with complaints of fall. The patient reports she was squatting down to use the bathroom when she lost her balance and fell forward causing her to hit her head on the trash can one hour prior to her arrival. Upon arrival, she states neck pain and back pain located in her lumbar region, but she denies loss of consciousness, chest pain, shortness of breath, and dizziness. Prior to her fall, she has experienced increased weakness for three days, and also mentions nausea and vomiting for two days. Today, her vomiting is resolved. She denies decreased urination, dysuria, and fever.     Additionally, the patient notes constant pain in her left abdomen for more than one month. Her pain worsens when she is lying on her side. She notes constipation, and her last bowel movement was earlier today but she describes it to be \"hard\" and \"I had to rock it out\". The patient denies recent colonoscopy, however she has an appointment scheduled with a gastroenterologist for the end of the month. Her pain has prompted her to visit the emergency department 3-4 times in the past couple months and she has received multiple CT scans of her abdomen which were all normal. Upon her most recent presentation, she was diagnosed with lymphadenopathy secondary to radiation exposure. The patient previously received 40 rounds of radiation due to lung cancer. Her last radiation treatment was May 10th, 2019.     The patient has a medical history of lung cancer, deep vein thrombosis, diabetes mellitus, hyperlipidemia, hypertension, COPD, myocardial infarction, and coronary artery disease. Her medications include Coreg and Protonix, but she denies taking any anticoagulation medications.     There are no other acute complaints at this time.        History provided by:  Patient  Fall   Mechanism of injury: fall    Injury location:  Head/neck  Head/neck injury location:  " Head  Incident location:  Bathroom  Time since incident:  1 hour  Fall:     Fall occurred:  In the bathroom    Point of impact:  Head  Protective equipment: none    Associated symptoms: abdominal pain, back pain, nausea, neck pain and vomiting (Resolved)    Associated symptoms: no chest pain and no loss of consciousness    Risk factors: beta blocker therapy, CAD, COPD and diabetes    Risk factors: no anticoagulation therapy        Review of Systems   Constitutional: Negative for fever.   Respiratory: Negative for shortness of breath.    Cardiovascular: Positive for syncope. Negative for chest pain.   Gastrointestinal: Positive for abdominal pain, constipation, nausea and vomiting (Resolved).   Genitourinary: Negative for difficulty urinating and frequency.   Musculoskeletal: Positive for back pain and neck pain.   Neurological: Positive for weakness. Negative for dizziness, loss of consciousness and syncope.   All other systems reviewed and are negative.      Past Medical History:   Diagnosis Date   • Arthritis    • Asthma    • COPD (chronic obstructive pulmonary disease) (CMS/HCC)    • Coronary artery disease     5 stents   • Diabetes mellitus (CMS/HCC)    • Disease of thyroid gland    • DVT (deep venous thrombosis) (CMS/[facility])     this year- per pt   • GERD (gastroesophageal reflux disease)    • Hyperlipidemia    • Hypertension    • Lung cancer (CMS/HCC)    • Myocardial infarction (CMS/[facility])    • Pancreatitis    • Pancreatitis    • Renal disorder        Allergies   Allergen Reactions   • Plavix [Clopidogrel Bisulfate] Anaphylaxis   • Codeine Other (See Comments)     Pt not certain of reatction   • Penicillins Other (See Comments)     Pt does not remember reaction       Past Surgical History:   Procedure Laterality Date   • APPENDECTOMY     • BRONCHOSCOPY N/A 11/6/2018    Procedure: BRONCHOSCOPY WITH ENDOBRONCHIAL ULTRASOUND, biopsies, brushing and washing.;  Surgeon: Isaac Epstein MD;  Location: [facility] ENDOSCOPY;   Service: Pulmonary   • BRONCHOSCOPY N/A 11/9/2018    Procedure: BRONCHOSCOPY WITH ENDOBRONCHIAL ULTRASOUND;  Surgeon: Clay Scott MD;  Location: [facility] ENDOSCOPY;  Service: Pulmonary   • CAROTID STENT      5 total   • CHOLECYSTECTOMY     • COLON SURGERY     • COLONOSCOPY     • HERNIA REPAIR     • HYSTERECTOMY     • OTHER SURGICAL HISTORY      bladder sling   • THYROIDECTOMY      1994   • TONSILLECTOMY     • TOTAL HIP ARTHROPLASTY Left    • UPPER GASTROINTESTINAL ENDOSCOPY         Family History   Problem Relation Age of Onset   • Colon polyps Mother    • Ulcerative colitis Sister        Social History     Socioeconomic History   • Marital status: Single     Spouse name: Not on file   • Number of children: Not on file   • Years of education: Not on file   • Highest education level: Not on file   Tobacco Use   • Smoking status: Former Smoker     Packs/day: 0.50     Types: Cigarettes   • Smokeless tobacco: Never Used   • Tobacco comment: quit 10/31/18   Substance and Sexual Activity   • Alcohol use: No   • Drug use: No   • Sexual activity: Defer         Objective   Physical Exam   Constitutional: She is oriented to person, place, and time. She appears well-developed and well-nourished. She is cooperative.  Non-toxic appearance. No distress.   HENT:   Head: Normocephalic and atraumatic.   Right Ear: Tympanic membrane, external ear and ear canal normal.   Left Ear: Tympanic membrane, external ear and ear canal normal.   Nose: Nose normal.   Mouth/Throat: Oropharynx is clear and moist and mucous membranes are normal. No trismus in the jaw.   Eyes: Conjunctivae, EOM and lids are normal. Pupils are equal, round, and reactive to light.   Neck: Trachea normal, normal range of motion and full passive range of motion without pain.   Cardiovascular: Regular rhythm, normal heart sounds, intact distal pulses and normal pulses.   Pulmonary/Chest: Effort normal and breath sounds normal. No respiratory distress. She has no  decreased breath sounds. She has no wheezes. She has no rhonchi. She has no rales.   Abdominal: Soft. Normal appearance and bowel sounds are normal. She exhibits no distension. There is tenderness (LLQ pain).   Musculoskeletal: Normal range of motion.   Neurological: She is alert and oriented to person, place, and time. She has normal strength. No cranial nerve deficit.   Skin: Skin is warm, dry and intact. No rash noted. There is pallor.   Psychiatric: She has a normal mood and affect. Her speech is normal and behavior is normal.   Nursing note and vitals reviewed.      Procedures         ED Course  ED Course as of Oct 06 2311   Sun Oct 06, 2019   2129 Creatinine: (!) 1.60 [KG]   2129 Sodium: (!!) 116 [KG]   2129 Hemoglobin: (!) 8.9 [RS]   2130 Sodium: (!!) 116 [RS]   2130 Creatinine: (!) 1.60 [RS]   2141 I personally evaluated the patient.  She is alert and oriented and in no distress.  Patient has hyponatremia for which she has a history in the past.  Patient does appear to be volume depleted as well.  Patient had a similar level in the past which was associated with a diagnosis of cancer back in October 2018.  Patient however has had negative scans for cancer recently.  We will admit the patient for further evaluation and management.  They voiced understanding and agree.  [RS]   5169 KG paged the hospitalist.  [BM]   8495 KG discussed the case with Dr. Dangelo, hospitalist, who will admit the patient.  [BM]      ED Course User Index  [BM] Patrick Ham  [KG] Kristan Chi APRN  [RS] Mannie Chan MD      Recent Results (from the past 24 hour(s))   Comprehensive Metabolic Panel    Collection Time: 10/06/19  8:39 PM   Result Value Ref Range    Glucose 143 (H) 65 - 99 mg/dL    BUN 23 8 - 23 mg/dL    Creatinine 1.60 (H) 0.57 - 1.00 mg/dL    Sodium 116 (C) 136 - 145 mmol/L    Potassium 4.9 3.5 - 5.2 mmol/L    Chloride 79 (L) 98 - 107 mmol/L    CO2 23.0 22.0 - 29.0 mmol/L    Calcium 9.9 8.6 - 10.5  mg/dL    Total Protein 7.4 6.0 - 8.5 g/dL    Albumin 4.90 3.50 - 5.20 g/dL    ALT (SGPT) 12 1 - 33 U/L    AST (SGOT) 16 1 - 32 U/L    Alkaline Phosphatase 60 39 - 117 U/L    Total Bilirubin 0.4 0.2 - 1.2 mg/dL    eGFR Non African Amer 33 (L) >60 mL/min/1.73    Globulin 2.5 gm/dL    A/G Ratio 2.0 g/dL    BUN/Creatinine Ratio 14.4 7.0 - 25.0    Anion Gap 14.0 5.0 - 15.0 mmol/L   BNP    Collection Time: 10/06/19  8:39 PM   Result Value Ref Range    proBNP 163.3 5.0 - 900.0 pg/mL   Troponin    Collection Time: 10/06/19  8:39 PM   Result Value Ref Range    Troponin T <0.010 0.000 - 0.030 ng/mL   Light Blue Top    Collection Time: 10/06/19  8:39 PM   Result Value Ref Range    Extra Tube hold for add-on    Green Top (Gel)    Collection Time: 10/06/19  8:39 PM   Result Value Ref Range    Extra Tube Hold for add-ons.    Lavender Top    Collection Time: 10/06/19  8:39 PM   Result Value Ref Range    Extra Tube hold for add-on    Gold Top - SST    Collection Time: 10/06/19  8:39 PM   Result Value Ref Range    Extra Tube Hold for add-ons.    CBC Auto Differential    Collection Time: 10/06/19  8:39 PM   Result Value Ref Range    WBC 6.31 3.40 - 10.80 10*3/mm3    RBC 2.82 (L) 3.77 - 5.28 10*6/mm3    Hemoglobin 8.9 (L) 12.0 - 15.9 g/dL    Hematocrit 24.6 (L) 34.0 - 46.6 %    MCV 87.2 79.0 - 97.0 fL    MCH 31.6 26.6 - 33.0 pg    MCHC 36.2 (H) 31.5 - 35.7 g/dL    RDW 12.5 12.3 - 15.4 %    RDW-SD 40.3 37.0 - 54.0 fl    MPV 8.5 6.0 - 12.0 fL    Platelets 275 140 - 450 10*3/mm3    Neutrophil % 66.2 42.7 - 76.0 %    Lymphocyte % 19.5 (L) 19.6 - 45.3 %    Monocyte % 10.3 5.0 - 12.0 %    Eosinophil % 3.2 0.3 - 6.2 %    Basophil % 0.5 0.0 - 1.5 %    Immature Grans % 0.3 0.0 - 0.5 %    Neutrophils, Absolute 4.18 1.70 - 7.00 10*3/mm3    Lymphocytes, Absolute 1.23 0.70 - 3.10 10*3/mm3    Monocytes, Absolute 0.65 0.10 - 0.90 10*3/mm3    Eosinophils, Absolute 0.20 0.00 - 0.40 10*3/mm3    Basophils, Absolute 0.03 0.00 - 0.20 10*3/mm3    Immature  Grans, Absolute 0.02 0.00 - 0.05 10*3/mm3    nRBC 0.0 0.0 - 0.2 /100 WBC   Urinalysis With Microscopic If Indicated (No Culture) - Urine, Catheter    Collection Time: 10/06/19  9:32 PM   Result Value Ref Range    Color, UA Yellow Yellow, Straw    Appearance, UA Cloudy (A) Clear    pH, UA <=5.0 5.0 - 8.0    Specific Gravity, UA 1.021 1.001 - 1.030    Glucose, UA Negative Negative    Ketones, UA Negative Negative    Bilirubin, UA Negative Negative    Blood, UA Negative Negative    Protein, UA Negative Negative    Leuk Esterase, UA Negative Negative    Nitrite, UA Negative Negative    Urobilinogen, UA 1.0 E.U./dL 0.2 - 1.0 E.U./dL   Urinalysis, Microscopic Only - Urine, Catheter    Collection Time: 10/06/19  9:32 PM   Result Value Ref Range    RBC, UA 0-2 None Seen, 0-2 /HPF    WBC, UA 3-5 (A) None Seen, 0-2 /HPF    Bacteria, UA Trace None Seen, Trace /HPF    Squamous Epithelial Cells, UA 7-12 (A) None Seen, 0-2 /HPF    Hyaline Casts, UA 0-6 0 - 6 /LPF    Methodology Automated Microscopy      Note: In addition to lab results from this visit, the labs listed above may include labs taken at another facility or during a different encounter within the last 24 hours. Please correlate lab times with ED admission and discharge times for further clarification of the services performed during this visit.    CT Abdomen Pelvis Without Contrast   Final Result   Adenopathy is present anterior to the distal thoracic aorta, left of the T10 vertebral body and at the T12 level in the upper abdomen. These findings are all unchanged from the recent study from 9/11/2019 there are new from 2018 exam. Patient apparently   has a history of lung cancer.      There is no evidence of diverticulitis.               Signer Name: Karan Hicks MD    Signed: 10/6/2019 10:38 PM    Workstation Name: [facility]-     [facility]      CT Head Without Contrast   Final Result   Senescent changes without acute abnormality.              "  Signer Name: Jose Simmons MD    Signed: 10/6/2019 10:22 PM    Workstation Name: [facility]     [facility]      XR Chest 1 View   Final Result   No change. Stable left perihilar scarring. No active disease      Signer Name: Karan Hicks MD    Signed: 10/6/2019 9:21 PM    Workstation Name: [email or web address]-     [facility]        Vitals:    10/06/19 1931 10/06/19 2130 10/06/19 2230   BP: 155/76 138/86 138/91   BP Location: Left arm     Patient Position: Sitting     Pulse: 80  73   Resp: 16  17   Temp: 98.9 °F (37.2 °C)     TempSrc: Oral     SpO2: 100%  96%   Weight: 82.1 kg (181 lb)     Height: 154.9 cm (61\")       Medications   sodium chloride 0.9 % flush 10 mL (not administered)   ammonia inhaler  - ADS Override Pull (  Not Given 10/6/19 2030)   sodium chloride 0.9 % flush 10 mL (not administered)   sodium chloride 0.9 % bolus 500 mL (500 mL Intravenous New Bag 10/6/19 2222)   sodium chloride 0.9 % bolus 1,000 mL (1,000 mL Intravenous New Bag 10/6/19 2222)   HYDROmorphone (DILAUDID) injection 0.5 mg (0.5 mg Intravenous Given 10/6/19 2257)     ECG/EMG Results (last 24 hours)     Procedure Component Value Units Date/Time    ECG 12 Lead [[id number]] Collected:  10/06/19 1944     Updated:  10/06/19 1944        ECG 12 Lead                               MDM    Final diagnoses:   Hyponatremia   Weakness   Left lower quadrant abdominal pain   Chronic renal failure, unspecified CKD stage   Anemia, unspecified type       Documentation assistance provided by barbie Ham.  Information recorded by the barbie was done at my direction and has been verified and validated by me.     Patrick Ham  10/06/19 2107       Kristan Chi APRN  10/06/19 2311    "

## 2019-10-07 NOTE — PROGRESS NOTES
Discharge Planning Assessment  [facility]     Patient Name: Shauna Valencia  MRN: [id number]  Today's Date: 10/7/2019    Admit Date: 10/6/2019    Discharge Needs Assessment     Row Name 10/07/19 1616       Living Environment    Lives With  alone    Current Living Arrangements  home/apartment/condo 1 stair step to enter, 1 stair step to den    Primary Care Provided by  self    Provides Primary Care For  no one    Family Caregiver if Needed  child(prakash), adult    Family Caregiver Names  Daughter Vandana    Quality of Family Relationships  supportive;involved;helpful    Able to Return to Prior Arrangements  yes       Resource/Environmental Concerns    Resource/Environmental Concerns  none    Transportation Concerns  car, none       Transition Planning    Patient/Family Anticipates Transition to  home    Patient/Family Anticipated Services at Transition  none    Transportation Anticipated  family or friend will provide       Discharge Needs Assessment    Concerns to be Addressed  discharge planning    Equipment Currently Used at Home  walker, standard;commode        Discharge Plan     Row Name 10/07/19 1618       Plan    Plan  Home    Patient/Family in Agreement with Plan  yes    Plan Comments  Spoke with patient and her daughter.  Patient lives alone in [place], [place].  Patient reports being independent (still drives) but gets very tired easily.  Daughter available to assist as needed.  Currently has wheeled walker and BSC, no current home care services.  PCP is Dr. Singleton, uses [facility].  Daughter to transport at [place].     Final Discharge Disposition Code  01 - home or self-care        Destination      No service coordination in this encounter.      Durable Medical Equipment      No service coordination in this encounter.      Dialysis/Infusion      No service coordination in this encounter.      Home Medical Care      No service coordination in this encounter.      Therapy      No service coordination in  this encounter.      Community Resources      No service coordination in this encounter.        Expected Discharge Date and Time     Expected Discharge Date Expected Discharge Time    Oct 9, 2019         Demographic Summary     Row Name 10/07/19 1612       General Information    Admission Type  inpatient    Arrived From  emergency department    Referral Source  admission list    Reason for Consult  discharge planning    Preferred Language  English     Used During This Interaction  no        Functional Status     Row Name 10/07/19 1612       Functional Status    Usual Activity Tolerance  moderate       Functional Status, IADL    Medications  independent    Meal Preparation  independent    Housekeeping  independent    Laundry  independent    Shopping  independent        Psychosocial    No documentation.       Abuse/Neglect    No documentation.       Legal    No documentation.       Substance Abuse    No documentation.       Patient Forms    No documentation.           Annita Husesin RN

## 2019-10-07 NOTE — CONSULTS
Patient Care Team:  Vidya Chávez MD as PCP - General (General Practice)  Desmond Cardoso MD as Referring Physician (Hematology and Oncology)  Ramone Huggins MD as Consulting Physician (Radiation Oncology)  Ravi Still MD as Consulting Physician (Interventional Cardiology)  Cirilo Oquendo MD as Consulting Physician (Endocrinology)    Chief complaint: Weakness, Nausea, vomiting and abdominal pain.    Subjective     History of Present Illness: patient is a 64 y.o.hx of small cell lung cancer treated w chemo and radiation in the past, hx of hyponatremia ( treated with ADH antagonist). She is admitted to  The hospital for progressive worsening of abdominal pain and nausea. Patient also mentioned poor oral intake. On admission she was noted to have low Na levels. Her Na levels on admission was 121 she got IV fluid in the ER with further worsening of Na to 117 this morning.She also had mild PABLO with cr 1.6mg/dl on admission w interval improvement to 1.3mg/dl. He urine studies are significant for serum osm 255, uric acid 3.7, TSH 1.05   Upon interview she denies any headaches, gait instability, dizziness. Does c/o bad taste, nausea, abdominal pain> She was treated by [facility] for Hypona in the past.Per the daughter her Na last time dropped to 115. She was treated w ADH antagonist for 2 months.      Review of Systems   Constitutional: Positive for activity change and appetite change. Negative for fatigue.   HENT: Negative.    Respiratory: Negative.    Cardiovascular: Negative.    Gastrointestinal: Positive for abdominal pain and nausea. Negative for blood in stool, constipation, diarrhea and vomiting.   Endocrine: Negative for cold intolerance and heat intolerance.   Genitourinary: Negative.    Musculoskeletal: Negative.    Skin: Negative.    Neurological: Positive for weakness.        Past Medical History:   Diagnosis Date   • Arthritis    • Asthma    • COPD (chronic obstructive pulmonary  disease) (CMS/HCC)    • Coronary artery disease     5 stents   • Diabetes mellitus (CMS/HCC)    • Disease of thyroid gland    • DVT (deep venous thrombosis) (CMS/HCC)     this year- per pt   • GERD (gastroesophageal reflux disease)    • Hyperlipidemia    • Hypertension    • Lung cancer (CMS/HCC)    • Myocardial infarction (CMS/HCC)    • Pancreatitis    • Pancreatitis    • Renal disorder    ,   Past Surgical History:   Procedure Laterality Date   • APPENDECTOMY     • BRONCHOSCOPY N/A 11/6/2018    Procedure: BRONCHOSCOPY WITH ENDOBRONCHIAL ULTRASOUND, biopsies, brushing and washing.;  Surgeon: Isaac Epstein MD;  Location:  [facility];  Service: Pulmonary   • BRONCHOSCOPY N/A 11/9/2018    Procedure: BRONCHOSCOPY WITH ENDOBRONCHIAL ULTRASOUND;  Surgeon: Clay Scott MD;  Location:  [facility];  Service: Pulmonary   • CAROTID STENT      5 total   • CHOLECYSTECTOMY     • COLON SURGERY     • COLONOSCOPY     • HERNIA REPAIR     • HYSTERECTOMY     • OTHER SURGICAL HISTORY      bladder sling   • THYROIDECTOMY      1994   • TONSILLECTOMY     • TOTAL HIP ARTHROPLASTY Left    • UPPER GASTROINTESTINAL ENDOSCOPY     ,   Family History   Problem Relation Age of Onset   • Colon polyps Mother    • Ulcerative colitis Sister     and   Social History     Tobacco Use   • Smoking status: Former Smoker     Packs/day: 0.50     Types: Cigarettes   • Smokeless tobacco: Never Used   • Tobacco comment: quit 10/31/18   Substance Use Topics   • Alcohol use: No   • Drug use: No       Objective     Vital Signs  Temp:  [97.7 °F (36.5 °C)-98.9 °F (37.2 °C)] 97.7 °F (36.5 °C)  Heart Rate:  [71-80] 74  Resp:  [16-18] 18  BP: (122-158)/(76-91) 122/76    I/O this shift:  In: -   Out: 300 [Urine:300]  I/O last 3 completed shifts:  In: 1500 [IV Piggyback:1500]  Out: 300 [Urine:300]    Physical Exam   Constitutional: She is oriented to person, place, and time. She appears well-nourished. No distress.   HENT:   Head: Normocephalic and  atraumatic.   Eyes: EOM are normal. Pupils are equal, round, and reactive to light.   Neck: Normal range of motion. Neck supple.   Cardiovascular: Normal rate and regular rhythm.   No murmur heard.  Pulmonary/Chest: Effort normal and breath sounds normal. No respiratory distress.   Abdominal: Soft. Bowel sounds are normal.   Musculoskeletal: Normal range of motion.   Neurological: She is alert and oriented to person, place, and time.   Skin: Skin is warm and dry.   Psychiatric: She has a normal mood and affect.       Results Review:    I reviewed the patient's new clinical results.    WBC WBC   Date Value Ref Range Status   10/07/2019 5.46 3.40 - 10.80 10*3/mm3 Final   10/06/2019 6.31 3.40 - 10.80 10*3/mm3 Final      HGB Hemoglobin   Date Value Ref Range Status   10/07/2019 8.1 (L) 12.0 - 15.9 g/dL Final   10/06/2019 8.9 (L) 12.0 - 15.9 g/dL Final      HCT Hematocrit   Date Value Ref Range Status   10/07/2019 23.4 (L) 34.0 - 46.6 % Final   10/06/2019 24.6 (L) 34.0 - 46.6 % Final      Platlets No results found for: LABPLAT   MCV MCV   Date Value Ref Range Status   10/07/2019 88.3 79.0 - 97.0 fL Final   10/06/2019 87.2 79.0 - 97.0 fL Final          Sodium Sodium   Date Value Ref Range Status   10/07/2019 117 (C) 136 - 145 mmol/L Final   10/07/2019 120 (L) 136 - 145 mmol/L Final   10/07/2019 121 (L) 136 - 145 mmol/L Final   10/06/2019 116 (C) 136 - 145 mmol/L Final      Potassium Potassium   Date Value Ref Range Status   10/07/2019 4.3 3.5 - 5.2 mmol/L Final   10/07/2019 4.6 3.5 - 5.2 mmol/L Final   10/07/2019 4.2 3.5 - 5.2 mmol/L Final   10/06/2019 4.9 3.5 - 5.2 mmol/L Final      Chloride Chloride   Date Value Ref Range Status   10/07/2019 83 (L) 98 - 107 mmol/L Final   10/07/2019 84 (L) 98 - 107 mmol/L Final   10/07/2019 86 (L) 98 - 107 mmol/L Final   10/06/2019 79 (L) 98 - 107 mmol/L Final      CO2 CO2   Date Value Ref Range Status   10/07/2019 19.0 (L) 22.0 - 29.0 mmol/L Final   10/07/2019 23.0 22.0 - 29.0 mmol/L  Final   10/07/2019 23.0 22.0 - 29.0 mmol/L Final   10/06/2019 23.0 22.0 - 29.0 mmol/L Final      BUN BUN   Date Value Ref Range Status   10/07/2019 19 8 - 23 mg/dL Final   10/07/2019 22 8 - 23 mg/dL Final   10/07/2019 23 8 - 23 mg/dL Final   10/06/2019 23 8 - 23 mg/dL Final      Creatinine Creatinine   Date Value Ref Range Status   10/07/2019 1.39 (H) 0.57 - 1.00 mg/dL Final   10/07/2019 1.40 (H) 0.57 - 1.00 mg/dL Final   10/07/2019 1.43 (H) 0.57 - 1.00 mg/dL Final   10/06/2019 1.60 (H) 0.57 - 1.00 mg/dL Final      Calcium Calcium   Date Value Ref Range Status   10/07/2019 9.1 8.6 - 10.5 mg/dL Final   10/07/2019 9.2 8.6 - 10.5 mg/dL Final   10/07/2019 8.7 8.6 - 10.5 mg/dL Final   10/06/2019 9.9 8.6 - 10.5 mg/dL Final      PO4 No results found for: CAPO4   Albumin Albumin   Date Value Ref Range Status   10/06/2019 4.90 3.50 - 5.20 g/dL Final      Magnesium No results found for: MG   Uric Acid Uric Acid   Date Value Ref Range Status   10/07/2019 3.7 2.4 - 5.7 mg/dL Final     Comment:     Falsely depressed results may occur on samples drawn from patients receiving N-Acetylcysteine (NAC) or Metamizole.            Assessment/Plan       Acute hyponatremia    Hyperlipidemia    Type 2 diabetes mellitus (CMS/HCC)    Tobacco abuse    Small cell lung cancer, left upper lobe (CMS/HCC)    Anemia    Generalized weakness    CKD (chronic kidney disease) stage 3, GFR 30-59 ml/min (CMS/HCC)    CAD (coronary artery disease)    COPD (chronic obstructive pulmonary disease) (CMS/HCC)    Hyponatremia      Assessment & Plan    Hyponatremia  - Asymptomatic euvolemic severe hyponatremia   - Multifactorial, poor oral intake, persistent nausea and vomiting, possible paraneoplastic acitivity with tumor recurrence and adrenal insufficiency cortisol low 3.93  - Check urine Na, K, urine osm serum osm daily   - patient will need free water restriction, increase protein intake 1.5g/KG ( Ensure TID w meals),   - Add salt tablet 1 gm BID  - Goal  correction Na 6 meqs/day       Hypocortisolism   - Morning cortisol 3.9 indicative of adrenal insufficiency. Her symptoms of Abdominal pain, nausea vomiting might be related to AI   - Questionable primary vs secondary AI   - Will order ACTh levels   - Start hydrocortisone 100mg Q8hr. And fludrocortisone 0.1mcg daily  - Will need to rule out any possibility of Adrenal involvement by small cell lung cancer.     PABLO: In the setting of hypovolemia  Improved with IV hydration   Cr 1.3mg/dl from 1.6mg/dl     Hx of small cell lung cancer: s/p radiation and chemo  Now w new lymphadenopathy.  Concern for recurrence   I discussed the patients findings and my recommendations with patient and family     Terry Wiggins MD  10/07/19  4:17 PM

## 2019-10-07 NOTE — PLAN OF CARE
Problem: Patient Care Overview  Goal: Plan of Care Review  Outcome: Ongoing (interventions implemented as appropriate)   10/07/19 6472   Coping/Psychosocial   Plan of Care Reviewed With patient   Plan of Care Review   Progress no change   OTHER   Outcome Summary VSS, pt alert and oriented x4, c/o back and left flank pain noted- dilaudid given with effectiveness. Daughter at BS throughout the night, no other concerns noted.

## 2019-10-07 NOTE — H&P
"    [facility] Medicine Services  HISTORY AND PHYSICAL    Patient Name: Shauna Valencia  : 1955  MRN: [id number]  Primary Care Physician: Vidya Chávez MD  Date of admission: 10/6/2019      Subjective   Subjective     Chief Complaint:  abdominal pain    HPI:  Shauna Valencia is a 64 y.o. female with a past medical history significant for DM2, Small cell lung cancer ( left lung), anemia, CKD, III, COPD, CAD, HTN, and HLD who presents with complaints progressively worsening left lower quadrant abdominal pain going on for the past month. Pain radiates from left lower quadrant to lower back. No modifying factors. Rated 7/10 at its worst. There is associated nausea with non bloody vomiting and constipation. Last bowel movement was today. However yesterday was her first bowel movement in a week. She reports \"extremely hard\" stool when she does have a bowel movement and reports difficulty getting it out. States today she had to \" rock out her stool\" while squatting over the commode. Patient ended up losing her balance and falling in the bathroom. She is not anticoagulated. Denies syncope, head trauma or LOC.  There are no complaints of fever, cough, congestion, chest pain, or SOB. No dysuria, hematuria, or blood in stool/emesis.Pertinent surgical history includes partial colectomy in  for diverticulitis ( gene) then hernia repair in . She is scheduled to Dr. Olson at the end of October for evaluation of chronic constipation.    Patient if followed by Dr. Cardoso per oncology. She completed multiple round of chemotherapy and radiation. Last radiation was 5/10/2019. Most recent follow up with oncology was 2019 wherein she was diagnosed with lymphadenopathy secondary to radiation exposure.    Emergency Department Evaluation; vitals stable. Sodium 116. Creatinine 1.6. H/H 8.9 and 24.6 respectively. Head CT negative. CT A/P largely unchanged. Did demonstrated paraspinal " mass which was supposedly there in imaging earlier this year.    Review of Systems   Constitutional: Negative for chills and fever.   HENT: Negative for congestion and trouble swallowing.    Eyes: Negative for photophobia and visual disturbance.   Respiratory: Negative for cough and shortness of breath.    Cardiovascular: Negative for chest pain and leg swelling.   Gastrointestinal: Positive for abdominal pain, constipation, nausea and vomiting. Negative for diarrhea.   Endocrine: Negative for cold intolerance and heat intolerance.   Genitourinary: Negative for dysuria and flank pain.   Musculoskeletal: Positive for back pain. Negative for joint swelling.   Skin: Negative for pallor and rash.   Allergic/Immunologic: Positive for immunocompromised state.   Neurological: Positive for weakness. Negative for dizziness and headaches.   Hematological: Positive for adenopathy.   Psychiatric/Behavioral: Negative for agitation and confusion.          All other systems reviewed and are negative.     Personal History     Past Medical History:   Diagnosis Date   • Arthritis    • Asthma    • COPD (chronic obstructive pulmonary disease) (CMS/HCC)    • Coronary artery disease     5 stents   • Diabetes mellitus (CMS/HCC)    • Disease of thyroid gland    • DVT (deep venous thrombosis) (CMS/HCC)     this year- per pt   • GERD (gastroesophageal reflux disease)    • Hyperlipidemia    • Hypertension    • Lung cancer (CMS/HCC)    • Myocardial infarction (CMS/HCC)    • Pancreatitis    • Pancreatitis    • Renal disorder        Past Surgical History:   Procedure Laterality Date   • APPENDECTOMY     • BRONCHOSCOPY N/A 11/6/2018    Procedure: BRONCHOSCOPY WITH ENDOBRONCHIAL ULTRASOUND, biopsies, brushing and washing.;  Surgeon: Isaac Epstein MD;  [facility];  Service: Pulmonary   • BRONCHOSCOPY N/A 11/9/2018    Procedure: BRONCHOSCOPY WITH ENDOBRONCHIAL ULTRASOUND;  Surgeon: Clay Scott MD;  [facility];  Service: Pulmonary   • CAROTID STENT      5 total   • CHOLECYSTECTOMY     • COLON SURGERY     • COLONOSCOPY     • HERNIA REPAIR     • HYSTERECTOMY     • OTHER SURGICAL HISTORY      bladder sling   • THYROIDECTOMY      1994   • TONSILLECTOMY     • TOTAL HIP ARTHROPLASTY Left    • UPPER GASTROINTESTINAL ENDOSCOPY         Family History: family history includes Colon polyps in her mother; Ulcerative colitis in her sister. Otherwise pertinent FHx was reviewed and unremarkable.     Social History:  reports that she has quit smoking. Her smoking use included cigarettes. She smoked 0.50 packs per day. She has never used smokeless tobacco. She reports that she does not drink alcohol or use drugs.  Social History     Social History Narrative   • Not on file       Medications:    Available home medication information reviewed.    (Not in a hospital admission)    Allergies   Allergen Reactions   • Plavix [Clopidogrel Bisulfate] Anaphylaxis   • Codeine Other (See Comments)     Pt not certain of reatction   • Penicillins Other (See Comments)     Pt does not remember reaction       Objective   Objective     Vital Signs:   Temp:  [98.9 °F (37.2 °C)] 98.9 °F (37.2 °C)  Heart Rate:  [73-80] 73  Resp:  [16-17] 17  BP: (138-155)/(76-91) 138/91        Physical Exam   Constitutional: Awake, alert  Eyes: PERRLA, sclerae anicteric, no conjunctival injection  HENT: NCAT, mucous membranes moist  Neck: Supple, no thyromegaly, no lymphadenopathy, trachea midline  Respiratory: Clear to auscultation bilaterally, nonlabored respirations   Cardiovascular: RRR, no murmurs, rubs, or gallops, palpable pedal pulses bilaterally  Gastrointestinal: Positive bowel sounds, soft, nondistended TTP to left lower quadrant  Musculoskeletal: No bilateral ankle edema, no clubbing or cyanosis to extremities  Psychiatric: Appropriate affect, cooperative  Neurologic: Oriented x 3, strength symmetric in all extremities, Cranial Nerves grossly intact to  confrontation, speech clear  Skin: No rashes      Results Reviewed:  I have personally reviewed current lab and radiology data.    Results from last 7 days   Lab Units 10/06/19  2039   WBC 10*3/mm3 6.31   HEMOGLOBIN g/dL 8.9*   HEMATOCRIT % 24.6*   PLATELETS 10*3/mm3 275     Results from last 7 days   Lab Units 10/06/19  2039   SODIUM mmol/L 116*   POTASSIUM mmol/L 4.9   CHLORIDE mmol/L 79*   CO2 mmol/L 23.0   BUN mg/dL 23   CREATININE mg/dL 1.60*   GLUCOSE mg/dL 143*   CALCIUM mg/dL 9.9   ALT (SGPT) U/L 12   AST (SGOT) U/L 16   TROPONIN T ng/mL <0.010   PROBNP pg/mL 163.3     Estimated Creatinine Clearance: 35.4 mL/min (A) (by C-G formula based on SCr of 1.6 mg/dL (H)).  Brief Urine Lab Results  (Last result in the past 365 days)      Color   Clarity   Blood   Leuk Est   Nitrite   Protein   CREAT   Urine HCG        10/06/19 2132 Yellow Cloudy Negative Negative Negative Negative             Imaging Results (last 24 hours)     Procedure Component Value Units Date/Time    CT Abdomen Pelvis Without Contrast [[id number]] Collected:  10/06/19 2238     Updated:  10/06/19 2240    Narrative:       CT Abdomen Pelvis WO    INDICATION:   Left lower quadrant pain for one month. History of diverticulitis    TECHNIQUE:   CT of the abdomen and pelvis without IV contrast. Coronal and sagittal reconstructions were obtained.  Radiation dose reduction techniques included automated exposure control or exposure modulation based on body size. Count of known CT and cardiac nuc  med studies performed in previous 12 months: 5.     COMPARISON:   9/11/2019    FINDINGS:  Abdomen: Lung bases are clear. There is a left paraspinal mass measuring 2.3 x 1.8 cm. This is adjacent to the T10 vertebral body on the left side.. Is unchanged the more recent study but new from study from one year ago. Is also a large node anterior to  the aorta at the level the diaphragm measuring 3.0 cm which is developed since 2018.. There is left paratracheal adenopathy  in the upper abdomen to the T12  Measuring about 2.6 m in diameter. The liver, spleen, pancreas and adrenal glands are normal. The right kidney is normal. Left kidney has superior cysts which are stable. Aorta is normal in size. Bowel is normal.    Pelvis: Bladder is normal. Uterus been removed. There are no adnexal masses. There is a right hip prosthesis present      Impression:       Adenopathy is present anterior to the distal thoracic aorta, left of the T10 vertebral body and at the T12 level in the upper abdomen. These findings are all unchanged from the recent study from 9/11/2019 there are new from 2018 exam. Patient apparently  has a history of lung cancer.    There is no evidence of diverticulitis.          Signer Name: Karan Hicks MD   Signed: 10/6/2019 10:38 PM   Workstation Name: [employer]    Radiology [facility]    CT Head Without Contrast [[id number]] Collected:  10/06/19 2222     Updated:  10/06/19 2224    Narrative:       CT Head WO    HISTORY:   64-year-old female who fell in bathroom today and hit for head on ground. In the ED tonight. No reported loss of consciousness.    TECHNIQUE:   Axial unenhanced head CT. Radiation dose reduction techniques included automated exposure control or exposure modulation based on body size. Count of known CT and cardiac nuc med studies performed in previous 12 months: 11.     Time of scan: 2209 hours    COMPARISON:   None.    FINDINGS:   No intracranial hemorrhage, mass, or infarct. No hydrocephalus or extra-axial fluid collection. There are senescent changes, including volume loss and nonspecific white matter change, but no acute abnormality is seen. The skull base, calvarium, and  extracranial soft tissues are normal.      Impression:       Senescent changes without acute abnormality.          Signer Name: Jose Simmons MD   Signed: 10/6/2019 10:22 PM   Workstation Name: [email or web address]    Radiology [facility]    XR Chest 1 View  [[id number]] Collected:  10/06/19 2121     Updated:  10/06/19 2123    Narrative:       CR Chest 1 Vw    INDICATION:   Shortness of air. Left-sided abdominal pain.     COMPARISON:    9/11/2018, CT chest 6/30/2019    FINDINGS:  Single portable AP view(s) of the chest.  Heart size and vascular normal. There is increased in the left suprahilar region. This was seen on the CT scan 6/20/2019 and represented atelectasis or perhaps radiation therapy change involving the left lower  lobe superior segment. It is unchanged from that examination as well as an old chest x-ray dating back to November 9018      Impression:       No change. Stable left perihilar scarring. No active disease    Signer Name: Karan Hicks MD   Signed: 10/6/2019 9:21 PM   Workstation Name: [email or web address]    [facility]             Assessment/Plan   Assessment / Plan     Active Hospital Problems    Diagnosis POA   • **Acute hyponatremia [E87.1] Yes     Priority: High   • Generalized weakness [R53.1] Yes     Priority: High   • CAD (coronary artery disease) [I25.10] Yes     Priority: Medium   • COPD (chronic obstructive pulmonary disease) (CMS/HCC) [J44.9] Yes     Priority: Medium   • CKD (chronic kidney disease) stage 3, GFR 30-59 ml/min (CMS/HCC) [N18.3] Yes     Priority: Medium   • Anemia [D64.9] Yes     Priority: Medium   • Small cell lung cancer, left upper lobe (CMS/HCC) [C34.12] Yes     Priority: Medium   • Type 2 diabetes mellitus (CMS/HCC) [E11.9] Yes     Priority: Medium   • Hyperlipidemia [E78.5] Yes     Priority: Low   • Tobacco abuse [Z72.0] Yes     Priority: Low         1. Acute hyponatremia:  - no AMS, seizure activity  - patient has a history of hyponatremia in 10/2018 found to be secondary to SIADH in the setting of new lung mass.  - was followed by nephrology and treated with samsca  - check uric acid, serum and urine osmolality plus urine sodium  - am cortisol and TSH  - trend chemistry every 4-6 hours  - administered  1L bolus in ED  - consider courtesy consult to heme/onc      2. CAD:  - s/p stent placement  - stable. On ASA and statin  - EKG as needed    3. COPD:  - continue home bronchodilating agents  - additional pulmonary toilet as needed    4. HTN:  - stable and controlled. Continue home meds    5. Anemia:  - H/H stable and at baseline. Improved from 9/19  - monitor    6. DM2:  - on SSI at home. Continue low level SSI as inpatient with scheduled accu checks    7. CKD III:  - has been followed by [facility]  - stable. Baseline around 1.9.   - monitor and avoid nephrotoxins      DVT prophylaxis:  [facility]    CODE STATUS:  Full code  Code Status and Medical Interventions:   Ordered at: 10/06/19 2311     Level Of Support Discussed With:    Patient     Code Status:    CPR     Medical Interventions (Level of Support Prior to Arrest):    Full       Admission Status:  I believe this patient meets INPATIENT status due to IVF.  I feel patient’s risk for adverse outcomes and need for care warrant INPATIENT evaluation and I predict the patient’s care encounter to likely last beyond 2 midnights.        Electronically signed by Brigitte Sandoval PA-C, 10/06/19, 11:24 PM.      Brief Attending Admission Attestation     I have seen and examined the patient, performing an independent face-to-face diagnostic evaluation with plan of care reviewed and developed with the advanced practice clinician (APC).         Vitals:    10/07/19 0001   BP:  160/91   Pulse:  73   Resp:  17   Temp:  Afebrile.   SpO2: 99%       Attending Physical Exam:  RS- CTA-BL, ,  No wheezing , no crackles, good effort.  CVS- s1s2 regular, no murmur.  ABD- soft, non tender, not distended, no organomegaly.  EXT- no edema.mucosa moist  NEURO- AAO-3, no focal defecit.      Old records reviewed and summarized in PM hx.    Brief Summary Statement:   64-year-old female presented to ED with a chief complaint of fall- which appears to be mechanical, denies any complaint of lightheadedness  dizziness or any syncope event.  Patient does complain of generalized weakness, with some lightheadedness on standing up.  In addition to that patient does have a complaint of left flank/left lower abdominal pain-going on for past few weeks.  She was constipated, she did have a bowel movement on Saturday-in spite of that her pain is still persistent.  Denies any bleeding per GI or .  She does not complain of any worsening of her lower back pain, denies any complaint of fever, does complain of nausea/occasional vomiting episodes associated with the pain.  Denies any complaint of epigastric pain.    In ER patient got 1 and half liter of normal saline, Dilaudid 0.5 mg.  Remainder of detailed HPI is as noted above and has been reviewed and/or edited by me for completeness.    PM HX :  Small cell lung cancer-involving left upper lobe of the lung- diagnosed in November 2018-status post chemo cisplatin/etoposide-completed in February.  -Also had radiation to the lungs, and radiation to the brain.  All get completed in May.  History of CAD-on aspirin, her primary cardiologist Dr. Salazar discontinued her Brilinta recently.  Hypothyroid-Synthroid 75 mcg  Hypertension-lisinopril 10 mg,  Chronic constipation  GERD  DM2  CKD-stage III-stage IV  Anemia-normocytic.    LABS:  Troponin-less than 0.01, proBNP of 163  Blood glucose-143  Sodium of 116, potassium 4.9, BUN/creatinine 23/1.6  WBC-6, hemoglobin of 9, MCV of 87, neutrophil of 66  UA- cloudy, WBC 3-5, bacteria trace, squamous epithelial 3-12.  CT head no acute changes.  CT abdomen-again seen adenopathy anterior to distal thoracic aorta, at the level of T10 vertebra, also at the level of T12.-  Has been seen for the first time in September   Chest x-ray-no change, stable left perihilar scarring.  EKG-normal sinus rhythm at 78 bpm, , no acute ST-T wave changes.    Brief Assessment/Plan :  Fall-mechanical, does not appear to have any syncope- PT OT in  a.m.  Hyponatremia- clinically she looks mildly dehydrated, given her history of small cell CA-need to rule out recurrence of malignancy- we will recheck urine sodium, serum sodium, serum osmolality, hold further fluids.  Nephrology consult if recheck is still on the lower side  Left lower quadrant pain along with constipation- did not improve even after having a bowel movement still has reproducible tenderness-CT abdomen do not show any significant changes- likely will benefit from Dr. Padilla consult to rule out GI pathology inpt vs outpt.  Again on the CT scan of the abdomen- we see enlarged lymph nodes- consult Dr. Cardoso    See above for further detailed assessment and plan developed with APC which I have reviewed and/or edited for completeness.    Electronically signed by Catia Ortiz MD, 10/07/19, 12:26 AM.

## 2019-10-07 NOTE — PROGRESS NOTES
Psychiatric Medicine Services  PROGRESS NOTE    Patient Name: Shauna Valencia  : 1955  MRN: [id number]    Date of Admission: 10/6/2019  Primary Care Physician: Vidya Chávez MD    Subjective   Subjective     CC:  LLQ pain    HPI:  Pt still complaining of LLQ pain.  Nothing seems to alleviate it, even her pain medications.     Review of Systems  Gen- No fevers, chills  CV- No chest pain, palpitations  Resp- No cough, dyspnea  GI- No N/V/D, abd pain      All other systems reviewed and negative.     Objective   Objective     Vital Signs:   Temp:  [97.7 °F (36.5 °C)-98.9 °F (37.2 °C)] 97.7 °F (36.5 °C)  Heart Rate:  [71-80] 71  Resp:  [16-18] 18  BP: (138-158)/(76-91) 144/81        Physical Exam:  Constitutional: No acute distress, awake, alert  HENT: NCAT, mucous membranes moist  Respiratory: Clear to auscultation bilaterally, respiratory effort normal   Cardiovascular: RRR, no murmurs, rubs, or gallops, palpable pedal pulses bilaterally  Gastrointestinal: Positive bowel sounds, soft, LLQ pain  Musculoskeletal: No bilateral ankle edema  Psychiatric: Appropriate affect, cooperative  Neurologic: Oriented x 3, strength symmetric in all extremities, Cranial Nerves grossly intact to confrontation, speech clear  Skin: No rashes    Results Reviewed:    Results from last 7 days   Lab Units 10/07/19  0541 10/06/19  2039   WBC 10*3/mm3 5.46 6.31   HEMOGLOBIN g/dL 8.1* 8.9*   HEMATOCRIT % 23.4* 24.6*   PLATELETS 10*3/mm3 253 275     Results from last 7 days   Lab Units 10/07/19  0541 10/07/19  0131 10/06/19  2039   SODIUM mmol/L 120* 121* 116*   POTASSIUM mmol/L 4.6 4.2 4.9   CHLORIDE mmol/L 84* 86* 79*   CO2 mmol/L 23.0 23.0 23.0   BUN mg/dL 22 23 23   CREATININE mg/dL 1.40* 1.43* 1.60*   GLUCOSE mg/dL 122* 129* 143*   CALCIUM mg/dL 9.2 8.7 9.9   ALT (SGPT) U/L  --   --  12   AST (SGOT) U/L  --   --  16   TROPONIN T ng/mL  --   --  <0.010   PROBNP pg/mL  --   --  163.3     Estimated  Creatinine Clearance: 40 mL/min (A) (by C-G formula based on SCr of 1.4 mg/dL (H)).    Microbiology Results Abnormal     None          Imaging Results (last 24 hours)     Procedure Component Value Units Date/Time    CT Abdomen Pelvis Without Contrast [[id number]] Collected:  10/06/19 2238     Updated:  10/06/19 2240    Narrative:       CT Abdomen Pelvis WO    INDICATION:   Left lower quadrant pain for one month. History of diverticulitis    TECHNIQUE:   CT of the abdomen and pelvis without IV contrast. Coronal and sagittal reconstructions were obtained.  Radiation dose reduction techniques included automated exposure control or exposure modulation based on body size. Count of known CT and cardiac nuc  med studies performed in previous 12 months: 5.     COMPARISON:   9/11/2019    FINDINGS:  Abdomen: Lung bases are clear. There is a left paraspinal mass measuring 2.3 x 1.8 cm. This is adjacent to the T10 vertebral body on the left side.. Is unchanged the more recent study but new from study from one year ago. Is also a large node anterior to  the aorta at the level the diaphragm measuring 3.0 cm which is developed since 2018.. There is left paratracheal adenopathy in the upper abdomen to the T12  Measuring about 2.6 m in diameter. The liver, spleen, pancreas and adrenal glands are normal. The right kidney is normal. Left kidney has superior cysts which are stable. Aorta is normal in size. Bowel is normal.    Pelvis: Bladder is normal. Uterus been removed. There are no adnexal masses. There is a right hip prosthesis present      Impression:       Adenopathy is present anterior to the distal thoracic aorta, left of the T10 vertebral body and at the T12 level in the upper abdomen. These findings are all unchanged from the recent study from 9/11/2019 there are new from 2018 exam. Patient apparently  has a history of lung cancer.    There is no evidence of diverticulitis.          Signer Name: Karan Hicks MD   Signed:  10/6/2019 10:38 PM   Workstation Name: [email or web address]    [facility]    CT Head Without Contrast [[id number]] Collected:  10/06/19 2222     Updated:  10/06/19 2224    Narrative:       CT Head WO    HISTORY:   64-year-old female who fell in bathroom today and hit for head on ground. In the ED tonight. No reported loss of consciousness.    TECHNIQUE:   Axial unenhanced head CT. Radiation dose reduction techniques included automated exposure control or exposure modulation based on body size. Count of known CT and cardiac nuc med studies performed in previous 12 months: 11.     Time of scan: 2209 hours    COMPARISON:   None.    FINDINGS:   No intracranial hemorrhage, mass, or infarct. No hydrocephalus or extra-axial fluid collection. There are senescent changes, including volume loss and nonspecific white matter change, but no acute abnormality is seen. The skull base, calvarium, and  extracranial soft tissues are normal.      Impression:       Senescent changes without acute abnormality.          Signer Name: Jose Simmons MD   Signed: 10/6/2019 10:22 PM   Workstation Name: [email or web address]    [facility]    XR Chest 1 View [[id number]] Collected:  10/06/19 2121     Updated:  10/06/19 2123    Narrative:       CR Chest 1 Vw    INDICATION:   Shortness of air. Left-sided abdominal pain.     COMPARISON:    9/11/2018, CT chest 6/30/2019    FINDINGS:  Single portable AP view(s) of the chest.  Heart size and vascular normal. There is increased in the left suprahilar region. This was seen on the CT scan 6/20/2019 and represented atelectasis or perhaps radiation therapy change involving the left lower  lobe superior segment. It is unchanged from that examination as well as an old chest x-ray dating back to November 9018      Impression:       No change. Stable left perihilar scarring. No active disease    Signer Name: Karan Hicks MD   Signed: 10/6/2019 9:21 PM   Workstation Name:  [email or web address]    [facility]               I have reviewed the medications:  Scheduled Meds:  aspirin 81 mg Oral Daily   carvedilol 6.25 mg Oral BID With Meals   cetirizine 5 mg Oral Daily   heparin (porcine) 5,000 Units Subcutaneous Q12H   insulin lispro 0-7 Units Subcutaneous 4x Daily With Meals & Nightly   lactulose 20 g Oral TID   levothyroxine 75 mcg Oral Q AM   pantoprazole 40 mg Oral Daily   sodium chloride 10 mL Intravenous Q12H     Continuous Infusions:   PRN Meds:.•  acetaminophen **OR** acetaminophen **OR** acetaminophen  •  dextrose  •  dextrose  •  dicyclomine  •  docusate sodium  •  glucagon (human recombinant)  •  influenza vaccine  •  melatonin  •  ondansetron  •  sodium chloride  •  [COMPLETED] Insert peripheral IV **AND** sodium chloride  •  sodium chloride      Assessment/Plan   Assessment / Plan     Active Hospital Problems    Diagnosis  POA   • **Acute hyponatremia [E87.1]  Yes   • CAD (coronary artery disease) [I25.10]  Yes   • COPD (chronic obstructive pulmonary disease) (CMS/HCC) [J44.9]  Yes   • Hyponatremia [E87.1]  Yes   • CKD (chronic kidney disease) stage 3, GFR 30-59 ml/min (CMS/HCC) [N18.3]  Yes   • Generalized weakness [R53.1]  Yes   • Anemia [D64.9]  Yes   • Small cell lung cancer, left upper lobe (CMS/HCC) [C34.12]  Yes   • Hyperlipidemia [E78.5]  Yes   • Type 2 diabetes mellitus (CMS/HCC) [E11.9]  Yes   • Tobacco abuse [Z72.0]  Yes      Resolved Hospital Problems   No resolved problems to display.        Brief Hospital Course to date:  Shauna Valencia is a 64 y.o. female with a past medical history significant for DM2, Small cell lung cancer ( left lung), anemia, CKD, III, COPD, CAD, HTN, and HLD who presents with complaints progressively worsening left lower quadrant abdominal pain going on for the past month. Pt was found to be hyponatremic with Na+ of 116 on presentation.    Plan:    Acute hyponatremia:  - no AMS, seizure activity  - patient has a history of  hyponatremia in 10/2018 found to be secondary to SIADH in the setting of new lung mass.  - was followed by nephrology and treated with samsca  - am cortisol LOW   - trend chemistry every 4-6 hours  - administered 1L bolus in ED  --NAL consulted      CAD:  -- s/p stent placement  -- stable. On ASA and statin, recently taken off Brilinta by her Cardiologist     COPD:  - continue home bronchodilating agents  - additional pulmonary toilet as needed     HTN:  - stable and controlled. Continue home meds     Anemia:  - H/H stable and at baseline. Improved from 9/19  - monitor     DM2:  - on SSI at home. Continue low level SSI as inpatient with scheduled accu checks     CKD III:  - has been followed by [facility]  - stable. Baseline around 1.9.   - monitor and avoid nephrotoxins     DVT Prophylaxis:  ALIS    Disposition: I expect the patient to be discharged TBD    CODE STATUS:   Code Status and Medical Interventions:   Ordered at: 10/06/19 2011     Level Of Support Discussed With:    Patient     Code Status:    CPR     Medical Interventions (Level of Support Prior to Arrest):    Full         Electronically signed by Malia Solis MD, 10/07/19, 8:18 AM.

## 2019-10-07 NOTE — CONSULTS
Subjective     CHIEF COMPLAINT: Abdominal pain    HISTORY OF PRESENT ILLNESS:  The patient is a 64 y.o. female, referred by Malia Solis MD for relapsed small cell lung cancer.  The patient was in her usual state of health until approximately 2 weeks ago she presented with abdominal pain diffuse but worse on the left lower quadrant, radiates to the back, associated with nausea but no vomiting.  She is been having poor appetite.  She had similar symptoms a month ago she came to the emergency room had a CAT scan that was essentially stable however given the fact that her symptoms has relapse and the been getting gradually worse she decided to come to the emergency room last night for CAT scan revealed slight progressive lymphadenopathy blood work revealed new onset hyponatremia.  The patient was admitted to the hospitalist service and I was consulted to further assist in her care.      REVIEW OF SYSTEMS:  A 14 point review of systems was performed and is negative except as noted above.    Past Medical History:   Diagnosis Date   • Arthritis    • Asthma    • COPD (chronic obstructive pulmonary disease) (CMS/HCC)    • Coronary artery disease     5 stents   • Diabetes mellitus (CMS/HCC)    • Disease of thyroid gland    • DVT (deep venous thrombosis) (CMS/HCC)     this year- per pt   • GERD (gastroesophageal reflux disease)    • Hyperlipidemia    • Hypertension    • Lung cancer (CMS/HCC)    • Myocardial infarction (CMS/HCC)    • Pancreatitis    • Pancreatitis    • Renal disorder        No current facility-administered medications on file prior to encounter.      Current Outpatient Medications on File Prior to Encounter   Medication Sig Dispense Refill   • aspirin 81 MG EC tablet Take 81 mg by mouth Daily.     • carvedilol (COREG) 6.25 MG tablet Take 6.25 mg by mouth 2 (Two) Times a Day With Meals.     • cetirizine (zyrTEC) 10 MG tablet Take 10 mg by mouth Daily.     • cyclobenzaprine (FLEXERIL) 10 MG tablet Take  1 tablet by mouth 3 (Three) Times a Day As Needed for Muscle Spasms. 90 tablet 0   • dicyclomine (BENTYL) 20 MG tablet Take 1 tablet by mouth Every 8 (Eight) Hours As Needed (pain, cramps). 20 tablet 0   • docusate sodium (COLACE) 100 MG capsule Take 1 capsule by mouth 2 (Two) Times a Day. (Patient taking differently: Take 100 mg by mouth 2 (Two) Times a Day As Needed.) 60 capsule 3   • famotidine (PEPCID) 20 MG tablet Take 20 mg by mouth 2 (Two) Times a Day.     • HYDROcodone-acetaminophen (NORCO) 7.5-325 MG per tablet Take 1 tablet by mouth Every 6 (Six) Hours As Needed for Moderate Pain .     • insulin aspart (novoLOG FLEXPEN) 100 UNIT/ML solution pen-injector sc pen Inject 0-10 Units under the skin into the appropriate area as directed 3 (Three) Times a Day With Meals. SEE instructions for Sliding Scale 15 mL 0   • lactulose (CHRONULAC) 10 GM/15ML solution Take 30 mL by mouth 3 (Three) Times a Day as needed for constipation 240 mL 2   • levothyroxine (SYNTHROID, LEVOTHROID) 75 MCG tablet Take 75 mcg by mouth Daily.     • Magic mouthwash Radonc Swish and swallow 5-10 mL 4 (Four) Times a Day Before Meals & at Bedtime. 480 mL 2   • ondansetron (ZOFRAN) 4 MG tablet Take 1 tablet by mouth Every 8 (Eight) Hours As Needed for Nausea. 15 tablet 0   • ondansetron (ZOFRAN) 8 MG tablet TAKE ONE TABLET BY MOUTH THREE TIMES A DAY AS NEEDED FOR NAUSEA AND VOMITING 90 tablet 5   • pantoprazole (PROTONIX) 40 MG EC tablet Take 40 mg by mouth Daily.     • promethazine (PHENERGAN) 12.5 MG tablet Take 2 tablets by mouth Every 8 (Eight) Hours As Needed for Nausea or Vomiting. 20 tablet 0   • promethazine (PHENERGAN) 25 MG tablet Take 1 tablet by mouth Every 6 (Six) Hours As Needed for Nausea or Vomiting. 45 tablet 5       Allergies   Allergen Reactions   • Plavix [Clopidogrel Bisulfate] Anaphylaxis   • Codeine Other (See Comments)     Pt not certain of reatction   • Penicillins Other (See Comments)     Pt does not remember reaction        Past Surgical History:   Procedure Laterality Date   • APPENDECTOMY     • BRONCHOSCOPY N/A 11/6/2018    Procedure: BRONCHOSCOPY WITH ENDOBRONCHIAL ULTRASOUND, biopsies, brushing and washing.;  Surgeon: Isaac Epstein MD;  Location:  [facility];  Service: Pulmonary   • BRONCHOSCOPY N/A 11/9/2018    Procedure: BRONCHOSCOPY WITH ENDOBRONCHIAL ULTRASOUND;  Surgeon: Clay Scott MD;  Location:  [facility];  Service: Pulmonary   • CAROTID STENT      5 total   • CHOLECYSTECTOMY     • COLON SURGERY     • COLONOSCOPY     • HERNIA REPAIR     • HYSTERECTOMY     • OTHER SURGICAL HISTORY      bladder sling   • THYROIDECTOMY      1994   • TONSILLECTOMY     • TOTAL HIP ARTHROPLASTY Left    • UPPER GASTROINTESTINAL ENDOSCOPY         OB History   No data available       Social History     Socioeconomic History   • Marital status: Single     Spouse name: Not on file   • Number of children: Not on file   • Years of education: Not on file   • Highest education level: Not on file   Tobacco Use   • Smoking status: Former Smoker     Packs/day: 0.50     Types: Cigarettes   • Smokeless tobacco: Never Used   • Tobacco comment: quit 10/31/18   Substance and Sexual Activity   • Alcohol use: No   • Drug use: No   • Sexual activity: Defer       Family History   Problem Relation Age of Onset   • Colon polyps Mother    • Ulcerative colitis Sister        Objective     Vitals:    10/07/19 0000 10/07/19 0001 10/07/19 0100 10/07/19 0712   BP: 158/91   144/81   BP Location:    Right arm   Patient Position:    Lying   Pulse:    71   Resp:    18   Temp:    97.7 °F (36.5 °C)   TempSrc:    Oral   SpO2:  99%     Weight:   80.3 kg (177 lb)    Height:                ECOG Performance Status: 1 - Symptomatic but completely ambulatory  General: well appearing female in no acute distress  Neuro/Psych: A&O x 3, gait steady, appropriate affect, strength 5/5 in all muscle groups  HEENT: sclerae anicteric, oropharynx clear  Lymphatics: no  cervical, supraclavicular, or axillary adenopathy  Cardiovascular: regular rate and rhythm, no murmurs  Lungs: clear to auscultation bilaterally  Abdomen: soft, nontender, nondistended.  No palpable organomegaly  Extremities: no lower extremity edema  Skin: no rashes, lesions, bruising, or petechiae      Admission on 10/06/2019   Component Date Value Ref Range Status   • Glucose 10/06/2019 143* 65 - 99 mg/dL Final   • BUN 10/06/2019 23  8 - 23 mg/dL Final   • Creatinine 10/06/2019 1.60* 0.57 - 1.00 mg/dL Final   • Sodium 10/06/2019 116* 136 - 145 mmol/L Final   • Potassium 10/06/2019 4.9  3.5 - 5.2 mmol/L Final   • Chloride 10/06/2019 79* 98 - 107 mmol/L Final   • CO2 10/06/2019 23.0  22.0 - 29.0 mmol/L Final   • Calcium 10/06/2019 9.9  8.6 - 10.5 mg/dL Final   • Total Protein 10/06/2019 7.4  6.0 - 8.5 g/dL Final   • Albumin 10/06/2019 4.90  3.50 - 5.20 g/dL Final   • ALT (SGPT) 10/06/2019 12  1 - 33 U/L Final   • AST (SGOT) 10/06/2019 16  1 - 32 U/L Final    Specimen hemolyzed.  Results may be affected.   • Alkaline Phosphatase 10/06/2019 60  39 - 117 U/L Final   • Total Bilirubin 10/06/2019 0.4  0.2 - 1.2 mg/dL Final   • eGFR Non  Amer 10/06/2019 33* >60 mL/min/1.73 Final   • Globulin 10/06/2019 2.5  gm/dL Final   • A/G Ratio 10/06/2019 2.0  g/dL Final   • BUN/Creatinine Ratio 10/06/2019 14.4  7.0 - 25.0 Final   • Anion Gap 10/06/2019 14.0  5.0 - 15.0 mmol/L Final   • proBNP 10/06/2019 163.3  5.0 - 900.0 pg/mL Final   • Troponin T 10/06/2019 <0.010  0.000 - 0.030 ng/mL Final   • Extra Tube 10/06/2019 hold for add-on   Final    Auto resulted   • Extra Tube 10/06/2019 Hold for add-ons.   Final    Auto resulted.   • Extra Tube 10/06/2019 hold for add-on   Final    Auto resulted   • Extra Tube 10/06/2019 Hold for add-ons.   Final    Auto resulted.   • WBC 10/06/2019 6.31  3.40 - 10.80 10*3/mm3 Final   • RBC 10/06/2019 2.82* 3.77 - 5.28 10*6/mm3 Final   • Hemoglobin 10/06/2019 8.9* 12.0 - 15.9 g/dL Final   •  Hematocrit 10/06/2019 24.6* 34.0 - 46.6 % Final   • MCV 10/06/2019 87.2  79.0 - 97.0 fL Final   • MCH 10/06/2019 31.6  26.6 - 33.0 pg Final   • MCHC 10/06/2019 36.2* 31.5 - 35.7 g/dL Final   • RDW 10/06/2019 12.5  12.3 - 15.4 % Final   • RDW-SD 10/06/2019 40.3  37.0 - 54.0 fl Final   • MPV 10/06/2019 8.5  6.0 - 12.0 fL Final   • Platelets 10/06/2019 275  140 - 450 10*3/mm3 Final   • Neutrophil % 10/06/2019 66.2  42.7 - 76.0 % Final   • Lymphocyte % 10/06/2019 19.5* 19.6 - 45.3 % Final   • Monocyte % 10/06/2019 10.3  5.0 - 12.0 % Final   • Eosinophil % 10/06/2019 3.2  0.3 - 6.2 % Final   • Basophil % 10/06/2019 0.5  0.0 - 1.5 % Final   • Immature Grans % 10/06/2019 0.3  0.0 - 0.5 % Final   • Neutrophils, Absolute 10/06/2019 4.18  1.70 - 7.00 10*3/mm3 Final   • Lymphocytes, Absolute 10/06/2019 1.23  0.70 - 3.10 10*3/mm3 Final   • Monocytes, Absolute 10/06/2019 0.65  0.10 - 0.90 10*3/mm3 Final   • Eosinophils, Absolute 10/06/2019 0.20  0.00 - 0.40 10*3/mm3 Final   • Basophils, Absolute 10/06/2019 0.03  0.00 - 0.20 10*3/mm3 Final   • Immature Grans, Absolute 10/06/2019 0.02  0.00 - 0.05 10*3/mm3 Final   • nRBC 10/06/2019 0.0  0.0 - 0.2 /100 WBC Final   • Color, UA 10/06/2019 Yellow  Yellow, Straw Final   • Appearance, UA 10/06/2019 Cloudy* Clear Final   • pH, UA 10/06/2019 <=5.0  5.0 - 8.0 Final   • Specific Gravity, UA 10/06/2019 1.021  1.001 - 1.030 Final   • Glucose, UA 10/06/2019 Negative  Negative Final   • Ketones, UA 10/06/2019 Negative  Negative Final   • Bilirubin, UA 10/06/2019 Negative  Negative Final   • Blood, UA 10/06/2019 Negative  Negative Final   • Protein, UA 10/06/2019 Negative  Negative Final   • Leuk Esterase, UA 10/06/2019 Negative  Negative Final   • Nitrite, UA 10/06/2019 Negative  Negative Final   • Urobilinogen, UA 10/06/2019 1.0 E.U./dL  0.2 - 1.0 E.U./dL Final   • RBC, UA 10/06/2019 0-2  None Seen, 0-2 /HPF Final   • WBC, UA 10/06/2019 3-5* None Seen, 0-2 /HPF Final   • Bacteria, UA  10/06/2019 Trace  None Seen, Trace /HPF Final   • Squamous Epithelial Cells, UA 10/06/2019 7-12* None Seen, 0-2 /HPF Final   • Hyaline Casts, UA 10/06/2019 0-6  0 - 6 /LPF Final   • Methodology 10/06/2019 Automated Microscopy   Final   • Glucose 10/07/2019 129* 65 - 99 mg/dL Final   • BUN 10/07/2019 23  8 - 23 mg/dL Final   • Creatinine 10/07/2019 1.43* 0.57 - 1.00 mg/dL Final   • Sodium 10/07/2019 121* 136 - 145 mmol/L Final   • Potassium 10/07/2019 4.2  3.5 - 5.2 mmol/L Final   • Chloride 10/07/2019 86* 98 - 107 mmol/L Final   • CO2 10/07/2019 23.0  22.0 - 29.0 mmol/L Final   • Calcium 10/07/2019 8.7  8.6 - 10.5 mg/dL Final   • eGFR Non African Amer 10/07/2019 37* >60 mL/min/1.73 Final   • BUN/Creatinine Ratio 10/07/2019 16.1  7.0 - 25.0 Final   • Anion Gap 10/07/2019 12.0  5.0 - 15.0 mmol/L Final   • Uric Acid 10/07/2019 3.7  2.4 - 5.7 mg/dL Final    Falsely depressed results may occur on samples drawn from patients receiving N-Acetylcysteine (NAC) or Metamizole.   • WBC 10/07/2019 5.46  3.40 - 10.80 10*3/mm3 Final   • RBC 10/07/2019 2.65* 3.77 - 5.28 10*6/mm3 Final   • Hemoglobin 10/07/2019 8.1* 12.0 - 15.9 g/dL Final   • Hematocrit 10/07/2019 23.4* 34.0 - 46.6 % Final   • MCV 10/07/2019 88.3  79.0 - 97.0 fL Final   • MCH 10/07/2019 30.6  26.6 - 33.0 pg Final   • MCHC 10/07/2019 34.6  31.5 - 35.7 g/dL Final   • RDW 10/07/2019 12.6  12.3 - 15.4 % Final   • RDW-SD 10/07/2019 40.7  37.0 - 54.0 fl Final   • MPV 10/07/2019 8.7  6.0 - 12.0 fL Final   • Platelets 10/07/2019 253  140 - 450 10*3/mm3 Final   • TSH 10/07/2019 1.580  0.270 - 4.200 uIU/mL Final   • Sodium, Urine 10/07/2019 94  mmol/L Final   • Osmolality, Urine 10/07/2019 575  300-1,100 mOsm/kg Final   • Osmolality 10/06/2019 255* 275 - 295 mOsm/kg Final   • Cortisol 10/07/2019 3.93    mcg/dL Final   • Glucose 10/07/2019 122* 65 - 99 mg/dL Final   • BUN 10/07/2019 22  8 - 23 mg/dL Final   • Creatinine 10/07/2019 1.40* 0.57 - 1.00 mg/dL Final   • Sodium  10/07/2019 120* 136 - 145 mmol/L Final   • Potassium 10/07/2019 4.6  3.5 - 5.2 mmol/L Final   • Chloride 10/07/2019 84* 98 - 107 mmol/L Final   • CO2 10/07/2019 23.0  22.0 - 29.0 mmol/L Final   • Calcium 10/07/2019 9.2  8.6 - 10.5 mg/dL Final   • eGFR Non  Amer 10/07/2019 38* >60 mL/min/1.73 Final   • BUN/Creatinine Ratio 10/07/2019 15.7  7.0 - 25.0 Final   • Anion Gap 10/07/2019 13.0  5.0 - 15.0 mmol/L Final   • Glucose 10/07/2019 122  70 - 130 mg/dL Final        No results found.    ASSESSMENT 64 years old female with relapsed small cell lung cancer    PROBLEM LIST   1.  Limited stage small cell lung cancer:  A.  The patient completed concurrent chemotherapy with radiation May 2019  B.  Now presented with upper abdominal lymphadenopathy and hyponatremia  2.  Hyponatremia  3.  Normocytic anemia  4.  Cancer related pain    PLAN  1.  I reviewed the patient's chart including admission note blood results and imaging report.  I reviewed the patient's CT scan films myself.  Her lymphadenopathy is essentially stable from last month however slightly worse compared to August 2019 scan.  With a new onset hyponatremia admitted with concern about possibility of active or relapsed small cell lung cancer although her hyponatremia could be induced by dehydration.  2.  I will order CT chest with contrast.  3.  Future treatment option would include immunotherapy.  4.  We will continue opiates as needed for pain.      Desmond Cardoso MD    10/7/2019

## 2019-10-08 NOTE — PROGRESS NOTES
"   LOS: 1 day    Patient Care Team:  Vidya Chávez MD as PCP - General (General Practice)  Desmond Cardoso MD as Referring Physician (Hematology and Oncology)  Ramone Huggins MD as Consulting Physician (Radiation Oncology)  Ravi Still MD as Consulting Physician (Interventional Cardiology)  Cirilo Oquendo MD as Consulting Physician (Endocrinology)    Reason For Visit:    Subjective     Patient seen/examined      Review of Systems:    Pulm: No soa   CV:  No CP      Objective       aspirin 81 mg Oral Daily   carvedilol 6.25 mg Oral BID With Meals   cetirizine 5 mg Oral Daily   fludrocortisone 100 mcg Oral Daily   heparin (porcine) 5,000 Units Subcutaneous Q12H   hydrocortisone sodium succinate 100 mg Intravenous Q8H   insulin lispro 0-7 Units Subcutaneous 4x Daily With Meals & Nightly   lactulose 20 g Oral TID   levothyroxine 75 mcg Oral Q AM   pantoprazole 40 mg Oral Daily   sodium chloride 10 mL Intravenous Q12H   sodium chloride 1 g Oral Daily            Vital Signs:  Blood pressure 142/76, pulse 96, temperature 98.1 °F (36.7 °C), temperature source Oral, resp. rate 18, height 154.9 cm (61\"), weight 80.8 kg (178 lb 3.2 oz), SpO2 99 %.    Flowsheet Rows      First Filed Value   Admission Height  154.9 cm (61\") Documented at 10/06/2019 1931   Admission Weight  82.1 kg (181 lb) Documented at 10/06/2019 1931          10/07 0701 - 10/08 0700  In: 240 [P.O.:240]  Out: 300 [Urine:300]    Physical Exam:    Constitutional: No acute distress, awake, alert  HENT: NCAT, mucous membranes moist  Respiratory: Clear to auscultation bilaterally, respiratory effort normal   Cardiovascular: RRR, no murmurs, rubs, or gallops, palpable pedal pulses bilaterally  Gastrointestinal: Positive bowel sounds, soft, no tenderness to palpation, nondistended  Musculoskeletal: No bilateral ankle edema  Psychiatric: Appropriate affect, cooperative    Radiology:      Renal Imaging:        Labs:  Results from " last 7 days   Lab Units 10/08/19  0655 10/07/19  0541 10/06/19  2039   WBC 10*3/mm3 4.74 5.46 6.31   HEMOGLOBIN g/dL 8.0* 8.1* 8.9*   HEMATOCRIT % 22.5* 23.4* 24.6*   PLATELETS 10*3/mm3 239 253 275     Results from last 7 days   Lab Units 10/08/19  1159 10/08/19  0655 10/08/19  0027 10/07/19  1852  10/06/19  2039   SODIUM mmol/L 120* 120* 120* 119*   < > 116*   POTASSIUM mmol/L 4.7 4.6 4.7 4.5   < > 4.9   CHLORIDE mmol/L 84* 86* 84* 85*   < > 79*   CO2 mmol/L 20.0* 22.0 19.0* 18.0*   < > 23.0   BUN mg/dL 23 23 23 21   < > 23   CREATININE mg/dL 1.31* 1.36* 1.41* 1.38*   < > 1.60*   CALCIUM mg/dL 9.4 9.7 9.3 8.9   < > 9.9   ALBUMIN g/dL  --   --   --   --   --  4.90    < > = values in this interval not displayed.     Results from last 7 days   Lab Units 10/08/19  1159   GLUCOSE mg/dL 202*       Results from last 7 days   Lab Units 10/06/19  2039   ALK PHOS U/L 60   BILIRUBIN mg/dL 0.4   ALT (SGPT) U/L 12   AST (SGOT) U/L 16           Results from last 7 days   Lab Units 10/06/19  2132   COLOR UA  Yellow   CLARITY UA  Cloudy*   PH, URINE  <=5.0   SPECIFIC GRAVITY, URINE  1.021   GLUCOSE UA  Negative   KETONES UA  Negative   BILIRUBIN UA  Negative   PROTEIN UA  Negative   BLOOD UA  Negative   LEUKOCYTES UA  Negative   NITRITE UA  Negative       Estimated Creatinine Clearance: 42.9 mL/min (A) (by C-G formula based on SCr of 1.31 mg/dL (H)).      Assessment       Acute hyponatremia    Hyperlipidemia    Type 2 diabetes mellitus (CMS/HCC)    Tobacco abuse    Small cell lung cancer, left upper lobe (CMS/HCC)    Anemia    Generalized weakness    CKD (chronic kidney disease) stage 3, GFR 30-59 ml/min (CMS/HCC)    CAD (coronary artery disease)    COPD (chronic obstructive pulmonary disease) (CMS/HCC)    Hyponatremia            Impression/plan:       Hyponatremia  - Asymptomatic euvolemic severe hyponatremia, not improving  - Multifactorial, poor oral intake, persistent nausea and vomiting, possible paraneoplastic acitivity with  tumor recurrence and adrenal insufficiency cortisol low 3.93  - looks like ADH mechanism  - start 1.5 L Fluid restricition, increase salt tabs to TID   - decrease ensure to once a day, may need samsca, was on it outpatient in the past     Hypocortisolism   - Morning cortisol 3.9 indicative of adrenal insufficiency. Her symptoms of Abdominal pain, nausea vomiting might be related to AI   - Questionable primary vs secondary AI   - On hydrocortisone 100mg Q8hr. And fludrocortisone 0.1mcg daily     PABLO: improved, stable          Martha Mccracken DO  10/08/19  3:13 PM

## 2019-10-08 NOTE — PLAN OF CARE
Problem: Fall Risk (Adult)  Goal: Identify Related Risk Factors and Signs and Symptoms  Outcome: Outcome(s) achieved Date Met: 10/08/19    Goal: Absence of Fall  Outcome: Ongoing (interventions implemented as appropriate)      Problem: Patient Care Overview  Goal: Plan of Care Review  Outcome: Ongoing (interventions implemented as appropriate)   10/08/19 0345 10/08/19 1621   Coping/Psychosocial   Plan of Care Reviewed With --  patient;daughter   Plan of Care Review   Progress no change --      Goal: Discharge Needs Assessment  Outcome: Ongoing (interventions implemented as appropriate)   10/07/19 1616   Disability   Equipment Currently Used at Home walker, standard;commode   Discharge Needs Assessment   Concerns to be Addressed discharge planning   Patient/Family Anticipates Transition to home   Patient/Family Anticipated Services at Transition none   Transportation Concerns car, none   Transportation Anticipated family or friend will provide       Problem: Syncope (Adult)  Goal: Identify Related Risk Factors and Signs and Symptoms  Outcome: Outcome(s) achieved Date Met: 10/08/19   10/08/19 1639   Syncope (Adult)   Related Risk Factors (Syncope) hypotension;stress, psychological   Signs and Symptoms (Syncope) dizziness     Goal: Physical Safety/Health Maintenance  Outcome: Ongoing (interventions implemented as appropriate)   10/08/19 1639   Syncope (Adult)   Physical Safety/Health Maintenance making progress toward outcome     Goal: Optimal Emotional/Functional [place]  Outcome: Ongoing (interventions implemented as appropriate)   10/08/19 1639   Syncope (Adult)   Optimal Emotional/Functional [place] making progress toward outcome

## 2019-10-08 NOTE — PLAN OF CARE
Problem: Fall Risk (Adult)  Goal: Identify Related Risk Factors and Signs and Symptoms  Outcome: Ongoing (interventions implemented as appropriate)    Goal: Absence of Fall  Outcome: Ongoing (interventions implemented as appropriate)      Problem: Patient Care Overview  Goal: Discharge Needs Assessment  Outcome: Ongoing (interventions implemented as appropriate)    Goal: Interprofessional Rounds/Family Conf  Outcome: Ongoing (interventions implemented as appropriate)      Problem: Syncope (Adult)  Goal: Identify Related Risk Factors and Signs and Symptoms  Outcome: Ongoing (interventions implemented as appropriate)    Goal: Physical Safety/Health Maintenance  Outcome: Ongoing (interventions implemented as appropriate)    Goal: Optimal Emotional/Functional [place]  Outcome: Ongoing (interventions implemented as appropriate)

## 2019-10-08 NOTE — PROGRESS NOTES
DATE OF VISIT: 10/8/2019    Chief Complaint: Followup for small cell lung cancer     SUBJECTIVE: The patient has been doing fairly well.  She  denied any fever or  chills, no night sweats, denied any headaches    REVIEW OF SYSTEMS: All the other 9 systems are reviewed by me and negative  except what is mentioned in HPI.     PAST MEDICAL HISTORY/SOCIAL HISTORY/FAMILY HISTORY: Unchanged from my prior documentation done on October 7, 2019      Current Facility-Administered Medications:   •  acetaminophen (TYLENOL) tablet 650 mg, 650 mg, Oral, Q4H PRN, 650 mg at 10/07/19 0307 **OR** acetaminophen (TYLENOL) 160 MG/5ML solution 650 mg, 650 mg, Oral, Q4H PRN **OR** acetaminophen (TYLENOL) suppository 650 mg, 650 mg, Rectal, Q4H PRN, Brigitte Sandoval PA-C  •  aspirin EC tablet 81 mg, 81 mg, Oral, Daily, Brigitte Sandoval PA-C, 81 mg at 10/07/19 0821  •  carvedilol (COREG) tablet 6.25 mg, 6.25 mg, Oral, BID With Meals, Brigitte Sandoval PA-C, 6.25 mg at 10/07/19 1744  •  cetirizine (zyrTEC) tablet 5 mg, 5 mg, Oral, Daily, Brigitte Sandoval PA-C, 5 mg at 10/07/19 0821  •  dextrose (D50W) 25 g/ 50mL Intravenous Solution 25 g, 25 g, Intravenous, Q15 Min PRN, Brigitte Sandoval PA-C  •  dextrose (GLUTOSE) oral gel 15 g, 15 g, Oral, Q15 Min PRN, Brigitte Sandoval PA-C  •  dicyclomine (BENTYL) tablet 20 mg, 20 mg, Oral, Q8H PRN, Brigitte Sandoval PA-C  •  docusate sodium (COLACE) capsule 100 mg, 100 mg, Oral, BID PRN, Brigitte Sandoval PA-C  •  fludrocortisone tablet 100 mcg, 100 mcg, Oral, Daily, Terry Wiggins MD, 100 mcg at 10/07/19 1744  •  glucagon (human recombinant) (GLUCAGEN DIAGNOSTIC) injection 1 mg, 1 mg, Subcutaneous, Q15 Min PRN, Brigitte Sandoval PA-C  •  heparin (porcine) 5000 UNIT/ML injection 5,000 Units, 5,000 Units, Subcutaneous, Q12H, Brigitte Sandoval PA-C, 5,000 Units at 10/07/19 2212  •  HYDROcodone-acetaminophen (NORCO) 7.5-325 MG per tablet 1 tablet, 1 tablet, Oral, Q6H PRN, Malia Solis  MD Sang, 1 tablet at 10/07/19 2217  •  hydrocortisone sodium succinate (Solu-CORTEF) injection 100 mg, 100 mg, Intravenous, Q8H, Terry Wiggins MD, 100 mg at 10/08/19 0127  •  HYDROmorphone (DILAUDID) injection 0.5 mg, 0.5 mg, Intravenous, Q2H PRN, Malia Solis MD, 0.5 mg at 10/07/19 2335  •  Influenza Vac Subunit Quad (FLUCELVAX) injection 0.5 mL, 0.5 mL, Intramuscular, During Hospitalization, Catia Ortiz MD  •  insulin lispro (humaLOG) injection 0-7 Units, 0-7 Units, Subcutaneous, 4x Daily With Meals & Nightly, Brigitte Snadoval PA-C, 2 Units at 10/07/19 2211  •  lactulose (CHRONULAC) 10 GM/15ML solution 20 g, 20 g, Oral, TID, Brigitte Sandoval PA-C, 20 g at 10/07/19 2210  •  levothyroxine (SYNTHROID, LEVOTHROID) tablet 75 mcg, 75 mcg, Oral, Q AM, Brigitte Sandoval PA-C, 75 mcg at 10/08/19 0526  •  melatonin tablet 5 mg, 5 mg, Oral, Nightly PRN, Brigitte Sandoval PA-C  •  ondansetron (ZOFRAN) injection 4 mg, 4 mg, Intravenous, Q6H PRN, Brigitte Sandoval PA-C, 4 mg at 10/07/19 2212  •  pantoprazole (PROTONIX) EC tablet 40 mg, 40 mg, Oral, Daily, Brigitte Sandoval PA-C, 40 mg at 10/07/19 0821  •  prochlorperazine (COMPAZINE) injection 5 mg, 5 mg, Intravenous, Q6H PRN, 5 mg at 10/07/19 2053 **OR** prochlorperazine (COMPAZINE) tablet 5 mg, 5 mg, Oral, Q6H PRN **OR** prochlorperazine (COMPAZINE) suppository 25 mg, 25 mg, Rectal, Q12H PRN, Brigitte Sanodval PA-C  •  sodium chloride 0.9 % flush 10 mL, 10 mL, Intravenous, PRN, Mannie Chan MD  •  [COMPLETED] Insert peripheral IV, , , Once **AND** sodium chloride 0.9 % flush 10 mL, 10 mL, Intravenous, PRN, Kristan Chi, MATTHEW  •  sodium chloride 0.9 % flush 10 mL, 10 mL, Intravenous, Q12H, Brigitte Sandoval PA-C, 10 mL at 10/07/19 2224  •  sodium chloride 0.9 % flush 10 mL, 10 mL, Intravenous, PRN, Brigitte Sandoval PA-C  •  sodium chloride tablet 1 g, 1 g, Oral, Daily, Terry Wiggins MD, 1 g at 10/07/19 4053    PHYSICAL EXAMINATION:  "  /74 (BP Location: Left arm, Patient Position: Lying)   Pulse 86   Temp 97.8 °F (36.6 °C) (Oral)   Resp 16   Ht 154.9 cm (61\")   Wt 80.8 kg (178 lb 3.2 oz)   SpO2 99%   BMI 33.67 kg/m²    ECOG Performance Status: 1 - Symptomatic but completely ambulatory  GENERAL: Age appropriate. No acute distress.   NEURO/PSYCH: A&O x 3, strength 5/5 in all muscle groups  HEENT: Head atraumatic, normocephalic.   NECK: Supple. No JVD. No lymphadenopathy.   LUNGS: Clear to auscultation bilaterally. No wheezing. No rhonchi.   HEART: Regular rate and rhythm. S1, S2, no murmurs.   ABDOMEN: Soft, nontender, nondistended. Bowel sounds positive. No  hepatosplenomegaly.   EXTREMITIES: No clubbing, cyanosis, or edema.   SKIN: No rashes. No purpura.       Admission on 10/06/2019   Component Date Value Ref Range Status   • Glucose 10/06/2019 143* 65 - 99 mg/dL Final   • BUN 10/06/2019 23  8 - 23 mg/dL Final   • Creatinine 10/06/2019 1.60* 0.57 - 1.00 mg/dL Final   • Sodium 10/06/2019 116* 136 - 145 mmol/L Final   • Potassium 10/06/2019 4.9  3.5 - 5.2 mmol/L Final   • Chloride 10/06/2019 79* 98 - 107 mmol/L Final   • CO2 10/06/2019 23.0  22.0 - 29.0 mmol/L Final   • Calcium 10/06/2019 9.9  8.6 - 10.5 mg/dL Final   • Total Protein 10/06/2019 7.4  6.0 - 8.5 g/dL Final   • Albumin 10/06/2019 4.90  3.50 - 5.20 g/dL Final   • ALT (SGPT) 10/06/2019 12  1 - 33 U/L Final   • AST (SGOT) 10/06/2019 16  1 - 32 U/L Final    Specimen hemolyzed.  Results may be affected.   • Alkaline Phosphatase 10/06/2019 60  39 - 117 U/L Final   • Total Bilirubin 10/06/2019 0.4  0.2 - 1.2 mg/dL Final   • eGFR Non  Amer 10/06/2019 33* >60 mL/min/1.73 Final   • Globulin 10/06/2019 2.5  gm/dL Final   • A/G Ratio 10/06/2019 2.0  g/dL Final   • BUN/Creatinine Ratio 10/06/2019 14.4  7.0 - 25.0 Final   • Anion Gap 10/06/2019 14.0  5.0 - 15.0 mmol/L Final   • proBNP 10/06/2019 163.3  5.0 - 900.0 pg/mL Final   • Troponin T 10/06/2019 <0.010  0.000 - 0.030 " ng/mL Final   • Extra Tube 10/06/2019 hold for add-on   Final    Auto resulted   • Extra Tube 10/06/2019 Hold for add-ons.   Final    Auto resulted.   • Extra Tube 10/06/2019 hold for add-on   Final    Auto resulted   • Extra Tube 10/06/2019 Hold for add-ons.   Final    Auto resulted.   • WBC 10/06/2019 6.31  3.40 - 10.80 10*3/mm3 Final   • RBC 10/06/2019 2.82* 3.77 - 5.28 10*6/mm3 Final   • Hemoglobin 10/06/2019 8.9* 12.0 - 15.9 g/dL Final   • Hematocrit 10/06/2019 24.6* 34.0 - 46.6 % Final   • MCV 10/06/2019 87.2  79.0 - 97.0 fL Final   • MCH 10/06/2019 31.6  26.6 - 33.0 pg Final   • MCHC 10/06/2019 36.2* 31.5 - 35.7 g/dL Final   • RDW 10/06/2019 12.5  12.3 - 15.4 % Final   • RDW-SD 10/06/2019 40.3  37.0 - 54.0 fl Final   • MPV 10/06/2019 8.5  6.0 - 12.0 fL Final   • Platelets 10/06/2019 275  140 - 450 10*3/mm3 Final   • Neutrophil % 10/06/2019 66.2  42.7 - 76.0 % Final   • Lymphocyte % 10/06/2019 19.5* 19.6 - 45.3 % Final   • Monocyte % 10/06/2019 10.3  5.0 - 12.0 % Final   • Eosinophil % 10/06/2019 3.2  0.3 - 6.2 % Final   • Basophil % 10/06/2019 0.5  0.0 - 1.5 % Final   • Immature Grans % 10/06/2019 0.3  0.0 - 0.5 % Final   • Neutrophils, Absolute 10/06/2019 4.18  1.70 - 7.00 10*3/mm3 Final   • Lymphocytes, Absolute 10/06/2019 1.23  0.70 - 3.10 10*3/mm3 Final   • Monocytes, Absolute 10/06/2019 0.65  0.10 - 0.90 10*3/mm3 Final   • Eosinophils, Absolute 10/06/2019 0.20  0.00 - 0.40 10*3/mm3 Final   • Basophils, Absolute 10/06/2019 0.03  0.00 - 0.20 10*3/mm3 Final   • Immature Grans, Absolute 10/06/2019 0.02  0.00 - 0.05 10*3/mm3 Final   • nRBC 10/06/2019 0.0  0.0 - 0.2 /100 WBC Final   • Color, UA 10/06/2019 Yellow  Yellow, Straw Final   • Appearance, UA 10/06/2019 Cloudy* Clear Final   • pH, UA 10/06/2019 <=5.0  5.0 - 8.0 Final   • Specific Gravity, UA 10/06/2019 1.021  1.001 - 1.030 Final   • Glucose, UA 10/06/2019 Negative  Negative Final   • Ketones, UA 10/06/2019 Negative  Negative Final   • Bilirubin, UA  10/06/2019 Negative  Negative Final   • Blood, UA 10/06/2019 Negative  Negative Final   • Protein, UA 10/06/2019 Negative  Negative Final   • Leuk Esterase, UA 10/06/2019 Negative  Negative Final   • Nitrite, UA 10/06/2019 Negative  Negative Final   • Urobilinogen, UA 10/06/2019 1.0 E.U./dL  0.2 - 1.0 E.U./dL Final   • RBC, UA 10/06/2019 0-2  None Seen, 0-2 /HPF Final   • WBC, UA 10/06/2019 3-5* None Seen, 0-2 /HPF Final   • Bacteria, UA 10/06/2019 Trace  None Seen, Trace /HPF Final   • Squamous Epithelial Cells, UA 10/06/2019 7-12* None Seen, 0-2 /HPF Final   • Hyaline Casts, UA 10/06/2019 0-6  0 - 6 /LPF Final   • Methodology 10/06/2019 Automated Microscopy   Final   • Glucose 10/07/2019 129* 65 - 99 mg/dL Final   • BUN 10/07/2019 23  8 - 23 mg/dL Final   • Creatinine 10/07/2019 1.43* 0.57 - 1.00 mg/dL Final   • Sodium 10/07/2019 121* 136 - 145 mmol/L Final   • Potassium 10/07/2019 4.2  3.5 - 5.2 mmol/L Final   • Chloride 10/07/2019 86* 98 - 107 mmol/L Final   • CO2 10/07/2019 23.0  22.0 - 29.0 mmol/L Final   • Calcium 10/07/2019 8.7  8.6 - 10.5 mg/dL Final   • eGFR Non African Amer 10/07/2019 37* >60 mL/min/1.73 Final   • BUN/Creatinine Ratio 10/07/2019 16.1  7.0 - 25.0 Final   • Anion Gap 10/07/2019 12.0  5.0 - 15.0 mmol/L Final   • Uric Acid 10/07/2019 3.7  2.4 - 5.7 mg/dL Final    Falsely depressed results may occur on samples drawn from patients receiving N-Acetylcysteine (NAC) or Metamizole.   • WBC 10/07/2019 5.46  3.40 - 10.80 10*3/mm3 Final   • RBC 10/07/2019 2.65* 3.77 - 5.28 10*6/mm3 Final   • Hemoglobin 10/07/2019 8.1* 12.0 - 15.9 g/dL Final   • Hematocrit 10/07/2019 23.4* 34.0 - 46.6 % Final   • MCV 10/07/2019 88.3  79.0 - 97.0 fL Final   • MCH 10/07/2019 30.6  26.6 - 33.0 pg Final   • MCHC 10/07/2019 34.6  31.5 - 35.7 g/dL Final   • RDW 10/07/2019 12.6  12.3 - 15.4 % Final   • RDW-SD 10/07/2019 40.7  37.0 - 54.0 fl Final   • MPV 10/07/2019 8.7  6.0 - 12.0 fL Final   • Platelets 10/07/2019 253  140 -  450 10*3/mm3 Final   • TSH 10/07/2019 1.580  0.270 - 4.200 uIU/mL Final   • Sodium, Urine 10/07/2019 94  mmol/L Final   • Osmolality, Urine 10/07/2019 575  300-1,100 mOsm/kg Final   • Osmolality 10/06/2019 255* 275 - 295 mOsm/kg Final   • Cortisol 10/07/2019 3.93    mcg/dL Final   • Glucose 10/07/2019 122* 65 - 99 mg/dL Final   • BUN 10/07/2019 22  8 - 23 mg/dL Final   • Creatinine 10/07/2019 1.40* 0.57 - 1.00 mg/dL Final   • Sodium 10/07/2019 120* 136 - 145 mmol/L Final   • Potassium 10/07/2019 4.6  3.5 - 5.2 mmol/L Final   • Chloride 10/07/2019 84* 98 - 107 mmol/L Final   • CO2 10/07/2019 23.0  22.0 - 29.0 mmol/L Final   • Calcium 10/07/2019 9.2  8.6 - 10.5 mg/dL Final   • eGFR Non  Amer 10/07/2019 38* >60 mL/min/1.73 Final   • BUN/Creatinine Ratio 10/07/2019 15.7  7.0 - 25.0 Final   • Anion Gap 10/07/2019 13.0  5.0 - 15.0 mmol/L Final   • Glucose 10/07/2019 132* 65 - 99 mg/dL Final   • BUN 10/07/2019 19  8 - 23 mg/dL Final   • Creatinine 10/07/2019 1.39* 0.57 - 1.00 mg/dL Final   • Sodium 10/07/2019 117* 136 - 145 mmol/L Final   • Potassium 10/07/2019 4.3  3.5 - 5.2 mmol/L Final   • Chloride 10/07/2019 83* 98 - 107 mmol/L Final   • CO2 10/07/2019 19.0* 22.0 - 29.0 mmol/L Final   • Calcium 10/07/2019 9.1  8.6 - 10.5 mg/dL Final   • eGFR Non  Amer 10/07/2019 38* >60 mL/min/1.73 Final   • BUN/Creatinine Ratio 10/07/2019 13.7  7.0 - 25.0 Final   • Anion Gap 10/07/2019 15.0  5.0 - 15.0 mmol/L Final   • Glucose 10/07/2019 122  70 - 130 mg/dL Final   • Glucose 10/07/2019 134* 70 - 130 mg/dL Final   • Glucose 10/07/2019 186* 65 - 99 mg/dL Final   • BUN 10/07/2019 21  8 - 23 mg/dL Final   • Creatinine 10/07/2019 1.38* 0.57 - 1.00 mg/dL Final   • Sodium 10/07/2019 119* 136 - 145 mmol/L Final   • Potassium 10/07/2019 4.5  3.5 - 5.2 mmol/L Final   • Chloride 10/07/2019 85* 98 - 107 mmol/L Final   • CO2 10/07/2019 18.0* 22.0 - 29.0 mmol/L Final   • Calcium 10/07/2019 8.9  8.6 - 10.5 mg/dL Final   • eGFR Non   Amer 10/07/2019 39* >60 mL/min/1.73 Final   • BUN/Creatinine Ratio 10/07/2019 15.2  7.0 - 25.0 Final   • Anion Gap 10/07/2019 16.0* 5.0 - 15.0 mmol/L Final   • Glucose 10/07/2019 157* 70 - 130 mg/dL Final   • Glucose 10/08/2019 213* 65 - 99 mg/dL Final   • BUN 10/08/2019 23  8 - 23 mg/dL Final   • Creatinine 10/08/2019 1.41* 0.57 - 1.00 mg/dL Final   • Sodium 10/08/2019 120* 136 - 145 mmol/L Final   • Potassium 10/08/2019 4.7  3.5 - 5.2 mmol/L Final   • Chloride 10/08/2019 84* 98 - 107 mmol/L Final   • CO2 10/08/2019 19.0* 22.0 - 29.0 mmol/L Final   • Calcium 10/08/2019 9.3  8.6 - 10.5 mg/dL Final   • eGFR Non  Amer 10/08/2019 38* >60 mL/min/1.73 Final   • BUN/Creatinine Ratio 10/08/2019 16.3  7.0 - 25.0 Final   • Anion Gap 10/08/2019 17.0* 5.0 - 15.0 mmol/L Final   • Glucose 10/07/2019 193* 70 - 130 mg/dL Final   • WBC 10/08/2019 4.74  3.40 - 10.80 10*3/mm3 Final   • RBC 10/08/2019 2.56* 3.77 - 5.28 10*6/mm3 Final   • Hemoglobin 10/08/2019 8.0* 12.0 - 15.9 g/dL Final   • Hematocrit 10/08/2019 22.5* 34.0 - 46.6 % Final   • MCV 10/08/2019 87.9  79.0 - 97.0 fL Final   • MCH 10/08/2019 31.3  26.6 - 33.0 pg Final   • MCHC 10/08/2019 35.6  31.5 - 35.7 g/dL Final   • RDW 10/08/2019 12.8  12.3 - 15.4 % Final   • RDW-SD 10/08/2019 41.1  37.0 - 54.0 fl Final   • MPV 10/08/2019 8.3  6.0 - 12.0 fL Final   • Platelets 10/08/2019 239  140 - 450 10*3/mm3 Final       No results found.    ASSESSMENT: The patient is a very pleasant 64 y.o. female  with small cell lung cancer      PLAN:  1.  Small cell lung cancer: I will follow-up and CT chest from today.  Hyponatremia is better.  Patient might need to have whole-body PET scan to further evaluate her CT related abnormalities.  2.  Abdominal pain: Possibly induced by her lymphadenopathy.  Continue as needed opiates.  3.  Hyponatremia: Significantly better with IV fluid.  4.  Chronic kidney disease: Appreciate nephrology assistance.      Desmond Cardoso MD  10/8/2019

## 2019-10-08 NOTE — PROGRESS NOTES
[facility] Medicine Services  PROGRESS NOTE    Patient Name: Shauna Valencia  : 1955  MRN: [id number]    Date of Admission: 10/6/2019  Primary Care Physician: Vidya Chávez MD    Subjective   Subjective     CC:  LLQ pain    HPI:  Pt states that her LLQ pain is better after having BMs and flatus. Now complains of left chest wall/rib pain with deep inspiration. Also a burning sensation at the site. Daughter is at bedside.     Review of Systems  Gen- No fevers, chills  CV- No chest pain, palpitations  Resp- No cough, dyspnea  GI- No N/V/D, abd pain      All other systems reviewed and negative.     Objective   Objective     Vital Signs:   Temp:  [97.8 °F (36.6 °C)] 97.8 °F (36.6 °C)  Heart Rate:  [74-86] 86  Resp:  [16-18] 16  BP: (102-122)/(74-76) 102/74        Physical Exam:  Constitutional: No acute distress, awake, alert  HENT: NCAT, mucous membranes moist  Respiratory: Clear to auscultation bilaterally, respiratory effort normal   Cardiovascular: RRR, no murmurs, rubs, or gallops, palpable pedal pulses bilaterally  Gastrointestinal: Positive bowel sounds, soft, no tenderness to palpation, nondistended  Musculoskeletal: No bilateral ankle edema  Psychiatric: Appropriate affect, cooperative  Neurologic: Oriented x 3, strength symmetric in all extremities, Cranial Nerves grossly intact to confrontation, speech clear  Skin: No rashes    Results Reviewed:    Results from last 7 days   Lab Units 10/08/19  0655 10/07/19  0541 10/06/19  2039   WBC 10*3/mm3 4.74 5.46 6.31   HEMOGLOBIN g/dL 8.0* 8.1* 8.9*   HEMATOCRIT % 22.5* 23.4* 24.6*   PLATELETS 10*3/mm3 239 253 275     Results from last 7 days   Lab Units 10/08/19  0655 10/08/19  0027 10/07/19  1852  10/06/19  2039   SODIUM mmol/L 120* 120* 119*   < > 116*   POTASSIUM mmol/L 4.6 4.7 4.5   < > 4.9   CHLORIDE mmol/L 86* 84* 85*   < > 79*   CO2 mmol/L 22.0 19.0* 18.0*   < > 23.0   BUN mg/dL 23 23 21   < > 23   CREATININE mg/dL  1.36* 1.41* 1.38*   < > 1.60*   GLUCOSE mg/dL 197* 213* 186*   < > 143*   CALCIUM mg/dL 9.7 9.3 8.9   < > 9.9   ALT (SGPT) U/L  --   --   --   --  12   AST (SGOT) U/L  --   --   --   --  16   TROPONIN T ng/mL  --   --   --   --  <0.010   PROBNP pg/mL  --   --   --   --  163.3    < > = values in this interval not displayed.     Estimated Creatinine Clearance: 41.3 mL/min (A) (by C-G formula based on SCr of 1.36 mg/dL (H)).    Microbiology Results Abnormal     None          Imaging Results (last 24 hours)     Procedure Component Value Units Date/Time    CT Chest Without Contrast [[id number]] Updated:  10/07/19 2158               I have reviewed the medications:  Scheduled Meds:    aspirin 81 mg Oral Daily   carvedilol 6.25 mg Oral BID With Meals   cetirizine 5 mg Oral Daily   fludrocortisone 100 mcg Oral Daily   heparin (porcine) 5,000 Units Subcutaneous Q12H   hydrocortisone sodium succinate 100 mg Intravenous Q8H   insulin lispro 0-7 Units Subcutaneous 4x Daily With Meals & Nightly   lactulose 20 g Oral TID   levothyroxine 75 mcg Oral Q AM   pantoprazole 40 mg Oral Daily   sodium chloride 10 mL Intravenous Q12H   sodium chloride 1 g Oral Daily     Continuous Infusions:   PRN Meds:.•  acetaminophen **OR** acetaminophen **OR** acetaminophen  •  dextrose  •  dextrose  •  dicyclomine  •  docusate sodium  •  glucagon (human recombinant)  •  HYDROcodone-acetaminophen  •  HYDROmorphone  •  influenza vaccine  •  melatonin  •  ondansetron  •  prochlorperazine **OR** prochlorperazine **OR** prochlorperazine  •  sodium chloride  •  [COMPLETED] Insert peripheral IV **AND** sodium chloride  •  sodium chloride      Assessment/Plan   Assessment / Plan     Active Hospital Problems    Diagnosis  POA   • **Acute hyponatremia [E87.1]  Yes   • CAD (coronary artery disease) [I25.10]  Yes   • COPD (chronic obstructive pulmonary disease) (CMS/HCC) [J44.9]  Yes   • Hyponatremia [E87.1]  Yes   • CKD (chronic kidney disease) stage 3, GFR  30-59 ml/min (CMS/HCC) [N18.3]  Yes   • Generalized weakness [R53.1]  Yes   • Anemia [D64.9]  Yes   • Small cell lung cancer, left upper lobe (CMS/HCC) [C34.12]  Yes   • Hyperlipidemia [E78.5]  Yes   • Type 2 diabetes mellitus (CMS/HCC) [E11.9]  Yes   • Tobacco abuse [Z72.0]  Yes      Resolved Hospital Problems   No resolved problems to display.        Brief Hospital Course to date:  Shauna Valencia is a 64 y.o. female with a past medical history significant for DM2, Small cell lung cancer ( left lung), anemia, CKD, III, COPD, CAD, HTN, and HLD who presents with complaints progressively worsening left lower quadrant abdominal pain going on for the past month. Pt was found to be hyponatremic with Na+ of 116 on presentation.    Plan:    Acute hyponatremia:  - no AMS, seizure activity  - patient has a history of hyponatremia in 10/2018 found to be secondary to SIADH in the setting of new lung mass.  - was followed by nephrology and treated with samsca  - am cortisol LOW c/w adrenal insufficiency  --suspect multifactorial due to adrenal insufficiency (? Primary vs secondary) as well as SIADH   - trend chemistry every 4-6 hours  --NAL consulted, started hydrocortisone and fludrocortisone  --may need adrenal imaging specifically to ensure no mets- will see what Dr. Cardoso thinks. I suspect the whole body PET scan that Dr. Cardoso wants to have done would include adrenal imaging.       CAD:  -- s/p stent placement  -- stable. On ASA and statin, recently taken off Brilinta by her Cardiologist     COPD:  - continue home bronchodilating agents  - additional pulmonary toilet as needed     HTN:  - stable and controlled. Continue home meds     Anemia:  - H/H stable and at baseline. Improved from 9/19  - monitor     DM2:  - on SSI at home. Continue low level SSI as inpatient with scheduled accu checks     CKD III:  - has been followed by [facility]  - stable. Baseline around 1.9.   - monitor and avoid nephrotoxins     Anxiety  --will add PRN  benzo for time being.  Pt has been intolerant to Buspar in the past.     DVT Prophylaxis:  ALIS    Disposition: I expect the patient to be discharged TBD    CODE STATUS:   Code Status and Medical Interventions:   Ordered at: 10/06/19 9258     Level Of Support Discussed With:    Patient     Code Status:    CPR     Medical Interventions (Level of Support Prior to Arrest):    Full         Electronically signed by Malia Solis MD, 10/08/19, 8:15 AM.

## 2019-10-09 NOTE — PROGRESS NOTES
"   LOS: 2 days    Patient Care Team:  Vidya Chávez MD as PCP - General (General Practice)  Desmond Cardoso MD as Referring Physician (Hematology and Oncology)  Ramone Huggins MD as Consulting Physician (Radiation Oncology)  Ravi Still MD as Consulting Physician (Interventional Cardiology)  Cirilo Oquendo MD as Consulting Physician (Endocrinology)    Reason For Visit:    Subjective   Patient seen/examined. Doing ok, still with abd pain    Review of Systems:    Pulm: No soa   CV:  No CP      Objective       aspirin 81 mg Oral Daily   carvedilol 6.25 mg Oral BID With Meals   cetirizine 5 mg Oral Daily   fludrocortisone 100 mcg Oral Daily   heparin (porcine) 5,000 Units Subcutaneous Q12H   hydrocortisone sodium succinate 100 mg Intravenous Q8H   insulin lispro 0-7 Units Subcutaneous 4x Daily With Meals & Nightly   lactulose 20 g Oral TID   levothyroxine 75 mcg Oral Q AM   pantoprazole 40 mg Oral Daily   sodium chloride 10 mL Intravenous Q12H   sodium chloride 1 g Oral TID With Meals            Vital Signs:  Blood pressure 150/77, pulse 81, temperature 97.7 °F (36.5 °C), temperature source Oral, resp. rate 20, height 154.9 cm (61\"), weight 80.8 kg (178 lb 3.2 oz), SpO2 99 %.    Flowsheet Rows      First Filed Value   Admission Height  154.9 cm (61\") Documented at 10/06/2019 1931   Admission Weight  82.1 kg (181 lb) Documented at 10/06/2019 1931          10/08 0701 - 10/09 0700  In: 660 [P.O.:660]  Out: 700 [Urine:700]    Physical Exam:    General Appearance: NAD, alert and cooperative, Ox3  Eyes: PER, conjunctivae and sclerae normal, no icterus  Lungs: respirations regular and unlabored, no crepitus, clear to auscultation  Heart/CV: regular rhythm & normal rate, no murmur, no gallop, no rub and no edema  Abdomen: not distended, soft, non-tender, no masses,  bowel sounds present obese  Skin: No rash, Warm and dry     Radiology:      Renal Imaging:        Labs:  Results from last 7 " days   Lab Units 10/09/19  0410 10/08/19  0655 10/07/19  0541   WBC 10*3/mm3 7.67 4.74 5.46   HEMOGLOBIN g/dL 7.7* 8.0* 8.1*   HEMATOCRIT % 22.4* 22.5* 23.4*   PLATELETS 10*3/mm3 266 239 253     Results from last 7 days   Lab Units 10/09/19  0508 10/09/19  0032 10/08/19  1743 10/08/19  1159  10/06/19  2039   SODIUM mmol/L 125* 124* 123* 120*   < > 116*   POTASSIUM mmol/L 4.5 4.4 4.5 4.7   < > 4.9   CHLORIDE mmol/L 86* 86* 87* 84*   < > 79*   CO2 mmol/L 23.0 23.0 24.0 20.0*   < > 23.0   BUN mg/dL 24* 25* 26* 23   < > 23   CREATININE mg/dL 1.42* 1.43* 1.44* 1.31*   < > 1.60*   CALCIUM mg/dL 9.6 9.9 10.0 9.4   < > 9.9   PHOSPHORUS mg/dL 3.8  --   --   --   --   --    ALBUMIN g/dL 4.60  --   --   --   --  4.90    < > = values in this interval not displayed.     Results from last 7 days   Lab Units 10/09/19  0508   GLUCOSE mg/dL 290*       Results from last 7 days   Lab Units 10/06/19  2039   ALK PHOS U/L 60   BILIRUBIN mg/dL 0.4   ALT (SGPT) U/L 12   AST (SGOT) U/L 16           Results from last 7 days   Lab Units 10/06/19  2132   COLOR UA  Yellow   CLARITY UA  Cloudy*   PH, URINE  <=5.0   SPECIFIC GRAVITY, URINE  1.021   GLUCOSE UA  Negative   KETONES UA  Negative   BILIRUBIN UA  Negative   PROTEIN UA  Negative   BLOOD UA  Negative   LEUKOCYTES UA  Negative   NITRITE UA  Negative       Estimated Creatinine Clearance: 39.6 mL/min (A) (by C-G formula based on SCr of 1.42 mg/dL (H)).      Assessment       Acute hyponatremia    Hyperlipidemia    Type 2 diabetes mellitus (CMS/HCC)    Tobacco abuse    Small cell lung cancer, left upper lobe (CMS/HCC)    Anemia    Generalized weakness    CKD (chronic kidney disease) stage 3, GFR 30-59 ml/min (CMS/HCC)    CAD (coronary artery disease)    COPD (chronic obstructive pulmonary disease) (CMS/HCC)    Hyponatremia            Impression:     Hyponatremia  -better, I do not think she had been following a fluid restriction, sodium now starting to improve  - looks like ADH mechanism  -  start 1.5 L Fluid restricition, increase salt tabs to TID   - decrease ensure to once a day  - hold tolvaptan currently     Hypocortisolism   - Morning cortisol 3.9 indicative of adrenal insufficiency. Her symptoms of Abdominal pain, nausea vomiting might be related to AI   - Questionable primary vs secondary AI   - wean cortef to 50 q 12 hours, continue fludrocortisone 0.1mcg daily      Recommendations:       Martha Mccracken,   10/09/19  12:20 PM

## 2019-10-09 NOTE — PROGRESS NOTES
[facility] Medicine Services  PROGRESS NOTE    Patient Name: Shauna Valencia  : 1955  MRN: [id number]    Date of Admission: 10/6/2019  Primary Care Physician: Vidya Chávez MD    Subjective   Subjective     CC:  LLQ pain    HPI:  Pt didn't sleep overnight due to pain- lost IV access, so RN was unable to give her IV pain medication for a bit until U/S guided IV could be placed.    Review of Systems  Gen- No fevers, chills  CV- No chest pain, palpitations  Resp- No cough, dyspnea  GI- No N/V/D, abd pain      All other systems reviewed and negative.     Objective   Objective     Vital Signs:   Temp:  [97.7 °F (36.5 °C)-98.1 °F (36.7 °C)] 97.7 °F (36.5 °C)  Heart Rate:  [] 100  Resp:  [18-22] 20  BP: (110-164)/(75-77) 150/77        Physical Exam:  Constitutional: No acute distress, awake, but falling asleep sitting up in chair  HENT: NCAT, mucous membranes moist  Respiratory: Clear to auscultation bilaterally, respiratory effort normal   Cardiovascular: RRR, no murmurs, rubs, or gallops, palpable pedal pulses bilaterally  Gastrointestinal: Positive bowel sounds, soft, no tenderness to palpation, nondistended  Musculoskeletal: No bilateral ankle edema  Psychiatric: Appropriate affect, cooperative  Neurologic: Oriented x 3, strength symmetric in all extremities, Cranial Nerves grossly intact to confrontation, speech clear  Skin: No rashes    Results Reviewed:    Results from last 7 days   Lab Units 10/09/19  0410 10/08/19  0655 10/07/19  0541   WBC 10*3/mm3 7.67 4.74 5.46   HEMOGLOBIN g/dL 7.7* 8.0* 8.1*   HEMATOCRIT % 22.4* 22.5* 23.4*   PLATELETS 10*3/mm3 266 239 253     Results from last 7 days   Lab Units 10/09/19  0508 10/09/19  0032 10/08/19  1743  10/06/19  2039   SODIUM mmol/L 125* 124* 123*   < > 116*   POTASSIUM mmol/L 4.5 4.4 4.5   < > 4.9   CHLORIDE mmol/L 86* 86* 87*   < > 79*   CO2 mmol/L 23.0 23.0 24.0   < > 23.0   BUN mg/dL 24* 25* 26*   < > 23   CREATININE  mg/dL 1.42* 1.43* 1.44*   < > 1.60*   GLUCOSE mg/dL 290* 276* 226*   < > 143*   CALCIUM mg/dL 9.6 9.9 10.0   < > 9.9   ALT (SGPT) U/L  --   --   --   --  12   AST (SGOT) U/L  --   --   --   --  16   TROPONIN T ng/mL  --   --   --   --  <0.010   PROBNP pg/mL  --   --   --   --  163.3    < > = values in this interval not displayed.     Estimated Creatinine Clearance: 39.6 mL/min (A) (by C-G formula based on SCr of 1.42 mg/dL (H)).    Microbiology Results Abnormal     None          Imaging Results (last 24 hours)     Procedure Component Value Units Date/Time    CT Chest Without Contrast [[id number]] Collected:  10/08/19 0823     Updated:  10/08/19 0950    Narrative:       EXAMINATION: CT CHEST WO CONTRAST-10/07/2019:      INDICATION: Lung cancer; E87.5-Fgcj-[id number] and hyponatremia;  R53.1-Weakness; R10.32-Left lower quadrant pain; N18.9-Chronic kidney  disease, unspecified; D64.9-Anemia, unspecified.     TECHNIQUE: 5 mm unenhanced images through the chest.     The radiation dose reduction device was turned on for each scan per the  ALARA (As Low as Reasonably Achievable) protocol.     COMPARISON: 08/27/2019 chest CT scan.     FINDINGS: Linear scarring in the left suprahilar region of the left  upper lobe, and somewhat more discoid scarring in the superior segment  of the left lower lobe, both appear unchanged. There is no evidence of  new pulmonary parenchymal disease. Trace pleural thickening in the  posterior left mid chest is similar to the prior study. There is trace  pericardial effusion. No significant mediastinal adenopathy is  identified.     Included images of the upper abdomen show interval development of a left  retrocrural nodule, possibly invading the left diaphragmatic rojas  itself, and measuring approximately 3.1 x 2.7 mm. Previously, this was  seen as a slightly thickened crural base, 15 mm in diameter. There is  adjacent, new soft tissue thickening along the left lateral margin of  the T12 vertebral  body, over an approximately 2.5 x 11.2 cm area. There  is now a left paraspinous mass adjacent to T10, approximately 3.3 cm in  diameter. No pathologic paraspinous mass is seen elsewhere. Separately,  there is now a round 3 cm soft tissue mass interposed between the distal  esophagus, and the aorta, apparently an enlarging lymph node. This  previously appeared to be a part of the distal esophagus, but was  apparently a separate 15 mm nodule on the prior scan. No gross  abnormalities are seen of the included portions of the liver, spleen,  pancreatic tail, adrenal glands, or upper renal poles.       Impression:       1. Stable left perihilar lung scarring compared to 08/27/2019 exam. No  new disease is seen in the thorax.  2. Interval development of left retrocrural, left lower thoracic  paraspinous and left lower paraesophageal masses, consistent with  metastatic disease.     D:  10/08/2019  E:  10/08/2019                        I have reviewed the medications:  Scheduled Meds:    aspirin 81 mg Oral Daily   carvedilol 6.25 mg Oral BID With Meals   cetirizine 5 mg Oral Daily   fludrocortisone 100 mcg Oral Daily   heparin (porcine) 5,000 Units Subcutaneous Q12H   hydrocortisone sodium succinate 100 mg Intravenous Q8H   insulin lispro 0-7 Units Subcutaneous 4x Daily With Meals & Nightly   lactulose 20 g Oral TID   levothyroxine 75 mcg Oral Q AM   pantoprazole 40 mg Oral Daily   sodium chloride 10 mL Intravenous Q12H   sodium chloride 1 g Oral TID With Meals     Continuous Infusions:   PRN Meds:.•  acetaminophen **OR** acetaminophen **OR** acetaminophen  •  ALPRAZolam  •  dextrose  •  dextrose  •  dicyclomine  •  docusate sodium  •  glucagon (human recombinant)  •  HYDROcodone-acetaminophen  •  HYDROmorphone  •  influenza vaccine  •  melatonin  •  ondansetron  •  prochlorperazine **OR** prochlorperazine **OR** prochlorperazine  •  sodium chloride  •  [COMPLETED] Insert peripheral IV **AND** sodium chloride  •  sodium  chloride      Assessment/Plan   Assessment / Plan     Active Hospital Problems    Diagnosis  POA   • **Acute hyponatremia [E87.1]  Yes   • CAD (coronary artery disease) [I25.10]  Yes   • COPD (chronic obstructive pulmonary disease) (CMS/[facility]) [J44.9]  Yes   • Hyponatremia [E87.1]  Yes   • CKD (chronic kidney disease) stage 3, GFR 30-59 ml/min (CMS/[facility]) [N18.3]  Yes   • Generalized weakness [R53.1]  Yes   • Anemia [D64.9]  Yes   • Small cell lung cancer, left upper lobe (CMS/[facility]) [C34.12]  Yes   • Hyperlipidemia [E78.5]  Yes   • Type 2 diabetes mellitus (CMS/[facility]) [E11.9]  Yes   • Tobacco abuse [Z72.0]  Yes      Resolved Hospital Problems   No resolved problems to display.        Brief Hospital Course to date:  Shauna Valencia is a 64 y.o. female with a past medical history significant for DM2, Small cell lung cancer ( left lung), anemia, CKD, III, COPD, CAD, HTN, and HLD who presents with complaints progressively worsening left lower quadrant abdominal pain going on for the past month. Pt was found to be hyponatremic with Na+ of 116 on presentation.    Plan:    Acute hyponatremia:  - no AMS, seizure activity  - patient has a history of hyponatremia in 10/2018 found to be secondary to SIADH in the setting of new lung mass.  - was followed by nephrology and treated with samsca  - am cortisol LOW c/w adrenal insufficiency  --suspect multifactorial due to adrenal insufficiency (? Primary vs secondary) as well as SIADH   - trend chemistry every 4-6 hours  --NAL consulted, started hydrocortisone and fludrocortisone     CAD:  -- s/p stent placement  -- stable. On ASA and statin, recently taken off Brilinta by her Cardiologist     COPD:  - continue home bronchodilating agents  - additional pulmonary toilet as needed     HTN:  - stable and controlled. Continue home meds     Anemia:  - H/H stable and at baseline. Improved from 9/19  - monitor     DM2:  - on SSI at home. Continue low level SSI as inpatient with scheduled accu  checks     CKD III:  - has been followed by NAL  - stable. Baseline around 1.9.   - monitor and avoid nephrotoxins     Anxiety  --PRN benzo for time being.  Pt has been intolerant to Buspar in the past.     DVT Prophylaxis:  ALIS    Disposition: I expect the patient to be discharged TBD    CODE STATUS:   Code Status and Medical Interventions:   Ordered at: 10/06/19 1855     Level Of Support Discussed With:    Patient     Code Status:    CPR     Medical Interventions (Level of Support Prior to Arrest):    Full         Electronically signed by Malia Solis MD, 10/09/19, 8:31 AM.

## 2019-10-09 NOTE — PLAN OF CARE
Problem: Patient Care Overview  Goal: Plan of Care Review  Outcome: Ongoing (interventions implemented as appropriate)   10/09/19 1029   Coping/Psychosocial   Plan of Care Reviewed With patient;daughter   Plan of Care Review   Progress improving   OTHER   Outcome Summary Presents w/ evolving sympt, incl. low HGB (9.7), hyponat.,dehyd., severe L lower abdom pain, mech. fall PTA, decr. strength/endurance & impaired funct mobil; able to amb 110 ft w/ Rwx & min A to guide, w/ 2 stand.rests, but limited by incr. abdom pain(pt guarding abdom wall/moaning freq),fatigue, signif elev DBP, & nausea; recommend home w/ dtr's asst. & HH f/u at d/c

## 2019-10-09 NOTE — PROGRESS NOTES
Continued Stay Note  [facility]     Patient Name: Shauna Valencia  MRN: [id number]  Today's Date: 10/9/2019    Admit Date: 10/6/2019    Discharge Plan     Row Name 10/09/19 1408       Plan    Plan  SNF    Patient/Family in Agreement with Plan  yes    Plan Comments  Spoke with patient and her daughter.  They would like short term skilled nursing at discharge.  Daughter chose [facility].  Called referral to Belinda Waggoner at [facility].  Expect patient to be medically ready for discharge in a couple days.      Final Discharge Disposition Code  03 - skilled nursing facility (SNF)        Discharge Codes    No documentation.       Expected Discharge Date and Time     Expected Discharge Date Expected Discharge Time    Oct 9, 2019             Annita Hussein RN

## 2019-10-09 NOTE — PLAN OF CARE
Problem: Fall Risk (Adult)  Goal: Absence of Fall  Outcome: Ongoing (interventions implemented as appropriate)   10/08/19 1639   Fall Risk (Adult)   Absence of Fall making progress toward outcome       Problem: Patient Care Overview  Goal: Plan of Care Review  Outcome: Ongoing (interventions implemented as appropriate)   10/08/19 0345 10/08/19 2000   Coping/Psychosocial   Plan of Care Reviewed With --  patient;family   Plan of Care Review   Progress no change --      Goal: Discharge Needs Assessment   10/07/19 1616   Disability   Equipment Currently Used at Home walker, standard;commode   Discharge Needs Assessment   Concerns to be Addressed discharge planning   Patient/Family Anticipates Transition to home   Patient/Family Anticipated Services at Transition none   Transportation Concerns car, none   Transportation Anticipated family or friend will provide       Problem: Syncope (Adult)  Goal: Physical Safety/Health Maintenance  Outcome: Ongoing (interventions implemented as appropriate)   10/08/19 1639   Syncope (Adult)   Physical Safety/Health Maintenance making progress toward outcome     Goal: Optimal Emotional/Functional [place]  Outcome: Ongoing (interventions implemented as appropriate)   10/08/19 1639   Syncope (Adult)   Optimal Emotional/Functional [place] making progress toward outcome

## 2019-10-09 NOTE — THERAPY EVALUATION
Patient Name: Shauna Valencia  : 1955    MRN: [id number]                              Today's Date: 10/9/2019       Admit Date: 10/6/2019    Visit Dx:     ICD-10-CM ICD-9-CM   1. Hyponatremia E87.1 276.1   2. Weakness R53.1 780.79   3. Left lower quadrant abdominal pain R10.32 789.04   4. Chronic renal failure, unspecified CKD stage N18.9 585.9   5. Anemia, unspecified type D64.9 285.9   6. Impaired functional mobility, balance, gait, and endurance Z74.09 V49.89     Patient Active Problem List   Diagnosis   • Acute hyponatremia   • Hyperlipidemia   • Type 2 diabetes mellitus (CMS/HCC)   • Tobacco abuse   • Obesity (BMI 30-39.9)   • Mass of upper lobe of left lung   • Mediastinal lymphadenopathy   • Small cell lung cancer, left upper lobe (CMS/HCC)   • Dehydration   • Anemia   • Hyperkalemia   • Symptomatic anemia   • Generalized weakness   • Nausea & vomiting   • CKD (chronic kidney disease) stage 3, GFR 30-59 ml/min (CMS/HCC)   • Hypomagnesemia   • Hypocalcemia   • CAD (coronary artery disease)   • COPD (chronic obstructive pulmonary disease) (CMS/HCC)   • Hyponatremia     Past Medical History:   Diagnosis Date   • Arthritis    • Asthma    • COPD (chronic obstructive pulmonary disease) (CMS/HCC)    • Coronary artery disease     5 stents   • Diabetes mellitus (CMS/HCC)    • Disease of thyroid gland    • DVT (deep venous thrombosis) (CMS/HCC)     this year- per pt   • GERD (gastroesophageal reflux disease)    • Hyperlipidemia    • Hypertension    • Lung cancer (CMS/HCC)    • Myocardial infarction (CMS/HCC)    • Pancreatitis    • Pancreatitis    • Renal disorder      Past Surgical History:   Procedure Laterality Date   • APPENDECTOMY     • BRONCHOSCOPY N/A 2018    Procedure: BRONCHOSCOPY WITH ENDOBRONCHIAL ULTRASOUND, biopsies, brushing and washing.;  Surgeon: Isaac Epstein MD;  Location: [facility] ENDOSCOPY;  Service: Pulmonary   • BRONCHOSCOPY N/A 2018    Procedure: BRONCHOSCOPY WITH ENDOBRONCHIAL  "ULTRASOUND;  Surgeon: Clay Scott MD;  Location: [facility] ENDOSCOPY;  Service: Pulmonary   • CAROTID STENT      5 total   • CHOLECYSTECTOMY     • COLON SURGERY     • COLONOSCOPY     • HERNIA REPAIR     • HYSTERECTOMY     • OTHER SURGICAL HISTORY      bladder sling   • THYROIDECTOMY      1994   • TONSILLECTOMY     • TOTAL HIP ARTHROPLASTY Left    • UPPER GASTROINTESTINAL ENDOSCOPY       General Information     Row Name 10/09/19 0835          PT Evaluation Time/Intention    Document Type  evaluation  -DM     Row Name 10/09/19 0835          General Information    Patient Profile Reviewed?  yes  -DM     Prior Level of Function  independent:;all household mobility;community mobility;gait;transfer;bed mobility;ADL's;home management;cooking;cleaning;shopping;driving;using stairs;dependent:;yard work indep gt w/o AD;Cousin does ydwk  -DM     Existing Precautions/Restrictions  fall;other (see comments) h/o L lung CA(Chemo,rad.rx)& R THR 1/'16;fell 10-6;used o2 during night  -DM     Barriers to Rehab  cognitive status per pt, \"they said the low Na+ caused the conf.\"  -DM     Row Name 10/09/19 0835          Relationship/Environment    Lives With  alone  -DM     Row Name 10/09/19 0835          Resource/Environmental Concerns    Current Living Arrangements  home/apartment/condo 1-st. w/ 1 step to den  -DM     Row Name 10/09/19 0835          Home Main Entrance    Number of Stairs, Main Entrance  one  -DM     Stair Railings, Main Entrance  none  -DM     Row Name 10/09/19 0835          Stairs Within Home, Primary    Number of Stairs, Within Home, Primary  one  -DM     Stair Railings, Within Home, Primary  none  -DM     Row Name 10/09/19 0835          Cognitive Assessment/Intervention- PT/OT    Orientation Status (Cognition)  oriented to;person;place \"no sleep till 5 am;groggy\"  -DM     Row Name 10/09/19 0835          Safety Issues, Functional Mobility    Safety Issues Affecting Function (Mobility)  ability to follow " commands;insight into deficits/self awareness;safety precaution awareness;safety precautions follow-through/compliance  -DM     Impairments Affecting Function (Mobility)  cognition;pain;postural/trunk control;strength  -DM       User Key  (r) = Recorded By, (t) = Taken By, (c) = Cosigned By    Initials Name Provider Type    DM Urmila Scott, PT Physical Therapist        Mobility     Row Name 10/09/19 08          Bed Mobility Assessment/Treatment    Bed Mobility Assessment/Treatment  rolling left;scooting/bridging;supine-sit  -DM     Rolling Left [place] (Bed Mobility)  conditional independence;verbal cues  -DM     Scooting/Bridging [place] (Bed Mobility)  conditional independence  -DM     Supine-Sit [place] (Bed Mobility)  minimum assist (75% patient effort);verbal cues  -DM     Assistive Device (Bed Mobility)  bed rails;head of bed elevated  -DM     Comment (Bed Mobility)  mult delays for nsg proced, assessment by primary nsg & student nurse,belly inject. & meds given while PT completing eval;cues for HP during transf to EOB  -DM     Row Name 10/09/19 0835          Transfer Assessment/Treatment    Comment (Transfers)  cues for seq.  -DM     Row Name 10/09/19 0835          Sit-Stand Transfer    Sit-Stand [place] (Transfers)  contact guard cues to utilize L bedrail  -DM     Assistive Device (Sit-Stand Transfers)  other (see comments) gt belt; did not utilize R wx for sit to stand, but req. cueing to release grasp on AD/reach for chair armrests p/t sitting UIC  -DM     Row Name 10/09/19 0835          Gait/Stairs Assessment/Training    Gait/Stairs Assessment/Training  gait/ambulation independence  -DM     [place] Level (Gait)  minimum assist (75% patient effort);verbal cues Min A to guide R wx on turns,around FOB;2 stand.rests  -DM     Assistive Device (Gait)  walker, front-wheeled  -DM     Distance in Feet (Gait)  110  -DM     Pattern (Gait)  step-through  -DM     Deviations/Abnormal  Patterns (Gait)  antalgic;base of support, narrow;tyrone decreased;stride length decreased decr. step length;Incr. pain L abdom.w/ swing through LLE(pt guarding area occ.); decl. mathis chair; transf to recliner to rest ; immed.dozed off;PT awakened to perform LE exer  -DM     Bilateral Gait Deviations  forward flexed posture;heel strike decreased;weight shift ability decreased  -DM     Comment (Gait/Stairs)  slow tyrone d/t pain incr. 10/10,despite premed w/ dilaudid for pain & compazine for nausea;cues for incr step length, PLB, trunk ext/focusing ahead  -DM       User Key  (r) = Recorded By, (t) = Taken By, (c) = Cosigned By    Initials Name Provider Type    DM Urmila Scott, PT Physical Therapist        Obj/Interventions     Row Name 10/09/19 08          General ROM    GENERAL ROM COMMENTS  B hip flex/abd limited ( L by 15-20% d/t L abdom pain; R by 10-15 % d/t prior THR)  -DM     Row Name 10/09/19 0835          MMT (Manual Muscle Testing)    General MMT Comments  L hip grossly 2+/5 (MMT limited by severe L lower abdom pain); R hip grossly 3-/5  -DM     Row Name 10/09/19 0835          Therapeutic Exercise    Lower Extremity (Therapeutic Exercise)  gluteal sets;heel slides, bilateral;LAQ (long arc quad), bilateral;quad sets, bilateral;SAQ (short arc quad), bilateral  -DM     Lower Extremity Range of Motion (Therapeutic Exercise)  hip flexion/extension, right;hip abduction/adduction, bilateral;hip internal/external rotation, bilateral;ankle dorsiflexion/plantar flexion, bilateral def. L hip marches EOB d/t L Abdom pain  -DM     Exercise Type (Therapeutic Exercise)  AAROM (active assistive range of motion);AROM (active range of motion);isometric contraction, static  -DM     Position (Therapeutic Exercise)  seated;supine  -DM     Sets/Reps (Therapeutic Exercise)  1/10  -DM     Expected Outcome (Therapeutic Exercise)  improve functional stability;improve functional tolerance, single extremity activity  -DM      Comment (Therapeutic Exercise)  rests btwn sets d/t pain, fatigue  -DM     Row Name 10/09/19 0835          Static Sitting Balance    Level of [place] (Unsupported Sitting, Static Balance)  supervision  -DM     Sitting Position (Unsupported Sitting, Static Balance)  sitting on edge of bed  -DM     Time Able to Maintain Position (Unsupported Sitting, Static Balance)  2 to 3 minutes  -DM     Comment (Unsupported Sitting, Static Balance)  LE exer; PLB  -DM     Row Name 10/09/19 0835          Dynamic Sitting Balance    Level of [place], Reaches Outside Midline (Sitting, Dynamic Balance)  supervision  -DM     Sitting Position, Reaches Outside Midline (Sitting, Dynamic Balance)  sitting on edge of bed;sitting in chair  -DM     Comment, Reaches Outside Midline (Sitting, Dynamic Balance)  recip scooting  -DM     Row Name 10/09/19 0835          Static Standing Balance    Level of [place] (Supported Standing, Static Balance)  contact guard assist  -DM     Time Able to Maintain Position (Supported Standing, Static Balance)  45 to 60 seconds  -DM     Assistive Device Utilized (Supported Standing, Static Balance)  walker, rolling  -DM     Comment (Supported Standing, Static Balance)  trunk ext/focusing ahead in mirror  -DM     Row Name 10/09/19 0835          Dynamic Standing Balance    Level of [place], Reaches Outside Midline (Standing, Dynamic Balance)  contact guard assist  -DM     Time Able to Maintain Position, Reaches Outside Midline (Standing, Dynamic Balance)  30 to 45 seconds  -DM     Assistive Device Utilized (Supported Standing, Dynamic Balance)  walker, rolling  -DM     Comment, Reaches Outside Midline (Standing, Dynamic Balance)  WS to init. sidesteps to chair  -DM       User Key  (r) = Recorded By, (t) = Taken By, (c) = Cosigned By    Initials Name Provider Type    DM Urmila Scott, PT Physical Therapist        Goals/Plan     Row Name 10/09/19 0835          Bed Mobility Goal 1 (PT)     Activity/Assistive Device (Bed Mobility Goal 1, PT)  bed mobility activities, all  -DM     Andes Level/Cues Needed (Bed Mobility Goal 1, PT)  independent  -DM     Time Frame (Bed Mobility Goal 1, PT)  long term goal (LTG);1 week  -DM     Row Name 10/09/19 1025 10/09/19 0835       Transfer Goal 1 (PT)    Activity/Assistive Device (Transfer Goal 1, PT)  cane, quad  -DM  sit-to-stand/stand-to-sit;bed-to-chair/chair-to-bed;toilet;cane, straight  -DM    [place] Level/Cues Needed (Transfer Goal 1, PT)  --  independent  -DM    Time Frame (Transfer Goal 1, PT)  --  long term goal (LTG);1 week  -DM    Row Name 10/09/19 0835          Gait Training Goal 1 (PT)    Activity/Assistive Device (Gait Training Goal 1, PT)  gait (walking locomotion);cane, straight stable VS  -DM     Andes Level (Gait Training Goal 1, PT)  independent  -DM     Distance (Gait Goal 1, PT)  250  -DM     Time Frame (Gait Training Goal 1, PT)  long term goal (LTG);1 week  -DM     Row Name 10/09/19 0835          Stairs Goal 1 (PT)    Activity/Assistive Device (Stairs Goal 1, PT)  stairs, all skills;cane, straight  -DM     And[place] Level/Cues Needed (Stairs Goal 1, PT)  independent  -DM     Number of Stairs (Stairs Goal 1, PT)  1  -DM     Time Frame (Stairs Goal 1, PT)  long term goal (LTG);1 week  -DM     Row Name 10/09/19 0835          Patient Education Goal (PT)    Activity (Patient Education Goal, PT)  HEP exer  -DM     Andes/Cues/Accuracy (Memory Goal 2, PT)  demonstrates adequately;verbalizes understanding  -DM     Time Frame (Patient Education Goal, PT)  1 week  -DM       User Key  (r) = Recorded By, (t) = Taken By, (c) = Cosigned By    Initials Name Provider Type    Urmila Alanis, PT Physical Therapist        Clinical Impression     Row Name 10/09/19 0835          Pain Assessment    Additional Documentation  Pain Scale: Numbers Pre/Post-Treatment (Group)  -DM     Row Name 10/09/19 0835          Pain Scale: Numbers  Pre/Post-Treatment    Pain Scale: Numbers, Pretreatment  9/10  -DM     Pain Scale: Numbers, Post-Treatment  10/10  -DM     Pain Location - Side  Left  -DM     Pain Location - Orientation  lower  -DM     Pain Location  abdomen  -DM     Pre/Post Treatment Pain Comment  premed w/ dilaudid (pain med) & compazine (nausea)  -DM     Pain Intervention(s)  Medication (See MAR);Repositioned;Elevated;Rest  -DM     Row Name 10/09/19 0835          Plan of Care Review    Plan of Care Reviewed With  patient;daughter  -DM     Row Name 10/09/19 0835          Physical Therapy Clinical Impression    Patient/Family Goals Statement (PT Clinical Impression)  incr. strength/endurance & improved funct mobil  -DM     Criteria for Skilled Interventions Met (PT Clinical Impression)  yes;treatment indicated  -DM     Rehab Potential (PT Clinical Summary)  good, to achieve stated therapy goals  -DM     Row Name 10/09/19 0835          Vital Signs    Pre Systolic BP Rehab  150  -DM     Pre Treatment Diastolic BP  77  -DM     Post Systolic BP Rehab  145  -DM     Post Treatment Diastolic BP  99  -DM     Pretreatment Heart Rate (beats/min)  93  -DM     Intratreatment Heart Rate (beats/min)  100  -DM     Posttreatment Heart Rate (beats/min)  85  -DM     Pre SpO2 (%)  97  -DM     O2 Delivery Pre Treatment  room air  -DM     Intra SpO2 (%)  96  -DM     O2 Delivery Intra Treatment  room air  -DM     Post SpO2 (%)  97  -DM     O2 Delivery Post Treatment  room air  -DM     Pre Patient Position  Supine  -DM     Intra Patient Position  Standing  -DM     Post Patient Position  Sitting  -DM     Rest Breaks   2  -DM     Row Name 10/09/19 0835          Positioning and Restraints    Pre-Treatment Position  in bed  -DM     Post Treatment Position  chair  -DM     In Chair  notified nsg;reclined;call light within reach;encouraged to call for assist;exit alarm on;with family/caregiver;waffle cushion;legs elevated  -DM       User Key  (r) = Recorded By, (t) = Taken  By, (c) = Cosigned By    Initials Name Provider Type    Urmila Alanis, PT Physical Therapist        Outcome Measures     Row Name 10/09/19 0835          How much help from another person do you currently need...    Turning from your back to your side while in flat bed without using bedrails?  4  -DM     Moving from lying on back to sitting on the side of a flat bed without bedrails?  3  -DM     Moving to and from a bed to a chair (including a wheelchair)?  3  -DM     Standing up from a chair using your arms (e.g., wheelchair, bedside chair)?  3  -DM     Climbing 3-5 steps with a railing?  3  -DM     To walk in hospital room?  3  -DM     AM-PAC 6 Clicks Score (PT)  19  -DM     Row Name 10/09/19 0835          Functional Assessment    Outcome Measure Options  AM-PAC 6 Clicks Basic Mobility (PT)  -DM       User Key  (r) = Recorded By, (t) = Taken By, (c) = Cosigned By    Initials Name Provider Type    Urmila Alanis, PT Physical Therapist        Physical Therapy Education     Title: PT OT SLP Therapies (In Progress)     Topic: Physical Therapy (In Progress)     Point: Mobility training (In Progress)     Learning Progress Summary           Patient Eager, E,D, NR by DM at 10/9/2019 10:29 AM   Family Eager, E,D, NR by DM at 10/9/2019 10:29 AM                   Point: Home exercise program (In Progress)     Learning Progress Summary           Patient Eager, E,D, NR by DM at 10/9/2019 10:29 AM   Family Eager, E,D, NR by DM at 10/9/2019 10:29 AM                   Point: Body mechanics (In Progress)     Learning Progress Summary           Patient Eager, E,D, NR by DM at 10/9/2019 10:29 AM   Family Eager, E,D, NR by DM at 10/9/2019 10:29 AM                   Point: Precautions (In Progress)     Learning Progress Summary           Patient Eager, E,D, NR by DM at 10/9/2019 10:29 AM   Family Eager, E,D, NR by DM at 10/9/2019 10:29 AM                               User Key     Initials Effective Dates Name Provider Type  Discipline    DM 06/19/15 -  Urmila Scott, PT Physical Therapist PT              PT Recommendation and Plan  Planned Therapy Interventions (PT Eval): bed mobility training, gait training, home exercise program, patient/family education, stair training, strengthening, transfer training  Outcome Summary/Treatment Plan (PT)  Anticipated Equipment Needs at Discharge (PT): standard cane(SPC (Contingent on progress w/ amb))  Anticipated Discharge Disposition (PT): home with assist, home with home health(dtr's asst PRN)  Plan of Care Reviewed With: patient, daughter  Progress: improving  Outcome Summary: Presents w/ evolving sympt, incl. low HGB (9.7), hyponat.,dehyd., severe L lower abdom pain, mech. fall PTA, decr. strength/endurance & impaired funct mobil; able to amb 110 ft w/ Rwx & min A to guide, w/ 2 stand.rests, but limited by incr. abdom pain(pt guarding abdom wall/moaning freq),fatigue, signif elev DBP, & nausea; recommend home w/ dtr's asst. & HH f/u at d/c      Time Calculation:   PT Charges     Row Name 10/09/19 1034             Time Calculation    Start Time  0835  -DM      PT Received On  10/09/19  -DM      PT Goal Re-Cert Due Date  10/19/19  -DM         Time Calculation- PT    Total Timed Code Minutes- PT  51 minute(s)  -DM         Timed Charges    78274 - PT Therapeutic Exercise Minutes  15  -DM      88286 - Gait Training Minutes   18  -DM      08035 - PT Therapeutic Activity Minutes  18  -DM        User Key  (r) = Recorded By, (t) = Taken By, (c) = Cosigned By    Initials Name Provider Type    Urmila Alanis, PT Physical Therapist        Therapy Charges for Today     Code Description Service Date Service Provider Modifiers Qty    85654961451 HC PT THER PROC EA 15 MIN 10/9/2019 Urmila Scott, PT GP 1    76475056446 HC GAIT TRAINING EA 15 MIN 10/9/2019 Urmila Scott, PT GP 1    47647127498 HC PT THERAPEUTIC ACT EA 15 MIN 10/9/2019 Urmila Scott, PT GP 1    40693880078 HC PT EVAL MOD  COMPLEXITY 4 10/9/2019 Urmila Scott, PT GP 1          PT G-Codes  Outcome Measure Options: AM-PAC 6 Clicks Basic Mobility (PT)  AM-PAC 6 Clicks Score (PT): 19    Urmila Scott, PT  10/9/2019

## 2019-10-10 NOTE — PROGRESS NOTES
DATE OF VISIT: 10/10/2019    Chief Complaint: Followup for small cell lung cancer     SUBJECTIVE: The patient has been doing fairly well.  She  denied any fever or  chills, no night sweats, denied any headaches    REVIEW OF SYSTEMS: All the other 9 systems are reviewed by me and negative  except what is mentioned in HPI.     PAST MEDICAL HISTORY/SOCIAL HISTORY/FAMILY HISTORY: Unchanged from my prior documentation done on October 7, 2019      Current Facility-Administered Medications:   •  acetaminophen (TYLENOL) tablet 650 mg, 650 mg, Oral, Q4H PRN, 650 mg at 10/07/19 0307 **OR** acetaminophen (TYLENOL) 160 MG/5ML solution 650 mg, 650 mg, Oral, Q4H PRN **OR** acetaminophen (TYLENOL) suppository 650 mg, 650 mg, Rectal, Q4H PRN, Brigitte Sandoval PA-C  •  ALPRAZolam (XANAX) tablet 0.25 mg, 0.25 mg, Oral, TID PRN, Malia Solis MD, 0.25 mg at 10/09/19 2006  •  aspirin EC tablet 81 mg, 81 mg, Oral, Daily, Brigitte Sandoval PA-C, 81 mg at 10/10/19 0925  •  carvedilol (COREG) tablet 6.25 mg, 6.25 mg, Oral, BID With Meals, Brigitte Sandoval PA-C, 6.25 mg at 10/10/19 0925  •  cetirizine (zyrTEC) tablet 5 mg, 5 mg, Oral, Daily, Brigitte Sandoval PA-C, 5 mg at 10/10/19 0926  •  dextrose (D50W) 25 g/ 50mL Intravenous Solution 25 g, 25 g, Intravenous, Q15 Min PRN, Brigitte Sandoval, PA-C  •  dextrose (GLUTOSE) oral gel 15 g, 15 g, Oral, Q15 Min PRN, Brigitte Sandoval PA-C  •  dicyclomine (BENTYL) tablet 20 mg, 20 mg, Oral, Q8H PRN, Brigitte Sandoval, PA-C, 20 mg at 10/10/19 0925  •  docusate sodium (COLACE) capsule 100 mg, 100 mg, Oral, BID PRN, Brigitte Sandoval PA-C  •  fludrocortisone tablet 100 mcg, 100 mcg, Oral, Daily, Terry Wiggins MD, 100 mcg at 10/10/19 0925  •  glucagon (human recombinant) (GLUCAGEN DIAGNOSTIC) injection 1 mg, 1 mg, Subcutaneous, Q15 Min PRN, Brigitte Sandoval PA-C  •  heparin (porcine) 5000 UNIT/ML injection 5,000 Units, 5,000 Units, Subcutaneous, Q12H, Brigitte Sandoval PA-C, 5,000  Units at 10/10/19 0926  •  HYDROcodone-acetaminophen (NORCO) 7.5-325 MG per tablet 1 tablet, 1 tablet, Oral, Q6H PRN, Malia Solis MD, 1 tablet at 10/10/19 0926  •  hydrocortisone sodium succinate (Solu-CORTEF) injection 50 mg, 50 mg, Intravenous, Q12H, Martha Mccracken DO, 50 mg at 10/10/19 0926  •  HYDROmorphone (DILAUDID) injection 0.5 mg, 0.5 mg, Intravenous, Q2H PRN, Malia Solis MD, 0.5 mg at 10/10/19 0456  •  Influenza Vac Subunit Quad (FLUCELVAX) injection 0.5 mL, 0.5 mL, Intramuscular, During Hospitalization, Catia Ortiz MD  •  insulin lispro (humaLOG) injection 0-7 Units, 0-7 Units, Subcutaneous, 4x Daily With Meals & Nightly, Brigitte Sandoval PA-C, 2 Units at 10/09/19 2016  •  lactulose (CHRONULAC) 10 GM/15ML solution 20 g, 20 g, Oral, TID, Brigitte Sandoval PA-C, 20 g at 10/10/19 0926  •  levothyroxine (SYNTHROID, LEVOTHROID) tablet 75 mcg, 75 mcg, Oral, Q AM, Brigitte Sandoval PA-C, 75 mcg at 10/10/19 0456  •  melatonin tablet 5 mg, 5 mg, Oral, Nightly PRN, Brigitte Sandoval PA-C  •  ondansetron (ZOFRAN) injection 4 mg, 4 mg, Intravenous, Q6H PRN, Brigitte Sandoval PA-C, 4 mg at 10/08/19 1020  •  pantoprazole (PROTONIX) EC tablet 40 mg, 40 mg, Oral, Daily, Brigitte Sandoval PA-C, 40 mg at 10/10/19 0926  •  prochlorperazine (COMPAZINE) injection 5 mg, 5 mg, Intravenous, Q6H PRN, 5 mg at 10/09/19 0924 **OR** prochlorperazine (COMPAZINE) tablet 5 mg, 5 mg, Oral, Q6H PRN **OR** prochlorperazine (COMPAZINE) suppository 25 mg, 25 mg, Rectal, Q12H PRN, Brigitte Sandoval PA-C  •  sodium chloride 0.9 % flush 10 mL, 10 mL, Intravenous, PRN, Mannie Chan MD  •  [COMPLETED] Insert peripheral IV, , , Once **AND** sodium chloride 0.9 % flush 10 mL, 10 mL, Intravenous, PRN, Kristan Chi, APRN  •  sodium chloride 0.9 % flush 10 mL, 10 mL, Intravenous, Q12H, Brigitte Sandoval PA-C, 10 mL at 10/10/19 0927  •  sodium chloride 0.9 % flush 10 mL, 10 mL, Intravenous, PRN,  "Brigitte Sandoval PA-C  •  sodium chloride tablet 1 g, 1 g, Oral, TID With Meals, Martha Mccracken DO, 1 g at 10/10/19 0925    PHYSICAL EXAMINATION:   /77 (BP Location: Right arm, Patient Position: Lying)   Pulse 90   Temp 98.1 °F (36.7 °C) (Oral)   Resp 16   Ht 154.9 cm (61\")   Wt 81.9 kg (180 lb 9.6 oz)   SpO2 96%   BMI 34.12 kg/m²    ECOG Performance Status: 1 - Symptomatic but completely ambulatory  GENERAL: Age appropriate. No acute distress.   NEURO/PSYCH: A&O x 3, strength 5/5 in all muscle groups  HEENT: Head atraumatic, normocephalic.   NECK: Supple. No JVD. No lymphadenopathy.   LUNGS: Clear to auscultation bilaterally. No wheezing. No rhonchi.   HEART: Regular rate and rhythm. S1, S2, no murmurs.   ABDOMEN: Soft, nontender, nondistended. Bowel sounds positive. No  hepatosplenomegaly.   EXTREMITIES: No clubbing, cyanosis, or edema.   SKIN: No rashes. No purpura.       Admission on 10/06/2019   No results displayed because visit has over 200 results.          No results found.    ASSESSMENT: The patient is a very pleasant 64 y.o. female  with small cell lung cancer      PLAN:  1.  Small cell lung cancer: CT chest with progressive lymphadenopathy.  Hyponatremia is stable.  The patient is scheduled for whole-body PET scan to further evaluate her CT related abnormalities.  2.  Abdominal pain: Possibly induced by her lymphadenopathy.  Continue as needed opiates.  3.  Hyponatremia: The patient should be discharged home on demeclocycline.  4.  Chronic kidney disease: Appreciate nephrology assistance.  The patient is scheduled for PET scan on Wednesday she will follow-up with me on Thursday possibly to start immunotherapy.    Desmond Cardoso MD  10/10/2019  "

## 2019-10-10 NOTE — CONSULTS
Vidya Chávez MD  Consulting physician: Will    Chief Complaint   Patient presents with   • Syncope         HPI: Patient is very pleasant 64-year-old female comes in hospital with possible syncopal episode.  Patient does have a history of small cell lung cancer and has received XRT as well as chemotherapy.  Patient's daughter reports that patient is on about a 2 to 3-month break from chemotherapy.  Apparently at home the patient has been having some trouble with falls.  Also at this point time the patient does admit that she feels fairly weak.    The patient's main complaint however centers around pain in multiple different areas.  Patient does complain of pain in her left lower rib cage/left upper quadrant area.  States that the pain is constant in nature and is a dull aching pain.  On top of this patient does state that she is been having some episodes of left chest wall pain which she states is burning sensation and seems to come and go.    The daughter does note that since the patient was diagnosed with cancer the patient has been having some problems with anxiety as well.      Past Medical History:   Diagnosis Date   • Arthritis    • Asthma    • COPD (chronic obstructive pulmonary disease) (CMS/HCC)    • Coronary artery disease     5 stents   • Diabetes mellitus (CMS/HCC)    • Disease of thyroid gland    • DVT (deep venous thrombosis) (CMS/HCC)     this year- per pt   • GERD (gastroesophageal reflux disease)    • Hyperlipidemia    • Hypertension    • Lung cancer (CMS/HCC)    • Myocardial infarction (CMS/HCC)    • Pancreatitis    • Pancreatitis    • Renal disorder      Past Surgical History:   Procedure Laterality Date   • APPENDECTOMY     • BRONCHOSCOPY N/A 11/6/2018    Procedure: BRONCHOSCOPY WITH ENDOBRONCHIAL ULTRASOUND, biopsies, brushing and washing.;  Surgeon: Isaac Epstein MD;  Location: [facility] ENDOSCOPY;  Service: Pulmonary   • BRONCHOSCOPY N/A 11/9/2018    Procedure: BRONCHOSCOPY WITH  ENDOBRONCHIAL ULTRASOUND;  Surgeon: Clay Scott MD;  Location: [facility] ENDOSCOPY;  Service: Pulmonary   • CAROTID STENT      5 total   • CHOLECYSTECTOMY     • COLON SURGERY     • COLONOSCOPY     • HERNIA REPAIR     • HYSTERECTOMY     • OTHER SURGICAL HISTORY      bladder sling   • THYROIDECTOMY      1994   • TONSILLECTOMY     • TOTAL HIP ARTHROPLASTY Left    • UPPER GASTROINTESTINAL ENDOSCOPY       Current Code Status     Date Active Code Status Order ID Comments User Context       10/6/2019 23:11 CPR [id number]  Brigitte Sandoval PAShirleyC ED       Questions for Current Code Status     Question Answer Comment    Code Status CPR     Medical Interventions (Level of Support Prior to Arrest) Full     Level Of Support Discussed With Patient         Current Facility-Administered Medications   Medication Dose Route Frequency Provider Last Rate Last Dose   • ALPRAZolam (XANAX) tablet 0.25 mg  0.25 mg Oral TID PRN Malia Solis MD   0.25 mg at 10/09/19 2006   • aspirin EC tablet 81 mg  81 mg Oral Daily Brigitte Sandoval PA-C   81 mg at 10/10/19 0925   • carvedilol (COREG) tablet 6.25 mg  6.25 mg Oral BID With Meals Brigitte Sandoval PA-C   6.25 mg at 10/10/19 0925   • cetirizine (zyrTEC) tablet 5 mg  5 mg Oral Daily Brigitte Sandoval PA-C   5 mg at 10/10/19 0926   • dextrose (D50W) 25 g/ 50mL Intravenous Solution 25 g  25 g Intravenous Q15 Min PRN Brigitte Sandoval PAShirleyC       • dextrose (GLUTOSE) oral gel 15 g  15 g Oral Q15 Min PRN Brigitte Sandoval PA-C       • dicyclomine (BENTYL) tablet 20 mg  20 mg Oral Q8H PRN Brigitte Sandoval PA-C   20 mg at 10/10/19 0925   • docusate sodium (COLACE) capsule 100 mg  100 mg Oral BID PRN Brigitte Sandoval PA-C       • DULoxetine (CYMBALTA) DR capsule 30 mg  30 mg Oral Daily Isaias Richardson DO   30 mg at 10/10/19 1402   • fludrocortisone tablet 100 mcg  100 mcg Oral Daily Terry Wiggins MD   100 mcg at 10/10/19 0925   • glucagon (human recombinant) (GLUCAGEN  DIAGNOSTIC) injection 1 mg  1 mg Subcutaneous Q15 Min PRN Brigitte Sandoval PA-C       • heparin (porcine) 5000 UNIT/ML injection 5,000 Units  5,000 Units Subcutaneous Q12H Brigitte Sandoval PA-C   5,000 Units at 10/10/19 0926   • HYDROcodone-acetaminophen (NORCO) 7.5-325 MG per tablet 1 tablet  1 tablet Oral Q6H PRN Malia Solis MD   1 tablet at 10/10/19 0926   • HYDROcodone-acetaminophen (NORCO) 7.5-325 MG per tablet 1 tablet  1 tablet Oral Q8H Isaias Richardson DO   1 tablet at 10/10/19 1402   • hydrocortisone sodium succinate (Solu-CORTEF) injection 50 mg  50 mg Intravenous Q12H Martha Mccracken DO   50 mg at 10/10/19 0926   • Influenza Vac Subunit Quad (FLUCELVAX) injection 0.5 mL  0.5 mL Intramuscular During Hospitalization Catia Ortiz MD       • insulin lispro (humaLOG) injection 0-7 Units  0-7 Units Subcutaneous 4x Daily With Meals & Nightly Brigitte Sandoval PA-C   3 Units at 10/10/19 1243   • lactulose (CHRONULAC) 10 GM/15ML solution 20 g  20 g Oral TID Brigitte Sandoval PA-C   20 g at 10/10/19 0926   • levothyroxine (SYNTHROID, LEVOTHROID) tablet 75 mcg  75 mcg Oral Q AM Brigitte Sandoval PA-C   75 mcg at 10/10/19 1029   • lidocaine (LIDODERM) 5 % 1 patch  1 patch Transdermal Q24H Martha Mccracken DO   1 patch at 10/10/19 1402   • melatonin tablet 5 mg  5 mg Oral Nightly PRN Brigitte Sandoval PA-C       • ondansetron (ZOFRAN) injection 4 mg  4 mg Intravenous Q6H PRN Brigitte Sandoval PA-C   4 mg at 10/08/19 1020   • pantoprazole (PROTONIX) EC tablet 40 mg  40 mg Oral Daily Brigitte Sandoval PA-C   40 mg at 10/10/19 0926   • prochlorperazine (COMPAZINE) injection 5 mg  5 mg Intravenous Q6H PRN Brigitte Sandoval PA-C   5 mg at 10/09/19 0924    Or   • prochlorperazine (COMPAZINE) tablet 5 mg  5 mg Oral Q6H PRN Brigitte Sandoval PA-C        Or   • prochlorperazine (COMPAZINE) suppository 25 mg  25 mg Rectal Q12H PRN Brigitte Sandoval PA-C       • sodium chloride 0.9 % flush 10 mL  " 10 mL Intravenous PRN Mannie Chan MD       • sodium chloride 0.9 % flush 10 mL  10 mL Intravenous PRN Kristan Chi APRN       • sodium chloride 0.9 % flush 10 mL  10 mL Intravenous Q12H Brigitte Sandoval PA-C   10 mL at 10/10/19 0927   • sodium chloride 0.9 % flush 10 mL  10 mL Intravenous PRN Brigitte Sandoval PA-C       • sodium chloride tablet 1 g  1 g Oral TID With Meals Martha Mccracken DO   1 g at 10/10/19 1243        •  ALPRAZolam  •  dextrose  •  dextrose  •  dicyclomine  •  docusate sodium  •  glucagon (human recombinant)  •  HYDROcodone-acetaminophen  •  influenza vaccine  •  melatonin  •  ondansetron  •  prochlorperazine **OR** prochlorperazine **OR** prochlorperazine  •  sodium chloride  •  [COMPLETED] Insert peripheral IV **AND** sodium chloride  •  sodium chloride  Allergies   Allergen Reactions   • Plavix [Clopidogrel Bisulfate] Anaphylaxis   • Buspar [Buspirone] Other (See Comments)     Daughter states it did not work for her   • Codeine Other (See Comments)     Pt has tolerated Norco during 10/6/19 admission   • Penicillins Other (See Comments)     Pt does not remember reaction     Family History   Problem Relation Age of Onset   • Colon polyps Mother    • Ulcerative colitis Sister      Social History     Socioeconomic History   • Marital status: Single     Spouse name: Not on file   • Number of children: Not on file   • Years of education: Not on file   • Highest education level: Not on file   Tobacco Use   • Smoking status: Former Smoker     Packs/day: 0.50     Types: Cigarettes   • Smokeless tobacco: Never Used   • Tobacco comment: quit 10/31/18   Substance and Sexual Activity   • Alcohol use: No   • Drug use: No   • Sexual activity: Defer     Review of Systems -all others reviewed and are negative except mentioned in HPI      /77 (BP Location: Right arm, Patient Position: Lying)   Pulse 96   Temp 98.1 °F (36.7 °C) (Oral)   Resp 16   Ht 154.9 cm (61\")   Wt 81.9 kg " (180 lb 9.6 oz)   SpO2 93%   BMI 34.12 kg/m²     Intake/Output Summary (Last 24 hours) at 10/10/2019 1404  Last data filed at 10/10/2019 1300  Gross per 24 hour   Intake 360 ml   Output 750 ml   Net -390 ml     Physical Exam:      General Appearance:    Alert, cooperative, in no acute distress   Head:    Normocephalic, without obvious abnormality, atraumatic   Eyes:            Lids and lashes normal, conjunctivae and sclerae normal, no   icterus, no pallor, corneas clear, PERRLA   Ears:    Ears appear intact with no abnormalities noted   Throat:   No oral lesions, no thrush, oral mucosa moist   Neck:   No adenopathy, supple, trachea midline, no thyromegaly, no     carotid bruit, no JVD   Back:     No kyphosis present, no scoliosis present, no skin lesions,       erythema or scars, no tenderness to percussion or                   palpation,   range of motion normal   Lungs:     Clear to auscultation,respirations regular, even and                   unlabored    Heart:    Regular rhythm and normal rate, normal S1 and S2, no            murmur, no gallop, no rub, no click   Breast Exam:    Deferred   Abdomen:     Normal bowel sounds, no masses, no organomegaly, soft        non-tender, non-distended, no guarding, no rebound                 tenderness   Genitalia:    Deferred   Extremities:   Moves all extremities well, no edema, no cyanosis, no              redness   Pulses:   Pulses palpable and equal bilaterally   Skin:   No bleeding, bruising or rash   Lymph nodes:   No palpable adenopathy   Neurologic:   Cranial nerves 2 - 12 grossly intact, sensation intact, DTR        present and equal bilaterally       Results from last 7 days   Lab Units 10/10/19  0438   WBC 10*3/mm3 8.57   HEMOGLOBIN g/dL 7.4*   HEMATOCRIT % 22.1*   PLATELETS 10*3/mm3 280     Results from last 7 days   Lab Units 10/10/19  0438  10/06/19  2039   SODIUM mmol/L 129*   < > 116*   POTASSIUM mmol/L 4.2   < > 4.9   CHLORIDE mmol/L 92*   < > 79*   CO2  mmol/L 24.0   < > 23.0   BUN mg/dL 23   < > 23   CREATININE mg/dL 1.21*   < > 1.60*   CALCIUM mg/dL 9.4   < > 9.9   BILIRUBIN mg/dL  --   --  0.4   ALK PHOS U/L  --   --  60   ALT (SGPT) U/L  --   --  12   AST (SGOT) U/L  --   --  16   GLUCOSE mg/dL 194*   < > 143*    < > = values in this interval not displayed.     Results from last 7 days   Lab Units 10/10/19  0438   SODIUM mmol/L 129*   POTASSIUM mmol/L 4.2   CHLORIDE mmol/L 92*   CO2 mmol/L 24.0   BUN mg/dL 23   CREATININE mg/dL 1.21*   GLUCOSE mg/dL 194*   CALCIUM mg/dL 9.4     Imaging Results (last 72 hours)     Procedure Component Value Units Date/Time    CT Chest Without Contrast [[id number]] Collected:  10/08/19 0823     Updated:  10/09/19 2258    Narrative:       EXAMINATION: CT CHEST WO CONTRAST-10/07/2019:      INDICATION: Lung cancer; E87.6-Xyvd-wbrnpbvqfx and hyponatremia;  R53.1-Weakness; R10.32-Left lower quadrant pain; N18.9-Chronic kidney  disease, unspecified; D64.9-Anemia, unspecified.     TECHNIQUE: 5 mm unenhanced images through the chest.     The radiation dose reduction device was turned on for each scan per the  ALARA (As Low as Reasonably Achievable) protocol.     COMPARISON: 08/27/2019 chest CT scan.     FINDINGS: Linear scarring in the left suprahilar region of the left  upper lobe, and somewhat more discoid scarring in the superior segment  of the left lower lobe, both appear unchanged. There is no evidence of  new pulmonary parenchymal disease. Trace pleural thickening in the  posterior left mid chest is similar to the prior study. There is trace  pericardial effusion. No significant mediastinal adenopathy is  identified.     Included images of the upper abdomen show interval development of a left  retrocrural nodule, possibly invading the left diaphragmatic rojas  itself, and measuring approximately 3.1 x 2.7 mm. Previously, this was  seen as a slightly thickened crural base, 15 mm in diameter. There is  adjacent, new soft tissue  thickening along the left lateral margin of  the T12 vertebral body, over an approximately 2.5 x 11.2 cm area. There  is now a left paraspinous mass adjacent to T10, approximately 3.3 cm in  diameter. No pathologic paraspinous mass is seen elsewhere. Separately,  there is now a round 3 cm soft tissue mass interposed between the distal  esophagus, and the aorta, apparently an enlarging lymph node. This  previously appeared to be a part of the distal esophagus, but was  apparently a separate 15 mm nodule on the prior scan. No gross  abnormalities are seen of the included portions of the liver, spleen,  pancreatic tail, adrenal glands, or upper renal poles.       Impression:       1. Stable left perihilar lung scarring compared to 08/27/2019 exam. No  new disease is seen in the thorax.  2. Interval development of left retrocrural, left lower thoracic  paraspinous and left lower paraesophageal masses, consistent with  metastatic disease.     D:  10/08/2019  E:  10/08/2019           This report was finalized on 10/9/2019 10:55 PM by DR. Maynor Weaver MD.           Impression: SCLC  Neuropathic pain  Anxiety  [facility]      Plan: In respect to goals of care the plan is for the patient to go to [facility] for rehab at time of discharge.    In respect to symptoms I will start patient on a low-dose of Cymbalta.  I feel that this may help with both the neuropathic pain the patient seems to be having as well as helping with the patient's generalized anxiety.  I am also going to start patient on a low-dose of scheduled Lortab.  If the Cymbalta helps with the patient's pain over the next week to 10 days the scheduled Lortab may be able to be stopped or wean down.        Isaias Richardson, DO  10/10/19  2:04 PM

## 2019-10-10 NOTE — PROGRESS NOTES
[facility] Medicine Services  PROGRESS NOTE    Patient Name: Shauna Valencia  : 1955  MRN: [id number]    Date of Admission: 10/6/2019  Primary Care Physician: Vidya Chávez MD    Subjective   Subjective     CC:  LLQ pain    HPI:  Doing okay this am, but continues to complain of left lower quadrant pain- states that it is painful when she touches- noticed her bruise there last evening.     Review of Systems  Gen- No fevers, chills  CV- No chest pain, palpitations  Resp- No cough, dyspnea  GI- No N/V/D, abd pain      All other systems reviewed and negative.     Objective   Objective     Vital Signs:   Temp:  [97.7 °F (36.5 °C)-98.1 °F (36.7 °C)] 98.1 °F (36.7 °C)  Heart Rate:  [] 90  Resp:  [16-20] 16  BP: (115-150)/(73-77) 127/77        Physical Exam:  Constitutional: No acute distress, awake, in bed  HENT: NCAT, mucous membranes moist  Respiratory: Clear to auscultation bilaterally, respiratory effort normal   Cardiovascular: RRR, no murmurs, rubs, or gallops, palpable pedal pulses bilaterally  Gastrointestinal: Positive bowel sounds, soft, no tenderness to palpation, nondistended, ecchymosis LLQ   Musculoskeletal: No bilateral ankle edema  Psychiatric: Appropriate affect, cooperative  Neurologic: Oriented x 3, strength symmetric in all extremities, Cranial Nerves grossly intact to confrontation, speech clear  Skin: No rashes    Results Reviewed:    Results from last 7 days   Lab Units 10/10/19  0438 10/09/19  0410 10/08/19  0655   WBC 10*3/mm3 8.57 7.67 4.74   HEMOGLOBIN g/dL 7.4* 7.7* 8.0*   HEMATOCRIT % 22.1* 22.4* 22.5*   PLATELETS 10*3/mm3 280 266 239     Results from last 7 days   Lab Units 10/10/19  0438 10/10/19  0106 10/09/19  1748  10/06/19  2039   SODIUM mmol/L 129* 126* 127*   < > 116*   POTASSIUM mmol/L 4.2 4.3 4.1   < > 4.9   CHLORIDE mmol/L 92* 90* 92*   < > 79*   CO2 mmol/L 24.0 22.0 24.0   < > 23.0   BUN mg/dL 23 23 22   < > 23   CREATININE mg/dL  1.21* 1.37* 1.28*   < > 1.60*   GLUCOSE mg/dL 194* 296* 207*   < > 143*   CALCIUM mg/dL 9.4 8.9 9.3   < > 9.9   ALT (SGPT) U/L  --   --   --   --  12   AST (SGOT) U/L  --   --   --   --  16   TROPONIN T ng/mL  --   --   --   --  <0.010   PROBNP pg/mL  --   --   --   --  163.3    < > = values in this interval not displayed.     Estimated Creatinine Clearance: 46.7 mL/min (A) (by C-G formula based on SCr of 1.21 mg/dL (H)).    Microbiology Results Abnormal     None          Imaging Results (last 24 hours)     Procedure Component Value Units Date/Time    CT Chest Without Contrast [[id number]] Collected:  10/08/19 0823     Updated:  10/09/19 2258    Narrative:       EXAMINATION: CT CHEST WO CONTRAST-10/07/2019:      INDICATION: Lung cancer; E87.9-Atbk-lqqymiefkv and hyponatremia;  R53.1-Weakness; R10.32-Left lower quadrant pain; N18.9-Chronic kidney  disease, unspecified; D64.9-Anemia, unspecified.     TECHNIQUE: 5 mm unenhanced images through the chest.     The radiation dose reduction device was turned on for each scan per the  ALARA (As Low as Reasonably Achievable) protocol.     COMPARISON: 08/27/2019 chest CT scan.     FINDINGS: Linear scarring in the left suprahilar region of the left  upper lobe, and somewhat more discoid scarring in the superior segment  of the left lower lobe, both appear unchanged. There is no evidence of  new pulmonary parenchymal disease. Trace pleural thickening in the  posterior left mid chest is similar to the prior study. There is trace  pericardial effusion. No significant mediastinal adenopathy is  identified.     Included images of the upper abdomen show interval development of a left  retrocrural nodule, possibly invading the left diaphragmatic rojas  itself, and measuring approximately 3.1 x 2.7 mm. Previously, this was  seen as a slightly thickened crural base, 15 mm in diameter. There is  adjacent, new soft tissue thickening along the left lateral margin of  the T12 vertebral body,  over an approximately 2.5 x 11.2 cm area. There  is now a left paraspinous mass adjacent to T10, approximately 3.3 cm in  diameter. No pathologic paraspinous mass is seen elsewhere. Separately,  there is now a round 3 cm soft tissue mass interposed between the distal  esophagus, and the aorta, apparently an enlarging lymph node. This  previously appeared to be a part of the distal esophagus, but was  apparently a separate 15 mm nodule on the prior scan. No gross  abnormalities are seen of the included portions of the liver, spleen,  pancreatic tail, adrenal glands, or upper renal poles.       Impression:       1. Stable left perihilar lung scarring compared to 08/27/2019 exam. No  new disease is seen in the thorax.  2. Interval development of left retrocrural, left lower thoracic  paraspinous and left lower paraesophageal masses, consistent with  metastatic disease.     D:  10/08/2019  E:  10/08/2019           This report was finalized on 10/9/2019 10:55 PM by DR. Maynor Weaver MD.                  I have reviewed the medications:  Scheduled Meds:    aspirin 81 mg Oral Daily   carvedilol 6.25 mg Oral BID With Meals   cetirizine 5 mg Oral Daily   fludrocortisone 100 mcg Oral Daily   heparin (porcine) 5,000 Units Subcutaneous Q12H   hydrocortisone sodium succinate 50 mg Intravenous Q12H   insulin lispro 0-7 Units Subcutaneous 4x Daily With Meals & Nightly   lactulose 20 g Oral TID   levothyroxine 75 mcg Oral Q AM   pantoprazole 40 mg Oral Daily   sodium chloride 10 mL Intravenous Q12H   sodium chloride 1 g Oral TID With Meals     Continuous Infusions:   PRN Meds:.•  acetaminophen **OR** acetaminophen **OR** acetaminophen  •  ALPRAZolam  •  dextrose  •  dextrose  •  dicyclomine  •  docusate sodium  •  glucagon (human recombinant)  •  HYDROcodone-acetaminophen  •  HYDROmorphone  •  influenza vaccine  •  melatonin  •  ondansetron  •  prochlorperazine **OR** prochlorperazine **OR** prochlorperazine  •  sodium chloride  •   [COMPLETED] Insert peripheral IV **AND** sodium chloride  •  sodium chloride      Assessment/Plan   Assessment / Plan     Active Hospital Problems    Diagnosis  POA   • **Acute hyponatremia [E87.1]  Yes   • CAD (coronary artery disease) [I25.10]  Yes   • COPD (chronic obstructive pulmonary disease) (CMS/HCC) [J44.9]  Yes   • Hyponatremia [E87.1]  Yes   • CKD (chronic kidney disease) stage 3, GFR 30-59 ml/min (CMS/[facility]) [N18.3]  Yes   • Generalized weakness [R53.1]  Yes   • Anemia [D64.9]  Yes   • Small cell lung cancer, left upper lobe (CMS/HCC) [C34.12]  Yes   • Hyperlipidemia [E78.5]  Yes   • Type 2 diabetes mellitus (CMS/[facility]) [E11.9]  Yes   • Tobacco abuse [Z72.0]  Yes      Resolved Hospital Problems   No resolved problems to display.        Brief Hospital Course to date:  Shauna Valencia is a 64 y.o. female with a past medical history significant for DM2, Small cell lung cancer ( left lung), anemia, CKD, III, COPD, CAD, HTN, and HLD who presents with complaints progressively worsening left lower quadrant abdominal pain going on for the past month. Pt was found to be hyponatremic with Na+ of 116 on presentation.    Plan:    Acute hyponatremia:  - no AMS, seizure activity  - patient has a history of hyponatremia in 10/2018 found to be secondary to SIADH in the setting of new lung mass.  - was followed by nephrology and treated with samsca  - am cortisol LOW c/w adrenal insufficiency  --suspect multifactorial due to adrenal insufficiency (? Primary vs secondary) as well as SIADH   - trend chemistry every 4-6 hours  --NAL consulted, started hydrocortisone and fludrocortisone  --improving    LLQ pain  --likely due to lymphadenopathy vs constipation  --improving with pain regimen and BMs/flatus     CAD:  -- s/p stent placement  -- stable. On ASA and statin, recently taken off Brilinta by her Cardiologist     COPD:  - continue home bronchodilating agents  - additional pulmonary toilet as needed     HTN:  - stable and  controlled. Continue home meds     Anemia:  - H/H stable and at baseline. Improved from 9/19  - monitor     DM2:  - on SSI at home. Continue low level SSI as inpatient with scheduled accu checks     CKD III:  - has been followed by [facility]  - stable. Baseline around 1.9.   - monitor and avoid nephrotoxins     Anxiety  --PRN benzo for time being.  Pt has been intolerant to Buspar in the past.     DVT Prophylaxis:  ALIS    Disposition: I expect the patient to be discharged to [facility] once Na+ improved and bed available    CODE STATUS:   Code Status and Medical Interventions:   Ordered at: 10/06/19 7071     Level Of Support Discussed With:    Patient     Code Status:    CPR     Medical Interventions (Level of Support Prior to Arrest):    Full         Electronically signed by Malia Solis MD, 10/10/19, 8:24 AM.

## 2019-10-10 NOTE — PLAN OF CARE
Problem: Fall Risk (Adult)  Goal: Absence of Fall  Outcome: Ongoing (interventions implemented as appropriate)      Problem: Patient Care Overview  Goal: Plan of Care Review  Outcome: Ongoing (interventions implemented as appropriate)   10/09/19 2006 10/10/19 0314   Coping/Psychosocial   Plan of Care Reviewed With patient;daughter --    Plan of Care Review   Progress --  improving   OTHER   Outcome Summary --  VSS, c/o pain x2, prn meds given, rested well, no other issues/concerns, will continue to monitor     Goal: Discharge Needs Assessment  Outcome: Ongoing (interventions implemented as appropriate)      Problem: Syncope (Adult)  Goal: Physical Safety/Health Maintenance  Outcome: Ongoing (interventions implemented as appropriate)    Goal: Optimal Emotional/Functional [place]  Outcome: Ongoing (interventions implemented as appropriate)

## 2019-10-10 NOTE — PLAN OF CARE
Problem: Patient Care Overview  Goal: Interprofessional Rounds/Family Conf   10/10/19 1300   Interdisciplinary Rounds/Family Conf   Summary New Palliative consult 10/10/2019 at 1050 from Dr. ANYA Solis for help with pain management. Palliative Dr. Richardson saw on 10/10/2019 at 1404. Patient rates pain a 8 on left flank side, sore to touch, she is anxious and depressed. Dr. Richardson added Cymbalta and a low dose Lortab. Her goals are aggressive, full code and go to [facility] to get her life back.

## 2019-10-10 NOTE — THERAPY TREATMENT NOTE
Patient Name: Shauna Valencia  : 1955    MRN: [id number]                              Today's Date: 10/10/2019       Admit Date: 10/6/2019    Visit Dx:     ICD-10-CM ICD-9-CM   1. Hyponatremia E87.1 276.1   2. Weakness R53.1 780.79   3. Left lower quadrant abdominal pain R10.32 789.04   4. Chronic renal failure, unspecified CKD stage N18.9 585.9   5. Anemia, unspecified type D64.9 285.9   6. Impaired functional mobility, balance, gait, and endurance Z74.09 V49.89     Patient Active Problem List   Diagnosis   • Acute hyponatremia   • Hyperlipidemia   • Type 2 diabetes mellitus (CMS/HCC)   • Tobacco abuse   • Obesity (BMI 30-39.9)   • Mass of upper lobe of left lung   • Mediastinal lymphadenopathy   • Small cell lung cancer, left upper lobe (CMS/HCC)   • Dehydration   • Anemia   • Hyperkalemia   • Symptomatic anemia   • Generalized weakness   • Nausea & vomiting   • CKD (chronic kidney disease) stage 3, GFR 30-59 ml/min (CMS/HCC)   • Hypomagnesemia   • Hypocalcemia   • CAD (coronary artery disease)   • COPD (chronic obstructive pulmonary disease) (CMS/HCC)   • Hyponatremia     Past Medical History:   Diagnosis Date   • Arthritis    • Asthma    • COPD (chronic obstructive pulmonary disease) (CMS/HCC)    • Coronary artery disease     5 stents   • Diabetes mellitus (CMS/HCC)    • Disease of thyroid gland    • DVT (deep venous thrombosis) (CMS/HCC)     this year- per pt   • GERD (gastroesophageal reflux disease)    • Hyperlipidemia    • Hypertension    • Lung cancer (CMS/HCC)    • Myocardial infarction (CMS/HCC)    • Pancreatitis    • Pancreatitis    • Renal disorder      Past Surgical History:   Procedure Laterality Date   • APPENDECTOMY     • BRONCHOSCOPY N/A 2018    Procedure: BRONCHOSCOPY WITH ENDOBRONCHIAL ULTRASOUND, biopsies, brushing and washing.;  Surgeon: Isaac Epstein MD;  Location: [facility] ENDOSCOPY;  Service: Pulmonary   • BRONCHOSCOPY N/A 2018    Procedure: BRONCHOSCOPY WITH ENDOBRONCHIAL  ULTRASOUND;  Surgeon: Clay Scott MD;  Location: [facility] ENDOSCOPY;  Service: Pulmonary   • CAROTID STENT      5 total   • CHOLECYSTECTOMY     • COLON SURGERY     • COLONOSCOPY     • HERNIA REPAIR     • HYSTERECTOMY     • OTHER SURGICAL HISTORY      bladder sling   • THYROIDECTOMY      1994   • TONSILLECTOMY     • TOTAL HIP ARTHROPLASTY Left    • UPPER GASTROINTESTINAL ENDOSCOPY       General Information     Row Name 10/10/19 1518          PT Evaluation Time/Intention    Document Type  therapy note (daily note)  -     Mode of Treatment  physical therapy;individual therapy  -     Row Name 10/10/19 1517          General Information    Existing Precautions/Restrictions  fall  -     Barriers to Rehab  cognitive status;hearing deficit  -     Row Name 10/10/19 1512          Cognitive Assessment/Intervention- PT/OT    Orientation Status (Cognition)  oriented x 3  -     Row Name 10/10/19 1516          Safety Issues, Functional Mobility    Safety Issues Affecting Function (Mobility)  insight into deficits/self awareness;positioning of assistive device;sequencing abilities  -     Impairments Affecting Function (Mobility)  cognition;postural/trunk control;strength  -       User Key  (r) = Recorded By, (t) = Taken By, (c) = Cosigned By    Initials Name Provider Type    SJ Yu Corea, PT Physical Therapist        Mobility     Row Name 10/10/19 1555          Bed Mobility Assessment/Treatment    Bed Mobility Assessment/Treatment  sit-supine  -SJ     Supine-Sit [place] (Bed Mobility)  supervision  -     Sit-Supine [place] (Bed Mobility)  supervision  -     Comment (Bed Mobility)  setup for lines, cues for sequencing  -     Row Name 10/10/19 155          Transfer Assessment/Treatment    Comment (Transfers)  cues for safety and sequencing. Pt performed toilet t/f with CGA  -     Row Name 10/10/19 1434          Sit-Stand Transfer    Sit-Stand [place] (Transfers)  contact guard  -      Assistive Device (Sit-Stand Transfers)  walker, front-wheeled  -     Row Name 10/10/19 1550          Gait/Stairs Assessment/Training    64959 - Gait Training Minutes   23  -SJ     Gait/Stairs Assessment/Training  gait/ambulation independence  -     [place] Level (Gait)  minimum assist (75% patient effort);verbal cues  -     Assistive Device (Gait)  walker, front-wheeled  -     Distance in Feet (Gait)  150  -SJ     Pattern (Gait)  step-through  -SJ     Deviations/Abnormal Patterns (Gait)  base of support, narrow;tyrone decreased;stride length decreased  -SJ     Bilateral Gait Deviations  forward flexed posture;heel strike decreased;weight shift ability decreased  -SJ     Comment (Gait/Stairs)  Pt had increased abdominal pain with mobility, req assist to steer RW. Pt req cues for attention to task.  -       User Key  (r) = Recorded By, (t) = Taken By, (c) = Cosigned By    Initials Name Provider Type     Yu Corea PT Physical Therapist        Obj/Interventions    No documentation.       Goals/Plan    No documentation.       Clinical Impression     Row Name 10/10/19 1552          Pain Scale: Numbers Pre/Post-Treatment    Pain Scale: Numbers, Pretreatment  3/10  -SJ     Pain Scale: Numbers, Post-Treatment  3/10  -SJ     Pain Location - Orientation  lower  -SJ     Pain Location  abdomen  -SJ     Pain Intervention(s)  Repositioned;Ambulation/increased activity  -     Row Name 10/10/19 1552          Vital Signs    Pre Systolic BP Rehab  127  -SJ     Pre Treatment Diastolic BP  77  -SJ     Pretreatment Heart Rate (beats/min)  90  -SJ     Pre SpO2 (%)  94  -SJ     O2 Delivery Pre Treatment  room air  -     Row Name 10/10/19 1551          Positioning and Restraints    Pre-Treatment Position  in bed  -SJ     Post Treatment Position  bed  -SJ     In Bed  fowlers;exit alarm on;encouraged to call for assist;with family/caregiver  -       User Key  (r) = Recorded By, (t) = Taken By, (c) = Cosigned By     Initials Name Provider Type     Yu Corea PT Physical Therapist        Outcome Measures    No documentation.       Physical Therapy Education     Title: PT OT SLP Therapies (In Progress)     Topic: Physical Therapy (In Progress)     Point: Mobility training (In Progress)     Learning Progress Summary           Patient Acceptance, E,D, VU,DU,NR by  at 10/10/2019  3:54 PM    Eager, E,D, NR by DM at 10/9/2019 10:29 AM   Family Eager, E,D, NR by DM at 10/9/2019 10:29 AM                   Point: Home exercise program (In Progress)     Learning Progress Summary           Patient Acceptance, E,D, VU,DU,NR by  at 10/10/2019  3:54 PM    Eager, E,D, NR by DM at 10/9/2019 10:29 AM   Family Eager, E,D, NR by DM at 10/9/2019 10:29 AM                   Point: Body mechanics (In Progress)     Learning Progress Summary           Patient Acceptance, E,D, VU,DU,NR by  at 10/10/2019  3:54 PM    Eager, E,D, NR by DM at 10/9/2019 10:29 AM   Family Eager, E,D, NR by DM at 10/9/2019 10:29 AM                   Point: Precautions (In Progress)     Learning Progress Summary           Patient Acceptance, E,D, VU,DU,NR by  at 10/10/2019  3:54 PM    Eager, E,D, NR by DM at 10/9/2019 10:29 AM   Family Eager, E,D, NR by DM at 10/9/2019 10:29 AM                               User Key     Initials Effective Dates Name Provider Type Discipline     06/19/15 -  Yu Corea, LITZY Physical Therapist PT     06/19/15 -  Urmila Scott PT Physical Therapist PT              PT Recommendation and Plan     Plan of Care Reviewed With: patient  Progress: improving  Outcome Summary: Pt able to increase gait distance. Pt limited by pain, weakness, and decreased activity sarita. Pt amb 150ft with RW with Cammy, req cues to steer RW and attention to task. Continue POC.     Time Calculation:   PT Charges     Row Name 10/10/19 8138 10/10/19 2798          Time Calculation    Start Time  1518  -  --     PT Received On  10/10/19  -  --      PT Goal Re-Cert Due Date  10/19/19  -  --        Time Calculation- PT    Total Timed Code Minutes- PT  23 minute(s)  -SJ  --        Timed Charges    84948 - Gait Training Minutes   --  23  -SJ       User Key  (r) = Recorded By, (t) = Taken By, (c) = Cosigned By    Initials Name Provider Type    Yu Ortega, PT Physical Therapist        Therapy Charges for Today     Code Description Service Date Service Provider Modifiers Qty    36830845324 HC GAIT TRAINING EA 15 MIN 10/10/2019 Yu Corea, PT GP 2          PT G-Codes  Outcome Measure Options: AM-PAC 6 Clicks Basic Mobility (PT)  AM-PAC 6 Clicks Score (PT): 21    Yu Corea PT  10/10/2019

## 2019-10-10 NOTE — PLAN OF CARE
Problem: Palliative Care (Adult)  Intervention: Promote Informed Decision Making and Goal Setting   10/10/19 1300   Coping/Psychosocial Interventions   Life Transition/Adjustment (pain management)

## 2019-10-10 NOTE — PLAN OF CARE
Problem: Patient Care Overview  Goal: Interprofessional Rounds/Family Conf   10/10/19 1300   Interdisciplinary Rounds/Family Conf   Summary New Palliative consult 10/10/2019 at 1050 from Dr. ANYA Solis for help with pain management. Palliative Dr. Richardson saw on 10/10/2019 at 1404. Patient rates pain a 8 on left flank side, sore to touch, she is anxious and depressed. Dr. Richardson added Cymbalta and a low dose Lortab. Her goals are aggressive, full code and go to [facility] to get her life back. Daughter asked for OT to see. Palliative will continue to follow for support.   Participants advanced practice nurse;nursing;physician

## 2019-10-10 NOTE — PROGRESS NOTES
"   LOS: 3 days    Patient Care Team:  Vidya Chávez MD as PCP - General (General Practice)  eDsmond Cardoso MD as Referring Physician (Hematology and Oncology)  Ramone Huggins MD as Consulting Physician (Radiation Oncology)  Ravi Still MD as Consulting Physician (Interventional Cardiology)  Cirilo Oquendo MD as Consulting Physician (Endocrinology)    Reason For Visit:    Subjective   Patient seen/examined.  Sodium up to 129        Review of Systems:    Pulm: No soa   CV:  No CP      Objective       aspirin 81 mg Oral Daily   carvedilol 6.25 mg Oral BID With Meals   cetirizine 5 mg Oral Daily   fludrocortisone 100 mcg Oral Daily   heparin (porcine) 5,000 Units Subcutaneous Q12H   hydrocortisone sodium succinate 50 mg Intravenous Q12H   insulin lispro 0-7 Units Subcutaneous 4x Daily With Meals & Nightly   lactulose 20 g Oral TID   levothyroxine 75 mcg Oral Q AM   pantoprazole 40 mg Oral Daily   sodium chloride 10 mL Intravenous Q12H   sodium chloride 1 g Oral TID With Meals            Vital Signs:  Blood pressure 127/77, pulse 90, temperature 98.1 °F (36.7 °C), temperature source Oral, resp. rate 16, height 154.9 cm (61\"), weight 81.9 kg (180 lb 9.6 oz), SpO2 96 %.    Flowsheet Rows      First Filed Value   Admission Height  154.9 cm (61\") Documented at 10/06/2019 1931   Admission Weight  82.1 kg (181 lb) Documented at 10/06/2019 1931          10/09 0701 - 10/10 0700  In: -   Out: 1850 [Urine:1850]    Physical Exam:    General Appearance: NAD, alert   Eyes: PER, conjunctivae and sclerae normal, no icterus  Lungs: respirations regular and unlabored, no crepitus, clear to auscultation  Heart/CV: regular rhythm & normal rate, no murmur, no gallop, no rub and no edema  Abdomen: not distended, soft, non-tender, no masses,  bowel sounds present  Skin: No rash, Warm and dry    Radiology:      Renal Imaging:        Labs:  Results from last 7 days   Lab Units 10/10/19  0438 10/09/19  0410 " 10/08/19  0655   WBC 10*3/mm3 8.57 7.67 4.74   HEMOGLOBIN g/dL 7.4* 7.7* 8.0*   HEMATOCRIT % 22.1* 22.4* 22.5*   PLATELETS 10*3/mm3 280 266 239     Results from last 7 days   Lab Units 10/10/19  0438 10/10/19  0106 10/09/19  1748 10/09/19  1203 10/09/19  0508  10/06/19  2039   SODIUM mmol/L 129* 126* 127* 125* 125*   < > 116*   POTASSIUM mmol/L 4.2 4.3 4.1 4.3 4.5   < > 4.9   CHLORIDE mmol/L 92* 90* 92* 87* 86*   < > 79*   CO2 mmol/L 24.0 22.0 24.0 23.0 23.0   < > 23.0   BUN mg/dL 23 23 22 22 24*   < > 23   CREATININE mg/dL 1.21* 1.37* 1.28* 1.17* 1.42*   < > 1.60*   CALCIUM mg/dL 9.4 8.9 9.3 9.5 9.6   < > 9.9   PHOSPHORUS mg/dL  --   --   --   --  3.8  --   --    ALBUMIN g/dL  --   --   --   --  4.60  --  4.90    < > = values in this interval not displayed.     Results from last 7 days   Lab Units 10/10/19  0438   GLUCOSE mg/dL 194*       Results from last 7 days   Lab Units 10/06/19  2039   ALK PHOS U/L 60   BILIRUBIN mg/dL 0.4   ALT (SGPT) U/L 12   AST (SGOT) U/L 16           Results from last 7 days   Lab Units 10/06/19  2132   COLOR UA  Yellow   CLARITY UA  Cloudy*   PH, URINE  <=5.0   SPECIFIC GRAVITY, URINE  1.021   GLUCOSE UA  Negative   KETONES UA  Negative   BILIRUBIN UA  Negative   PROTEIN UA  Negative   BLOOD UA  Negative   LEUKOCYTES UA  Negative   NITRITE UA  Negative       Estimated Creatinine Clearance: 46.7 mL/min (A) (by C-G formula based on SCr of 1.21 mg/dL (H)).      Assessment       Acute hyponatremia    Hyperlipidemia    Type 2 diabetes mellitus (CMS/HCC)    Tobacco abuse    Small cell lung cancer, left upper lobe (CMS/HCC)    Anemia    Generalized weakness    CKD (chronic kidney disease) stage 3, GFR 30-59 ml/min (CMS/[facility])    CAD (coronary artery disease)    COPD (chronic obstructive pulmonary disease) (CMS/[facility])    Hyponatremia        Impression:        Hyponatremia  - improving, ADH mechanism  - continue current  - hold tolvaptan currently     Hypocortisolism   - Morning cortisol 3.9  indicative of adrenal insufficiency. Her symptoms of Abdominal pain, nausea vomiting might be related to AI   - Questionable primary vs secondary AI   - wean cortef to 50 q 12 hours yesterday, continue fludrocortisone 0.1mcg daily      PET scan next week per onc, if sodium above 130 tomorrow then ok to go home from renal standpoint    Martha Mccracken, DO  10/10/19  11:39 AM

## 2019-10-10 NOTE — PLAN OF CARE
Problem: Patient Care Overview  Goal: Plan of Care Review  Outcome: Ongoing (interventions implemented as appropriate)   10/10/19 8059   Coping/Psychosocial   Plan of Care Reviewed With patient   Plan of Care Review   Progress improving   OTHER   Outcome Summary Pt able to increase gait distance. Pt limited by pain, weakness, and decreased activity sarita. Pt amb 150ft with RW with Cammy, req cues to steer RW and attention to task. Continue POC.

## 2019-10-11 NOTE — DISCHARGE PLACEMENT REQUEST
"To [facility] Palliative/[facility]osp[facility]  ATTN:  Quyen    Fax [phone]  Ph: [phone]     From Annita Hussein RN Ph: [phone]    Shauna Vera (64 y.o. Female)     Date of Birth Social Security Number Address Home Phone MRN    1955  [address] [place] [address] [phone] [id number]    [employer] Mar[employer] Status          Tenriism Single       Admission Date Admission Type Admitting Provider Attending Provider Department, Room/Bed    10/6/19 Emergency Niecy Haney MD Anciro, Audree, MD [facility] 5G, S554/1    Discharge Date Discharge Disposition Discharge Destination                       Attending Provider:  Niecy Haney MD    Allergies:  Plavix [Clopidogrel Bisulfate], Buspar [Buspirone], Codeine, Penicillins    Isolation:  None   Infection:  None   Code Status:  CPR    Ht:  154.9 cm (61\")   Wt:  84.7 kg (186 lb 11.2 oz)    Admission Cmt:  None   Principal Problem:  Acute hyponatremia [E87.1]                 Active Insurance as of 10/6/2019     Primary Coverage     Payor Plan Insurance Group Employer/Plan Group    MEDICARE MEDICARE A & B      Payor Plan Address Payor Plan Phone Number Payor Plan Fax Number Effective Dates    [address] [phone]  11/1/2008 - None Entered    [facility] [id number]       Subscriber Name Subscriber Birth Date Member ID       JODYSHAUNA SANTANA 1955 [id number]           Secondary Coverage     Payor Plan Insurance Group Employer/Plan Group    [place]      Payor Plan Address Payor Plan Phone Number Payor Plan Fax Number Effective Dates    [address] [address]   2/1/2014 - None Entered    [facility] [address]       Subscriber Name Subscriber Birth Date Member ID       VERA,SHAUNA SANTANA 1955 [id number]                 Emergency Contacts      (Rel.) Home Phone Work Phone Mobile Phone    Quarter,Jade (Daughter) -- -- [phone]    DesireReyna maguire (Sister) -- -- [phone]             " "  History & Physical      Catia Ortiz MD at 10/06/19 2327              [facility] Medicine Services  HISTORY AND PHYSICAL    Patient Name: Shauna Valencia  : 1955  MRN: [id number]  Primary Care Physician: Vidya Chávez MD  Date of admission: 10/6/2019      Subjective   Subjective     Chief Complaint:  abdominal pain    HPI:  Shauna Valencia is a 64 y.o. female with a past medical history significant for DM2, Small cell lung cancer ( left lung), anemia, CKD, III, COPD, CAD, HTN, and HLD who presents with complaints progressively worsening left lower quadrant abdominal pain going on for the past month. Pain radiates from left lower quadrant to lower back. No modifying factors. Rated 7/10 at its worst. There is associated nausea with non bloody vomiting and constipation. Last bowel movement was today. However yesterday was her first bowel movement in a week. She reports \"extremely hard\" stool when she does have a bowel movement and reports difficulty getting it out. States today she had to \" rock out her stool\" while squatting over the commode. Patient ended up losing her balance and falling in the bathroom. She is not anticoagulated. Denies syncope, head trauma or LOC.  There are no complaints of fever, cough, congestion, chest pain, or SOB. No dysuria, hematuria, or blood in stool/emesis.Pertinent surgical history includes partial colectomy in  for diverticulitis ( gene) then hernia repair in . She is scheduled to Dr. Olson at the end of October for evaluation of chronic constipation.    Patient if followed by Dr. Cardoso per oncology. She completed multiple round of chemotherapy and radiation. Last radiation was 5/10/2019. Most recent follow up with oncology was 2019 wherein she was diagnosed with lymphadenopathy secondary to radiation exposure.    Emergency Department Evaluation; vitals stable. Sodium 116. Creatinine 1.6. H/H 8.9 and 24.6 respectively. " Head CT negative. CT A/P largely unchanged. Did demonstrated paraspinal mass which was supposedly there in imaging earlier this year.    Review of Systems   Constitutional: Negative for chills and fever.   HENT: Negative for congestion and trouble swallowing.    Eyes: Negative for photophobia and visual disturbance.   Respiratory: Negative for cough and shortness of breath.    Cardiovascular: Negative for chest pain and leg swelling.   Gastrointestinal: Positive for abdominal pain, constipation, nausea and vomiting. Negative for diarrhea.   Endocrine: Negative for cold intolerance and heat intolerance.   Genitourinary: Negative for dysuria and flank pain.   Musculoskeletal: Positive for back pain. Negative for joint swelling.   Skin: Negative for pallor and rash.   Allergic/Immunologic: Positive for immunocompromised state.   Neurological: Positive for weakness. Negative for dizziness and headaches.   Hematological: Positive for adenopathy.   Psychiatric/Behavioral: Negative for agitation and confusion.          All other systems reviewed and are negative.     Personal History     Past Medical History:   Diagnosis Date   • Arthritis    • Asthma    • COPD (chronic obstructive pulmonary disease) (CMS/HCC)    • Coronary artery disease     5 stents   • Diabetes mellitus (CMS/HCC)    • Disease of thyroid gland    • DVT (deep venous thrombosis) (CMS/HCC)     this year- per pt   • GERD (gastroesophageal reflux disease)    • Hyperlipidemia    • Hypertension    • Lung cancer (CMS/HCC)    • Myocardial infarction (CMS/HCC)    • Pancreatitis    • Pancreatitis    • Renal disorder        Past Surgical History:   Procedure Laterality Date   • APPENDECTOMY     • BRONCHOSCOPY N/A 11/6/2018    Procedure: BRONCHOSCOPY WITH ENDOBRONCHIAL ULTRASOUND, biopsies, brushing and washing.;  Surgeon: Isaac Epstein MD;  Location: Replaced by [facility] ENDOSCOPY;  Service: Pulmonary   • BRONCHOSCOPY N/A 11/9/2018    Procedure: BRONCHOSCOPY WITH ENDOBRONCHIAL  ULTRASOUND;  Surgeon: Clay Scott MD;  Location: [facility] ENDOSCOPY;  Service: Pulmonary   • CAROTID STENT      5 total   • CHOLECYSTECTOMY     • COLON SURGERY     • COLONOSCOPY     • HERNIA REPAIR     • HYSTERECTOMY     • OTHER SURGICAL HISTORY      bladder sling   • THYROIDECTOMY      1994   • TONSILLECTOMY     • TOTAL HIP ARTHROPLASTY Left    • UPPER GASTROINTESTINAL ENDOSCOPY         Family History: family history includes Colon polyps in her mother; Ulcerative colitis in her sister. Otherwise pertinent FHx was reviewed and unremarkable.     Social History:  reports that she has quit smoking. Her smoking use included cigarettes. She smoked 0.50 packs per day. She has never used smokeless tobacco. She reports that she does not drink alcohol or use drugs.  Social History     Social History Narrative   • Not on file       Medications:    Available home medication information reviewed.    (Not in a hospital admission)    Allergies   Allergen Reactions   • Plavix [Clopidogrel Bisulfate] Anaphylaxis   • Codeine Other (See Comments)     Pt not certain of reatction   • Penicillins Other (See Comments)     Pt does not remember reaction       Objective   Objective     Vital Signs:   Temp:  [98.9 °F (37.2 °C)] 98.9 °F (37.2 °C)  Heart Rate:  [73-80] 73  Resp:  [16-17] 17  BP: (138-155)/(76-91) 138/91        Physical Exam   Constitutional: Awake, alert  Eyes: PERRLA, sclerae anicteric, no conjunctival injection  HENT: NCAT, mucous membranes moist  Neck: Supple, no thyromegaly, no lymphadenopathy, trachea midline  Respiratory: Clear to auscultation bilaterally, nonlabored respirations   Cardiovascular: RRR, no murmurs, rubs, or gallops, palpable pedal pulses bilaterally  Gastrointestinal: Positive bowel sounds, soft, nondistended TTP to left lower quadrant  Musculoskeletal: No bilateral ankle edema, no clubbing or cyanosis to extremities  Psychiatric: Appropriate affect, cooperative  Neurologic: Oriented x 3, strength  symmetric in all extremities, Cranial Nerves grossly intact to confrontation, speech clear  Skin: No rashes      Results Reviewed:  I have personally reviewed current lab and radiology data.    Results from last 7 days   Lab Units 10/06/19  2039   WBC 10*3/mm3 6.31   HEMOGLOBIN g/dL 8.9*   HEMATOCRIT % 24.6*   PLATELETS 10*3/mm3 275     Results from last 7 days   Lab Units 10/06/19  2039   SODIUM mmol/L 116*   POTASSIUM mmol/L 4.9   CHLORIDE mmol/L 79*   CO2 mmol/L 23.0   BUN mg/dL 23   CREATININE mg/dL 1.60*   GLUCOSE mg/dL 143*   CALCIUM mg/dL 9.9   ALT (SGPT) U/L 12   AST (SGOT) U/L 16   TROPONIN T ng/mL <0.010   PROBNP pg/mL 163.3     Estimated Creatinine Clearance: 35.4 mL/min (A) (by C-G formula based on SCr of 1.6 mg/dL (H)).  Brief Urine Lab Results  (Last result in the past 365 days)      Color   Clarity   Blood   Leuk Est   Nitrite   Protein   CREAT   Urine HCG        10/06/19 2132 Yellow Cloudy Negative Negative Negative Negative             Imaging Results (last 24 hours)     Procedure Component Value Units Date/Time    CT Abdomen Pelvis Without Contrast [[id number]] Collected:  10/06/19 2238     Updated:  10/06/19 2240    Narrative:       CT Abdomen Pelvis WO    INDICATION:   Left lower quadrant pain for one month. History of diverticulitis    TECHNIQUE:   CT of the abdomen and pelvis without IV contrast. Coronal and sagittal reconstructions were obtained.  Radiation dose reduction techniques included automated exposure control or exposure modulation based on body size. Count of known CT and cardiac nuc  med studies performed in previous 12 months: 5.     COMPARISON:   9/11/2019    FINDINGS:  Abdomen: Lung bases are clear. There is a left paraspinal mass measuring 2.3 x 1.8 cm. This is adjacent to the T10 vertebral body on the left side.. Is unchanged the more recent study but new from study from one year ago. Is also a large node anterior to  the aorta at the level the diaphragm measuring 3.0 cm which  is developed since 2018.. There is left paratracheal adenopathy in the upper abdomen to the T12  Measuring about 2.6 m in diameter. The liver, spleen, pancreas and adrenal glands are normal. The right kidney is normal. Left kidney has superior cysts which are stable. Aorta is normal in size. Bowel is normal.    Pelvis: Bladder is normal. Uterus been removed. There are no adnexal masses. There is a right hip prosthesis present      Impression:       Adenopathy is present anterior to the distal thoracic aorta, left of the T10 vertebral body and at the T12 level in the upper abdomen. These findings are all unchanged from the recent study from 9/11/2019 there are new from 2018 exam. Patient apparently  has a history of lung cancer.    There is no evidence of diverticulitis.          Signer Name: Karan Hicks MD   Signed: 10/6/2019 10:38 PM   Workstation Name: [employer]    [facility]    CT Head Without Contrast [[id number]] Collected:  10/06/19 2222     Updated:  10/06/19 2224    Narrative:       CT Head WO    HISTORY:   64-year-old female who fell in bathroom today and hit for head on ground. In the ED tonight. No reported loss of consciousness.    TECHNIQUE:   Axial unenhanced head CT. Radiation dose reduction techniques included automated exposure control or exposure modulation based on body size. Count of known CT and cardiac nuc med studies performed in previous 12 months: 11.     Time of scan: 2209 hours    COMPARISON:   None.    FINDINGS:   No intracranial hemorrhage, mass, or infarct. No hydrocephalus or extra-axial fluid collection. There are senescent changes, including volume loss and nonspecific white matter change, but no acute abnormality is seen. The skull base, calvarium, and  extracranial soft tissues are normal.      Impression:       Senescent changes without acute abnormality.          Signer Name: Jose Simmons MD   Signed: 10/6/2019 10:22 PM   Workstation Name: [employer]     [facility]    XR Chest 1 View [[id number]] Collected:  10/06/19 2121     Updated:  10/06/19 2123    Narrative:       CR Chest 1 Vw    INDICATION:   Shortness of air. Left-sided abdominal pain.     COMPARISON:    9/11/2018, CT chest 6/30/2019    FINDINGS:  Single portable AP view(s) of the chest.  Heart size and vascular normal. There is increased in the left suprahilar region. This was seen on the CT scan 6/20/2019 and represented atelectasis or perhaps radiation therapy change involving the left lower  lobe superior segment. It is unchanged from that examination as well as an old chest x-ray dating back to November 9018      Impression:       No change. Stable left perihilar scarring. No active disease    Signer Name: Karan Hicks MD   Signed: 10/6/2019 9:21 PM   Workstation Name: [email or web address]    [facility]             Assessment/Plan   Assessment / Plan     Active Hospital Problems    Diagnosis POA   • **Acute hyponatremia [E87.1] Yes     Priority: High   • Generalized weakness [R53.1] Yes     Priority: High   • CAD (coronary artery disease) [I25.10] Yes     Priority: Medium   • COPD (chronic obstructive pulmonary disease) (CMS/[facility]) [J44.9] Yes     Priority: Medium   • CKD (chronic kidney disease) stage 3, GFR 30-59 ml/min (CMS/[facility]) [N18.3] Yes     Priority: Medium   • Anemia [D64.9] Yes     Priority: Medium   • Small cell lung cancer, left upper lobe (CMS/HCC) [C34.12] Yes     Priority: Medium   • Type 2 diabetes mellitus (CMS/[facility]) [E11.9] Yes     Priority: Medium   • Hyperlipidemia [E78.5] Yes     Priority: Low   • Tobacco abuse [Z72.0] Yes     Priority: Low         1. Acute hyponatremia:  - no AMS, seizure activity  - patient has a history of hyponatremia in 10/2018 found to be secondary to SIADH in the setting of new lung mass.  - was followed by nephrology and treated with samsca  - check uric acid, serum and urine osmolality plus urine sodium  - am cortisol  and TSH  - trend chemistry every 4-6 hours  - administered 1L bolus in ED  - consider courtesy consult to heme/onc      2. CAD:  - s/p stent placement  - stable. On ASA and statin  - EKG as needed    3. COPD:  - continue home bronchodilating agents  - additional pulmonary toilet as needed    4. HTN:  - stable and controlled. Continue home meds    5. Anemia:  - H/H stable and at baseline. Improved from 9/19  - monitor    6. DM2:  - on SSI at home. Continue low level SSI as inpatient with scheduled accu checks    7. CKD III:  - has been followed by [facility]  - stable. Baseline around 1.9.   - monitor and avoid nephrotoxins      DVT prophylaxis:  SQH    CODE STATUS:  Full code  Code Status and Medical Interventions:   Ordered at: 10/06/19 2311     Level Of Support Discussed With:    Patient     Code Status:    CPR     Medical Interventions (Level of Support Prior to Arrest):    Full       Admission Status:  I believe this patient meets INPATIENT status due to IVF.  I feel patient’s risk for adverse outcomes and need for care warrant INPATIENT evaluation and I predict the patient’s care encounter to likely last beyond 2 midnights.        Electronically signed by Brigitte Sandoval PA-C, 10/06/19, 11:24 PM.      Brief Attending Admission Attestation     I have seen and examined the patient, performing an independent face-to-face diagnostic evaluation with plan of care reviewed and developed with the advanced practice clinician (APC).         Vitals:    10/07/19 0001   BP:  160/91   Pulse:  73   Resp:  17   Temp:  Afebrile.   SpO2: 99%       Attending Physical Exam:  RS- CTA-BL, ,  No wheezing , no crackles, good effort.  CVS- s1s2 regular, no murmur.  ABD- soft, non tender, not distended, no organomegaly.  EXT- no edema.mucosa moist  NEURO- AAO-3, no focal defecit.      Old records reviewed and summarized in PM hx.    Brief Summary Statement:   64-year-old female presented to ED with a chief complaint of fall- which appears to  be mechanical, denies any complaint of lightheadedness dizziness or any syncope event.  Patient does complain of generalized weakness, with some lightheadedness on standing up.  In addition to that patient does have a complaint of left flank/left lower abdominal pain-going on for past few weeks.  She was constipated, she did have a bowel movement on Saturday-in spite of that her pain is still persistent.  Denies any bleeding per GI or .  She does not complain of any worsening of her lower back pain, denies any complaint of fever, does complain of nausea/occasional vomiting episodes associated with the pain.  Denies any complaint of epigastric pain.    In ER patient got 1 and half liter of normal saline, Dilaudid 0.5 mg.  Remainder of detailed HPI is as noted above and has been reviewed and/or edited by me for completeness.    PM HX :  Small cell lung cancer-involving left upper lobe of the lung- diagnosed in November 2018-status post chemo cisplatin/etoposide-completed in February.  -Also had radiation to the lungs, and radiation to the brain.  All get completed in May.  History of CAD-on aspirin, her primary cardiologist Dr. Salazar discontinued her Brilinta recently.  Hypothyroid-Synthroid 75 mcg  Hypertension-lisinopril 10 mg,  Chronic constipation  GERD  DM2  CKD-stage III-stage IV  Anemia-normocytic.    LABS:  Troponin-less than 0.01, proBNP of 163  Blood glucose-143  Sodium of 116, potassium 4.9, BUN/creatinine 23/1.6  WBC-6, hemoglobin of 9, MCV of 87, neutrophil of 66  UA- cloudy, WBC 3-5, bacteria trace, squamous epithelial 3-12.  CT head no acute changes.  CT abdomen-again seen adenopathy anterior to distal thoracic aorta, at the level of T10 vertebra, also at the level of T12.-  Has been seen for the first time in September   Chest x-ray-no change, stable left perihilar scarring.  EKG-normal sinus rhythm at 78 bpm, , no acute ST-T wave changes.    Brief Assessment/Plan :  Fall-mechanical, does not  "appear to have any syncope- PT OT in a.m.  Hyponatremia- clinically she looks mildly dehydrated, given her history of small cell CA-need to rule out recurrence of malignancy- we will recheck urine sodium, serum sodium, serum osmolality, hold further fluids.  Nephrology consult if recheck is still on the lower side  Left lower quadrant pain along with constipation- did not improve even after having a bowel movement still has reproducible tenderness-CT abdomen do not show any significant changes- likely will benefit from Dr. Padilla consult to rule out GI pathology inpt vs outpt.  Again on the CT scan of the abdomen- we see enlarged lymph nodes- consult Dr. Cardoso    See above for further detailed assessment and plan developed with [facility] which I have reviewed and/or edited for completeness.    Electronically signed by Catia Ortiz MD, 10/07/19, 12:26 AM.        Electronically signed by Catia Ortiz MD at 10/07/19 0211       [facility] Medications (active)       Dose Frequency Start End    acetaminophen (TYLENOL) suppository 650 mg 650 mg Once 10/11/2019     Sig - Route: Insert 1 suppository into the rectum 1 (One) Time. - Rectal    Linked Group 1:  \"Or\" Linked Group Details        acetaminophen (TYLENOL) tablet 650 mg 650 mg Once 10/11/2019     Sig - Route: Take 2 tablets by mouth 1 (One) Time. - Oral    Linked Group 1:  \"Or\" Linked Group Details        ALPRAZolam (XANAX) tablet 0.25 mg 0.25 mg 3 Times Daily PRN 10/8/2019 10/18/2019    Sig - Route: Take 1 tablet by mouth 3 (Three) Times a Day As Needed for Anxiety. - Oral    aspirin EC tablet 81 mg 81 mg Daily 10/7/2019     Sig - Route: Take 1 tablet by mouth Daily. - Oral    carvedilol (COREG) tablet 6.25 mg 6.25 mg 2 Times Daily With Meals 10/7/2019     Sig - Route: Take 1 tablet by mouth 2 (Two) Times a Day With Meals. - Oral    cetirizine (zyrTEC) tablet 5 mg 5 mg Daily 10/7/2019     Sig - Route: Take 0.5 tablets by mouth Daily. - Oral    cyclobenzaprine " (FLEXERIL) tablet 5 mg 5 mg Every 6 Hours PRN 10/11/2019     Sig - Route: Take 0.5 tablets by mouth Every 6 (Six) Hours As Needed for Muscle Spasms. - Oral    dextrose (D50W) 25 g/ 50mL Intravenous Solution 25 g 25 g Every 15 Minutes PRN 10/7/2019     Sig - Route: Infuse 50 mL into a venous catheter Every 15 (Fifteen) Minutes As Needed for Low Blood Sugar (Blood Sugar Less Than 70). - Intravenous    dextrose (GLUTOSE) oral gel 15 g 15 g Every 15 Minutes PRN 10/7/2019     Sig - Route: Take 15 application by mouth Every 15 (Fifteen) Minutes As Needed for Low Blood Sugar (Blood sugar less than 70). - Oral    dicyclomine (BENTYL) tablet 20 mg 20 mg Every 8 Hours PRN 10/7/2019     Sig - Route: Take 1 tablet by mouth Every 8 (Eight) Hours As Needed (pain, cramps). - Oral    docusate sodium (COLACE) capsule 100 mg 100 mg 2 Times Daily PRN 10/7/2019     Sig - Route: Take 1 capsule by mouth 2 (Two) Times a Day As Needed for Constipation. - Oral    DULoxetine (CYMBALTA) DR capsule 30 mg 30 mg Daily 10/10/2019     Sig - Route: Take 1 capsule by mouth Daily. - Oral    fludrocortisone tablet 100 mcg 100 mcg Daily 10/7/2019     Sig - Route: Take 1 tablet by mouth Daily. - Oral    furosemide (LASIX) injection 20 mg 20 mg Once 10/11/2019     Sig - Route: Infuse 2 mL into a venous catheter 1 (One) Time. - Intravenous    glucagon (human recombinant) (GLUCAGEN DIAGNOSTIC) injection 1 mg 1 mg Every 15 Minutes PRN 10/7/2019     Sig - Route: Inject 1 mg under the skin into the appropriate area as directed Every 15 (Fifteen) Minutes As Needed for Low Blood Sugar (Blood Glucose Less Than 70). - Subcutaneous    heparin (porcine) 5000 UNIT/ML injection 5,000 Units 5,000 Units Every 12 Hours Scheduled 10/7/2019     Sig - Route: Inject 1 mL under the skin into the appropriate area as directed Every 12 (Twelve) Hours. - Subcutaneous    HYDROcodone-acetaminophen (NORCO) 7.5-325 MG per tablet 1 tablet 1 tablet Every 6 Hours PRN 10/7/2019  10/17/2019    Sig - Route: Take 1 tablet by mouth Every 6 (Six) Hours As Needed for Moderate Pain . - Oral    HYDROcodone-acetaminophen (NORCO) 7.5-325 MG per tablet 1 tablet 1 tablet Every 8 Hours 10/10/2019 10/20/2019    Sig - Route: Take 1 tablet by mouth Every 8 (Eight) Hours. - Oral    hydrocortisone sodium succinate (Solu-CORTEF) injection 50 mg 50 mg Every 12 Hours 10/9/2019     Sig - Route: Infuse 50 mg into a venous catheter Every 12 (Twelve) Hours. - Intravenous    Influenza Vac Subunit Quad (FLUCELVAX) injection 0.5 mL 0.5 mL During Hospitalization 10/7/2019     Sig - Route: Inject 0.5 mL into the appropriate muscle as directed by prescriber During Hospitalization for Immunization. - Intramuscular    Cosign for Ordering: Accepted by Catia Ortiz MD on 10/7/2019  4:01 AM    insulin lispro (humaLOG) injection 0-7 Units 0-7 Units 4 Times Daily With Meals & Nightly 10/7/2019     Sig - Route: Inject 0-7 Units under the skin into the appropriate area as directed 4 (Four) Times a Day With Meals & at Bedtime. - Subcutaneous    lactulose (CHRONULAC) 10 GM/15ML solution 20 g 20 g 3 Times Daily 10/7/2019     Sig - Route: Take 30 mL by mouth 3 (Three) Times a Day. - Oral    levothyroxine (SYNTHROID, LEVOTHROID) tablet 75 mcg 75 mcg Every Early Morning 10/7/2019     Sig - Route: Take 1 tablet by mouth Every Morning. - Oral    lidocaine (LIDODERM) 5 % 1 patch 1 patch Every 24 Hours Scheduled 10/10/2019     Sig - Route: Place 1 patch on the skin as directed by provider Daily. - Transdermal    melatonin tablet 5 mg 5 mg Nightly PRN 10/7/2019     Sig - Route: Take 1 tablet by mouth At Night As Needed for Sleep. - Oral    ondansetron (ZOFRAN) 4 MG tablet  - ADS Override Pull   10/11/2019 10/11/2019    Notes to Pharmacy: Created by cabinet override    ondansetron (ZOFRAN) injection 4 mg 4 mg Every 6 Hours PRN 10/7/2019     Sig - Route: Infuse 2 mL into a venous catheter Every 6 (Six) Hours As Needed for Nausea or  "Vomiting. - Intravenous    ondansetron (ZOFRAN) tablet 4 mg 4 mg Every 6 Hours PRN 10/11/2019     Sig - Route: Take 1 tablet by mouth Every 6 (Six) Hours As Needed for Nausea or Vomiting. - Oral    pantoprazole (PROTONIX) EC tablet 40 mg 40 mg Daily 10/7/2019     Sig - Route: Take 1 tablet by mouth Daily. - Oral    prochlorperazine (COMPAZINE) injection 5 mg 5 mg Every 6 Hours PRN 10/7/2019     Sig - Route: Infuse 1 mL into a venous catheter Every 6 (Six) Hours As Needed for Nausea or Vomiting. - Intravenous    Linked Group 2:  \"Or\" Linked Group Details        prochlorperazine (COMPAZINE) suppository 25 mg 25 mg Every 12 Hours PRN 10/7/2019     Sig - Route: Insert 1 suppository into the rectum Every 12 (Twelve) Hours As Needed for Nausea or Vomiting. - Rectal    Linked Group 2:  \"Or\" Linked Group Details        prochlorperazine (COMPAZINE) tablet 5 mg 5 mg Every 6 Hours PRN 10/7/2019     Sig - Route: Take 1 tablet by mouth Every 6 (Six) Hours As Needed for Nausea or Vomiting. - Oral    Linked Group 2:  \"Or\" Linked Group Details        sodium chloride 0.9 % flush 10 mL 10 mL As Needed 10/6/2019     Sig - Route: Infuse 10 mL into a venous catheter As Needed for Line Care. - Intravenous    Cosign for Ordering: Accepted by Mannie Chan MD on 10/6/2019 10:18 PM    sodium chloride 0.9 % flush 10 mL 10 mL As Needed 10/6/2019     Sig - Route: Infuse 10 mL into a venous catheter As Needed for Line Care. - Intravenous    Linked Group 3:  \"And\" Linked Group Details        sodium chloride 0.9 % flush 10 mL 10 mL Every 12 Hours Scheduled 10/7/2019     Sig - Route: Infuse 10 mL into a venous catheter Every 12 (Twelve) Hours. - Intravenous    sodium chloride 0.9 % flush 10 mL 10 mL As Needed 10/7/2019     Sig - Route: Infuse 10 mL into a venous catheter As Needed for Line Care. - Intravenous    sodium chloride tablet 1 g 1 g 3 Times Daily With Meals 10/8/2019     Sig - Route: Take 1 tablet by mouth 3 (Three) Times a Day " With Meals. - Oral             Physician Progress Notes (most recent note)      Isaias Richardson, DO at 10/11/19 1234          Palliative Care Progress Note    Date of Admission: 10/6/2019    Subjective: Patient continues to complain of pain primarily in her left side area.  States that the pain is a 7 out of 10.  Pain medicine does help but does not sufficiently diminish the pain.  Even with this the patient does state that she is able to tolerate the current pain.    Daughter is concerned due to the patient's confusion.  Current Code Status     Date Active Code Status Order ID Comments User Context       10/6/2019 23:11 CPR [id number]  Brigitte Sandoval PA-C ED       Questions for Current Code Status     Question Answer Comment    Code Status CPR     Medical Interventions (Level of Support Prior to Arrest) Full     Level Of Support Discussed With Patient         No current facility-administered medications on file prior to encounter.      Current Outpatient Medications on File Prior to Encounter   Medication Sig Dispense Refill   • aspirin 81 MG EC tablet Take 81 mg by mouth Daily.     • carvedilol (COREG) 6.25 MG tablet Take 6.25 mg by mouth 2 (Two) Times a Day With Meals.     • cetirizine (zyrTEC) 10 MG tablet Take 10 mg by mouth Daily.     • cyclobenzaprine (FLEXERIL) 10 MG tablet Take 1 tablet by mouth 3 (Three) Times a Day As Needed for Muscle Spasms. 90 tablet 0   • dicyclomine (BENTYL) 20 MG tablet Take 1 tablet by mouth Every 8 (Eight) Hours As Needed (pain, cramps). 20 tablet 0   • docusate sodium (COLACE) 100 MG capsule Take 1 capsule by mouth 2 (Two) Times a Day. (Patient taking differently: Take 100 mg by mouth 2 (Two) Times a Day As Needed.) 60 capsule 3   • famotidine (PEPCID) 20 MG tablet Take 20 mg by mouth 2 (Two) Times a Day.     • HYDROcodone-acetaminophen (NORCO) 7.5-325 MG per tablet Take 1 tablet by mouth Every 6 (Six) Hours As Needed for Moderate Pain .     • insulin aspart (novoLOG FLEXPEN)  "100 UNIT/ML solution pen-injector sc pen Inject 0-10 Units under the skin into the appropriate area as directed 3 (Three) Times a Day With Meals. SEE instructions for Sliding Scale 15 mL 0   • lactulose (CHRONULAC) 10 GM/15ML solution Take 30 mL by mouth 3 (Three) Times a Day as needed for constipation 240 mL 2   • levothyroxine (SYNTHROID, LEVOTHROID) 75 MCG tablet Take 75 mcg by mouth Daily.     • Magic mouthwash Radonc Swish and swallow 5-10 mL 4 (Four) Times a Day Before Meals & at Bedtime. 480 mL 2   • ondansetron (ZOFRAN) 4 MG tablet Take 1 tablet by mouth Every 8 (Eight) Hours As Needed for Nausea. 15 tablet 0   • ondansetron (ZOFRAN) 8 MG tablet TAKE ONE TABLET BY MOUTH THREE TIMES A DAY AS NEEDED FOR NAUSEA AND VOMITING 90 tablet 5   • pantoprazole (PROTONIX) 40 MG EC tablet Take 40 mg by mouth Daily.     • promethazine (PHENERGAN) 12.5 MG tablet Take 2 tablets by mouth Every 8 (Eight) Hours As Needed for Nausea or Vomiting. 20 tablet 0   • promethazine (PHENERGAN) 25 MG tablet Take 1 tablet by mouth Every 6 (Six) Hours As Needed for Nausea or Vomiting. 45 tablet 5        ALPRAZolam  •  cyclobenzaprine  •  dextrose  •  dextrose  •  dicyclomine  •  docusate sodium  •  glucagon (human recombinant)  •  HYDROcodone-acetaminophen  •  influenza vaccine  •  melatonin  •  ondansetron  •  ondansetron  •  prochlorperazine **OR** prochlorperazine **OR** prochlorperazine  •  sodium chloride  •  [COMPLETED] Insert peripheral IV **AND** sodium chloride  •  sodium chloride    Objective: /72   Pulse 103   Temp 98 °F (36.7 °C) (Oral)   Resp 18   Ht 154.9 cm (61\")   Wt 84.7 kg (186 lb 11.2 oz)   SpO2 92%   BMI 35.28 kg/m²       Intake/Output Summary (Last 24 hours) at 10/11/2019 7958  Last data filed at 10/11/2019 1000  Gross per 24 hour   Intake 1080 ml   Output 500 ml   Net 580 ml     Physical Exam:      General Appearance:    Alert, cooperative, slow to respond and appears to have some underlying confusion "   Head:    Normocephalic, without obvious abnormality, atraumatic   Eyes:            Lids and lashes normal, conjunctivae and sclerae normal, no   icterus, no pallor, corneas clear, PERRLA   Ears:    Ears appear intact with no abnormalities noted   Throat:   No oral lesions, no thrush, oral mucosa moist   Neck:   No adenopathy, supple, trachea midline, no thyromegaly, no     carotid bruit, no JVD   Back:     No kyphosis present, no scoliosis present, no skin lesions,       erythema or scars, no tenderness to percussion or                   palpation,   range of motion normal   Lungs:     Clear to auscultation,respirations regular, even and                   unlabored    Heart:    Regular rhythm and normal rate, normal S1 and S2, no            murmur, no gallop, no rub, no click   Breast Exam:    Deferred   Abdomen:     Normal bowel sounds, no masses, no organomegaly, soft        non-tender, non-distended, no guarding, no rebound                 tenderness   Genitalia:    Deferred   Extremities:   Moves all extremities well, no edema, no cyanosis, no              redness   Pulses:   Pulses palpable and equal bilaterally   Skin:   No bleeding, bruising or rash   Lymph nodes:   No palpable adenopathy   Neurologic:   Cranial nerves 2 - 12 grossly intact, sensation intact, DTR        present and equal bilaterally     Results from last 7 days   Lab Units 10/11/19  0255   WBC 10*3/mm3 10.65   HEMOGLOBIN g/dL 7.5*   HEMATOCRIT % 22.0*   PLATELETS 10*3/mm3 259     Results from last 7 days   Lab Units 10/11/19  1133  10/06/19  2039   SODIUM mmol/L 132*   < > 116*   POTASSIUM mmol/L 4.0   < > 4.9   CHLORIDE mmol/L 98   < > 79*   CO2 mmol/L 26.0   < > 23.0   BUN mg/dL 27*   < > 23   CREATININE mg/dL 1.43*   < > 1.60*   CALCIUM mg/dL 9.3   < > 9.9   BILIRUBIN mg/dL  --   --  0.4   ALK PHOS U/L  --   --  60   ALT (SGPT) U/L  --   --  12   AST (SGOT) U/L  --   --  16   GLUCOSE mg/dL 148*   < > 143*    < > = values in this interval  not displayed.       Impression: SCLC  Neuropathic pain  Anxiety  [facility]      Plan: In respect to the patient's symptoms we will continue patient recurrent symptom management regimen.  I did discuss with the daughter that the current mental status of the patient may be a new baseline given the patient's multiple medical issues, radiation, as well as medication.        Isaias Richardson DO  10/11/19  12:34 PM        Electronically signed by Isaias Richardson DO at 10/11/19 1236          Consult Notes (most recent note)      Brea Caro LMT at 10/11/19 1123      Consult Orders    1. Inpatient Integrative Care Consult [[id number]] ordered by Isaias Richardson DO at 10/10/19 1810              Pt seen by kemi for massage therapy, see flowsheet.    Electronically signed by Brea Caro LMT at 10/11/19 1156          Physical Therapy Notes (most recent note)      Yu Corea, PT at 10/10/19 1555  Version 1 of 1         Patient Name: Shauna Valencia  : 1955    MRN: [id number]                              Today's Date: 10/10/2019       Admit Date: 10/6/2019    Visit Dx:     ICD-10-CM ICD-9-CM   1. Hyponatremia E87.1 276.1   2. Weakness R53.1 780.79   3. Left lower quadrant abdominal pain R10.32 789.04   4. Chronic renal failure, unspecified CKD stage N18.9 585.9   5. Anemia, unspecified type D64.9 285.9   6. Impaired functional mobility, balance, gait, and endurance Z74.09 V49.89     Patient Active Problem List   Diagnosis   • Acute hyponatremia   • Hyperlipidemia   • Type 2 diabetes mellitus (CMS/HCC)   • Tobacco abuse   • Obesity (BMI 30-39.9)   • Mass of upper lobe of left lung   • Mediastinal lymphadenopathy   • Small cell lung cancer, left upper lobe (CMS/HCC)   • Dehydration   • Anemia   • Hyperkalemia   • Symptomatic anemia   • Generalized weakness   • Nausea & vomiting   • CKD (chronic kidney disease) stage 3, GFR 30-59 ml/min (CMS/HCC)   • Hypomagnesemia   • Hypocalcemia   • CAD (coronary artery  disease)   • COPD (chronic obstructive pulmonary disease) (CMS/HCC)   • Hyponatremia     Past Medical History:   Diagnosis Date   • Arthritis    • Asthma    • COPD (chronic obstructive pulmonary disease) (CMS/HCC)    • Coronary artery disease     5 stents   • Diabetes mellitus (CMS/HCC)    • Disease of thyroid gland    • DVT (deep venous thrombosis) (CMS/HCC)     this year- per pt   • GERD (gastroesophageal reflux disease)    • Hyperlipidemia    • Hypertension    • Lung cancer (CMS/HCC)    • Myocardial infarction (CMS/HCC)    • Pancreatitis    • Pancreatitis    • Renal disorder      Past Surgical History:   Procedure Laterality Date   • APPENDECTOMY     • BRONCHOSCOPY N/A 11/6/2018    Procedure: BRONCHOSCOPY WITH ENDOBRONCHIAL ULTRASOUND, biopsies, brushing and washing.;  Surgeon: Isaac Epstein MD;  Location:  [facility];  Service: Pulmonary   • BRONCHOSCOPY N/A 11/9/2018    Procedure: BRONCHOSCOPY WITH ENDOBRONCHIAL ULTRASOUND;  Surgeon: Clay Scott MD;  Location:  [facility];  Service: Pulmonary   • CAROTID STENT      5 total   • CHOLECYSTECTOMY     • COLON SURGERY     • COLONOSCOPY     • HERNIA REPAIR     • HYSTERECTOMY     • OTHER SURGICAL HISTORY      bladder sling   • THYROIDECTOMY      1994   • TONSILLECTOMY     • TOTAL HIP ARTHROPLASTY Left    • UPPER GASTROINTESTINAL ENDOSCOPY       General Information     Row Name 10/10/19 1518          PT Evaluation Time/Intention    Document Type  therapy note (daily note)  -     Mode of Treatment  physical therapy;individual therapy  -[facility] Name 10/10/19 1518          General Information    Existing Precautions/Restrictions  fall  -     Barriers to Rehab  cognitive status;hearing deficit  -[facility] Name 10/10/19 1518          Cognitive Assessment/Intervention- PT/OT    Orientation Status (Cognition)  oriented x 3  -     Row Name 10/10/19 1518          Safety Issues, Functional Mobility    Safety Issues Affecting Function (Mobility)   insight into deficits/self awareness;positioning of assistive device;sequencing abilities  -SJ     Impairments Affecting Function (Mobility)  cognition;postural/trunk control;strength  -SJ       User Key  (r) = Recorded By, (t) = Taken By, (c) = Cosigned By    Initials Name Provider Type    Yu Ortega PT Physical Therapist        Mobility     Row Name 10/10/19 1555          Bed Mobility Assessment/Treatment    Bed Mobility Assessment/Treatment  sit-supine  -SJ     Supine-Sit [place] (Bed Mobility)  supervision  -SJ     Sit-Supine [place] (Bed Mobility)  supervision  -SJ     Comment (Bed Mobility)  setup for lines, cues for sequencing  -SJ     Row Name 10/10/19 1559          Transfer Assessment/Treatment    Comment (Transfers)  cues for safety and sequencing. Pt performed toilet t/f with CGA  -SJ     Row Name 10/10/19 7294          Sit-Stand Transfer    Sit-Stand [place] (Transfers)  contact guard  -SJ     Assistive Device (Sit-Stand Transfers)  walker, front-wheeled  -SJ     Row Name 10/10/19 1532          Gait/Stairs Assessment/Training    78175 - Gait Training Minutes   23  -SJ     Gait/Stairs Assessment/Training  gait/ambulation independence  -SJ     [place] Level (Gait)  minimum assist (75% patient effort);verbal cues  -SJ     Assistive Device (Gait)  walker, front-wheeled  -SJ     Distance in Feet (Gait)  150  -SJ     Pattern (Gait)  step-through  -SJ     Deviations/Abnormal Patterns (Gait)  base of support, narrow;tyrone decreased;stride length decreased  -SJ     Bilateral Gait Deviations  forward flexed posture;heel strike decreased;weight shift ability decreased  -SJ     Comment (Gait/Stairs)  Pt had increased abdominal pain with mobility, req assist to steer RW. Pt req cues for attention to task.  -SJ       User Key  (r) = Recorded By, (t) = Taken By, (c) = Cosigned By    Initials Name Provider Type    Yu Ortega PT Physical Therapist        Obj/Interventions    No  documentation.       Goals/Plan    No documentation.       Clinical Impression     Row Name 10/10/19 1552          Pain Scale: Numbers Pre/Post-Treatment    Pain Scale: Numbers, Pretreatment  3/10  -     Pain Scale: Numbers, Post-Treatment  3/10  -SJ     Pain Location - Orientation  lower  -     Pain Location  abdomen  -     Pain Intervention(s)  Repositioned;Ambulation/increased activity  -     Row Name 10/10/19 1552          Vital Signs    Pre Systolic BP Rehab  127  -SJ     Pre Treatment Diastolic BP  77  -SJ     Pretreatment Heart Rate (beats/min)  90  -SJ     Pre SpO2 (%)  94  -SJ     O2 Delivery Pre Treatment  room air  -     Row Name 10/10/19 1552          Positioning and Restraints    Pre-Treatment Position  in bed  -     Post Treatment Position  bed  -     In Bed  fowlers;exit alarm on;encouraged to call for assist;with family/caregiver  -       User Key  (r) = Recorded By, (t) = Taken By, (c) = Cosigned By    Initials Name Provider Type    Yu Ortega, PT Physical Therapist        Outcome Measures    No documentation.       Physical Therapy Education     Title: PT OT SLP Therapies (In Progress)     Topic: Physical Therapy (In Progress)     Point: Mobility training (In Progress)     Learning Progress Summary           Patient Acceptance, E,D, VU,DU,NR by  at 10/10/2019  3:54 PM    Eager, E,D, NR by DM at 10/9/2019 10:29 AM   Family Eager, E,D, NR by DM at 10/9/2019 10:29 AM                   Point: Home exercise program (In Progress)     Learning Progress Summary           Patient Acceptance, E,D, VU,DU,NR by  at 10/10/2019  3:54 PM    Eager, E,D, NR by DM at 10/9/2019 10:29 AM   Family Eager, E,D, NR by DM at 10/9/2019 10:29 AM                   Point: Body mechanics (In Progress)     Learning Progress Summary           Patient Acceptance, E,D, VU,DU,NR by  at 10/10/2019  3:54 PM    Eager, E,D, NR by DM at 10/9/2019 10:29 AM   Family Eager, E,D, NR by DM at 10/9/2019 10:29 AM                    Point: Precautions (In Progress)     Learning Progress Summary           Patient Acceptance, ESTHER SHANE, VU,DU,NR by  at 10/10/2019  3:54 PM    ACOSTA Meeks D, NR by DM at 10/9/2019 10:29 AM   Family ACOSTA Meeks D, NR by DM at 10/9/2019 10:29 AM                               User Key     Initials Effective Dates Name Provider Type [facility] 06/19/15 -  Yu Corea, PT Physical Therapist PT    [facility] 06/19/15 -  Urmila Scott PT Physical Therapist PT              PT Recommendation and Plan     Plan of Care Reviewed With: patient  Progress: improving  Outcome Summary: Pt able to increase gait distance. Pt limited by pain, weakness, and decreased activity sarita. Pt amb 150ft with RW with Cammy, req cues to steer RW and attention to task. Continue POC.     Time Calculation:   PT Charges     Row Name 10/10/19 1555 10/10/19 1550          Time Calculation    Start Time  1518  -SJ  --     PT Received On  10/10/19  -  --     PT Goal Re-Cert Due Date  10/19/19  -  --        Time Calculation- PT    Total Timed Code Minutes- PT  23 minute(s)  -  --        Timed Charges    74481 - Gait Training Minutes   --  23  -SJ       User Key  (r) = Recorded By, (t) = Taken By, (c) = Cosigned By    Initials Name Provider Type     Yu Corea, PT Physical Therapist        Therapy Charges for Today     Code Description Service Date Service Provider Modifiers Qty    [id number] HC GAIT TRAINING EA 15 MIN 10/10/2019 Yu Corea PT GP 2          PT G-Codes  Outcome Measure Options: AM-PAC 6 Clicks Basic Mobility (PT)  AM-PAC 6 Clicks Score (PT): 21    Yu Corea PT  10/10/2019         Electronically signed by Yu Corea PT at 10/10/19 1555          Occupational Therapy Notes (most recent note)      [place], OT at 10/11/19 1000          Acute Care - Occupational Therapy Initial Evaluation  [facility]     Patient Name: Shauna Valencia  : 1955  MRN: [id number]  Today's Date:  10/11/2019  Onset of Illness/Injury or Date of Surgery: 10/06/19          Admit Date: 10/6/2019       ICD-10-CM ICD-9-CM   1. Hyponatremia E87.1 276.1   2. Weakness R53.1 780.79   3. Left lower quadrant abdominal pain R10.32 789.04   4. Chronic renal failure, unspecified CKD stage N18.9 585.9   5. Anemia, unspecified type D64.9 285.9   6. Impaired functional mobility, balance, gait, and endurance Z74.09 V49.89   7. Impaired mobility and ADLs Z74.09 799.89     Patient Active Problem List   Diagnosis   • Acute hyponatremia   • Hyperlipidemia   • Type 2 diabetes mellitus (CMS/HCC)   • Tobacco abuse   • Obesity (BMI 30-39.9)   • Mass of upper lobe of left lung   • Mediastinal lymphadenopathy   • Small cell lung cancer, left upper lobe (CMS/HCC)   • Dehydration   • Anemia   • Hyperkalemia   • Symptomatic anemia   • Generalized weakness   • Nausea & vomiting   • CKD (chronic kidney disease) stage 3, GFR 30-59 ml/min (CMS/HCC)   • Hypomagnesemia   • Hypocalcemia   • CAD (coronary artery disease)   • COPD (chronic obstructive pulmonary disease) (CMS/HCC)   • Hyponatremia     Past Medical History:   Diagnosis Date   • Arthritis    • Asthma    • COPD (chronic obstructive pulmonary disease) (CMS/HCC)    • Coronary artery disease     5 stents   • Diabetes mellitus (CMS/HCC)    • Disease of thyroid gland    • DVT (deep venous thrombosis) (CMS/HCC)     this year- per pt   • GERD (gastroesophageal reflux disease)    • Hyperlipidemia    • Hypertension    • Lung cancer (CMS/HCC)    • Myocardial infarction (CMS/HCC)    • Pancreatitis    • Pancreatitis    • Renal disorder      Past Surgical History:   Procedure Laterality Date   • APPENDECTOMY     • BRONCHOSCOPY N/A 11/6/2018    Procedure: BRONCHOSCOPY WITH ENDOBRONCHIAL ULTRASOUND, biopsies, brushing and washing.;  Surgeon: Isaac Epstein MD;  Location: [facility] ENDOSCOPY;  Service: Pulmonary   • BRONCHOSCOPY N/A 11/9/2018    Procedure: BRONCHOSCOPY WITH ENDOBRONCHIAL ULTRASOUND;   Surgeon: Clay Scott MD;  Location: [facility] ENDOSCOPY;  Service: Pulmonary   • CAROTID STENT      5 total   • CHOLECYSTECTOMY     • COLON SURGERY     • COLONOSCOPY     • HERNIA REPAIR     • HYSTERECTOMY     • OTHER SURGICAL HISTORY      bladder sling   • THYROIDECTOMY      1994   • TONSILLECTOMY     • TOTAL HIP ARTHROPLASTY Left    • UPPER GASTROINTESTINAL ENDOSCOPY            OT ASSESSMENT FLOWSHEET (last 12 hours)      Occupational Therapy Evaluation     Row Name 10/11/19 0907                   OT Evaluation Time/Intention    Subjective Information  complains of;pain;fatigue;dyspnea  -SANA        Document Type  evaluation  -SANA        Mode of Treatment  individual therapy;occupational therapy  -SANA        Patient Effort  excellent  -SANA        Symptoms Noted During/After Treatment  shortness of breath;fatigue  -SANA        Comment  post mobilizing pt. reporting feeling SOA, bu 02 sats were 96%.  -SANA           General Information    Patient Profile Reviewed?  yes  -SANA        Onset of Illness/Injury or Date of Surgery  10/06/19  -SANA        Patient Observations  cooperative;alert;agree to therapy  -SANA        Patient/Family Observations  Daughter present. Pt. sitting up in bed sipping coffee, tele, room air.  -SANA        Prior Level of Function  independent:;all household mobility;community mobility;ADL's;driving;shopping;home management;dependent:;yard work until recently struggling some with HM  -SANA        Equipment Currently Used at Home  commode, bedside;walker, rolling but was not using currently, from hip sx priorly  -SANA        Existing Precautions/Restrictions  fall  -SANA        Equipment Issued to Patient  reacher  -SANA        Risks Reviewed  patient and family:;LOB;dizziness;increased discomfort;change in vital signs;lines disloged  -SANA        Benefits Reviewed  patient and family:;improve function;increase independence;increase balance;increase knowledge;other (comment) activity tolerance  -SANA            Relationship/Environment    Lives With  alone  -SANA        Family Caregiver if Needed  child(prakash), adult  -SANA           Resource/Environmental Concerns    Current Living Arrangements  home/apartment/condo one step into den, wx in shower, standard toilet  -SANA           Home Main Entrance    Number of Stairs, Main Entrance  one  -SANA        Stair Railings, Main Entrance  none  -SANA           Stairs Within Home, Primary    Number of Stairs, Within Home, Primary  one  -SANA        Stair Railings, Within Home, Primary  none  -SANA           Cognitive Assessment/Intervention- PT/OT    Orientation Status (Cognition)  oriented x 3  -SANA        Follows Commands (Cognition)  WFL  -SANA        Safety Deficit (Cognitive)  awareness of need for assistance;insight into deficits/self awareness  -SANA           Safety Issues, Functional Mobility    Safety Issues Affecting Function (Mobility)  awareness of need for assistance;insight into deficits/self awareness  -SANA        Impairments Affecting Function (Mobility)  balance;endurance/activity tolerance  -           Bed Mobility Assessment/Treatment    Scooting/Bridging [place] (Bed Mobility)  conditional independence  -SANA        Supine-Sit [place] (Bed Mobility)  conditional independence  -SANA        Assistive Device (Bed Mobility)  bed rails;head of bed elevated  -           Functional Mobility    Functional Mobility- Ind. Level  minimum assist (75% patient effort) CGA with periods of min A especially as fatigued  -SANA        Functional Mobility- Device  other (see comments) gait belt and UE support  -SANA        Functional Mobility-Distance (Feet)  140  -SANA        Functional Mobility- Safety Issues  step length decreased  -        Functional Mobility- Comment  unsteady, fatigues quickly  -SANA           Transfer Assessment/Treatment    Transfer Assessment/Treatment  sit-stand transfer;stand-sit transfer;toilet transfer  -           Sit-Stand Transfer    Sit-Stand [place]  (Transfers)  contact guard  -SANA        Assistive Device (Sit-Stand Transfers)  other (see comments) gait belt  -SANA           Stand-Sit Transfer    Stand-Sit [place] (Transfers)  contact guard  -SANA        Assistive Device (Stand-Sit Transfers)  other (see comments) gait belt  -SANA           Toilet Transfer    Type (Toilet Transfer)  sit-stand;stand-sit  -SANA        [place] Level (Toilet Transfer)  supervision  -SANA        Assistive Device (Toilet Transfer)  grab bars/safety frame  -SANA           ADL Assessment/Intervention    BADL Assessment/Intervention  lower body dressing;grooming;toileting  -SANA           Lower Body Dressing Assessment/Training    Lower Body Dressing [place] Level  don;socks;set up;supervision  -SANA        Lower Body Dressing Position  edge of bed sitting  -SANA           Grooming Assessment/Training    [place] Level (Grooming)  supervision;oral care regimen;hair care, combing/brushing  -SANA        Grooming Position  sink side  -SANA        Comment (Grooming)  pt. also used hand gel post toileting.  -SANA           Toileting Assessment/Training    [place] Level (Toileting)  adjust/manage clothing;perform perineal hygiene;supervision  -SANA        Assistive Devices (Toileting)  grab bar/safety frame  -SANA        Toileting Position  supported sitting;unsupported standing  -SANA        Comment (Toileting)  per dtr. pt. fell post getting off toilet at home  -SANA           BADL Safety/Performance    Impairments, BADL Safety/Performance  balance;endurance/activity tolerance  -SANA           General ROM    GENERAL ROM COMMENTS  WFL AROM  -SANA           MMT (Manual Muscle Testing)    General MMT Comments  BUE grossly 4+ to 5/5  -SANA           Motor Assessment/Interventions    Additional Documentation  Balance (Group);Balance Interventions (Group);Therapeutic Exercise (Group);Therapeutic Exercise Interventions (Group)  -SANA           Dynamic Sitting Balance    Level of [place], Reaches Outside  Midline (Sitting, Dynamic Balance)  supervision  -SANA        Sitting Position, Reaches Outside Midline (Sitting, Dynamic Balance)  sitting on edge of bed LBD task  -SANA           Dynamic Standing Balance    Level of [place], Reaches Outside Midline (Standing, Dynamic Balance)  supervision  -SANA        Time Able to Maintain Position, Reaches Outside Midline (Standing, Dynamic Balance)  more than 5 minutes grooming and toileting  -SANA           Sensory Assessment/Intervention    Sensory General Assessment  -- per pt. 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 resolving  -SANA        Additional Documentation  Vision Assessment/Intervention (Group)  -SANA           Vision Assessment/Intervention    Visual Impairment/Limitations  corrective lenses full time  -SANA           Positioning and Restraints    Pre-Treatment Position  in bed  -SANA        Post Treatment Position  chair  -SANA        In Chair  reclined;call light within reach;encouraged to call for assist;exit alarm on;with family/caregiver;waffle cushion  -SANA           Pain Scale: Numbers Pre/Post-Treatment    Pain Scale: Numbers, Pretreatment  6/10  -SANA        Pain Scale: Numbers, Post-Treatment  6/10  -SANA        Pain Location - Side  Left  -SANA        Pain Location - Orientation  lower  -SANA        Pain Location  back  -SANA        Pain Intervention(s)  Ambulation/increased activity;Repositioned  -SANA           Plan of Care Review    Plan of Care Reviewed With  patient;daughter  -SANA           Clinical Impression (OT)    OT Diagnosis  impaired ADL, IADL independence.  -SANA        Patient/Family Goals Statement (OT Eval)  Pt. wants to go to rehab so can regain balance and endurance to care for self.  -SANA        Criteria for Skilled Therapeutic Interventions Met (OT Eval)  yes;treatment indicated  -SANA        Rehab Potential (OT Eval)  good, to achieve stated therapy goals  -SANA        Therapy Frequency (OT Eval)  daily  -SANA        Care Plan Review (OT)  evaluation/treatment results  reviewed;care plan/treatment goals reviewed;risks/benefits reviewed;current/potential barriers reviewed;patient/other agree to care plan  -SANA        Care Plan Review, Other Participant (OT Eval)  daughter  -SANA        Anticipated Equipment Needs at Discharge (OT)  -- grab bars shower, possible shower chair.  -SANA        Anticipated Discharge Disposition (OT)  inpatient rehabilitation facility  -SANA           Vital Signs    Pre Systolic BP Rehab  96  -SANA        Pre Treatment Diastolic BP  69  -SANA        Intra Systolic BP Rehab  -- elevated some at EOB, not recorded  -SANA        Post Systolic BP Rehab  107  -SANA        Post Treatment Diastolic BP  79  -SANA        Pretreatment Heart Rate (beats/min)  105  -SANA        Posttreatment Heart Rate (beats/min)  95  -SANA        Intra SpO2 (%)  96  -SANA        O2 Delivery Intra Treatment  room air  -SANA        Post SpO2 (%)  96  -SANA        O2 Delivery Post Treatment  room air  -SANA        Pre Patient Position  Supine  -SANA        Intra Patient Position  Standing  -SANA        Post Patient Position  Supine  -SANA           Planned OT Interventions    Planned Therapy Interventions (OT Eval)  activity tolerance training;adaptive equipment training;BADL retraining;functional balance retraining;occupation/activity based interventions;IADL retraining;ROM/therapeutic exercise  -SANA           OT Goals    Bed Mobility Goal Selection (OT)  bed mobility, OT goal 1  -SANA        Balance Goal Selection (OT)  balance, OT goal 1  -SANA        Activity Tolerance Goal Selection (OT)  activity tolerance, OT goal 1  -SANA        Additional Documentation  Activity Tolerance Goal Selection (OT) (Row);Balance Goal Selection (OT) (Row)  -SANA           Bed Mobility Goal 1 (OT)    Activity/Assistive Device (Bed Mobility Goal 1, OT)  bed mobility activities, all with bed flat with no rails up  -SANA        [place] Level/Cues Needed (Bed Mobility Goal 1, OT)  independent  -SANA        Time Frame (Bed Mobility Goal 1, OT)  long  term goal (LTG);10 days  -SANA        Progress/Outcomes (Bed Mobility Goal 1, OT)  goal ongoing  -SANA           Balance Goal 1 (OT)    Activity/Assistive Device (Balance Goal 1, OT)  standing, dynamic item retrieval with reacher, balance with ADL task/IADL task  -SANA        [place] Level/Cues Needed (Balance Goal 1, OT)  supervision required  -SANA        Time Frame (Balance Goal 1, OT)  long term goal (LTG);10 days  -SANA        Progress/Outcomes (Balance Goal 1, OT)  goal ongoing  -SANA            Activity Tolerance Goal 1 (OT)    Activity Tolerance Goal 1 (OT)  ADL tasks, IADL task, TE, mobilizing house hold distances  -SANA        Activity Level (Endurance Goal 1, OT)  15 min activity;20 min activity no rest period  -SANA        Time Frame (Activity Tolerance Goal 1, OT)  long term goal (LTG);10 days  -SANA        Progress/Outcome (Activity Tolerance Goal 1, OT)  goal ongoing  -SANA           Patient Education Goal (OT)    Activity (Patient Education Goal, OT)  EC education, home safety recommendations  -SANA        [place]/Cues/Accuracy (Memory Goal 2, OT)  verbalizes understanding  -SANA        Time Frame (Patient Education Goal, OT)  long term goal (LTG);10 days  -SANA        Progress/Outcome (Patient Education Goal, OT)  goal ongoing  -SANA          User Key  (r) = Recorded By, (t) = Taken By, (c) = Cosigned By    Initials Name Effective Dates    [place], OT 06/08/18 -          Occupational Therapy Education     Title: PT OT SLP Therapies (In Progress)     Topic: Occupational Therapy (In Progress)     Point: ADL training (Done)     Description: Instruct learner(s) on proper safety adaptation and remediation techniques during self care or transfers.   Instruct in proper use of assistive devices.    Learning Progress Summary           Patient Acceptance, E, VU by SANA at 10/11/2019  9:07 AM    Comment:  role OT, reason for consult, evaluation results, goal setting.                   Point: Precautions (Done)      Description: Instruct learner(s) on prescribed precautions during self-care and functional transfers.    Learning Progress Summary           Patient Acceptance, E, VU by SANA at 10/11/2019  9:07 AM    Comment:  role OT, reason for consult, evaluation results, goal setting.                               User Key     Initials Effective Dates Name Provider Type Discipline    SANA 06/08/18 -  [place], OT Occupational Therapist OT                  OT Recommendation and Plan  Outcome Summary/Treatment Plan (OT)  Anticipated Equipment Needs at Discharge (OT): (grab bars shower, possible shower chair.)  Anticipated Discharge Disposition (OT): inpatient rehabilitation facility  Planned Therapy Interventions (OT Eval): activity tolerance training, adaptive equipment training, BADL retraining, functional balance retraining, occupation/activity based interventions, IADL retraining, ROM/therapeutic exercise  Therapy Frequency (OT Eval): daily  Plan of Care Review  Plan of Care Reviewed With: patient, daughter  Plan of Care Reviewed With: patient, daughter  Outcome Summary: OT evaluation completed.  Pt. able to complete simple ADL tasks with SBA for safet with balance and mobilize mathis UE support min A.  Pt. demonstrating limited activity tolerance and balance impacting ADL and I ADL independence.  Pt. appropriate for skilled OT services to promote return to PLOF.  Pt. would benefit from short IP rehab stay at discharge.    Outcome Measures     Row Name 10/11/19 0907 10/10/19 1518          How much help from another person do you currently need...    Turning from your back to your side while in flat bed without using bedrails?  --  4  -SJ     Moving from lying on back to sitting on the side of a flat bed without bedrails?  --  4  -SJ     Moving to and from a bed to a chair (including a wheelchair)?  --  3  -SJ     Standing up from a chair using your arms (e.g., wheelchair, bedside chair)?  --  4  -SJ     Climbing 3-5 steps  with a railing?  --  3  -SJ     To walk in hospital room?  --  3  -SJ     AM-PAC 6 Clicks Score (PT)  --  21  -SJ        How much help from another is currently needed...    Putting on and taking off regular lower body clothing?  3 pt. needs SBA to CGA  for any standing portions all areas  -SANA  --     Bathing (including washing, rinsing, and drying)  3  -SANA  --     Toileting (which includes using toilet bed pan or urinal)  3  -SANA  --     Putting on and taking off regular upper body clothing  4  -SANA  --     Taking care of personal grooming (such as brushing teeth)  3  -SANA  --     Eating meals  4  -SANA  --     AM-PAC 6 Clicks Score (OT)  20  -SANA  --        Functional Assessment    Outcome Measure Options  AM-PAC 6 Clicks Daily Activity (OT)  -SANA  AM-PAC 6 Clicks Basic Mobility (PT)  -       User Key  (r) = Recorded By, (t) = Taken By, (c) = Cosigned By    Initials Name Provider Type    [place] OT Occupational Therapist    Yu Ortega, PT Physical Therapist          Time Calculation:   Time Calculation- OT     Row Name 10/11/19 0907             Time Calculation- OT    OT Start Time  0907  -SANA      OT Received On  10/11/19  -SANA      OT Goal Re-Cert Due Date  10/21/19  -SANA        User Key  (r) = Recorded By, (t) = Taken By, (c) = Cosigned By    Initials Name Provider Type    [place] OT Occupational Therapist        Therapy Charges for Today     Code Description Service Date Service Provider Modifiers Qty    99502541340  OT EVAL LOW COMPLEXITY 4 10/11/2019 La Villalta OT GO 1    [id number]  OT THER SUPP EA 15 MIN 10/11/2019 La Villalta OT GO 1               [place] OT  10/11/2019    Electronically signed by [place] OT at 10/11/19 1001

## 2019-10-11 NOTE — PLAN OF CARE
Problem: Patient Care Overview  Goal: Interprofessional Rounds/Family Conf  Outcome: Ongoing (interventions implemented as appropriate)  Palliative Care Team Attendance 1300:  Dr. Isaias Richardson, Erin Bailon APRN, MATTHEW Borges, Monica Kay, PETER, CHPN, Jessica Figueroa \[facility]\""W, Urmila Morrell RN, CM, and Anne Marie Cano RN, CHPN

## 2019-10-11 NOTE — THERAPY EVALUATION
Acute Care - Occupational Therapy Initial Evaluation  [facility]     Patient Name: Shauna Valencia  : 1955  MRN: [id number]  Today's Date: 10/11/2019  Onset of Illness/Injury or Date of Surgery: 10/06/19          Admit Date: 10/6/2019       ICD-10-CM ICD-9-CM   1. Hyponatremia E87.1 276.1   2. Weakness R53.1 780.79   3. Left lower quadrant abdominal pain R10.32 789.04   4. Chronic renal failure, unspecified CKD stage N18.9 585.9   5. Anemia, unspecified type D64.9 285.9   6. Impaired functional mobility, balance, gait, and endurance Z74.09 V49.89   7. Impaired mobility and ADLs Z74.09 799.89     Patient Active Problem List   Diagnosis   • Acute hyponatremia   • Hyperlipidemia   • Type 2 diabetes mellitus (CMS/HCC)   • Tobacco abuse   • Obesity (BMI 30-39.9)   • Mass of upper lobe of left lung   • Mediastinal lymphadenopathy   • Small cell lung cancer, left upper lobe (CMS/HCC)   • Dehydration   • Anemia   • Hyperkalemia   • Symptomatic anemia   • Generalized weakness   • Nausea & vomiting   • CKD (chronic kidney disease) stage 3, GFR 30-59 ml/min (CMS/HCC)   • Hypomagnesemia   • Hypocalcemia   • CAD (coronary artery disease)   • COPD (chronic obstructive pulmonary disease) (CMS/HCC)   • Hyponatremia     Past Medical History:   Diagnosis Date   • Arthritis    • Asthma    • COPD (chronic obstructive pulmonary disease) (CMS/HCC)    • Coronary artery disease     5 stents   • Diabetes mellitus (CMS/HCC)    • Disease of thyroid gland    • DVT (deep venous thrombosis) (CMS/HCC)     this year- per pt   • GERD (gastroesophageal reflux disease)    • Hyperlipidemia    • Hypertension    • Lung cancer (CMS/HCC)    • Myocardial infarction (CMS/HCC)    • Pancreatitis    • Pancreatitis    • Renal disorder      Past Surgical History:   Procedure Laterality Date   • APPENDECTOMY     • BRONCHOSCOPY N/A 2018    Procedure: BRONCHOSCOPY WITH ENDOBRONCHIAL ULTRASOUND, biopsies, brushing and washing.;  Surgeon: Isaac Epstein,  MD;  Location:  [facility];  Service: Pulmonary   • BRONCHOSCOPY N/A 11/9/2018    Procedure: BRONCHOSCOPY WITH ENDOBRONCHIAL ULTRASOUND;  Surgeon: Clay Scott MD;  Location:  [facility];  Service: Pulmonary   • CAROTID STENT      5 total   • CHOLECYSTECTOMY     • COLON SURGERY     • COLONOSCOPY     • HERNIA REPAIR     • HYSTERECTOMY     • OTHER SURGICAL HISTORY      bladder sling   • THYROIDECTOMY      1994   • TONSILLECTOMY     • TOTAL HIP ARTHROPLASTY Left    • UPPER GASTROINTESTINAL ENDOSCOPY            OT ASSESSMENT FLOWSHEET (last 12 hours)      Occupational Therapy Evaluation     Row Name 10/11/19 0907                   OT Evaluation Time/Intention    Subjective Information  complains of;pain;fatigue;dyspnea  -SANA        Document Type  evaluation  -SANA        Mode of Treatment  individual therapy;occupational therapy  -SANA        Patient Effort  excellent  -SANA        Symptoms Noted During/After Treatment  shortness of breath;fatigue  -SANA        Comment  post mobilizing pt. reporting feeling SOA, bu 02 sats were 96%.  -SANA           General Information    Patient Profile Reviewed?  yes  -SANA        Onset of Illness/Injury or Date of Surgery  10/06/19  -SANA        Patient Observations  cooperative;alert;agree to therapy  -SANA        Patient/Family Observations  Daughter present. Pt. sitting up in bed sipping coffee, tele, room air.  -SANA        Prior Level of Function  independent:;all household mobility;community mobility;ADL's;driving;shopping;home management;dependent:;yard work until recently struggling some with HM  -SANA        Equipment Currently Used at Home  commode, bedside;walker, rolling but was not using currently, from hip sx priorly  -SANA        Existing Precautions/Restrictions  fall  -SANA        Equipment Issued to Patient  reacher  -SANA        Risks Reviewed  patient and family:;LOB;dizziness;increased discomfort;change in vital signs;lines disloged  -SANA        Benefits Reviewed  patient  and family:;improve function;increase independence;increase balance;increase knowledge;other (comment) activity tolerance  -SANA           Relationship/Environment    Lives With  alone  -SANA        Family Caregiver if Needed  child(prakash), adult  -SANA           Resource/Environmental Concerns    Current Living Arrangements  home/apartment/condo one step into den, wx in shower, standard toilet  -SANA           Home Main Entrance    Number of Stairs, Main Entrance  one  -SANA        Stair Railings, Main Entrance  none  -SANA           Stairs Within Home, Primary    Number of Stairs, Within Home, Primary  one  -SANA        Stair Railings, Within Home, Primary  none  -SANA           Cognitive Assessment/Intervention- PT/OT    Orientation Status (Cognition)  oriented x 3  -SANA        Follows Commands (Cognition)  WFL  -SANA        Safety Deficit (Cognitive)  awareness of need for assistance;insight into deficits/self awareness  -SANA           Safety Issues, Functional Mobility    Safety Issues Affecting Function (Mobility)  awareness of need for assistance;insight into deficits/self awareness  -SANA        Impairments Affecting Function (Mobility)  balance;endurance/activity tolerance  -SANA           Bed Mobility Assessment/Treatment    Scooting/Bridging [place] (Bed Mobility)  conditional independence  -SANA        Supine-Sit [place] (Bed Mobility)  conditional independence  -SANA        Assistive Device (Bed Mobility)  bed rails;head of bed elevated  -           Functional Mobility    Functional Mobility- Ind. Level  minimum assist (75% patient effort) CGA with periods of min A especially as fatigued  -SANA        Functional Mobility- Device  other (see comments) gait belt and UE support  -SANA        Functional Mobility-Distance (Feet)  140  -SANA        Functional Mobility- Safety Issues  step length decreased  -SANA        Functional Mobility- Comment  unsteady, fatigues quickly  -SANA           Transfer Assessment/Treatment    Transfer  Assessment/Treatment  sit-stand transfer;stand-sit transfer;toilet transfer  -SANA           Sit-Stand Transfer    Sit-Stand [place] (Transfers)  contact guard  -SANA        Assistive Device (Sit-Stand Transfers)  other (see comments) gait belt  -SANA           Stand-Sit Transfer    Stand-Sit [place] (Transfers)  contact guard  -SANA        Assistive Device (Stand-Sit Transfers)  other (see comments) gait belt  -SANA           Toilet Transfer    Type (Toilet Transfer)  sit-stand;stand-sit  -SANA        [place] Level (Toilet Transfer)  supervision  -SANA        Assistive Device (Toilet Transfer)  grab bars/safety frame  -SANA           ADL Assessment/Intervention    BADL Assessment/Intervention  lower body dressing;grooming;toileting  -SANA           Lower Body Dressing Assessment/Training    Lower Body Dressing [place] Level  don;socks;set up;supervision  -SANA        Lower Body Dressing Position  edge of bed sitting  -SANA           Grooming Assessment/Training    [place] Level (Grooming)  supervision;oral care regimen;hair care, combing/brushing  -SANA        Grooming Position  sink side  -SANA        Comment (Grooming)  pt. also used hand gel post toileting.  -SANA           Toileting Assessment/Training    [place] Level (Toileting)  adjust/manage clothing;perform perineal hygiene;supervision  -SANA        Assistive Devices (Toileting)  grab bar/safety frame  -SANA        Toileting Position  supported sitting;unsupported standing  -SANA        Comment (Toileting)  per dtr. pt. fell post getting off toilet at home  -SANA           BADL Safety/Performance    Impairments, BADL Safety/Performance  balance;endurance/activity tolerance  -SANA           General ROM    GENERAL ROM COMMENTS  WFL AROM  -SANA           MMT (Manual Muscle Testing)    General MMT Comments  BUE grossly 4+ to 5/5  -SANA           Motor Assessment/Interventions    Additional Documentation  Balance (Group);Balance Interventions (Group);Therapeutic Exercise  (Group);Therapeutic Exercise Interventions (Group)  -SANA           Dynamic Sitting Balance    Level of [place], Reaches Outside Midline (Sitting, Dynamic Balance)  supervision  -SANA        Sitting Position, Reaches Outside Midline (Sitting, Dynamic Balance)  sitting on edge of bed LBD task  -SANA           Dynamic Standing Balance    Level of [place], Reaches Outside Midline (Standing, Dynamic Balance)  supervision  -SANA        Time Able to Maintain Position, Reaches Outside Midline (Standing, Dynamic Balance)  more than 5 minutes grooming and toileting  -SNAA           Sensory Assessment/Intervention    Sensory General Assessment  -- per pt. 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 resolving  -SANA        Additional Documentation  Vision Assessment/Intervention (Group)  -SANA           Vision Assessment/Intervention    Visual Impairment/Limitations  corrective lenses full time  -SANA           Positioning and Restraints    Pre-Treatment Position  in bed  -SANA        Post Treatment Position  chair  -SANA        In Chair  reclined;call light within reach;encouraged to call for assist;exit alarm on;with family/caregiver;waffle cushion  -SANA           Pain Scale: Numbers Pre/Post-Treatment    Pain Scale: Numbers, Pretreatment  6/10  -SANA        Pain Scale: Numbers, Post-Treatment  6/10  -SANA        Pain Location - Side  Left  -SANA        Pain Location - Orientation  lower  -SANA        Pain Location  back  -SANA        Pain Intervention(s)  Ambulation/increased activity;Repositioned  -SANA           Plan of Care Review    Plan of Care Reviewed With  patient;daughter  -SANA           Clinical Impression (OT)    OT Diagnosis  impaired ADL, IADL independence.  -SANA        Patient/Family Goals Statement (OT Eval)  Pt. wants to go to rehab so can regain balance and endurance to care for self.  -SANA        Criteria for Skilled Therapeutic Interventions Met (OT Eval)  yes;treatment indicated  -SANA        Rehab Potential (OT Eval)  good, to achieve stated  therapy goals  -SANA        Therapy Frequency (OT Eval)  daily  -SANA        Care Plan Review (OT)  evaluation/treatment results reviewed;care plan/treatment goals reviewed;risks/benefits reviewed;current/potential barriers reviewed;patient/other agree to care plan  -SANA        Care Plan Review, Other Participant (OT Eval)  daughter  -SANA        Anticipated Equipment Needs at Discharge (OT)  -- grab bars shower, possible shower chair.  -SANA        Anticipated Discharge Disposition (OT)  inpatient rehabilitation facility  -SANA           Vital Signs    Pre Systolic BP Rehab  96  -SANA        Pre Treatment Diastolic BP  69  -SANA        Intra Systolic BP Rehab  -- elevated some at EOB, not recorded  -SANA        Post Systolic BP Rehab  107  -SANA        Post Treatment Diastolic BP  79  -SANA        Pretreatment Heart Rate (beats/min)  105  -SANA        Posttreatment Heart Rate (beats/min)  95  -SANA        Intra SpO2 (%)  96  -SANA        O2 Delivery Intra Treatment  room air  -SANA        Post SpO2 (%)  96  -SANA        O2 Delivery Post Treatment  room air  -SANA        Pre Patient Position  Supine  -SANA        Intra Patient Position  Standing  -SANA        Post Patient Position  Supine  -SANA           Planned OT Interventions    Planned Therapy Interventions (OT Eval)  activity tolerance training;adaptive equipment training;BADL retraining;functional balance retraining;occupation/activity based interventions;IADL retraining;ROM/therapeutic exercise  -SANA           OT Goals    Bed Mobility Goal Selection (OT)  bed mobility, OT goal 1  -SANA        Balance Goal Selection (OT)  balance, OT goal 1  -SANA        Activity Tolerance Goal Selection (OT)  activity tolerance, OT goal 1  -SANA        Additional Documentation  Activity Tolerance Goal Selection (OT) (Row);Balance Goal Selection (OT) (Row)  -SANA           Bed Mobility Goal 1 (OT)    Activity/Assistive Device (Bed Mobility Goal 1, OT)  bed mobility activities, all with bed flat with no rails up  -SANA         [place] Level/Cues Needed (Bed Mobility Goal 1, OT)  independent  -SANA        Time Frame (Bed Mobility Goal 1, OT)  long term goal (LTG);10 days  -SANA        Progress/Outcomes (Bed Mobility Goal 1, OT)  goal ongoing  -SANA           Balance Goal 1 (OT)    Activity/Assistive Device (Balance Goal 1, OT)  standing, dynamic item retrieval with reacher, balance with ADL task/IADL task  -SANA        [place] Level/Cues Needed (Balance Goal 1, OT)  supervision required  -SANA        Time Frame (Balance Goal 1, OT)  long term goal (LTG);10 days  -SANA        Progress/Outcomes (Balance Goal 1, OT)  goal ongoing  -SANA            Activity Tolerance Goal 1 (OT)    Activity Tolerance Goal 1 (OT)  ADL tasks, IADL task, TE, mobilizing house hold distances  -SANA        Activity Level (Endurance Goal 1, OT)  15 min activity;20 min activity no rest period  -SANA        Time Frame (Activity Tolerance Goal 1, OT)  long term goal (LTG);10 days  -SANA        Progress/Outcome (Activity Tolerance Goal 1, OT)  goal ongoing  -SANA           Patient Education Goal (OT)    Activity (Patient Education Goal, OT)  EC education, home safety recommendations  -SANA        [place]/Cues/Accuracy (Memory Goal 2, OT)  verbalizes understanding  -SANA        Time Frame (Patient Education Goal, OT)  long term goal (LTG);10 days  -SANA        Progress/Outcome (Patient Education Goal, OT)  goal ongoing  -SANA          User Key  (r) = Recorded By, (t) = Taken By, (c) = Cosigned By    Initials Name Effective Dates    SANA VillaltaNyronald Andrews, OT 06/08/18 -          Occupational Therapy Education     Title: PT OT SLP Therapies (In Progress)     Topic: Occupational Therapy (In Progress)     Point: ADL training (Done)     Description: Instruct learner(s) on proper safety adaptation and remediation techniques during self care or transfers.   Instruct in proper use of assistive devices.    Learning Progress Summary           Patient Acceptance, E, VU by SANA at 10/11/2019  9:07 AM     Comment:  role OT, reason for consult, evaluation results, goal setting.                   Point: Precautions (Done)     Description: Instruct learner(s) on prescribed precautions during self-care and functional transfers.    Learning Progress Summary           Patient Acceptance, E, VU by SANA at 10/11/2019  9:07 AM    Comment:  role OT, reason for consult, evaluation results, goal setting.                               User Key     Initials Effective Dates Name Provider Type Discipline    SANA 06/08/18 -  [place], OT Occupational Therapist OT                  OT Recommendation and Plan  Outcome Summary/Treatment Plan (OT)  Anticipated Equipment Needs at Discharge (OT): (grab bars shower, possible shower chair.)  Anticipated Discharge Disposition (OT): inpatient rehabilitation facility  Planned Therapy Interventions (OT Eval): activity tolerance training, adaptive equipment training, BADL retraining, functional balance retraining, occupation/activity based interventions, IADL retraining, ROM/therapeutic exercise  Therapy Frequency (OT Eval): daily  Plan of Care Review  Plan of Care Reviewed With: patient, daughter  Plan of Care Reviewed With: patient, daughter  Outcome Summary: OT evaluation completed.  Pt. able to complete simple ADL tasks with SBA for safet with balance and mobilize mathis UE support min A.  Pt. demonstrating limited activity tolerance and balance impacting ADL and I ADL independence.  Pt. appropriate for skilled OT services to promote return to PLOF.  Pt. would benefit from short IP rehab stay at discharge.    Outcome Measures     Row Name 10/11/19 0907 10/10/19 1518          How much help from another person do you currently need...    Turning from your back to your side while in flat bed without using bedrails?  --  4  -SJ     Moving from lying on back to sitting on the side of a flat bed without bedrails?  --  4  -SJ     Moving to and from a bed to a chair (including a wheelchair)?  --  3   -SJ     Standing up from a chair using your arms (e.g., wheelchair, bedside chair)?  --  4  -SJ     Climbing 3-5 steps with a railing?  --  3  -SJ     To walk in hospital room?  --  3  -SJ     AM-PAC 6 Clicks Score (PT)  --  21  -SJ        How much help from another is currently needed...    Putting on and taking off regular lower body clothing?  3 pt. needs SBA to CGA  for any standing portions all areas  -SANA  --     Bathing (including washing, rinsing, and drying)  3  -SANA  --     Toileting (which includes using toilet bed pan or urinal)  3  -SANA  --     Putting on and taking off regular upper body clothing  4  -SANA  --     Taking care of personal grooming (such as brushing teeth)  3  -SANA  --     Eating meals  4  -SANA  --     AM-PAC 6 Clicks Score (OT)  20  -SANA  --        Functional Assessment    Outcome Measure Options  AM-PAC 6 Clicks Daily Activity (OT)  -SANA  AM-PAC 6 Clicks Basic Mobility (PT)  -       User Key  (r) = Recorded By, (t) = Taken By, (c) = Cosigned By    Initials Name Provider Type    [place] OT Occupational Therapist    SJ Yu Corea, PT Physical Therapist          Time Calculation:   Time Calculation- OT     Row Name 10/11/19 0907             Time Calculation- OT    OT Start Time  0907  -SANA      OT Received On  10/11/19  -SANA      OT Goal Re-Cert Due Date  10/21/19  -SANA        User Key  (r) = Recorded By, (t) = Taken By, (c) = Cosigned By    Initials Name Provider Type    [place] OT Occupational Therapist        Therapy Charges for Today     Code Description Service Date Service Provider Modifiers Qty    [id number]  OT EVAL LOW COMPLEXITY 4 10/11/2019 La Villalta OT GO 1    [id number]  OT THER SUPP EA 15 MIN 10/11/2019 La Villalta OT GO 1               [place] Vill[place] OT  10/11/2019

## 2019-10-11 NOTE — PLAN OF CARE
Problem: Patient Care Overview  Goal: Interprofessional Rounds/Family Conf   10/11/19 1300   Interdisciplinary Rounds/Family Conf   Summary Patient was hoping to go home today. Hgb 7.6 she has to stay to get blood. Still having pain but is down from a 8 to a 6. Hospitalist added Lidoderm 5% patch. Massage given today. Patient states she has a occassional hand jerk. Didn't wittness. Daughter and patient both anxious. Dr. Richardson requested a Palliative referral for home. I called [facility] [phone] referral. CM faxed medical records to them. they have Demohour's phone number to call. Informed patient and daughter that Jessica and Dr. Petit are on this week end and Jessica will check on them.

## 2019-10-11 NOTE — DISCHARGE PLACEMENT REQUEST
"To: [facility]  Fax: [phone]  From Annita Hussein RN Ph: [phone]      Shauna Vera (64 y.o. Female)     Date of Birth Social Security Number Address Home Phone MRN    1955  [address]  [facility] [place] [address] [phone] [id number]    Zoroastrian Marital Status          Druze Single       Admission Date Admission Type Admitting Provider Attending Provider Department, Room/Bed    10/6/19 Emergency Niecy Haney MD Anciro, Audree, MD [facility] 5G, S554/1    Discharge Date Discharge Disposition Discharge Destination                       Attending Provider:  Niecy Haney MD    Allergies:  Plavix [Clopidogrel Bisulfate], Buspar [Buspirone], Codeine, Penicillins    Isolation:  None   Infection:  None   Code Status:  CPR    Ht:  154.9 cm (61\")   Wt:  84.7 kg (186 lb 11.2 oz)    Admission Cmt:  None   Principal Problem:  Acute hyponatremia [E87.1]                 Active Insurance as of 10/6/2019     Primary Coverage     Payor Plan Insurance Group Employer/Plan Group    MEDICARE MEDICARE A & B      Payor Plan Address Payor Plan Phone Number Payor Plan Fax Number Effective Dates    [address] [phone]  11/1/2008 - None Entered    [facility] [id number]       Subscriber Name Subscriber Birth Date Member ID       SHAUNA VERA ESTHER 1955 [id number]           Secondary Coverage     Payor Plan Insurance Group Employer/Plan Group    [facility] [place] [facility] [place]      Payor Plan Address Payor Plan Phone Number Payor Plan Fax Number Effective Dates    [address] [address]   2/1/2014 - None Entered    [place] [address]       Subscriber Name Subscriber Birth Date Member ID       VERA,SHAUNA SANTANA 1955 [id number]                 Emergency Contacts      (Rel.) Home Phone Work Phone Mobile Phone    Naresh,Jade (Daughter) -- -- [phone]    Reyna Phipps (Sister) -- -- [phone]               History & Physical      Catia Ortiz MD at 10/06/19 2324 " "             [facility] Medicine Services  HISTORY AND PHYSICAL    Patient Name: Shauna Valencia  : 1955  MRN: [id number]  Primary Care Physician: Vidya Chávez MD  Date of admission: 10/6/2019      Subjective   Subjective     Chief Complaint:  abdominal pain    HPI:  Shauna Valencia is a 64 y.o. female with a past medical history significant for DM2, Small cell lung cancer ( left lung), anemia, CKD, III, COPD, CAD, HTN, and HLD who presents with complaints progressively worsening left lower quadrant abdominal pain going on for the past month. Pain radiates from left lower quadrant to lower back. No modifying factors. Rated 7/10 at its worst. There is associated nausea with non bloody vomiting and constipation. Last bowel movement was today. However yesterday was her first bowel movement in a week. She reports \"extremely hard\" stool when she does have a bowel movement and reports difficulty getting it out. States today she had to \" rock out her stool\" while squatting over the commode. Patient ended up losing her balance and falling in the bathroom. She is not anticoagulated. Denies syncope, head trauma or LOC.  There are no complaints of fever, cough, congestion, chest pain, or SOB. No dysuria, hematuria, or blood in stool/emesis.Pertinent surgical history includes partial colectomy in  for diverticulitis ( gene) then hernia repair in . She is scheduled to Dr. Olson at the end of October for evaluation of chronic constipation.    Patient if followed by Dr. Cardoso per oncology. She completed multiple round of chemotherapy and radiation. Last radiation was 5/10/2019. Most recent follow up with oncology was 2019 wherein she was diagnosed with lymphadenopathy secondary to radiation exposure.    Emergency Department Evaluation; vitals stable. Sodium 116. Creatinine 1.6. H/H 8.9 and 24.6 respectively. Head CT negative. CT A/P largely unchanged. Did demonstrated " paraspinal mass which was supposedly there in imaging earlier this year.    Review of Systems   Constitutional: Negative for chills and fever.   HENT: Negative for congestion and trouble swallowing.    Eyes: Negative for photophobia and visual disturbance.   Respiratory: Negative for cough and shortness of breath.    Cardiovascular: Negative for chest pain and leg swelling.   Gastrointestinal: Positive for abdominal pain, constipation, nausea and vomiting. Negative for diarrhea.   Endocrine: Negative for cold intolerance and heat intolerance.   Genitourinary: Negative for dysuria and flank pain.   Musculoskeletal: Positive for back pain. Negative for joint swelling.   Skin: Negative for pallor and rash.   Allergic/Immunologic: Positive for immunocompromised state.   Neurological: Positive for weakness. Negative for dizziness and headaches.   Hematological: Positive for adenopathy.   Psychiatric/Behavioral: Negative for agitation and confusion.          All other systems reviewed and are negative.     Personal History     Past Medical History:   Diagnosis Date   • Arthritis    • Asthma    • COPD (chronic obstructive pulmonary disease) (CMS/HCC)    • Coronary artery disease     5 stents   • Diabetes mellitus (CMS/HCC)    • Disease of thyroid gland    • DVT (deep venous thrombosis) (CMS/HCC)     this year- per pt   • GERD (gastroesophageal reflux disease)    • Hyperlipidemia    • Hypertension    • Lung cancer (CMS/HCC)    • Myocardial infarction (CMS/HCC)    • Pancreatitis    • Pancreatitis    • Renal disorder        Past Surgical History:   Procedure Laterality Date   • APPENDECTOMY     • BRONCHOSCOPY N/A 11/6/2018    Procedure: BRONCHOSCOPY WITH ENDOBRONCHIAL ULTRASOUND, biopsies, brushing and washing.;  Surgeon: Isaac Epstein MD;  Location:  [facility];  Service: Pulmonary   • BRONCHOSCOPY N/A 11/9/2018    Procedure: BRONCHOSCOPY WITH ENDOBRONCHIAL ULTRASOUND;  Surgeon: Clay Scott MD;  Location:   [facility];  Service: Pulmonary   • CAROTID STENT      5 total   • CHOLECYSTECTOMY     • COLON SURGERY     • COLONOSCOPY     • HERNIA REPAIR     • HYSTERECTOMY     • OTHER SURGICAL HISTORY      bladder sling   • THYROIDECTOMY      1994   • TONSILLECTOMY     • TOTAL HIP ARTHROPLASTY Left    • UPPER GASTROINTESTINAL ENDOSCOPY         Family History: family history includes Colon polyps in her mother; Ulcerative colitis in her sister. Otherwise pertinent FHx was reviewed and unremarkable.     Social History:  reports that she has quit smoking. Her smoking use included cigarettes. She smoked 0.50 packs per day. She has never used smokeless tobacco. She reports that she does not drink alcohol or use drugs.  Social History     Social History Narrative   • Not on file       Medications:    Available home medication information reviewed.    (Not in a hospital admission)    Allergies   Allergen Reactions   • Plavix [Clopidogrel Bisulfate] Anaphylaxis   • Codeine Other (See Comments)     Pt not certain of reatction   • Penicillins Other (See Comments)     Pt does not remember reaction       Objective   Objective     Vital Signs:   Temp:  [98.9 °F (37.2 °C)] 98.9 °F (37.2 °C)  Heart Rate:  [73-80] 73  Resp:  [16-17] 17  BP: (138-155)/(76-91) 138/91        Physical Exam   Constitutional: Awake, alert  Eyes: PERRLA, sclerae anicteric, no conjunctival injection  HENT: NCAT, mucous membranes moist  Neck: Supple, no thyromegaly, no lymphadenopathy, trachea midline  Respiratory: Clear to auscultation bilaterally, nonlabored respirations   Cardiovascular: RRR, no murmurs, rubs, or gallops, palpable pedal pulses bilaterally  Gastrointestinal: Positive bowel sounds, soft, nondistended TTP to left lower quadrant  Musculoskeletal: No bilateral ankle edema, no clubbing or cyanosis to extremities  Psychiatric: Appropriate affect, cooperative  Neurologic: Oriented x 3, strength symmetric in all extremities, Cranial Nerves grossly intact  to confrontation, speech clear  Skin: No rashes      Results Reviewed:  I have personally reviewed current lab and radiology data.    Results from last 7 days   Lab Units 10/06/19  2039   WBC 10*3/mm3 6.31   HEMOGLOBIN g/dL 8.9*   HEMATOCRIT % 24.6*   PLATELETS 10*3/mm3 275     Results from last 7 days   Lab Units 10/06/19  2039   SODIUM mmol/L 116*   POTASSIUM mmol/L 4.9   CHLORIDE mmol/L 79*   CO2 mmol/L 23.0   BUN mg/dL 23   CREATININE mg/dL 1.60*   GLUCOSE mg/dL 143*   CALCIUM mg/dL 9.9   ALT (SGPT) U/L 12   AST (SGOT) U/L 16   TROPONIN T ng/mL <0.010   PROBNP pg/mL 163.3     Estimated Creatinine Clearance: 35.4 mL/min (A) (by C-G formula based on SCr of 1.6 mg/dL (H)).  Brief Urine Lab Results  (Last result in the past 365 days)      Color   Clarity   Blood   Leuk Est   Nitrite   Protein   CREAT   Urine HCG        10/06/19 2132 Yellow Cloudy Negative Negative Negative Negative             Imaging Results (last 24 hours)     Procedure Component Value Units Date/Time    CT Abdomen Pelvis Without Contrast [[id number]] Collected:  10/06/19 2238     Updated:  10/06/19 2240    Narrative:       CT Abdomen Pelvis WO    INDICATION:   Left lower quadrant pain for one month. History of diverticulitis    TECHNIQUE:   CT of the abdomen and pelvis without IV contrast. Coronal and sagittal reconstructions were obtained.  Radiation dose reduction techniques included automated exposure control or exposure modulation based on body size. Count of known CT and cardiac nuc  med studies performed in previous 12 months: 5.     COMPARISON:   9/11/2019    FINDINGS:  Abdomen: Lung bases are clear. There is a left paraspinal mass measuring 2.3 x 1.8 cm. This is adjacent to the T10 vertebral body on the left side.. Is unchanged the more recent study but new from study from one year ago. Is also a large node anterior to  the aorta at the level the diaphragm measuring 3.0 cm which is developed since 2018.. There is left paratracheal  adenopathy in the upper abdomen to the T12  Measuring about 2.6 m in diameter. The liver, spleen, pancreas and adrenal glands are normal. The right kidney is normal. Left kidney has superior cysts which are stable. Aorta is normal in size. Bowel is normal.    Pelvis: Bladder is normal. Uterus been removed. There are no adnexal masses. There is a right hip prosthesis present      Impression:       Adenopathy is present anterior to the distal thoracic aorta, left of the T10 vertebral body and at the T12 level in the upper abdomen. These findings are all unchanged from the recent study from 9/11/2019 there are new from 2018 exam. Patient apparently  has a history of lung cancer.    There is no evidence of diverticulitis.          Signer Name: Karan Hicks MD   Signed: 10/6/2019 10:38 PM   Workstation Name: [email or web address]    Radiology [facility]    CT Head Without Contrast [[id number]] Collected:  10/06/19 2222     Updated:  10/06/19 2224    Narrative:       CT Head WO    HISTORY:   64-year-old female who fell in bathroom today and hit for head on ground. In the ED tonight. No reported loss of consciousness.    TECHNIQUE:   Axial unenhanced head CT. Radiation dose reduction techniques included automated exposure control or exposure modulation based on body size. Count of known CT and cardiac nuc med studies performed in previous 12 months: 11.     Time of scan: 2209 hours    COMPARISON:   None.    FINDINGS:   No intracranial hemorrhage, mass, or infarct. No hydrocephalus or extra-axial fluid collection. There are senescent changes, including volume loss and nonspecific white matter change, but no acute abnormality is seen. The skull base, calvarium, and  extracranial soft tissues are normal.      Impression:       Senescent changes without acute abnormality.          Signer Name: Jose Simmons MD   Signed: 10/6/2019 10:22 PM   Workstation Name: [email or web address]    Radiology [facility]    XR Chest 1  View [[id number]] Collected:  10/06/19 2121     Updated:  10/06/19 2123    Narrative:       CR Chest 1 Vw    INDICATION:   Shortness of air. Left-sided abdominal pain.     COMPARISON:    9/11/2018, CT chest 6/30/2019    FINDINGS:  Single portable AP view(s) of the chest.  Heart size and vascular normal. There is increased in the left suprahilar region. This was seen on the CT scan 6/20/2019 and represented atelectasis or perhaps radiation therapy change involving the left lower  lobe superior segment. It is unchanged from that examination as well as an old chest x-ray dating back to November 9018      Impression:       No change. Stable left perihilar scarring. No active disease    Signer Name: Karan Hicks MD   Signed: 10/6/2019 9:21 PM   Workstation Name: [email or web address]    [facility]             Assessment/Plan   Assessment / Plan     Active Hospital Problems    Diagnosis POA   • **Acute hyponatremia [E87.1] Yes     Priority: High   • Generalized weakness [R53.1] Yes     Priority: High   • CAD (coronary artery disease) [I25.10] Yes     Priority: Medium   • COPD (chronic obstructive pulmonary disease) (CMS/HCC) [J44.9] Yes     Priority: Medium   • CKD (chronic kidney disease) stage 3, GFR 30-59 ml/min (CMS/HCC) [N18.3] Yes     Priority: Medium   • Anemia [D64.9] Yes     Priority: Medium   • Small cell lung cancer, left upper lobe (CMS/HCC) [C34.12] Yes     Priority: Medium   • Type 2 diabetes mellitus (CMS/HCC) [E11.9] Yes     Priority: Medium   • Hyperlipidemia [E78.5] Yes     Priority: Low   • Tobacco abuse [Z72.0] Yes     Priority: Low         1. Acute hyponatremia:  - no AMS, seizure activity  - patient has a history of hyponatremia in 10/2018 found to be secondary to SIADH in the setting of new lung mass.  - was followed by nephrology and treated with samsca  - check uric acid, serum and urine osmolality plus urine sodium  - am cortisol and TSH  - trend chemistry every 4-6 hours  -  administered 1L bolus in ED  - consider courtesy consult to heme/onc      2. CAD:  - s/p stent placement  - stable. On ASA and statin  - EKG as needed    3. COPD:  - continue home bronchodilating agents  - additional pulmonary toilet as needed    4. HTN:  - stable and controlled. Continue home meds    5. Anemia:  - H/H stable and at baseline. Improved from 9/19  - monitor    6. DM2:  - on SSI at home. Continue low level SSI as inpatient with scheduled accu checks    7. CKD III:  - has been followed by [facility]  - stable. Baseline around 1.9.   - monitor and avoid nephrotoxins      DVT prophylaxis:  [facility]    CODE STATUS:  Full code  Code Status and Medical Interventions:   Ordered at: 10/06/19 2311     Level Of Support Discussed With:    Patient     Code Status:    CPR     Medical Interventions (Level of Support Prior to Arrest):    Full       Admission Status:  I believe this patient meets INPATIENT status due to IVF.  I feel patient’s risk for adverse outcomes and need for care warrant INPATIENT evaluation and I predict the patient’s care encounter to likely last beyond 2 midnights.        Electronically signed by Brigitte Sandoval PA-C, 10/06/19, 11:24 PM.      Brief Attending Admission Attestation     I have seen and examined the patient, performing an independent face-to-face diagnostic evaluation with plan of care reviewed and developed with the advanced practice clinician (APC).         Vitals:    10/07/19 0001   BP:  160/91   Pulse:  73   Resp:  17   Temp:  Afebrile.   SpO2: 99%       Attending Physical Exam:  RS- CTA-BL, ,  No wheezing , no crackles, good effort.  CVS- s1s2 regular, no murmur.  ABD- soft, non tender, not distended, no organomegaly.  EXT- no edema.mucosa moist  NEURO- AAO-3, no focal defecit.      Old records reviewed and summarized in PM hx.    Brief Summary Statement:   64-year-old female presented to ED with a chief complaint of fall- which appears to be mechanical, denies any complaint of  lightheadedness dizziness or any syncope event.  Patient does complain of generalized weakness, with some lightheadedness on standing up.  In addition to that patient does have a complaint of left flank/left lower abdominal pain-going on for past few weeks.  She was constipated, she did have a bowel movement on Saturday-in spite of that her pain is still persistent.  Denies any bleeding per GI or .  She does not complain of any worsening of her lower back pain, denies any complaint of fever, does complain of nausea/occasional vomiting episodes associated with the pain.  Denies any complaint of epigastric pain.    In ER patient got 1 and half liter of normal saline, Dilaudid 0.5 mg.  Remainder of detailed HPI is as noted above and has been reviewed and/or edited by me for completeness.    PM HX :  Small cell lung cancer-involving left upper lobe of the lung- diagnosed in November 2018-status post chemo cisplatin/etoposide-completed in February.  -Also had radiation to the lungs, and radiation to the brain.  All get completed in May.  History of CAD-on aspirin, her primary cardiologist Dr. Salazar discontinued her Brilinta recently.  Hypothyroid-Synthroid 75 mcg  Hypertension-lisinopril 10 mg,  Chronic constipation  GERD  DM2  CKD-stage III-stage IV  Anemia-normocytic.    LABS:  Troponin-less than 0.01, proBNP of 163  Blood glucose-143  Sodium of 116, potassium 4.9, BUN/creatinine 23/1.6  WBC-6, hemoglobin of 9, MCV of 87, neutrophil of 66  UA- cloudy, WBC 3-5, bacteria trace, squamous epithelial 3-12.  CT head no acute changes.  CT abdomen-again seen adenopathy anterior to distal thoracic aorta, at the level of T10 vertebra, also at the level of T12.-  Has been seen for the first time in September   Chest x-ray-no change, stable left perihilar scarring.  EKG-normal sinus rhythm at 78 bpm, , no acute ST-T wave changes.    Brief Assessment/Plan :  Fall-mechanical, does not appear to have any syncope- PT OT in  "a.m.  Hyponatremia- clinically she looks mildly dehydrated, given her history of small cell CA-need to rule out recurrence of malignancy- we will recheck urine sodium, serum sodium, serum osmolality, hold further fluids.  Nephrology consult if recheck is still on the lower side  Left lower quadrant pain along with constipation- did not improve even after having a bowel movement still has reproducible tenderness-CT abdomen do not show any significant changes- likely will benefit from Dr. Padilla consult to rule out GI pathology inpt vs outpt.  Again on the CT scan of the abdomen- we see enlarged lymph nodes- consult Dr. Cardoso    See above for further detailed assessment and plan developed with APC which I have reviewed and/or edited for completeness.    Electronically signed by Catia Ortiz MD, 10/07/19, 12:26 AM.        Electronically signed by Catia Ortiz MD at 10/07/19 [address] [place] [address] Medications (active)       Dose Frequency Start End    acetaminophen (TYLENOL) suppository 650 mg 650 mg Once 10/11/2019     Sig - Route: Insert 1 suppository into the rectum 1 (One) Time. - Rectal    Linked Group 1:  \"Or\" Linked Group Details        acetaminophen (TYLENOL) tablet 650 mg 650 mg Once 10/11/2019     Sig - Route: Take 2 tablets by mouth 1 (One) Time. - Oral    Linked Group 1:  \"Or\" Linked Group Details        ALPRAZolam (XANAX) tablet 0.25 mg 0.25 mg 3 Times Daily PRN 10/8/2019 10/18/2019    Sig - Route: Take 1 tablet by mouth 3 (Three) Times a Day As Needed for Anxiety. - Oral    aspirin EC tablet 81 mg 81 mg Daily 10/7/2019     Sig - Route: Take 1 tablet by mouth Daily. - Oral    carvedilol (COREG) tablet 6.25 mg 6.25 mg 2 Times Daily With Meals 10/7/2019     Sig - Route: Take 1 tablet by mouth 2 (Two) Times a Day With Meals. - Oral    cetirizine (zyrTEC) tablet 5 mg 5 mg Daily 10/7/2019     Sig - Route: Take 0.5 tablets by mouth Daily. - Oral    cyclobenzaprine (FLEXERIL) tablet 5 mg 5 mg Every 6 Hours " PRN 10/11/2019     Sig - Route: Take 0.5 tablets by mouth Every 6 (Six) Hours As Needed for Muscle Spasms. - Oral    dextrose (D50W) 25 g/ 50mL Intravenous Solution 25 g 25 g Every 15 Minutes PRN 10/7/2019     Sig - Route: Infuse 50 mL into a venous catheter Every 15 (Fifteen) Minutes As Needed for Low Blood Sugar (Blood Sugar Less Than 70). - Intravenous    dextrose (GLUTOSE) oral gel 15 g 15 g Every 15 Minutes PRN 10/7/2019     Sig - Route: Take 15 application by mouth Every 15 (Fifteen) Minutes As Needed for Low Blood Sugar (Blood sugar less than 70). - Oral    dicyclomine (BENTYL) tablet 20 mg 20 mg Every 8 Hours PRN 10/7/2019     Sig - Route: Take 1 tablet by mouth Every 8 (Eight) Hours As Needed (pain, cramps). - Oral    docusate sodium (COLACE) capsule 100 mg 100 mg 2 Times Daily PRN 10/7/2019     Sig - Route: Take 1 capsule by mouth 2 (Two) Times a Day As Needed for Constipation. - Oral    DULoxetine (CYMBALTA) DR capsule 30 mg 30 mg Daily 10/10/2019     Sig - Route: Take 1 capsule by mouth Daily. - Oral    fludrocortisone tablet 100 mcg 100 mcg Daily 10/7/2019     Sig - Route: Take 1 tablet by mouth Daily. - Oral    furosemide (LASIX) injection 20 mg 20 mg Once 10/11/2019     Sig - Route: Infuse 2 mL into a venous catheter 1 (One) Time. - Intravenous    glucagon (human recombinant) (GLUCAGEN DIAGNOSTIC) injection 1 mg 1 mg Every 15 Minutes PRN 10/7/2019     Sig - Route: Inject 1 mg under the skin into the appropriate area as directed Every 15 (Fifteen) Minutes As Needed for Low Blood Sugar (Blood Glucose Less Than 70). - Subcutaneous    heparin (porcine) 5000 UNIT/ML injection 5,000 Units 5,000 Units Every 12 Hours Scheduled 10/7/2019     Sig - Route: Inject 1 mL under the skin into the appropriate area as directed Every 12 (Twelve) Hours. - Subcutaneous    HYDROcodone-acetaminophen (NORCO) 7.5-325 MG per tablet 1 tablet 1 tablet Every 6 Hours PRN 10/7/2019 10/17/2019    Sig - Route: Take 1 tablet by mouth  Every 6 (Six) Hours As Needed for Moderate Pain . - Oral    HYDROcodone-acetaminophen (NORCO) 7.5-325 MG per tablet 1 tablet 1 tablet Every 8 Hours 10/10/2019 10/20/2019    Sig - Route: Take 1 tablet by mouth Every 8 (Eight) Hours. - Oral    hydrocortisone sodium succinate (Solu-CORTEF) injection 50 mg 50 mg Every 12 Hours 10/9/2019     Sig - Route: Infuse 50 mg into a venous catheter Every 12 (Twelve) Hours. - Intravenous    Influenza Vac Subunit Quad (FLUCELVAX) injection 0.5 mL 0.5 mL During Hospitalization 10/7/2019     Sig - Route: Inject 0.5 mL into the appropriate muscle as directed by prescriber During Hospitalization for Immunization. - Intramuscular    Cosign for Ordering: Accepted by Catia Ortiz MD on 10/7/2019  4:01 AM    insulin lispro (humaLOG) injection 0-7 Units 0-7 Units 4 Times Daily With Meals & Nightly 10/7/2019     Sig - Route: Inject 0-7 Units under the skin into the appropriate area as directed 4 (Four) Times a Day With Meals & at Bedtime. - Subcutaneous    lactulose (CHRONULAC) 10 GM/15ML solution 20 g 20 g 3 Times Daily 10/7/2019     Sig - Route: Take 30 mL by mouth 3 (Three) Times a Day. - Oral    levothyroxine (SYNTHROID, LEVOTHROID) tablet 75 mcg 75 mcg Every Early Morning 10/7/2019     Sig - Route: Take 1 tablet by mouth Every Morning. - Oral    lidocaine (LIDODERM) 5 % 1 patch 1 patch Every 24 Hours Scheduled 10/10/2019     Sig - Route: Place 1 patch on the skin as directed by provider Daily. - Transdermal    melatonin tablet 5 mg 5 mg Nightly PRN 10/7/2019     Sig - Route: Take 1 tablet by mouth At Night As Needed for Sleep. - Oral    ondansetron (ZOFRAN) 4 MG tablet  - ADS Override Pull   10/11/2019 10/11/2019    Notes to Pharmacy: Created by cabinet override    ondansetron (ZOFRAN) injection 4 mg 4 mg Every 6 Hours PRN 10/7/2019     Sig - Route: Infuse 2 mL into a venous catheter Every 6 (Six) Hours As Needed for Nausea or Vomiting. - Intravenous    ondansetron (ZOFRAN) tablet 4  "mg 4 mg Every 6 Hours PRN 10/11/2019     Sig - Route: Take 1 tablet by mouth Every 6 (Six) Hours As Needed for Nausea or Vomiting. - Oral    pantoprazole (PROTONIX) EC tablet 40 mg 40 mg Daily 10/7/2019     Sig - Route: Take 1 tablet by mouth Daily. - Oral    prochlorperazine (COMPAZINE) injection 5 mg 5 mg Every 6 Hours PRN 10/7/2019     Sig - Route: Infuse 1 mL into a venous catheter Every 6 (Six) Hours As Needed for Nausea or Vomiting. - Intravenous    Linked Group 2:  \"Or\" Linked Group Details        prochlorperazine (COMPAZINE) suppository 25 mg 25 mg Every 12 Hours PRN 10/7/2019     Sig - Route: Insert 1 suppository into the rectum Every 12 (Twelve) Hours As Needed for Nausea or Vomiting. - Rectal    Linked Group 2:  \"Or\" Linked Group Details        prochlorperazine (COMPAZINE) tablet 5 mg 5 mg Every 6 Hours PRN 10/7/2019     Sig - Route: Take 1 tablet by mouth Every 6 (Six) Hours As Needed for Nausea or Vomiting. - Oral    Linked Group 2:  \"Or\" Linked Group Details        sodium chloride 0.9 % flush 10 mL 10 mL As Needed 10/6/2019     Sig - Route: Infuse 10 mL into a venous catheter As Needed for Line Care. - Intravenous    Cosign for Ordering: Accepted by Mannie Chan MD on 10/6/2019 10:18 PM    sodium chloride 0.9 % flush 10 mL 10 mL As Needed 10/6/2019     Sig - Route: Infuse 10 mL into a venous catheter As Needed for Line Care. - Intravenous    Linked Group 3:  \"And\" Linked Group Details        sodium chloride 0.9 % flush 10 mL 10 mL Every 12 Hours Scheduled 10/7/2019     Sig - Route: Infuse 10 mL into a venous catheter Every 12 (Twelve) Hours. - Intravenous    sodium chloride 0.9 % flush 10 mL 10 mL As Needed 10/7/2019     Sig - Route: Infuse 10 mL into a venous catheter As Needed for Line Care. - Intravenous    sodium chloride tablet 1 g 1 g 3 Times Daily With Meals 10/8/2019     Sig - Route: Take 1 tablet by mouth 3 (Three) Times a Day With Meals. - Oral             Physician Progress Notes " (most recent note)      Isaias Richardson, DO at 10/11/19 1234          Palliative Care Progress Note    Date of Admission: 10/6/2019    Subjective: Patient continues to complain of pain primarily in her left side area.  States that the pain is a 7 out of 10.  Pain medicine does help but does not sufficiently diminish the pain.  Even with this the patient does state that she is able to tolerate the current pain.    Daughter is concerned due to the patient's confusion.  Current Code Status     Date Active Code Status Order ID Comments User Context       10/6/2019 23:11 CPR [id number]  Brigitte Sandoval PA-C ED       Questions for Current Code Status     Question Answer Comment    Code Status CPR     Medical Interventions (Level of Support Prior to Arrest) Full     Level Of Support Discussed With Patient         No current facility-administered medications on file prior to encounter.      Current Outpatient Medications on File Prior to Encounter   Medication Sig Dispense Refill   • aspirin 81 MG EC tablet Take 81 mg by mouth Daily.     • carvedilol (COREG) 6.25 MG tablet Take 6.25 mg by mouth 2 (Two) Times a Day With Meals.     • cetirizine (zyrTEC) 10 MG tablet Take 10 mg by mouth Daily.     • cyclobenzaprine (FLEXERIL) 10 MG tablet Take 1 tablet by mouth 3 (Three) Times a Day As Needed for Muscle Spasms. 90 tablet 0   • dicyclomine (BENTYL) 20 MG tablet Take 1 tablet by mouth Every 8 (Eight) Hours As Needed (pain, cramps). 20 tablet 0   • docusate sodium (COLACE) 100 MG capsule Take 1 capsule by mouth 2 (Two) Times a Day. (Patient taking differently: Take 100 mg by mouth 2 (Two) Times a Day As Needed.) 60 capsule 3   • famotidine (PEPCID) 20 MG tablet Take 20 mg by mouth 2 (Two) Times a Day.     • HYDROcodone-acetaminophen (NORCO) 7.5-325 MG per tablet Take 1 tablet by mouth Every 6 (Six) Hours As Needed for Moderate Pain .     • insulin aspart (novoLOG FLEXPEN) 100 UNIT/ML solution pen-injector sc pen Inject 0-10  "Units under the skin into the appropriate area as directed 3 (Three) Times a Day With Meals. SEE instructions for Sliding Scale 15 mL 0   • lactulose (CHRONULAC) 10 GM/15ML solution Take 30 mL by mouth 3 (Three) Times a Day as needed for constipation 240 mL 2   • levothyroxine (SYNTHROID, LEVOTHROID) 75 MCG tablet Take 75 mcg by mouth Daily.     • Magic mouthwash Radonc Swish and swallow 5-10 mL 4 (Four) Times a Day Before Meals & at Bedtime. 480 mL 2   • ondansetron (ZOFRAN) 4 MG tablet Take 1 tablet by mouth Every 8 (Eight) Hours As Needed for Nausea. 15 tablet 0   • ondansetron (ZOFRAN) 8 MG tablet TAKE ONE TABLET BY MOUTH THREE TIMES A DAY AS NEEDED FOR NAUSEA AND VOMITING 90 tablet 5   • pantoprazole (PROTONIX) 40 MG EC tablet Take 40 mg by mouth Daily.     • promethazine (PHENERGAN) 12.5 MG tablet Take 2 tablets by mouth Every 8 (Eight) Hours As Needed for Nausea or Vomiting. 20 tablet 0   • promethazine (PHENERGAN) 25 MG tablet Take 1 tablet by mouth Every 6 (Six) Hours As Needed for Nausea or Vomiting. 45 tablet 5        ALPRAZolam  •  cyclobenzaprine  •  dextrose  •  dextrose  •  dicyclomine  •  docusate sodium  •  glucagon (human recombinant)  •  HYDROcodone-acetaminophen  •  influenza vaccine  •  melatonin  •  ondansetron  •  ondansetron  •  prochlorperazine **OR** prochlorperazine **OR** prochlorperazine  •  sodium chloride  •  [COMPLETED] Insert peripheral IV **AND** sodium chloride  •  sodium chloride    Objective: /72   Pulse 103   Temp 98 °F (36.7 °C) (Oral)   Resp 18   Ht 154.9 cm (61\")   Wt 84.7 kg (186 lb 11.2 oz)   SpO2 92%   BMI 35.28 kg/m²       Intake/Output Summary (Last 24 hours) at 10/11/2019 1234  Last data filed at 10/11/2019 1000  Gross per 24 hour   Intake 1080 ml   Output 500 ml   Net 580 ml     Physical Exam:      General Appearance:    Alert, cooperative, slow to respond and appears to have some underlying confusion   Head:    Normocephalic, without obvious abnormality, " atraumatic   Eyes:            Lids and lashes normal, conjunctivae and sclerae normal, no   icterus, no pallor, corneas clear, PERRLA   Ears:    Ears appear intact with no abnormalities noted   Throat:   No oral lesions, no thrush, oral mucosa moist   Neck:   No adenopathy, supple, trachea midline, no thyromegaly, no     carotid bruit, no JVD   Back:     No kyphosis present, no scoliosis present, no skin lesions,       erythema or scars, no tenderness to percussion or                   palpation,   range of motion normal   Lungs:     Clear to auscultation,respirations regular, even and                   unlabored    Heart:    Regular rhythm and normal rate, normal S1 and S2, no            murmur, no gallop, no rub, no click   Breast Exam:    Deferred   Abdomen:     Normal bowel sounds, no masses, no organomegaly, soft        non-tender, non-distended, no guarding, no rebound                 tenderness   Genitalia:    Deferred   Extremities:   Moves all extremities well, no edema, no cyanosis, no              redness   Pulses:   Pulses palpable and equal bilaterally   Skin:   No bleeding, bruising or rash   Lymph nodes:   No palpable adenopathy   Neurologic:   Cranial nerves 2 - 12 grossly intact, sensation intact, DTR        present and equal bilaterally     Results from last 7 days   Lab Units 10/11/19  0255   WBC 10*3/mm3 10.65   HEMOGLOBIN g/dL 7.5*   HEMATOCRIT % 22.0*   PLATELETS 10*3/mm3 259     Results from last 7 days   Lab Units 10/11/19  1133  10/06/19  2039   SODIUM mmol/L 132*   < > 116*   POTASSIUM mmol/L 4.0   < > 4.9   CHLORIDE mmol/L 98   < > 79*   CO2 mmol/L 26.0   < > 23.0   BUN mg/dL 27*   < > 23   CREATININE mg/dL 1.43*   < > 1.60*   CALCIUM mg/dL 9.3   < > 9.9   BILIRUBIN mg/dL  --   --  0.4   ALK PHOS U/L  --   --  60   ALT (SGPT) U/L  --   --  12   AST (SGOT) U/L  --   --  16   GLUCOSE mg/dL 148*   < > 143*    < > = values in this interval not displayed.       Impression: SCLC  Neuropathic  pain  Anxiety  [facility]      Plan: In respect to the patient's symptoms we will continue patient recurrent symptom management regimen.  I did discuss with the daughter that the current mental status of the patient may be a new baseline given the patient's multiple medical issues, radiation, as well as medication.        Isaias Richardson DO  10/11/19  12:34 PM        Electronically signed by Isaias Richardson DO at 10/11/19 1236          Consult Notes (most recent note)      Brea Caro LMT at 10/11/19 1123      Consult Orders    1. Inpatient Integrative Care Consult [[id number]] ordered by Isaias Richardson DO at 10/10/19 1810              Pt seen by desit for massage therapy, see flowsheet.    Electronically signed by Brea Caro LMT at 10/11/19 1156          Physical Therapy Notes (most recent note)      Yu Corea, PT at 10/10/19 1555  Version 1 of 1         Patient Name: Shauna Valencia  : 1955    MRN: [id number]                              Today's Date: 10/10/2019       Admit Date: 10/6/2019    Visit Dx:     ICD-10-CM ICD-9-CM   1. Hyponatremia E87.1 276.1   2. Weakness R53.1 780.79   3. Left lower quadrant abdominal pain R10.32 789.04   4. Chronic renal failure, unspecified CKD stage N18.9 585.9   5. Anemia, unspecified type D64.9 285.9   6. Impaired functional mobility, balance, gait, and endurance Z74.09 V49.89     Patient Active Problem List   Diagnosis   • Acute hyponatremia   • Hyperlipidemia   • Type 2 diabetes mellitus (CMS/HCC)   • Tobacco abuse   • Obesity (BMI 30-39.9)   • Mass of upper lobe of left lung   • Mediastinal lymphadenopathy   • Small cell lung cancer, left upper lobe (CMS/HCC)   • Dehydration   • Anemia   • Hyperkalemia   • Symptomatic anemia   • Generalized weakness   • Nausea & vomiting   • CKD (chronic kidney disease) stage 3, GFR 30-59 ml/min (CMS/HCC)   • Hypomagnesemia   • Hypocalcemia   • CAD (coronary artery disease)   • COPD (chronic obstructive pulmonary  disease) (CMS/HCC)   • Hyponatremia     Past Medical History:   Diagnosis Date   • Arthritis    • Asthma    • COPD (chronic obstructive pulmonary disease) (CMS/HCC)    • Coronary artery disease     5 stents   • Diabetes mellitus (CMS/HCC)    • Disease of thyroid gland    • DVT (deep venous thrombosis) (CMS/HCC)     this year- per pt   • GERD (gastroesophageal reflux disease)    • Hyperlipidemia    • Hypertension    • Lung cancer (CMS/HCC)    • Myocardial infarction (CMS/HCC)    • Pancreatitis    • Pancreatitis    • Renal disorder      Past Surgical History:   Procedure Laterality Date   • APPENDECTOMY     • BRONCHOSCOPY N/A 11/6/2018    Procedure: BRONCHOSCOPY WITH ENDOBRONCHIAL ULTRASOUND, biopsies, brushing and washing.;  Surgeon: Isaac Epstein MD;  Location:  [facility];  Service: Pulmonary   • BRONCHOSCOPY N/A 11/9/2018    Procedure: BRONCHOSCOPY WITH ENDOBRONCHIAL ULTRASOUND;  Surgeon: Clay Scott MD;  Location:  [facility];  Service: Pulmonary   • CAROTID STENT      5 total   • CHOLECYSTECTOMY     • COLON SURGERY     • COLONOSCOPY     • HERNIA REPAIR     • HYSTERECTOMY     • OTHER SURGICAL HISTORY      bladder sling   • THYROIDECTOMY      1994   • TONSILLECTOMY     • TOTAL HIP ARTHROPLASTY Left    • UPPER GASTROINTESTINAL ENDOSCOPY       General Information     Row Name 10/10/19 1518          PT Evaluation Time/Intention    Document Type  therapy note (daily note)  -     Mode of Treatment  physical therapy;individual therapy  -     Row Name 10/10/19 1518          General Information    Existing Precautions/Restrictions  fall  -     Barriers to Rehab  cognitive status;hearing deficit  -     Row Name 10/10/19 1518          Cognitive Assessment/Intervention- PT/OT    Orientation Status (Cognition)  oriented x 3  -     Row Name 10/10/19 1518          Safety Issues, Functional Mobility    Safety Issues Affecting Function (Mobility)  insight into deficits/self awareness;positioning of  assistive device;sequencing abilities  -SJ     Impairments Affecting Function (Mobility)  cognition;postural/trunk control;strength  -SJ       User Key  (r) = Recorded By, (t) = Taken By, (c) = Cosigned By    Initials Name Provider Type    Yu Ortega PT Physical Therapist        Mobility     Row Name 10/10/19 1559          Bed Mobility Assessment/Treatment    Bed Mobility Assessment/Treatment  sit-supine  -SJ     Supine-Sit [place] (Bed Mobility)  supervision  -SJ     Sit-Supine [place] (Bed Mobility)  supervision  -SJ     Comment (Bed Mobility)  setup for lines, cues for sequencing  -SJ     Row Name 10/10/19 1550          Transfer Assessment/Treatment    Comment (Transfers)  cues for safety and sequencing. Pt performed toilet t/f with CGA  -SJ     Row Name 10/10/19 6390          Sit-Stand Transfer    Sit-Stand [place] (Transfers)  contact guard  -SJ     Assistive Device (Sit-Stand Transfers)  walker, front-wheeled  -SJ     Row Name 10/10/19 2275          Gait/Stairs Assessment/Training    73434 - Gait Training Minutes   23  -SJ     Gait/Stairs Assessment/Training  gait/ambulation independence  -SJ     [place] Level (Gait)  minimum assist (75% patient effort);verbal cues  -SJ     Assistive Device (Gait)  walker, front-wheeled  -SJ     Distance in Feet (Gait)  150  -SJ     Pattern (Gait)  step-through  -SJ     Deviations/Abnormal Patterns (Gait)  base of support, narrow;tyrone decreased;stride length decreased  -SJ     Bilateral Gait Deviations  forward flexed posture;heel strike decreased;weight shift ability decreased  -SJ     Comment (Gait/Stairs)  Pt had increased abdominal pain with mobility, req assist to steer RW. Pt req cues for attention to task.  -SJ       User Key  (r) = Recorded By, (t) = Taken By, (c) = Cosigned By    Initials Name Provider Type    Yu Ortega PT Physical Therapist        Obj/Interventions    No documentation.       Goals/Plan    No  documentation.       Clinical Impression     Row Name 10/10/19 1552          Pain Scale: Numbers Pre/Post-Treatment    Pain Scale: Numbers, Pretreatment  3/10  -     Pain Scale: Numbers, Post-Treatment  3/10  -SJ     Pain Location - Orientation  lower  -     Pain Location  abdomen  -     Pain Intervention(s)  Repositioned;Ambulation/increased activity  -     Row Name 10/10/19 1552          Vital Signs    Pre Systolic BP Rehab  127  -SJ     Pre Treatment Diastolic BP  77  -SJ     Pretreatment Heart Rate (beats/min)  90  -SJ     Pre SpO2 (%)  94  -SJ     O2 Delivery Pre Treatment  room air  -     Row Name 10/10/19 1552          Positioning and Restraints    Pre-Treatment Position  in bed  -     Post Treatment Position  bed  -     In Bed  fowlers;exit alarm on;encouraged to call for assist;with family/caregiver  -       User Key  (r) = Recorded By, (t) = Taken By, (c) = Cosigned By    Initials Name Provider Type    Yu Ortega, PT Physical Therapist        Outcome Measures    No documentation.       Physical Therapy Education     Title: PT OT SLP Therapies (In Progress)     Topic: Physical Therapy (In Progress)     Point: Mobility training (In Progress)     Learning Progress Summary           Patient Acceptance, E,D, VU,DU,NR by  at 10/10/2019  3:54 PM    Eager, E,D, NR by DM at 10/9/2019 10:29 AM   Family Eager, E,D, NR by DM at 10/9/2019 10:29 AM                   Point: Home exercise program (In Progress)     Learning Progress Summary           Patient Acceptance, E,D, VU,DU,NR by  at 10/10/2019  3:54 PM    Eager, E,D, NR by DM at 10/9/2019 10:29 AM   Family Eager, E,D, NR by DM at 10/9/2019 10:29 AM                   Point: Body mechanics (In Progress)     Learning Progress Summary           Patient Acceptance, E,D, VU,DU,NR by  at 10/10/2019  3:54 PM    Eager, E,D, NR by DM at 10/9/2019 10:29 AM   Family Eager, E,D, NR by DM at 10/9/2019 10:29 AM                   Point: Precautions  (In Progress)     Learning Progress Summary           Patient Acceptance, ESTHER SHANE, VU,DU,NR by  at 10/10/2019  3:54 PM    ACOSTA Meeks D, NR by DM at 10/9/2019 10:29 AM   Family ACOSTA Meeks D, NR by DM at 10/9/2019 10:29 AM                               User Key     Initials Effective Dates Name Provider Type Discipline     06/19/15 -  Yu Corea, PT Physical Therapist PT    DM 06/19/15 -  Urmila Scott PT Physical Therapist PT              PT Recommendation and Plan     Plan of Care Reviewed With: patient  Progress: improving  Outcome Summary: Pt able to increase gait distance. Pt limited by pain, weakness, and decreased activity sarita. Pt amb 150ft with RW with Cammy, req cues to steer RW and attention to task. Continue POC.     Time Calculation:   PT Charges     Row Name 10/10/19 1555 10/10/19 1550          Time Calculation    Start Time  1518  -SJ  --     PT Received On  10/10/19  -  --     PT Goal Re-Cert Due Date  10/19/19  -  --        Time Calculation- PT    Total Timed Code Minutes- PT  23 minute(s)  -  --        Timed Charges    14814 - Gait Training Minutes   --  23  -SJ       User Key  (r) = Recorded By, (t) = Taken By, (c) = Cosigned By    Initials Name Provider Type     Yu Corea, PT Physical Therapist        Therapy Charges for Today     Code Description Service Date Service Provider Modifiers Qty    60496646173 HC GAIT TRAINING EA 15 MIN 10/10/2019 Yu Corea PT GP 2          PT G-Codes  Outcome Measure Options: AM-PAC 6 Clicks Basic Mobility (PT)  AM-PAC 6 Clicks Score (PT): 21    Yu Corea PT  10/10/2019         Electronically signed by Yu Corea PT at 10/10/19 1555          Occupational Therapy Notes (most recent note)      [place], OT at 10/11/19 1000          Acute Care - Occupational Therapy Initial Evaluation  [facility]     Patient Name: Shauna Valencia  : 1955  MRN: [id number]  Today's Date: 10/11/2019  Onset of Illness/Injury or Date of  Surgery: 10/06/19          Admit Date: 10/6/2019       ICD-10-CM ICD-9-CM   1. Hyponatremia E87.1 276.1   2. Weakness R53.1 780.79   3. Left lower quadrant abdominal pain R10.32 789.04   4. Chronic renal failure, unspecified CKD stage N18.9 585.9   5. Anemia, unspecified type D64.9 285.9   6. Impaired functional mobility, balance, gait, and endurance Z74.09 V49.89   7. Impaired mobility and ADLs Z74.09 799.89     Patient Active Problem List   Diagnosis   • Acute hyponatremia   • Hyperlipidemia   • Type 2 diabetes mellitus (CMS/HCC)   • Tobacco abuse   • Obesity (BMI 30-39.9)   • Mass of upper lobe of left lung   • Mediastinal lymphadenopathy   • Small cell lung cancer, left upper lobe (CMS/HCC)   • Dehydration   • Anemia   • Hyperkalemia   • Symptomatic anemia   • Generalized weakness   • Nausea & vomiting   • CKD (chronic kidney disease) stage 3, GFR 30-59 ml/min (CMS/[facility])   • Hypomagnesemia   • Hypocalcemia   • CAD (coronary artery disease)   • COPD (chronic obstructive pulmonary disease) (CMS/HCC)   • Hyponatremia     Past Medical History:   Diagnosis Date   • Arthritis    • Asthma    • COPD (chronic obstructive pulmonary disease) (CMS/[facility])    • Coronary artery disease     5 stents   • Diabetes mellitus (CMS/HCC)    • Disease of thyroid gland    • DVT (deep venous thrombosis) (CMS/HCC)     this year- per pt   • GERD (gastroesophageal reflux disease)    • Hyperlipidemia    • Hypertension    • Lung cancer (CMS/HCC)    • Myocardial infarction (CMS/HCC)    • Pancreatitis    • Pancreatitis    • Renal disorder      Past Surgical History:   Procedure Laterality Date   • APPENDECTOMY     • BRONCHOSCOPY N/A 11/6/2018    Procedure: BRONCHOSCOPY WITH ENDOBRONCHIAL ULTRASOUND, biopsies, brushing and washing.;  Surgeon: Isaac Epstein MD;  Location: [facility];  Service: Pulmonary   • BRONCHOSCOPY N/A 11/9/2018    Procedure: BRONCHOSCOPY WITH ENDOBRONCHIAL ULTRASOUND;  Surgeon: Clay Scott MD;  Location: [facility]  ENDOSCOPY;  Service: Pulmonary   • CAROTID STENT      5 total   • CHOLECYSTECTOMY     • COLON SURGERY     • COLONOSCOPY     • HERNIA REPAIR     • HYSTERECTOMY     • OTHER SURGICAL HISTORY      bladder sling   • THYROIDECTOMY      1994   • TONSILLECTOMY     • TOTAL HIP ARTHROPLASTY Left    • UPPER GASTROINTESTINAL ENDOSCOPY            OT ASSESSMENT FLOWSHEET (last 12 hours)      Occupational Therapy Evaluation     Row Name 10/11/19 0907                   OT Evaluation Time/Intention    Subjective Information  complains of;pain;fatigue;dyspnea  -SANA        Document Type  evaluation  -SANA        Mode of Treatment  individual therapy;occupational therapy  -SANA        Patient Effort  excellent  -SANA        Symptoms Noted During/After Treatment  shortness of breath;fatigue  -SANA        Comment  post mobilizing pt. reporting feeling SOA, bu 02 sats were 96%.  -SANA           General Information    Patient Profile Reviewed?  yes  -SANA        Onset of Illness/Injury or Date of Surgery  10/06/19  -SANA        Patient Observations  cooperative;alert;agree to therapy  -SANA        Patient/Family Observations  Daughter present. Pt. sitting up in bed sipping coffee, tele, room air.  -SANA        Prior Level of Function  independent:;all household mobility;community mobility;ADL's;driving;shopping;home management;dependent:;yard work until recently struggling some with HM  -SANA        Equipment Currently Used at Home  commode, bedside;walker, rolling but was not using currently, from hip sx priorly  -SANA        Existing Precautions/Restrictions  fall  -SANA        Equipment Issued to Patient  reacher  -SANA        Risks Reviewed  patient and family:;LOB;dizziness;increased discomfort;change in vital signs;lines disloged  -SANA        Benefits Reviewed  patient and family:;improve function;increase independence;increase balance;increase knowledge;other (comment) activity tolerance  -SANA           Relationship/Environment    Lives With  alone  -SANA         Family Caregiver if Needed  child(prakash), adult  -SANA           Resource/Environmental Concerns    Current Living Arrangements  home/apartment/condo one step into den, wx in shower, standard toilet  -SANA           Home Main Entrance    Number of Stairs, Main Entrance  one  -SANA        Stair Railings, Main Entrance  none  -SANA           Stairs Within Home, Primary    Number of Stairs, Within Home, Primary  one  -SANA        Stair Railings, Within Home, Primary  none  -SANA           Cognitive Assessment/Intervention- PT/OT    Orientation Status (Cognition)  oriented x 3  -SANA        Follows Commands (Cognition)  WFL  -SANA        Safety Deficit (Cognitive)  awareness of need for assistance;insight into deficits/self awareness  -SANA           Safety Issues, Functional Mobility    Safety Issues Affecting Function (Mobility)  awareness of need for assistance;insight into deficits/self awareness  -SANA        Impairments Affecting Function (Mobility)  balance;endurance/activity tolerance  -           Bed Mobility Assessment/Treatment    Scooting/Bridging [place] (Bed Mobility)  conditional independence  -SANA        Supine-Sit [place] (Bed Mobility)  conditional independence  -SANA        Assistive Device (Bed Mobility)  bed rails;head of bed elevated  -           Functional Mobility    Functional Mobility- Ind. Level  minimum assist (75% patient effort) CGA with periods of min A especially as fatigued  -SANA        Functional Mobility- Device  other (see comments) gait belt and UE support  -SANA        Functional Mobility-Distance (Feet)  140  -SANA        Functional Mobility- Safety Issues  step length decreased  -        Functional Mobility- Comment  unsteady, fatigues quickly  -SANA           Transfer Assessment/Treatment    Transfer Assessment/Treatment  sit-stand transfer;stand-sit transfer;toilet transfer  -SANA           Sit-Stand Transfer    Sit-Stand [place] (Transfers)  contact guard  -        Assistive Device  (Sit-Stand Transfers)  other (see comments) gait belt  -SANA           Stand-Sit Transfer    Stand-Sit [place] (Transfers)  contact guard  -SANA        Assistive Device (Stand-Sit Transfers)  other (see comments) gait belt  -SANA           Toilet Transfer    Type (Toilet Transfer)  sit-stand;stand-sit  -SANA        [place] Level (Toilet Transfer)  supervision  -SANA        Assistive Device (Toilet Transfer)  grab bars/safety frame  -SANA           ADL Assessment/Intervention    BADL Assessment/Intervention  lower body dressing;grooming;toileting  -SANA           Lower Body Dressing Assessment/Training    Lower Body Dressing [place] Level  don;socks;set up;supervision  -SANA        Lower Body Dressing Position  edge of bed sitting  -SANA           Grooming Assessment/Training    [place] Level (Grooming)  supervision;oral care regimen;hair care, combing/brushing  -SANA        Grooming Position  sink side  -SANA        Comment (Grooming)  pt. also used hand gel post toileting.  -SANA           Toileting Assessment/Training    [place] Level (Toileting)  adjust/manage clothing;perform perineal hygiene;supervision  -SANA        Assistive Devices (Toileting)  grab bar/safety frame  -SANA        Toileting Position  supported sitting;unsupported standing  -SANA        Comment (Toileting)  per dtr. pt. fell post getting off toilet at home  -SANA           BADL Safety/Performance    Impairments, BADL Safety/Performance  balance;endurance/activity tolerance  -SANA           General ROM    GENERAL ROM COMMENTS  WFL AROM  -SANA           MMT (Manual Muscle Testing)    General MMT Comments  BUE grossly 4+ to 5/5  -SANA           Motor Assessment/Interventions    Additional Documentation  Balance (Group);Balance Interventions (Group);Therapeutic Exercise (Group);Therapeutic Exercise Interventions (Group)  -SANA           Dynamic Sitting Balance    Level of [place], Reaches Outside Midline (Sitting, Dynamic Balance)  supervision  -SANA         Sitting Position, Reaches Outside Midline (Sitting, Dynamic Balance)  sitting on edge of bed LBD task  -SANA           Dynamic Standing Balance    Level of [place], Reaches Outside Midline (Standing, Dynamic Balance)  supervision  -SANA        Time Able to Maintain Position, Reaches Outside Midline (Standing, Dynamic Balance)  more than 5 minutes grooming and toileting  -SANA           Sensory Assessment/Intervention    Sensory General Assessment  -- per pt. 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 resolving  -SANA        Additional Documentation  Vision Assessment/Intervention (Group)  -SANA           Vision Assessment/Intervention    Visual Impairment/Limitations  corrective lenses full time  -SANA           Positioning and Restraints    Pre-Treatment Position  in bed  -SANA        Post Treatment Position  chair  -SANA        In Chair  reclined;call light within reach;encouraged to call for assist;exit alarm on;with family/caregiver;waffle cushion  -SANA           Pain Scale: Numbers Pre/Post-Treatment    Pain Scale: Numbers, Pretreatment  6/10  -SANA        Pain Scale: Numbers, Post-Treatment  6/10  -SANA        Pain Location - Side  Left  -SANA        Pain Location - Orientation  lower  -SANA        Pain Location  back  -SANA        Pain Intervention(s)  Ambulation/increased activity;Repositioned  -SANA           Plan of Care Review    Plan of Care Reviewed With  patient;daughter  -SANA           Clinical Impression (OT)    OT Diagnosis  impaired ADL, IADL independence.  -SANA        Patient/Family Goals Statement (OT Eval)  Pt. wants to go to rehab so can regain balance and endurance to care for self.  -SANA        Criteria for Skilled Therapeutic Interventions Met (OT Eval)  yes;treatment indicated  -SANA        Rehab Potential (OT Eval)  good, to achieve stated therapy goals  -SANA        Therapy Frequency (OT Eval)  daily  -SANA        Care Plan Review (OT)  evaluation/treatment results reviewed;care plan/treatment goals reviewed;risks/benefits  reviewed;current/potential barriers reviewed;patient/other agree to care plan  -SANA        Care Plan Review, Other Participant (OT Eval)  daughter  -SANA        Anticipated Equipment Needs at Discharge (OT)  -- grab bars shower, possible shower chair.  -SANA        Anticipated Discharge Disposition (OT)  inpatient rehabilitation facility  -SANA           Vital Signs    Pre Systolic BP Rehab  96  -SANA        Pre Treatment Diastolic BP  69  -SANA        Intra Systolic BP Rehab  -- elevated some at EOB, not recorded  -SANA        Post Systolic BP Rehab  107  -SANA        Post Treatment Diastolic BP  79  -SANA        Pretreatment Heart Rate (beats/min)  105  -SNAA        Posttreatment Heart Rate (beats/min)  95  -SANA        Intra SpO2 (%)  96  -SANA        O2 Delivery Intra Treatment  room air  -SANA        Post SpO2 (%)  96  -SANA        O2 Delivery Post Treatment  room air  -SANA        Pre Patient Position  Supine  -SANA        Intra Patient Position  Standing  -SANA        Post Patient Position  Supine  -SANA           Planned OT Interventions    Planned Therapy Interventions (OT Eval)  activity tolerance training;adaptive equipment training;BADL retraining;functional balance retraining;occupation/activity based interventions;IADL retraining;ROM/therapeutic exercise  -SANA           OT Goals    Bed Mobility Goal Selection (OT)  bed mobility, OT goal 1  -SANA        Balance Goal Selection (OT)  balance, OT goal 1  -SANA        Activity Tolerance Goal Selection (OT)  activity tolerance, OT goal 1  -SANA        Additional Documentation  Activity Tolerance Goal Selection (OT) (Row);Balance Goal Selection (OT) (Row)  -SANA           Bed Mobility Goal 1 (OT)    Activity/Assistive Device (Bed Mobility Goal 1, OT)  bed mobility activities, all with bed flat with no rails up  -SANA        [place] Level/Cues Needed (Bed Mobility Goal 1, OT)  independent  -SANA        Time Frame (Bed Mobility Goal 1, OT)  long term goal (LTG);10 days  -SANA        Progress/Outcomes (Bed  Mobility Goal 1, OT)  goal ongoing  -SANA           Balance Goal 1 (OT)    Activity/Assistive Device (Balance Goal 1, OT)  standing, dynamic item retrieval with reacher, balance with ADL task/IADL task  -SANA        [place] Level/Cues Needed (Balance Goal 1, OT)  supervision required  -SANA        Time Frame (Balance Goal 1, OT)  long term goal (LTG);10 days  -SANA        Progress/Outcomes (Balance Goal 1, OT)  goal ongoing  -SANA            Activity Tolerance Goal 1 (OT)    Activity Tolerance Goal 1 (OT)  ADL tasks, IADL task, TE, mobilizing house hold distances  -SANA        Activity Level (Endurance Goal 1, OT)  15 min activity;20 min activity no rest period  -SANA        Time Frame (Activity Tolerance Goal 1, OT)  long term goal (LTG);10 days  -SANA        Progress/Outcome (Activity Tolerance Goal 1, OT)  goal ongoing  -SANA           Patient Education Goal (OT)    Activity (Patient Education Goal, OT)  EC education, home safety recommendations  -SANA        [place]/Cues/Accuracy (Memory Goal 2, OT)  verbalizes understanding  -SANA        Time Frame (Patient Education Goal, OT)  long term goal (LTG);10 days  -SANA        Progress/Outcome (Patient Education Goal, OT)  goal ongoing  -SANA          User Key  (r) = Recorded By, (t) = Taken By, (c) = Cosigned By    Initials Name Effective Dates    [place], OT 06/08/18 -          Occupational Therapy Education     Title: PT OT SLP Therapies (In Progress)     Topic: Occupational Therapy (In Progress)     Point: ADL training (Done)     Description: Instruct learner(s) on proper safety adaptation and remediation techniques during self care or transfers.   Instruct in proper use of assistive devices.    Learning Progress Summary           Patient Acceptance, E, VU by SANA at 10/11/2019  9:07 AM    Comment:  role OT, reason for consult, evaluation results, goal setting.                   Point: Precautions (Done)     Description: Instruct learner(s) on prescribed precautions  during self-care and functional transfers.    Learning Progress Summary           Patient Acceptance, E, VU by SANA at 10/11/2019  9:07 AM    Comment:  role OT, reason for consult, evaluation results, goal setting.                               User Key     Initials Effective Dates Name Provider Type Discipline    SANA 06/08/18 -  [place], OT Occupational Therapist OT                  OT Recommendation and Plan  Outcome Summary/Treatment Plan (OT)  Anticipated Equipment Needs at Discharge (OT): (grab bars shower, possible shower chair.)  Anticipated Discharge Disposition (OT): inpatient rehabilitation facility  Planned Therapy Interventions (OT Eval): activity tolerance training, adaptive equipment training, BADL retraining, functional balance retraining, occupation/activity based interventions, IADL retraining, ROM/therapeutic exercise  Therapy Frequency (OT Eval): daily  Plan of Care Review  Plan of Care Reviewed With: patient, daughter  Plan of Care Reviewed With: patient, daughter  Outcome Summary: OT evaluation completed.  Pt. able to complete simple ADL tasks with SBA for safet with balance and mobilize mathis UE support min A.  Pt. demonstrating limited activity tolerance and balance impacting ADL and I ADL independence.  Pt. appropriate for skilled OT services to promote return to PLOF.  Pt. would benefit from short IP rehab stay at discharge.    Outcome Measures     Row Name 10/11/19 0907 10/10/19 1518          How much help from another person do you currently need...    Turning from your back to your side while in flat bed without using bedrails?  --  4  -SJ     Moving from lying on back to sitting on the side of a flat bed without bedrails?  --  4  -SJ     Moving to and from a bed to a chair (including a wheelchair)?  --  3  -SJ     Standing up from a chair using your arms (e.g., wheelchair, bedside chair)?  --  4  -SJ     Climbing 3-5 steps with a railing?  --  3  -SJ     To walk in hospital room?  --   3  -SJ     AM-PAC 6 Clicks Score (PT)  --  21  -SJ        How much help from another is currently needed...    Putting on and taking off regular lower body clothing?  3 pt. needs SBA to CGA  for any standing portions all areas  -SANA  --     Bathing (including washing, rinsing, and drying)  3  -SANA  --     Toileting (which includes using toilet bed pan or urinal)  3  -SANA  --     Putting on and taking off regular upper body clothing  4  -SANA  --     Taking care of personal grooming (such as brushing teeth)  3  -SANA  --     Eating meals  4  -SANA  --     AM-PAC 6 Clicks Score (OT)  20  -SANA  --        Functional Assessment    Outcome Measure Options  AM-PAC 6 Clicks Daily Activity (OT)  -SANA  AM-PAC 6 Clicks Basic Mobility (PT)  -       User Key  (r) = Recorded By, (t) = Taken By, (c) = Cosigned By    Initials Name Provider Type    [place] OT Occupational Therapist    SJ Yu Corea, PT Physical Therapist          Time Calculation:   Time Calculation- OT     Row Name 10/11/19 0907             Time Calculation- OT    OT Start Time  907  -SANA      OT Received On  10/11/19  -SANA      OT Goal Re-Cert Due Date  10/21/19  -SANA        User Key  (r) = Recorded By, (t) = Taken By, (c) = Cosigned By    Initials Name Provider Type    [place] OT Occupational Therapist        Therapy Charges for Today     Code Description Service Date Service Provider Modifiers Qty    [id number]  OT EVAL LOW COMPLEXITY 4 10/11/2019 La Villalta OT GO 1    [id number]  OT THER SUPP EA 15 MIN 10/11/2019 La Villalta OT GO 1               [place] OT  10/11/2019    Electronically signed by La Villalta OT at 10/11/19 1001       [place] [id number] [facility] [address]  Phone:  [phone]  Fax:   Date: Oct 11, 2019      Ambulatory Referral to Home Health     Patient:  Shauna Valencia MRN:  [id number]   [address] [facility] [address] :  1955  SSN:     Phone: [phone] Sex:  F      INSURANCE PAYOR PLAN GROUP # SUBSCRIBER ID   Primary:  Secondary:    MEDICARE  [facility] [id number]  [id number]      [id number]  [id number]      Referring Provider Information:  ELIZABETH OLIVARES Phone: [phone] Fax:       Referral Information:   # Visits:  1 Referral Type: Home Health [42]   Urgency:  Routine Referral Reason: Specialty Services Required   Start Date: Oct 11, 2019 End Date:  To be determined by Insurer   Diagnosis: Hyponatremia (E87.1 [ICD-10-CM] 276.1 [ICD-9-CM])  Weakness (R53.1 [ICD-10-CM] 780.79 [ICD-9-CM])  Left lower quadrant abdominal pain (R10.32 [ICD-10-CM] 789.04 [ICD-9-CM])  Chronic renal failure, unspecified CKD stage (N18.9 [ICD-10-CM] 585.9 [ICD-9-CM])  Impaired functional mobility, balance, gait, and endurance (Z74.09 [ICD-10-CM] V49.89 [ICD-9-CM])  Impaired mobility and ADLs (Z74.09 [ICD-10-CM] 799.89 [ICD-9-CM])  Small cell lung cancer, left upper lobe (CMS/HCC) (C34.12 [ICD-10-CM] 162.3 [ICD-9-CM])  Generalized weakness (R53.1 [ICD-10-CM] 780.79 [ICD-9-CM])  Mass of upper lobe of left lung (R91.8 [ICD-10-CM] 786.6 [ICD-9-CM])  Dehydration (E86.0 [ICD-10-CM] 276.51 [ICD-9-CM])  Symptomatic anemia (D64.9 [ICD-10-CM] 285.9 [ICD-9-CM])  Hypomagnesemia (E83.42 [ICD-10-CM] 275.2 [ICD-9-CM])  Hypocalcemia (E83.51 [ICD-10-CM] 275.41 [ICD-9-CM])      Refer to Dept:   Refer to Provider:   Refer to Facility:       Face to Face Visit Date: 10/11/2019  Follow-up provider for Plan of Care? I treated the patient in an acute care facility and will not continue treatment after discharge.  Follow-up provider: CARROL PALAFOX [[id number]]  Reason/Clinical Findings: s/p hospitalization for hyponatremia, weakness, debility  Describe mobility limitations that make leaving home difficult: weakness, debility, short of breath, pain  Nursing/Therapeutic Services Requested: Skilled Nursing  Nursing/Therapeutic Services Requested: Physical  Therapy  Nursing/Therapeutic Services Requested: Occupational Therapy  Skilled nursing orders: Medication education  Skilled nursing orders: Pain management  PT orders: Therapeutic exercise  PT orders: Strengthening  PT orders: Transfer training  Occupational orders: Activities of daily living  Occupational orders: Energy conservation  Occupational orders: Strengthening  Occupational orders: Home safety assessment  Frequency: 1 Week 1     This document serves as a request of services and does not constitute Insurance authorization or approval of services.  To determine eligibility, please contact the members Insurance carrier to verify and review coverage.     If you have medical questions regarding this request for services. Please contact [address] at [phone] between the hours of 8:00am - 5:00pm (Mon-Fri).       Verbal Order Mode: Telephone with readback   Authorizing Provider: Niecy Haney MD  Authorizing Provider's NPI: [id number]     Order Entered By: Annita Hussein RN 10/11/2019  1:33 PM

## 2019-10-11 NOTE — PROGRESS NOTES
"   LOS: 4 days    Patient Care Team:  Vidya Chávez MD as PCP - General (General Practice)  Desmond Cardoso MD as Referring Physician (Hematology and Oncology)  Ramone Huggins MD as Consulting Physician (Radiation Oncology)  Ravi Still MD as Consulting Physician (Interventional Cardiology)  Cirilo Oquendo MD as Consulting Physician (Endocrinology)    Reason For Visit:    Subjective   Patient seen/examined.  Sodium up to 132        Review of Systems:    Nausea earlier today.  Denies any chest pain shortness of breath no dysuria or hematuria.      Objective       acetaminophen 650 mg Oral Once   Or      acetaminophen 650 mg Rectal Once   aspirin 81 mg Oral Daily   carvedilol 6.25 mg Oral BID With Meals   cetirizine 5 mg Oral Daily   DULoxetine 30 mg Oral Daily   fludrocortisone 100 mcg Oral Daily   furosemide 20 mg Intravenous Once   heparin (porcine) 5,000 Units Subcutaneous Q12H   HYDROcodone-acetaminophen 1 tablet Oral Q8H   hydrocortisone sodium succinate 50 mg Intravenous Q12H   insulin lispro 0-7 Units Subcutaneous 4x Daily With Meals & Nightly   lactulose 20 g Oral TID   levothyroxine 75 mcg Oral Q AM   lidocaine 1 patch Transdermal Q24H   pantoprazole 40 mg Oral Daily   sodium chloride 10 mL Intravenous Q12H   sodium chloride 1 g Oral TID With Meals            Vital Signs:  Blood pressure 112/72, pulse 103, temperature 98 °F (36.7 °C), temperature source Oral, resp. rate 18, height 154.9 cm (61\"), weight 84.7 kg (186 lb 11.2 oz), SpO2 92 %.    Flowsheet Rows      First Filed Value   Admission Height  154.9 cm (61\") Documented at 10/06/2019 1931   Admission Weight  82.1 kg (181 lb) Documented at 10/06/2019 1931          10/10 0701 - 10/11 0700  In: 720 [P.O.:720]  Out: 200 [Urine:200]    Physical Exam:    General Appearance:alert no obvious distress morbidly obese  female  Eyes: PER, EOMI.  Lungs: respirations regular and unlabored, no crepitus, clear to " auscultation  Heart/CV: regular rhythm & normal rate, no murmur, no gallop, no rub and no edema  Abdomen: not distended, soft, non-tender, no masses,  bowel sounds present morbid obesity.  Skin: No rash, Warm and dry    Radiology:      Renal Imaging:        Labs:  Results from last 7 days   Lab Units 10/11/19  0255 10/10/19  0438 10/09/19  0410   WBC 10*3/mm3 10.65 8.57 7.67   HEMOGLOBIN g/dL 7.5* 7.4* 7.7*   HEMATOCRIT % 22.0* 22.1* 22.4*   PLATELETS 10*3/mm3 259 280 266     Results from last 7 days   Lab Units 10/11/19  1133 10/11/19  0216 10/10/19  0438 10/10/19  0106  10/09/19  0508  10/06/19  2039   SODIUM mmol/L 132* 131* 129* 126*   < > 125*   < > 116*   POTASSIUM mmol/L 4.0 4.3 4.2 4.3   < > 4.5   < > 4.9   CHLORIDE mmol/L 98 93* 92* 90*   < > 86*   < > 79*   CO2 mmol/L 26.0 25.0 24.0 22.0   < > 23.0   < > 23.0   BUN mg/dL 27* 27* 23 23   < > 24*   < > 23   CREATININE mg/dL 1.43* 1.50* 1.21* 1.37*   < > 1.42*   < > 1.60*   CALCIUM mg/dL 9.3 9.0 9.4 8.9   < > 9.6   < > 9.9   PHOSPHORUS mg/dL  --   --   --   --   --  3.8  --   --    ALBUMIN g/dL  --   --   --   --   --  4.60  --  4.90    < > = values in this interval not displayed.     Results from last 7 days   Lab Units 10/11/19  1133   GLUCOSE mg/dL 148*       Results from last 7 days   Lab Units 10/06/19  2039   ALK PHOS U/L 60   BILIRUBIN mg/dL 0.4   ALT (SGPT) U/L 12   AST (SGOT) U/L 16           Results from last 7 days   Lab Units 10/06/19  2132   COLOR UA  Yellow   CLARITY UA  Cloudy*   PH, URINE  <=5.0   SPECIFIC GRAVITY, URINE  1.021   GLUCOSE UA  Negative   KETONES UA  Negative   BILIRUBIN UA  Negative   PROTEIN UA  Negative   BLOOD UA  Negative   LEUKOCYTES UA  Negative   NITRITE UA  Negative       Estimated Creatinine Clearance: 40.3 mL/min (A) (by C-G formula based on SCr of 1.43 mg/dL (H)).      Assessment       Acute hyponatremia    Hyperlipidemia    Type 2 diabetes mellitus (CMS/HCC)    Tobacco abuse    Small cell lung cancer, left upper lobe  (CMS/[facility])    Anemia    Generalized weakness    CKD (chronic kidney disease) stage 3, GFR 30-59 ml/min (CMS/HCC)    CAD (coronary artery disease)    COPD (chronic obstructive pulmonary disease) (CMS/[facility])    Hyponatremia    Impression:     Hyponatremia  - improving, ADH mechanism  - continue current  - hold tolvaptan currently     Hypocortisolism   - Morning cortisol 3.9 indicative of adrenal insufficiency. Her symptoms of Abdominal pain, nausea vomiting might be related to AI   - Questionable primary vs secondary AI   - wean cortef to 50 q 12 hours yesterday, continue fludrocortisone 0.1mcg daily      PET scan next week per onc, ok to go home from renal standpoint  Discussed with the family members in the room.    Shree Richmond MD  10/11/19  3:25 PM

## 2019-10-11 NOTE — PLAN OF CARE
Problem: Patient Care Overview  Goal: Plan of Care Review  Outcome: Ongoing (interventions implemented as appropriate)   10/11/19 0956   Coping/Psychosocial   Plan of Care Reviewed With patient;daughter   Plan of Care Review   Progress no change   OTHER   Outcome Summary OT evaluation completed. Pt. able to complete simple ADL tasks with SBA for safet with balance and mobilize mathis UE support min A. Pt. demonstrating limited activity tolerance and balance impacting ADL and I ADL independence. Pt. appropriate for skilled OT services to promote return to PLOF. Pt. would benefit from short IP rehab stay at discharge.

## 2019-10-11 NOTE — PROGRESS NOTES
Continued Stay Note   Maura     Patient Name: Shauna Valencia  MRN: [id number]  Today's Date: 10/11/2019    Admit Date: 10/6/2019    Discharge Plan     Row Name 10/11/19 1629       Plan    Plan  Home with     Patient/Family in Agreement with Plan  yes    Plan Comments  Spoke with patient and daughter.  Per Sharlene with [facility], they are unable to accept patient due to upcoming PET scan.  Patient is agreeable to return home with home health services, verbal list provided she chose [facility].  Spoke with Naomi at [facility] and faxed info to [phone].  Faxed records to [facility] [phone] as requested by Monica with  Palliative. Daughter to transport at discharge.     Final Discharge Disposition Code  06 - home with home health care        Discharge Codes    No documentation.       Expected Discharge Date and Time     Expected Discharge Date Expected Discharge Time    Oct 9, 2019             Annita Hussein RN

## 2019-10-11 NOTE — PROGRESS NOTES
[facility] Medicine Services  PROGRESS NOTE    Patient Name: Shauna Valencia  : 1955  MRN: [id number]    Date of Admission: 10/6/2019  Primary Care Physician: Vidya Chávez MD    Subjective   Subjective     CC:  Left lower quadrant abdominal pain    HPI:  No acute events overnight. Feeling well at the moment, not complaining of significant abdominal pain, will be working with PT soon.    Review of Systems  Gen- No fevers, chills  CV- No chest pain, palpitations  Resp- No cough, dyspnea  GI- No N/V/D, abd pain     All other systems reviewed and negative.     Objective   Objective     Vital Signs:   Temp:  [98 °F (36.7 °C)-98.6 °F (37 °C)] 98 °F (36.7 °C)  Heart Rate:  [] 103  Resp:  [16-18] 18  BP: ()/(65-97) 112/72        Physical Exam:  Constitutional: Awake, alert  Eyes: PERRLA, sclerae anicteric, no conjunctival injection  HENT: NCAT, mucous membranes moist  Respiratory: Clear to auscultation bilaterally, nonlabored respirations   Cardiovascular: RRR, no murmurs, rubs, or gallops, palpable pedal pulses bilaterally  Gastrointestinal: Positive bowel sounds, soft, nontender, nondistended  Psychiatric: Appropriate affect, cooperative  Neurologic: Oriented x 3, Cranial Nerves grossly intact to confrontation, speech clear  Skin: No rashes    Results Reviewed:    Results from last 7 days   Lab Units 10/11/19  0255 10/10/19  0438 10/09/19  0410   WBC 10*3/mm3 10.65 8.57 7.67   HEMOGLOBIN g/dL 7.5* 7.4* 7.7*   HEMATOCRIT % 22.0* 22.1* 22.4*   PLATELETS 10*3/mm3 259 280 266     Results from last 7 days   Lab Units 10/11/19  1133 10/11/19  0216 10/10/19  0438  10/06/19  2039   SODIUM mmol/L 132* 131* 129*   < > 116*   POTASSIUM mmol/L 4.0 4.3 4.2   < > 4.9   CHLORIDE mmol/L 98 93* 92*   < > 79*   CO2 mmol/L 26.0 25.0 24.0   < > 23.0   BUN mg/dL 27* 27* 23   < > 23   CREATININE mg/dL 1.43* 1.50* 1.21*   < > 1.60*   GLUCOSE mg/dL 148* 254* 194*   < > 143*   CALCIUM mg/dL  9.3 9.0 9.4   < > 9.9   ALT (SGPT) U/L  --   --   --   --  12   AST (SGOT) U/L  --   --   --   --  16   TROPONIN T ng/mL  --   --   --   --  <0.010   PROBNP pg/mL  --   --   --   --  163.3    < > = values in this interval not displayed.     Estimated Creatinine Clearance: 40.3 mL/min (A) (by C-G formula based on SCr of 1.43 mg/dL (H)).    Microbiology Results Abnormal     None          Imaging Results (last 24 hours)     ** No results found for the last 24 hours. **               I have reviewed the medications:  Scheduled Meds:  acetaminophen 650 mg Oral Once   Or      acetaminophen 650 mg Rectal Once   aspirin 81 mg Oral Daily   carvedilol 6.25 mg Oral BID With Meals   cetirizine 5 mg Oral Daily   DULoxetine 30 mg Oral Daily   fludrocortisone 100 mcg Oral Daily   furosemide 20 mg Intravenous Once   heparin (porcine) 5,000 Units Subcutaneous Q12H   HYDROcodone-acetaminophen 1 tablet Oral Q8H   hydrocortisone sodium succinate 50 mg Intravenous Q12H   insulin lispro 0-7 Units Subcutaneous 4x Daily With Meals & Nightly   lactulose 20 g Oral TID   levothyroxine 75 mcg Oral Q AM   lidocaine 1 patch Transdermal Q24H   pantoprazole 40 mg Oral Daily   sodium chloride 10 mL Intravenous Q12H   sodium chloride 1 g Oral TID With Meals     Continuous Infusions:   PRN Meds:.ALPRAZolam  •  cyclobenzaprine  •  dextrose  •  dextrose  •  dicyclomine  •  docusate sodium  •  glucagon (human recombinant)  •  HYDROcodone-acetaminophen  •  influenza vaccine  •  melatonin  •  ondansetron  •  ondansetron  •  prochlorperazine **OR** prochlorperazine **OR** prochlorperazine  •  sodium chloride  •  [COMPLETED] Insert peripheral IV **AND** sodium chloride  •  sodium chloride      Assessment/Plan   Assessment / Plan     Active Hospital Problems    Diagnosis  POA   • **Acute hyponatremia [E87.1]  Yes   • CAD (coronary artery disease) [I25.10]  Yes   • COPD (chronic obstructive pulmonary disease) (CMS/HCC) [J44.9]  Yes   • Hyponatremia [E87.1]   Yes   • CKD (chronic kidney disease) stage 3, GFR 30-59 ml/min (CMS/HCC) [N18.3]  Yes   • Generalized weakness [R53.1]  Yes   • Anemia [D64.9]  Yes   • Small cell lung cancer, left upper lobe (CMS/HCC) [C34.12]  Yes   • Hyperlipidemia [E78.5]  Yes   • Type 2 diabetes mellitus (CMS/HCC) [E11.9]  Yes   • Tobacco abuse [Z72.0]  Yes      Resolved Hospital Problems   No resolved problems to display.        Brief Hospital Course to date:  Shauna Valencia is a 64 y.o. female  63 yo lady new to me today with hx of SCLC admitted on 10/6 for hyponatremia, improved today to 131.    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 Cardoso, palliative care consult for pain control     Hx of CAD: continue asa 81 mg     CKD: nephrology following, avoid nephrotoxins, check CMP tmr     Hypothyroidism: continue levothyroxine 75 mcg     HTN: continue carvedilol 6.25 mg BID    Anemia: transfusing 1u PRBCs today, check CBC tmr      DVT Prophylaxis:  Heparin sq    Disposition: I expect the patient to be discharged 1-2 days.    CODE STATUS:   Code Status and Medical Interventions:   Ordered at: 10/06/19 2969     Level Of Support Discussed With:    Patient     Code Status:    CPR     Medical Interventions (Level of Support Prior to Arrest):    Full         Electronically signed by Niecy Haney MD, 10/11/19, 2:58 PM.

## 2019-10-11 NOTE — PROGRESS NOTES
Palliative Care Progress Note    Date of Admission: 10/6/2019    Subjective: Patient continues to complain of pain primarily in her left side area.  States that the pain is a 7 out of 10.  Pain medicine does help but does not sufficiently diminish the pain.  Even with this the patient does state that she is able to tolerate the current pain.    Daughter is concerned due to the patient's confusion.  Current Code Status     Date Active Code Status Order ID Comments User Context       10/6/2019 23:11 CPR [id number]  Brigitte Sandoval PA-C ED       Questions for Current Code Status     Question Answer Comment    Code Status CPR     Medical Interventions (Level of Support Prior to Arrest) Full     Level Of Support Discussed With Patient         No current facility-administered medications on file prior to encounter.      Current Outpatient Medications on File Prior to Encounter   Medication Sig Dispense Refill   • aspirin 81 MG EC tablet Take 81 mg by mouth Daily.     • carvedilol (COREG) 6.25 MG tablet Take 6.25 mg by mouth 2 (Two) Times a Day With Meals.     • cetirizine (zyrTEC) 10 MG tablet Take 10 mg by mouth Daily.     • cyclobenzaprine (FLEXERIL) 10 MG tablet Take 1 tablet by mouth 3 (Three) Times a Day As Needed for Muscle Spasms. 90 tablet 0   • dicyclomine (BENTYL) 20 MG tablet Take 1 tablet by mouth Every 8 (Eight) Hours As Needed (pain, cramps). 20 tablet 0   • docusate sodium (COLACE) 100 MG capsule Take 1 capsule by mouth 2 (Two) Times a Day. (Patient taking differently: Take 100 mg by mouth 2 (Two) Times a Day As Needed.) 60 capsule 3   • famotidine (PEPCID) 20 MG tablet Take 20 mg by mouth 2 (Two) Times a Day.     • HYDROcodone-acetaminophen (NORCO) 7.5-325 MG per tablet Take 1 tablet by mouth Every 6 (Six) Hours As Needed for Moderate Pain .     • insulin aspart (novoLOG FLEXPEN) 100 UNIT/ML solution pen-injector sc pen Inject 0-10 Units under the skin into the appropriate area as directed 3 (Three)  "Times a Day With Meals. SEE instructions for Sliding Scale 15 mL 0   • lactulose (CHRONULAC) 10 GM/15ML solution Take 30 mL by mouth 3 (Three) Times a Day as needed for constipation 240 mL 2   • levothyroxine (SYNTHROID, LEVOTHROID) 75 MCG tablet Take 75 mcg by mouth Daily.     • Magic mouthwash Radonc Swish and swallow 5-10 mL 4 (Four) Times a Day Before Meals & at Bedtime. 480 mL 2   • ondansetron (ZOFRAN) 4 MG tablet Take 1 tablet by mouth Every 8 (Eight) Hours As Needed for Nausea. 15 tablet 0   • ondansetron (ZOFRAN) 8 MG tablet TAKE ONE TABLET BY MOUTH THREE TIMES A DAY AS NEEDED FOR NAUSEA AND VOMITING 90 tablet 5   • pantoprazole (PROTONIX) 40 MG EC tablet Take 40 mg by mouth Daily.     • promethazine (PHENERGAN) 12.5 MG tablet Take 2 tablets by mouth Every 8 (Eight) Hours As Needed for Nausea or Vomiting. 20 tablet 0   • promethazine (PHENERGAN) 25 MG tablet Take 1 tablet by mouth Every 6 (Six) Hours As Needed for Nausea or Vomiting. 45 tablet 5        ALPRAZolam  •  cyclobenzaprine  •  dextrose  •  dextrose  •  dicyclomine  •  docusate sodium  •  glucagon (human recombinant)  •  HYDROcodone-acetaminophen  •  influenza vaccine  •  melatonin  •  ondansetron  •  ondansetron  •  prochlorperazine **OR** prochlorperazine **OR** prochlorperazine  •  sodium chloride  •  [COMPLETED] Insert peripheral IV **AND** sodium chloride  •  sodium chloride    Objective: /72   Pulse 103   Temp 98 °F (36.7 °C) (Oral)   Resp 18   Ht 154.9 cm (61\")   Wt 84.7 kg (186 lb 11.2 oz)   SpO2 92%   BMI 35.28 kg/m²      Intake/Output Summary (Last 24 hours) at 10/11/2019 1234  Last data filed at 10/11/2019 1000  Gross per 24 hour   Intake 1080 ml   Output 500 ml   Net 580 ml     Physical Exam:      General Appearance:    Alert, cooperative, slow to respond and appears to have some underlying confusion   Head:    Normocephalic, without obvious abnormality, atraumatic   Eyes:            Lids and lashes normal, conjunctivae and " sclerae normal, no   icterus, no pallor, corneas clear, PERRLA   Ears:    Ears appear intact with no abnormalities noted   Throat:   No oral lesions, no thrush, oral mucosa moist   Neck:   No adenopathy, supple, trachea midline, no thyromegaly, no     carotid bruit, no JVD   Back:     No kyphosis present, no scoliosis present, no skin lesions,       erythema or scars, no tenderness to percussion or                   palpation,   range of motion normal   Lungs:     Clear to auscultation,respirations regular, even and                   unlabored    Heart:    Regular rhythm and normal rate, normal S1 and S2, no            murmur, no gallop, no rub, no click   Breast Exam:    Deferred   Abdomen:     Normal bowel sounds, no masses, no organomegaly, soft        non-tender, non-distended, no guarding, no rebound                 tenderness   Genitalia:    Deferred   Extremities:   Moves all extremities well, no edema, no cyanosis, no              redness   Pulses:   Pulses palpable and equal bilaterally   Skin:   No bleeding, bruising or rash   Lymph nodes:   No palpable adenopathy   Neurologic:   Cranial nerves 2 - 12 grossly intact, sensation intact, DTR        present and equal bilaterally     Results from last 7 days   Lab Units 10/11/19  0255   WBC 10*3/mm3 10.65   HEMOGLOBIN g/dL 7.5*   HEMATOCRIT % 22.0*   PLATELETS 10*3/mm3 259     Results from last 7 days   Lab Units 10/11/19  1133  10/06/19  2039   SODIUM mmol/L 132*   < > 116*   POTASSIUM mmol/L 4.0   < > 4.9   CHLORIDE mmol/L 98   < > 79*   CO2 mmol/L 26.0   < > 23.0   BUN mg/dL 27*   < > 23   CREATININE mg/dL 1.43*   < > 1.60*   CALCIUM mg/dL 9.3   < > 9.9   BILIRUBIN mg/dL  --   --  0.4   ALK PHOS U/L  --   --  60   ALT (SGPT) U/L  --   --  12   AST (SGOT) U/L  --   --  16   GLUCOSE mg/dL 148*   < > 143*    < > = values in this interval not displayed.       Impression: SCLC  Neuropathic pain  Anxiety  GOC      Plan: In respect to the patient's symptoms we will  continue patient recurrent symptom management regimen.  I did discuss with the daughter that the current mental status of the patient may be a new baseline given the patient's multiple medical issues, radiation, as well as medication.        Isaias Richardson,   10/11/19  12:34 PM

## 2019-10-11 NOTE — PROGRESS NOTES
DATE OF VISIT: 10/11/2019    Chief Complaint: Followup for small cell lung cancer     SUBJECTIVE: The patient is laying comfortable in bed. Her daughter and nurse are at the bedside.  She  denied any fever or chills, no night sweats, denied any headaches. She is complaining of fatigue. She continue to have pain on left side.    REVIEW OF SYSTEMS: All the other 9 systems are reviewed by me and negative  except what is mentioned in HPI.     PAST MEDICAL HISTORY/SOCIAL HISTORY/FAMILY HISTORY: Unchanged from my prior documentation done on October 7, 2019      Current Facility-Administered Medications:   •  acetaminophen (TYLENOL) tablet 650 mg, 650 mg, Oral, Once **OR** acetaminophen (TYLENOL) suppository 650 mg, 650 mg, Rectal, Once, Desmond Cardoso MD  •  ALPRAZolam (XANAX) tablet 0.25 mg, 0.25 mg, Oral, TID PRN, Malia Solis MD, 0.25 mg at 10/10/19 1634  •  aspirin EC tablet 81 mg, 81 mg, Oral, Daily, Brigitte Sandoval PA-C, 81 mg at 10/11/19 0810  •  carvedilol (COREG) tablet 6.25 mg, 6.25 mg, Oral, BID With Meals, Brigitte Sandoval PA-C, 6.25 mg at 10/11/19 0920  •  cetirizine (zyrTEC) tablet 5 mg, 5 mg, Oral, Daily, Brigitte Sandoval PA-C, 5 mg at 10/11/19 0809  •  cyclobenzaprine (FLEXERIL) tablet 5 mg, 5 mg, Oral, Q6H PRN, Niecy Haney MD, 5 mg at 10/11/19 1205  •  dextrose (D50W) 25 g/ 50mL Intravenous Solution 25 g, 25 g, Intravenous, Q15 Min PRN, Brigitte Sandoval PA-C  •  dextrose (GLUTOSE) oral gel 15 g, 15 g, Oral, Q15 Min PRN, Brigitte Sandoval PA-C  •  dicyclomine (BENTYL) tablet 20 mg, 20 mg, Oral, Q8H PRN, Brigitte Sandoval PA-C, 20 mg at 10/11/19 1214  •  docusate sodium (COLACE) capsule 100 mg, 100 mg, Oral, BID PRN, Brigitte Sandoval PA-C  •  DULoxetine (CYMBALTA) DR capsule 30 mg, 30 mg, Oral, Daily, Isaias Richardson DO, 30 mg at 10/11/19 0810  •  fludrocortisone tablet 100 mcg, 100 mcg, Oral, Daily, Terry Wiggins MD, 100 mcg at 10/11/19 0839  •  furosemide (LASIX) injection  20 mg, 20 mg, Intravenous, Once, Desmond Cardoso MD  •  glucagon (human recombinant) (GLUCAGEN DIAGNOSTIC) injection 1 mg, 1 mg, Subcutaneous, Q15 Min PRN, Brigitte Sandoval PA-C  •  heparin (porcine) 5000 UNIT/ML injection 5,000 Units, 5,000 Units, Subcutaneous, Q12H, Brigitte Sandoval PA-C, 5,000 Units at 10/11/19 0812  •  HYDROcodone-acetaminophen (NORCO) 7.5-325 MG per tablet 1 tablet, 1 tablet, Oral, Q6H PRN, Malia Solis MD, 1 tablet at 10/11/19 0319  •  HYDROcodone-acetaminophen (NORCO) 7.5-325 MG per tablet 1 tablet, 1 tablet, Oral, Q8H, Isaias Richardson DO, 1 tablet at 10/11/19 0730  •  hydrocortisone sodium succinate (Solu-CORTEF) injection 50 mg, 50 mg, Intravenous, Q12H, Martha Mccracken DO, 50 mg at 10/10/19 2041  •  Influenza Vac Subunit Quad (FLUCELVAX) injection 0.5 mL, 0.5 mL, Intramuscular, During Hospitalization, Catia Ortiz MD  •  insulin lispro (humaLOG) injection 0-7 Units, 0-7 Units, Subcutaneous, 4x Daily With Meals & Nightly, Brigitte Sandoval PA-C, 2 Units at 10/10/19 2040  •  lactulose (CHRONULAC) 10 GM/15ML solution 20 g, 20 g, Oral, TID, Brigitte Sandoval PA-C, 20 g at 10/10/19 1634  •  levothyroxine (SYNTHROID, LEVOTHROID) tablet 75 mcg, 75 mcg, Oral, Q AM, Brigitte Sandoval PA-C, 75 mcg at 10/11/19 0606  •  lidocaine (LIDODERM) 5 % 1 patch, 1 patch, Transdermal, Q24H, Martha Mccracken DO, 1 patch at 10/11/19 1215  •  melatonin tablet 5 mg, 5 mg, Oral, Nightly PRN, Brigitte Sandoval PA-C  •  ondansetron (ZOFRAN) injection 4 mg, 4 mg, Intravenous, Q6H PRN, Brigitte Sandoval PA-C, 4 mg at 10/08/19 1020  •  ondansetron (ZOFRAN) tablet 4 mg, 4 mg, Oral, Q6H PRN, Isaias Richardson, DO  •  pantoprazole (PROTONIX) EC tablet 40 mg, 40 mg, Oral, Daily, Brigitte Sandoval PA-C, 40 mg at 10/11/19 0809  •  prochlorperazine (COMPAZINE) injection 5 mg, 5 mg, Intravenous, Q6H PRN, 5 mg at 10/09/19 0924 **OR** prochlorperazine (COMPAZINE) tablet 5 mg, 5 mg, Oral, Q6H PRN  "**OR** prochlorperazine (COMPAZINE) suppository 25 mg, 25 mg, Rectal, Q12H PRN, Brigitte Sandoval PA-C  •  sodium chloride 0.9 % flush 10 mL, 10 mL, Intravenous, PRN, Mannie Chan MD  •  [COMPLETED] Insert peripheral IV, , , Once **AND** sodium chloride 0.9 % flush 10 mL, 10 mL, Intravenous, PRN, Kristan Chi APRN  •  sodium chloride 0.9 % flush 10 mL, 10 mL, Intravenous, Q12H, Brigitte Sandoval PA-C, 10 mL at 10/10/19 0927  •  sodium chloride 0.9 % flush 10 mL, 10 mL, Intravenous, PRN, Brigitte Sandoval PA-C  •  sodium chloride tablet 1 g, 1 g, Oral, TID With Meals, Martha Mccracken S, DO, 1 g at 10/11/19 1205    PHYSICAL EXAMINATION:   /72   Pulse 103   Temp 98 °F (36.7 °C) (Oral)   Resp 18   Ht 154.9 cm (61\")   Wt 84.7 kg (186 lb 11.2 oz)   SpO2 92%   BMI 35.28 kg/m²    ECOG Performance Status: 2 - Symptomatic, <50% confined to bed  GENERAL: Age appropriate. No acute distress.   NEURO/PSYCH: A&O x 3, strength 5/5 in all muscle groups  HEENT: Head atraumatic, normocephalic.   NECK: Supple. No JVD. No lymphadenopathy.   LUNGS: Clear to auscultation bilaterally. No wheezing. No rhonchi.   HEART: Regular rate and rhythm. S1, S2, no murmurs.   ABDOMEN: Soft, nontender, nondistended. Bowel sounds positive. No  hepatosplenomegaly.   EXTREMITIES: No clubbing, cyanosis, or edema.   SKIN: No rashes. No purpura.       Admission on 10/06/2019   No results displayed because visit has over 200 results.          No results found.    ASSESSMENT: The patient is a very pleasant 64 y.o. female  with small cell lung cancer      PLAN:  1.  Small cell lung cancer: CT chest with progressive lymphadenopathy.  Hyponatremia is stable.  The patient is scheduled for whole-body PET scan to further evaluate her CT related abnormalities.  2.  Abdominal pain: Possibly induced by her lymphadenopathy.  Continue as needed opiates.  3.  Hyponatremia: The patient should be discharged home on demeclocycline.  4.  " Chronic kidney disease: Appreciate nephrology assistance.  5. Symptomatic anemia: I will transfuse 1unit of blood.  The patient is scheduled for PET scan on Wednesday she will follow-up with me on Thursday possibly to start immunotherapy.    Desmond Cardoso MD  10/11/2019

## 2019-10-12 NOTE — PLAN OF CARE
Problem: Palliative Care (Adult)  Intervention: Support/Optimize Psychosocial Response   10/12/19 4441   Coping/Psychosocial Interventions   Supportive Measures active listening utilized;positive reinforcement provided;verbalization of feelings encouraged;other (see comments)  (symptom assessment)   Psychosocial Support   Family/Support System Care support provided   Visit with patient and family at bedside - patient reports feeling better today, states pain 7/10 but feels improved, has had multiple BMs, both pt and family feel pt is stronger today and more steady.  Patient hopeful to home soon.  Patient and family receptive to supportive/empathic presence, active listening, and symptom assessment.

## 2019-10-12 NOTE — PROGRESS NOTES
[facility] Medicine Services  PROGRESS NOTE    Patient Name: Shauna Valencia  : 1955  MRN: [id number]    Date of Admission: 10/6/2019  Primary Care Physician: Vidya Chávez MD    Subjective   Subjective     CC:  Left lower quadrant abdominal pain    HPI:  Pt is seen resting up in bed in NAD.  Dozing but awakens easily.  States she is sleepy. No n/v, abd pain or cp.  Daughter at bs.  Reports pt did not sleep well last night.  Had to hold a few doses of her scheduled pain meds yesterday due to way too sleepy yesterday.  Family asking for adjust of pain med dose down some today and to monitor pt inpt until am to see if pain controlled and not too sleepy on new dose.  Pt currently rates pain 6/10 scale.      Review of Systems  Gen- No fevers, chills  CV- No chest pain, palpitations  Resp- No cough, dyspnea  GI- No N/V/D, abd pain     All other systems reviewed and negative.     Objective   Objective     Vital Signs:   Temp:  [97.9 °F (36.6 °C)-98.4 °F (36.9 °C)] 97.9 °F (36.6 °C)  Heart Rate:  [] 82  Resp:  [14-18] 18  BP: ()/(56-83) 139/77        Physical Exam:  Constitutional: drowsy, but more awake this am per staff/daughter.  NAD.  Just appears sleepy.   Eyes: PERRLA, sclerae anicteric, no conjunctival injection  HENT: NCAT, mucous membranes moist  Respiratory: Clear to auscultation bilaterally but decreased at bases, nonlabored respirations on RA   Cardiovascular: RRR, no murmurs, rubs, or gallops, palpable pedal pulses bilaterally  Gastrointestinal: Positive bowel sounds, soft, nontender, nondistended. Obese   Psychiatric: slightly flat affect, cooperative and calm   Neurologic: Oriented x 3, Cranial Nerves grossly intact to confrontation, speech clear and appropriate.  Follows commands   Skin: No rashes    Results Reviewed:    Results from last 7 days   Lab Units 10/12/19  0729 10/12/19  0025 10/11/19  0255 10/10/19  0438   WBC 10*3/mm3 5.19  --  10.65 8.57    HEMOGLOBIN g/dL 9.2* 9.0* 7.5* 7.4*   HEMATOCRIT % 29.3* 27.6* 22.0* 22.1*   PLATELETS 10*3/mm3 222  --  259 280     Results from last 7 days   Lab Units 10/12/19  0729 10/12/19  0025 10/11/19  1732  10/06/19  2039   SODIUM mmol/L 133* 136 135*   < > 116*   POTASSIUM mmol/L 3.8 3.9 3.9   < > 4.9   CHLORIDE mmol/L 97* 101 100   < > 79*   CO2 mmol/L 25.0 25.0 26.0   < > 23.0   BUN mg/dL 28* 29* 28*   < > 23   CREATININE mg/dL 1.52* 1.52* 1.38*   < > 1.60*   GLUCOSE mg/dL 128* 164* 157*   < > 143*   CALCIUM mg/dL 9.1 9.0 9.3   < > 9.9   ALT (SGPT) U/L 11  --   --   --  12   AST (SGOT) U/L 15  --   --   --  16   TROPONIN T ng/mL  --   --   --   --  <0.010   PROBNP pg/mL  --   --   --   --  163.3    < > = values in this interval not displayed.     Estimated Creatinine Clearance: 37.4 mL/min (A) (by C-G formula based on SCr of 1.52 mg/dL (H)).    Microbiology Results Abnormal     None          Imaging Results (last 24 hours)     ** No results found for the last 24 hours. **               I have reviewed the medications:  Scheduled Meds:    aspirin 81 mg Oral Daily   carvedilol 6.25 mg Oral BID With Meals   cetirizine 5 mg Oral Daily   DULoxetine 30 mg Oral Daily   fludrocortisone 100 mcg Oral Daily   heparin (porcine) 5,000 Units Subcutaneous Q12H   HYDROcodone-acetaminophen 1 tablet Oral Q8H   hydrocortisone sodium succinate 50 mg Intravenous Q12H   insulin lispro 0-7 Units Subcutaneous 4x Daily With Meals & Nightly   lactulose 20 g Oral TID   levothyroxine 75 mcg Oral Q AM   lidocaine 1 patch Transdermal Q24H   pantoprazole 40 mg Oral Daily   sodium chloride 10 mL Intravenous Q12H   sodium chloride 1 g Oral TID With Meals     Continuous Infusions:   PRN Meds:.•  ALPRAZolam  •  cyclobenzaprine  •  dextrose  •  dextrose  •  dicyclomine  •  docusate sodium  •  glucagon (human recombinant)  •  HYDROcodone-acetaminophen  •  influenza vaccine  •  melatonin  •  ondansetron  •  ondansetron  •  prochlorperazine **OR**  prochlorperazine **OR** prochlorperazine  •  sodium chloride  •  [COMPLETED] Insert peripheral IV **AND** sodium chloride  •  sodium chloride      Assessment/Plan   Assessment / Plan     Active Hospital Problems    Diagnosis  POA   • **Acute hyponatremia [E87.1]  Yes   • CAD (coronary artery disease) [I25.10]  Yes   • COPD (chronic obstructive pulmonary disease) (CMS/[facility]) [J44.9]  Yes   • Hyponatremia [E87.1]  Yes   • CKD (chronic kidney disease) stage 3, GFR 30-59 ml/min (CMS/[facility]) [N18.3]  Yes   • Generalized weakness [R53.1]  Yes   • Anemia [D64.9]  Yes   • Small cell lung cancer, left upper lobe (CMS/[facility]) [C34.12]  Yes   • Hyperlipidemia [E78.5]  Yes   • Type 2 diabetes mellitus (CMS/[facility]) [E11.9]  Yes   • Tobacco abuse [Z72.0]  Yes      Resolved Hospital Problems   No resolved problems to display.        Brief Hospital Course to date:  Shauna Valencia is a 64 y.o. female  65 yo lady new to me today with hx of SCLC admitted on 10/6 for hyponatremia, improved 10/11 to 131.    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  Remains on hydrocortisone 50 mg q12h and fludrocortisone 100 mcg qd  -nephrology following  -per oncology should be on demeclocycline on discharge    Hx SCLC:   scheduled for outpatient PET scan on Wednesday  follows with Dr. Cardoso   palliative care consult for pain control   --pt/fam feel she is NOT quite ready to go home today. Feel pain meds need better adjustment prior to dc.  Was on meagan norco 7.5 q8 but staff/fam report pt was somnolent yesterday and that had to hold a dose or 2 overnight.  Pt with pain reports this am.  Will reduce dose to 5 mg meagan q8 today and monitor.      Hx of CAD: continue asa 81 mg     CKD: nephrology following, avoid nephrotoxins  --am labs     Hypothyroidism: continue levothyroxine 75 mcg     HTN: continue carvedilol 6.25 mg BID    Anemia: transfused 1u PRBCs yesterday 10/11  H/H stable today.  However not much improved s/p 1  unit  [place] am labs       DVT Prophylaxis:  Heparin sq    Disposition: I expect the patient to be discharged 1-2 days pending palliative/onc final recs and hospice setup outpt per fam/pt.      CODE STATUS:   Code Status and Medical Interventions:   Ordered at: 10/06/19 2715     Level Of Support Discussed With:    Patient     Code Status:    CPR     Medical Interventions (Level of Support Prior to Arrest):    Full         Electronically signed by MATTHEW Greco, 10/12/19, 12:37 PM.

## 2019-10-12 NOTE — PLAN OF CARE
Problem: Fall Risk (Adult)  Goal: Absence of Fall  Outcome: Ongoing (interventions implemented as appropriate)      Problem: Patient Care Overview  Goal: Plan of Care Review  Outcome: Ongoing (interventions implemented as appropriate)   10/11/19 2000 10/12/19 0410   Coping/Psychosocial   Plan of Care Reviewed With patient;daughter --    Plan of Care Review   Progress --  no change   OTHER   Outcome Summary --  VSS, 1UPRB transfused with improve H/H, rested well, no other issues/concerns, will monitor     Goal: Discharge Needs Assessment  Outcome: Ongoing (interventions implemented as appropriate)      Problem: Syncope (Adult)  Goal: Physical Safety/Health Maintenance  Outcome: Ongoing (interventions implemented as appropriate)    Goal: Optimal Emotional/Functional [place]  Outcome: Ongoing (interventions implemented as appropriate)      Problem: Palliative Care (Adult)  Goal: Identify Related Risk Factors and Signs and Symptoms  Outcome: Outcome(s) achieved Date Met: 10/12/19    Goal: Maximized Comfort  Outcome: Ongoing (interventions implemented as appropriate)    Goal: Enhanced Quality of Life  Outcome: Ongoing (interventions implemented as appropriate)

## 2019-10-12 NOTE — PROGRESS NOTES
DATE OF VISIT: 10/12/2019    Chief Complaint: Followup for small cell lung cancer     SUBJECTIVE: The patient is sitting comfortable in bedside chair, her daughter is at the bedside.  Hip pain is better her energy has improved after 1 unit of blood yesterday.    REVIEW OF SYSTEMS: All the other 9 systems are reviewed by me and negative  except what is mentioned in HPI.     PAST MEDICAL HISTORY/SOCIAL HISTORY/FAMILY HISTORY: Unchanged from my prior documentation done on October 7, 2019      Current Facility-Administered Medications:   •  ALPRAZolam (XANAX) tablet 0.25 mg, 0.25 mg, Oral, TID PRN, Malia Solis MD, 0.25 mg at 10/10/19 1634  •  aspirin EC tablet 81 mg, 81 mg, Oral, Daily, Brigitte Sandoval PA-C, 81 mg at 10/12/19 0937  •  carvedilol (COREG) tablet 6.25 mg, 6.25 mg, Oral, BID With Meals, Brigitte Sandoval PA-C, 6.25 mg at 10/12/19 0938  •  cetirizine (zyrTEC) tablet 5 mg, 5 mg, Oral, Daily, Brigitte Sandoval PA-C, 5 mg at 10/12/19 0938  •  cyclobenzaprine (FLEXERIL) tablet 5 mg, 5 mg, Oral, Q6H PRN, Niecy Haney MD, 5 mg at 10/11/19 1205  •  dextrose (D50W) 25 g/ 50mL Intravenous Solution 25 g, 25 g, Intravenous, Q15 Min PRN, Brigitte Sandoval PA-C  •  dextrose (GLUTOSE) oral gel 15 g, 15 g, Oral, Q15 Min PRN, Brigitte Sandoval PA-C  •  dicyclomine (BENTYL) tablet 20 mg, 20 mg, Oral, Q8H PRN, Brigitte Sandoval PA-C, 20 mg at 10/11/19 1214  •  docusate sodium (COLACE) capsule 100 mg, 100 mg, Oral, BID PRN, Brigitte Sandoval PA-C  •  DULoxetine (CYMBALTA) DR capsule 30 mg, 30 mg, Oral, Daily, Isaias Richardson DO, 30 mg at 10/12/19 0938  •  fludrocortisone tablet 100 mcg, 100 mcg, Oral, Daily, Terry Wiggnis MD, 100 mcg at 10/12/19 0938  •  glucagon (human recombinant) (GLUCAGEN DIAGNOSTIC) injection 1 mg, 1 mg, Subcutaneous, Q15 Min PRN, Birgitte Sandoval PA-C  •  heparin (porcine) 5000 UNIT/ML injection 5,000 Units, 5,000 Units, Subcutaneous, Q12H, Brigitte Sandoval PA-C, 5,000 Units  at 10/12/19 0937  •  HYDROcodone-acetaminophen (NORCO) 5-325 MG per tablet 1 tablet, 1 tablet, Oral, Q8H, Valentine Delaney APRN  •  HYDROcodone-acetaminophen (NORCO) 7.5-325 MG per tablet 1 tablet, 1 tablet, Oral, Q6H PRN, Malia Solis MD, 1 tablet at 10/11/19 1717  •  hydrocortisone sodium succinate (Solu-CORTEF) injection 50 mg, 50 mg, Intravenous, Q12H, Martha Mccracken, DO, 50 mg at 10/12/19 0939  •  Influenza Vac Subunit Quad (FLUCELVAX) injection 0.5 mL, 0.5 mL, Intramuscular, During Hospitalization, Catia Ortiz MD  •  insulin lispro (humaLOG) injection 0-7 Units, 0-7 Units, Subcutaneous, 4x Daily With Meals & Nightly, Brigitte Sandoval PA-C, 3 Units at 10/11/19 2141  •  lactulose (CHRONULAC) 10 GM/15ML solution 20 g, 20 g, Oral, TID, Brigitte Sandoval PA-C, 20 g at 10/12/19 0938  •  levothyroxine (SYNTHROID, LEVOTHROID) tablet 75 mcg, 75 mcg, Oral, Q AM, Brigitte Sandoval PA-LINDA, 75 mcg at 10/12/19 0541  •  lidocaine (LIDODERM) 5 % 1 patch, 1 patch, Transdermal, Q24H, Martha Mccracken DO, 1 patch at 10/11/19 1215  •  melatonin tablet 5 mg, 5 mg, Oral, Nightly PRN, Brigitte Sandoval PA-C  •  ondansetron (ZOFRAN) injection 4 mg, 4 mg, Intravenous, Q6H PRN, Brigitte Sandoval PA-C, 4 mg at 10/08/19 1020  •  ondansetron (ZOFRAN) tablet 4 mg, 4 mg, Oral, Q6H PRN, Isaias Richardson DO  •  pantoprazole (PROTONIX) EC tablet 40 mg, 40 mg, Oral, Daily, Brigitte Sandoval PA-C, 40 mg at 10/12/19 0937  •  prochlorperazine (COMPAZINE) injection 5 mg, 5 mg, Intravenous, Q6H PRN, 5 mg at 10/09/19 0924 **OR** prochlorperazine (COMPAZINE) tablet 5 mg, 5 mg, Oral, Q6H PRN **OR** prochlorperazine (COMPAZINE) suppository 25 mg, 25 mg, Rectal, Q12H PRN, Brigitte Sandoval PA-C  •  sodium chloride 0.9 % flush 10 mL, 10 mL, Intravenous, PRN, Mannie Chan MD  •  [COMPLETED] Insert peripheral IV, , , Once **AND** sodium chloride 0.9 % flush 10 mL, 10 mL, Intravenous, PRN, Kristan Chi,  "APRN  •  sodium chloride 0.9 % flush 10 mL, 10 mL, Intravenous, Q12H, Brigitte Sandoval PA-C, 10 mL at 10/10/19 0927  •  sodium chloride 0.9 % flush 10 mL, 10 mL, Intravenous, PRN, Brigitte Sandoval PA-C  •  sodium chloride tablet 1 g, 1 g, Oral, TID With Meals, Martha Mccracken DO, 1 g at 10/12/19 1206    PHYSICAL EXAMINATION:   /77 (BP Location: Left arm, Patient Position: Lying)   Pulse 82   Temp 97.9 °F (36.6 °C) (Oral)   Resp 18   Ht 154.9 cm (61\")   Wt 82.9 kg (182 lb 12.8 oz)   SpO2 94%   BMI 34.54 kg/m²    ECOG Performance Status: 2 - Symptomatic, <50% confined to bed  GENERAL: Age appropriate. No acute distress.   NEURO/PSYCH: A&O x 3, strength 5/5 in all muscle groups  HEENT: Head atraumatic, normocephalic.   NECK: Supple. No JVD. No lymphadenopathy.   LUNGS: Clear to auscultation bilaterally. No wheezing. No rhonchi.   HEART: Regular rate and rhythm. S1, S2, no murmurs.   ABDOMEN: Soft, nontender, nondistended. Bowel sounds positive. No  hepatosplenomegaly.   EXTREMITIES: No clubbing, cyanosis, or edema.   SKIN: No rashes. No purpura.       Admission on 10/06/2019   No results displayed because visit has over 200 results.          No results found.    ASSESSMENT: The patient is a very pleasant 64 y.o. female  with small cell lung cancer      PLAN:  1.  Small cell lung cancer: CT chest with progressive lymphadenopathy.  Hyponatremia is stable.  The patient is scheduled for whole-body PET scan to further evaluate her CT related abnormalities.  2.  Abdominal pain: Possibly induced by her lymphadenopathy.  Continue as needed opiates.  3.  Hyponatremia: Better.  We will follow-up with nephrology recommendations.  4.  Chronic kidney disease: Appreciate nephrology assistance.  5. Symptomatic anemia: Status post 1 unit of blood given on October 11, 2019.  The patient is scheduled for PET scan on Wednesday she will follow-up with me on Thursday possibly to start immunotherapy.    Desmond Cardoso" MD  10/12/2019

## 2019-10-12 NOTE — PROGRESS NOTES
"   LOS: 5 days    Patient Care Team:  Vidya Chávez MD as PCP - General (General Practice)  Desmond Cardoso MD as Referring Physician (Hematology and Oncology)  Ramone Huggins MD as Consulting Physician (Radiation Oncology)  Ravi Still MD as Consulting Physician (Interventional Cardiology)  Cirilo Oquendo MD as Consulting Physician (Endocrinology)    Reason For Visit:    Subjective   No new events, no obvious distress.  Sodium 137 creatinine 1.45 both improved.        Review of Systems:   Denies any nausea vomiting, chest pain shortness of breath, dysuria or hematuria..      Objective       aspirin 81 mg Oral Daily   carvedilol 6.25 mg Oral BID With Meals   cetirizine 5 mg Oral Daily   DULoxetine 30 mg Oral Daily   fludrocortisone 100 mcg Oral Daily   heparin (porcine) 5,000 Units Subcutaneous Q12H   HYDROcodone-acetaminophen 1 tablet Oral Q8H   hydrocortisone sodium succinate 50 mg Intravenous Q12H   insulin lispro 0-7 Units Subcutaneous 4x Daily With Meals & Nightly   lactulose 20 g Oral TID   levothyroxine 75 mcg Oral Q AM   lidocaine 1 patch Transdermal Q24H   pantoprazole 40 mg Oral Daily   sodium chloride 10 mL Intravenous Q12H   sodium chloride 1 g Oral TID With Meals            Vital Signs:  Blood pressure 139/77, pulse 82, temperature 97.9 °F (36.6 °C), temperature source Oral, resp. rate 18, height 154.9 cm (61\"), weight 82.9 kg (182 lb 12.8 oz), SpO2 94 %.    Flowsheet Rows      First Filed Value   Admission Height  154.9 cm (61\") Documented at 10/06/2019 1931   Admission Weight  82.1 kg (181 lb) Documented at 10/06/2019 1931          10/11 0701 - 10/12 0700  In: 800 [P.O.:480]  Out: 1100 [Urine:1100]    Physical Exam:    General Appearance: Awake, alert, oriented x3 no obvious distress laying comfortably in bed.  Eyes: PER, EOMI.  Lungs: Lungs clear to auscultation nonlabored breathing.  No crepitation.  Heart/CV: regular rhythm & normal rate, no murmur, no gallop, " no rub and no edema  Abdomen: not distended, soft, non-tender, no masses,  bowel sounds present morbid obesity.  Skin: No rash, Warm and dry    Radiology:      Renal Imaging:        Labs:  Results from last 7 days   Lab Units 10/12/19  0729 10/12/19  0025 10/11/19  0255 10/10/19  0438   WBC 10*3/mm3 5.19  --  10.65 8.57   HEMOGLOBIN g/dL 9.2* 9.0* 7.5* 7.4*   HEMATOCRIT % 29.3* 27.6* 22.0* 22.1*   PLATELETS 10*3/mm3 222  --  259 280     Results from last 7 days   Lab Units 10/12/19  1139 10/12/19  0729 10/12/19  0025 10/11/19  1732  10/09/19  0508  10/06/19  2039   SODIUM mmol/L 137 133* 136 135*   < > 125*   < > 116*   POTASSIUM mmol/L 4.0 3.8 3.9 3.9   < > 4.5   < > 4.9   CHLORIDE mmol/L 97* 97* 101 100   < > 86*   < > 79*   CO2 mmol/L 29.0 25.0 25.0 26.0   < > 23.0   < > 23.0   BUN mg/dL 27* 28* 29* 28*   < > 24*   < > 23   CREATININE mg/dL 1.45* 1.52* 1.52* 1.38*   < > 1.42*   < > 1.60*   CALCIUM mg/dL 9.4 9.1 9.0 9.3   < > 9.6   < > 9.9   PHOSPHORUS mg/dL  --   --   --   --   --  3.8  --   --    ALBUMIN g/dL  --  3.80  --   --   --  4.60  --  4.90    < > = values in this interval not displayed.     Results from last 7 days   Lab Units 10/12/19  1139   GLUCOSE mg/dL 127*       Results from last 7 days   Lab Units 10/12/19  0729   ALK PHOS U/L 58   BILIRUBIN mg/dL 0.2   ALT (SGPT) U/L 11   AST (SGOT) U/L 15           Results from last 7 days   Lab Units 10/06/19  2132   COLOR UA  Yellow   CLARITY UA  Cloudy*   PH, URINE  <=5.0   SPECIFIC GRAVITY, URINE  1.021   GLUCOSE UA  Negative   KETONES UA  Negative   BILIRUBIN UA  Negative   PROTEIN UA  Negative   BLOOD UA  Negative   LEUKOCYTES UA  Negative   NITRITE UA  Negative       Estimated Creatinine Clearance: 39.2 mL/min (A) (by C-G formula based on SCr of 1.45 mg/dL (H)).      Assessment       Acute hyponatremia    Hyperlipidemia    Type 2 diabetes mellitus (CMS/HCC)    Tobacco abuse    Small cell lung cancer, left upper lobe (CMS/HCC)    Anemia    Generalized  weakness    CKD (chronic kidney disease) stage 3, GFR 30-59 ml/min (CMS/HCC)    CAD (coronary artery disease)    COPD (chronic obstructive pulmonary disease) (CMS/HCC)    Hyponatremia    Impression:     Hyponatremia  - improving, ADH mechanism, sodium 137 acceptable range.  - continue current  - hold tolvaptan no need for it at this time.     Hypocortisolism   - Morning cortisol 3.9 indicative of adrenal insufficiency. Her symptoms of Abdominal pain, nausea vomiting might be related to AI   - Questionable primary vs secondary AI   - wean cortef, continue fludrocortisone 0.1mcg daily check labs in the morning.      PET scan next week per onc, ok to go home from renal standpoint  Discussed with the family members in the room.    Shree Richmond MD  10/12/19  12:55 PM

## 2019-10-13 NOTE — PLAN OF CARE
Problem: Fall Risk (Adult)  Goal: Absence of Fall  Outcome: Ongoing (interventions implemented as appropriate)   10/13/19 0433   Fall Risk (Adult)   Absence of Fall making progress toward outcome

## 2019-10-13 NOTE — PLAN OF CARE
Problem: Patient Care Overview  Goal: Plan of Care Review  Outcome: Ongoing (interventions implemented as appropriate)   10/13/19 0636   Coping/Psychosocial   Plan of Care Reviewed With patient;daughter   OTHER   Outcome Summary Pt participated in transfer training, gt training, and sitting/standing balance while dressing with [id number] and RW. Pt plans to D/C home today and would benefit from continued therapy with home health.

## 2019-10-13 NOTE — DISCHARGE PLACEMENT REQUEST
"Shauna Vera (64 y.o. Female)     Date of Birth Social Security Number Address Home Phone MRN    1955  [address]  [facility] [address] [phone] [id number]    Jehovah's witness Marital Status          Voodoo Single       Admission Date Admission Type Admitting Provider Attending Provider Department, Room/Bed    10/6/19 Emergency Niecy Haney MD Anciro, Audree, MD [facility] 5G, S554/1    Discharge Date Discharge Disposition Discharge Destination         [place] [facility] [place]              Attending Provider:  Niecy Haney MD    Allergies:  Plavix [Clopidogrel Bisulfate], Buspar [Buspirone], Codeine, Penicillins    Isolation:  None   Infection:  None   Code Status:  CPR    Ht:  154.9 cm (61\")   Wt:  82.6 kg (182 lb)    Admission Cmt:  None   Principal Problem:  Acute hyponatremia [E87.1]                 Active Insurance as of 10/6/2019     Primary Coverage     Payor Plan Insurance Group Employer/Plan Group    MEDICARE MEDICARE A & B      Payor Plan Address Payor Plan Phone Number Payor Plan Fax Number Effective Dates    [address] [phone]  11/1/2008 - None Entered    [facility] [id number]       Subscriber Name Subscriber Birth Date Member ID       SHAUNA VERA 1955 [id number]           Secondary Coverage     Payor Plan Insurance Group Employer/Plan Group    [place]      Payor Plan Address Payor Plan Phone Number Payor Plan Fax Number Effective Dates    [address] [address]   2/1/2014 - None Entered    [place] [address]       Subscriber Name Subscriber Birth Date Member ID       SHANUA VERA 1955 [id number]                 Emergency Contacts      (Rel.) Home Phone Work Phone Mobile Phone    Jade Infante (Daughter) -- -- [phone]    Reyna Phipps (Sister) -- -- [phone]        [facility] 5G  [facility] [address]  Phone:  [phone]  Fax:   Date: Oct 11, 2019 "      Ambulatory Referral to Home Health     Patient:  Shauna Valencia MRN:  [id number]   [address]  [facility] [place] [address] :  1955  SSN:    Phone: [phone] Sex:  F      INSURANCE PAYOR PLAN GROUP # SUBSCRIBER ID   Primary:  Secondary:    MEDICARE  [facility] [id number]  [id number]      [id number]  [id number]      Referring Provider Information:  ELIZABETH OLIVARES Phone: [phone] Fax:       Referral Information:   # Visits:  1 Referral Type: Home Health [42]   Urgency:  Routine Referral Reason: Specialty Services Required   Start Date: Oct 11, 2019 End Date:  To be determined by Insurer   Diagnosis: Hyponatremia (E87.1 [ICD-10-CM] 276.1 [ICD-9-CM])  Weakness (R53.1 [ICD-10-CM] 780.79 [ICD-9-CM])  Left lower quadrant abdominal pain (R10.32 [ICD-10-CM] 789.04 [ICD-9-CM])  Chronic renal failure, unspecified CKD stage (N18.9 [ICD-10-CM] 585.9 [ICD-9-CM])  Impaired functional mobility, balance, gait, and endurance (Z74.09 [ICD-10-CM] V49.89 [ICD-9-CM])  Impaired mobility and ADLs (Z74.09 [ICD-10-CM] 799.89 [ICD-9-CM])  Small cell lung cancer, left upper lobe (CMS/HCC) (C34.12 [ICD-10-CM] 162.3 [ICD-9-CM])  Generalized weakness (R53.1 [ICD-10-CM] 780.79 [ICD-9-CM])  Mass of upper lobe of left lung (R91.8 [ICD-10-CM] 786.6 [ICD-9-CM])  Dehydration (E86.0 [ICD-10-CM] 276.51 [ICD-9-CM])  Symptomatic anemia (D64.9 [ICD-10-CM] 285.9 [ICD-9-CM])  Hypomagnesemia (E83.42 [ICD-10-CM] 275.2 [ICD-9-CM])  Hypocalcemia (E83.51 [ICD-10-CM] 275.41 [ICD-9-CM])      Refer to Dept:   Refer to Provider:   Refer to Facility:       Face to Face Visit Date: 10/11/2019  Follow-up provider for Plan of Care? I treated the patient in an acute care facility and will not continue treatment after discharge.  Follow-up provider: CARROL PALAFOX [[id number]]  Reason/Clinical Findings: s/p hospitalization for hyponatremia, weakness, debility  Describe mobility limitations that make leaving home difficult: weakness,  debility, short of breath, pain  Nursing/Therapeutic Services Requested: Skilled Nursing  Nursing/Therapeutic Services Requested: Physical Therapy  Nursing/Therapeutic Services Requested: Occupational Therapy  Skilled nursing orders: Medication education  Skilled nursing orders: Pain management  PT orders: Therapeutic exercise  PT orders: Strengthening  PT orders: Transfer training  Occupational orders: Activities of daily living  Occupational orders: Energy conservation  Occupational orders: Strengthening  Occupational orders: Home safety assessment  Frequency: 1 Week 1     This document serves as a request of services and does not constitute Insurance authorization or approval of services.  To determine eligibility, please contact the members Insurance carrier to verify and review coverage.     If you have medical questions regarding this request for services. Please contact [address] at [phone] between the hours of 8:00am - 5:00pm (Mon-Fri).       Verbal Order Mode: Telephone with readback   Authorizing Provider: Niecy Haney MD  Authorizing Provider's NPI: [id number]     Order Entered By: Annita Hussein RN 10/11/2019  1:33 PM     Electronically signed by: Niecy Haney MD 10/11/2019  2:57 PM               Discharge Summary      Mariia Mo, APRN at 10/13/19 1110              [facility] Medicine Services  DISCHARGE SUMMARY    Patient Name: Shauna Valencia  : 1955  MRN: [id number]    Date of Admission: 10/6/2019  Date of Discharge:  10/13/2019  Primary Care Physician: Vidya Chávez MD    Consults     Date and Time Order Name Status Description    10/10/2019 1050 Inpatient Palliative Care MD Consult Completed     10/7/2019 0043 Inpatient Hematology & Oncology Consult Completed     10/7/2019 0034 Inpatient Nephrology Consult            Hospital Course     Presenting Problem:   Hyponatremia [E87.1]    Active Hospital Problems    Diagnosis   "POA   • **Acute hyponatremia [E87.1]  Yes   • CAD (coronary artery disease) [I25.10]  Yes   • COPD (chronic obstructive pulmonary disease) (CMS/[facility]) [J44.9]  Yes   • Hyponatremia [E87.1]  Yes   • CKD (chronic kidney disease) stage 3, GFR 30-59 ml/min (CMS/HCC) [N18.3]  Yes   • Generalized weakness [R53.1]  Yes   • Anemia [D64.9]  Yes   • Small cell lung cancer, left upper lobe (CMS/[facility]) [C34.12]  Yes   • Hyperlipidemia [E78.5]  Yes   • Type 2 diabetes mellitus (CMS/[facility]) [E11.9]  Yes   • Tobacco abuse [Z72.0]  Yes      Resolved Hospital Problems   No resolved problems to display.          Hospital Course:  Shauna Valencia is a 64 y.o. female with a past medical history significant for DM2, Small cell lung cancer ( left lung), anemia, CKD, III, COPD, CAD, HTN, and HLD who presents with complaints progressively worsening left lower quadrant abdominal pain going on for the past month. Pain radiates from left lower quadrant to lower back. No modifying factors. Rated 7/10 at its worst. There is associated nausea with non bloody vomiting and constipation. Last bowel movement was today. However yesterday was her first bowel movement in a week. She reports \"extremely hard\" stool when she does have a bowel movement and reports difficulty getting it out. States today she had to \" rock out her stool\" while squatting over the commode. Patient ended up losing her balance and falling in the bathroom. She is not anticoagulated. Denies syncope, head trauma or LOC.  Pertinent surgical history includes partial colectomy in 2014 for diverticulitis ( gene) then hernia repair in 2015. She is scheduled to Dr. Olson at the end of October for evaluation of chronic constipation.    Patient was admitted to hospital medicine for further evaluation. Patient had hyponatremia secondary to SIADH and Adrenal insufficiency. Nephrology followed patient. She was started on a fluid restriction and sodium chloride tablets. Sodium has improved and " normalized at discharge. Patient will be sent home on Sodium chloride tables daily and no fluid restriction per nephrology recommendations. Patient was treated with hydrocortisone and Fludrocortisone while in hospital. Morning cortisol level was 3.9 indicative of adrenal insufficiency questionable primary or secondary AI and patient will need to follow up with Endocrinologist as outpatient. She will be sent home on Fludrocortisone and a  taper of hydrocortisone from 50 mg daily for 4 days to 25 mg daily until follow up with Endocrinology.     Patient was followed by oncology while in hospital. Patient has symptomatic anemia and was given one unit of PRBC and hemoglobin remained stable. CT chest with progressive lymphadenopathy. PET scan ordered as outpatient to further evaluate.     Patient has CKD and creatinine has remained stable and improved prior to discharge. Patient was also followed by palliative medicine for pain control. Patient was started on scheduled pain meds, Cymbalta and xanax with improvement. Patient was referred to outpatient Palliative medicine for further follow up.     Patient has remained clinically stable and will be discharged home today with . Patient will need to follow up with PCP one week. Follow up with Dr. Cardoso on 10/17/19. Follow up with DANNY in 2 weeks. Follow up with Endocrinology first available. Follow up Palliative medicine.       Discharge Follow Up Recommendations for labs/diagnostics:  PCP one week  Endocrinology follow up first available   Dr. Cardoso Thursday 10/17/19  Follow up with NAL 2 weeks   Follow up with Palliative medicine   PET scan on 10/16/19    Day of Discharge     HPI:   Patient is resting in bed in NAD. She slept well. Tolerating diet. Pain controlled.     Review of Systems  Gen- No fevers, chills  CV- No chest pain, palpitations  Resp- No cough, dyspnea  GI- No N/V/D, abd pain      Otherwise ROS is negative except as mentioned in the HPI.    Vital Signs:    Temp:  [98.5 °F (36.9 °C)] 98.5 °F (36.9 °C)  Heart Rate:  [84-96] 84  Resp:  [18] 18  BP: (123-144)/(70-85) 144/85     Physical Exam:  Constitutional: Awake, alert, awake and alert   Eyes: PERRLA, sclerae anicteric, no conjunctival injection  HENT: NCAT, mucous membranes moist  Neck: Supple, trachea midline  Respiratory: Clear to auscultation bilaterally, nonlabored respirations, room air    Cardiovascular: RRR, no murmurs, rubs, or gallops, palpable pedal pulses bilaterally  Gastrointestinal: Positive bowel sounds, soft, nontender, nondistended  Musculoskeletal: No bilateral ankle edema, no clubbing or cyanosis to extremities  Psychiatric: Appropriate affect, cooperative  Neurologic: Oriented x 3, strength symmetric in all extremities, Cranial Nerves grossly intact to confrontation, speech clear  Skin: No rashes      Pertinent  and/or Most Recent Results     Results from last 7 days   Lab Units 10/13/19  0555 10/12/19  2342 10/12/19  1744 10/12/19  1139 10/12/19  0729 10/12/19  0025 10/11/19  1732  10/11/19  0255  10/10/19  0438  10/09/19  0410  10/08/19  0655  10/07/19  0541   WBC 10*3/mm3 5.97  --   --   --  5.19  --   --   --  10.65  --  8.57  --  7.67  --  4.74  --  5.46   HEMOGLOBIN g/dL 9.1*  --   --   --  9.2* 9.0*  --   --  7.5*  --  7.4*  --  7.7*  --  8.0*  --  8.1*   HEMATOCRIT % 27.2*  --   --   --  29.3* 27.6*  --   --  22.0*  --  22.1*  --  22.4*  --  22.5*  --  23.4*   PLATELETS 10*3/mm3 229  --   --   --  222  --   --   --  259  --  280  --  266  --  239  --  253   SODIUM mmol/L 137 136 136 137 133* 136 135*   < >  --    < > 129*   < >  --    < > 120*   < > 120*   POTASSIUM mmol/L 3.6 3.8 3.5 4.0 3.8 3.9 3.9   < >  --    < > 4.2   < >  --    < > 4.6   < > 4.6   CHLORIDE mmol/L 98 99 97* 97* 97* 101 100   < >  --    < > 92*   < >  --    < > 86*   < > 84*   CO2 mmol/L 26.0 26.0 29.0 29.0 25.0 25.0 26.0   < >  --    < > 24.0   < >  --    < > 22.0   < > 23.0   BUN mg/dL 27* 30* 29* 27* 28* 29* 28*    < >  --    < > 23   < >  --    < > 23   < > 22   CREATININE mg/dL 1.19* 1.32* 1.47* 1.45* 1.52* 1.52* 1.38*   < >  --    < > 1.21*   < >  --    < > 1.36*   < > 1.40*   GLUCOSE mg/dL 166* 176* 235* 127* 128* 164* 157*   < >  --    < > 194*   < >  --    < > 197*   < > 122*   CALCIUM mg/dL 9.1 9.0 9.0 9.4 9.1 9.0 9.3   < >  --    < > 9.4   < >  --    < > 9.7   < > 9.2    < > = values in this interval not displayed.     Results from last 7 days   Lab Units 10/12/19  0729 10/06/19  2039   BILIRUBIN mg/dL 0.2 0.4   ALK PHOS U/L 58 60   ALT (SGPT) U/L 11 12   AST (SGOT) U/L 15 16           Invalid input(s): TG, LDLCALC, LDLREALC  Results from last 7 days   Lab Units 10/07/19  0541 10/06/19  2039   TSH uIU/mL 1.580  --    CORTISOL mcg/dL 3.93  --    PROBNP pg/mL  --  163.3   TROPONIN T ng/mL  --  <0.010       Brief Urine Lab Results  (Last result in the past 365 days)      Color   Clarity   Blood   Leuk Est   Nitrite   Protein   CREAT   Urine HCG        10/06/19 2132 Yellow Cloudy Negative Negative Negative Negative               Microbiology Results Abnormal     None          Imaging Results (all)     Procedure Component Value Units Date/Time    CT Chest Without Contrast [[id number]] Collected:  10/08/19 0823     Updated:  10/09/19 2258    Narrative:       EXAMINATION: CT CHEST WO CONTRAST-10/07/2019:      INDICATION: Lung cancer; E87.1-Twze-hwjhlxfzjc and hyponatremia;  R53.1-Weakness; R10.32-Left lower quadrant pain; N18.9-Chronic kidney  disease, unspecified; D64.9-Anemia, unspecified.     TECHNIQUE: 5 mm unenhanced images through the chest.     The radiation dose reduction device was turned on for each scan per the  ALARA (As Low as Reasonably Achievable) protocol.     COMPARISON: 08/27/2019 chest CT scan.     FINDINGS: Linear scarring in the left suprahilar region of the left  upper lobe, and somewhat more discoid scarring in the superior segment  of the left lower lobe, both appear unchanged. There is no evidence  of  new pulmonary parenchymal disease. Trace pleural thickening in the  posterior left mid chest is similar to the prior study. There is trace  pericardial effusion. No significant mediastinal adenopathy is  identified.     Included images of the upper abdomen show interval development of a left  retrocrural nodule, possibly invading the left diaphragmatic rojas  itself, and measuring approximately 3.1 x 2.7 mm. Previously, this was  seen as a slightly thickened crural base, 15 mm in diameter. There is  adjacent, new soft tissue thickening along the left lateral margin of  the T12 vertebral body, over an approximately 2.5 x 11.2 cm area. There  is now a left paraspinous mass adjacent to T10, approximately 3.3 cm in  diameter. No pathologic paraspinous mass is seen elsewhere. Separately,  there is now a round 3 cm soft tissue mass interposed between the distal  esophagus, and the aorta, apparently an enlarging lymph node. This  previously appeared to be a part of the distal esophagus, but was  apparently a separate 15 mm nodule on the prior scan. No gross  abnormalities are seen of the included portions of the liver, spleen,  pancreatic tail, adrenal glands, or upper renal poles.       Impression:       1. Stable left perihilar lung scarring compared to 08/27/2019 exam. No  new disease is seen in the thorax.  2. Interval development of left retrocrural, left lower thoracic  paraspinous and left lower paraesophageal masses, consistent with  metastatic disease.     D:  10/08/2019  E:  10/08/2019           This report was finalized on 10/9/2019 10:55 PM by DR. Maynor Weaver MD.       CT Abdomen Pelvis Without Contrast [[id number]] Collected:  10/06/19 2238     Updated:  10/06/19 2240    Narrative:       CT Abdomen Pelvis WO    INDICATION:   Left lower quadrant pain for one month. History of diverticulitis    TECHNIQUE:   CT of the abdomen and pelvis without IV contrast. Coronal and sagittal reconstructions were obtained.   Radiation dose reduction techniques included automated exposure control or exposure modulation based on body size. Count of known CT and cardiac nuc  med studies performed in previous 12 months: 5.     COMPARISON:   9/11/2019    FINDINGS:  Abdomen: Lung bases are clear. There is a left paraspinal mass measuring 2.3 x 1.8 cm. This is adjacent to the T10 vertebral body on the left side.. Is unchanged the more recent study but new from study from one year ago. Is also a large node anterior to  the aorta at the level the diaphragm measuring 3.0 cm which is developed since 2018.. There is left paratracheal adenopathy in the upper abdomen to the T12  Measuring about 2.6 m in diameter. The liver, spleen, pancreas and adrenal glands are normal. The right kidney is normal. Left kidney has superior cysts which are stable. Aorta is normal in size. Bowel is normal.    Pelvis: Bladder is normal. Uterus been removed. There are no adnexal masses. There is a right hip prosthesis present      Impression:       Adenopathy is present anterior to the distal thoracic aorta, left of the T10 vertebral body and at the T12 level in the upper abdomen. These findings are all unchanged from the recent study from 9/11/2019 there are new from 2018 exam. Patient apparently  has a history of lung cancer.    There is no evidence of diverticulitis.          Signer Name: Karan Hicks MD   Signed: 10/6/2019 10:38 PM   Workstation Name: [email or web address]    [facility]    CT Head Without Contrast [[id number]] Collected:  10/06/19 2222     Updated:  10/06/19 2224    Narrative:       CT Head WO    HISTORY:   64-year-old female who fell in bathroom today and hit for head on ground. In the ED tonight. No reported loss of consciousness.    TECHNIQUE:   Axial unenhanced head CT. Radiation dose reduction techniques included automated exposure control or exposure modulation based on body size. Count of known CT and cardiac nuc med studies  performed in previous 12 months: 11.     Time of scan: 2209 hours    COMPARISON:   None.    FINDINGS:   No intracranial hemorrhage, mass, or infarct. No hydrocephalus or extra-axial fluid collection. There are senescent changes, including volume loss and nonspecific white matter change, but no acute abnormality is seen. The skull base, calvarium, and  extracranial soft tissues are normal.      Impression:       Senescent changes without acute abnormality.          Signer Name: Jose Simmons MD   Signed: 10/6/2019 10:22 PM   Workstation Name: [email or web address] [facility]    [facility]    XR Chest 1 View [[id number]] Collected:  10/06/19 2121     Updated:  10/06/19 2123    Narrative:       CR Chest 1 Vw    INDICATION:   Shortness of air. Left-sided abdominal pain.     COMPARISON:    9/11/2018, CT chest 6/30/2019    FINDINGS:  Single portable AP view(s) of the chest.  Heart size and vascular normal. There is increased in the left suprahilar region. This was seen on the CT scan 6/20/2019 and represented atelectasis or perhaps radiation therapy change involving the left lower  lobe superior segment. It is unchanged from that examination as well as an old chest x-ray dating back to November 9018      Impression:       No change. Stable left perihilar scarring. No active disease    Signer Name: Karan Hicks MD   Signed: 10/6/2019 9:21 PM   Workstation Name: [email or web address]    [facility]          Results for orders placed during the hospital encounter of 12/05/18   Duplex Venous Lower Extremity - Right CAR    Narrative · Normal right lower extremity venous duplex scan.          Results for orders placed during the hospital encounter of 12/05/18   Duplex Venous Lower Extremity - Right CAR    Narrative · Normal right lower extremity venous duplex scan.                Order Current Status    Basic Metabolic Panel Collected (10/13/19 0084)        Discharge Details        Discharge Medications      New  Medications      Instructions Start Date   ALPRAZolam 0.25 MG tablet  Commonly known as:  XANAX   0.25 mg, Oral, 3 Times Daily PRN      DULoxetine 30 MG capsule  Commonly known as:  CYMBALTA   30 mg, Oral, Daily   Start Date:  10/14/2019     fludrocortisone 0.1 MG tablet   0.1 mg, Oral, Daily   Start Date:  10/14/2019     HYDROcodone-acetaminophen 5-325 MG per tablet  Commonly known as:  NORCO  Replaces:  HYDROcodone-acetaminophen 7.5-325 MG per tablet   1 tablet, Oral, Every 8 Hours      HYDROcodone-acetaminophen 5-325 MG per tablet  Commonly known as:  NORCO   1 tablet, Oral, Every 6 Hours PRN      hydrocortisone 10 MG tablet  Commonly known as:  CORTEF   50 mg, Oral, Daily With Lunch   Start Date:  10/14/2019     hydrocortisone 5 MG tablet  Commonly known as:  CORTEF   25 mg, Oral, Daily, Start on 10/17/19   Start Date:  10/17/2019     lidocaine 5 %  Commonly known as:  LIDODERM   1 patch, Transdermal, Every 24 Hours Scheduled, Remove & Discard patch within 12 hours or as directed by MD      sodium chloride 1 g tablet   1 g, Oral, Daily         Changes to Medications      Instructions Start Date   STOOL SOFTENER 100 MG capsule  Generic drug:  docusate sodium  What changed:    · when to take this  · reasons to take this   100 mg, Oral, 2 Times Daily         Continue These Medications      Instructions Start Date   aspirin 81 MG EC tablet   81 mg, Oral, Daily      cetirizine 10 MG tablet  Commonly known as:  zyrTEC   10 mg, Oral, Daily      COREG 6.25 MG tablet  Generic drug:  carvedilol   6.25 mg, Oral, 2 Times Daily With Meals      cyclobenzaprine 10 MG tablet  Commonly known as:  FLEXERIL   10 mg, Oral, 3 Times Daily PRN      dicyclomine 20 MG tablet  Commonly known as:  BENTYL   20 mg, Oral, Every 8 Hours PRN      famotidine 20 MG tablet  Commonly known as:  PEPCID   20 mg, Oral, 2 Times Daily      insulin aspart 100 UNIT/ML solution pen-injector sc pen  Commonly known as:  novoLOG FLEXPEN   0-10 Units,  Subcutaneous, 3 Times Daily With Meals, SEE instructions for Sliding Scale      lactulose 10 GM/15ML solution  Commonly known as:  CHRONULAC   20 g, Oral, 3 Times Daily      levothyroxine 75 MCG tablet  Commonly known as:  SYNTHROID, LEVOTHROID   75 mcg, Oral, Daily      Magic mouthwash Radonc   5-10 mL, Swish & Swallow, 4 Times Daily Before Meals & Nightly      ondansetron 8 MG tablet  Commonly known as:  ZOFRAN   TAKE ONE TABLET BY MOUTH THREE TIMES A DAY AS NEEDED FOR NAUSEA AND VOMITING      ondansetron 4 MG tablet  Commonly known as:  ZOFRAN   4 mg, Oral, Every 8 Hours PRN      pantoprazole 40 MG EC tablet  Commonly known as:  PROTONIX   40 mg, Oral, Daily      promethazine 25 MG tablet  Commonly known as:  PHENERGAN   25 mg, Oral, Every 6 Hours PRN      promethazine 12.5 MG tablet  Commonly known as:  PHENERGAN   25 mg, Oral, Every 8 Hours PRN         Stop These Medications    HYDROcodone-acetaminophen 7.5-325 MG per tablet  Commonly known as:  NORCO  Replaced by:  HYDROcodone-acetaminophen 5-325 MG per tablet            Allergies   Allergen Reactions   • Plavix [Clopidogrel Bisulfate] Anaphylaxis   • Buspar [Buspirone] Other (See Comments)     Daughter states it did not work for her   • Codeine Other (See Comments)     Pt has tolerated Norco during 10/6/19 admission   • Penicillins Other (See Comments)     Pt does not remember reaction         Discharge Disposition:  Home-Health Care [facility]    Diet:  Hospital:  Diet Order   Procedures   • Diet Regular; Cardiac, Consistent Carbohydrate, Daily Fluid Restriction; 1500 mL Fluid Per Day     Discharge:   Diet Instructions     Diet: Cardiac, Consistent Carbohydrate; Thin      Discharge Diet:   Cardiac  Consistent Carbohydrate       Fluid Consistency:  Thin    Diet Regular; Cardiac, Consistent Carbohydrate,          Discharge Activity:   Activity Instructions     Activity as Tolerated      Measure Blood Pressure      Measure Weight              CODE STATUS:    Code  Status and Medical Interventions:   Ordered at: 10/06/19 2311     Level Of Support Discussed With:    Patient     Code Status:    CPR     Medical Interventions (Level of Support Prior to Arrest):    Full         Future Appointments   Date Time Provider Department Center   10/16/2019  1:15 PM DOE [facility] PET ADMIN RM2  DOE PETCT DOE   10/16/2019  2:15 PM DOE [facility] PETCT 1  DOE PETCT DOE   10/17/2019  8:45 AM Desmond Cardoso MD [facility] ONC DOE DOE   11/26/2019  2:30 PM Olive Trujillo APRN [facility] ONC DOE DOE   12/30/2019  2:00 PM Ramone Huggins MD NEE RAON DOE None       Additional Instructions for the Follow-ups that You Need to Schedule     Ambulatory Referral to Home Health   As directed      Face to Face Visit Date:  10/11/2019    Follow-up provider for Plan of Care?:  I treated the patient in an acute care facility and will not continue treatment after discharge.    Follow-up provider:  VIDYA CHÁVEZ [[id number]]    Reason/Clinical Findings:  s/p hospitalization for hyponatremia, weakness, debility    Describe mobility limitations that make leaving home difficult:  weakness, debility, short of breath, pain    Nursing/Therapeutic Services Requested:  Skilled Nursing Physical Therapy Occupational Therapy    Skilled nursing orders:  Medication education Pain management    PT orders:  Therapeutic exercise Strengthening Transfer training    Occupational orders:  Activities of daily living Energy conservation Strengthening Home safety assessment    Frequency:  1 Week 1         Discharge Follow-up with PCP   As directed       Currently Documented PCP:    Vidya Chávez MD    PCP Phone Number:    None     Follow Up Details:  PCP one week         Discharge Follow-up with Specialty: PET scan on 10/16   As directed      Specialty:  PET scan on 10/16         Discharge Follow-up with Specified Provider: DR. Cardoso 10/17/19   As directed      To:  DR. Cardoso 10/17/19         Discharge Follow-up  with Specified Provider: Endocrinologist for Adrenal insufficiency   As directed      To:  Endocrinologist for Adrenal insufficiency    Follow Up Details:  first available appt         Discharge Follow-up with Specified Provider: DANNY; 2 Weeks   As directed      To:  DANNY    Follow Up:  2 Weeks               Time Spent on Discharge:  35 minutes    Electronically signed by MATTHEW Murphy, 10/13/19, 11:10 AM.        Electronically signed by Mariia Mo APRN at 10/13/19 [id number]

## 2019-10-13 NOTE — PROGRESS NOTES
"   LOS: 6 days      Reason For Visit:    Subjective   No new events, no obvious distress.  Serum sodium 137, creatinine 1.19, both has improved.        Review of Systems:   Denies any nausea vomiting, chest pain shortness of breath, dysuria or hematuria..      Objective       aspirin 81 mg Oral Daily   carvedilol 6.25 mg Oral BID With Meals   cetirizine 5 mg Oral Daily   DULoxetine 30 mg Oral Daily   fludrocortisone 100 mcg Oral Daily   heparin (porcine) 5,000 Units Subcutaneous Q12H   HYDROcodone-acetaminophen 1 tablet Oral Q8H   hydrocortisone 50 mg Oral Daily With Lunch   insulin lispro 0-7 Units Subcutaneous 4x Daily With Meals & Nightly   lactulose 20 g Oral TID   levothyroxine 75 mcg Oral Q AM   lidocaine 1 patch Transdermal Q24H   pantoprazole 40 mg Oral Daily   sodium chloride 10 mL Intravenous Q12H   sodium chloride 1 g Oral TID With Meals            Vital Signs:  Blood pressure 144/85, pulse 84, temperature 98.5 °F (36.9 °C), temperature source Oral, resp. rate 18, height 154.9 cm (61\"), weight 82.6 kg (182 lb), SpO2 94 %.    Flowsheet Rows      First Filed Value   Admission Height  154.9 cm (61\") Documented at 10/06/2019 1931   Admission Weight  82.1 kg (181 lb) Documented at 10/06/2019 1931          10/12 0701 - 10/13 0700  In: -   Out: 900 [Urine:550]    Physical Exam:    General Appearance: Awake alert comfortable in bed oriented x3  female.  Eyes: PER, EOMI.  Lungs: Lungs clear to auscultation nonlabored breathing.  No crepitation.  Heart/CV: regular rhythm & normal rate, no murmur, no gallop, no rub and no edema  Abdomen: not distended, soft, no tenderness, no masses,  bowel sounds present morbid obesity.  Skin: No rash, Warm and dry  Neuro: Intact.  Extremities: No edema cyanosis.  Radiology:      Renal Imaging:  Labs:  Results from last 7 days   Lab Units 10/13/19  0555 10/12/19  0729 10/12/19  0025 10/11/19  0255   WBC 10*3/mm3 5.97 5.19  --  10.65   HEMOGLOBIN g/dL 9.1* 9.2* 9.0* 7.5* "   HEMATOCRIT % 27.2* 29.3* 27.6* 22.0*   PLATELETS 10*3/mm3 229 222  --  259     Results from last 7 days   Lab Units 10/13/19  0555 10/12/19  2342 10/12/19  1744 10/12/19  1139 10/12/19  0729  10/09/19  0508  10/06/19  2039   SODIUM mmol/L 137 136 136 137 133*   < > 125*   < > 116*   POTASSIUM mmol/L 3.6 3.8 3.5 4.0 3.8   < > 4.5   < > 4.9   CHLORIDE mmol/L 98 99 97* 97* 97*   < > 86*   < > 79*   CO2 mmol/L 26.0 26.0 29.0 29.0 25.0   < > 23.0   < > 23.0   BUN mg/dL 27* 30* 29* 27* 28*   < > 24*   < > 23   CREATININE mg/dL 1.19* 1.32* 1.47* 1.45* 1.52*   < > 1.42*   < > 1.60*   CALCIUM mg/dL 9.1 9.0 9.0 9.4 9.1   < > 9.6   < > 9.9   PHOSPHORUS mg/dL  --   --   --   --   --   --  3.8  --   --    ALBUMIN g/dL  --   --   --   --  3.80  --  4.60  --  4.90    < > = values in this interval not displayed.     Results from last 7 days   Lab Units 10/13/19  0555   GLUCOSE mg/dL 166*       Results from last 7 days   Lab Units 10/12/19  0729   ALK PHOS U/L 58   BILIRUBIN mg/dL 0.2   ALT (SGPT) U/L 11   AST (SGOT) U/L 15           Results from last 7 days   Lab Units 10/06/19  2132   COLOR UA  Yellow   CLARITY UA  Cloudy*   PH, URINE  <=5.0   SPECIFIC GRAVITY, URINE  1.021   GLUCOSE UA  Negative   KETONES UA  Negative   BILIRUBIN UA  Negative   PROTEIN UA  Negative   BLOOD UA  Negative   LEUKOCYTES UA  Negative   NITRITE UA  Negative       Estimated Creatinine Clearance: 47.7 mL/min (A) (by C-G formula based on SCr of 1.19 mg/dL (H)).      Assessment       Acute hyponatremia    Hyperlipidemia    Type 2 diabetes mellitus (CMS/HCC)    Tobacco abuse    Small cell lung cancer, left upper lobe (CMS/HCC)    Anemia    Generalized weakness    CKD (chronic kidney disease) stage 3, GFR 30-59 ml/min (CMS/HCC)    CAD (coronary artery disease)    COPD (chronic obstructive pulmonary disease) (CMS/HCC)    Hyponatremia    Impression:     Hyponatremia  - improving, ADH mechanism, sodium 137 acceptable range.  - continue current  - hold  tolvaptan no need for it at this time.  DC fluid restriction.  Hypocortisolism   - Morning cortisol 3.9 indicative of adrenal insufficiency. Her symptoms of Abdominal pain, nausea vomiting might be related to AI   - Questionable primary vs secondary AI   - wean cortef, continue fludrocortisone 0.1mcg daily check labs in the morning.      PET scan next week per onc, ok to go home from renal standpoint  Discussed with the family members in the room.    Shree Richmond MD  10/13/19  10:26 AM

## 2019-10-13 NOTE — DISCHARGE PLACEMENT REQUEST
"Shauna Vera (64 y.o. Female)     Date of Birth Social Security Number Address Home Phone MRN    [id number]  [address]  [facility] [address] [phone] [id number]    [employer] Mar[employer] Status          Spiritism Single       Admission Date Admission Type Admitting Provider Attending Provider Department, Room/Bed    10/6/19 Emergency Niecy Haney MD Anciro, Audree, MD [facility] 5G, S554/1    Discharge Date Discharge Disposition Discharge Destination         Home-Health Care [facility]              Attending Provider:  Niecy Haney MD    Allergies:  Plavix [Clopidogrel Bisulfate], Buspar [Buspirone], Codeine, Penicillins    Isolation:  None   Infection:  None   Code Status:  CPR    Ht:  154.9 cm (61\")   Wt:  82.6 kg (182 lb)    Admission Cmt:  None   Principal Problem:  Acute hyponatremia [E87.1]                 Active Insurance as of 10/6/2019     Primary Coverage     Payor Plan Insurance Group Employer/Plan Group    MEDICARE MEDICARE A & B      Payor Plan Address Payor Plan Phone Number Payor Plan Fax Number Effective Dates    [address] [phone]  11/1/2008 - None Entered    [facility] [id number]       Subscriber Name Subscriber Birth Date Member ID       SHAUNA VERA 1955 [id number]           Secondary Coverage     Payor Plan Insurance Group Employer/Plan Group    [place]      Payor Plan Address Payor Plan Phone Number Payor Plan Fax Number Effective Dates    [address] [address]   2/1/2014 - None Entered    [place] [address]       Subscriber Name Subscriber Birth Date Member ID       SHAUNA VERA 1955 [id number]                 Emergency Contacts      (Rel.) Home Phone Work Phone Mobile Phone    Jade Infante (Daughter) -- -- [phone]    Reyna Phipps (Sister) -- -- [phone]        [facility] 5G  [facility] [address]  Phone:  [phone]  Fax:   Date: Oct 11, 2019 "      Ambulatory Referral to Home Health     Patient:  Shauna Valencia MRN:  [id number]   [address]  [facility] [place] [address] :  1955  SSN:    Phone: [phone] Sex:  F      INSURANCE PAYOR PLAN GROUP # SUBSCRIBER ID   Primary:  Secondary:    MEDICARE  [facility] [id number]  [id number]      [id number]  [id number]      Referring Provider Information:  ELIZABETH OLIVARES Phone: [phone] Fax:       Referral Information:   # Visits:  1 Referral Type: Home Health [42]   Urgency:  Routine Referral Reason: Specialty Services Required   Start Date: Oct 11, 2019 End Date:  To be determined by Insurer   Diagnosis: Hyponatremia (E87.1 [ICD-10-CM] 276.1 [ICD-9-CM])  Weakness (R53.1 [ICD-10-CM] 780.79 [ICD-9-CM])  Left lower quadrant abdominal pain (R10.32 [ICD-10-CM] 789.04 [ICD-9-CM])  Chronic renal failure, unspecified CKD stage (N18.9 [ICD-10-CM] 585.9 [ICD-9-CM])  Impaired functional mobility, balance, gait, and endurance (Z74.09 [ICD-10-CM] V49.89 [ICD-9-CM])  Impaired mobility and ADLs (Z74.09 [ICD-10-CM] 799.89 [ICD-9-CM])  Small cell lung cancer, left upper lobe (CMS/HCC) (C34.12 [ICD-10-CM] 162.3 [ICD-9-CM])  Generalized weakness (R53.1 [ICD-10-CM] 780.79 [ICD-9-CM])  Mass of upper lobe of left lung (R91.8 [ICD-10-CM] 786.6 [ICD-9-CM])  Dehydration (E86.0 [ICD-10-CM] 276.51 [ICD-9-CM])  Symptomatic anemia (D64.9 [ICD-10-CM] 285.9 [ICD-9-CM])  Hypomagnesemia (E83.42 [ICD-10-CM] 275.2 [ICD-9-CM])  Hypocalcemia (E83.51 [ICD-10-CM] 275.41 [ICD-9-CM])      Refer to Dept:   Refer to Provider:   Refer to Facility:       Face to Face Visit Date: 10/11/2019  Follow-up provider for Plan of Care? I treated the patient in an acute care facility and will not continue treatment after discharge.  Follow-up provider: CARROL PALAFOX [[id number]]  Reason/Clinical Findings: s/p hospitalization for hyponatremia, weakness, debility  Describe mobility limitations that make leaving home difficult: weakness,  debility, short of breath, pain  Nursing/Therapeutic Services Requested: Skilled Nursing  Nursing/Therapeutic Services Requested: Physical Therapy  Nursing/Therapeutic Services Requested: Occupational Therapy  Skilled nursing orders: Medication education  Skilled nursing orders: Pain management  PT orders: Therapeutic exercise  PT orders: Strengthening  PT orders: Transfer training  Occupational orders: Activities of daily living  Occupational orders: Energy conservation  Occupational orders: Strengthening  Occupational orders: Home safety assessment  Frequency: 1 Week 1     This document serves as a request of services and does not constitute Insurance authorization or approval of services.  To determine eligibility, please contact the members Insurance carrier to verify and review coverage.     If you have medical questions regarding this request for services. Please contact [address] at [phone] between the hours of 8:00am - 5:00pm (Mon-Fri).       Verbal Order Mode: Telephone with readback   Authorizing Provider: Niecy Haney MD  Authorizing Provider's NPI: [id number]     Order Entered By: Annita Hussein RN 10/11/2019  1:33 PM     Electronically signed by: Niecy Haney MD 10/11/2019  2:57 PM            Discharge Summary     No notes of this type exist for this encounter.

## 2019-10-13 NOTE — THERAPY TREATMENT NOTE
Patient Name: Shauna Valencia  : 1955    MRN: [id number]                              Today's Date: 10/13/2019       Admit Date: 10/6/2019    Visit Dx:     ICD-10-CM ICD-9-CM   1. Hyponatremia E87.1 276.1   2. Weakness R53.1 780.79   3. Left lower quadrant abdominal pain R10.32 789.04   4. Chronic renal failure, unspecified CKD stage N18.9 585.9   5. Anemia, unspecified type D64.9 285.9   6. Impaired functional mobility, balance, gait, and endurance Z74.09 V49.89   7. Impaired mobility and ADLs Z74.09 799.89   8. Small cell lung cancer, left upper lobe (CMS/HCC) C34.12 162.3   9. Generalized weakness R53.1 780.79   10. Mass of upper lobe of left lung R91.8 786.6   11. Dehydration E86.0 276.51   12. Symptomatic anemia D64.9 285.9   13. Hypomagnesemia E83.42 275.2   14. Hypocalcemia E83.51 275.41     Patient Active Problem List   Diagnosis   • Acute hyponatremia   • Hyperlipidemia   • Type 2 diabetes mellitus (CMS/HCC)   • Tobacco abuse   • Obesity (BMI 30-39.9)   • Mass of upper lobe of left lung   • Mediastinal lymphadenopathy   • Small cell lung cancer, left upper lobe (CMS/HCC)   • Dehydration   • Anemia   • Hyperkalemia   • Symptomatic anemia   • Generalized weakness   • Nausea & vomiting   • CKD (chronic kidney disease) stage 3, GFR 30-59 ml/min (CMS/HCC)   • Hypomagnesemia   • Hypocalcemia   • CAD (coronary artery disease)   • COPD (chronic obstructive pulmonary disease) (CMS/HCC)   • Hyponatremia     Past Medical History:   Diagnosis Date   • Arthritis    • Asthma    • COPD (chronic obstructive pulmonary disease) (CMS/HCC)    • Coronary artery disease     5 stents   • Diabetes mellitus (CMS/HCC)    • Disease of thyroid gland    • DVT (deep venous thrombosis) (CMS/HCC)     this year- per pt   • GERD (gastroesophageal reflux disease)    • Hyperlipidemia    • Hypertension    • Lung cancer (CMS/HCC)    • Myocardial infarction (CMS/HCC)    • Pancreatitis    • Pancreatitis    • Renal disorder      Past Surgical  History:   Procedure Laterality Date   • APPENDECTOMY     • BRONCHOSCOPY N/A 11/6/2018    Procedure: BRONCHOSCOPY WITH ENDOBRONCHIAL ULTRASOUND, biopsies, brushing and washing.;  Surgeon: Isaac Epstein MD;  Location:  [facility];  Service: Pulmonary   • BRONCHOSCOPY N/A 11/9/2018    Procedure: BRONCHOSCOPY WITH ENDOBRONCHIAL ULTRASOUND;  Surgeon: Clay Scott MD;  Location:  [facility];  Service: Pulmonary   • CAROTID STENT      5 total   • CHOLECYSTECTOMY     • COLON SURGERY     • COLONOSCOPY     • HERNIA REPAIR     • HYSTERECTOMY     • OTHER SURGICAL HISTORY      bladder sling   • THYROIDECTOMY      1994   • TONSILLECTOMY     • TOTAL HIP ARTHROPLASTY Left    • UPPER GASTROINTESTINAL ENDOSCOPY       General Information     Row Name 10/13/19 1448          PT Evaluation Time/Intention    Document Type  therapy note (daily note)  -LF     Mode of Treatment  physical therapy  -     Row Name 10/13/19 1448          General Information    Patient Profile Reviewed?  yes  -LF     Existing Precautions/Restrictions  fall  -LF     Barriers to Rehab  none identified  -     Row Name 10/13/19 1448          Cognitive Assessment/Intervention- PT/OT    Orientation Status (Cognition)  oriented x 4  -LF     Row Name 10/13/19 1448          Safety Issues, Functional Mobility    Safety Issues Affecting Function (Mobility)  awareness of need for assistance;safety precaution awareness;safety precautions follow-through/compliance;sequencing abilities  -LF     Impairments Affecting Function (Mobility)  endurance/activity tolerance;balance;postural/trunk control  -LF       User Key  (r) = Recorded By, (t) = Taken By, (c) = Cosigned By    Initials Name Provider Type    LF Lila, Olive HINOJOSA, PT Physical Therapist        Mobility     Row Name 10/13/19 1449          Bed Mobility Assessment/Treatment    Bed Mobility Assessment/Treatment  supine-sit  -LF     Scooting/Bridging [place] (Bed Mobility)  independent  -LF      Supine-Sit [place] (Bed Mobility)  independent  -LF     Assistive Device (Bed Mobility)  bed rails  -LF     Row Name 10/13/19 1449          Bed-Chair Transfer    Bed-Chair [place] (Transfers)  contact guard;1 person assist  -LF     Assistive Device (Bed-Chair Transfers)  walker, front-wheeled  -LF     Row Name 10/13/19 1449          Sit-Stand Transfer    Sit-Stand [place] (Transfers)  supervision;1 person assist  -LF     Assistive Device (Sit-Stand Transfers)  walker, front-wheeled  -LF     Row Name 10/13/19 1449          Gait/Stairs Assessment/Training    Gait/Stairs Assessment/Training  gait/ambulation independence;gait/ambulation assistive device;distance ambulated;gait pattern;gait deviations  -LF     [place] Level (Gait)  contact guard;1 person assist  -LF     Assistive Device (Gait)  walker, front-wheeled  -LF     Distance in Feet (Gait)  350  -LF     Pattern (Gait)  step-through  -LF     Deviations/Abnormal Patterns (Gait)  base of support, narrow;tyrone decreased;stride length decreased  -LF     Bilateral Gait Deviations  forward flexed posture;heel strike decreased  -LF       User Key  (r) = Recorded By, (t) = Taken By, (c) = Cosigned By    Initials Name Provider Type     Olive Sharp PT Physical Therapist        Obj/Interventions     Row Name 10/13/19 1456          Static Sitting Balance    Level of [place] (Unsupported Sitting, Static Balance)  independent  -LF     Sitting Position (Unsupported Sitting, Static Balance)  sitting on edge of bed;other (see comments) toilet  -LF     Time Able to Maintain Position (Unsupported Sitting, Static Balance)  more than 5 minutes  -LF     Row Name 10/13/19 1451          Dynamic Sitting Balance    Level of [place], Reaches Outside Midline (Sitting, Dynamic Balance)  supervision;1 person assist  -LF     Sitting Position, Reaches Outside Midline (Sitting, Dynamic Balance)  sitting on edge of bed;other (see comments) toilet  -LF      Comment, Reaches Outside Midline (Sitting, Dynamic Balance)  Pt sitting and weight shifting while dresing.  -     Row Name 10/13/19 1452          Static Standing Balance    Level of [place] (Supported Standing, Static Balance)  supervision;1 person assist  -     Time Able to Maintain Position (Supported Standing, Static Balance)  4 to 5 minutes  -     Assistive Device Utilized (Supported Standing, Static Balance)  walker, rolling  -     Row Name 10/13/19 1452          Dynamic Standing Balance    Level of [place], Reaches Outside Midline (Standing, Dynamic Balance)  contact guard assist;1 person assist  -LF     Time Able to Maintain Position, Reaches Outside Midline (Standing, Dynamic Balance)  3 to 4 minutes  -     Assistive Device Utilized (Supported Standing, Dynamic Balance)  walker, rolling  -     Comment, Reaches Outside Midline (Standing, Dynamic Balance)  Weight shifting while dresing  -       User Key  (r) = Recorded By, (t) = Taken By, (c) = Cosigned By    Initials Name Provider Type     Olive Sharp, PT Physical Therapist        Goals/Plan    No documentation.       Clinical Impression     Row Name 10/13/19 1454          Pain Scale: Numbers Pre/Post-Treatment    Pain Scale: Numbers, Pretreatment  7/10  -     Pain Scale: Numbers, Post-Treatment  7/10  -LF     Pain Location  other (see comments)  (Significant)  stomach  -     Pain Intervention(s)  Ambulation/increased activity;Repositioned  -     Row Name 10/13/19 1450          Plan of Care Review    Plan of Care Reviewed With  patient;daughter  -     Row Name 10/13/19 1455          Physical Therapy Clinical Impression    Criteria for Skilled Interventions Met (PT Clinical Impression)  yes;treatment indicated  -     Rehab Potential (PT Clinical Summary)  good, to achieve stated therapy goals  -     Row Name 10/13/19 1459          Vital Signs    O2 Delivery Pre Treatment  room air  -     O2 Delivery Intra Treatment   room air  -LF     O2 Delivery Post Treatment  room air  -LF     Pre Patient Position  Side Lying  -LF     Intra Patient Position  Standing  -LF     Post Patient Position  Standing  -LF     Row Name 10/13/19 1454          Positioning and Restraints    Pre-Treatment Position  in bed  -LF     Post Treatment Position  other standing  -LF     Other Position  with nsg  -LF       User Key  (r) = Recorded By, (t) = Taken By, (c) = Cosigned By    Initials Name Provider Type    Olive Foote PT Physical Therapist        Outcome Measures     Row Name 10/13/19 1458          How much help from another person do you currently need...    Turning from your back to your side while in flat bed without using bedrails?  4  -LF     Moving from lying on back to sitting on the side of a flat bed without bedrails?  4  -LF     Moving to and from a bed to a chair (including a wheelchair)?  4  -LF     Standing up from a chair using your arms (e.g., wheelchair, bedside chair)?  4  -LF     Climbing 3-5 steps with a railing?  3  -LF     To walk in hospital room?  3  -LF     AM-PAC 6 Clicks Score (PT)  22  -LF     Row Name 10/13/19 1458          Functional Assessment    Outcome Measure Options  AM-PAC 6 Clicks Basic Mobility (PT)  -LF       User Key  (r) = Recorded By, (t) = Taken By, (c) = Cosigned By    Initials Name Provider Type    Olive Foote PT Physical Therapist        Physical Therapy Education     Title: PT OT SLP Therapies (Done)     Topic: Physical Therapy (Done)     Point: Mobility training (Done)     Learning Progress Summary           Patient Acceptance, EESTHER VU, DU by TORRES at 10/13/2019  2:56 PM    Acceptance, EESTHER VU, DU,NR by GABBIE at 10/10/2019  3:54 PM    ACOSTA MeeksD, NR by AIMEE at 10/9/2019 10:29 AM   Family Acceptance, ESTHER SHANE VU, DU by TORRES at 10/13/2019  2:56 PM    ACOSTA Meeks D, NR by AIMEE at 10/9/2019 10:29 AM                   Point: Home exercise program (Done)     Learning Progress Summary           Patient Acceptance,  E,D, VU,DU by  at 10/13/2019  2:56 PM    Acceptance, E,D, VU,DU,NR by  at 10/10/2019  3:54 PM    Eager, E,D, NR by DM at 10/9/2019 10:29 AM   Family Acceptance, E,D, VU,DU by  at 10/13/2019  2:56 PM    Eager, E,D, NR by DM at 10/9/2019 10:29 AM                   Point: Body mechanics (Done)     Learning Progress Summary           Patient Acceptance, E,D, VU,DU by  at 10/13/2019  2:56 PM    Acceptance, E,D, VU,DU,NR by  at 10/10/2019  3:54 PM    Eager, E,D, NR by DM at 10/9/2019 10:29 AM   Family Acceptance, E,D, VU,DU by  at 10/13/2019  2:56 PM    Eager, E,D, NR by DM at 10/9/2019 10:29 AM                   Point: Precautions (Done)     Learning Progress Summary           Patient Acceptance, E,D, VU,DU by  at 10/13/2019  2:56 PM    Acceptance, E,D, VU,DU,NR by  at 10/10/2019  3:54 PM    Eager, E,D, NR by DM at 10/9/2019 10:29 AM   Family Acceptance, E,D, VU,DU by  at 10/13/2019  2:56 PM    Eager, E,D, NR by DM at 10/9/2019 10:29 AM                               User Key     Initials Effective Dates Name Provider Type Discipline     06/19/15 -  Yu Corea, PT Physical Therapist PT     06/19/15 -  Urmila Scott, PT Physical Therapist PT     06/08/18 -  Olive Sharp, PT Physical Therapist PT              PT Recommendation and Plan     Outcome Summary/Treatment Plan (PT)  Anticipated Equipment Needs at Discharge (PT): front wheeled walker  Anticipated Discharge Disposition (PT): home with assist, home with home health  Plan of Care Reviewed With: patient, daughter  Outcome Summary: Pt participated in transfer training, gt training, and sitting/standing balance while dressing with [id number] and RW.  Pt plans to D/C home today and would benefit from continued therapy with home health.     Time Calculation:   PT Charges     Row Name 10/13/19 1419             Time Calculation    Start Time  1419  -LF      PT Received On  10/13/19  -LF      PT Goal Re-Cert Due Date  10/19/19  -LF          Timed Charges    44329 - Gait Training Minutes   15  -LF      54311 - PT Therapeutic Activity Minutes  18  -LF        User Key  (r) = Recorded By, (t) = Taken By, (c) = Cosigned By    Initials Name Provider Type    LF Olive Sharp, PT Physical Therapist        Therapy Charges for Today     Code Description Service Date Service Provider Modifiers Qty    09500895040 HC GAIT TRAINING EA 15 MIN 10/13/2019 Olive Sharp, PT GP 1    52842749522 HC PT THERAPEUTIC ACT EA 15 MIN 10/13/2019 Olive Sharp, PT GP 1          PT G-Codes  Outcome Measure Options: AM-PAC 6 Clicks Basic Mobility (PT)  AM-PAC 6 Clicks Score (PT): 22  AM-PAC 6 Clicks Score (OT): 20    Olive Sharp PT  10/13/2019

## 2019-10-13 NOTE — PROGRESS NOTES
Case Management Discharge Note    Final Note: Plans are for pt. to dc to home with family and [facility]. I have notified [facility] of pt's dc and have faxed the order and dc summary. Family will provide transport.     Destination      No service has been selected for the patient.      Durable Medical Equipment      No service has been selected for the patient.      Dialysis/Infusion      No service has been selected for the patient.      Home Medical Care - Selection Complete      Service Provider Request Status Selected Services Address Phone Number Fax Number    [facility] Selected Home Health Services [facility] [phone] --      Therapy      No service has been selected for the patient.      Community Resources      No service has been selected for the patient.             Final Discharge Disposition Code: 06 - home with home health care

## 2019-10-14 NOTE — OUTREACH NOTE
Prep Survey      Responses   Facility patient discharged from?  [place]   Is patient eligible?  Yes   Discharge diagnosis  **Acute hyponatremia    Does the patient have one of the following disease processes/diagnoses(primary or secondary)?  Other   Does the patient have Home health ordered?  Yes   What is the Home health agency?   [facility]    Is there a DME ordered?  No   General alerts for this patient  See CM note fax sent to Hospice   Prep survey completed?  Yes          Sarina Paz RN

## 2019-10-16 NOTE — OUTREACH NOTE
Medical Week 1 Survey      Responses   Facility patient discharged from?  [place]   Does the patient have one of the following disease processes/diagnoses(primary or secondary)?  Other   Is there a successful TCM telephone encounter documented?  No   Week 1 attempt successful?  Yes   Call start time  1736   Call end time  1742   Discharge diagnosis  **Acute hyponatremia    Meds reviewed with patient/caregiver?  Yes   Is the patient having any side effects they believe may be caused by any medication additions or changes?  No   Does the patient have all medications ordered at discharge?  Yes   Prescription comments  Cortef on hold by local MD at ED on 10/16/19   Is the patient taking all medications as directed (includes completed medication regime)?  Yes   Comments regarding appointments  ED visit in local hospital, today for IV fluids, 10/16/19   Does the patient have a primary care provider?   Yes   Does the patient have an appointment with their PCP within 7 days of discharge?  Yes   Has the patient kept scheduled appointments due by today?  N/A   What is the Home health agency?   [facility]     Has home health visited the patient within 72 hours of discharge?  Yes   Psychosocial issues?  No   Comments  had dehydration after d/c, has been treated at local ED, states is feeling better, but still weak   Did the patient receive a copy of their discharge instructions?  Yes   Nursing interventions  Reviewed instructions with patient   What is the patient's perception of their health status since discharge?  Improving   Is the patient/caregiver able to teach back signs and symptoms related to disease process for when to call PCP?  Yes   Is the patient/caregiver able to teach back signs and symptoms related to disease process for when to call 911?  Yes   Is the patient/caregiver able to teach back the hierarchy of who to call/visit for symptoms/problems? PCP, Specialist, Home health nurse, Urgent Care, ED, 911  Yes    Additional teach back comments  states appetite fairly good   Week 1 call completed?  Yes          Namrata Hawkins RN

## 2019-10-21 NOTE — PROGRESS NOTES
DATE OF VISIT: 10/21/2019    REASON FOR VISIT: Followup for limited stage small cell lung cancer     HISTORY OF PRESENT ILLNESS: The patient is a very pleasant 64 y.o. female  with past medical history significant for limited stage small cell lung cancer diagnosed November 9, 2018.  She was started on chemotherapy using cisplatin and etoposide November 15, 2018. She completed 5 cycles on February 2019. Cycle 6 was not given secondary to progressive fatigue and multiple side effects.  Repeat scans done October 21, 2019 revealed relapsed disease with mediastinal and upper abdominal lymphadenopathy.  She was started on Opdivo single agent October 21, 2019. The patient is here today for scheduled follow-up visit with treatment cycle #1.    SUBJECTIVE: The patient is here today with her daughter and sister.  She is complaining of fatigue.  She still having pain left lower quadrant.  She is anxious about the scan results.    PAST MEDICAL HISTORY/SOCIAL HISTORY/FAMILY HISTORY: Reviewed by me and unchanged from my documentation done on 10/21/19.    Review of Systems   Constitutional: Positive for fatigue. Negative for activity change, appetite change, chills, fever and unexpected weight change.   HENT: Negative for hearing loss, mouth sores, nosebleeds, sore throat and trouble swallowing.    Eyes: Negative for visual disturbance.   Respiratory: Positive for shortness of breath. Negative for cough, chest tightness and wheezing.    Cardiovascular: Negative for chest pain, palpitations and leg swelling.   Gastrointestinal: Positive for nausea. Negative for abdominal distention, abdominal pain, blood in stool, constipation, diarrhea, rectal pain and vomiting.   Endocrine: Negative for cold intolerance and heat intolerance.   Genitourinary: Negative for difficulty urinating, dysuria, frequency and urgency.   Musculoskeletal: Negative for arthralgias, back pain, gait problem, joint swelling and myalgias.   Skin: Negative for  rash.   Neurological: Positive for weakness and numbness. Negative for dizziness, tremors, syncope, light-headedness and headaches.   Hematological: Negative for adenopathy. Does not bruise/bleed easily.   Psychiatric/Behavioral: Negative for confusion, sleep disturbance and suicidal ideas. The patient is not nervous/anxious.          Current Outpatient Medications:   •  ALPRAZolam (XANAX) 0.25 MG tablet, Take 1 tablet by mouth 3 (Three) Times a Day As Needed for Anxiety., Disp: 20 tablet, Rfl: 0  •  aspirin 81 MG EC tablet, Take 81 mg by mouth Daily., Disp: , Rfl:   •  carvedilol (COREG) 6.25 MG tablet, Take 6.25 mg by mouth 2 (Two) Times a Day With Meals., Disp: , Rfl:   •  cetirizine (zyrTEC) 10 MG tablet, Take 10 mg by mouth Daily., Disp: , Rfl:   •  cyclobenzaprine (FLEXERIL) 10 MG tablet, Take 1 tablet by mouth 3 (Three) Times a Day As Needed for Muscle Spasms., Disp: 90 tablet, Rfl: 0  •  dicyclomine (BENTYL) 20 MG tablet, Take 1 tablet by mouth Every 8 (Eight) Hours As Needed (pain, cramps)., Disp: 20 tablet, Rfl: 0  •  docusate sodium (COLACE) 100 MG capsule, Take 1 capsule by mouth 2 (Two) Times a Day. (Patient taking differently: Take 100 mg by mouth 2 (Two) Times a Day As Needed.), Disp: 60 capsule, Rfl: 3  •  DULoxetine (CYMBALTA) 30 MG capsule, Take 1 capsule by mouth Daily., Disp: , Rfl:   •  ENULOSE 10 GM/15ML solution solution (encephalopathy), TAKE 1 TABLESPOONFUL (15 ML) BY MOUTH TWICE DAILY AS NEEDED, Disp: , Rfl: 3  •  famotidine (PEPCID) 20 MG tablet, Take 20 mg by mouth 2 (Two) Times a Day., Disp: , Rfl:   •  fludrocortisone 0.1 MG tablet, Take 1 tablet by mouth Daily., Disp: 30 tablet, Rfl: 0  •  HYDROcodone-acetaminophen (NORCO) 5-325 MG per tablet, Take 1 tablet  Every 8 Hours scheduled and may take 1 tablet every 6 hours as needed for pain or shortness of breath, Disp: 35 tablet, Rfl: 0  •  HYDROcodone-acetaminophen (NORCO) 5-325 MG per tablet, Take 1 tablet by mouth Every 6 (Six) Hours As  "Needed for Moderate Pain  for up to 10 days., Disp: , Rfl:   •  hydrocortisone (CORTEF) 5 MG tablet, Take 5 tablets by mouth Daily for 30 days. Start on 10/17/19, Disp: 150 tablet, Rfl: 0  •  insulin aspart (novoLOG FLEXPEN) 100 UNIT/ML solution pen-injector sc pen, Inject 0-10 Units under the skin into the appropriate area as directed 3 (Three) Times a Day With Meals. SEE instructions for Sliding Scale, Disp: 15 mL, Rfl: 0  •  lactulose (CHRONULAC) 10 GM/15ML solution, Take 30 mL by mouth Daily As Needed (constipaiton) for up to 7 doses., Disp: 210 mL, Rfl: 0  •  levothyroxine (SYNTHROID, LEVOTHROID) 75 MCG tablet, Take 75 mcg by mouth Daily., Disp: , Rfl:   •  lidocaine (LIDODERM) 5 %, Place 1 patch on the skin as directed by provider Daily for 30 days. Remove & Discard patch within 12 hours or as directed by MD, Disp: 30 patch, Rfl: 0  •  Magic mouthwash Radon, Swish and swallow 5-10 mL 4 (Four) Times a Day Before Meals & at Bedtime., Disp: 480 mL, Rfl: 2  •  ondansetron (ZOFRAN) 4 MG tablet, Take 1 tablet by mouth Every 8 (Eight) Hours As Needed for Nausea., Disp: 15 tablet, Rfl: 0  •  ondansetron (ZOFRAN) 8 MG tablet, TAKE ONE TABLET BY MOUTH THREE TIMES A DAY AS NEEDED FOR NAUSEA AND VOMITING, Disp: 90 tablet, Rfl: 5  •  pantoprazole (PROTONIX) 40 MG EC tablet, Take 40 mg by mouth Daily., Disp: , Rfl:   •  promethazine (PHENERGAN) 12.5 MG tablet, Take 2 tablets by mouth Every 8 (Eight) Hours As Needed for Nausea or Vomiting., Disp: 20 tablet, Rfl: 0  •  promethazine (PHENERGAN) 25 MG tablet, Take 1 tablet by mouth Every 6 (Six) Hours As Needed for Nausea or Vomiting., Disp: 45 tablet, Rfl: 5  •  sodium chloride 1 g tablet, Take 1 tablet by mouth Daily., Disp: 30 tablet, Rfl: 0  No current facility-administered medications for this visit.     PHYSICAL EXAMINATION:   /87   Pulse 101   Temp 97.1 °F (36.2 °C) (Temporal)   Resp 16   Ht 154.9 cm (61\")   Wt 82.1 kg (181 lb)   SpO2 95%   BMI 34.20 kg/m² "    ECOG Performance Status: 1 - Symptomatic but completely ambulatory  General Appearance:  alert, cooperative, no apparent distress and appears stated age   Neurologic/Psychiatric: A&O x 3, gait steady, appropriate affect, strength 5/5 in all muscle groups   HEENT:  Normocephalic, without obvious abnormality, mucous membranes moist   Neck: Supple, symmetrical, trachea midline, no adenopathy;  No thyromegaly, masses, or tenderness   Lungs:   Clear to auscultation bilaterally; respirations regular, even, and unlabored bilaterally   Heart:  Regular rate and rhythm, no murmurs appreciated   Abdomen:   Soft, non-tender, non-distended and no organomegaly   Lymph nodes: No cervical, supraclavicular, inguinal or axillary adenopathy noted   Extremities: Normal, atraumatic; no clubbing, cyanosis, or edema    Skin: No rashes, ulcers, or suspicious lesions noted     Hospital Outpatient Visit on 10/21/2019   Component Date Value Ref Range Status   • Glucose 10/21/2019 170* 70 - 130 mg/dL Final   Admission on 10/06/2019, Discharged on 10/13/2019   No results displayed because visit has over 200 results.           Ct Abdomen Pelvis Without Contrast    Result Date: 10/6/2019  Narrative: CT Abdomen Pelvis WO INDICATION: Left lower quadrant pain for one month. History of diverticulitis TECHNIQUE: CT of the abdomen and pelvis without IV contrast. Coronal and sagittal reconstructions were obtained.  Radiation dose reduction techniques included automated exposure control or exposure modulation based on body size. Count of known CT and cardiac nuc med studies performed in previous 12 months: 5. COMPARISON: 9/11/2019 FINDINGS: Abdomen: Lung bases are clear. There is a left paraspinal mass measuring 2.3 x 1.8 cm. This is adjacent to the T10 vertebral body on the left side.. Is unchanged the more recent study but new from study from one year ago. Is also a large node anterior to the aorta at the level the diaphragm measuring 3.0 cm which  is developed since 2018.. There is left paratracheal adenopathy in the upper abdomen to the T12 Measuring about 2.6 m in diameter. The liver, spleen, pancreas and adrenal glands are normal. The right kidney is normal. Left kidney has superior cysts which are stable. Aorta is normal in size. Bowel is normal. Pelvis: Bladder is normal. Uterus been removed. There are no adnexal masses. There is a right hip prosthesis present     Impression: Adenopathy is present anterior to the distal thoracic aorta, left of the T10 vertebral body and at the T12 level in the upper abdomen. These findings are all unchanged from the recent study from 9/11/2019 there are new from 2018 exam. Patient apparently has a history of lung cancer. There is no evidence of diverticulitis. Signer Name: Karan Hicks MD  Signed: 10/6/2019 10:38 PM  Workstation Name: [email or web address]  Radiology [facility]    Ct Head Without Contrast    Result Date: 10/6/2019  Narrative: CT Head WO HISTORY: 64-year-old female who fell in bathroom today and hit for head on ground. In the ED tonight. No reported loss of consciousness. TECHNIQUE: Axial unenhanced head CT. Radiation dose reduction techniques included automated exposure control or exposure modulation based on body size. Count of known CT and cardiac nuc med studies performed in previous 12 months: 11. Time of scan: 2209 hours COMPARISON: None. FINDINGS: No intracranial hemorrhage, mass, or infarct. No hydrocephalus or extra-axial fluid collection. There are senescent changes, including volume loss and nonspecific white matter change, but no acute abnormality is seen. The skull base, calvarium, and extracranial soft tissues are normal.     Impression: Senescent changes without acute abnormality. Signer Name: Jose Simmons MD  Signed: 10/6/2019 10:22 PM  Workstation Name: [email or web address]  Radiology [facility]    Ct Chest Without Contrast    Result Date: 10/9/2019  Narrative: EXAMINATION: CT  CHEST WO CONTRAST-10/07/2019:  INDICATION: Lung cancer; E87.4-Cyga-xpmrybfjga and hyponatremia; R53.1-Weakness; R10.32-Left lower quadrant pain; N18.9-Chronic kidney disease, unspecified; D64.9-Anemia, unspecified.  TECHNIQUE: 5 mm unenhanced images through the chest.  The radiation dose reduction device was turned on for each scan per the ALARA (As Low as Reasonably Achievable) protocol.  COMPARISON: 08/27/2019 chest CT scan.  FINDINGS: Linear scarring in the left suprahilar region of the left upper lobe, and somewhat more discoid scarring in the superior segment of the left lower lobe, both appear unchanged. There is no evidence of new pulmonary parenchymal disease. Trace pleural thickening in the posterior left mid chest is similar to the prior study. There is trace pericardial effusion. No significant mediastinal adenopathy is identified.  Included images of the upper abdomen show interval development of a left retrocrural nodule, possibly invading the left diaphragmatic rojas itself, and measuring approximately 3.1 x 2.7 mm. Previously, this was seen as a slightly thickened crural base, 15 mm in diameter. There is adjacent, new soft tissue thickening along the left lateral margin of the T12 vertebral body, over an approximately 2.5 x 11.2 cm area. There is now a left paraspinous mass adjacent to T10, approximately 3.3 cm in diameter. No pathologic paraspinous mass is seen elsewhere. Separately, there is now a round 3 cm soft tissue mass interposed between the distal esophagus, and the aorta, apparently an enlarging lymph node. This previously appeared to be a part of the distal esophagus, but was apparently a separate 15 mm nodule on the prior scan. No gross abnormalities are seen of the included portions of the liver, spleen, pancreatic tail, adrenal glands, or upper renal poles.      Impression: 1. Stable left perihilar lung scarring compared to 08/27/2019 exam. No new disease is seen in the thorax. 2.  Interval development of left retrocrural, left lower thoracic paraspinous and left lower paraesophageal masses, consistent with metastatic disease.  D:  10/08/2019 E:  10/08/2019    This report was finalized on 10/9/2019 10:55 PM by DR. Maynor Weaver MD.      Nm Bone Scan Whole Body    Result Date: 10/4/2019  Narrative: EXAMINATION: NM BONE SCAN WHOLE BODY- 10/03/2019  INDICATION: small cell lung cancer, left hip/low back pain; C34.12-Malignant neoplasm of upper lobe, left bronchus or lung  TECHNIQUE: Anterior and posterior whole body scintigraphic imaging as well as lateral views of the skull and multiplanar views of the ribs was obtained after administration of 26.10 mCi of technetium 99 MDP.  COMPARISON: Bone scan 11/14/2018  FINDINGS: Per history the patient has reported small cell lung carcinoma with a negative prior bone scan. There is degenerative uptake involving the shoulders, sternum, pelvis, knees, ankles, and skull. There has been an increased focal activity within the lower cervical spine best seen on the lateral skull delayed imaging. Additional uptake identified throughout the spine is likely degenerative without focality.      Impression: 1. Radiotracer focus of activity within the posterior cervical spine likely within the left posterior elements which was present to some degree on 11/14/2018 and is likely degenerative. Consider correlation with radiographs for further evaluation.  2. Additional degenerative appearing uptake within the axial and appendicular skeleton.   D:  10/04/2019 E:  10/04/2019  This report was finalized on 10/4/2019 2:52 PM by Dr. Luis Lr MD.      Xr Chest 1 View    Result Date: 10/6/2019  Narrative: CR Chest 1 Vw INDICATION: Shortness of air. Left-sided abdominal pain. COMPARISON:  9/11/2018, CT chest 6/30/2019 FINDINGS: Single portable AP view(s) of the chest.  Heart size and vascular normal. There is increased in the left suprahilar region. This was seen on the CT  scan 6/20/2019 and represented atelectasis or perhaps radiation therapy change involving the left lower lobe superior segment. It is unchanged from that examination as well as an old chest x-ray dating back to November 9018     Impression: No change. Stable left perihilar scarring. No active disease Signer Name: Karan Hicks MD  Signed: 10/6/2019 9:21 PM  Workstation Name: [email or web address]  [facility] Pet Skull Base To Mid Thigh    Result Date: 10/21/2019  Narrative: EXAMINATION: NM PET SKULL BASE TO MID THIGH- 10/21/2019  INDICATION: C34.12-Malignant neoplasm of upper lobe, left bronchus or lung; follow-up squamous cell lung cancer  TECHNIQUE: With fasting blood glucose level of 170 mg/dL a total of 12.1 mCi of FDG was administered via the right wrist. Following appropriate delay PET/CT imaging was obtained from the skull vertex to the mid thighs bilaterally and fused multiplanar images were reconstructed. The CT scan is an unenhanced study and used for reference to the PET scan only and should not be considered a diagnostic CT scan. PET/CT imaging was reviewed in the axial, coronal, and sagittal planes as well as within the cine mode.  COMPARISON: The initial exam for treatment. No prior studies available for comparison.  FINDINGS: Normal variant activity seen within the oropharynx and muscles of phonation.  Normal variant activity identified in the region of the vocal cords. No hypermetabolic activity identified within the neck to suggest evidence of metastatic disease. There is small focus of hypermetabolic activity correlating to the tip of the odontoid with maximum SUV of 3.5. Findings may be related to degenerative change. Close interval follow-up is recommended.  Within the chest there is hypermetabolic activity seen within the suprahilar region on the left with maximum SUV of 4.5. Findings suggesting malignancy. There is adenopathy identified within the retrocrural region on the left  with maximum SUV of 8.3. There is a soft tissue mass in the periaortic region of the diaphragmatic hiatus maximum SUV of 11.5. There is additional retrocrural adenopathy identified on the left more inferiorly with maximum SUV of 9.2. There is a soft tissue mass with no significant bony involvement in the left anterior pericardial region maximum SUV of 12.4.  Within the abdomen and pelvis the remainder of the examination is grossly unremarkable. Normal variant activity seen within the GI and  tract. No abnormality seen within the upper thighs. Findings suggest possible bursitis on the right hip.      Impression: Hypermetabolic mass identified in the left suprahilar region with adenopathy anterior to the pericardium on the left as well as in the retrocrural regions. There is a periaortic mass as well all suggesting metastatic disease. Follow-up imaging of the tip of the odontoid is recommended with low level activity present suggesting possible degenerative change. There is possible bursitis identified of the right hip.   D:  10/21/2019 E:  10/21/2019        ASSESSMENT: The patient is a very pleasant 64 y.o. female  with  Left upper lobe small cell lung cancer [id number] stage IIIa disease:  A.  Presented with shortness of breath.  B.  Status post bronchoscopy with a biopsy done on November 9, 2018 revealed small cell lung cancer   C. Started chemotherapy with cisplatin and etoposide November 15, 2018, status post 5 cycles, completed February 2019.  D. Completed concurrent radiation January 18, 2019  E.  Completed prophylactic whole brain radiation May 13 2019  F.  Relapsed disease with thoracic upper abdominal lymphadenopathy documented whole-body PET scan done on October 21, 2019  G.  Started Opdivo 480 mg IV every 4 weeks October 21, 2019  2.  Normocytic anemia   3.  Insomnia  4.  Nausea  5. Type 2 diabetes    PLAN:  1.  I did go over the PET scan results with the patient and her family, I reviewed the films myself  and I went over the pictures with them.  Unfortunately patient has evidence of relapsed disease.  2.  The patient is interested in palliative treatment patient will get started on Opdivo single agent for 80 mg IV every 4 weeks.  3.  I will continue to monitor labs including CBC CMP serum TSH.    4.  The patient will follow up with me in 4 weeks for cycle #2.   5.  I will continue Zofran as needed for nausea.  6.  We'll continue Restoril as needed for insomnia.  7.  We will continue insulin for type 2 diabetes.  8.  I will repeat the patient's scans after cycle #3.  9.  I will continue Lortab as needed for cancer related pain.  10. I will continue Xanax as needed for anxiety.  11. We discussed potential side effects of immunotherapy including but not limited to immune mediated reactions with thyroiditis, pneumonitis, hepatitis, colitis, rash, and electrolytes abnormalities, fatigue, multiorgan failure, and possibly death.    Desmond Cardoso MD  10/21/2019

## 2019-10-23 NOTE — TELEPHONE ENCOUNTER
Sent referral form and records over to [facility] to schedule patient for port placement prior to next treatment in November.

## 2019-10-23 NOTE — TELEPHONE ENCOUNTER
Jade patient's daughter called and Dr. Cardoso said to call if she was struggling with her veins and he said they could get a port placed. She wants to know if an order can be put in for this? Please call.

## 2019-10-25 NOTE — PROGRESS NOTES
Onc Nutrition     Patient:  Shauna Valencia  YOB: 1955    Diagnosis: small cell lung cancer  Treatment:  Opdivo - every 28 days    Weight: 181#; down ~14# x ~7 months; 7.2% weight loss  Nutrition Symptoms: taste changes, nausea, and constipation    Met with patient and her daughter during her immunotherapy infusion appointment.  Note patient has started on a new treatment plan due to relapsed disease.  Patient complains of taste changes, nausea, and constipation which has resulted in decreased oral intake and weight loss as above.    Reviewed the importance of good nutrition during her treatment course.  Advised her to be eating smaller more frequent meals/snacks throughout the day to aid with nausea management and oral intake.  Stressed the importance of proper oral care to aid with taste changes and recommended she use the baking soda/salt mouth rinses before she eats.  Emphasized the importance of protein during treatments; reviewed high protein foods; and recommended she have a protein source at each snack.  Offered several snack ideas she may find more appealing at this time.  Discussed nutritional supplements and their role in the diet and provided samples/coupons of Boost and Ensure products.  Also discussed constipation management tips.    Answered their questions and both voiced understanding of information discussed.  Encouraged her to call RD with questions.  Will monitor as needed during her treatment course.    Amelie Briggs RD  10/25/19

## 2019-10-25 NOTE — OUTREACH NOTE
Medical Week 2 Survey      Responses   Facility patient discharged from?  [facility]   Does the patient have one of the following disease processes/diagnoses(primary or secondary)?  Other   Week 2 attempt successful?  Yes   Call start time  1307   General alerts for this patient  See CM note fax sent to Hospice   Discharge diagnosis  **Acute hyponatremia    Call end time  1314   Meds reviewed with patient/caregiver?  Yes   Is the patient having any side effects they believe may be caused by any medication additions or changes?  No   Does the patient have all medications ordered at discharge?  Yes   Is the patient taking all medications as directed (includes completed medication regime)?  Yes   Does the patient have a primary care provider?   Yes   Does the patient have an appointment with their PCP within 7 days of discharge?  Yes   Has the patient kept scheduled appointments due by today?  Yes   What is the Home health agency?   [facility]     Has home health visited the patient within 72 hours of discharge?  Yes   Psychosocial issues?  No   Did the patient receive a copy of their discharge instructions?  Yes   Nursing interventions  Reviewed instructions with patient   What is the patient's perception of their health status since discharge?  Improving   Is the patient/caregiver able to teach back signs and symptoms related to disease process for when to call PCP?  Yes   Is the patient/caregiver able to teach back signs and symptoms related to disease process for when to call 911?  Yes   Is the patient/caregiver able to teach back the hierarchy of who to call/visit for symptoms/problems? PCP, Specialist, Home health nurse, Urgent Care, ED, 911  Yes   Week 2 Call Completed?  Yes          Huseyin Ernst RN

## 2019-10-29 NOTE — TELEPHONE ENCOUNTER
Patient's daughter called the triage line and reported that while patient was admitted to hospital that she was having pain in her side.  Daughter reported that patient's gastrologist said that it was nerve pain and gave her an injection of steroids and gabapentin, and was told that it will take up to 5 days to be effective.  Daughter reports that today is the 5th day and patient is in pain when she is in any position other than laying down.  Daughter reported that patient did start the gabapentin last night at bedtime as prescribes by gastrologist  but didn't feel that it helped with pain.  Discussed with Claudette BARKSDALE and advised that patient/family call gastrologist due to he was provider to administer injection and prescribe gabapentin. Daughter verbalized understanding.

## 2019-11-03 NOTE — OUTREACH NOTE
Medical Week 3 Survey      Responses   Facility patient discharged from?  [facility]   Does the patient have one of the following disease processes/diagnoses(primary or secondary)?  Other   Week 3 attempt successful?  No   Unsuccessful attempts  Attempt 1          Tamra Gallego RN

## 2019-11-05 NOTE — OUTREACH NOTE
Medical Week 3 Survey      Responses   Facility patient discharged from?  [facility]   Does the patient have one of the following disease processes/diagnoses(primary or secondary)?  Other   Week 3 attempt successful?  No   Unsuccessful attempts  Attempt 2          Neeru Keating RN

## 2019-11-18 NOTE — PROGRESS NOTES
DATE OF VISIT: 11/18/2019    REASON FOR VISIT: Followup for limited stage small cell lung cancer     HISTORY OF PRESENT ILLNESS: The patient is a very pleasant 64 y.o. female  with past medical history significant for limited stage small cell lung cancer diagnosed November 9, 2018.  She was started on chemotherapy using cisplatin and etoposide November 15, 2018. She completed 5 cycles on February 2019. Cycle 6 was not given secondary to progressive fatigue and multiple side effects.  Repeat scans done October 21, 2019 revealed relapsed disease with mediastinal and upper abdominal lymphadenopathy.  She was started on Opdivo single agent October 21, 2019. The patient is here today for scheduled follow-up visit with treatment cycle #2.    SUBJECTIVE: The patient is here today with her daughter. She has been doing fairly well. She continues to have pain in her left lower back, hip and abdomen, however it has started to improve some. She is walking better and is able to sit up for longer periods of time. She was seen in the ER and thought to have musculoskeletal type symptoms. She is doing physical therapy with use of TENS unit as well as deep muscle ultrasound that has helped. She would like to try increasing her Percocet if possible as the 5 mg tablets do not seem to help relieve her pain when she needs them. She tolerated her last treatment without side effects. She denies skin rash or diarrhea. Her breathing is stable. She saw Dr. Farr with endocrinology this morning who has ordered cortisol level to be checked.     PAST MEDICAL HISTORY/SOCIAL HISTORY/FAMILY HISTORY: Reviewed by me and unchanged from my documentation done on 11/18/19.    Review of Systems   Constitutional: Positive for fatigue. Negative for activity change, appetite change, chills, fever and unexpected weight change.   HENT: Negative for hearing loss, mouth sores, nosebleeds, sore throat and trouble swallowing.    Eyes: Negative for visual  disturbance.   Respiratory: Positive for shortness of breath. Negative for cough, chest tightness and wheezing.    Cardiovascular: Negative for chest pain, palpitations and leg swelling.   Gastrointestinal: Positive for nausea. Negative for abdominal distention, abdominal pain, blood in stool, constipation, diarrhea, rectal pain and vomiting.   Endocrine: Negative for cold intolerance and heat intolerance.   Genitourinary: Negative for difficulty urinating, dysuria, frequency and urgency.   Musculoskeletal: Positive for arthralgias and myalgias. Negative for back pain, gait problem and joint swelling.        Left hip/low back pain   Skin: Negative for rash.   Neurological: Positive for numbness. Negative for dizziness, tremors, syncope, weakness, light-headedness and headaches.   Hematological: Negative for adenopathy. Does not bruise/bleed easily.   Psychiatric/Behavioral: Negative for confusion, sleep disturbance and suicidal ideas. The patient is not nervous/anxious.          Current Outpatient Medications:   •  carvedilol (COREG) 6.25 MG tablet, Take 6.25 mg by mouth 2 (Two) Times a Day With Meals., Disp: , Rfl:   •  cetirizine (zyrTEC) 10 MG tablet, Take 10 mg by mouth Daily., Disp: , Rfl:   •  docusate sodium (COLACE) 100 MG capsule, Take 1 capsule by mouth 2 (Two) Times a Day. (Patient taking differently: Take 100 mg by mouth 2 (Two) Times a Day As Needed.), Disp: 60 capsule, Rfl: 3  •  DULoxetine (CYMBALTA) 30 MG capsule, Take 1 capsule by mouth Daily., Disp: , Rfl:   •  ENULOSE 10 GM/15ML solution solution (encephalopathy), TAKE 1 TABLESPOONFUL (15 ML) BY MOUTH TWICE DAILY AS NEEDED, Disp: , Rfl: 3  •  famotidine (PEPCID) 20 MG tablet, Take 20 mg by mouth 2 (Two) Times a Day., Disp: , Rfl:   •  gabapentin (NEURONTIN) 100 MG capsule, , Disp: , Rfl: 0  •  HYDROcodone-acetaminophen (NORCO) 5-325 MG per tablet, Take 1 tablet by mouth Every 8 (Eight) Hours As Needed., Disp: 10 tablet, Rfl: 0  •  insulin aspart  "(novoLOG FLEXPEN) 100 UNIT/ML solution pen-injector sc pen, Inject 0-10 Units under the skin into the appropriate area as directed 3 (Three) Times a Day With Meals. SEE instructions for Sliding Scale, Disp: 15 mL, Rfl: 0  •  lactulose (CHRONULAC) 10 GM/15ML solution, Take 30 mL by mouth Daily As Needed (constipaiton) for up to 7 doses., Disp: 210 mL, Rfl: 0  •  levothyroxine (SYNTHROID, LEVOTHROID) 75 MCG tablet, Take 75 mcg by mouth Daily., Disp: , Rfl:   •  lidocaine-prilocaine (EMLA) 2.5-2.5 % cream, Apply  topically to the appropriate area as directed As Needed (45-60 minutes prior to port access.  Cover with saran/plastic wrap.)., Disp: 30 g, Rfl: 3  •  ondansetron (ZOFRAN) 4 MG tablet, Take 1 tablet by mouth Every 8 (Eight) Hours As Needed for Nausea., Disp: 15 tablet, Rfl: 0  •  ondansetron (ZOFRAN) 8 MG tablet, TAKE ONE TABLET BY MOUTH THREE TIMES A DAY AS NEEDED FOR NAUSEA AND VOMITING, Disp: 90 tablet, Rfl: 5  •  oxyCODONE-acetaminophen (PERCOCET) 5-325 MG per tablet, , Disp: , Rfl: 0  •  pantoprazole (PROTONIX) 40 MG EC tablet, Take 40 mg by mouth Daily., Disp: , Rfl:   •  promethazine (PHENERGAN) 25 MG tablet, Take 1 tablet by mouth Every 6 (Six) Hours As Needed for Nausea or Vomiting., Disp: 45 tablet, Rfl: 5  •  sodium chloride 1 g tablet, Take 1 tablet by mouth Daily., Disp: 30 tablet, Rfl: 0    PHYSICAL EXAMINATION:   /87   Pulse 111   Temp 97 °F (36.1 °C) (Temporal)   Resp 16   Ht 154.9 cm (61\")   Wt 79.4 kg (175 lb)   SpO2 97%   BMI 33.07 kg/m²    ECOG Performance Status: 2 - Symptomatic, <50% confined to bed, appears to be in pain  General Appearance:  alert, cooperative, no apparent distress and appears stated age   Neurologic/Psychiatric: A&O x 3, gait steady, appropriate affect, strength 5/5 in all muscle groups   HEENT:  Normocephalic, without obvious abnormality, mucous membranes moist   Neck: Supple, symmetrical, trachea midline, no adenopathy;  No thyromegaly, masses, or " tenderness   Lungs:   Clear to auscultation bilaterally; respirations regular, even, and unlabored bilaterally   Heart:  Regular rate and rhythm, no murmurs appreciated   Abdomen:   Soft, non-tender, non-distended and no organomegaly   Lymph nodes: No cervical, supraclavicular, inguinal or axillary adenopathy noted   Extremities: Normal, atraumatic; no clubbing, cyanosis, or edema    Skin: No rashes, ulcers, or suspicious lesions noted     No visits with results within 2 Week(s) from this visit.   Latest known visit with results is:   Hospital Outpatient Visit on 10/21/2019   Component Date Value Ref Range Status   • Glucose 10/21/2019 172* 65 - 99 mg/dL Final   • BUN 10/21/2019 22  8 - 23 mg/dL Final   • Creatinine 10/21/2019 1.36* 0.57 - 1.00 mg/dL Final   • Sodium 10/21/2019 127* 136 - 145 mmol/L Final   • Potassium 10/21/2019 4.4  3.5 - 5.2 mmol/L Final   • Chloride 10/21/2019 88* 98 - 107 mmol/L Final   • CO2 10/21/2019 27.0  22.0 - 29.0 mmol/L Final   • Calcium 10/21/2019 9.7  8.6 - 10.5 mg/dL Final   • Total Protein 10/21/2019 6.9  6.0 - 8.5 g/dL Final   • Albumin 10/21/2019 4.20  3.50 - 5.20 g/dL Final   • ALT (SGPT) 10/21/2019 16  1 - 33 U/L Final   • AST (SGOT) 10/21/2019 13  1 - 32 U/L Final   • Alkaline Phosphatase 10/21/2019 69  39 - 117 U/L Final   • Total Bilirubin 10/21/2019 0.3  0.2 - 1.2 mg/dL Final   • eGFR Non African Amer 10/21/2019 39* >60 mL/min/1.73 Final   • Globulin 10/21/2019 2.7  gm/dL Final   • A/G Ratio 10/21/2019 1.6  g/dL Final   • BUN/Creatinine Ratio 10/21/2019 16.2  7.0 - 25.0 Final   • Anion Gap 10/21/2019 12.0  5.0 - 15.0 mmol/L Final   • TSH 10/21/2019 1.680  0.270 - 4.200 uIU/mL Final   • Free T4 10/21/2019 1.85* 0.93 - 1.70 ng/dL Final   • WBC 10/21/2019 7.00  3.40 - 10.80 10*3/mm3 Final   • RBC 10/21/2019 3.05* 3.77 - 5.28 10*6/mm3 Final   • Hemoglobin 10/21/2019 9.4* 12.0 - 15.9 g/dL Final   • Hematocrit 10/21/2019 28.3* 34.0 - 46.6 % Final   • RDW 10/21/2019 13.5  12.3 -  15.4 % Final   • MCV 10/21/2019 92.8  79.0 - 97.0 fL Final   • MCH 10/21/2019 30.8  26.6 - 33.0 pg Final   • MCHC 10/21/2019 33.2  31.5 - 35.7 g/dL Final   • MPV 10/21/2019 5.8* 6.0 - 12.0 fL Final   • Platelets 10/21/2019 302  140 - 450 10*3/mm3 Final   • Neutrophil % 10/21/2019 75.9  42.7 - 76.0 % Final   • Lymphocyte % 10/21/2019 17.5* 19.6 - 45.3 % Final   • Monocyte % 10/21/2019 6.6  5.0 - 12.0 % Final   • Neutrophils, Absolute 10/21/2019 5.30  1.70 - 7.00 10*3/mm3 Final   • Lymphocytes, Absolute 10/21/2019 1.20  0.70 - 3.10 10*3/mm3 Final   • Monocytes, Absolute 10/21/2019 0.50  0.10 - 0.90 10*3/mm3 Final   • Creatinine 10/21/2019 1.40* 0.60 - 1.30 mg/dL Final    Serial Number: [id number]:  [id number]        Ir Central Line Placement    Result Date: 11/16/2019  Narrative: EXAMINATION: IR CENTRAL LINE PLACEMENT-11/13/2019:  INDICATION: BPSC PORT; C34.12-Malignant neoplasm of upper lobe, left bronchus or lung.  COMPARISON: NONE.  FINDINGS: Dictation is to record 36 seconds of fluoroscopy time.  Two intraprocedural images obtained show guidewire placement and Port-A-Cath, tip in the expected location of the mid to distal SVC. No pneumothorax is seen on these images.      Impression: Fluoroscopy provided during Port-A-Cath placement.  D:  11/13/2019 E:  11/13/2019     This report was finalized on 11/16/2019 4:17 PM by DR. Maynor Weaver MD.      Xr Chest 1 View    Result Date: 11/13/2019  Narrative:  EXAMINATION: XR CHEST 1 VW-  INDICATION: malignant neoplasm of upper lobe left bronchus or lung; C34.12-Malignant neoplasm of upper lobe, left bronchus or lung  COMPARISON: 10/06/2019  FINDINGS: Right-sided Port-A-Cath is seen with its tip in the mid SVC. No pneumothorax or new pulmonary parenchymal disease is seen. Heart is upper normal size. Vasculature appears normal. Note is made of clips in the lower neck presumably from previous right hemithyroidectomy.           Impression: Right-sided Port-A-Cath placement as  noted. No pneumothorax or other new chest disease is seen.  This report was finalized on 11/13/2019 10:43 AM by DR. Maynor Weaver MD.      Nm Pet Skull Base To Mid Thigh    Result Date: 10/21/2019  Narrative: EXAMINATION: NM PET SKULL BASE TO MID THIGH- 10/21/2019  INDICATION: C34.12-Malignant neoplasm of upper lobe, left bronchus or lung; follow-up squamous cell lung cancer  TECHNIQUE: With fasting blood glucose level of 170 mg/dL a total of 12.1 mCi of FDG was administered via the right wrist. Following appropriate delay PET/CT imaging was obtained from the skull vertex to the mid thighs bilaterally and fused multiplanar images were reconstructed. The CT scan is an unenhanced study and used for reference to the PET scan only and should not be considered a diagnostic CT scan. PET/CT imaging was reviewed in the axial, coronal, and sagittal planes as well as within the cine mode.  COMPARISON: The initial exam for treatment. No prior studies available for comparison.  FINDINGS: Normal variant activity seen within the oropharynx and muscles of phonation.  Normal variant activity identified in the region of the vocal cords. No hypermetabolic activity identified within the neck to suggest evidence of metastatic disease. There is small focus of hypermetabolic activity correlating to the tip of the odontoid with maximum SUV of 3.5. Findings may be related to degenerative change. Close interval follow-up is recommended.  Within the chest there is hypermetabolic activity seen within the suprahilar region on the left with maximum SUV of 4.5. Findings suggesting malignancy. There is adenopathy identified within the retrocrural region on the left with maximum SUV of 8.3. There is a soft tissue mass in the periaortic region of the diaphragmatic hiatus maximum SUV of 11.5. There is additional retrocrural adenopathy identified on the left more inferiorly with maximum SUV of 9.2. There is a soft tissue mass with no significant bony  involvement in the left anterior pericardial region maximum SUV of 12.4.  Within the abdomen and pelvis the remainder of the examination is grossly unremarkable. Normal variant activity seen within the GI and  tract. No abnormality seen within the upper thighs. Findings suggest possible bursitis on the right hip.      Impression: Hypermetabolic mass identified in the left suprahilar region with adenopathy anterior to the pericardium on the left as well as in the retrocrural regions. There is a periaortic mass as well all suggesting metastatic disease. Follow-up imaging of the tip of the odontoid is recommended with low level activity present suggesting possible degenerative change. There is possible bursitis identified of the right hip.   D:  10/21/2019 E:  10/21/2019  This report was finalized on 10/21/2019 4:35 PM by Dr. Amelie Teran MD.        ASSESSMENT: The patient is a very pleasant 64 y.o. female  with  Left upper lobe small cell lung cancer [id number] stage IIIa disease:  A.  Presented with shortness of breath.  B.  Status post bronchoscopy with a biopsy done on November 9, 2018 revealed small cell lung cancer   C. Started chemotherapy with cisplatin and etoposide November 15, 2018, status post 5 cycles, completed February 2019.  D. Completed concurrent radiation January 18, 2019  E.  Completed prophylactic whole brain radiation May 13 2019  F.  Relapsed disease with thoracic upper abdominal lymphadenopathy documented whole-body PET scan done on October 21, 2019  G.  Started Opdivo 480 mg IV every 4 weeks October 21, 2019  2.  Normocytic anemia   3.  Insomnia  4.  Nausea  5. Type 2 diabetes    PLAN:  1.  We will proceed today with treatment as scheduled using Opdivo 480 mg given IV once every 4 weeks, cycle #2 of treatment.   2. We will monitor the patient's labs throughout treatment including blood counts, kidney function, liver functions, electrolytes, and thyroid function.    3. We will repeat CAT  scans and MRI brain following cycle #3 of treatment.   4. She will continue physical therapy twice a week for left hip and back pain. We will increase her Percocet to 7.5/325 mg every 6 hours as needed for pain.   5. She will continue gabapentin 100 mg at bedtime for pain.  6. We again reviewed potential side effects of immunotherapy including but not limited to immune mediated reactions with thyroiditis, pneumonitis, hepatitis, colitis, rash, and electrolytes abnormalities, fatigue, multiorgan failure, and possibly death.  7. We will see the patient back in 4 weeks for next cycle of treatment.   8. She will continue Protonix 40 mg daily for acid reflux.   9. She will continue routine port care with EMLA cream applied prior to access.   10.  We will continue Zofran 8 mg every 8 hours as needed for treatment induced nausea.   11.  We'll continue Restoril as needed for insomnia.  12. She will continue Colace and Lactulose as needed for constipation.   13. We reviewed again the potential side effects of immunotherapy including but not limited to immune mediated reactions with thyroiditis, pneumonitis, hepatitis, colitis, rash, and electrolytes abnormalities, fatigue, multiorgan failure, and possibly death.    Olive Trujillo, APRN  11/18/2019

## 2019-11-19 NOTE — TELEPHONE ENCOUNTER
----- Message from Soni Murcia sent at 11/19/2019  1:20 PM EST -----  Regarding: BADIN-PRESCRIPTION  Contact: [phone]  Shyanne patient's daughter called and the Hydrocodone was supposed to be called into [facility] in [place], [place] not [facility] and this be sent over to them? Please call.

## 2019-11-19 NOTE — TELEPHONE ENCOUNTER
Patient came to the office today because she thought that she had an appointment.  Her appointment is on Friday for Botox. .  She complains of migraine . 9/10 pain scale.    Patient may have a medrol dose hina per vo Dr. Basilio. Patient notified and v/u.  She will keep her appointment for Friday.   resent to pharmacy

## 2019-11-25 NOTE — PLAN OF CARE
I answered patient's portal question about drug drug interactions with CBD oil and patient's medications. I performed a drug interactions check and found no interactions. I spoke with the patient's daughter and notified her that I could not find anything that would indicate there was a major interaction between CBD oil and her mother's medications. I also notified her that there was limited scientific data on CBD. The patient told me that she would be obtaining the product through a pharmacy.

## 2019-11-25 NOTE — TELEPHONE ENCOUNTER
Patient's daughter called and wants to know if she could try some CBD oil? Wants to make sure if won't interfere with any of her other medications? Please call.

## 2019-12-12 NOTE — TELEPHONE ENCOUNTER
Patient's daughter wants a call back to discuss her recent visit at [facility]. Please call.

## 2019-12-12 NOTE — TELEPHONE ENCOUNTER
Dr. Singleton just called to confirm that we have Shauna's CT scan so that it can be looked over before her next appt. I let her know that we do and told her to call back on Monday if she still wants to talk to Dr. Cardoso.     Her call back number is  (her cell phone).

## 2019-12-12 NOTE — TELEPHONE ENCOUNTER
Jade states that patient was at [facility] for pain, and they performed a scan which showed that the cancer may have spread to multiple other areas. Advised her that I will contact the hospital to have all of her records sent to our office for Dr Cardoso to review prior to her appt on Monday, and requested that she bring a copy of the disc with the scan so that will be available to look at as well.  Pt is seen by an outside palliative team, so I advised her to contact them to see if they can see patient either today or tomorrow to help with her pain instead of waiting until Tuesday when they are scheduled to see her.   Advised to keep appt as scheduled on Monday, but to call back sooner if she has any questions or concerns.  Also advised her that we have left messages with her PCP's office to return call if still needing to discuss her care with Dr Cardoso

## 2019-12-16 NOTE — PROGRESS NOTES
DATE OF VISIT: 12/16/2019    REASON FOR VISIT: Followup for limited stage small cell lung cancer     HISTORY OF PRESENT ILLNESS: The patient is a very pleasant 64 y.o. female  with past medical history significant for limited stage small cell lung cancer diagnosed November 9, 2018.  She was started on chemotherapy using cisplatin and etoposide November 15, 2018. She completed 5 cycles on February 2019. Cycle 6 was not given secondary to progressive fatigue and multiple side effects.  Repeat scans done October 21, 2019 revealed relapsed disease with mediastinal and upper abdominal lymphadenopathy.  She was started on Opdivo single agent October 21, 2019. The patient is here today for scheduled follow-up visit with treatment cycle #3.    SUBJECTIVE: The patient is here today with her daughter.  She is having more pain.  Percocet 10 and fentanyl 12 are not helping.  He is having progressive constipation.    PAST MEDICAL HISTORY/SOCIAL HISTORY/FAMILY HISTORY: Reviewed by me and unchanged from my documentation done on 12/16/19.    Review of Systems   Constitutional: Positive for fatigue. Negative for activity change, appetite change, chills, fever and unexpected weight change.   HENT: Negative for hearing loss, mouth sores, nosebleeds, sore throat and trouble swallowing.    Eyes: Negative for visual disturbance.   Respiratory: Positive for shortness of breath. Negative for cough, chest tightness and wheezing.    Cardiovascular: Negative for chest pain, palpitations and leg swelling.   Gastrointestinal: Positive for nausea. Negative for abdominal distention, abdominal pain, blood in stool, constipation, diarrhea, rectal pain and vomiting.   Endocrine: Negative for cold intolerance and heat intolerance.   Genitourinary: Negative for difficulty urinating, dysuria, frequency and urgency.   Musculoskeletal: Positive for arthralgias and myalgias. Negative for back pain, gait problem and joint swelling.        Left hip/low back  pain   Skin: Negative for rash.   Neurological: Positive for numbness. Negative for dizziness, tremors, syncope, weakness, light-headedness and headaches.   Hematological: Negative for adenopathy. Does not bruise/bleed easily.   Psychiatric/Behavioral: Negative for confusion, sleep disturbance and suicidal ideas. The patient is not nervous/anxious.          Current Outpatient Medications:   •  carvedilol (COREG) 6.25 MG tablet, Take 6.25 mg by mouth 2 (Two) Times a Day With Meals., Disp: , Rfl:   •  cetirizine (zyrTEC) 10 MG tablet, Take 10 mg by mouth Daily., Disp: , Rfl:   •  docusate sodium (COLACE) 100 MG capsule, Take 1 capsule by mouth 2 (Two) Times a Day. (Patient taking differently: Take 100 mg by mouth 2 (Two) Times a Day As Needed.), Disp: 60 capsule, Rfl: 3  •  DULoxetine (CYMBALTA) 30 MG capsule, Take 1 capsule by mouth Daily., Disp: , Rfl:   •  ENULOSE 10 GM/15ML solution solution (encephalopathy), TAKE 1 TABLESPOONFUL (15 ML) BY MOUTH TWICE DAILY AS NEEDED, Disp: , Rfl: 3  •  famotidine (PEPCID) 20 MG tablet, Take 20 mg by mouth 2 (Two) Times a Day., Disp: , Rfl:   •  gabapentin (NEURONTIN) 100 MG capsule, , Disp: , Rfl: 0  •  insulin aspart (novoLOG FLEXPEN) 100 UNIT/ML solution pen-injector sc pen, Inject 0-10 Units under the skin into the appropriate area as directed 3 (Three) Times a Day With Meals. SEE instructions for Sliding Scale, Disp: 15 mL, Rfl: 0  •  lactulose (CHRONULAC) 10 GM/15ML solution, Take 30 mL by mouth Daily As Needed (constipaiton) for up to 7 doses., Disp: 210 mL, Rfl: 0  •  levothyroxine (SYNTHROID, LEVOTHROID) 75 MCG tablet, Take 75 mcg by mouth Daily., Disp: , Rfl:   •  lidocaine-prilocaine (EMLA) 2.5-2.5 % cream, Apply  topically to the appropriate area as directed As Needed (45-60 minutes prior to port access.  Cover with saran/plastic wrap.)., Disp: 30 g, Rfl: 3  •  ondansetron (ZOFRAN) 8 MG tablet, TAKE ONE TABLET BY MOUTH THREE TIMES A DAY AS NEEDED FOR NAUSEA AND  "VOMITING, Disp: 90 tablet, Rfl: 5  •  pantoprazole (PROTONIX) 40 MG EC tablet, Take 40 mg by mouth Daily., Disp: , Rfl:   •  promethazine (PHENERGAN) 25 MG tablet, Take 1 tablet by mouth Every 6 (Six) Hours As Needed for Nausea or Vomiting., Disp: 45 tablet, Rfl: 5  •  sodium chloride 1 g tablet, Take 1 tablet by mouth Daily., Disp: 30 tablet, Rfl: 0  •  fentaNYL (DURAGESIC) 25 MCG/HR patch, Place 1 patch on the skin as directed by provider Every 72 (Seventy-Two) Hours., Disp: 10 patch, Rfl: 0  •  oxyCODONE (ROXICODONE) 15 MG immediate release tablet, Take 1 tablet by mouth Every 4 (Four) Hours As Needed for Moderate Pain ., Disp: 180 tablet, Rfl: 0    PHYSICAL EXAMINATION:   /72   Pulse 91   Temp 97.4 °F (36.3 °C) (Temporal)   Resp 22   Ht 154.9 cm (61\")   Wt 76.2 kg (167 lb 14.4 oz)   SpO2 98%   BMI 31.72 kg/m²    ECOG Performance Status: 2 - Symptomatic, <50% confined to bed, appears to be in pain  General Appearance:  alert, cooperative, no apparent distress and appears stated age   Neurologic/Psychiatric: A&O x 3, gait steady, appropriate affect, strength 5/5 in all muscle groups   HEENT:  Normocephalic, without obvious abnormality, mucous membranes moist   Neck: Supple, symmetrical, trachea midline, no adenopathy;  No thyromegaly, masses, or tenderness   Lungs:   Clear to auscultation bilaterally; respirations regular, even, and unlabored bilaterally   Heart:  Regular rate and rhythm, no murmurs appreciated   Abdomen:   Soft, non-tender, non-distended and no organomegaly   Lymph nodes: No cervical, supraclavicular, inguinal or axillary adenopathy noted   Extremities: Normal, atraumatic; no clubbing, cyanosis, or edema    Skin: No rashes, ulcers, or suspicious lesions noted     No visits with results within 2 Week(s) from this visit.   Latest known visit with results is:   Hospital Outpatient Visit on 11/18/2019   Component Date Value Ref Range Status   • Glucose 11/18/2019 186* 65 - 99 mg/dL Final "   • BUN 11/18/2019 14  8 - 23 mg/dL Final   • Creatinine 11/18/2019 1.13* 0.57 - 1.00 mg/dL Final   • Sodium 11/18/2019 132* 136 - 145 mmol/L Final   • Potassium 11/18/2019 3.8  3.5 - 5.2 mmol/L Final   • Chloride 11/18/2019 91* 98 - 107 mmol/L Final   • CO2 11/18/2019 29.0  22.0 - 29.0 mmol/L Final   • Calcium 11/18/2019 9.8  8.6 - 10.5 mg/dL Final   • Total Protein 11/18/2019 7.0  6.0 - 8.5 g/dL Final   • Albumin 11/18/2019 4.30  3.50 - 5.20 g/dL Final   • ALT (SGPT) 11/18/2019 21  1 - 33 U/L Final   • AST (SGOT) 11/18/2019 21  1 - 32 U/L Final    Specimen hemolyzed.  Results may be affected.   • Alkaline Phosphatase 11/18/2019 67  39 - 117 U/L Final   • Total Bilirubin 11/18/2019 0.3  0.2 - 1.2 mg/dL Final   • eGFR Non African Amer 11/18/2019 49* >60 mL/min/1.73 Final   • Globulin 11/18/2019 2.7  gm/dL Final   • A/G Ratio 11/18/2019 1.6  g/dL Final   • BUN/Creatinine Ratio 11/18/2019 12.4  7.0 - 25.0 Final   • Anion Gap 11/18/2019 12.0  5.0 - 15.0 mmol/L Final   • TSH 11/18/2019 1.100  0.270 - 4.200 uIU/mL Final   • Free T4 11/18/2019 1.71* 0.93 - 1.70 ng/dL Final   • WBC 11/18/2019 5.50  3.40 - 10.80 10*3/mm3 Final   • RBC 11/18/2019 3.00* 3.77 - 5.28 10*6/mm3 Final   • Hemoglobin 11/18/2019 9.4* 12.0 - 15.9 g/dL Final   • Hematocrit 11/18/2019 27.8* 34.0 - 46.6 % Final   • RDW 11/18/2019 13.4  12.3 - 15.4 % Final   • MCV 11/18/2019 92.8  79.0 - 97.0 fL Final   • MCH 11/18/2019 31.4  26.6 - 33.0 pg Final   • MCHC 11/18/2019 33.9  31.5 - 35.7 g/dL Final   • MPV 11/18/2019 5.5* 6.0 - 12.0 fL Final   • Platelets 11/18/2019 324  140 - 450 10*3/mm3 Final   • Neutrophil % 11/18/2019 68.6  42.7 - 76.0 % Final   • Lymphocyte % 11/18/2019 22.6  19.6 - 45.3 % Final   • Monocyte % 11/18/2019 8.8  5.0 - 12.0 % Final   • Neutrophils, Absolute 11/18/2019 3.80  1.70 - 7.00 10*3/mm3 Final   • Lymphocytes, Absolute 11/18/2019 1.20  0.70 - 3.10 10*3/mm3 Final   • Monocytes, Absolute 11/18/2019 0.50  0.10 - 0.90 10*3/mm3 Final   •  Cortisol - AM 11/18/2019 10.48  mcg/dL Final   • Creatinine 11/18/2019 1.10  mg/dL Final   • Creatinine 11/18/2019 1.10  0.60 - 1.30 mg/dL Final    Serial Number: [id number]  [id number]        No results found.    ASSESSMENT: The patient is a very pleasant 64 y.o. female  with  Left upper lobe small cell lung cancer [id number] stage IIIa disease:  A.  Presented with shortness of breath.  B.  Status post bronchoscopy with a biopsy done on November 9, 2018 revealed small cell lung cancer   C. Started chemotherapy with cisplatin and etoposide November 15, 2018, status post 5 cycles, completed February 2019.  D. Completed concurrent radiation January 18, 2019  E.  Completed prophylactic whole brain radiation May 13 2019  F.  Relapsed disease with thoracic upper abdominal lymphadenopathy documented whole-body PET scan done on October 21, 2019  G.  Started Opdivo 480 mg IV every 4 weeks October 21, 2019, status post 2 cycles.  2.  Normocytic anemia   3.  Insomnia  4.  Nausea  5. Type 2 diabetes    PLAN:  1.  I did go over the scan results that she did on December 9, 2019 at outside facility.  Unfortunately did show rapidly progressive disease with increase in size of abdominal lymphadenopathy as well as pleural metastatic deposits.  2.  At this point given her progressive disease as well as worsening performance status I do recommend holding treatment.  3.  I will arrange for home hospice.  4.  I will change her pain medicine to oxycodone 15 mg every 6 hours as needed as well as fentanyl 25 mcg patches every 72 hours for cancer related pain.  5. She will continue gabapentin 100 mg at bedtime for pain.  6.  She will follow-up with me in 1 month.  7.  Consider reinitiating treatment if she is feeling better or getting stronger treatment option would include Taxol, Toprol T can, or Opdivo plus Yervoy.  On the other hand if she continues to decline we will continue hospice care.   8. She will continue Protonix 40 mg daily for  acid reflux.   9. She will continue routine port care with EMLA cream applied prior to access.   10.  We will continue Zofran 8 mg every 8 hours as needed for treatment induced nausea.   11.  We'll continue Restoril as needed for insomnia.  12. She will continue Colace and Lactulose as needed for constipation.     Desmond Cardoso MD  12/16/2019

## 2020-01-01 NOTE — TELEPHONE ENCOUNTER
Pt's daughter called with concerns for mothers change in status.  She states she is lethargic and unsteady on her feet.  She states she is confused a lot and sleeps all the time.  Discussed with Dr. Samson and called daughter back.  Explained she needs to be seen by her medical oncologist for evaluation or if she feels the pt is in need of evaluation now she needs to take her to the closest emergency room.  Daughter verbalized understanding and states she will try to get her in to see Dr. Cardoso tomorrow.    Statement Selected

## 2020-01-13 ENCOUNTER — APPOINTMENT (OUTPATIENT)
Dept: CT IMAGING | Facility: HOSPITAL | Age: 65
End: 2020-01-13

## 2020-01-13 ENCOUNTER — APPOINTMENT (OUTPATIENT)
Dept: MRI IMAGING | Facility: HOSPITAL | Age: 65
End: 2020-01-13

## 2023-03-14 NOTE — PROGRESS NOTES
[facility] Medicine Services  PROGRESS NOTE    Patient Name: Shauna Valencia  : 1955  MRN: [id number]    Date of Admission: 10/31/2018  Length of Stay: 2  Primary Care Physician: Rox Singleton MD    Subjective   Subjective     CC:  Weakness    HPI:  Dry cough. No shortness of breath. Weakness better. Slept poorly, HA noted.    Review of Systems    Otherwise ROS is negative except as mentioned in the HPI.    Objective   Objective     Vital Signs:   Temp:  [97.5 °F (36.4 °C)-97.7 °F (36.5 °C)] 97.5 °F (36.4 °C)  Heart Rate:  [66-81] 66  Resp:  [18] 18  BP: (118-153)/(62-81) 140/77        Physical Exam:  NAD, alert and oriented  OP clear, MMM  PERRL  Neck supple  RRR  CTAB  +BS, ND, NT  POLLACK  No c/c/e  No rashes  Normal affect  No change     Results Reviewed:  I have personally reviewed current lab, radiology, and data and agree.      Results from last 7 days  Lab Units 10/31/18  1710   WBC 10*3/mm3 7.54   HEMOGLOBIN g/dL 11.7   HEMATOCRIT % 33.8*   PLATELETS 10*3/mm3 422       Results from last 7 days  Lab Units 18  0758 18  0343 18  0022   SODIUM mmol/L 121* 121* 122*   POTASSIUM mmol/L 3.8 3.8 3.6   CHLORIDE mmol/L 88* 87* 89*   CO2 mmol/L 31.0 26.0 26.0   BUN mg/dL 11 12 12   CREATININE mg/dL 0.71 0.74 0.79   GLUCOSE mg/dL 108* 154* 135*   CALCIUM mg/dL 8.8 8.5* 8.8     Estimated Creatinine Clearance: 85 mL/min (by C-G formula based on SCr of 0.71 mg/dL).  No results found for: BNP    Microbiology Results Abnormal     None          Imaging Results (last 24 hours)     Procedure Component Value Units Date/Time    Fiberoptic Endo (fees) [[id number]] Resulted:  18     Updated:  18    Narrative:       This procedure was auto-finalized with no dictation required.             I have reviewed the medications.      aspirin 81 mg Oral Daily   carvedilol 6.25 mg Oral BID With Meals   cetirizine 10 mg Oral Daily   enoxaparin 40 mg Subcutaneous Q24H    famotidine 20 mg Oral Daily   insulin lispro 0-7 Units Subcutaneous 4x Daily With Meals & Nightly   levothyroxine 75 mcg Oral Daily   nicotine 1 patch Transdermal Q24H   pantoprazole 40 mg Oral Q AM   sodium chloride 1 g Oral TID With Meals         Assessment/Plan   Assessment / Plan     Active Hospital Problems    Diagnosis   • Mass of upper lobe of left lung   • Mediastinal lymphadenopathy   • Acute hyponatremia   • Hyperlipidemia   • Type 2 diabetes mellitus (CMS/HCC)   • Tobacco abuse   • Obesity (BMI 30-39.9)          Brief Hospital Course to date:  Shauna Valencia is a 63 y.o. female here with hyponatremia, likely SIADH, and found to have a lung mass      Assessment & Plan:  L hilar mass  --bronchoscopy next week  Hx of carotid stent per chart  --hold Brilinta, aspirin only  Probable post-obstructive pneumonia/bronchiolitis  --abx stopped per pulmonary  Hyponatremia  --probable SIADH  --fluid restrict  --checked AM cortisol  --TSH normal  --ask for NAL opinion regarding demeclocycline/tolvaptan  Tobacco abuse  Obesity  HL    DVT Prophylaxis:  Lovenox    CODE STATUS:   Code Status and Medical Interventions:   Ordered at: 10/31/18 1909     Level Of Support Discussed With:    Patient     Code Status:    CPR     Medical Interventions (Level of Support Prior to Arrest):    Full       Disposition: I expect the patient to be discharged TBD.      Electronically signed by Maynor Ansari MD, 11/02/18, 9:37 AM.       Infliximab Counseling:  I discussed with the patient the risks of infliximab including but not limited to myelosuppression, immunosuppression, autoimmune hepatitis, demyelinating diseases, lymphoma, and serious infections.  The patient understands that monitoring is required including a PPD at baseline and must alert us or the primary physician if symptoms of infection or other concerning signs are noted.

## (undated) DEVICE — LUER-LOK 360°: Brand: CONNECTA, LUER-LOK

## (undated) DEVICE — ST NDL BRONCH ASP VIZISHOT 2 FLX 19GA

## (undated) DEVICE — SINGLE USE BIOPSY VALVE MAJ-210: Brand: SINGLE USE BIOPSY VALVE (STERILE)

## (undated) DEVICE — CANNULA,ADULT,SOFT-TOUCH,7'TUBE,UC: Brand: PENDING

## (undated) DEVICE — COL SPUTUM SYS 50ML

## (undated) DEVICE — ADAPT SWVL FIBROPTIC BRONCH

## (undated) DEVICE — Device: Brand: SINGLE USE ASPIRATION NEEDLE NA-U401SX

## (undated) DEVICE — BOWL UTIL STRL 32OZ

## (undated) DEVICE — Device: Brand: BALLOON

## (undated) DEVICE — ST EXT MICROBORE FIX M LL 38IN

## (undated) DEVICE — 3-WAY STOPCOCK: Brand: MAXGUARD

## (undated) DEVICE — BRUSH CYTO BRONCOSCOPE

## (undated) DEVICE — TRAP,MUCUS SPECIMEN,40CC: Brand: MEDLINE

## (undated) DEVICE — SINGLE USE SUCTION VALVE MAJ-209: Brand: SINGLE USE SUCTION VALVE (STERILE)

## (undated) DEVICE — FRCP BX RADJAW4 PULM WO NDL STD1.8X2 100

## (undated) DEVICE — CONN STD FOR/O2 TBG